# Patient Record
Sex: FEMALE | Race: BLACK OR AFRICAN AMERICAN | NOT HISPANIC OR LATINO | Employment: OTHER | URBAN - METROPOLITAN AREA
[De-identification: names, ages, dates, MRNs, and addresses within clinical notes are randomized per-mention and may not be internally consistent; named-entity substitution may affect disease eponyms.]

---

## 2017-01-04 DIAGNOSIS — M54.2 CHRONIC NECK PAIN: ICD-10-CM

## 2017-01-04 DIAGNOSIS — G89.29 CHRONIC NECK PAIN: ICD-10-CM

## 2017-01-04 DIAGNOSIS — M17.0 PRIMARY OSTEOARTHRITIS OF BOTH KNEES: ICD-10-CM

## 2017-01-04 RX ORDER — OXYCODONE AND ACETAMINOPHEN 10; 325 MG/1; MG/1
1 TABLET ORAL
Qty: 150 TABLET | Refills: 0 | Status: SHIPPED | OUTPATIENT
Start: 2017-01-04 | End: 2017-02-06 | Stop reason: SDUPTHER

## 2017-01-27 DIAGNOSIS — G89.29 CHRONIC NECK PAIN: ICD-10-CM

## 2017-01-27 DIAGNOSIS — M54.2 CHRONIC NECK PAIN: ICD-10-CM

## 2017-01-27 DIAGNOSIS — M54.50 CHRONIC LOW BACK PAIN: ICD-10-CM

## 2017-01-27 DIAGNOSIS — G89.29 CHRONIC LOW BACK PAIN: ICD-10-CM

## 2017-01-27 DIAGNOSIS — M17.0 PRIMARY OSTEOARTHRITIS OF BOTH KNEES: ICD-10-CM

## 2017-01-27 RX ORDER — MELOXICAM 15 MG/1
TABLET ORAL
Qty: 90 TABLET | Refills: 0 | Status: SHIPPED | OUTPATIENT
Start: 2017-01-27 | End: 2017-05-01 | Stop reason: SDUPTHER

## 2017-01-28 DIAGNOSIS — J30.9 ALLERGIC RHINITIS: ICD-10-CM

## 2017-01-30 RX ORDER — CETIRIZINE HYDROCHLORIDE 10 MG/1
TABLET ORAL
Qty: 30 TABLET | Refills: 0 | Status: SHIPPED | OUTPATIENT
Start: 2017-01-30 | End: 2017-02-27 | Stop reason: SDUPTHER

## 2017-02-03 ENCOUNTER — TELEPHONE (OUTPATIENT)
Dept: PHYSICAL MEDICINE AND REHAB | Facility: CLINIC | Age: 67
End: 2017-02-03

## 2017-02-03 NOTE — TELEPHONE ENCOUNTER
----- Message from Raven Schmitt sent at 2/3/2017  2:25 PM CST -----  Contact: Patient [phone]  Patient is calling to see if he can get his appt for 10:30 on Monday 2/6/17, he is currently scheduled for 11:20 am that day. Patient says his girlfriend has an appt for 10:30 am that day and she is his ride. Please call.

## 2017-02-03 NOTE — TELEPHONE ENCOUNTER
----- Message from Raven Schmitt sent at 2/3/2017  2:25 PM CST -----  Contact: Patient [phone]  Patient is calling to see if he can get his appt for 10:30 on Monday 2/6/17, he is currently scheduled for 11:20 am that day. Patient says his girlfriend has an appt for 10:30 am that day and she is his ride. Please call.

## 2017-02-06 ENCOUNTER — OFFICE VISIT (OUTPATIENT)
Dept: PHYSICAL MEDICINE AND REHAB | Facility: CLINIC | Age: 67
End: 2017-02-06
Payer: COMMERCIAL

## 2017-02-06 VITALS
HEIGHT: 62 IN | SYSTOLIC BLOOD PRESSURE: 136 MMHG | DIASTOLIC BLOOD PRESSURE: 81 MMHG | WEIGHT: 271.19 LBS | HEART RATE: 88 BPM | BODY MASS INDEX: 49.9 KG/M2

## 2017-02-06 DIAGNOSIS — M47.26 OSTEOARTHRITIS OF SPINE WITH RADICULOPATHY, LUMBAR REGION: ICD-10-CM

## 2017-02-06 DIAGNOSIS — M17.0 PRIMARY OSTEOARTHRITIS OF BOTH KNEES: ICD-10-CM

## 2017-02-06 DIAGNOSIS — M48.061 LUMBAR SPINAL STENOSIS: ICD-10-CM

## 2017-02-06 DIAGNOSIS — M47.22 OSTEOARTHRITIS OF SPINE WITH RADICULOPATHY, CERVICAL REGION: ICD-10-CM

## 2017-02-06 DIAGNOSIS — G89.29 CHRONIC MIDLINE LOW BACK PAIN WITH BILATERAL SCIATICA: Primary | ICD-10-CM

## 2017-02-06 DIAGNOSIS — G89.29 CHRONIC NECK PAIN: ICD-10-CM

## 2017-02-06 DIAGNOSIS — M54.12 CERVICAL RADICULOPATHY: ICD-10-CM

## 2017-02-06 DIAGNOSIS — M54.16 BILATERAL LUMBAR RADICULOPATHY: ICD-10-CM

## 2017-02-06 DIAGNOSIS — E66.01 MORBID OBESITY, UNSPECIFIED OBESITY TYPE: ICD-10-CM

## 2017-02-06 DIAGNOSIS — M54.41 CHRONIC MIDLINE LOW BACK PAIN WITH BILATERAL SCIATICA: Primary | ICD-10-CM

## 2017-02-06 DIAGNOSIS — M54.42 CHRONIC MIDLINE LOW BACK PAIN WITH BILATERAL SCIATICA: Primary | ICD-10-CM

## 2017-02-06 DIAGNOSIS — M54.2 CHRONIC NECK PAIN: ICD-10-CM

## 2017-02-06 PROCEDURE — 3075F SYST BP GE 130 - 139MM HG: CPT | Mod: S$GLB,,, | Performed by: PHYSICAL MEDICINE & REHABILITATION

## 2017-02-06 PROCEDURE — 99214 OFFICE O/P EST MOD 30 MIN: CPT | Mod: S$GLB,,, | Performed by: PHYSICAL MEDICINE & REHABILITATION

## 2017-02-06 PROCEDURE — 3079F DIAST BP 80-89 MM HG: CPT | Mod: S$GLB,,, | Performed by: PHYSICAL MEDICINE & REHABILITATION

## 2017-02-06 PROCEDURE — 99999 PR PBB SHADOW E&M-EST. PATIENT-LVL III: CPT | Mod: PBBFAC,,, | Performed by: PHYSICAL MEDICINE & REHABILITATION

## 2017-02-06 RX ORDER — OXYCODONE AND ACETAMINOPHEN 10; 325 MG/1; MG/1
1 TABLET ORAL
Qty: 150 TABLET | Refills: 0 | Status: SHIPPED | OUTPATIENT
Start: 2017-02-13 | End: 2017-02-10 | Stop reason: SDUPTHER

## 2017-02-06 RX ORDER — CYCLOBENZAPRINE HCL 10 MG
10 TABLET ORAL NIGHTLY PRN
Qty: 90 TABLET | Refills: 1 | Status: SHIPPED | OUTPATIENT
Start: 2017-02-06 | End: 2017-08-04 | Stop reason: SDUPTHER

## 2017-02-06 RX ORDER — GABAPENTIN 300 MG/1
300 CAPSULE ORAL 3 TIMES DAILY
Qty: 90 CAPSULE | Refills: 2 | Status: SHIPPED | OUTPATIENT
Start: 2017-02-06 | End: 2017-06-05 | Stop reason: SINTOL

## 2017-02-06 NOTE — PROGRESS NOTES
Subjective:       Patient ID: Kuldeep Villela is a 66 y.o. female.    Chief Complaint: No chief complaint on file.    HPI    HISTORY OF PRESENT ILLNESS:  Ms. Villela is a 66-year-old black female who is   followed up in the [facility] for chronic low back pain with   bilateral lumbar radiculopathy, chronic neck pain with bilateral cervical   radiculopathy and OA of the knees.  Her last visit to the clinic was on   11/07/2016.  She was maintained on gabapentin, p.r.n. cyclobenzaprine and p.r.n.   oxycodone.    The patient is coming today to the clinic for followup and refill of her pain   medications.  Her back pain is still a constant severe aching pain in the lumbar   spine and across her back.  The pain radiates to both lower extremities down to   the feet with numbness and tingling.  Her pain is worse with activity and   better with rest.  Her maximum pain is 9-10/10 and minimum 8/10.  Today, it is   9/10.  The patient denies any change in her lower extremity weakness.  She   denies any bowel or bladder incontinence.    She continues to complain of severe neck pain.  It is a constant aching pain in   the cervical spine.  The pain radiates to both upper extremities down to the   hands with numbness.  Her pain is worse with activity and better with rest.  Her   maximum pain is 9-10/10 and minimum 8/10.  Today, it is 9/10.  The patient   complains of chronic upper extremity weakness.    She complains of severe bilateral knee pain.  It is worse with weightbearing and   better with sitting down or lying down.  Her maximum pain is 9-10/10 and   minimum 6/10.  Today, it is 7-8/10.    She is currently taking gabapentin 300 mg p.o. twice per day, meloxicam 15 mg   p.o. once per day, oxycodone/APAP 10/325 p.r.n., usually 4-5 times per day and   cyclobenzaprine 10 mg p.r.n., usually at bedtime for help with muscle spasms and   sleep.  The patient is intending to apply for disability.      MS/HN  dd: 02/06/2017  12:29:22 (CST)  td: 02/06/2017 22:59:08 (CST)  Doc ID   #[id number]  Job ID #[id number]    CC:         Review of Systems   Constitutional: Positive for fatigue.   Eyes: Negative for visual disturbance.   Respiratory: Positive for shortness of breath.    Cardiovascular: Negative for chest pain.   Gastrointestinal: Positive for constipation. Negative for nausea and vomiting.   Genitourinary: Negative for difficulty urinating.   Musculoskeletal: Positive for back pain, gait problem, neck pain and neck stiffness.   Skin: Negative for rash.   Neurological: Positive for dizziness and headaches.   Psychiatric/Behavioral: Positive for sleep disturbance. Negative for behavioral problems.       Objective:      Physical Exam   Constitutional: She appears well-developed and well-nourished.   Ambulating with a rollator walker.   Neck:   Decreased ROM.  +ve tenderness over cervical spine.   Musculoskeletal:   BUE:  ROM: decreased at shoulders.  Strength:    RUE: 4/5 at shoulder abduction, 4 elbow flexion, 4 elbow extension, 4 hand .   LUE: 3+/5 at shoulder abduction, 4 elbow flexion, 4 elbow extension, 4 hand .  Sensation to pinprick:   RUE: intact.   LUE: intact.       BLE:  ROM:full.  Bilateral knee crepitus.   Strength:    RLE: 4-/5 at hip flexion, 4 knee extension, 4 ankle DF/PF.   LLE: 4-/5 at hip flexion, 4 knee extension, 4 ankle DF/PF.  Sensation to pinprick:     RLE: intact.     LLE: intact.  SLR (sitting):    RLE: +ve.     LLE: +ve.   +ve diffuse tenderness over lumbar spine.       Neurological: She is alert.   Psychiatric: Her behavior is normal.   Anxious.   Vitals reviewed.        Assessment:       1. Chronic midline low back pain with bilateral sciatica    2. Osteoarthritis of spine with radiculopathy, lumbar region    3. Bilateral lumbar radiculopathy    4. Lumbar spinal stenosis at L4-L5.    5. Chronic neck pain    6. Cervical radiculopathy    7. Osteoarthritis of spine with radiculopathy, cervical region    8.  Primary osteoarthritis of both knees    9. Morbid obesity, unspecified obesity type        Plan:     - Increase gabapentin (NEURONTIN) 300 MG capsule; Take 1 capsule (300 mg total) by mouth 3 times.  - Continue meloxicam 15 mg po qd.  - Continue cyclobenzaprine (FLEXERIL) 10 MG tablet; Take 1 tablet (10 mg total) by mouth 2 (two) times daily as needed.  - Continue oxycodone-acetaminophen (PERCOCET)  mg per tablet; Take 1 tablet by mouth every 4 to 6 hours as needed for Pain.  - The patienet is not interested in referral for Epidural Steroid Injections.  - Return in about 4 months (around 6/6/2017).

## 2017-02-10 DIAGNOSIS — G89.29 CHRONIC NECK PAIN: ICD-10-CM

## 2017-02-10 DIAGNOSIS — M17.0 PRIMARY OSTEOARTHRITIS OF BOTH KNEES: ICD-10-CM

## 2017-02-10 DIAGNOSIS — M54.2 CHRONIC NECK PAIN: ICD-10-CM

## 2017-02-10 NOTE — TELEPHONE ENCOUNTER
---02/06/17 last office visit  02/06/17 last Rx refill  06/07/17 RTC      -- Message from Raven Schmitt sent at 2/10/2017 11:19 AM [facility] -----  Contact: Patient [phone]  Patient is calling to request a refill on her oxycodone-acetaminophen (PERCOCET)  mg per tablet      [facility] [address] [place] [address] [address] [place] AT [address] [place] [address]  Phone: [phone] Fax: [phone]

## 2017-02-11 RX ORDER — OXYCODONE AND ACETAMINOPHEN 10; 325 MG/1; MG/1
1 TABLET ORAL
Qty: 150 TABLET | Refills: 0 | Status: SHIPPED | OUTPATIENT
Start: 2017-02-13 | End: 2017-03-10 | Stop reason: SDUPTHER

## 2017-02-27 DIAGNOSIS — F39 MOOD DISORDER: ICD-10-CM

## 2017-02-27 DIAGNOSIS — J30.9 ALLERGIC RHINITIS: ICD-10-CM

## 2017-02-27 RX ORDER — CETIRIZINE HYDROCHLORIDE 10 MG/1
TABLET ORAL
Qty: 30 TABLET | Refills: 0 | Status: SHIPPED | OUTPATIENT
Start: 2017-02-27 | End: 2017-03-31 | Stop reason: SDUPTHER

## 2017-02-27 RX ORDER — CITALOPRAM 20 MG/1
TABLET, FILM COATED ORAL
Qty: 30 TABLET | Refills: 0 | Status: SHIPPED | OUTPATIENT
Start: 2017-02-27 | End: 2017-03-31 | Stop reason: SDUPTHER

## 2017-03-10 DIAGNOSIS — G89.29 CHRONIC NECK PAIN: ICD-10-CM

## 2017-03-10 DIAGNOSIS — M17.0 PRIMARY OSTEOARTHRITIS OF BOTH KNEES: ICD-10-CM

## 2017-03-10 DIAGNOSIS — M54.2 CHRONIC NECK PAIN: ICD-10-CM

## 2017-03-10 RX ORDER — OXYCODONE AND ACETAMINOPHEN 10; 325 MG/1; MG/1
1 TABLET ORAL
Qty: 150 TABLET | Refills: 0 | Status: SHIPPED | OUTPATIENT
Start: 2017-03-13 | End: 2017-04-11 | Stop reason: SDUPTHER

## 2017-03-10 NOTE — TELEPHONE ENCOUNTER
02/06/17 last Office visit  02/11/17 last Rx refill  06/05/17 RTC    Patient is requesting a refill on her oxycodone.       [facility] [address] [place] [address] [address] [place] AT [address] [place] [address]   Phone: [phone] Fax: [phone]

## 2017-03-31 DIAGNOSIS — I11.9 BENIGN HYPERTENSIVE HEART DISEASE WITHOUT HEART FAILURE: ICD-10-CM

## 2017-03-31 DIAGNOSIS — J30.9 ALLERGIC RHINITIS: ICD-10-CM

## 2017-03-31 DIAGNOSIS — F39 MOOD DISORDER: ICD-10-CM

## 2017-03-31 RX ORDER — CITALOPRAM 20 MG/1
TABLET, FILM COATED ORAL
Qty: 30 TABLET | Refills: 0 | Status: SHIPPED | OUTPATIENT
Start: 2017-03-31 | End: 2017-05-01 | Stop reason: SDUPTHER

## 2017-03-31 RX ORDER — AMLODIPINE BESYLATE 10 MG/1
TABLET ORAL
Qty: 30 TABLET | Refills: 0 | Status: SHIPPED | OUTPATIENT
Start: 2017-03-31 | End: 2017-05-01 | Stop reason: SDUPTHER

## 2017-03-31 RX ORDER — CETIRIZINE HYDROCHLORIDE 10 MG/1
TABLET ORAL
Qty: 30 TABLET | Refills: 0 | Status: SHIPPED | OUTPATIENT
Start: 2017-03-31 | End: 2017-05-01 | Stop reason: SDUPTHER

## 2017-04-11 DIAGNOSIS — M54.2 CHRONIC NECK PAIN: ICD-10-CM

## 2017-04-11 DIAGNOSIS — G89.29 CHRONIC NECK PAIN: ICD-10-CM

## 2017-04-11 DIAGNOSIS — M17.0 PRIMARY OSTEOARTHRITIS OF BOTH KNEES: ICD-10-CM

## 2017-04-11 RX ORDER — OXYCODONE AND ACETAMINOPHEN 10; 325 MG/1; MG/1
1 TABLET ORAL
Qty: 150 TABLET | Refills: 0 | Status: SHIPPED | OUTPATIENT
Start: 2017-04-11 | End: 2017-05-17 | Stop reason: SDUPTHER

## 2017-05-01 DIAGNOSIS — I11.9 BENIGN HYPERTENSIVE HEART DISEASE WITHOUT HEART FAILURE: ICD-10-CM

## 2017-05-01 DIAGNOSIS — G89.29 CHRONIC LOW BACK PAIN: ICD-10-CM

## 2017-05-01 DIAGNOSIS — M54.50 CHRONIC LOW BACK PAIN: ICD-10-CM

## 2017-05-01 DIAGNOSIS — G89.29 CHRONIC NECK PAIN: ICD-10-CM

## 2017-05-01 DIAGNOSIS — M54.2 CHRONIC NECK PAIN: ICD-10-CM

## 2017-05-01 DIAGNOSIS — M17.0 PRIMARY OSTEOARTHRITIS OF BOTH KNEES: ICD-10-CM

## 2017-05-01 DIAGNOSIS — E78.5 HYPERLIPIDEMIA: ICD-10-CM

## 2017-05-01 DIAGNOSIS — F39 MOOD DISORDER: ICD-10-CM

## 2017-05-01 DIAGNOSIS — J30.9 ALLERGIC RHINITIS: ICD-10-CM

## 2017-05-01 RX ORDER — MELOXICAM 15 MG/1
TABLET ORAL
Qty: 90 TABLET | Refills: 0 | Status: SHIPPED | OUTPATIENT
Start: 2017-05-01 | End: 2017-07-25 | Stop reason: SDUPTHER

## 2017-05-01 RX ORDER — PRAVASTATIN SODIUM 20 MG/1
TABLET ORAL
Qty: 30 TABLET | Refills: 0 | Status: SHIPPED | OUTPATIENT
Start: 2017-05-01 | End: 2017-05-31 | Stop reason: SDUPTHER

## 2017-05-01 RX ORDER — CITALOPRAM 20 MG/1
TABLET, FILM COATED ORAL
Qty: 30 TABLET | Refills: 0 | Status: SHIPPED | OUTPATIENT
Start: 2017-05-01 | End: 2017-05-31 | Stop reason: SDUPTHER

## 2017-05-01 RX ORDER — CETIRIZINE HYDROCHLORIDE 10 MG/1
TABLET ORAL
Qty: 30 TABLET | Refills: 0 | Status: SHIPPED | OUTPATIENT
Start: 2017-05-01 | End: 2017-06-06 | Stop reason: SDUPTHER

## 2017-05-01 RX ORDER — AMLODIPINE BESYLATE 10 MG/1
TABLET ORAL
Qty: 30 TABLET | Refills: 0 | Status: SHIPPED | OUTPATIENT
Start: 2017-05-01 | End: 2017-05-31 | Stop reason: SDUPTHER

## 2017-05-17 DIAGNOSIS — M17.0 PRIMARY OSTEOARTHRITIS OF BOTH KNEES: ICD-10-CM

## 2017-05-17 DIAGNOSIS — G89.29 CHRONIC NECK PAIN: ICD-10-CM

## 2017-05-17 DIAGNOSIS — M54.2 CHRONIC NECK PAIN: ICD-10-CM

## 2017-05-17 RX ORDER — OXYCODONE AND ACETAMINOPHEN 10; 325 MG/1; MG/1
1 TABLET ORAL
Qty: 150 TABLET | Refills: 0 | Status: SHIPPED | OUTPATIENT
Start: 2017-05-17 | End: 2017-06-16 | Stop reason: SDUPTHER

## 2017-05-28 DIAGNOSIS — E78.5 HYPERLIPIDEMIA: ICD-10-CM

## 2017-05-28 DIAGNOSIS — F39 MOOD DISORDER: ICD-10-CM

## 2017-05-28 DIAGNOSIS — I11.9 BENIGN HYPERTENSIVE HEART DISEASE WITHOUT HEART FAILURE: ICD-10-CM

## 2017-05-29 RX ORDER — PRAVASTATIN SODIUM 20 MG/1
TABLET ORAL
Qty: 30 TABLET | Refills: 0 | OUTPATIENT
Start: 2017-05-29

## 2017-05-29 RX ORDER — CITALOPRAM 20 MG/1
TABLET, FILM COATED ORAL
Qty: 30 TABLET | Refills: 0 | OUTPATIENT
Start: 2017-05-29

## 2017-05-29 RX ORDER — AMLODIPINE BESYLATE 10 MG/1
TABLET ORAL
Qty: 30 TABLET | Refills: 0 | OUTPATIENT
Start: 2017-05-29

## 2017-05-31 ENCOUNTER — TELEPHONE (OUTPATIENT)
Dept: FAMILY MEDICINE | Facility: CLINIC | Age: 67
End: 2017-05-31

## 2017-05-31 DIAGNOSIS — I11.9 BENIGN HYPERTENSIVE HEART DISEASE WITHOUT HEART FAILURE: ICD-10-CM

## 2017-05-31 DIAGNOSIS — F39 MOOD DISORDER: ICD-10-CM

## 2017-05-31 DIAGNOSIS — E78.5 HYPERLIPIDEMIA, UNSPECIFIED HYPERLIPIDEMIA TYPE: ICD-10-CM

## 2017-05-31 RX ORDER — PRAVASTATIN SODIUM 20 MG/1
20 TABLET ORAL DAILY
Qty: 30 TABLET | Refills: 0 | Status: SHIPPED | OUTPATIENT
Start: 2017-05-31 | End: 2017-06-06 | Stop reason: SDUPTHER

## 2017-05-31 RX ORDER — AMLODIPINE BESYLATE 10 MG/1
TABLET ORAL
Qty: 30 TABLET | Refills: 0 | Status: SHIPPED | OUTPATIENT
Start: 2017-05-31 | End: 2017-06-06 | Stop reason: SDUPTHER

## 2017-05-31 RX ORDER — CITALOPRAM 20 MG/1
TABLET, FILM COATED ORAL
Qty: 30 TABLET | Refills: 0 | Status: SHIPPED | OUTPATIENT
Start: 2017-05-31 | End: 2017-06-06

## 2017-05-31 NOTE — TELEPHONE ENCOUNTER
----- Message from Dawna Gage sent at 5/31/2017  8:27 AM CDT -----  Contact: self   Pt request to speak the nurse regarding about her medication. Please contact the pt at [phone]. Thanks!

## 2017-05-31 NOTE — TELEPHONE ENCOUNTER
"Patient contacted regarding her message; informed her that request was refused; patient needed an appointment. Patient stated that she has an appointment, but, "she needs her medication today".  "

## 2017-06-05 ENCOUNTER — OFFICE VISIT (OUTPATIENT)
Dept: PHYSICAL MEDICINE AND REHAB | Facility: CLINIC | Age: 67
End: 2017-06-05
Payer: COMMERCIAL

## 2017-06-05 VITALS
WEIGHT: 277.75 LBS | SYSTOLIC BLOOD PRESSURE: 115 MMHG | HEIGHT: 62 IN | HEART RATE: 87 BPM | DIASTOLIC BLOOD PRESSURE: 75 MMHG | BODY MASS INDEX: 51.11 KG/M2

## 2017-06-05 DIAGNOSIS — G89.29 CHRONIC NECK PAIN: ICD-10-CM

## 2017-06-05 DIAGNOSIS — M54.16 BILATERAL LUMBAR RADICULOPATHY: ICD-10-CM

## 2017-06-05 DIAGNOSIS — M17.0 PRIMARY OSTEOARTHRITIS OF BOTH KNEES: ICD-10-CM

## 2017-06-05 DIAGNOSIS — G89.29 CHRONIC MIDLINE LOW BACK PAIN WITH BILATERAL SCIATICA: Primary | ICD-10-CM

## 2017-06-05 DIAGNOSIS — M54.42 CHRONIC MIDLINE LOW BACK PAIN WITH BILATERAL SCIATICA: Primary | ICD-10-CM

## 2017-06-05 DIAGNOSIS — M47.22 OSTEOARTHRITIS OF SPINE WITH RADICULOPATHY, CERVICAL REGION: ICD-10-CM

## 2017-06-05 DIAGNOSIS — M54.2 CHRONIC NECK PAIN: ICD-10-CM

## 2017-06-05 DIAGNOSIS — M54.41 CHRONIC MIDLINE LOW BACK PAIN WITH BILATERAL SCIATICA: Primary | ICD-10-CM

## 2017-06-05 DIAGNOSIS — E66.01 MORBID OBESITY, UNSPECIFIED OBESITY TYPE: ICD-10-CM

## 2017-06-05 DIAGNOSIS — M48.061 LUMBAR SPINAL STENOSIS: ICD-10-CM

## 2017-06-05 DIAGNOSIS — M47.26 OSTEOARTHRITIS OF SPINE WITH RADICULOPATHY, LUMBAR REGION: ICD-10-CM

## 2017-06-05 DIAGNOSIS — M54.12 CERVICAL RADICULOPATHY: ICD-10-CM

## 2017-06-05 PROCEDURE — 99214 OFFICE O/P EST MOD 30 MIN: CPT | Mod: S$GLB,,, | Performed by: PHYSICAL MEDICINE & REHABILITATION

## 2017-06-05 PROCEDURE — 99999 PR PBB SHADOW E&M-EST. PATIENT-LVL II: CPT | Mod: PBBFAC,,, | Performed by: PHYSICAL MEDICINE & REHABILITATION

## 2017-06-05 RX ORDER — PREGABALIN 50 MG/1
50 CAPSULE ORAL 3 TIMES DAILY
Qty: 90 CAPSULE | Refills: 1 | Status: SHIPPED | OUTPATIENT
Start: 2017-06-05 | End: 2017-12-04 | Stop reason: SDUPTHER

## 2017-06-05 NOTE — PROGRESS NOTES
Subjective:       Patient ID: Kuldeep Villela is a 66 y.o. female.    Chief Complaint: No chief complaint on file.    HPI    HISTORY OF PRESENT ILLNESS:  Mrs. Villela is a 66-year-old black female with   osteoarthritis and morbid obesity who is followed up in the Physical Medicine   Clinic for chronic low back pain with bilateral lumbar radiculopathy, chronic   neck pain with bilateral cervical radiculopathy and OA of the knees.  Her last   visit to the clinic was on 02/06/2017.  She was maintained on meloxicam,   gabapentin, p.r.n. oxycodone and p.r.n. cyclobenzaprine.    The patient is coming today to the clinic for followup.  Her back pain has been   stable.  It is a constant aching pain in the lumbar spine.  The pain radiates to   both feet with numbness.  Her pain is worse with activity and better with rest.    Her maximum pain is 9-10/10 and minimum 8/10.  Today, it is 9-10/10.  The   patient denies any changes in lower extremity strength.  She denies any bowel or   bladder incontinence.    Her neck pain has also been about the same.  It is a constant aching pain in the   cervical spine.  It radiates to both hands with numbness.  It is worse with   activity and better with rest.  Her maximum pain is 9-10/10 and minimum 8/10.    Today, it is 9-10/10.  The patient denies any change in her upper extremity   strength.    She continues to complain of bilateral knee pain.  It is a constant aching pain   aggravated by weightbearing and better with sitting down or lying down.  Her   maximum pain is 9-10/10 and minimum 5-6/10.  Today, it is 5-6/10.    The patient is taking meloxicam 15 mg p.o. daily.  She takes gabapentin 300 mg   p.o. t.i.d.  However, she reports increase in her appetite and weight gain when   she takes it.  She takes oxycodone/APAP 10/325 p.r.n., usually 4-5 times per   day.  She takes cyclobenzaprine 10 mg p.o. three times a day to help with muscle   spasms.      MS/HN  dd: 06/05/2017 11:40:02 (CDT)   td: 06/06/2017 08:55:22 (CDT)  Doc ID   #[id number]  Job ID #[id number]    CC:         Review of Systems   Constitutional: Positive for fatigue.   Eyes: Negative for visual disturbance.   Respiratory: Positive for shortness of breath.    Cardiovascular: Negative for chest pain.   Gastrointestinal: Positive for constipation. Negative for nausea and vomiting.   Genitourinary: Negative for difficulty urinating.   Musculoskeletal: Positive for back pain, gait problem, neck pain and neck stiffness.   Skin: Negative for rash.   Neurological: Positive for dizziness and headaches.   Psychiatric/Behavioral: Positive for sleep disturbance. Negative for behavioral problems.       Objective:      Physical Exam   Constitutional: She appears well-developed and well-nourished.   Ambulating with a rollator walker.   Neck:   Decreased ROM.  +ve tenderness over cervical spine.   Musculoskeletal:   BUE:  ROM: decreased at shoulders.  Strength:    RUE: 4/5 at shoulder abduction, 4 elbow flexion, 4 elbow extension, 4 hand .   LUE: 3+/5 at shoulder abduction, 4 elbow flexion, 4 elbow extension, 4 hand .  Sensation to pinprick:   RUE: intact.   LUE: intact.       BLE:  ROM:full.  Bilateral knee crepitus.   Strength:    RLE: 4-/5 at hip flexion, 4 knee extension, 4 ankle DF/PF.   LLE: 4-/5 at hip flexion, 4 knee extension, 4 ankle DF/PF.  Sensation to pinprick:     RLE: intact.     LLE: intact.  SLR (sitting):    RLE: +ve.     LLE: +ve.   +ve diffuse tenderness over lumbar spine.       Neurological: She is alert.   Psychiatric: Her behavior is normal.   Anxious.   Vitals reviewed.        Assessment:       1. Chronic midline low back pain with bilateral sciatica    2. Osteoarthritis of spine with radiculopathy, lumbar region    3. Bilateral lumbar radiculopathy    4. Lumbar spinal stenosis at L4-L5.    5. Chronic neck pain    6. Cervical radiculopathy, BUE    7. Osteoarthritis of spine with radiculopathy, cervical region    8. Primary  osteoarthritis of both knees    9. Morbid obesity, unspecified obesity type        Plan:     - Discontinue gabapentin (due to increased appetite and weight gain).  - Start pregabalin (LYRICA) 50 MG capsule; Take 1 capsule (50 mg total) by mouth 3 (three) times daily.  - Continue meloxicam 15 mg po qd.  - Continue cyclobenzaprine (FLEXERIL) 10 MG tablet; Take 1 tablet (10 mg total) by mouth 2 (two) times daily as needed.  - Continue oxycodone-acetaminophen (PERCOCET)  mg per tablet; Take 1 tablet by mouth every 4 to 6 hours as needed for Pain.  - The patienet is not interested in referral for Epidural Steroid Injections.  - She was encouraged to pursue weight loss surgery.  - The patient is not able to commit to a course of Physical Therapy at this point due to transportation problems. She was encouraged to check with a  for possible resources..  - Return in about 3 months (around 9/5/2017).

## 2017-06-06 ENCOUNTER — LAB VISIT (OUTPATIENT)
Dept: LAB | Facility: HOSPITAL | Age: 67
End: 2017-06-06
Attending: FAMILY MEDICINE
Payer: COMMERCIAL

## 2017-06-06 ENCOUNTER — OFFICE VISIT (OUTPATIENT)
Dept: FAMILY MEDICINE | Facility: CLINIC | Age: 67
End: 2017-06-06
Payer: COMMERCIAL

## 2017-06-06 VITALS
OXYGEN SATURATION: 96 % | BODY MASS INDEX: 50.81 KG/M2 | DIASTOLIC BLOOD PRESSURE: 78 MMHG | SYSTOLIC BLOOD PRESSURE: 138 MMHG | HEART RATE: 88 BPM | HEIGHT: 62 IN | TEMPERATURE: 98 F

## 2017-06-06 DIAGNOSIS — I11.9 BENIGN HYPERTENSIVE HEART DISEASE WITHOUT HEART FAILURE: ICD-10-CM

## 2017-06-06 DIAGNOSIS — Z72.0 TOBACCO ABUSE: ICD-10-CM

## 2017-06-06 DIAGNOSIS — I10 ESSENTIAL HYPERTENSION: Primary | ICD-10-CM

## 2017-06-06 DIAGNOSIS — E78.5 HYPERLIPIDEMIA, UNSPECIFIED HYPERLIPIDEMIA TYPE: ICD-10-CM

## 2017-06-06 DIAGNOSIS — J30.89 ALLERGIC RHINITIS DUE TO OTHER ALLERGIC TRIGGER, UNSPECIFIED RHINITIS SEASONALITY: ICD-10-CM

## 2017-06-06 DIAGNOSIS — F41.9 ANXIETY: ICD-10-CM

## 2017-06-06 DIAGNOSIS — I10 ESSENTIAL HYPERTENSION: ICD-10-CM

## 2017-06-06 LAB
ALBUMIN SERPL BCP-MCNC: 4 G/DL
ALP SERPL-CCNC: 74 U/L
ALT SERPL W/O P-5'-P-CCNC: 9 U/L
ANION GAP SERPL CALC-SCNC: 9 MMOL/L
AST SERPL-CCNC: 14 U/L
BASOPHILS # BLD AUTO: 0.02 K/UL
BASOPHILS NFR BLD: 0.3 %
BILIRUB SERPL-MCNC: 0.6 MG/DL
BUN SERPL-MCNC: 11 MG/DL
CALCIUM SERPL-MCNC: 9.9 MG/DL
CHLORIDE SERPL-SCNC: 103 MMOL/L
CHOLEST/HDLC SERPL: 6.1 {RATIO}
CO2 SERPL-SCNC: 30 MMOL/L
CREAT SERPL-MCNC: 1 MG/DL
DIFFERENTIAL METHOD: NORMAL
EOSINOPHIL # BLD AUTO: 0.4 K/UL
EOSINOPHIL NFR BLD: 5.3 %
ERYTHROCYTE [DISTWIDTH] IN BLOOD BY AUTOMATED COUNT: 13.5 %
EST. GFR  (AFRICAN AMERICAN): >60 ML/MIN/1.73 M^2
EST. GFR  (NON AFRICAN AMERICAN): 60 ML/MIN/1.73 M^2
GLUCOSE SERPL-MCNC: 87 MG/DL
HCT VFR BLD AUTO: 41.8 %
HDL/CHOLESTEROL RATIO: 16.5 %
HDLC SERPL-MCNC: 164 MG/DL
HDLC SERPL-MCNC: 27 MG/DL
HGB BLD-MCNC: 14 G/DL
LDLC SERPL CALC-MCNC: 116.8 MG/DL
LYMPHOCYTES # BLD AUTO: 1.9 K/UL
LYMPHOCYTES NFR BLD: 26 %
MCH RBC QN AUTO: 31 PG
MCHC RBC AUTO-ENTMCNC: 33.5 %
MCV RBC AUTO: 93 FL
MONOCYTES # BLD AUTO: 0.5 K/UL
MONOCYTES NFR BLD: 6.3 %
NEUTROPHILS # BLD AUTO: 4.6 K/UL
NEUTROPHILS NFR BLD: 62.1 %
NONHDLC SERPL-MCNC: 137 MG/DL
PLATELET # BLD AUTO: 268 K/UL
PMV BLD AUTO: 10.6 FL
POTASSIUM SERPL-SCNC: 3.8 MMOL/L
PROT SERPL-MCNC: 7.4 G/DL
RBC # BLD AUTO: 4.52 M/UL
SODIUM SERPL-SCNC: 142 MMOL/L
TRIGL SERPL-MCNC: 101 MG/DL
TSH SERPL DL<=0.005 MIU/L-ACNC: 0.69 UIU/ML
WBC # BLD AUTO: 7.32 K/UL

## 2017-06-06 PROCEDURE — 36415 COLL VENOUS BLD VENIPUNCTURE: CPT

## 2017-06-06 PROCEDURE — 99214 OFFICE O/P EST MOD 30 MIN: CPT | Mod: S$GLB,,, | Performed by: FAMILY MEDICINE

## 2017-06-06 PROCEDURE — 80053 COMPREHEN METABOLIC PANEL: CPT

## 2017-06-06 PROCEDURE — 84443 ASSAY THYROID STIM HORMONE: CPT

## 2017-06-06 PROCEDURE — 80061 LIPID PANEL: CPT

## 2017-06-06 PROCEDURE — 85025 COMPLETE CBC W/AUTO DIFF WBC: CPT

## 2017-06-06 PROCEDURE — 99999 PR PBB SHADOW E&M-EST. PATIENT-LVL III: CPT | Mod: PBBFAC,,, | Performed by: FAMILY MEDICINE

## 2017-06-06 RX ORDER — VARENICLINE TARTRATE 0.5 (11)-1
KIT ORAL
Qty: 1 PACKAGE | Refills: 0 | Status: SHIPPED | OUTPATIENT
Start: 2017-06-06 | End: 2017-06-06

## 2017-06-06 RX ORDER — VARENICLINE TARTRATE 0.5 (11)-1
KIT ORAL
Qty: 1 PACKAGE | Refills: 0 | Status: SHIPPED | OUTPATIENT
Start: 2017-06-06 | End: 2018-11-29 | Stop reason: SDUPTHER

## 2017-06-06 RX ORDER — CETIRIZINE HYDROCHLORIDE 10 MG/1
TABLET ORAL
Qty: 30 TABLET | Refills: 5 | Status: SHIPPED | OUTPATIENT
Start: 2017-06-06 | End: 2017-12-29 | Stop reason: SDUPTHER

## 2017-06-06 RX ORDER — CITALOPRAM 40 MG/1
40 TABLET, FILM COATED ORAL DAILY
Qty: 30 TABLET | Refills: 5 | Status: SHIPPED | OUTPATIENT
Start: 2017-06-06 | End: 2017-11-29 | Stop reason: SDUPTHER

## 2017-06-06 RX ORDER — AMLODIPINE BESYLATE 10 MG/1
TABLET ORAL
Qty: 30 TABLET | Refills: 5 | Status: SHIPPED | OUTPATIENT
Start: 2017-06-06 | End: 2017-12-28 | Stop reason: SDUPTHER

## 2017-06-06 RX ORDER — PRAVASTATIN SODIUM 20 MG/1
20 TABLET ORAL DAILY
Qty: 30 TABLET | Refills: 5 | Status: SHIPPED | OUTPATIENT
Start: 2017-06-06 | End: 2017-12-28 | Stop reason: SDUPTHER

## 2017-06-06 NOTE — PROGRESS NOTES
"Subjective:       Patient ID: Kuldeep Villela is a 66 y.o. female.    Chief Complaint: Discuss Meds    66 year old female with hypertension, DJD of C spine and L spine, tobacco abuse, anxiety and depression, morbid obesity presents for several issues. She would like to quit smoking due to the cost. She also should not be smoking as she is on hormone therapy, which is a risk  factor for blood clots anyway along with her obesity and immobility.   She also feels her anxiety and depression is not controlled and would like an increase in her Celexa 20 mg.    She has hypertension and hyperlipidemia and denies problems with her medication. she does need refills and labs though.         Review of Systems   Constitutional: Negative for fatigue.   Respiratory: Negative for cough, chest tightness and shortness of breath.    Cardiovascular: Negative for chest pain, palpitations and leg swelling.   Gastrointestinal: Negative for abdominal pain.   Neurological: Negative for dizziness, syncope, light-headedness and headaches.       Objective:       Vitals:    06/06/17 1107   BP: 138/78   Pulse: 88   Temp: 97.7 °F (36.5 °C)   TempSrc: Oral   SpO2: 96%   Height: 5' 2" (1.575 m)       Physical Exam   Constitutional: She is oriented to person, place, and time. She appears well-developed and well-nourished. No distress.   HENT:   Head: Normocephalic and atraumatic.   Eyes: Conjunctivae are normal.   Neck: Normal range of motion. Neck supple. Carotid bruit is not present.   Cardiovascular: Normal rate, regular rhythm and normal heart sounds.  Exam reveals no gallop and no friction rub.    No murmur heard.  Pulmonary/Chest: Effort normal and breath sounds normal. No respiratory distress. She has no wheezes. She has no rales.   Musculoskeletal: She exhibits no edema.   Neurological: She is alert and oriented to person, place, and time.   Skin: She is not diaphoretic.       Assessment:       1. Essential hypertension    2. Anxiety    3. " Allergic rhinitis due to other allergic trigger, unspecified rhinitis seasonality    4. Hyperlipidemia, unspecified hyperlipidemia type    5. Benign hypertensive heart disease without heart failure    6. Tobacco abuse        Plan:       Kuldeep was seen today for discuss meds.    Diagnoses and all orders for this visit:    Essential hypertension  -     Comprehensive metabolic panel; Future  -     Lipid panel; Future  -     CBC auto differential; Future  -     TSH; Future    Anxiety  -     citalopram (CELEXA) 40 MG tablet; Take 1 tablet (40 mg total) by mouth once daily.  Increased her celexa today.   Return in about 1 month (around 7/6/2017) for depression recheck.      Allergic rhinitis due to other allergic trigger, unspecified rhinitis seasonality  -     cetirizine (ZYRTEC) 10 MG tablet; TAKE 1 TABLET(10 MG) BY MOUTH EVERY DAY    Hyperlipidemia, unspecified hyperlipidemia type  -     pravastatin (PRAVACHOL) 20 MG tablet; Take 1 tablet (20 mg total) by mouth once daily.  Refilled her pravastatin.   Benign hypertensive heart disease without heart failure  -     amlodipine (NORVASC) 10 MG tablet; TAKE 1 TABLET(10 MG) BY MOUTH EVERY DAY  Stable. Refilled meds and due for labs    Tobacco abuse  -     varenicline (CHANTIX STARTING MONTH BOX) 0.5 mg (11)- 1 mg (42) tablet; Take one 0.5mg tab by mouth once daily X3 days,then increase to one 0.5mg tab twice daily X4 days,then increase to one 1mg tab twice daily  Starting chantix to help her quit smoking.

## 2017-06-16 DIAGNOSIS — M54.2 CHRONIC NECK PAIN: ICD-10-CM

## 2017-06-16 DIAGNOSIS — M17.0 PRIMARY OSTEOARTHRITIS OF BOTH KNEES: ICD-10-CM

## 2017-06-16 DIAGNOSIS — G89.29 CHRONIC NECK PAIN: ICD-10-CM

## 2017-06-16 RX ORDER — OXYCODONE AND ACETAMINOPHEN 10; 325 MG/1; MG/1
1 TABLET ORAL
Qty: 150 TABLET | Refills: 0 | Status: SHIPPED | OUTPATIENT
Start: 2017-06-16 | End: 2017-07-25 | Stop reason: SDUPTHER

## 2017-06-27 DIAGNOSIS — F39 MOOD DISORDER: ICD-10-CM

## 2017-06-27 RX ORDER — CITALOPRAM 20 MG/1
TABLET, FILM COATED ORAL
Qty: 30 TABLET | Refills: 0 | OUTPATIENT
Start: 2017-06-27

## 2017-06-28 DIAGNOSIS — I11.9 BENIGN HYPERTENSIVE HEART DISEASE WITHOUT HEART FAILURE: ICD-10-CM

## 2017-06-28 DIAGNOSIS — E78.5 HYPERLIPIDEMIA, UNSPECIFIED HYPERLIPIDEMIA TYPE: ICD-10-CM

## 2017-06-28 RX ORDER — AMLODIPINE BESYLATE 10 MG/1
TABLET ORAL
Qty: 30 TABLET | Refills: 5 | Status: SHIPPED | OUTPATIENT
Start: 2017-06-28 | End: 2018-06-28 | Stop reason: SDUPTHER

## 2017-06-28 RX ORDER — PRAVASTATIN SODIUM 20 MG/1
TABLET ORAL
Qty: 30 TABLET | Refills: 5 | Status: SHIPPED | OUTPATIENT
Start: 2017-06-28 | End: 2018-06-28 | Stop reason: SDUPTHER

## 2017-07-12 NOTE — TELEPHONE ENCOUNTER
----- Message from Lily Ferreira sent at 7/12/2017 12:58 PM CDT -----  Contact: self  REFILL: varenicline (CHANTIX STARTING MONTH BOX) 0.5 mg (11)- 1 mg (42) tablet    Patient also needs something to help her with SOB per discuss with Dr hearn. Please contact her at [phone].    Thanks!

## 2017-07-17 NOTE — TELEPHONE ENCOUNTER
Patient states that she has already discussed this during her last visit with Dr. Musa/ Will patient need another appointment?/ please advise

## 2017-07-25 DIAGNOSIS — M17.0 PRIMARY OSTEOARTHRITIS OF BOTH KNEES: ICD-10-CM

## 2017-07-25 DIAGNOSIS — G89.29 CHRONIC NECK PAIN: ICD-10-CM

## 2017-07-25 DIAGNOSIS — M54.2 CHRONIC NECK PAIN: ICD-10-CM

## 2017-07-25 RX ORDER — MELOXICAM 15 MG/1
15 TABLET ORAL DAILY
Qty: 90 TABLET | Refills: 0 | Status: SHIPPED | OUTPATIENT
Start: 2017-07-25 | End: 2017-09-15 | Stop reason: SDUPTHER

## 2017-07-25 RX ORDER — OXYCODONE AND ACETAMINOPHEN 10; 325 MG/1; MG/1
1 TABLET ORAL
Qty: 150 TABLET | Refills: 0 | Status: SHIPPED | OUTPATIENT
Start: 2017-07-25 | End: 2017-08-31 | Stop reason: SDUPTHER

## 2017-07-27 DIAGNOSIS — G89.29 CHRONIC NECK PAIN: ICD-10-CM

## 2017-07-27 DIAGNOSIS — M54.2 CHRONIC NECK PAIN: ICD-10-CM

## 2017-07-27 DIAGNOSIS — M54.50 CHRONIC LOW BACK PAIN: ICD-10-CM

## 2017-07-27 DIAGNOSIS — M17.0 PRIMARY OSTEOARTHRITIS OF BOTH KNEES: ICD-10-CM

## 2017-07-27 DIAGNOSIS — G89.29 CHRONIC LOW BACK PAIN: ICD-10-CM

## 2017-07-27 RX ORDER — MELOXICAM 15 MG/1
TABLET ORAL
Qty: 90 TABLET | Refills: 0 | Status: SHIPPED | OUTPATIENT
Start: 2017-07-27 | End: 2017-12-04

## 2017-08-04 DIAGNOSIS — M54.41 CHRONIC MIDLINE LOW BACK PAIN WITH BILATERAL SCIATICA: ICD-10-CM

## 2017-08-04 DIAGNOSIS — G89.29 CHRONIC MIDLINE LOW BACK PAIN WITH BILATERAL SCIATICA: ICD-10-CM

## 2017-08-04 DIAGNOSIS — M54.42 CHRONIC MIDLINE LOW BACK PAIN WITH BILATERAL SCIATICA: ICD-10-CM

## 2017-08-04 RX ORDER — CYCLOBENZAPRINE HCL 10 MG
10 TABLET ORAL NIGHTLY PRN
Qty: 90 TABLET | Refills: 1 | Status: SHIPPED | OUTPATIENT
Start: 2017-08-04 | End: 2017-11-29 | Stop reason: SDUPTHER

## 2017-08-24 DIAGNOSIS — Z12.11 COLON CANCER SCREENING: ICD-10-CM

## 2017-08-31 DIAGNOSIS — G89.29 CHRONIC NECK PAIN: ICD-10-CM

## 2017-08-31 DIAGNOSIS — M54.2 CHRONIC NECK PAIN: ICD-10-CM

## 2017-08-31 DIAGNOSIS — M17.0 PRIMARY OSTEOARTHRITIS OF BOTH KNEES: ICD-10-CM

## 2017-08-31 RX ORDER — OXYCODONE AND ACETAMINOPHEN 10; 325 MG/1; MG/1
1 TABLET ORAL
Qty: 150 TABLET | Refills: 0 | Status: SHIPPED | OUTPATIENT
Start: 2017-08-31 | End: 2017-09-28 | Stop reason: SDUPTHER

## 2017-09-15 DIAGNOSIS — G89.29 CHRONIC NECK PAIN: ICD-10-CM

## 2017-09-15 DIAGNOSIS — M17.0 PRIMARY OSTEOARTHRITIS OF BOTH KNEES: ICD-10-CM

## 2017-09-15 DIAGNOSIS — M54.2 CHRONIC NECK PAIN: ICD-10-CM

## 2017-09-15 RX ORDER — MELOXICAM 15 MG/1
15 TABLET ORAL DAILY
Qty: 90 TABLET | Refills: 0 | Status: SHIPPED | OUTPATIENT
Start: 2017-09-15 | End: 2017-11-29 | Stop reason: SDUPTHER

## 2017-09-15 NOTE — TELEPHONE ENCOUNTER
First time receiving patient request.  Rx refill placed.  Please call office back if you have any questions or concerns.    ----- Message from Raven Schmitt sent at 9/15/2017 12:14 PM CDT -----  Contact: Patient [phone]  Patient states she is calling back to request a refill on her meloxicam (MOBIC) 15 MG tablet. Patient states she called 2 days ago for refill and was not called back. Patient states she is in a lot of pain and it feels like knee is about to fall off. Please call      [facility] [address] [place] [address] [address] [address] AT [address]  [place] [address]  Phone: [phone] Fax: [phone]

## 2017-09-28 DIAGNOSIS — G89.29 CHRONIC NECK PAIN: ICD-10-CM

## 2017-09-28 DIAGNOSIS — M54.2 CHRONIC NECK PAIN: ICD-10-CM

## 2017-09-28 DIAGNOSIS — M17.0 PRIMARY OSTEOARTHRITIS OF BOTH KNEES: ICD-10-CM

## 2017-09-28 RX ORDER — OXYCODONE AND ACETAMINOPHEN 10; 325 MG/1; MG/1
1 TABLET ORAL
Qty: 150 TABLET | Refills: 0 | Status: SHIPPED | OUTPATIENT
Start: 2017-09-30 | End: 2017-10-30 | Stop reason: SDUPTHER

## 2017-10-30 DIAGNOSIS — M54.2 CHRONIC NECK PAIN: ICD-10-CM

## 2017-10-30 DIAGNOSIS — M17.0 PRIMARY OSTEOARTHRITIS OF BOTH KNEES: ICD-10-CM

## 2017-10-30 DIAGNOSIS — G89.29 CHRONIC NECK PAIN: ICD-10-CM

## 2017-10-30 RX ORDER — OXYCODONE AND ACETAMINOPHEN 10; 325 MG/1; MG/1
1 TABLET ORAL
Qty: 150 TABLET | Refills: 0 | Status: SHIPPED | OUTPATIENT
Start: 2017-10-30 | End: 2017-12-01 | Stop reason: SDUPTHER

## 2017-10-30 NOTE — TELEPHONE ENCOUNTER
09/28/17 last Rx refill  06/05/17 office visit  01/19/17 RTC    Pt is req a refill for oxycodone 10/325.  [facility] on [facility] in [place].  Pt would like to speak with someone about helping her get an apartment.  pls call.

## 2017-11-29 DIAGNOSIS — G89.29 CHRONIC NECK PAIN: ICD-10-CM

## 2017-11-29 DIAGNOSIS — M54.42 CHRONIC MIDLINE LOW BACK PAIN WITH BILATERAL SCIATICA: ICD-10-CM

## 2017-11-29 DIAGNOSIS — F41.9 ANXIETY: ICD-10-CM

## 2017-11-29 DIAGNOSIS — M54.41 CHRONIC MIDLINE LOW BACK PAIN WITH BILATERAL SCIATICA: ICD-10-CM

## 2017-11-29 DIAGNOSIS — M17.0 PRIMARY OSTEOARTHRITIS OF BOTH KNEES: ICD-10-CM

## 2017-11-29 DIAGNOSIS — G89.29 CHRONIC MIDLINE LOW BACK PAIN WITH BILATERAL SCIATICA: ICD-10-CM

## 2017-11-29 DIAGNOSIS — M54.2 CHRONIC NECK PAIN: ICD-10-CM

## 2017-11-29 RX ORDER — CYCLOBENZAPRINE HCL 10 MG
10 TABLET ORAL NIGHTLY PRN
Qty: 90 TABLET | Refills: 1 | Status: SHIPPED | OUTPATIENT
Start: 2017-11-29 | End: 2018-03-02 | Stop reason: SDUPTHER

## 2017-11-29 RX ORDER — CITALOPRAM 40 MG/1
TABLET, FILM COATED ORAL
Qty: 30 TABLET | Refills: 5 | Status: SHIPPED | OUTPATIENT
Start: 2017-11-29 | End: 2018-05-30 | Stop reason: SDUPTHER

## 2017-11-29 RX ORDER — MELOXICAM 15 MG/1
15 TABLET ORAL DAILY
Qty: 90 TABLET | Refills: 0 | Status: SHIPPED | OUTPATIENT
Start: 2017-11-29 | End: 2017-12-04

## 2017-11-29 NOTE — TELEPHONE ENCOUNTER
06/05/17 last office visit  08/04/17 last Rx refill- flexeril  09/15/17 last Rx refill-Mobic  01/19/18 RTC          Patient called in requesting to get a refill on medications cyclobenzaprine, meloxicem, oxycodin. Please contact patient for further information if needed.  Patient's contact info is [phone]. Thank You.

## 2017-11-29 NOTE — TELEPHONE ENCOUNTER
----- Message from Triston Navarrete sent at 11/29/2017 12:03 PM CST -----  Contact: Patient  Patient called in requesting to schedule a sooner appointment with Dr. Esquivel. Please have nurse to contact patient to do so. Contact number is [phone]. Thank You.

## 2017-12-01 ENCOUNTER — NURSE TRIAGE (OUTPATIENT)
Dept: ADMINISTRATIVE | Facility: CLINIC | Age: 67
End: 2017-12-01

## 2017-12-01 DIAGNOSIS — M54.2 CHRONIC NECK PAIN: ICD-10-CM

## 2017-12-01 DIAGNOSIS — G89.29 CHRONIC NECK PAIN: ICD-10-CM

## 2017-12-01 DIAGNOSIS — M17.0 PRIMARY OSTEOARTHRITIS OF BOTH KNEES: ICD-10-CM

## 2017-12-01 RX ORDER — OXYCODONE AND ACETAMINOPHEN 10; 325 MG/1; MG/1
1 TABLET ORAL
Qty: 150 TABLET | Refills: 0 | Status: SHIPPED | OUTPATIENT
Start: 2017-12-02 | End: 2018-01-02 | Stop reason: SDUPTHER

## 2017-12-02 ENCOUNTER — HOSPITAL ENCOUNTER (EMERGENCY)
Facility: HOSPITAL | Age: 67
Discharge: HOME | End: 2017-12-02
Attending: EMERGENCY MEDICINE
Payer: COMMERCIAL

## 2017-12-02 ENCOUNTER — NURSE TRIAGE (OUTPATIENT)
Dept: ADMINISTRATIVE | Facility: CLINIC | Age: 67
End: 2017-12-02

## 2017-12-02 VITALS
TEMPERATURE: 98 F | WEIGHT: 272.06 LBS | OXYGEN SATURATION: 100 % | SYSTOLIC BLOOD PRESSURE: 144 MMHG | RESPIRATION RATE: 20 BRPM | HEART RATE: 88 BPM | HEIGHT: 62 IN | DIASTOLIC BLOOD PRESSURE: 86 MMHG | BODY MASS INDEX: 50.06 KG/M2

## 2017-12-02 DIAGNOSIS — G25.81 RESTLESS LEGS: ICD-10-CM

## 2017-12-02 DIAGNOSIS — G89.29 CHRONIC LOW BACK PAIN, UNSPECIFIED BACK PAIN LATERALITY, WITH SCIATICA PRESENCE UNSPECIFIED: Primary | ICD-10-CM

## 2017-12-02 DIAGNOSIS — M54.5 CHRONIC LOW BACK PAIN, UNSPECIFIED BACK PAIN LATERALITY, WITH SCIATICA PRESENCE UNSPECIFIED: Primary | ICD-10-CM

## 2017-12-02 PROCEDURE — 99283 EMERGENCY DEPT VISIT LOW MDM: CPT

## 2017-12-02 PROCEDURE — 99283 EMERGENCY DEPT VISIT LOW MDM: CPT | Mod: ,,, | Performed by: PHYSICIAN ASSISTANT

## 2017-12-02 RX ORDER — MELOXICAM 7.5 MG/1
15 TABLET ORAL DAILY
Qty: 20 TABLET | Refills: 0 | Status: SHIPPED | OUTPATIENT
Start: 2017-12-02 | End: 2017-12-04 | Stop reason: SDUPTHER

## 2017-12-02 NOTE — TELEPHONE ENCOUNTER
"  Reason for Disposition   Unable to walk    Answer Assessment - Initial Assessment Questions  1. ONSET: "When did the pain start?"       tonight  2. LOCATION: "Where is the pain located?"       Both legs lower back  3. PAIN: "How bad is the pain?"    (Scale 1-10; or mild, moderate, severe)    -  MILD (1-3): doesn't interfere with normal activities     -  MODERATE (4-7): interferes with normal activities (e.g., work or school) or awakens from sleep, limping     -  SEVERE (8-10): excruciating pain, unable to do any normal activities, unable to walk      severe  4. WORK OR EXERCISE: "Has there been any recent work or exercise that involved this part of the body?"       unanswered  5. CAUSE: "What do you think is causing the leg pain?"      arthritis  6. OTHER SYMPTOMS: "Do you have any other symptoms?" (e.g., chest pain, back pain, breathing difficulty, swelling, rash, fever, numbness, weakness)      Low back pain  7. PREGNANCY: "Is there any chance you are pregnant?" "When was your last menstrual period?"      unanswered    Protocols used: ST LEG PAIN-A-    "

## 2017-12-02 NOTE — TELEPHONE ENCOUNTER
"    Reason for Disposition   [1] Request for URGENT new prescription or refill of "essential" medication (i.e., likelihood of harm to patient if not taken) AND [2] triager unable to fill per unit policy    Protocols used: ST MEDICATION QUESTION CALL-A-    Patient called to state that she is in severe pain and asked why Dr. Esquivel has not refilled her oxycodone. Explained to patient that we can send Dr. Esquivel a request to refill, but she should go to ED if she is in severe pain. Patient asked that we send the refill request but refuses to go to ED at this time.   "

## 2017-12-02 NOTE — ED TRIAGE NOTES
"Kuldeep Villela, a 67 y.o. female presents to the ED c/o chronic lower back pain. Pt states "I went to [facility] earlier but they didn't do anything for me." Pt denies any new injuries.       Chief Complaint   Patient presents with    Back Pain     pt arrived by  EMS from home. pt complaining of chronic lower back pain and restless leg syndrome. pt was seen at [facility] and [facility] in the last 24 hours without relief.      Review of patient's allergies indicates:   Allergen Reactions    Pcn [penicillins] Anxiety    Anesthetic [benzocaine-aloe vera]      Jittery/hyper    Robitussin [guaifenesin] Anxiety     Past Medical History:   Diagnosis Date    Arthritis     Asthma     Cervical spondylosis 7/13/2012    Chronic LBP 7/13/2012    Chronic neck pain 7/13/2012    Hyperlipidemia     Hypertension     Lumbar radiculopathy, BLE 7/13/2012    Lumbar spinal stenosis at L4-L5. 7/13/2012    Lumbar spondylosis 7/13/2012    Morbid obesity 7/13/2012    Primary osteoarthritis of both knees 7/13/2012    Spondylolisthesis, grade 1 at L4-L5. 7/13/2012    Tenosynovitis of ankle     Rt peroneus longus     Adult Physical Assessment  LOC: Kuldeep Villela, 67 y.o. female verified via two identifiers.  The patient is awake, alert, oriented and speaking appropriately at this time.  APPEARANCE: Patient resting comfortably and appears to be in no acute distress at this time. Patient is clean and well groomed, patient's clothing is properly fastened.  SKIN:The skin is warm and dry, color consistent with ethnicity, patient has normal skin turgor and moist mucus membranes, skin intact, no breakdown or brusing noted.  MUSCULOSKELETAL: Patient moving all extremities well, no obvious swelling or deformities noted. Pt is c/o of lower back pain.   RESPIRATORY: Airway is open and patent, respirations are spontaneous, patient has a normal effort and rate, no accessory muscle use noted.  CARDIAC: Patient has a " normal rate and rhythm, no periphreal edema noted in any extremity, capillary refill < 3 seconds in all extremities  ABDOMEN: Soft and non tender to palpation, no abdominal distention noted. Bowel sounds present in all four quadrants.  NEUROLOGIC: Eyes open spontaneously, behavior appropriate to situation, follows commands, facial expression symmetrical, bilateral hand grasp equal and even, purposeful motor response noted, normal sensation in all extremities when touched with a finger.  ]

## 2017-12-02 NOTE — ED PROVIDER NOTES
Encounter Date: 12/2/2017    SCRIBE #1 NOTE: I, Taylor Winn, am scribing for, and in the presence of,  Dr. Hyman. I have scribed the following portions of the note - the APC attestation.       History     Chief Complaint   Patient presents with    Back Pain     pt arrived by  EMS from home. pt complaining of chronic lower back pain and restless leg syndrome. pt was seen at [facility] and [facility] in the last 24 hours without relief.      62-year-old female with a PMH significant for morbid obesity, lumbar spondylosis with spinal stenosis and radiculopathy of BLE, HTN presents to the ED with complaints of back pain.  Patient complains of diffuse low back pain as well as pain to bilateral extremities secondary to her restless leg syndrome.  Patient is out of her oxycodone and meloxicam.  She denies recent falls or injury.  Denies bowel or bladder incontinence, perineal anesthesia, fever, chills, weakness.  She was seen at [facility] this morning and was discharged.  Patient arrived to this ED via EMS.           Review of patient's allergies indicates:   Allergen Reactions    Pcn [penicillins] Anxiety    Anesthetic [benzocaine-aloe vera]      Jittery/hyper    Robitussin [guaifenesin] Anxiety     Past Medical History:   Diagnosis Date    Arthritis     Asthma     Cervical spondylosis 7/13/2012    Chronic LBP 7/13/2012    Chronic neck pain 7/13/2012    Hyperlipidemia     Hypertension     Lumbar radiculopathy, BLE 7/13/2012    Lumbar spinal stenosis at L4-L5. 7/13/2012    Lumbar spondylosis 7/13/2012    Morbid obesity 7/13/2012    Primary osteoarthritis of both knees 7/13/2012    Spondylolisthesis, grade 1 at L4-L5. 7/13/2012    Tenosynovitis of ankle     Rt peroneus longus     Past Surgical History:   Procedure Laterality Date    CHOLECYSTECTOMY      HYSTERECTOMY      AR REMOVAL OF OVARY/TUBE(S)       Family History   Problem Relation Age of Onset    Heart disease Mother      Hypertension Mother     Heart disease Father     Hypertension Father     Breast cancer Neg Hx     Colon cancer Neg Hx     Ovarian cancer Neg Hx      Social History   Substance Use Topics    Smoking status: Current Every Day Smoker     Packs/day: 1.00     Years: 20.00     Types: Cigarettes    Smokeless tobacco: Never Used    Alcohol use No     Review of Systems   Constitutional: Negative for chills and fever.   Genitourinary:        Negative for bowel/bladder incontinence, perineal anesthesia   Musculoskeletal: Positive for back pain.   Neurological: Negative for syncope and weakness.       Physical Exam     Initial Vitals [12/02/17 0640]   BP Pulse Resp Temp SpO2   (!) 144/86 88 20 98.3 °F (36.8 °C) 100 %      MAP       105.33         Physical Exam    Nursing note and vitals reviewed.  Constitutional: She appears well-developed and well-nourished. She is not diaphoretic. She is Obese .  Non-toxic appearance. She does not appear ill. No distress.   HENT:   Head: Normocephalic and atraumatic.   Neck: Neck supple.   Cardiovascular: Normal rate and regular rhythm. Exam reveals no gallop and no friction rub.    No murmur heard.  Pulses:       Dorsalis pedis pulses are 2+ on the right side, and 2+ on the left side.   Pulmonary/Chest: Effort normal and breath sounds normal. No accessory muscle usage. No tachypnea. No respiratory distress. She has no decreased breath sounds. She has no wheezes. She has no rhonchi. She has no rales.   Abdominal: Normal appearance. She exhibits no distension.   Musculoskeletal: Normal range of motion.   Diffuse lumbar tenderness.   Neurological: She is alert and oriented to person, place, and time.   4/5 symmetrical strength to BLE.  Normal sensation to light touch to BLE.    Skin: Skin is warm and dry. No rash noted. No pallor.   Psychiatric: She has a normal mood and affect. Her behavior is normal. Judgment and thought content normal.         ED Course   Procedures  Labs  Reviewed - No data to display          Medical Decision Making:   History:   Old Medical Records: I decided to obtain old medical records.  Differential Diagnosis:   My differential diagnosis includes but is not limited to: Exacerbation of chronic low back pain, opiate dependence, spinal cord compression, herniated disc, radiculopathy, epidural abscess, fracture       APC / Resident Notes:   68 yo obese F with chronic low back pain, restless leg syndrome presents to the ED with chronic low back pain requesting refills of her pain medications.  Vitals within normal limits.  Patient is tearful secondary to pain.  She is neurovascularly intact.  Diffuse lumbar tenderness.    I discussed at length with the patient that we will not be be refilling her narcotic pain medication today and that she should return to her pain management clinic for refills. I will discharge with refills for her meloxicam.  Return precautions given. I have reviewed the patient's records and discussed this case with my supervising physician.             Scribe Attestation:   Scribe #1: I performed the above scribed service and the documentation accurately describes the services I performed. I attest to the accuracy of the note.    Attending Attestation:     Physician Attestation Statement for NP/PA:   I discussed this assessment and plan of this patient with the NP/PA, but I did not personally examine the patient. The face to face encounter was performed by the NP/PA.    Other NP/PA Attestation Additions:      Medical Decision Making: Pt with low back pain.  Hx of the same.  Ran out of home opiates.  No change to quality of pain.  This is consistent with her chronic pain.  Will not prescribe opiates from ED.  Pt is neurovascularly intact.  This is not epidural abscess, fracture, nerve root compression.       Physician Attestation for Scribe:      Comments: I, Dr. Douglas Hyman, personally performed the services described in this documentation. All  medical record entries made by the scribe were at my direction and in my presence.  I have reviewed the chart and agree that the record reflects my personal performance and is accurate and complete. Douglas Hyman MD.  10:58 AM 12/02/2017              ED Course      Clinical Impression:   The primary encounter diagnosis was Chronic low back pain, unspecified back pain laterality, with sciatica presence unspecified. A diagnosis of Restless legs was also pertinent to this visit.    Disposition:   Disposition: Discharged  Condition: Stable                        Marianne Olivo PA-C  12/02/17 1888

## 2017-12-04 ENCOUNTER — TELEPHONE (OUTPATIENT)
Dept: PHYSICAL MEDICINE AND REHAB | Facility: CLINIC | Age: 67
End: 2017-12-04

## 2017-12-04 ENCOUNTER — OFFICE VISIT (OUTPATIENT)
Dept: PHYSICAL MEDICINE AND REHAB | Facility: CLINIC | Age: 67
End: 2017-12-04
Payer: COMMERCIAL

## 2017-12-04 VITALS — DIASTOLIC BLOOD PRESSURE: 100 MMHG | HEIGHT: 62 IN | SYSTOLIC BLOOD PRESSURE: 152 MMHG | HEART RATE: 90 BPM

## 2017-12-04 DIAGNOSIS — G89.29 CHRONIC MIDLINE LOW BACK PAIN WITH BILATERAL SCIATICA: Primary | ICD-10-CM

## 2017-12-04 DIAGNOSIS — M47.26 OSTEOARTHRITIS OF SPINE WITH RADICULOPATHY, LUMBAR REGION: ICD-10-CM

## 2017-12-04 DIAGNOSIS — M47.22 OSTEOARTHRITIS OF SPINE WITH RADICULOPATHY, CERVICAL REGION: ICD-10-CM

## 2017-12-04 DIAGNOSIS — M17.0 PRIMARY OSTEOARTHRITIS OF BOTH KNEES: ICD-10-CM

## 2017-12-04 DIAGNOSIS — M54.2 CHRONIC NECK PAIN: ICD-10-CM

## 2017-12-04 DIAGNOSIS — G89.29 CHRONIC NECK PAIN: ICD-10-CM

## 2017-12-04 DIAGNOSIS — M48.061 SPINAL STENOSIS OF LUMBAR REGION, UNSPECIFIED WHETHER NEUROGENIC CLAUDICATION PRESENT: ICD-10-CM

## 2017-12-04 DIAGNOSIS — M54.16 BILATERAL LUMBAR RADICULOPATHY: ICD-10-CM

## 2017-12-04 DIAGNOSIS — M54.41 CHRONIC MIDLINE LOW BACK PAIN WITH BILATERAL SCIATICA: Primary | ICD-10-CM

## 2017-12-04 DIAGNOSIS — E66.01 MORBID OBESITY, UNSPECIFIED OBESITY TYPE: ICD-10-CM

## 2017-12-04 DIAGNOSIS — M54.12 CERVICAL RADICULOPATHY: ICD-10-CM

## 2017-12-04 DIAGNOSIS — M54.42 CHRONIC MIDLINE LOW BACK PAIN WITH BILATERAL SCIATICA: Primary | ICD-10-CM

## 2017-12-04 PROCEDURE — 99999 PR PBB SHADOW E&M-EST. PATIENT-LVL III: CPT | Mod: PBBFAC,,, | Performed by: PHYSICAL MEDICINE & REHABILITATION

## 2017-12-04 PROCEDURE — 99214 OFFICE O/P EST MOD 30 MIN: CPT | Mod: S$GLB,,, | Performed by: PHYSICAL MEDICINE & REHABILITATION

## 2017-12-04 RX ORDER — PREGABALIN 50 MG/1
50 CAPSULE ORAL 3 TIMES DAILY
Qty: 90 CAPSULE | Refills: 2 | Status: SHIPPED | OUTPATIENT
Start: 2017-12-04 | End: 2018-05-17 | Stop reason: SDUPTHER

## 2017-12-04 RX ORDER — MELOXICAM 7.5 MG/1
15 TABLET ORAL 2 TIMES DAILY
Qty: 180 TABLET | Refills: 1 | Status: SHIPPED | OUTPATIENT
Start: 2017-12-04 | End: 2018-03-04

## 2017-12-04 RX ORDER — MELOXICAM 7.5 MG/1
15 TABLET ORAL 2 TIMES DAILY
Qty: 180 TABLET | Refills: 1 | Status: SHIPPED | OUTPATIENT
Start: 2017-12-04 | End: 2017-12-04 | Stop reason: SDUPTHER

## 2017-12-04 NOTE — TELEPHONE ENCOUNTER
----- Message from Alin JOSE L Garcia sent at 12/4/2017  8:28 AM CST -----  Contact: Self @  Pt says she's outside the clinic, asking to see the doctor today due to getting medication. I gave pt first available for 10:20 AM this morning. Pt declined and is asking to speak with someone to check if she can be seen sooner. I was not able to reach the medical staff regarding this. Pt does not have cell phone and is currently in the waiting room and asking to speak with the Ma.

## 2017-12-04 NOTE — PROGRESS NOTES
Subjective:       Patient ID: Kuldeep Villela is a 67 y.o. female.    Chief Complaint: No chief complaint on file.    HPI    Mrs. Villela is a 67-year-old black female with past medical history of OA and   morbid obesity who is followed up in the Physical Medicine Clinic for chronic   low back pain with lumbar radiculopathy, chronic neck pain with cervical   radiculopathy and OA of the knees.  Her last visit to the clinic was on   06/05/2017.  She was maintained on Lyrica, meloxicam, p.r.n. oxycodone/APAP and   p.r.n. cyclobenzaprine.    The patient is coming today to the clinic due to worsening of her back pain.    This started a few days ago.  She denies any preceding trauma.  However, she   admits to being out of her medications.  She presented first to the Emergency   Department at [facility] late in the evening on 12/01/2017.  No   interventions were done.  She presented to the Emergency Department at [facility] on 12/02/2017.  She was asked to follow up with the Pain Clinic.    Her back pain is a severe constant aching pain in the lumbar spine and across   her back.  She continues to have occasional shooting pain to both feet with   numbness.  Her pain is worse with activity and better with rest.  Her maximum   pain is 10/10 and minimum 8/10.  Today, it is 8-9/10.  The patient complains of   mild bilateral lower extremity weakness.  She denies any bowel or bladder   incontinence.    Her neck pain has been stable.  It is a constant aching pain in the cervical   spine.  She has occasional radiation to both hands with numbness.  Her pain is   worse with neck movement and better with rest.  Her maximum pain is 8/10 and   minimum 7/10.  Today, it is 7-8/10.  She complains of mild bilateral upper   extremity weakness.  Her bilateral knee pain has also been recently worse.  This   is usually related to weightbearing.    The patient was on Lyrica 50 mg p.o. 3 times per day.  However, she  probably ran   out and has not asked for a refill.  She continues to take meloxicam 15 mg p.o.   once per day.  She takes oxycodone/APAP 10/325 p.r.n., usually up to 5 times   per day (she has been out for a couple of weeks.  A prescription was sent for   refill on 12/02/2017, but the patient did not realize that and did not pick it   up).  She takes cyclobenzaprine 10 mg p.o. t.i.d. p.r.n. to help with muscle   spasms.  She is still hesitant to consider epidural steroid injections.  Her   last ones were about 2 years ago.      MS/IN  dd: 12/04/2017 11:17:18 (CST)  td: 12/05/2017 09:06:36 (CST)  Doc ID   #[id number]  Job ID #[id number]    CC:         Review of Systems   Constitutional: Positive for fatigue.   Eyes: Negative for visual disturbance.   Respiratory: Positive for shortness of breath.    Cardiovascular: Negative for chest pain.   Gastrointestinal: Positive for constipation and nausea. Negative for vomiting.   Genitourinary: Negative for difficulty urinating.   Musculoskeletal: Positive for back pain, gait problem, neck pain and neck stiffness.   Skin: Negative for rash.   Neurological: Positive for dizziness and headaches.   Psychiatric/Behavioral: Positive for sleep disturbance. Negative for behavioral problems.       Objective:      Physical Exam   Constitutional: She appears well-developed and well-nourished.   Ambulating with a rollator walker.   Neck:   Decreased ROM.  +ve tenderness over cervical spine.   Musculoskeletal:   BUE:  ROM: decreased at shoulders.  Strength:    RUE: 4/5 at shoulder abduction, 4 elbow flexion, 4 elbow extension, 4 hand .   LUE: 3+/5 at shoulder abduction, 4 elbow flexion, 4 elbow extension, 4 hand .  Sensation to pinprick:   RUE: intact.   LUE: intact.       BLE:  ROM:full.  Bilateral knee crepitus.   Strength:    RLE: 4-/5 at hip flexion, 4 knee extension, 4 ankle DF/PF.   LLE: 4-/5 at hip flexion, 4 knee extension, 4 ankle DF/PF.  Sensation to pinprick:     RLE: intact.      LLE: intact.  SLR (sitting):    RLE: +ve.     LLE: +ve.   +ve diffuse tenderness over lumbar spine.       Neurological: She is alert.   Psychiatric: Her behavior is normal.   Anxious.   Vitals reviewed.        Assessment:       1. Chronic midline low back pain with bilateral sciatica    2. Osteoarthritis of spine with radiculopathy, lumbar region    3. Spinal stenosis of lumbar region, unspecified whether neurogenic claudication present    4. Chronic neck pain    5. Cervical radiculopathy, BUE    6. Osteoarthritis of spine with radiculopathy, cervical region    7. Primary osteoarthritis of both knees    8. Morbid obesity, unspecified obesity type        Plan:     - Restart pregabalin (LYRICA) 50 MG capsule; Take 1 capsule (50 mg total) by mouth 3 (three) times daily.  - Continue meloxicam (MOBIC) 7.5 MG tablet; Take 2 tablets (15 mg total) by mouth 2 (two) times daily. May decrease to once daily if pain improves.  - Continue cyclobenzaprine (FLEXERIL) 10 MG tablet; Take 1 tablet (10 mg total) by mouth 2 (two) times daily as needed.  - Continue oxycodone-acetaminophen (PERCOCET)  mg per tablet; Take 1 tablet by mouth every 4 to 6 hours as needed for Pain.  -  Ambulatory Referral to Physical Therapy  - The patient is still not interested in referral for Epidural Steroid Injections.  - Return in about 3 months (around 3/4/2018).     This was a 25 minute visit, more than 50% of which was spent counseling the patient about the diagnosis and the treatment plan.

## 2017-12-28 DIAGNOSIS — I11.9 BENIGN HYPERTENSIVE HEART DISEASE WITHOUT HEART FAILURE: ICD-10-CM

## 2017-12-28 DIAGNOSIS — E78.5 HYPERLIPIDEMIA, UNSPECIFIED HYPERLIPIDEMIA TYPE: ICD-10-CM

## 2017-12-28 RX ORDER — PRAVASTATIN SODIUM 20 MG/1
TABLET ORAL
Qty: 30 TABLET | Refills: 0 | Status: SHIPPED | OUTPATIENT
Start: 2017-12-28 | End: 2018-01-27 | Stop reason: SDUPTHER

## 2017-12-28 RX ORDER — AMLODIPINE BESYLATE 10 MG/1
TABLET ORAL
Qty: 30 TABLET | Refills: 0 | Status: SHIPPED | OUTPATIENT
Start: 2017-12-28 | End: 2018-01-27 | Stop reason: SDUPTHER

## 2017-12-29 RX ORDER — CETIRIZINE HYDROCHLORIDE 10 MG/1
TABLET ORAL
Qty: 30 TABLET | Refills: 0 | Status: SHIPPED | OUTPATIENT
Start: 2017-12-29 | End: 2018-02-02 | Stop reason: SDUPTHER

## 2018-01-02 DIAGNOSIS — M54.2 CHRONIC NECK PAIN: ICD-10-CM

## 2018-01-02 DIAGNOSIS — G89.29 CHRONIC NECK PAIN: ICD-10-CM

## 2018-01-02 DIAGNOSIS — M17.0 PRIMARY OSTEOARTHRITIS OF BOTH KNEES: ICD-10-CM

## 2018-01-02 RX ORDER — OXYCODONE AND ACETAMINOPHEN 10; 325 MG/1; MG/1
1 TABLET ORAL
Qty: 150 TABLET | Refills: 0 | Status: SHIPPED | OUTPATIENT
Start: 2018-01-03 | End: 2018-01-31 | Stop reason: SDUPTHER

## 2018-01-02 NOTE — TELEPHONE ENCOUNTER
----- Message from Bib Rueda sent at 1/2/2018 11:07 AM CST -----  Contact: Patient @ [phone]  Patient needs a refill on (oxyCODONE-acetaminophen (PERCOCET)  mg per tablet )     [facility] [address] [place] [address] [address]  [place] [address]  Phone: [phone] Fax: [phone]

## 2018-01-09 DIAGNOSIS — M25.561 PAIN IN BOTH KNEES, UNSPECIFIED CHRONICITY: Primary | ICD-10-CM

## 2018-01-09 DIAGNOSIS — M25.562 PAIN IN BOTH KNEES, UNSPECIFIED CHRONICITY: Primary | ICD-10-CM

## 2018-01-27 DIAGNOSIS — E78.5 HYPERLIPIDEMIA, UNSPECIFIED HYPERLIPIDEMIA TYPE: ICD-10-CM

## 2018-01-27 DIAGNOSIS — I11.9 BENIGN HYPERTENSIVE HEART DISEASE WITHOUT HEART FAILURE: ICD-10-CM

## 2018-01-29 RX ORDER — PRAVASTATIN SODIUM 20 MG/1
TABLET ORAL
Qty: 30 TABLET | Refills: 2 | Status: SHIPPED | OUTPATIENT
Start: 2018-01-29 | End: 2018-07-12 | Stop reason: SDUPTHER

## 2018-01-29 RX ORDER — AMLODIPINE BESYLATE 10 MG/1
TABLET ORAL
Qty: 30 TABLET | Refills: 2 | Status: SHIPPED | OUTPATIENT
Start: 2018-01-29 | End: 2018-07-12 | Stop reason: SDUPTHER

## 2018-01-31 DIAGNOSIS — G89.29 CHRONIC MIDLINE LOW BACK PAIN WITH BILATERAL SCIATICA: ICD-10-CM

## 2018-01-31 DIAGNOSIS — G89.29 CHRONIC NECK PAIN: ICD-10-CM

## 2018-01-31 DIAGNOSIS — M54.41 CHRONIC MIDLINE LOW BACK PAIN WITH BILATERAL SCIATICA: ICD-10-CM

## 2018-01-31 DIAGNOSIS — M54.42 CHRONIC MIDLINE LOW BACK PAIN WITH BILATERAL SCIATICA: ICD-10-CM

## 2018-01-31 DIAGNOSIS — M17.0 PRIMARY OSTEOARTHRITIS OF BOTH KNEES: ICD-10-CM

## 2018-01-31 DIAGNOSIS — M54.2 CHRONIC NECK PAIN: ICD-10-CM

## 2018-01-31 RX ORDER — MELOXICAM 7.5 MG/1
15 TABLET ORAL 2 TIMES DAILY
Qty: 180 TABLET | Refills: 1 | Status: CANCELLED | OUTPATIENT
Start: 2018-01-31 | End: 2018-05-01

## 2018-01-31 RX ORDER — OXYCODONE AND ACETAMINOPHEN 10; 325 MG/1; MG/1
1 TABLET ORAL
Qty: 150 TABLET | Refills: 0 | Status: SHIPPED | OUTPATIENT
Start: 2018-02-02 | End: 2018-03-02 | Stop reason: SDUPTHER

## 2018-01-31 NOTE — TELEPHONE ENCOUNTER
----- Message from Yolanda Carr MA sent at 1/31/2018  1:42 PM CST -----  Contact: Self [phone]      ----- Message -----  From: Inez Avalos  Sent: 1/31/2018  12:40 PM  To: [facility] Staff    Pt is requesting a refill of the following:    oxyCODONE-acetaminophen (PERCOCET)  mg per tablet 150 tablet 0 1/3/2018 2/2/2018     meloxicam (MOBIC) 7.5 MG tablet 180 tablet 1 12/4/2017 3/4/2018     Pharmacy Contact     Telephone Fax  [phone]    Pt stated that the 7.5 of meloxicam was too strong and wanted 1mg but I did not see that listed.    Pt may be reached at [phone].    Thank you.  JESSICA

## 2018-02-02 DIAGNOSIS — G25.81 RESTLESS LEG: ICD-10-CM

## 2018-02-02 RX ORDER — CETIRIZINE HYDROCHLORIDE 10 MG/1
TABLET ORAL
Qty: 30 TABLET | Refills: 0 | Status: SHIPPED | OUTPATIENT
Start: 2018-02-02 | End: 2018-03-01 | Stop reason: SDUPTHER

## 2018-02-02 RX ORDER — ROPINIROLE 1 MG/1
TABLET, FILM COATED ORAL
Qty: 60 TABLET | Refills: 0 | Status: SHIPPED | OUTPATIENT
Start: 2018-02-02 | End: 2018-03-31 | Stop reason: SDUPTHER

## 2018-03-02 DIAGNOSIS — M54.2 CHRONIC NECK PAIN: ICD-10-CM

## 2018-03-02 DIAGNOSIS — G89.29 CHRONIC MIDLINE LOW BACK PAIN WITH BILATERAL SCIATICA: ICD-10-CM

## 2018-03-02 DIAGNOSIS — M54.42 CHRONIC MIDLINE LOW BACK PAIN WITH BILATERAL SCIATICA: ICD-10-CM

## 2018-03-02 DIAGNOSIS — M17.0 PRIMARY OSTEOARTHRITIS OF BOTH KNEES: ICD-10-CM

## 2018-03-02 DIAGNOSIS — G89.29 CHRONIC NECK PAIN: ICD-10-CM

## 2018-03-02 DIAGNOSIS — M54.41 CHRONIC MIDLINE LOW BACK PAIN WITH BILATERAL SCIATICA: ICD-10-CM

## 2018-03-02 RX ORDER — CETIRIZINE HYDROCHLORIDE 10 MG/1
TABLET ORAL
Qty: 30 TABLET | Refills: 0 | Status: SHIPPED | OUTPATIENT
Start: 2018-03-02 | End: 2018-04-03 | Stop reason: SDUPTHER

## 2018-03-02 NOTE — TELEPHONE ENCOUNTER
11/29/17 last Rx refill-Flexeril  01/31/18 last Rx refill-Oyxcodone  12/04/17 last office visit  05/17/18 RTC

## 2018-03-03 RX ORDER — OXYCODONE AND ACETAMINOPHEN 10; 325 MG/1; MG/1
1 TABLET ORAL
Qty: 150 TABLET | Refills: 0 | Status: SHIPPED | OUTPATIENT
Start: 2018-03-03 | End: 2018-04-03 | Stop reason: SDUPTHER

## 2018-03-03 RX ORDER — CYCLOBENZAPRINE HCL 10 MG
10 TABLET ORAL NIGHTLY PRN
Qty: 90 TABLET | Refills: 1 | Status: SHIPPED | OUTPATIENT
Start: 2018-03-03 | End: 2018-05-17 | Stop reason: SDUPTHER

## 2018-03-31 DIAGNOSIS — G25.81 RESTLESS LEG: ICD-10-CM

## 2018-04-02 RX ORDER — ROPINIROLE 1 MG/1
TABLET, FILM COATED ORAL
Qty: 60 TABLET | Refills: 1 | Status: SHIPPED | OUTPATIENT
Start: 2018-04-02 | End: 2018-05-30 | Stop reason: SDUPTHER

## 2018-04-03 DIAGNOSIS — M17.0 PRIMARY OSTEOARTHRITIS OF BOTH KNEES: ICD-10-CM

## 2018-04-03 DIAGNOSIS — G89.29 CHRONIC NECK PAIN: ICD-10-CM

## 2018-04-03 DIAGNOSIS — M54.2 CHRONIC NECK PAIN: ICD-10-CM

## 2018-04-03 RX ORDER — CETIRIZINE HYDROCHLORIDE 10 MG/1
TABLET ORAL
Qty: 30 TABLET | Refills: 0 | Status: SHIPPED | OUTPATIENT
Start: 2018-04-03 | End: 2018-05-02 | Stop reason: SDUPTHER

## 2018-04-03 RX ORDER — OXYCODONE AND ACETAMINOPHEN 10; 325 MG/1; MG/1
1 TABLET ORAL
Qty: 150 TABLET | Refills: 0 | Status: SHIPPED | OUTPATIENT
Start: 2018-04-04 | End: 2018-05-02 | Stop reason: SDUPTHER

## 2018-04-03 NOTE — TELEPHONE ENCOUNTER
12/04/17 last office visit  05/17/18 RTC  03/03/18 last rx refill      ----- Message from Alin Garcia sent at 4/3/2018 10:23 AM CDT -----  Contact: Self @ [phone]  Pt is asking for refill on oxyCODONE-acetaminophen (PERCOCET)  mg per tablet    [facility] [address] [place] [address] [address] [place] AT [address] [place] [address]  Phone: [phone] Fax: [phone]

## 2018-05-02 DIAGNOSIS — M17.0 PRIMARY OSTEOARTHRITIS OF BOTH KNEES: ICD-10-CM

## 2018-05-02 DIAGNOSIS — G89.29 CHRONIC NECK PAIN: ICD-10-CM

## 2018-05-02 DIAGNOSIS — M54.2 CHRONIC NECK PAIN: ICD-10-CM

## 2018-05-02 RX ORDER — OXYCODONE AND ACETAMINOPHEN 10; 325 MG/1; MG/1
1 TABLET ORAL
Qty: 150 TABLET | Refills: 0 | Status: SHIPPED | OUTPATIENT
Start: 2018-05-03 | End: 2018-05-31 | Stop reason: SDUPTHER

## 2018-05-02 RX ORDER — CETIRIZINE HYDROCHLORIDE 10 MG/1
TABLET ORAL
Qty: 30 TABLET | Refills: 0 | Status: SHIPPED | OUTPATIENT
Start: 2018-05-02 | End: 2018-06-01 | Stop reason: SDUPTHER

## 2018-05-17 ENCOUNTER — OFFICE VISIT (OUTPATIENT)
Dept: PHYSICAL MEDICINE AND REHAB | Facility: CLINIC | Age: 68
End: 2018-05-17
Payer: COMMERCIAL

## 2018-05-17 ENCOUNTER — TELEPHONE (OUTPATIENT)
Dept: PHYSICAL MEDICINE AND REHAB | Facility: CLINIC | Age: 68
End: 2018-05-17

## 2018-05-17 VITALS
SYSTOLIC BLOOD PRESSURE: 139 MMHG | HEIGHT: 62 IN | HEART RATE: 91 BPM | WEIGHT: 271.69 LBS | BODY MASS INDEX: 50 KG/M2 | DIASTOLIC BLOOD PRESSURE: 84 MMHG

## 2018-05-17 DIAGNOSIS — M54.41 CHRONIC MIDLINE LOW BACK PAIN WITH BILATERAL SCIATICA: Primary | ICD-10-CM

## 2018-05-17 DIAGNOSIS — M47.26 OSTEOARTHRITIS OF SPINE WITH RADICULOPATHY, LUMBAR REGION: ICD-10-CM

## 2018-05-17 DIAGNOSIS — M17.0 PRIMARY OSTEOARTHRITIS OF BOTH KNEES: ICD-10-CM

## 2018-05-17 DIAGNOSIS — M54.2 CHRONIC NECK PAIN: ICD-10-CM

## 2018-05-17 DIAGNOSIS — G89.29 CHRONIC MIDLINE LOW BACK PAIN WITH BILATERAL SCIATICA: Primary | ICD-10-CM

## 2018-05-17 DIAGNOSIS — M48.061 SPINAL STENOSIS OF LUMBAR REGION, UNSPECIFIED WHETHER NEUROGENIC CLAUDICATION PRESENT: ICD-10-CM

## 2018-05-17 DIAGNOSIS — G89.29 CHRONIC NECK PAIN: ICD-10-CM

## 2018-05-17 DIAGNOSIS — E66.01 MORBID OBESITY, UNSPECIFIED OBESITY TYPE: ICD-10-CM

## 2018-05-17 DIAGNOSIS — M54.12 CERVICAL RADICULOPATHY: ICD-10-CM

## 2018-05-17 DIAGNOSIS — M54.42 CHRONIC MIDLINE LOW BACK PAIN WITH BILATERAL SCIATICA: Primary | ICD-10-CM

## 2018-05-17 DIAGNOSIS — M47.22 OSTEOARTHRITIS OF SPINE WITH RADICULOPATHY, CERVICAL REGION: ICD-10-CM

## 2018-05-17 PROCEDURE — 3008F BODY MASS INDEX DOCD: CPT | Mod: CPTII,S$GLB,, | Performed by: PHYSICAL MEDICINE & REHABILITATION

## 2018-05-17 PROCEDURE — 3075F SYST BP GE 130 - 139MM HG: CPT | Mod: CPTII,S$GLB,, | Performed by: PHYSICAL MEDICINE & REHABILITATION

## 2018-05-17 PROCEDURE — 3079F DIAST BP 80-89 MM HG: CPT | Mod: CPTII,S$GLB,, | Performed by: PHYSICAL MEDICINE & REHABILITATION

## 2018-05-17 PROCEDURE — 99214 OFFICE O/P EST MOD 30 MIN: CPT | Mod: S$GLB,,, | Performed by: PHYSICAL MEDICINE & REHABILITATION

## 2018-05-17 PROCEDURE — 99999 PR PBB SHADOW E&M-EST. PATIENT-LVL III: CPT | Mod: PBBFAC,,, | Performed by: PHYSICAL MEDICINE & REHABILITATION

## 2018-05-17 RX ORDER — MELOXICAM 15 MG/1
15 TABLET ORAL DAILY
Start: 2018-05-17 | End: 2018-06-16

## 2018-05-17 RX ORDER — CYCLOBENZAPRINE HCL 10 MG
10 TABLET ORAL NIGHTLY PRN
Qty: 90 TABLET | Refills: 1 | Status: SHIPPED | OUTPATIENT
Start: 2018-05-17 | End: 2018-06-16

## 2018-05-17 RX ORDER — PREGABALIN 50 MG/1
50 CAPSULE ORAL 3 TIMES DAILY
Qty: 90 CAPSULE | Refills: 2 | Status: SHIPPED | OUTPATIENT
Start: 2018-05-17 | End: 2019-04-23 | Stop reason: SDUPTHER

## 2018-05-17 NOTE — PROGRESS NOTES
Subjective:       Patient ID: Kuldeep Villela is a 67 y.o. female.    Chief Complaint: No chief complaint on file.    HPI    Mrs. Villela is a 67-year-old black female with past medical history of OA and   morbid obesity who is followed up in the Physical Medicine Clinic for chronic   low back pain, lumbar radiculopathy, chronic neck pain with cervical   radiculopathy and OA of the knees.  Her last visit to the clinic was on   12/04/2017.  She was maintained on Meloxicam, Lyrica, p.r.n. oxycodone/APAP and   p.r.n. cyclobenzaprine.  She was referred to Physical Therapy, but was not able   to make it.    The patient comes today to the clinic for followup.  Her back pain has been   worse.  It is a constant aching pain in the lumbar spine and across her back.    Her maximum pain is 9-10/10 and minimum 7-8/10.  Today, it is 9-10/10.  The   patient complains of mild bilateral lower extremity weakness.  She denies any   bowel or bladder incontinence.    Her neck pain has also been worse.  It is a constant aching pain in the cervical   spine.  It is worse with excess neck movement.  Maximum pain is 8-9/10 and   minimum 6-7/10.  Today, it is 7-8/10.  The patient complains of mild bilateral   upper extremity weakness, but without change from baseline.  She continues to   also complain of bilateral knee pain with weightbearing.  She was due for   followup with Dr. Helms from Orthopedic Surgery in January 1018.  However,   she missed the appointment and did not reschedule yet.    She is currently taking Meloxicam 15 mg p.o. once or twice per day.  She takes   cyclobenzaprine 10 mg p.r.n., usually at bedtime.  She takes oxycodone/APAP   10/325 p.r.n., usually 4 to 5 times per day.  She was on Lyrica 50 mg p.o. 3   times per day, but ran out a few weeks ago and did not call for a refill.      MS/IN  dd: 05/17/2018 15:24:42 (CDT)  td: 05/17/2018 23:37:40 (CDT)  Doc ID   #[id number]  Job ID #[id number]    CC:         Review of Systems    Constitutional: Positive for fatigue.   Eyes: Negative for visual disturbance.   Respiratory: Positive for shortness of breath.    Cardiovascular: Negative for chest pain.   Gastrointestinal: Positive for constipation and nausea. Negative for vomiting.   Genitourinary: Negative for difficulty urinating.   Musculoskeletal: Positive for back pain, gait problem, neck pain and neck stiffness.   Skin: Negative for rash.   Neurological: Negative for dizziness and headaches.   Psychiatric/Behavioral: Positive for sleep disturbance. Negative for behavioral problems.       Objective:      Physical Exam   Constitutional: She appears well-developed and well-nourished.   Ambulating with a rollator walker.   Neck:   Decreased ROM.  +ve tenderness over cervical spine.   Musculoskeletal:   BUE:  ROM: decreased at shoulders.  Strength:    RUE: 4/5 at shoulder abduction, 4 elbow flexion, 4 elbow extension, 4 hand .   LUE: 3+/5 at shoulder abduction, 4 elbow flexion, 4 elbow extension, 4 hand .  Sensation to pinprick:   RUE: intact.   LUE: intact.       BLE:  ROM:full.  Bilateral knee crepitus.   Strength:    RLE: 4-/5 at hip flexion, 3 knee extension, 4 ankle DF/PF.   LLE: 4-/5 at hip flexion, 4 knee extension, 4 ankle DF/PF.  Sensation to pinprick:     RLE: intact.     LLE: intact.  SLR (sitting):    RLE: +ve.     LLE: +ve.   +ve diffuse tenderness over lumbar spine.       Neurological: She is alert.   Psychiatric: Her behavior is normal.   Anxious.   Vitals reviewed.        Assessment:       1. Chronic midline low back pain with bilateral sciatica    2. Osteoarthritis of spine with radiculopathy, lumbar region    3. Spinal stenosis of lumbar region, unspecified whether neurogenic claudication present    4. Chronic neck pain    5. Cervical radiculopathy, BUE    6. Osteoarthritis of spine with radiculopathy, cervical region    7. Primary osteoarthritis of both knees    8. Morbid obesity, unspecified obesity type         Plan:     - Restart pregabalin (LYRICA) 50 MG capsule; Take 1 capsule (50 mg total) by mouth 3 (three) times daily.  - Continue meloxicam (MOBIC) 15 mg po qd..  - Continue cyclobenzaprine (FLEXERIL) 10 MG tablet; Take 1 tablet (10 mg total) by mouth 2 (two) times daily as needed.  - Continue oxycodone-acetaminophen (PERCOCET)  mg per tablet; Take 1 tablet by mouth every 4 to 6 hours as needed for Pain.  - The patient is still not interested in referral for Epidural Steroid Injections.  - Follow up with Orthopedics for ADVANCED knee OA.  - Follow-up in about 4 months (around 9/17/2018).     This was a 25 minute visit, more than 50% of which was spent counseling the patient about the diagnosis and the treatment plan.

## 2018-05-17 NOTE — TELEPHONE ENCOUNTER
No available appointments at this janeen, patient placed on waiting list.  Patient has appointment today.      ----- Message from Monika Valladares sent at 5/17/2018  9:22 AM CDT -----  Contact: self  Pt would like to know if you can fit her in next week on Tuesday.  She can be reached at [phone]

## 2018-05-30 DIAGNOSIS — G25.81 RESTLESS LEG: ICD-10-CM

## 2018-05-30 DIAGNOSIS — F41.9 ANXIETY: ICD-10-CM

## 2018-05-30 RX ORDER — CITALOPRAM 40 MG/1
TABLET, FILM COATED ORAL
Qty: 30 TABLET | Refills: 0 | Status: SHIPPED | OUTPATIENT
Start: 2018-05-30 | End: 2018-06-28 | Stop reason: SDUPTHER

## 2018-05-30 RX ORDER — ROPINIROLE 1 MG/1
TABLET, FILM COATED ORAL
Qty: 60 TABLET | Refills: 0 | Status: SHIPPED | OUTPATIENT
Start: 2018-05-30 | End: 2018-07-12 | Stop reason: SDUPTHER

## 2018-05-31 DIAGNOSIS — M54.2 CHRONIC NECK PAIN: ICD-10-CM

## 2018-05-31 DIAGNOSIS — G89.29 CHRONIC NECK PAIN: ICD-10-CM

## 2018-05-31 DIAGNOSIS — M17.0 PRIMARY OSTEOARTHRITIS OF BOTH KNEES: ICD-10-CM

## 2018-05-31 RX ORDER — OXYCODONE AND ACETAMINOPHEN 10; 325 MG/1; MG/1
1 TABLET ORAL
Qty: 150 TABLET | Refills: 0 | Status: SHIPPED | OUTPATIENT
Start: 2018-06-01 | End: 2018-07-02 | Stop reason: SDUPTHER

## 2018-05-31 NOTE — TELEPHONE ENCOUNTER
05/02/18 last Rx refill  05/17/18 last office visit  09/18/18 [facility]    [phone]  Patient was informed that her Rx refill request was forward to the Doctor.             ----- Message from Brandi Martinez sent at 5/31/2018  7:59 AM CDT -----  Contact: Pt  Pt called to speak to the nurse to request an Rx refill for oxyCODONE-acetaminophen (PERCOCET)  mg per tablet and would like the Rx sent to [facility] located at [address] [place] [address] in [place], [place]. Pt would like a call back to confirm that the request has been sent.    Pt can be reached at [phone].    Thanks

## 2018-06-01 RX ORDER — CETIRIZINE HYDROCHLORIDE 10 MG/1
TABLET ORAL
Qty: 30 TABLET | Refills: 11 | Status: SHIPPED | OUTPATIENT
Start: 2018-06-01 | End: 2019-06-01 | Stop reason: SDUPTHER

## 2018-06-26 DIAGNOSIS — M17.0 PRIMARY OSTEOARTHRITIS OF BOTH KNEES: ICD-10-CM

## 2018-06-26 DIAGNOSIS — G89.29 CHRONIC NECK PAIN: ICD-10-CM

## 2018-06-26 DIAGNOSIS — M54.41 CHRONIC MIDLINE LOW BACK PAIN WITH BILATERAL SCIATICA: ICD-10-CM

## 2018-06-26 DIAGNOSIS — M54.42 CHRONIC MIDLINE LOW BACK PAIN WITH BILATERAL SCIATICA: ICD-10-CM

## 2018-06-26 DIAGNOSIS — M54.2 CHRONIC NECK PAIN: ICD-10-CM

## 2018-06-26 DIAGNOSIS — G89.29 CHRONIC MIDLINE LOW BACK PAIN WITH BILATERAL SCIATICA: ICD-10-CM

## 2018-06-26 RX ORDER — MELOXICAM 7.5 MG/1
TABLET ORAL
Qty: 180 TABLET | Refills: 0 | Status: SHIPPED | OUTPATIENT
Start: 2018-06-26 | End: 2018-08-14 | Stop reason: SDUPTHER

## 2018-06-28 DIAGNOSIS — I11.9 BENIGN HYPERTENSIVE HEART DISEASE WITHOUT HEART FAILURE: ICD-10-CM

## 2018-06-28 DIAGNOSIS — F41.9 ANXIETY: ICD-10-CM

## 2018-06-28 DIAGNOSIS — E78.5 HYPERLIPIDEMIA, UNSPECIFIED HYPERLIPIDEMIA TYPE: ICD-10-CM

## 2018-06-28 DIAGNOSIS — Z00.00 ANNUAL PHYSICAL EXAM: Primary | ICD-10-CM

## 2018-06-28 RX ORDER — PRAVASTATIN SODIUM 20 MG/1
TABLET ORAL
Qty: 30 TABLET | Refills: 0 | Status: SHIPPED | OUTPATIENT
Start: 2018-06-28 | End: 2018-07-12 | Stop reason: SDUPTHER

## 2018-06-28 RX ORDER — AMLODIPINE BESYLATE 10 MG/1
TABLET ORAL
Qty: 30 TABLET | Refills: 0 | Status: SHIPPED | OUTPATIENT
Start: 2018-06-28 | End: 2018-07-12 | Stop reason: SDUPTHER

## 2018-06-28 RX ORDER — CITALOPRAM 40 MG/1
TABLET, FILM COATED ORAL
Qty: 30 TABLET | Refills: 0 | Status: SHIPPED | OUTPATIENT
Start: 2018-06-28 | End: 2018-07-12 | Stop reason: SDUPTHER

## 2018-06-28 NOTE — TELEPHONE ENCOUNTER
Left message informing patient of one month refill approvals and need for office visit and lab visit.

## 2018-07-02 DIAGNOSIS — M17.0 PRIMARY OSTEOARTHRITIS OF BOTH KNEES: ICD-10-CM

## 2018-07-02 DIAGNOSIS — M54.2 CHRONIC NECK PAIN: ICD-10-CM

## 2018-07-02 DIAGNOSIS — G89.29 CHRONIC NECK PAIN: ICD-10-CM

## 2018-07-02 RX ORDER — OXYCODONE AND ACETAMINOPHEN 10; 325 MG/1; MG/1
1 TABLET ORAL
Qty: 150 TABLET | Refills: 0 | Status: SHIPPED | OUTPATIENT
Start: 2018-07-02 | End: 2018-08-02 | Stop reason: SDUPTHER

## 2018-07-02 NOTE — TELEPHONE ENCOUNTER
-05/17/18 last Office visit  05/31/18 last Rx refill  09/18/18 RTC    ---- Message from Ana Maria Elias sent at 7/2/2018  8:09 AM CDT -----  Contact: self @ [phone]  Pt is req a refill for oxycodone 10/325.        [facility] [address] [place] [address] [address] AT [facility] OF [address]  [place] [address]  Phone: [phone] Fax: [phone]

## 2018-07-12 ENCOUNTER — OFFICE VISIT (OUTPATIENT)
Dept: FAMILY MEDICINE | Facility: CLINIC | Age: 68
End: 2018-07-12
Payer: COMMERCIAL

## 2018-07-12 VITALS
BODY MASS INDEX: 46.88 KG/M2 | DIASTOLIC BLOOD PRESSURE: 76 MMHG | HEIGHT: 63 IN | SYSTOLIC BLOOD PRESSURE: 138 MMHG | OXYGEN SATURATION: 95 % | WEIGHT: 264.56 LBS | HEART RATE: 93 BPM | TEMPERATURE: 98 F

## 2018-07-12 DIAGNOSIS — G25.81 RESTLESS LEG: ICD-10-CM

## 2018-07-12 DIAGNOSIS — F41.1 GENERALIZED ANXIETY DISORDER WITH PANIC ATTACKS: ICD-10-CM

## 2018-07-12 DIAGNOSIS — F41.0 GENERALIZED ANXIETY DISORDER WITH PANIC ATTACKS: ICD-10-CM

## 2018-07-12 DIAGNOSIS — E78.5 HYPERLIPIDEMIA, UNSPECIFIED HYPERLIPIDEMIA TYPE: ICD-10-CM

## 2018-07-12 DIAGNOSIS — I11.9 BENIGN HYPERTENSIVE HEART DISEASE WITHOUT HEART FAILURE: ICD-10-CM

## 2018-07-12 PROCEDURE — 3078F DIAST BP <80 MM HG: CPT | Mod: CPTII,S$GLB,, | Performed by: INTERNAL MEDICINE

## 2018-07-12 PROCEDURE — 3075F SYST BP GE 130 - 139MM HG: CPT | Mod: CPTII,S$GLB,, | Performed by: INTERNAL MEDICINE

## 2018-07-12 PROCEDURE — 99214 OFFICE O/P EST MOD 30 MIN: CPT | Mod: S$GLB,,, | Performed by: INTERNAL MEDICINE

## 2018-07-12 PROCEDURE — 99999 PR PBB SHADOW E&M-EST. PATIENT-LVL III: CPT | Mod: PBBFAC,,, | Performed by: INTERNAL MEDICINE

## 2018-07-12 PROCEDURE — 3008F BODY MASS INDEX DOCD: CPT | Mod: CPTII,S$GLB,, | Performed by: INTERNAL MEDICINE

## 2018-07-12 RX ORDER — BUSPIRONE HYDROCHLORIDE 7.5 MG/1
7.5 TABLET ORAL 3 TIMES DAILY PRN
Qty: 60 TABLET | Refills: 2 | Status: SHIPPED | OUTPATIENT
Start: 2018-07-12 | End: 2018-07-26 | Stop reason: DRUGHIGH

## 2018-07-12 RX ORDER — PRAVASTATIN SODIUM 20 MG/1
TABLET ORAL
Qty: 30 TABLET | Refills: 2 | Status: SHIPPED | OUTPATIENT
Start: 2018-07-12 | End: 2018-10-26 | Stop reason: SDUPTHER

## 2018-07-12 RX ORDER — CITALOPRAM 40 MG/1
TABLET, FILM COATED ORAL
Qty: 30 TABLET | Refills: 2 | Status: SHIPPED | OUTPATIENT
Start: 2018-07-12 | End: 2018-10-26 | Stop reason: SDUPTHER

## 2018-07-12 RX ORDER — ROPINIROLE 1 MG/1
TABLET, FILM COATED ORAL
Qty: 60 TABLET | Refills: 0 | Status: SHIPPED | OUTPATIENT
Start: 2018-07-12 | End: 2018-12-31

## 2018-07-12 RX ORDER — AMLODIPINE BESYLATE 10 MG/1
10 TABLET ORAL DAILY
Qty: 30 TABLET | Refills: 2 | Status: SHIPPED | OUTPATIENT
Start: 2018-07-12 | End: 2018-10-26 | Stop reason: SDUPTHER

## 2018-07-12 NOTE — PROGRESS NOTES
SUBJECTIVE     Chief Complaint   Patient presents with    Anxiety    Panic Attack       HPI  Kuldeep Villela is a 67 y.o. female with multiple medical diagnoses as listed in the medical history and problem list that presents for evaluation of anxiety x 1 week. Pt reports anxiety worsened last night. It starts as SOB and gets paranoid and start shaking. Pt reports full compliance with Celexa since she started it last month. Her anxiety had been doing well, but she has a current stressor of her last living aunt being ill. She is having a hard time accepting her aunt's condition with resultant anxiety and panic attacks.     PAST MEDICAL HISTORY:  Past Medical History:   Diagnosis Date    Arthritis     Asthma     Cervical spondylosis 7/13/2012    Chronic LBP 7/13/2012    Chronic neck pain 7/13/2012    Hyperlipidemia     Hypertension     Lumbar radiculopathy, BLE 7/13/2012    Lumbar spinal stenosis at L4-L5. 7/13/2012    Lumbar spondylosis 7/13/2012    Morbid obesity 7/13/2012    Primary osteoarthritis of both knees 7/13/2012    Spondylolisthesis, grade 1 at L4-L5. 7/13/2012    Tenosynovitis of ankle     Rt peroneus longus       PAST SURGICAL HISTORY:  Past Surgical History:   Procedure Laterality Date    CHOLECYSTECTOMY      HYSTERECTOMY      GA REMOVAL OF OVARY/TUBE(S)         SOCIAL HISTORY:  Social History     Social History    Marital status: Single     Spouse name: N/A    Number of children: N/A    Years of education: N/A     Occupational History    Not on file.     Social History Main Topics    Smoking status: Current Every Day Smoker     Packs/day: 1.00     Years: 20.00     Types: Cigarettes    Smokeless tobacco: Never Used    Alcohol use No    Drug use: No    Sexual activity: Not Currently     Other Topics Concern    Not on file     Social History Narrative    No narrative on file       FAMILY HISTORY:  Family History   Problem Relation Age of Onset    Heart disease Mother      Hypertension Mother     Heart disease Father     Hypertension Father     Breast cancer Neg Hx     Colon cancer Neg Hx     Ovarian cancer Neg Hx        ALLERGIES AND MEDICATIONS: updated and reviewed.  Review of patient's allergies indicates:   Allergen Reactions    Pcn [penicillins] Anxiety    Anesthetic [benzocaine-aloe vera]      Jittery/hyper    Robitussin [guaifenesin] Anxiety     Current Outpatient Prescriptions   Medication Sig Dispense Refill    amLODIPine (NORVASC) 10 MG tablet Take 1 tablet (10 mg total) by mouth once daily. 30 tablet 2    bisacodyl (DULCOLAX) 5 mg EC tablet Take 1 tablet (5 mg total) by mouth daily as needed for Constipation. 30 tablet 5    cetirizine (ZYRTEC) 10 MG tablet TAKE 1 TABLET BY MOUTH EVERY DAY 30 tablet 11    estrogens,conjugated,-methyltestosterone 0.625-1.25mg (EEMT HS) 0.625-1.25 mg per tablet Take 1 tablet by mouth once daily. 30 tablet 5    meloxicam (MOBIC) 7.5 MG tablet TAKE 2 TABLETS BY MOUTH TWICE DAILY, MAY DECREASE TO ONCE DAILY IF PAIN IMPROVES 180 tablet 0    oxyCODONE-acetaminophen (PERCOCET)  mg per tablet Take 1 tablet by mouth every 4 to 6 hours as needed for Pain. 150 tablet 0    pravastatin (PRAVACHOL) 20 MG tablet TAKE 1 TABLET(20 MG) BY MOUTH EVERY DAY 30 tablet 2    rOPINIRole (REQUIP) 1 MG tablet TAKE 1 TABLET(1 MG) BY MOUTH TWICE DAILY 60 tablet 0    busPIRone (BUSPAR) 7.5 MG tablet Take 1 tablet (7.5 mg total) by mouth 3 (three) times daily as needed. 60 tablet 2    citalopram (CELEXA) 40 MG tablet TAKE 1 TABLET(40 MG) BY MOUTH EVERY DAY 30 tablet 2    cyclobenzaprine (FLEXERIL) 10 MG tablet Take 1 tablet (10 mg total) by mouth nightly as needed for Muscle spasms. 90 tablet 1    lubiprostone (AMITIZA) 24 MCG Cap Take 1 capsule (24 mcg total) by mouth 2 (two) times daily. 60 capsule 5    nicotine (NICODERM CQ) 14 mg/24 hr Place 1 patch onto the skin once daily. 30 patch 12    pregabalin (LYRICA) 50 MG capsule Take 1 capsule  "(50 mg total) by mouth 3 (three) times daily. 90 capsule 2    varenicline (CHANTIX STARTING MONTH BOX) 0.5 mg (11)- 1 mg (42) tablet Take one 0.5mg tab by mouth once daily X3 days,then increase to one 0.5mg tab twice daily X4 days,then increase to one 1mg tab twice daily 1 Package 0     No current facility-administered medications for this visit.        ROS  Review of Systems   Constitutional: Negative for chills and fever.   HENT: Negative for hearing loss and sore throat.    Eyes: Negative for visual disturbance.   Respiratory: Negative for cough and shortness of breath.    Cardiovascular: Negative for chest pain, palpitations and leg swelling.   Gastrointestinal: Negative for abdominal pain, constipation, diarrhea, nausea and vomiting.   Genitourinary: Negative for dysuria, frequency and urgency.   Musculoskeletal: Negative for arthralgias, joint swelling and myalgias.   Skin: Negative for rash and wound.   Neurological: Negative for headaches.   Psychiatric/Behavioral: Negative for agitation and confusion. The patient is nervous/anxious.          OBJECTIVE     Physical Exam  Vitals:    07/12/18 1114   BP: 138/76   Pulse: 93   Temp: 97.9 °F (36.6 °C)    Body mass index is 46.86 kg/m².  Weight: 120 kg (264 lb 8.8 oz)   Height: 5' 3" (160 cm)     Physical Exam   Constitutional: She is oriented to person, place, and time. She appears well-developed and well-nourished. No distress.   HENT:   Head: Normocephalic and atraumatic.   Right Ear: External ear normal.   Left Ear: External ear normal.   Nose: Nose normal.   Mouth/Throat: Oropharynx is clear and moist.   Eyes: Conjunctivae and EOM are normal. Right eye exhibits no discharge. Left eye exhibits no discharge. No scleral icterus.   Neck: Normal range of motion. Neck supple. No JVD present. No tracheal deviation present.   Cardiovascular: Normal rate, regular rhythm and intact distal pulses.  Exam reveals no gallop and no friction rub.    No murmur " heard.  Pulmonary/Chest: Effort normal and breath sounds normal. No respiratory distress. She has no wheezes.   Abdominal: Soft. Bowel sounds are normal. She exhibits no distension and no mass. There is no tenderness. There is no rebound and no guarding.   Musculoskeletal: Normal range of motion. She exhibits no edema, tenderness or deformity.   Neurological: She is alert and oriented to person, place, and time. She exhibits normal muscle tone. Coordination normal.   Skin: Skin is warm and dry. No rash noted. No erythema.   Psychiatric: She has a normal mood and affect. Her behavior is normal. Judgment and thought content normal.         Health Maintenance       Date Due Completion Date    Hepatitis C Screening 1950 ---    TETANUS VACCINE 11/18/1970 ---    Sign Pain Contract 11/18/1970 ---    Complete Opioid Risk Tool 11/18/1970 ---    DEXA SCAN 11/18/1992 ---    Colonoscopy 11/18/2002 ---    Zoster Vaccine 11/18/2012 ---    Mammogram 05/20/2014 5/20/2013    Override on 5/13/2013: Done    Pneumococcal (65+) (1 of 2 - PCV13) 11/18/2017 ---    Pap Smear 09/06/2018 9/6/2017    Influenza Vaccine 08/01/2018 12/2/2014    Lipid Panel 06/06/2022 6/6/2017            ASSESSMENT     67 y.o. female with     1. Generalized anxiety disorder with panic attacks    2. Benign hypertensive heart disease without heart failure    3. Hyperlipidemia, unspecified hyperlipidemia type    4. Restless leg        PLAN:     1. Generalized anxiety disorder with panic attacks  - Will add Buspar prn panic attacks and pt to continue Celexa as previously ordered  - citalopram (CELEXA) 40 MG tablet; TAKE 1 TABLET(40 MG) BY MOUTH EVERY DAY  Dispense: 30 tablet; Refill: 2  - busPIRone (BUSPAR) 7.5 MG tablet; Take 1 tablet (7.5 mg total) by mouth 3 (three) times daily as needed.  Dispense: 60 tablet; Refill: 2    2. Benign hypertensive heart disease without heart failure  - BP well controlled; at goal of <140/90  - The current medical regimen is  effective;  continue present plan and medications.  - amLODIPine (NORVASC) 10 MG tablet; Take 1 tablet (10 mg total) by mouth once daily.  Dispense: 30 tablet; Refill: 2    3. Hyperlipidemia, unspecified hyperlipidemia type  - Stable; no acute issues  - The current medical regimen is effective;  continue present plan and medications.  - pravastatin (PRAVACHOL) 20 MG tablet; TAKE 1 TABLET(20 MG) BY MOUTH EVERY DAY  Dispense: 30 tablet; Refill: 2    4. Restless leg  - Stable; no acute issues  - The current medical regimen is effective;  continue present plan and medications.  - rOPINIRole (REQUIP) 1 MG tablet; TAKE 1 TABLET(1 MG) BY MOUTH TWICE DAILY  Dispense: 60 tablet; Refill: 0        RTC in 3 months     Rosanne Sawyer MD  07/12/2018 11:53 AM        No Follow-up on file.

## 2018-07-26 ENCOUNTER — TELEPHONE (OUTPATIENT)
Dept: FAMILY MEDICINE | Facility: CLINIC | Age: 68
End: 2018-07-26

## 2018-07-26 DIAGNOSIS — F41.0 ANXIETY ATTACK: Primary | ICD-10-CM

## 2018-07-26 RX ORDER — BUSPIRONE HYDROCHLORIDE 15 MG/1
15 TABLET ORAL 3 TIMES DAILY
Qty: 90 TABLET | Refills: 1 | Status: SHIPPED | OUTPATIENT
Start: 2018-07-26 | End: 2018-11-01 | Stop reason: SDUPTHER

## 2018-07-26 NOTE — TELEPHONE ENCOUNTER
----- Message from Vianey kelbysilvina sent at 7/26/2018  3:40 PM CDT -----  Contact: self  Pt would like to speak to Dr. Sawyer regarding her anxiety attacks. She states she has been fighting the attack all day.  [phone].

## 2018-07-26 NOTE — TELEPHONE ENCOUNTER
Called pt and she reports continued anxiety attacks without improvement. She has 26 tabs of Buspar remaining. Pt advised to start taking two 7.5 mg tabs 3 times daily for a total of 15 mg TID for better control. I placed a referral for Psych and gave pt the number for her to call and make an official appt. Sent a new Rx for 15 mg TID and stressed to pt that she will go back to 1 tab TID once she picks up the new Rx because it is already reflective of the dose adjustment. All questions/concerns addressed and she voiced understanding.

## 2018-07-30 DIAGNOSIS — F41.9 ANXIETY: ICD-10-CM

## 2018-07-30 DIAGNOSIS — E78.5 HYPERLIPIDEMIA, UNSPECIFIED HYPERLIPIDEMIA TYPE: ICD-10-CM

## 2018-07-30 RX ORDER — CITALOPRAM 40 MG/1
TABLET, FILM COATED ORAL
Qty: 30 TABLET | Refills: 0 | OUTPATIENT
Start: 2018-07-30

## 2018-07-30 RX ORDER — PRAVASTATIN SODIUM 20 MG/1
TABLET ORAL
Qty: 30 TABLET | Refills: 2 | Status: SHIPPED | OUTPATIENT
Start: 2018-07-30 | End: 2018-11-29 | Stop reason: SDUPTHER

## 2018-07-31 ENCOUNTER — TELEPHONE (OUTPATIENT)
Dept: FAMILY MEDICINE | Facility: CLINIC | Age: 68
End: 2018-07-31

## 2018-07-31 DIAGNOSIS — I11.9 BENIGN HYPERTENSIVE HEART DISEASE WITHOUT HEART FAILURE: ICD-10-CM

## 2018-07-31 RX ORDER — AMLODIPINE BESYLATE 10 MG/1
TABLET ORAL
Qty: 30 TABLET | Refills: 2 | Status: SHIPPED | OUTPATIENT
Start: 2018-07-31 | End: 2018-11-29 | Stop reason: SDUPTHER

## 2018-07-31 NOTE — TELEPHONE ENCOUNTER
Spoke with pt and the medication she is requesting a refill on Zyrtec has refills, suggested she call the pharmacy for refill.

## 2018-07-31 NOTE — TELEPHONE ENCOUNTER
----- Message from Cherelle Burgess sent at 7/31/2018  3:59 PM CDT -----  Contact: Self/ [phone]  Pt requesting refill on sinus medications. Says she is having a lot of trouble. Please call with status. Thank you.    [facility] [address] [place] [address] [place] [address] [address]  Phone: [phone] Fax: [phone]

## 2018-08-02 DIAGNOSIS — G89.29 CHRONIC NECK PAIN: ICD-10-CM

## 2018-08-02 DIAGNOSIS — M17.0 PRIMARY OSTEOARTHRITIS OF BOTH KNEES: ICD-10-CM

## 2018-08-02 DIAGNOSIS — M54.2 CHRONIC NECK PAIN: ICD-10-CM

## 2018-08-02 RX ORDER — OXYCODONE AND ACETAMINOPHEN 10; 325 MG/1; MG/1
1 TABLET ORAL
Qty: 150 TABLET | Refills: 0 | Status: SHIPPED | OUTPATIENT
Start: 2018-08-03 | End: 2018-09-04 | Stop reason: SDUPTHER

## 2018-08-02 NOTE — TELEPHONE ENCOUNTER
07/02/18 last Rx refill  05/17/18 last Office visit  09/18/18 RTC         ----- Message from Paige Marte sent at 8/2/2018  2:01 PM CDT -----  Contact: Pt  Pt is requesting a refill on Percocet 10-325mg to be sent to [facility] # [phone]    Pt contact number  [phone]  Thanks

## 2018-08-14 DIAGNOSIS — M17.0 PRIMARY OSTEOARTHRITIS OF BOTH KNEES: ICD-10-CM

## 2018-08-14 DIAGNOSIS — G89.29 CHRONIC MIDLINE LOW BACK PAIN WITH BILATERAL SCIATICA: ICD-10-CM

## 2018-08-14 DIAGNOSIS — G89.29 CHRONIC NECK PAIN: ICD-10-CM

## 2018-08-14 DIAGNOSIS — M54.42 CHRONIC MIDLINE LOW BACK PAIN WITH BILATERAL SCIATICA: ICD-10-CM

## 2018-08-14 DIAGNOSIS — M54.2 CHRONIC NECK PAIN: ICD-10-CM

## 2018-08-14 DIAGNOSIS — M54.41 CHRONIC MIDLINE LOW BACK PAIN WITH BILATERAL SCIATICA: ICD-10-CM

## 2018-08-14 RX ORDER — MELOXICAM 7.5 MG/1
7.5 TABLET ORAL 2 TIMES DAILY
Qty: 180 TABLET | Refills: 0 | Status: SHIPPED | OUTPATIENT
Start: 2018-08-14 | End: 2018-11-11 | Stop reason: SDUPTHER

## 2018-08-17 ENCOUNTER — TELEPHONE (OUTPATIENT)
Dept: FAMILY MEDICINE | Facility: CLINIC | Age: 68
End: 2018-08-17

## 2018-08-17 DIAGNOSIS — R29.6 FREQUENT FALLS: Primary | ICD-10-CM

## 2018-08-17 DIAGNOSIS — M47.26 OSTEOARTHRITIS OF SPINE WITH RADICULOPATHY, LUMBAR REGION: ICD-10-CM
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
## 2018-08-17 NOTE — TELEPHONE ENCOUNTER
----- Message from Vianey Haro sent at 8/17/2018 12:07 PM CDT -----  Contact: daughter-  Henri  Pt's daughter is asking for HH. She states this is urgent and they are really stressed out. [phone].

## 2018-08-17 NOTE — TELEPHONE ENCOUNTER
----- Message from Vianey Haro sent at 8/17/2018 12:07 PM CDT -----  Contact: daughter-  Henri  Pt's daughter is asking for HH. She states this is urgent and they are really stressed out. [phone].

## 2018-08-23 ENCOUNTER — TELEPHONE (OUTPATIENT)
Dept: FAMILY MEDICINE | Facility: CLINIC | Age: 68
End: 2018-08-23

## 2018-08-23 NOTE — TELEPHONE ENCOUNTER
----- Message from Clint Flanagan sent at 8/23/2018  1:17 PM CDT -----  Contact: Willie with [facility] [phone]  Willie with [facility] is calling to notify Dr. Sawyer that when they tried to go to the patient's home, she refused home health services. Please call at your earliest convenience.  
----- Message from Lisa Lopez sent at 8/23/2018 12:40 PM CDT -----  Contact: Cheryl larios/ roseanne  [phone]  Pt refused  today because he was a male. Will try again tomorrow. Thanks......Aliza  
BERT Musa  
Noted  
unknown

## 2018-08-24 DIAGNOSIS — Z12.11 COLON CANCER SCREENING: ICD-10-CM

## 2018-08-28 DIAGNOSIS — F41.0 GENERALIZED ANXIETY DISORDER WITH PANIC ATTACKS: ICD-10-CM

## 2018-08-28 DIAGNOSIS — F41.1 GENERALIZED ANXIETY DISORDER WITH PANIC ATTACKS: ICD-10-CM

## 2018-08-28 RX ORDER — BUSPIRONE HYDROCHLORIDE 7.5 MG/1
TABLET ORAL
Qty: 60 TABLET | Refills: 0 | OUTPATIENT
Start: 2018-08-28

## 2018-08-28 NOTE — TELEPHONE ENCOUNTER
----- Message from Re Sena sent at 8/27/2018  2:27 PM CDT -----  Contact: self  PT Needs refill of anxiety medicine but not sure of which one she has several on file and doesn't seem to know what kind. She says the 7.5 mg help her (unidentified name).please call at [phone]

## 2018-09-03 ENCOUNTER — NURSE TRIAGE (OUTPATIENT)
Dept: ADMINISTRATIVE | Facility: CLINIC | Age: 68
End: 2018-09-03

## 2018-09-03 NOTE — TELEPHONE ENCOUNTER
Reason for Disposition   Caller requesting a NON-URGENT new prescription or refill and triager unable to refill per unit policy    Protocols used: ST MEDICATION QUESTION CALL-A-AH

## 2018-09-04 DIAGNOSIS — M17.0 PRIMARY OSTEOARTHRITIS OF BOTH KNEES: ICD-10-CM

## 2018-09-04 DIAGNOSIS — M54.2 CHRONIC NECK PAIN: ICD-10-CM

## 2018-09-04 DIAGNOSIS — G89.29 CHRONIC NECK PAIN: ICD-10-CM

## 2018-09-04 RX ORDER — OXYCODONE AND ACETAMINOPHEN 10; 325 MG/1; MG/1
1 TABLET ORAL
Qty: 150 TABLET | Refills: 0 | Status: SHIPPED | OUTPATIENT
Start: 2018-09-04 | End: 2018-09-05 | Stop reason: SDUPTHER

## 2018-09-04 NOTE — TELEPHONE ENCOUNTER
----- Message from Burke Smith sent at 9/4/2018  8:40 AM CDT -----  Contact: self  Pt called in about wanting to get refill on medication. Pt medication: oxyCODONE-acetaminophen (PERCOCET)  mg per tablet    Pt would like the office to give her a call back      Pt can be reached at [phone]      TY

## 2018-09-05 ENCOUNTER — TELEPHONE (OUTPATIENT)
Dept: PHYSICAL MEDICINE AND REHAB | Facility: CLINIC | Age: 68
End: 2018-09-05

## 2018-09-05 DIAGNOSIS — M54.2 CHRONIC NECK PAIN: ICD-10-CM

## 2018-09-05 DIAGNOSIS — G89.29 CHRONIC NECK PAIN: ICD-10-CM

## 2018-09-05 DIAGNOSIS — M17.0 PRIMARY OSTEOARTHRITIS OF BOTH KNEES: ICD-10-CM

## 2018-09-05 RX ORDER — OXYCODONE AND ACETAMINOPHEN 10; 325 MG/1; MG/1
1 TABLET ORAL
Qty: 150 TABLET | Refills: 0 | Status: SHIPPED | OUTPATIENT
Start: 2018-09-05 | End: 2018-10-01 | Stop reason: SDUPTHER

## 2018-09-05 NOTE — TELEPHONE ENCOUNTER
----- Message from Ana Maria Elias sent at 9/5/2018  3:45 PM CDT -----  Contact: self @ [phone]  Pt says [facility] on [facility] [address] [facility] does not have her medication and will not have it until tomorrow.  She was told the [facility] on [facility] [address] has the medication.  She would like to have the prescription moved.    [id number] - [place] [address] [address] AT [facility]          [phone] (Phone)               [phone] (Fax)

## 2018-09-18 ENCOUNTER — TELEPHONE (OUTPATIENT)
Dept: PHYSICAL MEDICINE AND REHAB | Facility: CLINIC | Age: 68
End: 2018-09-18

## 2018-09-18 ENCOUNTER — OFFICE VISIT (OUTPATIENT)
Dept: PHYSICAL MEDICINE AND REHAB | Facility: CLINIC | Age: 68
End: 2018-09-18
Payer: COMMERCIAL

## 2018-09-18 VITALS
WEIGHT: 267 LBS | SYSTOLIC BLOOD PRESSURE: 132 MMHG | DIASTOLIC BLOOD PRESSURE: 78 MMHG | BODY MASS INDEX: 47.31 KG/M2 | HEIGHT: 63 IN | HEART RATE: 93 BPM

## 2018-09-18 DIAGNOSIS — M47.22 OSTEOARTHRITIS OF SPINE WITH RADICULOPATHY, CERVICAL REGION: ICD-10-CM

## 2018-09-18 DIAGNOSIS — M54.41 CHRONIC MIDLINE LOW BACK PAIN WITH BILATERAL SCIATICA: Primary | ICD-10-CM

## 2018-09-18 DIAGNOSIS — M54.42 CHRONIC MIDLINE LOW BACK PAIN WITH BILATERAL SCIATICA: Primary | ICD-10-CM

## 2018-09-18 DIAGNOSIS — E66.01 MORBID OBESITY, UNSPECIFIED OBESITY TYPE: ICD-10-CM

## 2018-09-18 DIAGNOSIS — M48.061 SPINAL STENOSIS OF LUMBAR REGION, UNSPECIFIED WHETHER NEUROGENIC CLAUDICATION PRESENT: ICD-10-CM

## 2018-09-18 DIAGNOSIS — L84 FOOT CALLUS: ICD-10-CM

## 2018-09-18 DIAGNOSIS — M47.26 OSTEOARTHRITIS OF SPINE WITH RADICULOPATHY, LUMBAR REGION: ICD-10-CM

## 2018-09-18 DIAGNOSIS — M54.12 CERVICAL RADICULOPATHY: ICD-10-CM

## 2018-09-18 DIAGNOSIS — M54.2 CHRONIC NECK PAIN: ICD-10-CM

## 2018-09-18 DIAGNOSIS — G89.29 CHRONIC NECK PAIN: ICD-10-CM

## 2018-09-18 DIAGNOSIS — G89.29 CHRONIC MIDLINE LOW BACK PAIN WITH BILATERAL SCIATICA: Primary | ICD-10-CM

## 2018-09-18 DIAGNOSIS — M17.0 PRIMARY OSTEOARTHRITIS OF BOTH KNEES: ICD-10-CM

## 2018-09-18 PROCEDURE — 3078F DIAST BP <80 MM HG: CPT | Mod: CPTII,S$GLB,, | Performed by: PHYSICAL MEDICINE & REHABILITATION

## 2018-09-18 PROCEDURE — 3008F BODY MASS INDEX DOCD: CPT | Mod: CPTII,S$GLB,, | Performed by: PHYSICAL MEDICINE & REHABILITATION

## 2018-09-18 PROCEDURE — 99214 OFFICE O/P EST MOD 30 MIN: CPT | Mod: S$GLB,,, | Performed by: PHYSICAL MEDICINE & REHABILITATION

## 2018-09-18 PROCEDURE — 99999 PR PBB SHADOW E&M-EST. PATIENT-LVL III: CPT | Mod: PBBFAC,,, | Performed by: PHYSICAL MEDICINE & REHABILITATION

## 2018-09-18 PROCEDURE — 1101F PT FALLS ASSESS-DOCD LE1/YR: CPT | Mod: CPTII,S$GLB,, | Performed by: PHYSICAL MEDICINE & REHABILITATION

## 2018-09-18 PROCEDURE — 3075F SYST BP GE 130 - 139MM HG: CPT | Mod: CPTII,S$GLB,, | Performed by: PHYSICAL MEDICINE & REHABILITATION

## 2018-09-18 NOTE — PROGRESS NOTES
Subjective:       Patient ID: Kuldeep Villela is a 67 y.o. female.    Chief Complaint: No chief complaint on file.    HPI    HISTORY OF PRESENT ILLNESS:  Mrs. Villela is a 67-year-old black female with past   medical history of osteoarthritis and morbid obesity who is followed up in the   Physical Medicine Clinic for chronic low back pain with lumbar radiculopathy,   chronic neck pain with cervical radiculopathy and OA of the knees.  Her last   visit to the clinic was on May 17th.  She was maintained on meloxicam, Lyrica,   p.r.n. oxycodone and p.r.n. cyclobenzaprine.    The patient comes today to the clinic for followup.  Her symptoms have been   stable.  Her back pain is a constant aching pain at the lumbar spine and across   her back.  The pain radiates to both feet with numbness.  Her pain is worse with   activity and better with rest.  Her maximum pain is 9 to 10/10 and minimum 6 to   7/10.  Today, it is 8/10.  The patient complains of bilateral lower extremity   weakness.  She denies any bowel or bladder incontinence.    Her neck pain is a constant aching pain in the cervical spine.  It radiates to   both hands with numbness and burning.  It is worse with movement.  Her maximum   pain is 9 to 10/10 and minimum 6 to 7/10.  Today, it is 7 to 8/10.  The patient   admits bilateral upper extremity weakness, but without significant change from   baseline.    She continues to complain of bilateral knee pain.  She was asked to follow up   with Orthopedic Surgery last visit, but did not have the chance to do it.  Her   pain is a constant aching sensation in both knees.  It is aggravated by   weightbearing.  Her maximum pain is 9 to 10/10 and minimum 6 to 7/10.  Today, it   is 7 to 8/10.    She is currently taking meloxicam 7.5 mg p.o. twice per day, oxycodone/APAP   10/325 p.r.n., usually five times per day.  She is not taking the previously   prescribed Lyrica.  She takes cyclobenzaprine 10 mg p.r.n., usually once or    twice per day.      MS/HN  dd: 09/18/2018 15:14:21 (CDT)  td: 09/19/2018 11:57:54 (CDT)  Doc ID   #[id number]  Job ID #[id number]    CC:         Review of Systems   Constitutional: Positive for fatigue.   Eyes: Negative for visual disturbance.   Respiratory: Positive for shortness of breath.    Cardiovascular: Negative for chest pain.   Gastrointestinal: Positive for constipation and nausea. Negative for vomiting.   Genitourinary: Negative for difficulty urinating.   Musculoskeletal: Positive for back pain, gait problem, neck pain and neck stiffness.   Skin: Negative for rash.   Neurological: Positive for dizziness and headaches.   Psychiatric/Behavioral: Positive for sleep disturbance. Negative for behavioral problems.       Objective:      Physical Exam   Constitutional: She appears well-developed and well-nourished.   Coming to the clinic in a manual wheelchair propelled by family member.     Neck:   Decreased ROM.  +ve tenderness over cervical spine.   Musculoskeletal:   BUE:  ROM: decreased at shoulders.  Strength:    RUE: 4/5 at shoulder abduction, 4 elbow flexion, 4 elbow extension, 4 hand .   LUE: 3+/5 at shoulder abduction, 4 elbow flexion, 4 elbow extension, 4 hand .  Sensation to pinprick:   RUE: intact.   LUE: intact.       BLE:  ROM:full.  Bilateral knee crepitus.   Strength:    RLE: 4-/5 at hip flexion, 3 knee extension, 4 ankle DF/PF.   LLE: 4-/5 at hip flexion, 4 knee extension, 4 ankle DF/PF.  Sensation to pinprick:     RLE: intact.     LLE: intact.  SLR (sitting):    RLE: +ve.     LLE: +ve.   +ve diffuse tenderness over lumbar spine.       Neurological: She is alert.   Psychiatric: Her behavior is normal.   Anxious.   Vitals reviewed.        Assessment:       1. Chronic midline low back pain with bilateral sciatica    2. Osteoarthritis of spine with radiculopathy, lumbar region    3. Spinal stenosis of lumbar region, unspecified whether neurogenic claudication present    4. Chronic neck pain     5. Cervical radiculopathy, BUE    6. Osteoarthritis of spine with radiculopathy, cervical region    7. Primary osteoarthritis of both knees    8. Morbid obesity, unspecified obesity type        Plan:     - Start pregabalin (LYRICA) 50 MG capsule; Take 1 capsule (50 mg total) by mouth 3 (three) times daily.  - Continue meloxicam (MOBIC) 7.5 mg po bid.  - Continue cyclobenzaprine (FLEXERIL) 10 MG tablet; Take 1 tablet (10 mg total) by mouth 2 (two) times daily as needed.  - Continue oxycodone-acetaminophen (PERCOCET)  mg per tablet; Take 1 tablet by mouth every 4 to 6 hours as needed for Pain.  - Follow up with Orthopedics for adavnced knee OA.  - Ambulatory consult to Podiatry for foot calluses.  - Follow-up in about 4 months (around 1/18/2019).      This was a 25 minute visit, more than 50% of which was spent counseling the patient about the diagnosis and the treatment plan.

## 2018-09-18 NOTE — TELEPHONE ENCOUNTER
----- Message from Karon Hatfield sent at 9/18/2018 10:03 AM CDT -----  Contact: pt @ [phone]  Calling to inform the office that she is running late, but will be coming to her appt scheduled for 10:40am today.

## 2018-09-18 NOTE — TELEPHONE ENCOUNTER
Patient was seen today at schedule appointment         ----- Message from Karon Hatfield sent at 9/18/2018 10:38 AM CDT -----  Contact: pt @ [phone]  [phone]  Calling to confirm that the patient will be seen today if she is late. Please call to inform if the patient will not be seen.

## 2018-10-01 DIAGNOSIS — M17.0 PRIMARY OSTEOARTHRITIS OF BOTH KNEES: ICD-10-CM

## 2018-10-01 DIAGNOSIS — M54.2 CHRONIC NECK PAIN: ICD-10-CM

## 2018-10-01 DIAGNOSIS — G89.29 CHRONIC NECK PAIN: ICD-10-CM

## 2018-10-01 RX ORDER — OXYCODONE AND ACETAMINOPHEN 10; 325 MG/1; MG/1
1 TABLET ORAL
Qty: 150 TABLET | Refills: 0 | Status: SHIPPED | OUTPATIENT
Start: 2018-10-05 | End: 2018-11-02 | Stop reason: SDUPTHER

## 2018-10-01 NOTE — TELEPHONE ENCOUNTER
09/05/18 last Rx refill  09/18/18 last Office visit  01/18/19 RTC    ----- Message from Alin Garcia sent at 10/1/2018 11:19 AM CDT -----  Rx Refill/Request     Is this a Refill or New Rx:  Refill  Rx Name and Strength:  oxyCODONE-acetaminophen (PERCOCET)  mg per tablet  Preferred Pharmacy with phone number: see below  Communication Preference: [phone]  Additional Information:     [email or web address] [address] [place] [address] [place] [address] AT [address]  [place] [address]  Phone: [phone] Fax: [phone]

## 2018-10-09 ENCOUNTER — HOSPITAL ENCOUNTER (OUTPATIENT)
Dept: RADIOLOGY | Facility: HOSPITAL | Age: 68
Discharge: HOME | End: 2018-10-09
Attending: PODIATRIST
Payer: COMMERCIAL

## 2018-10-09 ENCOUNTER — OFFICE VISIT (OUTPATIENT)
Dept: PODIATRY | Facility: CLINIC | Age: 68
End: 2018-10-09
Payer: COMMERCIAL

## 2018-10-09 VITALS
BODY MASS INDEX: 45.58 KG/M2 | WEIGHT: 267 LBS | SYSTOLIC BLOOD PRESSURE: 142 MMHG | HEIGHT: 64 IN | DIASTOLIC BLOOD PRESSURE: 82 MMHG | HEART RATE: 80 BPM

## 2018-10-09 DIAGNOSIS — M79.672 LEFT FOOT PAIN: Primary | ICD-10-CM

## 2018-10-09 DIAGNOSIS — R60.9 SWELLING: ICD-10-CM

## 2018-10-09 DIAGNOSIS — M79.672 LEFT FOOT PAIN: ICD-10-CM

## 2018-10-09 PROCEDURE — 99999 PR PBB SHADOW E&M-EST. PATIENT-LVL III: CPT | Mod: PBBFAC,,, | Performed by: PODIATRIST

## 2018-10-09 PROCEDURE — 3079F DIAST BP 80-89 MM HG: CPT | Mod: CPTII,S$GLB,, | Performed by: PODIATRIST

## 2018-10-09 PROCEDURE — 1101F PT FALLS ASSESS-DOCD LE1/YR: CPT | Mod: CPTII,S$GLB,, | Performed by: PODIATRIST

## 2018-10-09 PROCEDURE — 73630 X-RAY EXAM OF FOOT: CPT | Mod: TC,LT

## 2018-10-09 PROCEDURE — 73630 X-RAY EXAM OF FOOT: CPT | Mod: 26,LT,, | Performed by: RADIOLOGY

## 2018-10-09 PROCEDURE — 99203 OFFICE O/P NEW LOW 30 MIN: CPT | Mod: S$GLB,,, | Performed by: PODIATRIST

## 2018-10-09 PROCEDURE — 3077F SYST BP >= 140 MM HG: CPT | Mod: CPTII,S$GLB,, | Performed by: PODIATRIST

## 2018-10-09 PROCEDURE — 3008F BODY MASS INDEX DOCD: CPT | Mod: CPTII,S$GLB,, | Performed by: PODIATRIST

## 2018-10-09 NOTE — LETTER
October 16, 2018      Donny Esquivel MD  [address]  [place] [address]           [facility]  [address]  [place] [address]  Phone: [phone]          Patient: Kuldeep Villela   MR Number: [id number]   YOB: 1950   Date of Visit: 10/9/2018       Dear Dr. Donny Esquivel:    Thank you for referring Kuldeep Villela to me for evaluation. Attached you will find relevant portions of my assessment and plan of care.    If you have questions, please do not hesitate to call me. I look forward to following Kuldeep Villela along with you.    Sincerely,    Yanet Templeton, SAMI    Enclosure  CC:  No Recipients    If you would like to receive this communication electronically, please contact [email or web address] or [phone] to request more information on [facility] access.    For providers and/or their staff who would like to refer a patient to [facility], please contact us through our one-stop-shop provider referral line, [facility], at [phone].    If you feel you have received this communication in error or would no longer like to receive these types of communications, please e-mail [email or web address]

## 2018-10-16 RX ORDER — DOXYCYCLINE HYCLATE 100 MG
100 TABLET ORAL 2 TIMES DAILY
Qty: 14 TABLET | Refills: 0 | Status: SHIPPED | OUTPATIENT
Start: 2018-10-16 | End: 2018-11-29

## 2018-10-16 RX ORDER — METHYLPREDNISOLONE 4 MG/1
TABLET ORAL
Qty: 1 PACKAGE | Refills: 0 | Status: SHIPPED | OUTPATIENT
Start: 2018-10-16 | End: 2018-11-06

## 2018-10-16 NOTE — PROGRESS NOTES
Subjective:      Patient ID: Kuldeep Villela is a 67 y.o. female.    Chief Complaint: Callouses and Foot Problem    Kuldeep is a 67 y.o. female who presents to the podiatry clinic  with complaint of  left foot pain. Onset of the symptoms was several days ago. Precipitating event: none known. Current symptoms include: ability to bear weight, but with some pain, redness and swelling. Aggravating factors: any weight bearing. Symptoms have progressed to a point and plateaued. Patient has had no prior foot problems. Evaluation to date: plain films: normal. Treatment to date: none. Patients rates pain 6/10 on pain scale.        Review of Systems   Constitution: Negative for chills, fever and malaise/fatigue.   HENT: Negative for hearing loss.    Cardiovascular: Positive for leg swelling. Negative for claudication.   Respiratory: Negative for shortness of breath.    Skin: Negative for flushing and rash.   Musculoskeletal: Negative for joint pain and myalgias.   Neurological: Negative for loss of balance, numbness, paresthesias and sensory change.   Psychiatric/Behavioral: Negative for altered mental status.           Objective:      Physical Exam   Constitutional: She is oriented to person, place, and time. She appears well-developed and well-nourished.   Cardiovascular:   Pulses:       Dorsalis pedis pulses are 2+ on the right side, and 2+ on the left side.        Posterior tibial pulses are 2+ on the right side, and 2+ on the left side.   Left plantar lateral foot swollen, some erythema, increased temp.    Musculoskeletal:        Right knee: She exhibits no swelling and no ecchymosis.        Left knee: She exhibits no swelling and no ecchymosis.        Right ankle: She exhibits normal range of motion, no swelling, no ecchymosis and normal pulse. No lateral malleolus, no medial malleolus and no head of 5th metatarsal tenderness found. Achilles tendon exhibits no pain, no defect and normal Campos's test results.         Left ankle: She exhibits normal range of motion, no swelling, no ecchymosis and normal pulse. No lateral malleolus, no medial malleolus and no head of 5th metatarsal tenderness found. Achilles tendon exhibits no pain and normal Campos's test results.        Right lower leg: She exhibits no tenderness, no bony tenderness, no swelling, no edema and no deformity.        Left lower leg: She exhibits no tenderness, no swelling and no edema.        Right foot: There is normal range of motion and no deformity.        Left foot: There is normal range of motion and no deformity.   Adequate joint ROM noted to all lower extremity muscle groups with no pain or crepitation noted. Muscle strength is 5/5 in all groups bilaterally.     Feet:   Right Foot:   Protective Sensation: 5 sites tested. 5 sites sensed.   Left Foot:   Protective Sensation: 5 sites tested. 5 sites sensed.   Neurological: She is alert and oriented to person, place, and time.   Gross sensation intact to b/L lower extremities   Skin: Skin is warm. Capillary refill takes more than 3 seconds. No abrasion, no bruising, no burn and no ecchymosis noted.   No open lesions noted to b/L lower extremities.        Psychiatric: She has a normal mood and affect. Her speech is normal and behavior is normal. She is attentive.             Assessment:       Encounter Diagnoses   Name Primary?    Left foot pain Yes    Swelling          Plan:       Kuldeep was seen today for callouses and foot problem.    Diagnoses and all orders for this visit:    Left foot pain  -     Uric acid; Future  -     X-Ray Foot Complete Left; Future  -     methylPREDNISolone (MEDROL DOSEPACK) 4 mg tablet; use as directed    Swelling  -     methylPREDNISolone (MEDROL DOSEPACK) 4 mg tablet; use as directed    Other orders  -     doxycycline (VIBRA-TABS) 100 MG tablet; Take 1 tablet (100 mg total) by mouth 2 (two) times daily.      I counseled the patient on her conditions, their implications and  medical management.      Uric acid ordered to r/o gout vs cellulitis.   Results back with UA 0.2 points elevated  X-ray negative  Doxy and dose pac proscribed and pt dispensed CAM boot.   Pt advised on RICE and OTC NSAIDs for associated pain.     RTC in 2 weeks or sooner if any new pedal problems should arise or if condition worsens.     .

## 2018-10-26 DIAGNOSIS — F41.0 GENERALIZED ANXIETY DISORDER WITH PANIC ATTACKS: ICD-10-CM

## 2018-10-26 DIAGNOSIS — E78.5 HYPERLIPIDEMIA, UNSPECIFIED HYPERLIPIDEMIA TYPE: ICD-10-CM

## 2018-10-26 DIAGNOSIS — F41.1 GENERALIZED ANXIETY DISORDER WITH PANIC ATTACKS: ICD-10-CM

## 2018-10-26 DIAGNOSIS — I11.9 BENIGN HYPERTENSIVE HEART DISEASE WITHOUT HEART FAILURE: ICD-10-CM

## 2018-10-26 RX ORDER — AMLODIPINE BESYLATE 10 MG/1
TABLET ORAL
Qty: 90 TABLET | Refills: 0 | Status: SHIPPED | OUTPATIENT
Start: 2018-10-26 | End: 2018-11-29 | Stop reason: SDUPTHER

## 2018-10-26 RX ORDER — PRAVASTATIN SODIUM 20 MG/1
TABLET ORAL
Qty: 90 TABLET | Refills: 0 | Status: SHIPPED | OUTPATIENT
Start: 2018-10-26 | End: 2019-01-27 | Stop reason: SDUPTHER

## 2018-10-26 RX ORDER — CITALOPRAM 40 MG/1
TABLET, FILM COATED ORAL
Qty: 90 TABLET | Refills: 0 | Status: SHIPPED | OUTPATIENT
Start: 2018-10-26 | End: 2018-11-29 | Stop reason: SDUPTHER

## 2018-11-01 DIAGNOSIS — F41.0 ANXIETY ATTACK: ICD-10-CM

## 2018-11-01 NOTE — TELEPHONE ENCOUNTER
----- Message from Adele Sierra sent at 11/1/2018  3:07 PM CDT -----  Contact: Self   Refill ; busPIRone (BUSPAR) 15 MG tablet      Pharmacy     [facility] [address] [place] [address] [place] - [address] [facility] [address] AT [facility]

## 2018-11-02 DIAGNOSIS — G89.29 CHRONIC NECK PAIN: ICD-10-CM

## 2018-11-02 DIAGNOSIS — M54.2 CHRONIC NECK PAIN: ICD-10-CM

## 2018-11-02 DIAGNOSIS — M17.0 PRIMARY OSTEOARTHRITIS OF BOTH KNEES: ICD-10-CM

## 2018-11-02 RX ORDER — OXYCODONE AND ACETAMINOPHEN 10; 325 MG/1; MG/1
1 TABLET ORAL
Qty: 150 TABLET | Refills: 0 | Status: SHIPPED | OUTPATIENT
Start: 2018-11-03 | End: 2018-12-03 | Stop reason: SDUPTHER

## 2018-11-02 RX ORDER — BUSPIRONE HYDROCHLORIDE 15 MG/1
15 TABLET ORAL 3 TIMES DAILY
Qty: 90 TABLET | Refills: 1 | Status: SHIPPED | OUTPATIENT
Start: 2018-11-02 | End: 2018-12-31 | Stop reason: SDUPTHER

## 2018-11-02 NOTE — TELEPHONE ENCOUNTER
-10/0/18 last Rx refill  09/18/18 last office visit  01/18/19 RTC          ---- Message from Alin Garcia sent at 11/2/2018  8:31 AM CDT -----  Rx Refill/Request     Is this a Refill or New Rx: Refill    Rx Name and Strength:  oxyCODONE-acetaminophen (PERCOCET)  mg per tablet  Preferred Pharmacy with phone number: see below  Communication Preference: [phone]  Additional Information:     [facility] [address] [place] [address] [place] [address] AT [address]  [place] [address]  Phone: [phone] Fax: [phone]

## 2018-11-06 ENCOUNTER — TELEPHONE (OUTPATIENT)
Dept: PODIATRY | Facility: CLINIC | Age: 68
End: 2018-11-06

## 2018-11-06 NOTE — TELEPHONE ENCOUNTER
----- Message from Kathleen Flores sent at 11/6/2018 10:52 AM CST -----  Contact: Self  Pt is calling to here result from xray and blood work   Can be reached @home#

## 2018-11-07 ENCOUNTER — TELEPHONE (OUTPATIENT)
Dept: FAMILY MEDICINE | Facility: CLINIC | Age: 68
End: 2018-11-07

## 2018-11-11 DIAGNOSIS — M54.41 CHRONIC MIDLINE LOW BACK PAIN WITH BILATERAL SCIATICA: ICD-10-CM

## 2018-11-11 DIAGNOSIS — G89.29 CHRONIC NECK PAIN: ICD-10-CM

## 2018-11-11 DIAGNOSIS — G89.29 CHRONIC MIDLINE LOW BACK PAIN WITH BILATERAL SCIATICA: ICD-10-CM

## 2018-11-11 DIAGNOSIS — M17.0 PRIMARY OSTEOARTHRITIS OF BOTH KNEES: ICD-10-CM

## 2018-11-11 DIAGNOSIS — M54.42 CHRONIC MIDLINE LOW BACK PAIN WITH BILATERAL SCIATICA: ICD-10-CM

## 2018-11-11 DIAGNOSIS — M54.2 CHRONIC NECK PAIN: ICD-10-CM

## 2018-11-11 RX ORDER — MELOXICAM 7.5 MG/1
TABLET ORAL
Qty: 180 TABLET | Refills: 0 | Status: SHIPPED | OUTPATIENT
Start: 2018-11-11 | End: 2019-04-23 | Stop reason: SDUPTHER

## 2018-11-28 DIAGNOSIS — G25.81 RESTLESS LEG: ICD-10-CM

## 2018-11-28 RX ORDER — ROPINIROLE 1 MG/1
TABLET, FILM COATED ORAL
Qty: 60 TABLET | Refills: 0 | Status: SHIPPED | OUTPATIENT
Start: 2018-11-28 | End: 2018-12-31 | Stop reason: SDUPTHER

## 2018-11-29 ENCOUNTER — OFFICE VISIT (OUTPATIENT)
Dept: FAMILY MEDICINE | Facility: CLINIC | Age: 68
End: 2018-11-29
Payer: COMMERCIAL

## 2018-11-29 VITALS
WEIGHT: 262.38 LBS | OXYGEN SATURATION: 97 % | HEIGHT: 63 IN | SYSTOLIC BLOOD PRESSURE: 130 MMHG | DIASTOLIC BLOOD PRESSURE: 80 MMHG | HEART RATE: 80 BPM | TEMPERATURE: 99 F | BODY MASS INDEX: 46.49 KG/M2

## 2018-11-29 DIAGNOSIS — Z11.59 ENCOUNTER FOR HEPATITIS C SCREENING TEST FOR LOW RISK PATIENT: Primary | ICD-10-CM

## 2018-11-29 DIAGNOSIS — F41.1 GENERALIZED ANXIETY DISORDER WITH PANIC ATTACKS: ICD-10-CM

## 2018-11-29 DIAGNOSIS — K59.04 CHRONIC IDIOPATHIC CONSTIPATION: ICD-10-CM

## 2018-11-29 DIAGNOSIS — I11.9 BENIGN HYPERTENSIVE HEART DISEASE WITHOUT HEART FAILURE: ICD-10-CM

## 2018-11-29 DIAGNOSIS — F41.0 GENERALIZED ANXIETY DISORDER WITH PANIC ATTACKS: ICD-10-CM

## 2018-11-29 DIAGNOSIS — E66.01 MORBID OBESITY: ICD-10-CM

## 2018-11-29 DIAGNOSIS — Z12.39 BREAST CANCER SCREENING: ICD-10-CM

## 2018-11-29 DIAGNOSIS — Z72.0 TOBACCO ABUSE: ICD-10-CM

## 2018-11-29 PROCEDURE — 3075F SYST BP GE 130 - 139MM HG: CPT | Mod: CPTII,S$GLB,, | Performed by: FAMILY MEDICINE

## 2018-11-29 PROCEDURE — 99214 OFFICE O/P EST MOD 30 MIN: CPT | Mod: S$GLB,,, | Performed by: FAMILY MEDICINE

## 2018-11-29 PROCEDURE — 3079F DIAST BP 80-89 MM HG: CPT | Mod: CPTII,S$GLB,, | Performed by: FAMILY MEDICINE

## 2018-11-29 PROCEDURE — 99999 PR PBB SHADOW E&M-EST. PATIENT-LVL III: CPT | Mod: PBBFAC,,, | Performed by: FAMILY MEDICINE

## 2018-11-29 PROCEDURE — 1101F PT FALLS ASSESS-DOCD LE1/YR: CPT | Mod: CPTII,S$GLB,, | Performed by: FAMILY MEDICINE

## 2018-11-29 RX ORDER — AMLODIPINE BESYLATE 10 MG/1
TABLET ORAL
Qty: 90 TABLET | Refills: 1 | Status: SHIPPED | OUTPATIENT
Start: 2018-11-29 | End: 2019-07-27 | Stop reason: SDUPTHER

## 2018-11-29 RX ORDER — VARENICLINE TARTRATE 1 MG/1
1 TABLET, FILM COATED ORAL DAILY
Qty: 30 TABLET | Refills: 1 | Status: SHIPPED | OUTPATIENT
Start: 2018-11-29 | End: 2019-12-04 | Stop reason: SDUPTHER

## 2018-11-29 RX ORDER — CITALOPRAM 40 MG/1
40 TABLET, FILM COATED ORAL DAILY
Qty: 90 TABLET | Refills: 1 | Status: SHIPPED | OUTPATIENT
Start: 2018-11-29 | End: 2019-07-28 | Stop reason: SDUPTHER

## 2018-11-29 RX ORDER — VARENICLINE TARTRATE 0.5 (11)-1
KIT ORAL
Qty: 1 PACKAGE | Refills: 0 | Status: SHIPPED | OUTPATIENT
Start: 2018-11-29 | End: 2021-01-28

## 2018-11-29 NOTE — PROGRESS NOTES
Subjective:       Patient ID: Kuldeep Villela is a 68 y.o. female.    Chief Complaint: Panic Attack and Discuss Medication Changes    68 year old female is here for refills of her medication. She is on amlodipine for blood pressure and it is controlled.    She is on celexa for her mood and panic attacks and this is effective.     She would like a refill of her chantix to quit smoking as she found this effective.    She would also like a prescription for linzess as this helped her use the restroom effectively.      Past Medical History:  No date: Arthritis  No date: Asthma  7/13/2012: Cervical spondylosis  7/13/2012: Chronic LBP  7/13/2012: Chronic neck pain  No date: Hyperlipidemia  No date: Hypertension  7/13/2012: Lumbar radiculopathy, BLE  7/13/2012: Lumbar spinal stenosis at L4-L5.  7/13/2012: Lumbar spondylosis  7/13/2012: Morbid obesity  7/13/2012: Primary osteoarthritis of both knees  7/13/2012: Spondylolisthesis, grade 1 at L4-L5.  No date: Tenosynovitis of ankle      Comment:  Rt peroneus longus   Past Surgical History:  5/18/2015: BLOCK-NERVE-MEDIAL BRANCH-LUMBAR; Bilateral      Comment:  Performed by Aarti Londono MD at [facility]  No date: CHOLECYSTECTOMY  No date: HYSTERECTOMY  No date: NC REMOVAL OF OVARY/TUBE(S)  Review of patient's family history indicates:  Problem: Heart disease      Relation: Mother          Age of Onset: (Not Specified)  Problem: Hypertension      Relation: Mother          Age of Onset: (Not Specified)  Problem: Heart disease      Relation: Father          Age of Onset: (Not Specified)  Problem: Hypertension      Relation: Father          Age of Onset: (Not Specified)  Problem: Breast cancer      Relation: Neg Hx          Age of Onset: (Not Specified)  Problem: Colon cancer      Relation: Neg Hx          Age of Onset: (Not Specified)  Problem: Ovarian cancer      Relation: Neg Hx          Age of Onset: (Not Specified)    Social History    Socioeconomic History      Marital  "status: Single      Spouse name: Not on file      Number of children: Not on file      Years of education: Not on file      Highest education level: Not on file    Social Needs      Financial resource strain: Not on file      Food insecurity - worry: Not on file      Food insecurity - inability: Not on file      Transportation needs - medical: Not on file      Transportation needs - non-medical: Not on file    Occupational History      Not on file    Tobacco Use      Smoking status: Current Every Day Smoker        Packs/day: 1.00        Years: 20.00        Pack years: 20        Types: Cigarettes      Smokeless tobacco: Never Used    Substance and Sexual Activity      Alcohol use: No        Alcohol/week: 0.0 oz      Drug use: No      Sexual activity: Not Currently    Other Topics      Concerns:        Not on file    Social History Narrative      Not on file          Review of Systems    Objective:       Vitals:    11/29/18 1346   BP: 130/80   Pulse: 80   Temp: 98.8 °F (37.1 °C)   TempSrc: Oral   SpO2: 97%   Weight: 119 kg (262 lb 5.6 oz)   Height: 5' 3" (1.6 m)       Physical Exam   Constitutional: She is oriented to person, place, and time. She appears well-developed and well-nourished. No distress.   HENT:   Head: Normocephalic and atraumatic.   Eyes: Conjunctivae are normal.   Neck: Normal range of motion. Neck supple. Carotid bruit is not present.   Cardiovascular: Normal rate, regular rhythm and normal heart sounds. Exam reveals no gallop and no friction rub.   No murmur heard.  Pulmonary/Chest: Effort normal and breath sounds normal. No stridor. No respiratory distress. She has no wheezes. She has no rales.   Musculoskeletal: She exhibits edema.   Neurological: She is alert and oriented to person, place, and time.   Skin: She is not diaphoretic.       Assessment:       1. Encounter for hepatitis C screening test for low risk patient    2. Generalized anxiety disorder with panic attacks    3. Benign hypertensive " heart disease without heart failure    4. Morbid obesity    5. Tobacco abuse    6. Chronic idiopathic constipation    7. Breast cancer screening        Plan:       Kuldeep was seen today for panic attack and discuss medication changes.    Diagnoses and all orders for this visit:    Encounter for hepatitis C screening test for low risk patient  -     Hepatitis C antibody; Future    Generalized anxiety disorder with panic attacks  -     citalopram (CELEXA) 40 MG tablet; Take 1 tablet (40 mg total) by mouth once daily.    Benign hypertensive heart disease without heart failure  -     CBC auto differential; Future  -     Comprehensive metabolic panel; Future  -     Lipid panel; Future  -     TSH; Future  -     amLODIPine (NORVASC) 10 MG tablet; TAKE 1 TABLET(10 MG) BY MOUTH EVERY DAY  Stable. Refilled meds and due for labs    Morbid obesity  -     Hemoglobin A1c; Future  -     Lipid panel; Future    Tobacco abuse  -     varenicline (CHANTIX STARTING MONTH BOX) 0.5 mg (11)- 1 mg (42) tablet; Take one 0.5mg tab by mouth once daily X3 days,then increase to one 0.5mg tab twice daily X4 days,then increase to one 1mg tab twice daily  -     varenicline (CHANTIX) 1 mg Tab; Take 1 tablet (1 mg total) by mouth once daily.  Restarting chantix and given a starter pack and refill    Chronic idiopathic constipation  -     linaclotide (LINZESS) 145 mcg Cap capsule; Take 1 capsule (145 mcg total) by mouth once daily.  Trial of linzess.   Breast cancer screening  -     Mammo Digital Screening Bilat; Future

## 2018-11-30 ENCOUNTER — TELEPHONE (OUTPATIENT)
Dept: FAMILY MEDICINE | Facility: CLINIC | Age: 68
End: 2018-11-30

## 2018-11-30 NOTE — TELEPHONE ENCOUNTER
----- Message from Bonnie Valladares sent at 11/30/2018  2:29 PM CST -----  Contact: Self   Patient says she was told to call back with a number to have her medication ordered [phone]. Please call patient at [phone]      [place]

## 2018-11-30 NOTE — TELEPHONE ENCOUNTER
Patient states she want medication sent to professional [facility] pharmacy in [place]. Medication is Litomax. Form on Dr Musa desmindy for signature

## 2018-11-30 NOTE — TELEPHONE ENCOUNTER
Patient states Dr Musa told her to call with the number of the place to order her medication. I called number given and got a answering machine stating call cannot be completed as dialed. Check the number and try again. Informed patient she will call her friend to confirm number

## 2018-12-03 DIAGNOSIS — M17.0 PRIMARY OSTEOARTHRITIS OF BOTH KNEES: ICD-10-CM

## 2018-12-03 DIAGNOSIS — M54.2 CHRONIC NECK PAIN: ICD-10-CM

## 2018-12-03 DIAGNOSIS — G89.29 CHRONIC NECK PAIN: ICD-10-CM

## 2018-12-03 RX ORDER — OXYCODONE AND ACETAMINOPHEN 10; 325 MG/1; MG/1
1 TABLET ORAL
Qty: 150 TABLET | Refills: 0 | Status: SHIPPED | OUTPATIENT
Start: 2018-12-03 | End: 2018-12-04 | Stop reason: SDUPTHER

## 2018-12-03 NOTE — TELEPHONE ENCOUNTER
----- Message from Alin Garcia sent at 12/3/2018  9:59 AM CST -----  Rx Refill/Request     Is this a Refill or New Rx: Refill    Rx Name and Strength:  oxyCODONE-acetaminophen (PERCOCET)  mg per tablet  Preferred Pharmacy with phone number: see below  Communication Preference: [phone]  Additional Information:     [facility] [address] [place] [address] [place] [address] AT [address]  [place] [address]  Phone: [phone] Fax: [phone]

## 2018-12-04 DIAGNOSIS — M17.0 PRIMARY OSTEOARTHRITIS OF BOTH KNEES: ICD-10-CM

## 2018-12-04 DIAGNOSIS — M54.2 CHRONIC NECK PAIN: ICD-10-CM

## 2018-12-04 DIAGNOSIS — G89.29 CHRONIC NECK PAIN: ICD-10-CM

## 2018-12-04 RX ORDER — OXYCODONE AND ACETAMINOPHEN 10; 325 MG/1; MG/1
1 TABLET ORAL
Qty: 150 TABLET | Refills: 0 | Status: SHIPPED | OUTPATIENT
Start: 2018-12-04 | End: 2019-01-02 | Stop reason: SDUPTHER

## 2018-12-04 NOTE — TELEPHONE ENCOUNTER
----- Message from Alin Garcia sent at 12/4/2018  8:21 AM CST -----  Needs Advice    Reason for call: Pt states [facility] does not have any oxyCODONE-acetaminophen (PERCOCET)  mg per tablet in stock, and pt is asking the doctor send the script to pharmacy below        Communication Preference: [phone]    Additional Information:    [facility] [address] [address] [place] - [address] AT [facility] & [place] [address] [employer]  [place] [address]  Phone: [phone] Fax: [phone]

## 2018-12-06 ENCOUNTER — TELEPHONE (OUTPATIENT)
Dept: FAMILY MEDICINE | Facility: CLINIC | Age: 68
End: 2018-12-06

## 2018-12-06 NOTE — TELEPHONE ENCOUNTER
Patient would like to know how she is to go about starting the Chantix.  Stated she has two medications and is not sure if she is to start both at the same time or start one then stagger in the use of the other.  Please advise.

## 2018-12-06 NOTE — TELEPHONE ENCOUNTER
----- Message from Clint Flanagan sent at 12/5/2018  4:27 PM CST -----  Contact: Kuldeep [phone]  The patient is requesting a call back from the staff. She has some questions to ask, in regards to medication. Please call at your earliest convenience.

## 2018-12-31 DIAGNOSIS — F41.0 ANXIETY ATTACK: ICD-10-CM

## 2018-12-31 DIAGNOSIS — G25.81 RESTLESS LEG: ICD-10-CM

## 2018-12-31 RX ORDER — ROPINIROLE 1 MG/1
TABLET, FILM COATED ORAL
Qty: 60 TABLET | Refills: 0 | Status: SHIPPED | OUTPATIENT
Start: 2018-12-31 | End: 2019-03-30 | Stop reason: SDUPTHER

## 2018-12-31 RX ORDER — BUSPIRONE HYDROCHLORIDE 15 MG/1
TABLET ORAL
Qty: 90 TABLET | Refills: 0 | Status: SHIPPED | OUTPATIENT
Start: 2018-12-31 | End: 2019-01-31 | Stop reason: SDUPTHER

## 2019-01-02 DIAGNOSIS — M17.0 PRIMARY OSTEOARTHRITIS OF BOTH KNEES: ICD-10-CM

## 2019-01-02 DIAGNOSIS — M54.2 CHRONIC NECK PAIN: ICD-10-CM

## 2019-01-02 DIAGNOSIS — G89.29 CHRONIC NECK PAIN: ICD-10-CM

## 2019-01-02 RX ORDER — OXYCODONE AND ACETAMINOPHEN 10; 325 MG/1; MG/1
1 TABLET ORAL
Qty: 150 TABLET | Refills: 0 | Status: SHIPPED | OUTPATIENT
Start: 2019-01-03 | End: 2019-01-03 | Stop reason: SDUPTHER

## 2019-01-02 NOTE — TELEPHONE ENCOUNTER
12/04/18 last Rx refill  09/18/18 last office visit  01/18/19 RTC      ---- Message from Karon Hatifeld sent at 1/2/2019  9:22 AM CST -----  Rx Refill/Request     Is this a Refill or New Rx:  refill  Rx Name and Strength:  oxyCODONE-acetaminophen (PERCOCET)  mg per tablet  Preferred Pharmacy with phone number:     [facility] [address]  [place] [facility] [address] [facility] & [place] [address] [facility]  [place] [address]  Phone: [phone] Fax: [phone]    Communication Preference:pt @ [phone]  Additional Information:

## 2019-01-03 RX ORDER — OXYCODONE AND ACETAMINOPHEN 10; 325 MG/1; MG/1
1 TABLET ORAL
Qty: 150 TABLET | Refills: 0 | Status: SHIPPED | OUTPATIENT
Start: 2019-01-03 | End: 2019-02-01 | Stop reason: SDUPTHER

## 2019-01-03 NOTE — TELEPHONE ENCOUNTER
Last visit: 09/18/2018  Next visit: 01/18/2019  Last refill: 01/03/2018  Refill was done but went to wrong pharmacy    ----- Message from Karon Hatfield sent at 1/3/2019  8:54 AM CST -----  Contact: pt @ [phone]   Calling regarding her medication being sent to the wrong pharmacy, per the message sent yesterday:  Karon BARNETT Staff  Caller: Unspecified (Yesterday,  9:22 AM)  Rx Refill/Request       Is this a Refill or New Rx:  refill   Rx Name and Strength:  oxyCODONE-acetaminophen (PERCOCET)  mg per tablet   Preferred Pharmacy with phone number:     [facility] [address] [place] [address] [place] - [address] AT [facility] & [place] [address] [address]   [facility] [address]   Phone: [phone] Fax: [phone]     the  refill was called in to [facility] which is out of the medication.     asking to have sent to: [facility] [address] [facility] [place] [facility] [address] [place] [address] [address]   [facility] [address]   Phone: [phone] Fax: [phone]

## 2019-01-10 ENCOUNTER — TELEPHONE (OUTPATIENT)
Dept: FAMILY MEDICINE | Facility: CLINIC | Age: 69
End: 2019-01-10

## 2019-01-27 DIAGNOSIS — E78.5 HYPERLIPIDEMIA, UNSPECIFIED HYPERLIPIDEMIA TYPE: ICD-10-CM

## 2019-01-28 DIAGNOSIS — I11.9 BENIGN HYPERTENSIVE HEART DISEASE WITHOUT HEART FAILURE: ICD-10-CM

## 2019-01-28 DIAGNOSIS — F41.1 GENERALIZED ANXIETY DISORDER WITH PANIC ATTACKS: ICD-10-CM

## 2019-01-28 DIAGNOSIS — F41.0 GENERALIZED ANXIETY DISORDER WITH PANIC ATTACKS: ICD-10-CM

## 2019-01-28 RX ORDER — PRAVASTATIN SODIUM 20 MG/1
TABLET ORAL
Qty: 90 TABLET | Refills: 0 | Status: SHIPPED | OUTPATIENT
Start: 2019-01-28 | End: 2019-04-29 | Stop reason: SDUPTHER

## 2019-01-28 RX ORDER — AMLODIPINE BESYLATE 10 MG/1
TABLET ORAL
Qty: 90 TABLET | Refills: 0 | Status: SHIPPED | OUTPATIENT
Start: 2019-01-28 | End: 2020-05-01 | Stop reason: SDUPTHER

## 2019-01-28 RX ORDER — CITALOPRAM 40 MG/1
TABLET, FILM COATED ORAL
Qty: 90 TABLET | Refills: 0 | Status: SHIPPED | OUTPATIENT
Start: 2019-01-28 | End: 2019-10-27 | Stop reason: SDUPTHER

## 2019-01-31 DIAGNOSIS — F41.0 ANXIETY ATTACK: ICD-10-CM

## 2019-01-31 RX ORDER — BUSPIRONE HYDROCHLORIDE 15 MG/1
TABLET ORAL
Qty: 90 TABLET | Refills: 0 | Status: SHIPPED | OUTPATIENT
Start: 2019-01-31 | End: 2019-05-22 | Stop reason: SDUPTHER

## 2019-02-01 DIAGNOSIS — M54.2 CHRONIC NECK PAIN: ICD-10-CM

## 2019-02-01 DIAGNOSIS — G89.29 CHRONIC NECK PAIN: ICD-10-CM

## 2019-02-01 DIAGNOSIS — M17.0 PRIMARY OSTEOARTHRITIS OF BOTH KNEES: ICD-10-CM

## 2019-02-01 RX ORDER — OXYCODONE AND ACETAMINOPHEN 10; 325 MG/1; MG/1
1 TABLET ORAL
Qty: 150 TABLET | Refills: 0 | Status: SHIPPED | OUTPATIENT
Start: 2019-02-02 | End: 2019-03-01 | Stop reason: SDUPTHER

## 2019-02-01 NOTE — TELEPHONE ENCOUNTER
----- Message from Brandi Martinez sent at 2/1/2019  9:23 AM CST -----  Contact: Pt  Pt called to speak to the nurse to request a refill for oxyCODONE-acetaminophen (PERCOCET)  mg per tablet and would like the Rx sent to [facility] and would like a call back to confirm that the request has been sent.    Pt can be reached at [phone].    Thanks

## 2019-02-27 DIAGNOSIS — G89.29 CHRONIC MIDLINE LOW BACK PAIN WITH BILATERAL SCIATICA: ICD-10-CM

## 2019-02-27 DIAGNOSIS — M54.42 CHRONIC MIDLINE LOW BACK PAIN WITH BILATERAL SCIATICA: ICD-10-CM

## 2019-02-27 DIAGNOSIS — M54.41 CHRONIC MIDLINE LOW BACK PAIN WITH BILATERAL SCIATICA: ICD-10-CM

## 2019-02-27 RX ORDER — CYCLOBENZAPRINE HCL 10 MG
TABLET ORAL
Qty: 90 TABLET | Refills: 0 | Status: SHIPPED | OUTPATIENT
Start: 2019-02-27 | End: 2019-04-23 | Stop reason: SDUPTHER

## 2019-03-01 DIAGNOSIS — G89.29 CHRONIC NECK PAIN: ICD-10-CM

## 2019-03-01 DIAGNOSIS — M54.2 CHRONIC NECK PAIN: ICD-10-CM

## 2019-03-01 DIAGNOSIS — M17.0 PRIMARY OSTEOARTHRITIS OF BOTH KNEES: ICD-10-CM

## 2019-03-01 RX ORDER — OXYCODONE AND ACETAMINOPHEN 10; 325 MG/1; MG/1
1 TABLET ORAL
Qty: 150 TABLET | Refills: 0 | Status: SHIPPED | OUTPATIENT
Start: 2019-03-03 | End: 2019-03-02 | Stop reason: SDUPTHER

## 2019-03-01 NOTE — TELEPHONE ENCOUNTER
02/01/19 last Rx refill  09/18/18 last office visit  04/23/19 RTC          ----- Message from Bib Rueda sent at 3/1/2019  9:08 AM CST -----  Contact: Patient   Rx Refill/Request     Is this a Refill or New Rx:  Yes   Rx Name and Strength:  oxyCODONE-acetaminophen (PERCOCET)  mg per tablet    Preferred Pharmacy with phone number:     [facility] [id number]  [place]  [address] [place] [address] [place] [address]  Phone: [phone] Fax: [phone]

## 2019-03-02 DIAGNOSIS — G89.29 CHRONIC NECK PAIN: ICD-10-CM

## 2019-03-02 DIAGNOSIS — M54.2 CHRONIC NECK PAIN: ICD-10-CM

## 2019-03-02 DIAGNOSIS — M17.0 PRIMARY OSTEOARTHRITIS OF BOTH KNEES: ICD-10-CM

## 2019-03-02 RX ORDER — OXYCODONE AND ACETAMINOPHEN 10; 325 MG/1; MG/1
1 TABLET ORAL
Qty: 150 TABLET | Refills: 0 | Status: SHIPPED | OUTPATIENT
Start: 2019-03-03 | End: 2019-04-01 | Stop reason: SDUPTHER

## 2019-03-30 DIAGNOSIS — G25.81 RESTLESS LEG: ICD-10-CM

## 2019-04-01 DIAGNOSIS — G89.29 CHRONIC NECK PAIN: ICD-10-CM

## 2019-04-01 DIAGNOSIS — M54.2 CHRONIC NECK PAIN: ICD-10-CM

## 2019-04-01 DIAGNOSIS — M17.0 PRIMARY OSTEOARTHRITIS OF BOTH KNEES: ICD-10-CM

## 2019-04-01 RX ORDER — OXYCODONE AND ACETAMINOPHEN 10; 325 MG/1; MG/1
1 TABLET ORAL
Qty: 150 TABLET | Refills: 0 | Status: SHIPPED | OUTPATIENT
Start: 2019-04-02 | End: 2019-04-23 | Stop reason: SDUPTHER

## 2019-04-01 NOTE — TELEPHONE ENCOUNTER
03/02/19 last Rx refill  09/18/18 last office visit  04/23/19 RTC      ----- Message from Bib Rueda sent at 4/1/2019  9:20 AM CDT -----  Contact: Patient   Rx Refill/Request     Is this a Refill or New Rx:  Yes   Rx Name and Strength:  oxyCODONE-acetaminophen (PERCOCET)  mg per tablet    Preferred Pharmacy with phone number:     [facility] [address]  [place]  [address] [place] [address] [facility] [place] [address]  Phone: [phone] Fax: [phone]

## 2019-04-03 RX ORDER — ROPINIROLE 1 MG/1
TABLET, FILM COATED ORAL
Qty: 60 TABLET | Refills: 5 | Status: SHIPPED | OUTPATIENT
Start: 2019-04-03 | End: 2020-03-27 | Stop reason: SDUPTHER

## 2019-04-23 ENCOUNTER — OFFICE VISIT (OUTPATIENT)
Dept: PHYSICAL MEDICINE AND REHAB | Facility: CLINIC | Age: 69
End: 2019-04-23
Payer: COMMERCIAL

## 2019-04-23 ENCOUNTER — LAB VISIT (OUTPATIENT)
Dept: LAB | Facility: HOSPITAL | Age: 69
End: 2019-04-23
Attending: PHYSICAL MEDICINE & REHABILITATION
Payer: COMMERCIAL

## 2019-04-23 VITALS
SYSTOLIC BLOOD PRESSURE: 165 MMHG | BODY MASS INDEX: 48.06 KG/M2 | HEART RATE: 83 BPM | DIASTOLIC BLOOD PRESSURE: 87 MMHG | HEIGHT: 63 IN | WEIGHT: 271.25 LBS

## 2019-04-23 DIAGNOSIS — Z79.891 CHRONICALLY ON OPIATE THERAPY: ICD-10-CM

## 2019-04-23 DIAGNOSIS — M47.26 OSTEOARTHRITIS OF SPINE WITH RADICULOPATHY, LUMBAR REGION: ICD-10-CM

## 2019-04-23 DIAGNOSIS — G89.29 CHRONIC NECK PAIN: ICD-10-CM

## 2019-04-23 DIAGNOSIS — M54.42 CHRONIC MIDLINE LOW BACK PAIN WITH BILATERAL SCIATICA: Primary | ICD-10-CM

## 2019-04-23 DIAGNOSIS — M54.2 CHRONIC NECK PAIN: ICD-10-CM

## 2019-04-23 DIAGNOSIS — G89.29 CHRONIC MIDLINE LOW BACK PAIN WITH BILATERAL SCIATICA: Primary | ICD-10-CM

## 2019-04-23 DIAGNOSIS — M17.0 PRIMARY OSTEOARTHRITIS OF BOTH KNEES: ICD-10-CM

## 2019-04-23 DIAGNOSIS — E66.01 MORBID OBESITY, UNSPECIFIED OBESITY TYPE: ICD-10-CM

## 2019-04-23 DIAGNOSIS — M54.41 CHRONIC MIDLINE LOW BACK PAIN WITH BILATERAL SCIATICA: Primary | ICD-10-CM

## 2019-04-23 DIAGNOSIS — M54.12 CERVICAL RADICULOPATHY: ICD-10-CM

## 2019-04-23 DIAGNOSIS — M48.061 SPINAL STENOSIS OF LUMBAR REGION, UNSPECIFIED WHETHER NEUROGENIC CLAUDICATION PRESENT: ICD-10-CM

## 2019-04-23 DIAGNOSIS — M47.22 OSTEOARTHRITIS OF SPINE WITH RADICULOPATHY, CERVICAL REGION: ICD-10-CM

## 2019-04-23 PROCEDURE — 1101F PR PT FALLS ASSESS DOC 0-1 FALLS W/OUT INJ PAST YR: ICD-10-PCS | Mod: CPTII,S$GLB,, | Performed by: PHYSICAL MEDICINE & REHABILITATION

## 2019-04-23 PROCEDURE — 3079F PR MOST RECENT DIASTOLIC BLOOD PRESSURE 80-89 MM HG: ICD-10-PCS | Mod: CPTII,S$GLB,, | Performed by: PHYSICAL MEDICINE & REHABILITATION

## 2019-04-23 PROCEDURE — 1101F PT FALLS ASSESS-DOCD LE1/YR: CPT | Mod: CPTII,S$GLB,, | Performed by: PHYSICAL MEDICINE & REHABILITATION

## 2019-04-23 PROCEDURE — 99214 OFFICE O/P EST MOD 30 MIN: CPT | Mod: S$GLB,,, | Performed by: PHYSICAL MEDICINE & REHABILITATION

## 2019-04-23 PROCEDURE — 3077F SYST BP >= 140 MM HG: CPT | Mod: CPTII,S$GLB,, | Performed by: PHYSICAL MEDICINE & REHABILITATION

## 2019-04-23 PROCEDURE — 3079F DIAST BP 80-89 MM HG: CPT | Mod: CPTII,S$GLB,, | Performed by: PHYSICAL MEDICINE & REHABILITATION

## 2019-04-23 PROCEDURE — 80307 DRUG TEST PRSMV CHEM ANLYZR: CPT

## 2019-04-23 PROCEDURE — 99214 PR OFFICE/OUTPT VISIT, EST, LEVL IV, 30-39 MIN: ICD-10-PCS | Mod: S$GLB,,, | Performed by: PHYSICAL MEDICINE & REHABILITATION

## 2019-04-23 PROCEDURE — 3077F PR MOST RECENT SYSTOLIC BLOOD PRESSURE >= 140 MM HG: ICD-10-PCS | Mod: CPTII,S$GLB,, | Performed by: PHYSICAL MEDICINE & REHABILITATION

## 2019-04-23 PROCEDURE — 99999 PR PBB SHADOW E&M-EST. PATIENT-LVL III: CPT | Mod: PBBFAC,,, | Performed by: PHYSICAL MEDICINE & REHABILITATION

## 2019-04-23 PROCEDURE — 99999 PR PBB SHADOW E&M-EST. PATIENT-LVL III: ICD-10-PCS | Mod: PBBFAC,,, | Performed by: PHYSICAL MEDICINE & REHABILITATION

## 2019-04-23 RX ORDER — MELOXICAM 7.5 MG/1
TABLET ORAL
Qty: 180 TABLET | Refills: 2 | Status: SHIPPED | OUTPATIENT
Start: 2019-04-23 | End: 2020-01-27

## 2019-04-23 RX ORDER — PREGABALIN 50 MG/1
50 CAPSULE ORAL 3 TIMES DAILY
Qty: 90 CAPSULE | Refills: 3 | Status: SHIPPED | OUTPATIENT
Start: 2019-04-23 | End: 2020-01-07 | Stop reason: SDUPTHER

## 2019-04-23 RX ORDER — CYCLOBENZAPRINE HCL 10 MG
10 TABLET ORAL 2 TIMES DAILY PRN
Qty: 180 TABLET | Refills: 1 | Status: SHIPPED | OUTPATIENT
Start: 2019-04-23 | End: 2019-08-02 | Stop reason: SDUPTHER

## 2019-04-23 RX ORDER — OXYCODONE AND ACETAMINOPHEN 10; 325 MG/1; MG/1
1 TABLET ORAL
Qty: 150 TABLET | Refills: 0 | Status: SHIPPED | OUTPATIENT
Start: 2019-05-02 | End: 2019-05-31 | Stop reason: SDUPTHER

## 2019-04-23 NOTE — PROGRESS NOTES
Subjective:       Patient ID: Kuldeep Villela is a 68 y.o. female.    Chief Complaint: No chief complaint on file.    HPI    HISTORY OF PRESENT ILLNESS:  Mrs. Villela is a 68-year-old black female with past   medical history of osteoarthritis and morbid obesity who is followed up in the   Physical Medicine Clinic for chronic low back pain with lumbar radiculopathy,   chronic neck pain with cervical radiculopathy and OA of the knees.  Her last   visit to the clinic was on 9/18/18.  She was maintained on meloxicam, Lyrica,   p.r.n. oxycodone and p.r.n. Cyclobenzaprine. Podiatry was consulted for foot calluses.    The patient comes to the clinic for followup.  She missed her last appointment.    Her back pain has been stable.  It is a constant aching pain in the lumbar spine and across her back.  The pain radiates to both   feet with numbness.  Her maximum pain is 9 to 10/10 and minimum 8/10.  Today, it   is 8/10.  The patient complains of bilateral lower extremity weakness.  She has   chronic bladder urgency.  She denies any bowel incontinence.    Her neck pain has been stable.  It is a constant aching pain in the cervical   spine.  She has occasional radiation to both hands with numbness.  Her maximum   pain is 9 to 10/10 and minimum 7 to 8/10.  Today, it is 7 to 8/10.  The patient   complains of bilateral upper extremity weakness.    She continues to complain of bilateral knee pain, worse on the right.  It is a   constant aching pain aggravated by weightbearing.  Her maximum pain is 9 to   10/10 and minimum 6 to 7/10.  Today, it is 7 to 8/10.  The patient complains of   occasional swelling and warmth of her knees.  She was seen in the past by   Orthopedic Surgery (Dr. Helms), but has not seen her a long time.  She   continues to also complain of bilateral shoulder pain aggravated by movement.    She is currently taking meloxicam 7.5 mg p.o. twice per day.  However, she has   been out for few weeks.  She was  prescribed Lyrica in the past, but apparently   is not taking it.  She takes oxycodone/APAP 10/325 p.r.n. four to five times per   day.  She takes cyclobenzaprine 10 mg p.r.n. usually couple of times per day.    She has been out for couple of weeks.      MS/HN  dd: 04/23/2019 14:30:12 (CDT)  td: 04/24/2019 02:46:01 (CDT)  Doc ID   #[id number]  Job ID #[id number]    CC:         Review of Systems   Constitutional: Positive for fatigue.   Eyes: Negative for visual disturbance.   Respiratory: Positive for shortness of breath.    Cardiovascular: Negative for chest pain.   Gastrointestinal: Positive for constipation and nausea. Negative for vomiting.   Genitourinary: Negative for difficulty urinating.   Musculoskeletal: Positive for back pain, gait problem, neck pain and neck stiffness.   Neurological: Positive for dizziness and headaches.   Psychiatric/Behavioral: Positive for sleep disturbance. Negative for behavioral problems.       Objective:      Physical Exam   Constitutional: She appears well-developed and well-nourished.   Coming to the clinic in a manual wheelchair propelled by family member.     Neck:   Decreased ROM.  +ve tenderness over cervical spine.   Musculoskeletal:   BUE:  ROM: decreased at shoulders.  Strength:    RUE: 4/5 at shoulder abduction, 4 elbow flexion, 4 elbow extension, 4 hand .   LUE: 3+/5 at shoulder abduction, 4 elbow flexion, 4 elbow extension, 4 hand .  Sensation to pinprick:   RUE: intact.   LUE: intact.       BLE:  ROM:full.  Bilateral knee crepitus.   Strength:    RLE: 4-/5 at hip flexion, 4 knee extension, 4 ankle DF/PF.   LLE: 4-/5 at hip flexion, 4 knee extension, 4 ankle DF/PF.  Sensation to pinprick:     RLE: intact.     LLE: intact.  SLR (sitting):    RLE: +ve.     LLE: +ve.   +ve diffuse severe tenderness over lumbar spine.       Neurological: She is alert.   Psychiatric: Her behavior is normal.   Anxious.   Vitals reviewed.        Assessment:       1. Chronic midline low back  pain with bilateral sciatica    2. Osteoarthritis of spine with radiculopathy, lumbar region    3. Spinal stenosis of lumbar region, unspecified whether neurogenic claudication present    4. Chronic neck pain    5. Cervical radiculopathy, BUE    6. Osteoarthritis of spine with radiculopathy, cervical region    7. Primary osteoarthritis of both knees    8. Morbid obesity, unspecified obesity type        Plan:       - Restart meloxicam (MOBIC) 7.5 MG tablet; TAKE 1 TABLET(7.5 MG) BY MOUTH TWICE DAILY  - Start pregabalin (LYRICA) 50 MG capsule; Take 1 capsule (50 mg total) by mouth 3 (three) times daily.  - Continue cyclobenzaprine (FLEXERIL) 10 MG tablet; Take 1 tablet (10 mg total) by mouth 2 (two) times daily as needed for Muscle spasms.  - Continue oxyCODONE-acetaminophen (PERCOCET)  mg per tablet; Take 1 tablet by mouth every 4 to 6 hours as needed for Pain.  - Ambulatory consult to Orthopedics  - A Pain contract was signed and will be scanned into the chart.  - Pain Clinic Drug Screen; Future  - Weight loss was encouraged.  - Follow up in about 4 months (around 8/23/2019).      This was a 25 minute visit, more than 50% of which was spent counseling the patient about the diagnosis and the treatment plan.

## 2019-04-28 LAB

## 2019-04-29 DIAGNOSIS — E78.5 HYPERLIPIDEMIA, UNSPECIFIED HYPERLIPIDEMIA TYPE: ICD-10-CM

## 2019-04-29 RX ORDER — PRAVASTATIN SODIUM 20 MG/1
TABLET ORAL
Qty: 90 TABLET | Refills: 0 | Status: SHIPPED | OUTPATIENT
Start: 2019-04-29 | End: 2019-07-27 | Stop reason: SDUPTHER

## 2019-05-03 ENCOUNTER — TELEPHONE (OUTPATIENT)
Dept: ORTHOPEDICS | Facility: CLINIC | Age: 69
End: 2019-05-03

## 2019-05-03 NOTE — TELEPHONE ENCOUNTER
Left message with Elana Sravani, at pt's contact number, to have pt return call for Ortho appt r/t knees per Dr. Esquivel's referral. Mrs. Lassiter states will have pt return call.

## 2019-05-07 ENCOUNTER — TELEPHONE (OUTPATIENT)
Dept: ORTHOPEDICS | Facility: CLINIC | Age: 69
End: 2019-05-07

## 2019-05-07 NOTE — TELEPHONE ENCOUNTER
Ortho appt scheduled with SILVESTRE Davila NP, for ophelia knee pain per Dr. Esquivel referral, on 5/21/19 at 11:15am with arrival at 11:00am. Pt confirms time and location of appt. Appt slip mailed.

## 2019-05-22 DIAGNOSIS — F41.0 ANXIETY ATTACK: ICD-10-CM

## 2019-05-22 RX ORDER — BUSPIRONE HYDROCHLORIDE 15 MG/1
TABLET ORAL
Qty: 90 TABLET | Refills: 0 | Status: SHIPPED | OUTPATIENT
Start: 2019-05-22 | End: 2019-07-08 | Stop reason: SDUPTHER

## 2019-05-31 ENCOUNTER — TELEPHONE (OUTPATIENT)
Dept: FAMILY MEDICINE | Facility: CLINIC | Age: 69
End: 2019-05-31

## 2019-05-31 DIAGNOSIS — M17.0 PRIMARY OSTEOARTHRITIS OF BOTH KNEES: ICD-10-CM

## 2019-05-31 DIAGNOSIS — G89.29 CHRONIC NECK PAIN: ICD-10-CM

## 2019-05-31 DIAGNOSIS — M54.2 CHRONIC NECK PAIN: ICD-10-CM

## 2019-05-31 NOTE — TELEPHONE ENCOUNTER
----- Message from Adele Sierra sent at 5/31/2019 10:12 AM CDT -----  Contact: Self   Pt calling to speak to a nurse regarding her sinus medication. [phone]

## 2019-05-31 NOTE — TELEPHONE ENCOUNTER
----- Message from Alin Garcia sent at 5/31/2019  9:11 AM CDT -----  Needs Advice    Reason for call: Pt is asking to speak w/ the doctor about the new medication prescribed, pt said it's too expensive        Communication Preference: [phone]    Additional Information:

## 2019-05-31 NOTE — TELEPHONE ENCOUNTER
Return call to Pt, and she states that her Zyrtec isn't working for her. She states that she still takes it, because if she stops. She would be worse off if she doesn't take it. She would like something else for her sinus. Pt last seen on 11-28-19. Pt refused appointment with Provider. She states that she doesn't need appointment. She wants Provider to call her. Please advise.

## 2019-05-31 NOTE — TELEPHONE ENCOUNTER
Last Rx refill---04/23/19  Last office visit--04/23/19  Next office visit--09/03/19    ----- Message from Alin Garcia sent at 5/31/2019  9:13 AM CDT -----  Rx Refill/Request     Is this a Refill or New Rx: Refill    Rx Name and Strength: oxyCODONE-acetaminophen (PERCOCET)  mg per tablet   Preferred Pharmacy with phone number: see below  Communication Preference: [phone]  Additional Information:     [id number] - [place] - [address] AT [facility]  [address]  [place] [address]  Phone: [phone] Fax: [phone]

## 2019-06-01 RX ORDER — OXYCODONE AND ACETAMINOPHEN 10; 325 MG/1; MG/1
1 TABLET ORAL
Qty: 150 TABLET | Refills: 0 | Status: SHIPPED | OUTPATIENT
Start: 2019-06-01 | End: 2019-07-02 | Stop reason: SDUPTHER

## 2019-06-03 ENCOUNTER — TELEPHONE (OUTPATIENT)
Dept: FAMILY MEDICINE | Facility: CLINIC | Age: 69
End: 2019-06-03

## 2019-06-03 RX ORDER — CETIRIZINE HYDROCHLORIDE 10 MG/1
TABLET ORAL
Qty: 30 TABLET | Refills: 0 | Status: SHIPPED | OUTPATIENT
Start: 2019-06-03 | End: 2019-07-02 | Stop reason: SDUPTHER

## 2019-06-03 NOTE — TELEPHONE ENCOUNTER
Patient requesting medication for rash on right inner groin region, with a foul odor.  Stated it began Friday.  Please advise.

## 2019-06-03 NOTE — TELEPHONE ENCOUNTER
----- Message from Vianey Haro sent at 6/3/2019  9:17 AM CDT -----  ..Type: Patient Call Back    Who called: pt     What is the request in detail: pt calling to check on the status of her medication request placed on Friday.     Can the clinic reply by [facility]? No     Would the patient rather a call back or a response via [facility]? Call back     Best call back number: [phone]
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
## 2019-06-03 NOTE — TELEPHONE ENCOUNTER
Call placed to Pt, and informed of Provider order. Pt acknowledged understanding. Pt states that she has a rash to her right inner groin region, with a foul odor. Pt states that it has been there since Friday. She is requesting something called into her pharmacy. Please advise.

## 2019-06-12 ENCOUNTER — PATIENT OUTREACH (OUTPATIENT)
Dept: ADMINISTRATIVE | Facility: HOSPITAL | Age: 69
End: 2019-06-12

## 2019-06-13 ENCOUNTER — TELEPHONE (OUTPATIENT)
Dept: PHYSICAL MEDICINE AND REHAB | Facility: CLINIC | Age: 69
End: 2019-06-13

## 2019-06-13 NOTE — TELEPHONE ENCOUNTER
----- Message from Ana Maria Elias sent at 6/13/2019  4:10 PM CDT -----  Contact: Eileen (daughter) @ [phone]  Calling to inform Dr Esquivel that pt is being rushed to the hospital with concerns of dementia.  This just started this past Monday.  Pts daughter is requesting to speak with Dr Esquivel

## 2019-07-02 DIAGNOSIS — M17.0 PRIMARY OSTEOARTHRITIS OF BOTH KNEES: ICD-10-CM

## 2019-07-02 DIAGNOSIS — M54.2 CHRONIC NECK PAIN: ICD-10-CM

## 2019-07-02 DIAGNOSIS — G89.29 CHRONIC NECK PAIN: ICD-10-CM

## 2019-07-02 NOTE — TELEPHONE ENCOUNTER
----- Message from Karon Hatfield sent at 7/2/2019  8:58 AM CDT -----  Rx Refill/Request     Is this a Refill or New Rx:  Refill    Rx Name and Strength:  oxyCODONE-acetaminophen (PERCOCET)  mg per tablet    Preferred Pharmacy with phone number:   [id number]  [place] - [address] AT [facility] & [facility]  [address] [facility] [address]  [place] [address]  Phone: [phone] Fax: [phone]    Communication Preference:pt @ [phone]  Additional Information:

## 2019-07-03 ENCOUNTER — TELEPHONE (OUTPATIENT)
Dept: PHYSICAL MEDICINE AND REHAB | Facility: CLINIC | Age: 69
End: 2019-07-03

## 2019-07-03 RX ORDER — CETIRIZINE HYDROCHLORIDE 10 MG/1
10 TABLET ORAL DAILY
Qty: 30 TABLET | Refills: 0 | Status: SHIPPED | OUTPATIENT
Start: 2019-07-03 | End: 2019-08-06 | Stop reason: SDUPTHER

## 2019-07-03 NOTE — TELEPHONE ENCOUNTER
Duplicate request, MD already has request    ----- Message from Bib Rueda sent at 7/3/2019 11:44 AM CDT -----  Contact: Patient   Rx Refill/Request     Is this a Refill or New Rx:  Yes   Rx Name and Strength:  oxyCODONE-acetaminophen (PERCOCET)  mg per tablet  Preferred Pharmacy with phone number:     [id number] - [place]  [address] [facility]  [address] [place] [address]  Phone: [phone] Fax: [phone]

## 2019-07-04 RX ORDER — OXYCODONE AND ACETAMINOPHEN 10; 325 MG/1; MG/1
1 TABLET ORAL
Qty: 150 TABLET | Refills: 0 | Status: SHIPPED | OUTPATIENT
Start: 2019-07-04 | End: 2019-08-02 | Stop reason: SDUPTHER

## 2019-07-08 DIAGNOSIS — F41.0 ANXIETY ATTACK: ICD-10-CM

## 2019-07-08 RX ORDER — BUSPIRONE HYDROCHLORIDE 15 MG/1
TABLET ORAL
Qty: 90 TABLET | Refills: 0 | Status: SHIPPED | OUTPATIENT
Start: 2019-07-08 | End: 2019-09-29 | Stop reason: SDUPTHER

## 2019-07-27 DIAGNOSIS — E78.5 HYPERLIPIDEMIA, UNSPECIFIED HYPERLIPIDEMIA TYPE: ICD-10-CM

## 2019-07-27 DIAGNOSIS — I11.9 BENIGN HYPERTENSIVE HEART DISEASE WITHOUT HEART FAILURE: ICD-10-CM

## 2019-07-28 DIAGNOSIS — F41.0 GENERALIZED ANXIETY DISORDER WITH PANIC ATTACKS: ICD-10-CM

## 2019-07-28 DIAGNOSIS — F41.1 GENERALIZED ANXIETY DISORDER WITH PANIC ATTACKS: ICD-10-CM

## 2019-07-30 DIAGNOSIS — I11.9 BENIGN HYPERTENSIVE HEART DISEASE WITHOUT HEART FAILURE: ICD-10-CM

## 2019-07-30 DIAGNOSIS — E78.5 HYPERLIPIDEMIA, UNSPECIFIED HYPERLIPIDEMIA TYPE: ICD-10-CM

## 2019-07-30 RX ORDER — AMLODIPINE BESYLATE 10 MG/1
TABLET ORAL
Qty: 90 TABLET | Refills: 0 | Status: SHIPPED | OUTPATIENT
Start: 2019-07-30 | End: 2021-01-28 | Stop reason: SDUPTHER

## 2019-07-30 RX ORDER — PRAVASTATIN SODIUM 20 MG/1
20 TABLET ORAL DAILY
Qty: 90 TABLET | Refills: 0 | Status: SHIPPED | OUTPATIENT
Start: 2019-07-30 | End: 2020-01-27

## 2019-07-30 RX ORDER — PRAVASTATIN SODIUM 20 MG/1
TABLET ORAL
Qty: 90 TABLET | Refills: 0 | Status: SHIPPED | OUTPATIENT
Start: 2019-07-30 | End: 2021-01-06 | Stop reason: SDUPTHER

## 2019-07-30 RX ORDER — AMLODIPINE BESYLATE 10 MG/1
TABLET ORAL
Qty: 90 TABLET | Refills: 0 | Status: SHIPPED | OUTPATIENT
Start: 2019-07-30 | End: 2020-01-27

## 2019-07-30 RX ORDER — CITALOPRAM 40 MG/1
TABLET, FILM COATED ORAL
Qty: 90 TABLET | Refills: 0 | Status: SHIPPED | OUTPATIENT
Start: 2019-07-30 | End: 2020-05-01 | Stop reason: SDUPTHER

## 2019-07-30 NOTE — TELEPHONE ENCOUNTER
Called to inform patient of refill approval and need for office and lab visits.  No answer, unable to leave a message.

## 2019-08-02 DIAGNOSIS — M54.2 CHRONIC NECK PAIN: ICD-10-CM

## 2019-08-02 DIAGNOSIS — G89.29 CHRONIC NECK PAIN: ICD-10-CM

## 2019-08-02 DIAGNOSIS — M54.41 CHRONIC MIDLINE LOW BACK PAIN WITH BILATERAL SCIATICA: ICD-10-CM

## 2019-08-02 DIAGNOSIS — M17.0 PRIMARY OSTEOARTHRITIS OF BOTH KNEES: ICD-10-CM

## 2019-08-02 DIAGNOSIS — G89.29 CHRONIC MIDLINE LOW BACK PAIN WITH BILATERAL SCIATICA: ICD-10-CM

## 2019-08-02 DIAGNOSIS — M54.42 CHRONIC MIDLINE LOW BACK PAIN WITH BILATERAL SCIATICA: ICD-10-CM

## 2019-08-02 RX ORDER — CYCLOBENZAPRINE HCL 10 MG
10 TABLET ORAL 2 TIMES DAILY PRN
Qty: 180 TABLET | Refills: 1 | Status: SHIPPED | OUTPATIENT
Start: 2019-08-03 | End: 2021-01-28

## 2019-08-02 RX ORDER — OXYCODONE AND ACETAMINOPHEN 10; 325 MG/1; MG/1
1 TABLET ORAL
Qty: 150 TABLET | Refills: 0 | Status: SHIPPED | OUTPATIENT
Start: 2019-08-03 | End: 2019-09-03 | Stop reason: SDUPTHER

## 2019-08-02 NOTE — TELEPHONE ENCOUNTER
Last Rx refill-----07/04/19  Last office visit--04/23/19  Next office visit--09/03/19        ----- Message from Karon Hatfield sent at 8/2/2019  2:05 PM CDT -----  Rx Refill/Request     Is this a Refill or New Rx:  Refills    Rx Name and Strength:  oxyCODONE-acetaminophen (PERCOCET)  mg per tablet    Preferred Pharmacy with phone number:     [facility] #[id number] - [place]  [address] [place] [address]  [place] [address]  Phone: [phone] Fax: [phone]    Communication Preference:pt@   Additional Information:

## 2019-08-02 NOTE — TELEPHONE ENCOUNTER
----- Message from Karon Hatfield sent at 8/2/2019  2:08 PM CDT -----  Rx Refill/Request     Is this a Refill or New Rx:  Refill    Rx Name and Strength:cyclobenzaprine (FLEXERIL) 10 MG tablet      Preferred Pharmacy with phone number:       [id number]  [place] [address] AT [address]  [place] [address]  Phone: [phone] Fax: [phone]      Communication Preference:pt @ [phone]  Additional Information:

## 2019-08-08 RX ORDER — CETIRIZINE HYDROCHLORIDE 10 MG/1
TABLET ORAL
Qty: 30 TABLET | Refills: 5 | Status: SHIPPED | OUTPATIENT
Start: 2019-08-08 | End: 2020-02-04

## 2019-08-28 DIAGNOSIS — Z12.11 COLON CANCER SCREENING: ICD-10-CM

## 2019-09-03 ENCOUNTER — TELEPHONE (OUTPATIENT)
Dept: PHYSICAL MEDICINE AND REHAB | Facility: CLINIC | Age: 69
End: 2019-09-03

## 2019-09-03 DIAGNOSIS — M54.2 CHRONIC NECK PAIN: ICD-10-CM

## 2019-09-03 DIAGNOSIS — M17.0 PRIMARY OSTEOARTHRITIS OF BOTH KNEES: ICD-10-CM

## 2019-09-03 DIAGNOSIS — G89.29 CHRONIC NECK PAIN: ICD-10-CM

## 2019-09-03 RX ORDER — OXYCODONE AND ACETAMINOPHEN 10; 325 MG/1; MG/1
1 TABLET ORAL
Qty: 150 TABLET | Refills: 0 | Status: SHIPPED | OUTPATIENT
Start: 2019-09-03 | End: 2019-10-02 | Stop reason: SDUPTHER

## 2019-09-03 NOTE — TELEPHONE ENCOUNTER
Patient no show  appointment today.        ----- Message from Michelle Hercules sent at 9/3/2019 12:51 PM CDT -----  Contact: pt at [phone]  Rx Refill/Request     Is this a Refill or New Rx:  refill  Rx Name and Strength:  Oycodone 10/325mg  Qty   150  Preferred Pharmacy with phone number: [facility] at [phone]  Communication Preference:call pt when taken  care of  Additional Information: Med is due today and wants today.

## 2019-09-29 DIAGNOSIS — F41.0 ANXIETY ATTACK: ICD-10-CM

## 2019-09-30 RX ORDER — BUSPIRONE HYDROCHLORIDE 15 MG/1
TABLET ORAL
Qty: 90 TABLET | Refills: 0 | Status: SHIPPED | OUTPATIENT
Start: 2019-09-30 | End: 2019-10-04 | Stop reason: SDUPTHER

## 2019-10-02 DIAGNOSIS — M17.0 PRIMARY OSTEOARTHRITIS OF BOTH KNEES: ICD-10-CM

## 2019-10-02 DIAGNOSIS — G89.29 CHRONIC NECK PAIN: ICD-10-CM

## 2019-10-02 DIAGNOSIS — M54.2 CHRONIC NECK PAIN: ICD-10-CM

## 2019-10-02 RX ORDER — OXYCODONE AND ACETAMINOPHEN 10; 325 MG/1; MG/1
1 TABLET ORAL
Qty: 150 TABLET | Refills: 0 | Status: SHIPPED | OUTPATIENT
Start: 2019-10-03 | End: 2019-11-04 | Stop reason: SDUPTHER

## 2019-10-03 PROBLEM — A41.9 SEPSIS: Status: ACTIVE | Noted: 2019-06-13

## 2019-10-03 PROBLEM — L03.311 CELLULITIS OF ABDOMINAL WALL: Status: ACTIVE | Noted: 2019-06-15

## 2019-10-04 DIAGNOSIS — F41.0 ANXIETY ATTACK: ICD-10-CM

## 2019-10-09 RX ORDER — BUSPIRONE HYDROCHLORIDE 15 MG/1
TABLET ORAL
Qty: 90 TABLET | Refills: 0 | Status: SHIPPED | OUTPATIENT
Start: 2019-10-09 | End: 2020-09-08

## 2019-10-27 DIAGNOSIS — F41.1 GENERALIZED ANXIETY DISORDER WITH PANIC ATTACKS: ICD-10-CM

## 2019-10-27 DIAGNOSIS — F41.0 GENERALIZED ANXIETY DISORDER WITH PANIC ATTACKS: ICD-10-CM

## 2019-10-28 RX ORDER — CITALOPRAM 40 MG/1
TABLET, FILM COATED ORAL
Qty: 90 TABLET | Refills: 0 | Status: SHIPPED | OUTPATIENT
Start: 2019-10-28 | End: 2020-01-27

## 2019-10-31 ENCOUNTER — TELEPHONE (OUTPATIENT)
Dept: PHYSICAL MEDICINE AND REHAB | Facility: CLINIC | Age: 69
End: 2019-10-31

## 2019-10-31 NOTE — TELEPHONE ENCOUNTER
----- Message from Gypsy Carvalho sent at 10/31/2019  9:51 AM CDT -----  Contact: pt [phone]  Pt is requesting a refill:  oxyCODONE-acetaminophen (PERCOCET)  mg per tablet      [facility] #[id number] - [place] - [address] AT [facility]  [address] [place] [address]  Phone: [phone] Fax: [phone]

## 2019-11-02 ENCOUNTER — NURSE TRIAGE (OUTPATIENT)
Dept: ADMINISTRATIVE | Facility: CLINIC | Age: 69
End: 2019-11-02

## 2019-11-02 NOTE — TELEPHONE ENCOUNTER
Reason for Disposition   [1] Caller requesting NON-URGENT health information AND [2] PCP's office is the best resource    Protocols used: ST INFORMATION ONLY CALL-A-    Pt calling for prescription pain medication refill. Advised to discuss with Provider on Monday.

## 2019-11-04 DIAGNOSIS — M17.0 PRIMARY OSTEOARTHRITIS OF BOTH KNEES: ICD-10-CM

## 2019-11-04 DIAGNOSIS — G89.29 CHRONIC NECK PAIN: ICD-10-CM

## 2019-11-04 DIAGNOSIS — M54.2 CHRONIC NECK PAIN: ICD-10-CM

## 2019-11-04 RX ORDER — OXYCODONE AND ACETAMINOPHEN 10; 325 MG/1; MG/1
1 TABLET ORAL
Qty: 150 TABLET | Refills: 0 | Status: SHIPPED | OUTPATIENT
Start: 2019-11-04 | End: 2019-12-04 | Stop reason: SDUPTHER

## 2019-11-04 NOTE — TELEPHONE ENCOUNTER
Last Rx refill-----10/02/19  Last office visit--04/23/19  Next office visit--01/07/20        ----- Message from Karon Hatfield sent at 11/4/2019  8:34 AM CST -----  Rx Refill/Request     Is this a Refill or New Rx:  Refill    Rx Name and Strength: oxyCODONE-acetaminophen (PERCOCET)  mg per tablet     Preferred Pharmacy with phone number:     [facility] #[id number] - [place] - [facility] [facility] [place] [address]  Phone: [phone] Fax: [phone]    Communication Preference:pt @ [phone]  Additional Information: please call patient she needs to speak with the doctor.

## 2019-12-04 DIAGNOSIS — M54.2 CHRONIC NECK PAIN: ICD-10-CM

## 2019-12-04 DIAGNOSIS — Z72.0 TOBACCO ABUSE: ICD-10-CM

## 2019-12-04 DIAGNOSIS — M17.0 PRIMARY OSTEOARTHRITIS OF BOTH KNEES: ICD-10-CM

## 2019-12-04 DIAGNOSIS — G89.29 CHRONIC NECK PAIN: ICD-10-CM

## 2019-12-04 RX ORDER — VARENICLINE TARTRATE 1 MG/1
TABLET, FILM COATED ORAL
Qty: 30 TABLET | Refills: 0 | Status: SHIPPED | OUTPATIENT
Start: 2019-12-04 | End: 2020-03-20 | Stop reason: SDUPTHER

## 2019-12-04 RX ORDER — OXYCODONE AND ACETAMINOPHEN 10; 325 MG/1; MG/1
1 TABLET ORAL
Qty: 150 TABLET | Refills: 0 | Status: SHIPPED | OUTPATIENT
Start: 2019-12-05 | End: 2020-01-07 | Stop reason: SDUPTHER

## 2019-12-04 NOTE — TELEPHONE ENCOUNTER
Last Rx refill-----11/04/19  Last office visit--04/23/19  Next office visit--01/07/20        ----- Message from Radha Middleton sent at 12/4/2019 11:16 AM CST -----  Contact: self  Rx Refill/Request     Is this a Refill or New Rx:  Refill    Rx Name and Strength:  oxyCODONE-acetaminophen (PERCOCET)  mg per tablet    Preferred Pharmacy with phone number: [facility] #[id number] [phone] (Phone) [phone] (Fax)    Communication Preference: [phone]    Additional Information: Pt ask why is her appointment schedule so late in the day on 1/7/2020 ask for a call    This department did not scheduled patient appointment.  Patient can be rescheduled.  If patient decides first available is  April

## 2020-01-02 DIAGNOSIS — Z72.0 TOBACCO ABUSE: ICD-10-CM

## 2020-01-02 DIAGNOSIS — F41.0 ANXIETY ATTACK: ICD-10-CM

## 2020-01-02 RX ORDER — VARENICLINE TARTRATE 1 MG/1
TABLET, FILM COATED ORAL
Qty: 30 TABLET | Refills: 0 | OUTPATIENT
Start: 2020-01-02

## 2020-01-02 RX ORDER — BUSPIRONE HYDROCHLORIDE 15 MG/1
TABLET ORAL
Qty: 90 TABLET | Refills: 0 | OUTPATIENT
Start: 2020-01-02

## 2020-01-04 ENCOUNTER — PATIENT OUTREACH (OUTPATIENT)
Dept: ADMINISTRATIVE | Facility: OTHER | Age: 70
End: 2020-01-04

## 2020-01-07 ENCOUNTER — OFFICE VISIT (OUTPATIENT)
Dept: PHYSICAL MEDICINE AND REHAB | Facility: CLINIC | Age: 70
End: 2020-01-07
Payer: COMMERCIAL

## 2020-01-07 VITALS
DIASTOLIC BLOOD PRESSURE: 89 MMHG | SYSTOLIC BLOOD PRESSURE: 145 MMHG | HEIGHT: 63 IN | HEART RATE: 99 BPM | BODY MASS INDEX: 44.46 KG/M2

## 2020-01-07 DIAGNOSIS — M48.061 SPINAL STENOSIS OF LUMBAR REGION, UNSPECIFIED WHETHER NEUROGENIC CLAUDICATION PRESENT: ICD-10-CM

## 2020-01-07 DIAGNOSIS — M54.12 CERVICAL RADICULOPATHY: ICD-10-CM

## 2020-01-07 DIAGNOSIS — M17.0 PRIMARY OSTEOARTHRITIS OF BOTH KNEES: ICD-10-CM

## 2020-01-07 DIAGNOSIS — M54.41 CHRONIC MIDLINE LOW BACK PAIN WITH BILATERAL SCIATICA: Primary | ICD-10-CM

## 2020-01-07 DIAGNOSIS — G89.29 CHRONIC NECK PAIN: ICD-10-CM

## 2020-01-07 DIAGNOSIS — G89.29 CHRONIC MIDLINE LOW BACK PAIN WITH BILATERAL SCIATICA: Primary | ICD-10-CM

## 2020-01-07 DIAGNOSIS — Z79.891 CHRONICALLY ON OPIATE THERAPY: ICD-10-CM

## 2020-01-07 DIAGNOSIS — M54.42 CHRONIC MIDLINE LOW BACK PAIN WITH BILATERAL SCIATICA: Primary | ICD-10-CM

## 2020-01-07 DIAGNOSIS — M47.26 OSTEOARTHRITIS OF SPINE WITH RADICULOPATHY, LUMBAR REGION: ICD-10-CM

## 2020-01-07 DIAGNOSIS — M54.2 CHRONIC NECK PAIN: ICD-10-CM

## 2020-01-07 DIAGNOSIS — E66.01 MORBID OBESITY, UNSPECIFIED OBESITY TYPE: ICD-10-CM

## 2020-01-07 DIAGNOSIS — M47.22 OSTEOARTHRITIS OF SPINE WITH RADICULOPATHY, CERVICAL REGION: ICD-10-CM

## 2020-01-07 PROCEDURE — 99999 PR PBB SHADOW E&M-EST. PATIENT-LVL III: CPT | Mod: PBBFAC,,, | Performed by: PHYSICAL MEDICINE & REHABILITATION

## 2020-01-07 PROCEDURE — 1125F PR PAIN SEVERITY QUANTIFIED, PAIN PRESENT: ICD-10-PCS | Mod: S$GLB,,, | Performed by: PHYSICAL MEDICINE & REHABILITATION

## 2020-01-07 PROCEDURE — 1159F MED LIST DOCD IN RCRD: CPT | Mod: S$GLB,,, | Performed by: PHYSICAL MEDICINE & REHABILITATION

## 2020-01-07 PROCEDURE — 3079F PR MOST RECENT DIASTOLIC BLOOD PRESSURE 80-89 MM HG: ICD-10-PCS | Mod: CPTII,S$GLB,, | Performed by: PHYSICAL MEDICINE & REHABILITATION

## 2020-01-07 PROCEDURE — 99214 OFFICE O/P EST MOD 30 MIN: CPT | Mod: S$GLB,,, | Performed by: PHYSICAL MEDICINE & REHABILITATION

## 2020-01-07 PROCEDURE — 1125F AMNT PAIN NOTED PAIN PRSNT: CPT | Mod: S$GLB,,, | Performed by: PHYSICAL MEDICINE & REHABILITATION

## 2020-01-07 PROCEDURE — 3079F DIAST BP 80-89 MM HG: CPT | Mod: CPTII,S$GLB,, | Performed by: PHYSICAL MEDICINE & REHABILITATION

## 2020-01-07 PROCEDURE — 3077F SYST BP >= 140 MM HG: CPT | Mod: CPTII,S$GLB,, | Performed by: PHYSICAL MEDICINE & REHABILITATION

## 2020-01-07 PROCEDURE — 3077F PR MOST RECENT SYSTOLIC BLOOD PRESSURE >= 140 MM HG: ICD-10-PCS | Mod: CPTII,S$GLB,, | Performed by: PHYSICAL MEDICINE & REHABILITATION

## 2020-01-07 PROCEDURE — 99999 PR PBB SHADOW E&M-EST. PATIENT-LVL III: ICD-10-PCS | Mod: PBBFAC,,, | Performed by: PHYSICAL MEDICINE & REHABILITATION

## 2020-01-07 PROCEDURE — 99214 PR OFFICE/OUTPT VISIT, EST, LEVL IV, 30-39 MIN: ICD-10-PCS | Mod: S$GLB,,, | Performed by: PHYSICAL MEDICINE & REHABILITATION

## 2020-01-07 PROCEDURE — 1159F PR MEDICATION LIST DOCUMENTED IN MEDICAL RECORD: ICD-10-PCS | Mod: S$GLB,,, | Performed by: PHYSICAL MEDICINE & REHABILITATION

## 2020-01-07 RX ORDER — PREGABALIN 50 MG/1
50 CAPSULE ORAL 3 TIMES DAILY
Qty: 90 CAPSULE | Refills: 3 | Status: SHIPPED | OUTPATIENT
Start: 2020-01-07 | End: 2020-09-18 | Stop reason: SDUPTHER

## 2020-01-07 RX ORDER — OXYCODONE AND ACETAMINOPHEN 10; 325 MG/1; MG/1
1 TABLET ORAL
Qty: 150 TABLET | Refills: 0 | Status: SHIPPED | OUTPATIENT
Start: 2020-01-07 | End: 2020-02-04 | Stop reason: SDUPTHER

## 2020-01-07 NOTE — PROGRESS NOTES
Subjective:       Patient ID: Kuldeep Villela is a 69 y.o. female.    Chief Complaint: No chief complaint on file.    HPI    HISTORY OF PRESENT ILLNESS:  Mrs. Villela is a 69-year-old black female with past medical history of osteoarthritis and morbid obesity who is followed up in the   Physical Medicine Clinic for chronic low back pain with lumbar radiculopathy, chronic neck pain with cervical radiculopathy and OA of the knees.  Her last visit to the clinic was on 4/23/19.  She was maintained on meloxicam, Lyrica, p.r.n. oxycodone and p.r.n. Cyclobenzaprine. A Pain Clinic Drug Screen was obtained and was positive as expected for oxycodone.    The patient comes to the clinic for followup.  Her back pain has been stable with occasional flare ups.  It is a constant aching pain in the lumbar spine and across her back.  The pain radiates to both feet with numbness.  Her maximum pain is 10/10 and minimum 7-8/10.  Today, it is 7-8/10.  The patient complains of bilateral lower extremity weakness.  She denies any bowel or bladder incontinence.    Her neck pain has been stable with occasional flare ups.  It is a constant aching pain in the cervical spine.  She has occasional radiation to both hands with cold sensations.  Her maximum   pain is 10/10 and minimum 5-6/10.  Today, it is 5-6/10.  The patient complains of bilateral upper extremity weakness.    She continues to complain of bilateral knee pain, worse on the right.  It is a constant aching pain aggravated by weightbearing.  Her maximum pain is 10/10 and minimum 5/10.  Today, it is 5/10.  The patient complains of occasional swelling and warmth of her knees.  She was seen in the past by Orthopedic Surgery (Dr. Helms) when she is interested in see him again to discuss her options.     She is currently taking meloxicam 7.5 mg p.o. twice per day. She was prescribed Lyrica in the past, but did not get it filled.  She takes oxycodone/APAP 10/325 p.r.n. five times per   day.   She takes cyclobenzaprine 10 mg p.r.n. usually in the evening.         Review of Systems   Constitutional: Positive for fatigue.   Eyes: Negative for visual disturbance.   Respiratory: Positive for shortness of breath.    Cardiovascular: Positive for chest pain.   Gastrointestinal: Positive for constipation. Negative for nausea and vomiting.   Genitourinary: Negative for difficulty urinating.   Musculoskeletal: Positive for back pain, gait problem, myalgias, neck pain and neck stiffness.   Neurological: Positive for dizziness and headaches.   Psychiatric/Behavioral: Positive for sleep disturbance. Negative for behavioral problems.       Objective:      Physical Exam   Constitutional: She appears well-developed and well-nourished.   Coming to the clinic in a manual wheelchair propelled by family member.     Neck:   Decreased ROM.  +ve tenderness over cervical spine.   Musculoskeletal:   BUE:  ROM: decreased at shoulders.  Strength:    RUE: 4/5 at shoulder abduction, 4 elbow flexion, 4 elbow extension, 4 hand .   LUE: 3+/5 at shoulder abduction, 4 elbow flexion, 4 elbow extension, 4 hand .  Sensation to pinprick:   RUE: intact.   LUE: intact.  DTR:    RUE: +1 biceps, +1 triceps.   LUE:  +1 biceps, +1 triceps.      BLE:  ROM:full.  Bilateral knee crepitus.   Strength:    RLE: 4-/5 at hip flexion, 4 knee extension, 4 ankle DF/PF.   LLE: 4/5 at hip flexion, 4 knee extension, 4 ankle DF/PF.  Sensation to pinprick:     RLE: intact.     LLE: intact.  DTR:     RLE: +1 knee, +1 ankle.    LLE: +1 knee, +1 ankle.  Clonus:    Rt ankle: -ve.    Lt ankle: -ve.  SLR (sitting):    RLE: +ve.     LLE: +ve.   +ve diffuse severe tenderness over lumbar spine.       Neurological: She is alert.   Psychiatric: Her behavior is normal.   Anxious.   Vitals reviewed.        Assessment:       1. Chronic midline low back pain with bilateral sciatica    2. Osteoarthritis of spine with radiculopathy, lumbar region    3. Spinal stenosis of  lumbar region, unspecified whether neurogenic claudication present    4. Chronic neck pain    5. Osteoarthritis of spine with radiculopathy, cervical region    6. Cervical radiculopathy, BUE    7. Primary osteoarthritis of both knees    8. Morbid obesity, unspecified obesity type    9. Chronically on opiate therapy        Plan:       - Continuet meloxicam (MOBIC) 7.5 MG tablet; TAKE 1 TABLET(7.5 MG) BY MOUTH TWICE DAILY  - Start pregabalin (LYRICA) 50 MG capsule; Take 1 capsule (50 mg total) by mouth 3 (three) times daily (she was given a printed prescription and asked to check if it is covered).  - Continue cyclobenzaprine (FLEXERIL) 10 MG tablet; Take 1 tablet (10 mg total) by mouth 2 (two) times daily as needed for Muscle spasms.  - Continue oxyCODONE-acetaminophen (PERCOCET)  mg per tablet; Take 1 tablet by mouth every 4 to 6 hours as needed for Pain.  - Ambulatory consult to Orthopedics (Dr. Helms at her request).  - Weight loss was encouraged.  - Smoking cessation was advised.  She said she is on Chantix.  - Follow up in about 4 months (around 5/7/2020).      This was a 25 minute visit, more than 50% of which was spent counseling the patient about the diagnosis and the treatment plan.    This note was generated with Blink voice recognition software. I apologize for any possible typographical errors.

## 2020-01-08 ENCOUNTER — TELEPHONE (OUTPATIENT)
Dept: ADMINISTRATIVE | Facility: OTHER | Age: 70
End: 2020-01-08

## 2020-01-08 NOTE — TELEPHONE ENCOUNTER
Left voice message for patient to return call to schedule appointment from referral to Orthopedic department.  Rosemarie EVANS [phone]

## 2020-01-27 DIAGNOSIS — I11.9 BENIGN HYPERTENSIVE HEART DISEASE WITHOUT HEART FAILURE: ICD-10-CM

## 2020-01-27 DIAGNOSIS — E78.5 HYPERLIPIDEMIA, UNSPECIFIED HYPERLIPIDEMIA TYPE: ICD-10-CM

## 2020-01-27 DIAGNOSIS — G89.29 CHRONIC NECK PAIN: ICD-10-CM

## 2020-01-27 DIAGNOSIS — M17.0 PRIMARY OSTEOARTHRITIS OF BOTH KNEES: ICD-10-CM

## 2020-01-27 DIAGNOSIS — M54.41 CHRONIC MIDLINE LOW BACK PAIN WITH BILATERAL SCIATICA: ICD-10-CM

## 2020-01-27 DIAGNOSIS — G89.29 CHRONIC MIDLINE LOW BACK PAIN WITH BILATERAL SCIATICA: ICD-10-CM

## 2020-01-27 DIAGNOSIS — M54.42 CHRONIC MIDLINE LOW BACK PAIN WITH BILATERAL SCIATICA: ICD-10-CM

## 2020-01-27 DIAGNOSIS — F41.1 GENERALIZED ANXIETY DISORDER WITH PANIC ATTACKS: ICD-10-CM

## 2020-01-27 DIAGNOSIS — F41.0 GENERALIZED ANXIETY DISORDER WITH PANIC ATTACKS: ICD-10-CM

## 2020-01-27 DIAGNOSIS — M54.2 CHRONIC NECK PAIN: ICD-10-CM

## 2020-01-27 RX ORDER — CITALOPRAM 40 MG/1
TABLET, FILM COATED ORAL
Qty: 30 TABLET | Refills: 0 | Status: SHIPPED | OUTPATIENT
Start: 2020-01-27 | End: 2020-02-26

## 2020-01-27 RX ORDER — PRAVASTATIN SODIUM 20 MG/1
TABLET ORAL
Qty: 90 TABLET | Refills: 0 | Status: SHIPPED | OUTPATIENT
Start: 2020-01-27 | End: 2020-04-25

## 2020-01-27 RX ORDER — MELOXICAM 7.5 MG/1
TABLET ORAL
Qty: 180 TABLET | Refills: 2 | Status: SHIPPED | OUTPATIENT
Start: 2020-01-27 | End: 2021-02-09 | Stop reason: SDUPTHER

## 2020-01-27 RX ORDER — AMLODIPINE BESYLATE 10 MG/1
TABLET ORAL
Qty: 90 TABLET | Refills: 0 | Status: SHIPPED | OUTPATIENT
Start: 2020-01-27 | End: 2020-04-27
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
## 2020-01-29 DIAGNOSIS — F41.0 ANXIETY ATTACK: ICD-10-CM

## 2020-01-29 RX ORDER — BUSPIRONE HYDROCHLORIDE 15 MG/1
TABLET ORAL
Qty: 90 TABLET | Refills: 0 | Status: SHIPPED | OUTPATIENT
Start: 2020-01-29 | End: 2020-02-26

## 2020-02-04 DIAGNOSIS — M54.2 CHRONIC NECK PAIN: ICD-10-CM

## 2020-02-04 DIAGNOSIS — M17.0 PRIMARY OSTEOARTHRITIS OF BOTH KNEES: ICD-10-CM

## 2020-02-04 DIAGNOSIS — G89.29 CHRONIC NECK PAIN: ICD-10-CM

## 2020-02-04 RX ORDER — OXYCODONE AND ACETAMINOPHEN 10; 325 MG/1; MG/1
1 TABLET ORAL
Qty: 150 TABLET | Refills: 0 | Status: SHIPPED | OUTPATIENT
Start: 2020-02-06 | End: 2020-02-05 | Stop reason: SDUPTHER

## 2020-02-04 RX ORDER — CETIRIZINE HYDROCHLORIDE 10 MG/1
TABLET ORAL
Qty: 30 TABLET | Refills: 5 | Status: SHIPPED | OUTPATIENT
Start: 2020-02-04 | End: 2020-06-01

## 2020-02-04 NOTE — TELEPHONE ENCOUNTER
Last Rx refill-----01/07/20  Last office visit--01/07/20  Next office visit--05/11/20        ----- Message from Bib Rueda sent at 2/4/2020 10:35 AM CST -----  Contact: Patient   Rx Refill/Request     Is this a Refill or New Rx:  Yes   Rx Name and Strength:  oxyCODONE-acetaminophen (PERCOCET)  mg per tablet  Preferred Pharmacy with phone number:     [facility] #[id number] - [place] - [address] [facility] [address]  [address] [place] [address]  Phone: [phone] Fax: [phone]

## 2020-02-05 DIAGNOSIS — G89.29 CHRONIC NECK PAIN: ICD-10-CM

## 2020-02-05 DIAGNOSIS — M17.0 PRIMARY OSTEOARTHRITIS OF BOTH KNEES: ICD-10-CM

## 2020-02-05 DIAGNOSIS — M54.2 CHRONIC NECK PAIN: ICD-10-CM

## 2020-02-05 RX ORDER — OXYCODONE AND ACETAMINOPHEN 10; 325 MG/1; MG/1
1 TABLET ORAL
Qty: 150 TABLET | Refills: 0 | Status: SHIPPED | OUTPATIENT
Start: 2020-02-06 | End: 2020-03-05 | Stop reason: SDUPTHER

## 2020-02-05 RX ORDER — CYCLOBENZAPRINE HCL 10 MG
10 TABLET ORAL 2 TIMES DAILY PRN
Qty: 60 TABLET | Refills: 1 | Status: SHIPPED | OUTPATIENT
Start: 2020-02-05 | End: 2020-04-27 | Stop reason: SDUPTHER

## 2020-02-05 NOTE — TELEPHONE ENCOUNTER
Last Rx refill-----02/04/20-Percocet                             10/03/19-Flexeril  Last office visit--01/07/20  Next office visit--05/11/20          ----- Message from Radha Minor MA sent at 2/5/2020 12:00 PM CST -----  Contact: self/[phone]  Refill request       cyclobenzaprine (FLEXERIL) 10 MG tablet    oxyCODONE-acetaminophen (PERCOCET)  mg per tablet     .  [facility] #[id number] - [place] - [address] [facility]  [address]  [place] [address]  Phone: [phone] Fax: [phone]

## 2020-02-07 DIAGNOSIS — Z11.59 NEED FOR HEPATITIS C SCREENING TEST: ICD-10-CM

## 2020-02-14 DIAGNOSIS — I10 HYPERTENSION: ICD-10-CM

## 2020-02-25 DIAGNOSIS — F41.0 GENERALIZED ANXIETY DISORDER WITH PANIC ATTACKS: ICD-10-CM

## 2020-02-25 DIAGNOSIS — F41.0 ANXIETY ATTACK: ICD-10-CM

## 2020-02-25 DIAGNOSIS — F41.1 GENERALIZED ANXIETY DISORDER WITH PANIC ATTACKS: ICD-10-CM

## 2020-02-26 RX ORDER — BUSPIRONE HYDROCHLORIDE 15 MG/1
TABLET ORAL
Qty: 90 TABLET | Refills: 0 | Status: SHIPPED | OUTPATIENT
Start: 2020-02-26 | End: 2020-03-26

## 2020-02-26 RX ORDER — CITALOPRAM 40 MG/1
TABLET, FILM COATED ORAL
Qty: 30 TABLET | Refills: 0 | Status: SHIPPED | OUTPATIENT
Start: 2020-02-26 | End: 2020-03-26

## 2020-03-05 DIAGNOSIS — G89.29 CHRONIC NECK PAIN: ICD-10-CM

## 2020-03-05 DIAGNOSIS — M17.0 PRIMARY OSTEOARTHRITIS OF BOTH KNEES: ICD-10-CM

## 2020-03-05 DIAGNOSIS — M54.2 CHRONIC NECK PAIN: ICD-10-CM

## 2020-03-05 RX ORDER — OXYCODONE AND ACETAMINOPHEN 10; 325 MG/1; MG/1
1 TABLET ORAL
Qty: 150 TABLET | Refills: 0 | Status: SHIPPED | OUTPATIENT
Start: 2020-03-07 | End: 2020-04-06 | Stop reason: SDUPTHER

## 2020-03-05 NOTE — TELEPHONE ENCOUNTER
Last Rx refill-----02/05/20  Last office visit--01/07/20  Next office visit--05/11/20      ----- Message from Radha Minor MA sent at 3/5/2020 11:32 AM CST -----  Contact: self/[phone]  Refill request     oxyCODONE-acetaminophen (PERCOCET)  mg per tablet    ..  [facility] #[id number] - [place] - [address] [facility] [address]  [address]  [place] [address]  Phone: [phone] Fax: [phone]

## 2020-03-06 DIAGNOSIS — G25.81 RESTLESS LEG: ICD-10-CM

## 2020-03-10 RX ORDER — ROPINIROLE 1 MG/1
TABLET, FILM COATED ORAL
Qty: 60 TABLET | Refills: 5 | OUTPATIENT
Start: 2020-03-10

## 2020-03-20 DIAGNOSIS — F41.1 GENERALIZED ANXIETY DISORDER WITH PANIC ATTACKS: ICD-10-CM

## 2020-03-20 DIAGNOSIS — F41.0 GENERALIZED ANXIETY DISORDER WITH PANIC ATTACKS: ICD-10-CM

## 2020-03-20 DIAGNOSIS — Z72.0 TOBACCO ABUSE: ICD-10-CM

## 2020-03-20 RX ORDER — ROPINIROLE 1 MG/1
1 TABLET, FILM COATED ORAL
OUTPATIENT
Start: 2020-03-20

## 2020-03-20 RX ORDER — VARENICLINE TARTRATE 1 MG/1
TABLET, FILM COATED ORAL
Qty: 30 TABLET | Refills: 2 | Status: SHIPPED | OUTPATIENT
Start: 2020-03-20 | End: 2021-01-28

## 2020-03-20 RX ORDER — CITALOPRAM 40 MG/1
40 TABLET, FILM COATED ORAL DAILY
Qty: 90 TABLET | Refills: 1 | OUTPATIENT
Start: 2020-03-20

## 2020-03-20 NOTE — TELEPHONE ENCOUNTER
----- Message from Tara Lopez sent at 3/20/2020 12:37 PM CDT -----  Contact: Self/  [phone]  Type: RX Refill Request    Who Called:   Patient    Refill or New Rx:  Refill    RX Name and Strength:  rOPINIRole (REQUIP) 1 MG tablet                                          citalopram (CELEXA) 40 MG tablet                                          CHANTIX 1 mg Tab      Preferred Pharmacy with phone number:  [id number] [place], [place] - [address] [facility] [address] AT [facility]    Local or Mail Order:  Local    Ordering Provider:  JAG Musa    Would the patient rather a call back or a response via [facility]?   Call back    Best Call Back Number: [phone]

## 2020-03-25 DIAGNOSIS — Z72.0 TOBACCO ABUSE: ICD-10-CM

## 2020-03-25 DIAGNOSIS — F41.0 GENERALIZED ANXIETY DISORDER WITH PANIC ATTACKS: ICD-10-CM

## 2020-03-25 DIAGNOSIS — G25.81 RESTLESS LEG: ICD-10-CM

## 2020-03-25 DIAGNOSIS — F41.1 GENERALIZED ANXIETY DISORDER WITH PANIC ATTACKS: ICD-10-CM

## 2020-03-25 RX ORDER — ROPINIROLE 1 MG/1
1 TABLET, FILM COATED ORAL 2 TIMES DAILY
Qty: 180 TABLET | Refills: 0 | Status: CANCELLED | OUTPATIENT
Start: 2020-03-25

## 2020-03-25 RX ORDER — CITALOPRAM 40 MG/1
40 TABLET, FILM COATED ORAL DAILY
Qty: 90 TABLET | Refills: 0 | Status: CANCELLED | OUTPATIENT
Start: 2020-03-25

## 2020-03-25 RX ORDER — VARENICLINE TARTRATE 1 MG/1
TABLET, FILM COATED ORAL
Qty: 30 TABLET | Refills: 2 | Status: CANCELLED | OUTPATIENT
Start: 2020-03-25

## 2020-03-25 NOTE — TELEPHONE ENCOUNTER
Pt states that this is the second time her appointment was rescheduled, and she needs her medication. She doesn't do online appointments. Please advise.

## 2020-03-26 DIAGNOSIS — F41.0 GENERALIZED ANXIETY DISORDER WITH PANIC ATTACKS: ICD-10-CM

## 2020-03-26 DIAGNOSIS — F41.1 GENERALIZED ANXIETY DISORDER WITH PANIC ATTACKS: ICD-10-CM

## 2020-03-26 DIAGNOSIS — F41.0 ANXIETY ATTACK: ICD-10-CM

## 2020-03-26 RX ORDER — CITALOPRAM 40 MG/1
TABLET, FILM COATED ORAL
Qty: 30 TABLET | Refills: 0 | Status: SHIPPED | OUTPATIENT
Start: 2020-03-26 | End: 2021-04-05

## 2020-03-26 RX ORDER — BUSPIRONE HYDROCHLORIDE 15 MG/1
TABLET ORAL
Qty: 90 TABLET | Refills: 0 | Status: SHIPPED | OUTPATIENT
Start: 2020-03-26 | End: 2020-09-08

## 2020-03-27 DIAGNOSIS — G25.81 RESTLESS LEG: ICD-10-CM

## 2020-03-27 RX ORDER — ROPINIROLE 1 MG/1
1 TABLET, FILM COATED ORAL 2 TIMES DAILY
Qty: 60 TABLET | Refills: 5 | Status: SHIPPED | OUTPATIENT
Start: 2020-03-27 | End: 2020-11-11

## 2020-03-27 NOTE — TELEPHONE ENCOUNTER
----- Message from Cherelle Quintero sent at 3/27/2020 12:51 PM CDT -----  Contact: pt  Type: RX Refill Request    Who Called: pt call pt. She said this is the third request    Have you contacted your pharmacy    Refill or New Rx:citalopram (CELEXA) 40 MG tablet     cetirizine (ZYRTEC) 10 MG tablet     rOPINIRole (REQUIP) 1 MG tablet     RX Name and Strength:    How is the patient currently taking it? (ex. 1XDay):    Is this a 30 day or 90 day RX:    Preferred Pharmacy with phone number:      [facility] #[id number]  [place] [address] [place] [address]  Phone: [phone] Fax: [phone]        Local or Mail Order:    Ordering Provider:    Would the patient rather a call back or a response via [facility]? call    Best Call Back Number:[phone]    Additional Information:

## 2020-03-27 NOTE — TELEPHONE ENCOUNTER
Trying to notify patient medication was approved , no one answer , left message to check with pharmacy .

## 2020-04-06 DIAGNOSIS — M17.0 PRIMARY OSTEOARTHRITIS OF BOTH KNEES: ICD-10-CM

## 2020-04-06 DIAGNOSIS — M54.2 CHRONIC NECK PAIN: ICD-10-CM

## 2020-04-06 DIAGNOSIS — G89.29 CHRONIC NECK PAIN: ICD-10-CM

## 2020-04-06 RX ORDER — OXYCODONE AND ACETAMINOPHEN 10; 325 MG/1; MG/1
1 TABLET ORAL
Qty: 150 TABLET | Refills: 0 | Status: SHIPPED | OUTPATIENT
Start: 2020-04-06 | End: 2020-05-06 | Stop reason: SDUPTHER

## 2020-04-06 NOTE — TELEPHONE ENCOUNTER
Last Rx refill-----03/05/20  Last office visit--01/07/20  Next office visit--05/11/20        ----- Message from Shilpi Shipman sent at 4/6/2020 10:25 AM CDT -----  Contact: Kuldeep    tel:   [phone]    Ref:  Wants the OXYCODONE.   Pharmacy:  [facility] :  [phone]    /  Caller says it was due on Sunday, and she has to call every month for this .

## 2020-04-24 DIAGNOSIS — F41.0 GENERALIZED ANXIETY DISORDER WITH PANIC ATTACKS: ICD-10-CM

## 2020-04-24 DIAGNOSIS — F41.0 ANXIETY ATTACK: ICD-10-CM

## 2020-04-24 DIAGNOSIS — F41.1 GENERALIZED ANXIETY DISORDER WITH PANIC ATTACKS: ICD-10-CM

## 2020-04-24 RX ORDER — BUSPIRONE HYDROCHLORIDE 15 MG/1
TABLET ORAL
Qty: 90 TABLET | Refills: 0 | OUTPATIENT
Start: 2020-04-24

## 2020-04-24 RX ORDER — CITALOPRAM 40 MG/1
TABLET, FILM COATED ORAL
Qty: 30 TABLET | Refills: 0 | OUTPATIENT
Start: 2020-04-24

## 2020-04-25 DIAGNOSIS — E78.5 HYPERLIPIDEMIA, UNSPECIFIED HYPERLIPIDEMIA TYPE: ICD-10-CM

## 2020-04-25 RX ORDER — PRAVASTATIN SODIUM 20 MG/1
TABLET ORAL
Qty: 90 TABLET | Refills: 0 | Status: SHIPPED | OUTPATIENT
Start: 2020-04-25 | End: 2020-07-06

## 2020-04-25 NOTE — TELEPHONE ENCOUNTER
Last refill  She is overdue for appointment. She can be scheduled in like 2 months   Fast for labs

## 2020-04-26 DIAGNOSIS — F41.1 GENERALIZED ANXIETY DISORDER WITH PANIC ATTACKS: ICD-10-CM

## 2020-04-26 DIAGNOSIS — I11.9 BENIGN HYPERTENSIVE HEART DISEASE WITHOUT HEART FAILURE: ICD-10-CM

## 2020-04-26 DIAGNOSIS — F41.0 GENERALIZED ANXIETY DISORDER WITH PANIC ATTACKS: ICD-10-CM

## 2020-04-27 RX ORDER — CITALOPRAM 40 MG/1
TABLET, FILM COATED ORAL
Qty: 30 TABLET | Refills: 0 | OUTPATIENT
Start: 2020-04-27

## 2020-04-27 RX ORDER — AMLODIPINE BESYLATE 10 MG/1
TABLET ORAL
Qty: 90 TABLET | Refills: 0 | Status: SHIPPED | OUTPATIENT
Start: 2020-04-27 | End: 2020-07-06

## 2020-04-27 RX ORDER — CYCLOBENZAPRINE HCL 10 MG
10 TABLET ORAL 2 TIMES DAILY PRN
Qty: 60 TABLET | Refills: 1 | Status: SHIPPED | OUTPATIENT
Start: 2020-04-27 | End: 2020-08-31

## 2020-04-27 NOTE — TELEPHONE ENCOUNTER
----- Message from Frances Faust sent at 4/27/2020  1:03 PM CDT -----  Contact: PT   PT is calling to get a refill on one of her prescriptions     cyclobenzaprine (FLEXERIL) 10 MG tablet  Take 1 tablet (10 mg total) by mouth 2 (two) times daily as needed for Muscle spasms. - Oral  60 tablet    [id number] - [place] [address] [place] [address]  Phone: [phone] Fax: [phone]    Callback: [phone]

## 2020-04-27 NOTE — TELEPHONE ENCOUNTER
----- Message from Paige Knox sent at 4/27/2020  8:50 AM CDT -----  Contact: Self  Type: RX Refill Request    Who Called: Kuldeep    Have you contacted your pharmacy:No    Refill or New Rx:refill    RX Name and Strength:citalopram (CELEXA) 40 MG tablet 30 tablet     Preferred Pharmacy with phone number: [facility] #[id number] [place], [place] - [address] [place] [address] [address] AT [facility] [phone] (Phone)  [phone] (Fax)    Local or Mail Order:Local    Ordering Provider:Lencho    Would the patient rather a call back or a response via [facility]? Call    Best Call Back Number:[phone]    Additional Information: Medication refill.  Patient requesting call with meds are sent over

## 2020-04-28 DIAGNOSIS — F41.1 GENERALIZED ANXIETY DISORDER WITH PANIC ATTACKS: ICD-10-CM

## 2020-04-28 DIAGNOSIS — F41.0 GENERALIZED ANXIETY DISORDER WITH PANIC ATTACKS: ICD-10-CM

## 2020-04-28 DIAGNOSIS — I11.9 BENIGN HYPERTENSIVE HEART DISEASE WITHOUT HEART FAILURE: ICD-10-CM

## 2020-04-28 NOTE — TELEPHONE ENCOUNTER
----- Message from Bianca Bernstein sent at 4/28/2020  4:54 PM CDT -----  Contact: ELENA CHANDLER   Can the clinic reply in [facility]: no      Please refill the medication(s) listed below. Please call the patient when the prescription(s) is ready for  at this phone number   [phone]      Medication #1 citalopram (CELEXA) 40 MG tablet    Medication #2       Preferred Pharmacy: [facility] #[id number] [facility] [address] [place] [address] [address] AT [place]

## 2020-04-29 RX ORDER — CITALOPRAM 40 MG/1
TABLET, FILM COATED ORAL
Qty: 30 TABLET | Refills: 0 | OUTPATIENT
Start: 2020-04-29

## 2020-04-29 NOTE — TELEPHONE ENCOUNTER
----- Message from Destiny Valladares sent at 4/29/2020  9:16 AM CDT -----  Contact: Patient  Type: Patient Call Back    Who called: Patient    What is the request in detail: Patient is requesting a 90 day supply of citalopram (CELEXA) 40 MG tablet. Please advise    Can the clinic reply by [facility]? No    Would the patient rather a call back or a response via [facility]? Call    Best call back number: [phone] or [phone]    Additional Information:   [facility] #[id number]  [place] [address] AT [address]  [place] [address]  Phone: [phone] Fax: [phone]

## 2020-05-01 RX ORDER — AMLODIPINE BESYLATE 10 MG/1
TABLET ORAL
Qty: 90 TABLET | Refills: 0 | Status: SHIPPED | OUTPATIENT
Start: 2020-05-01 | End: 2021-04-05

## 2020-05-01 RX ORDER — CITALOPRAM 40 MG/1
TABLET, FILM COATED ORAL
Qty: 90 TABLET | Refills: 0 | Status: SHIPPED | OUTPATIENT
Start: 2020-05-01 | End: 2020-07-06

## 2020-05-01 NOTE — TELEPHONE ENCOUNTER
Return call to Pt, and she states that she takes the Celexa every day and the Effexor only as needed. But, she needs it because she has been out since Monday, and it shows.

## 2020-05-01 NOTE — TELEPHONE ENCOUNTER
I will refill the celexa, but she shouldn't be taking the effexor as needed. She needs to talk to her psychiatrist to adjust her medicaitons. Also, is she taking zoloft?

## 2020-05-04 ENCOUNTER — TELEPHONE (OUTPATIENT)
Dept: FAMILY MEDICINE | Facility: CLINIC | Age: 70
End: 2020-05-04

## 2020-05-04 NOTE — TELEPHONE ENCOUNTER
PLEASE RESCHEDULE APPOINTMENT FOR TODAY AS WE ARE NOT DOING WELLNESS VISITS OR PHYSICAL RIGHT NOW.

## 2020-05-06 ENCOUNTER — PATIENT OUTREACH (OUTPATIENT)
Dept: ADMINISTRATIVE | Facility: HOSPITAL | Age: 70
End: 2020-05-06

## 2020-05-06 DIAGNOSIS — G89.29 CHRONIC NECK PAIN: ICD-10-CM

## 2020-05-06 DIAGNOSIS — M54.2 CHRONIC NECK PAIN: ICD-10-CM

## 2020-05-06 DIAGNOSIS — M17.0 PRIMARY OSTEOARTHRITIS OF BOTH KNEES: ICD-10-CM

## 2020-05-06 DIAGNOSIS — Z12.11 COLON CANCER SCREENING: Primary | ICD-10-CM

## 2020-05-06 RX ORDER — OXYCODONE AND ACETAMINOPHEN 10; 325 MG/1; MG/1
1 TABLET ORAL
Qty: 150 TABLET | Refills: 0 | Status: SHIPPED | OUTPATIENT
Start: 2020-05-06 | End: 2020-06-05 | Stop reason: SDUPTHER

## 2020-05-06 NOTE — TELEPHONE ENCOUNTER
Last Rx refill-----04/06/20  Last office visit--01/07/20  Next office visit--05/11/20          ----- Message from Alin Garcia sent at 5/6/2020  9:17 AM CDT -----  Rx Refill/Request     Is this a Refill or New Rx: Refill    Rx Name and Strength: oxyCODONE-acetaminophen (PERCOCET)  mg per tablet    Preferred Pharmacy with phone number: see below  Communication Preference: [phone]  Additional Information:     [id number] - [place] - [address] AT [facility] [place] [facility]  [address]  [place] [address]  Phone: [phone] Fax: [phone]

## 2020-05-07 ENCOUNTER — PATIENT OUTREACH (OUTPATIENT)
Dept: ADMINISTRATIVE | Facility: OTHER | Age: 70
End: 2020-05-07

## 2020-05-08 NOTE — PROGRESS NOTES
Chart reviewed.   Immunizations: UPDATED  Orders placed: N/A  Upcoming appts to satisfy SHIRA topics: N/A  OPEN CASE REQUEST FOR COLONOSCOPY

## 2020-05-11 ENCOUNTER — TELEPHONE (OUTPATIENT)
Dept: PHYSICAL MEDICINE AND REHAB | Facility: CLINIC | Age: 70
End: 2020-05-11

## 2020-05-11 ENCOUNTER — OFFICE VISIT (OUTPATIENT)
Dept: PHYSICAL MEDICINE AND REHAB | Facility: CLINIC | Age: 70
End: 2020-05-11
Payer: COMMERCIAL

## 2020-05-11 DIAGNOSIS — G89.29 CHRONIC NECK PAIN: ICD-10-CM

## 2020-05-11 DIAGNOSIS — G89.29 CHRONIC MIDLINE LOW BACK PAIN WITH BILATERAL SCIATICA: Primary | ICD-10-CM

## 2020-05-11 DIAGNOSIS — E66.01 MORBID OBESITY, UNSPECIFIED OBESITY TYPE: ICD-10-CM

## 2020-05-11 DIAGNOSIS — M54.12 CERVICAL RADICULOPATHY: ICD-10-CM

## 2020-05-11 DIAGNOSIS — M54.42 CHRONIC MIDLINE LOW BACK PAIN WITH BILATERAL SCIATICA: Primary | ICD-10-CM

## 2020-05-11 DIAGNOSIS — M54.41 CHRONIC MIDLINE LOW BACK PAIN WITH BILATERAL SCIATICA: ICD-10-CM

## 2020-05-11 DIAGNOSIS — M17.0 PRIMARY OSTEOARTHRITIS OF BOTH KNEES: ICD-10-CM

## 2020-05-11 DIAGNOSIS — M47.26 OSTEOARTHRITIS OF SPINE WITH RADICULOPATHY, LUMBAR REGION: ICD-10-CM

## 2020-05-11 DIAGNOSIS — R26.9 GAIT DISORDER: Primary | ICD-10-CM

## 2020-05-11 DIAGNOSIS — M48.061 SPINAL STENOSIS OF LUMBAR REGION, UNSPECIFIED WHETHER NEUROGENIC CLAUDICATION PRESENT: ICD-10-CM

## 2020-05-11 DIAGNOSIS — M54.2 CHRONIC NECK PAIN: ICD-10-CM

## 2020-05-11 DIAGNOSIS — Z79.891 CHRONICALLY ON OPIATE THERAPY: ICD-10-CM

## 2020-05-11 DIAGNOSIS — M54.41 CHRONIC MIDLINE LOW BACK PAIN WITH BILATERAL SCIATICA: Primary | ICD-10-CM

## 2020-05-11 DIAGNOSIS — M47.22 OSTEOARTHRITIS OF SPINE WITH RADICULOPATHY, CERVICAL REGION: ICD-10-CM

## 2020-05-11 DIAGNOSIS — G89.29 CHRONIC MIDLINE LOW BACK PAIN WITH BILATERAL SCIATICA: ICD-10-CM

## 2020-05-11 DIAGNOSIS — M54.42 CHRONIC MIDLINE LOW BACK PAIN WITH BILATERAL SCIATICA: ICD-10-CM

## 2020-05-11 PROCEDURE — 1159F MED LIST DOCD IN RCRD: CPT | Mod: 95,,, | Performed by: PHYSICAL MEDICINE & REHABILITATION

## 2020-05-11 PROCEDURE — 99214 OFFICE O/P EST MOD 30 MIN: CPT | Mod: 95,,, | Performed by: PHYSICAL MEDICINE & REHABILITATION

## 2020-05-11 PROCEDURE — 99214 PR OFFICE/OUTPT VISIT, EST, LEVL IV, 30-39 MIN: ICD-10-PCS | Mod: 95,,, | Performed by: PHYSICAL MEDICINE & REHABILITATION

## 2020-05-11 PROCEDURE — 1159F PR MEDICATION LIST DOCUMENTED IN MEDICAL RECORD: ICD-10-PCS | Mod: 95,,, | Performed by: PHYSICAL MEDICINE & REHABILITATION

## 2020-05-11 NOTE — PROGRESS NOTES
Subjective:       Patient ID: Kuldeep Villela is a 69 y.o. female.    Chief Complaint: No chief complaint on file.      A telemedicine Audio Visit is being done today (due to the COVID-19 restrictions).     The patient location is: home  The chief complaint leading to consultation is: back pain, neck pain.  Visit type: Virtual visit with synchronous audio  Total time spent with patient: 25 min  Each patient to whom he or she provides medical services by telemedicine is:  (1) informed of the relationship between the physician and patient and the respective role of any other health care provider with respect to management of the patient; and (2) notified that he or she may decline to receive medical services by telemedicine and may withdraw from such care at any time.  This service was not originating from a related E/M service provided within the previous 7 days nor will  to an E/M service or procedure within the next 24 hours or my soonest available appointment.  Prevailing standard of care was able to be met in this audio-only visit.      HPI    HISTORY OF PRESENT ILLNESS:  Mrs. Villela is a 69-year-old black female with past medical history of osteoarthritis and morbid obesity who is followed up in the Physical Medicine Clinic for chronic low back pain with lumbar radiculopathy, chronic neck pain with cervical radiculopathy and OA of the knees.  Her last visit to the clinic was on 1/7/2020.  She was maintained on meloxicam, Lyrica, p.r.n. oxycodone and p.r.n. Cyclobenzaprine.     The patient's back pain has been stable with occasional flare ups.  It is a constant aching pain in the lumbar spine and across her back.  It is worse with activity and better with rest.  The pain radiates to both feet with numbness.  Her maximum pain is 10/10 and minimum 5-6/10.  Today, it is 5-6/10.  The patient complains of bilateral lower extremity weakness.  She denies any bowel or bladder incontinence.    Her neck pain has been  stable with occasional flare ups.  It is a constant aching pain in the cervical spine.  She has occasional radiation to both hands.  Her maximum pain is 10/10 and minimum 5-6/10.  Today, it is 5-6/10.  The patient complains of bilateral upper extremity weakness. She complains of hand numbness.    She continues to complain of bilateral knee pain, worse on the right.  It is a constant aching pain aggravated by weightbearing.  Her maximum pain is 8/10 and minimum 0/10.  Today, it is 8/10.  The patient denies swelling or warmth of her knees.  She could not get an appointment with Orthopedic surgery due to Coronavirus restrictions.    She is currently taking meloxicam 7.5 mg p.o. twice put occasional 3 times per day.  She takes oxycodone/APAP 10/325 p.r.n., usually 4 times per day.  She takes cyclobenzaprine 10 mg at bedtime for help with muscle spasms and sleep.      Past Medical History:   Diagnosis Date    Arthritis     Asthma     Cervical spondylosis 7/13/2012    Chronic LBP 7/13/2012    Chronic neck pain 7/13/2012    Hyperlipidemia     Hypertension     Lumbar radiculopathy, BLE 7/13/2012    Lumbar spinal stenosis at L4-L5. 7/13/2012    Lumbar spondylosis 7/13/2012    Morbid obesity 7/13/2012    Primary osteoarthritis of both knees 7/13/2012    Spondylolisthesis, grade 1 at L4-L5. 7/13/2012    Tenosynovitis of ankle     Rt peroneus longus         Review of Systems   Constitutional: Positive for chills and fatigue. Negative for fever.   Eyes: Negative for visual disturbance.   Respiratory: Negative for shortness of breath.    Cardiovascular: Negative for chest pain.   Gastrointestinal: Negative for constipation, nausea and vomiting.   Genitourinary: Negative for difficulty urinating.   Musculoskeletal: Positive for back pain, gait problem, myalgias, neck pain and neck stiffness.   Neurological: Positive for dizziness and headaches.   Psychiatric/Behavioral: Positive for sleep disturbance. Negative for  behavioral problems.       Objective:      Physical Exam         N/A    BUE:  The patient reports mild weakness on the right and mild-to-moderate on the left. .    BLE:  The patient reports mild bilateral weakness.       Assessment:       1. Chronic midline low back pain with bilateral sciatica    2. Osteoarthritis of spine with radiculopathy, lumbar region    3. Spinal stenosis of lumbar region, unspecified whether neurogenic claudication present    4. Chronic neck pain    5. Osteoarthritis of spine with radiculopathy, cervical region    6. Cervical radiculopathy, BUE    7. Primary osteoarthritis of both knees    8. Morbid obesity, unspecified obesity type    9. Chronically on opiate therapy        Plan:       - Continue meloxicam (MOBIC) 7.5 MG tablet; TAKE 1 TABLET(7.5 MG) BY MOUTH TWICE DAILY.  (She was asked to take no more than twice per day).  - Continue cyclobenzaprine (FLEXERIL) 10 MG tablet; Take 1 tablet (10 mg total) by mouth 2 (two) times daily as needed for Muscle spasms.  - Continue oxyCODONE-acetaminophen (PERCOCET)  mg per tablet; Take 1 tablet by mouth every 4 to 6 hours as needed for Pain.  - She was asked to call and get an appointment with orthopedic surgery for her knees.  - Regular home exercise program was encouraged.  - Follow up in about 4 months (around 9/11/2020).      This note was generated with [employer] voice recognition software. I apologize for any possible typographical errors.

## 2020-05-11 NOTE — TELEPHONE ENCOUNTER
The patient called her insurance ([employer]).  She was reportedly told that they cover is stand up walker.  A generated a prescription and will mail it to the patient.

## 2020-05-11 NOTE — TELEPHONE ENCOUNTER
Patient calling to remind the Doctor of her walker.  Patient states this was discussed during her Audio visit today @ 11:20.      ----- Message from Gala Monson sent at 5/11/2020 12:33 PM CDT -----  Contact: pt   Please call pt at [phone]    Patient would like to discuss getting the stand up chair/walker orders    Thank you

## 2020-05-29 ENCOUNTER — OFFICE VISIT (OUTPATIENT)
Dept: FAMILY MEDICINE | Facility: CLINIC | Age: 70
End: 2020-05-29
Payer: COMMERCIAL

## 2020-05-29 VITALS
RESPIRATION RATE: 16 BRPM | OXYGEN SATURATION: 97 % | TEMPERATURE: 98 F | HEART RATE: 92 BPM | HEIGHT: 62 IN | WEIGHT: 257.94 LBS | SYSTOLIC BLOOD PRESSURE: 128 MMHG | DIASTOLIC BLOOD PRESSURE: 84 MMHG | BODY MASS INDEX: 47.47 KG/M2

## 2020-05-29 DIAGNOSIS — E66.01 MORBID OBESITY: ICD-10-CM

## 2020-05-29 DIAGNOSIS — E78.5 HYPERLIPIDEMIA, UNSPECIFIED HYPERLIPIDEMIA TYPE: ICD-10-CM

## 2020-05-29 DIAGNOSIS — I10 ESSENTIAL HYPERTENSION: Primary | ICD-10-CM

## 2020-05-29 DIAGNOSIS — Z12.11 COLON CANCER SCREENING: ICD-10-CM

## 2020-05-29 PROCEDURE — 99397 PR PREVENTIVE VISIT,EST,65 & OVER: ICD-10-PCS | Mod: S$GLB,,, | Performed by: FAMILY MEDICINE

## 2020-05-29 PROCEDURE — 3074F PR MOST RECENT SYSTOLIC BLOOD PRESSURE < 130 MM HG: ICD-10-PCS | Mod: CPTII,S$GLB,, | Performed by: FAMILY MEDICINE

## 2020-05-29 PROCEDURE — 3074F SYST BP LT 130 MM HG: CPT | Mod: CPTII,S$GLB,, | Performed by: FAMILY MEDICINE

## 2020-05-29 PROCEDURE — 99397 PER PM REEVAL EST PAT 65+ YR: CPT | Mod: S$GLB,,, | Performed by: FAMILY MEDICINE

## 2020-05-29 PROCEDURE — 99999 PR PBB SHADOW E&M-EST. PATIENT-LVL III: ICD-10-PCS | Mod: PBBFAC,,, | Performed by: FAMILY MEDICINE

## 2020-05-29 PROCEDURE — 3079F DIAST BP 80-89 MM HG: CPT | Mod: CPTII,S$GLB,, | Performed by: FAMILY MEDICINE

## 2020-05-29 PROCEDURE — 3079F PR MOST RECENT DIASTOLIC BLOOD PRESSURE 80-89 MM HG: ICD-10-PCS | Mod: CPTII,S$GLB,, | Performed by: FAMILY MEDICINE

## 2020-05-29 PROCEDURE — 99999 PR PBB SHADOW E&M-EST. PATIENT-LVL III: CPT | Mod: PBBFAC,,, | Performed by: FAMILY MEDICINE

## 2020-05-29 RX ORDER — ESTERIFIED ESTROGEN AND METHYLTESTOSTERONE .625; 1.25 MG/1; MG/1
1 TABLET ORAL
COMMUNITY
Start: 2020-04-30

## 2020-05-29 RX ORDER — ALBUTEROL SULFATE 90 UG/1
2 AEROSOL, METERED RESPIRATORY (INHALATION) EVERY 6 HOURS PRN
Qty: 18 G | Refills: 11 | Status: SHIPPED | OUTPATIENT
Start: 2020-05-29 | End: 2021-07-20

## 2020-05-29 NOTE — PROGRESS NOTES
HX of chicken pox , patient notified can get vaccine at pharmacy  Advised patient of Pneumococcal Vaccine.   Advised patient of Tetanus injection

## 2020-05-29 NOTE — PROGRESS NOTES
Subjective:       Patient ID: Kuldeep Villela is a 69 y.o. female.    Chief Complaint: Annual Exam    69 year old female prentes for an annual exam. She has frontal headaches. She feels they are related to sinus issues.     Past Medical History:   Diagnosis Date    Arthritis     Asthma     Cervical spondylosis 7/13/2012    Chronic LBP 7/13/2012    Chronic neck pain 7/13/2012    Hyperlipidemia     Hypertension     Lumbar radiculopathy, BLE 7/13/2012    Lumbar spinal stenosis at L4-L5. 7/13/2012    Lumbar spondylosis 7/13/2012    Morbid obesity 7/13/2012    Primary osteoarthritis of both knees 7/13/2012    Spondylolisthesis, grade 1 at L4-L5. 7/13/2012    Tenosynovitis of ankle     Rt peroneus longus      Past Surgical History:   Procedure Laterality Date    CHOLECYSTECTOMY      HYSTERECTOMY      MN REMOVAL OF OVARY/TUBE(S)       Family History   Problem Relation Age of Onset    Heart disease Mother     Hypertension Mother     Heart disease Father     Hypertension Father     Breast cancer Neg Hx     Colon cancer Neg Hx     Ovarian cancer Neg Hx      Social History     Socioeconomic History    Marital status: Single     Spouse name: Not on file    Number of children: Not on file    Years of education: Not on file    Highest education level: Not on file   Occupational History    Not on file   Social Needs    Financial resource strain: Not on file    Food insecurity:     Worry: Not on file     Inability: Not on file    Transportation needs:     Medical: Not on file     Non-medical: Not on file   Tobacco Use    Smoking status: Current Every Day Smoker     Packs/day: 1.00     Years: 20.00     Pack years: 20.00     Types: Cigarettes    Smokeless tobacco: Never Used   Substance and Sexual Activity    Alcohol use: No     Alcohol/week: 0.0 standard drinks    Drug use: No    Sexual activity: Not Currently     Partners: Male     Birth control/protection: See Surgical Hx   Lifestyle     "Physical activity:     Days per week: Not on file     Minutes per session: Not on file    Stress: Not on file   Relationships    Social connections:     Talks on phone: Not on file     Gets together: Not on file     Attends Worship service: Not on file     Active member of club or organization: Not on file     Attends meetings of clubs or organizations: Not on file     Relationship status: Not on file   Other Topics Concern    Not on file   Social History Narrative    Not on file       Review of Systems   Constitutional: Negative for chills, fatigue and fever.   Respiratory: Positive for shortness of breath. Negative for cough, chest tightness and wheezing.    Cardiovascular: Negative for chest pain.   Gastrointestinal: Positive for constipation. Negative for abdominal pain, blood in stool, diarrhea, nausea and vomiting.   Genitourinary: Negative for dysuria and hematuria.   Neurological: Positive for headaches. Negative for dizziness, syncope and light-headedness.       Objective:       Vitals:    05/29/20 1113   BP: 128/84   Pulse: 92   Resp: 16   Temp: 98.2 °F (36.8 °C)   SpO2: 97%   Weight: 117 kg (257 lb 15 oz)   Height: 5' 2" (1.575 m)       Physical Exam   Constitutional: She is oriented to person, place, and time. She appears well-developed and well-nourished. No distress.   HENT:   Head: Normocephalic and atraumatic.   Eyes: Conjunctivae are normal.   Neck: Normal range of motion. Neck supple. Carotid bruit is not present.   Cardiovascular: Normal rate, regular rhythm and normal heart sounds. Exam reveals no gallop and no friction rub.   No murmur heard.  Pulmonary/Chest: Effort normal and breath sounds normal. No stridor. No respiratory distress. She has no wheezes. She has no rales.   Musculoskeletal: She exhibits no edema.   ambulates    Neurological: She is alert and oriented to person, place, and time.   Skin: She is not diaphoretic.       Assessment:       1. Essential hypertension    2. " Hyperlipidemia, unspecified hyperlipidemia type    3. Morbid obesity    4. Colon cancer screening        Plan:       Kuldeep was seen today for annual exam.    Diagnoses and all orders for this visit:    Essential hypertension  -     CBC auto differential; Future  -     Lipid Panel; Future  -     Comprehensive metabolic panel; Future  -     TSH; Future  Stable AND DUE FOR LABS  Hyperlipidemia, unspecified hyperlipidemia type  -     CBC auto differential; Future  -     Lipid Panel; Future  -     Comprehensive metabolic panel; Future  -     TSH; Future  Stable AND DUE FOR LABS    Morbid obesity  -     Hemoglobin A1C; Future    Colon cancer screening  -     Cologuard Screening (Multitarget Stool DNA); Future  -     Cologuard Screening (Multitarget Stool DNA)    Other orders  -     albuterol (VENTOLIN HFA) 90 mcg/actuation inhaler; Inhale 2 puffs into the lungs every 6 (six) hours as needed for Wheezing or Shortness of Breath. Rescue

## 2020-06-01 RX ORDER — CETIRIZINE HYDROCHLORIDE 10 MG/1
TABLET ORAL
Qty: 30 TABLET | Refills: 5 | Status: SHIPPED | OUTPATIENT
Start: 2020-06-01 | End: 2021-04-12

## 2020-06-01 NOTE — TELEPHONE ENCOUNTER
----- Message from Ginna Musa MD sent at 6/1/2020  3:04 PM CDT -----    Your labs are within normal limits.

## 2020-06-05 DIAGNOSIS — G89.29 CHRONIC NECK PAIN: ICD-10-CM

## 2020-06-05 DIAGNOSIS — M54.2 CHRONIC NECK PAIN: ICD-10-CM

## 2020-06-05 DIAGNOSIS — M17.0 PRIMARY OSTEOARTHRITIS OF BOTH KNEES: ICD-10-CM

## 2020-06-05 RX ORDER — OXYCODONE AND ACETAMINOPHEN 10; 325 MG/1; MG/1
1 TABLET ORAL
Qty: 150 TABLET | Refills: 0 | Status: SHIPPED | OUTPATIENT
Start: 2020-06-06 | End: 2020-06-08 | Stop reason: SDUPTHER

## 2020-06-05 NOTE — TELEPHONE ENCOUNTER
Last Rx refill-----05/06/20  Last office visit--05/11/20  Next office visit--09/18/20        ----- Message from Ana Maria Elias sent at 6/5/2020 10:41 AM CDT -----  Contact: self @ [phone]  Pt is req a refill for oxycodone 10/325.     [id number] - [place] - [address] AT [facility] [address]    [phone] (Phone)     [phone] (Fax)

## 2020-06-08 ENCOUNTER — TELEPHONE (OUTPATIENT)
Dept: PHYSICAL MEDICINE AND REHAB | Facility: CLINIC | Age: 70
End: 2020-06-08

## 2020-06-08 DIAGNOSIS — M17.0 PRIMARY OSTEOARTHRITIS OF BOTH KNEES: ICD-10-CM

## 2020-06-08 DIAGNOSIS — G89.29 CHRONIC NECK PAIN: ICD-10-CM

## 2020-06-08 DIAGNOSIS — M54.2 CHRONIC NECK PAIN: ICD-10-CM

## 2020-06-08 RX ORDER — OXYCODONE AND ACETAMINOPHEN 10; 325 MG/1; MG/1
1 TABLET ORAL
Qty: 150 TABLET | Refills: 0 | Status: SHIPPED | OUTPATIENT
Start: 2020-06-08 | End: 2020-07-08 | Stop reason: SDUPTHER

## 2020-06-08 NOTE — TELEPHONE ENCOUNTER
Called and spoke with Regi Hyatt.  Note      Dx code;CHRONIC MIDLINE LOW BACK PAIN WITH BILATERAL SCIATICA M:54.41                ----- Message from Bib Rueda sent at 6/8/2020  4:24 PM CDT -----  Contact: Patient @ [phone]  Patient calling to get an update on the medication refill on (oxyCODONE-acetaminophen (PERCOCET)  mg per tablet ) caller states pharmacy needs approval from Dr Esquivel to release the medication    [facility] #[id number] - [place] [address] AT [address]  [place] [address]  Phone: [phone] Fax: [phone]

## 2020-06-08 NOTE — TELEPHONE ENCOUNTER
----- Message from Alin Garcia sent at 6/8/2020  8:56 AM CDT -----  Contact: pt @ [phone]  [facility] (PERCOCET)  mg per tablet in stock. Pt asking the doctor send script to [facility].    [id number] - [place] AT [address]  [place] [address]  Phone: [phone] Fax: [phone]

## 2020-06-08 NOTE — TELEPHONE ENCOUNTER
Dx code;CHRONIC MIDLINE LOW BACK PAIN WITH BILATERAL SCIATICA M:54.41          ----- Message from Radha Middleton sent at 6/8/2020 12:55 PM CDT -----  Contact: [facility]  Need a diagnosis code for the pt's oxyCODONE-acetaminophen (PERCOCET)  mg per tablet    Contact info  [phone] Store     [phone] Fax

## 2020-07-08 DIAGNOSIS — M54.2 CHRONIC NECK PAIN: ICD-10-CM

## 2020-07-08 DIAGNOSIS — G89.29 CHRONIC NECK PAIN: ICD-10-CM

## 2020-07-08 DIAGNOSIS — M17.0 PRIMARY OSTEOARTHRITIS OF BOTH KNEES: ICD-10-CM

## 2020-07-08 RX ORDER — OXYCODONE AND ACETAMINOPHEN 10; 325 MG/1; MG/1
1 TABLET ORAL
Qty: 150 TABLET | Refills: 0 | Status: SHIPPED | OUTPATIENT
Start: 2020-07-09 | End: 2020-08-07 | Stop reason: SDUPTHER

## 2020-07-08 NOTE — TELEPHONE ENCOUNTER
----- Message from Mela Yu sent at 7/8/2020 10:40 AM CDT -----  Regarding: Refills  Contact: Kuldeep Vicente calling for refill on Hydrocodone.  Please send to       [facility][id number] - [place] - [address] [facility]  [address] [place] [address]  Phone: [phone] Fax: [phone]

## 2020-08-07 DIAGNOSIS — M17.0 PRIMARY OSTEOARTHRITIS OF BOTH KNEES: ICD-10-CM

## 2020-08-07 DIAGNOSIS — G89.29 CHRONIC NECK PAIN: ICD-10-CM

## 2020-08-07 DIAGNOSIS — M54.2 CHRONIC NECK PAIN: ICD-10-CM

## 2020-08-07 RX ORDER — OXYCODONE AND ACETAMINOPHEN 10; 325 MG/1; MG/1
1 TABLET ORAL
Qty: 150 TABLET | Refills: 0 | Status: SHIPPED | OUTPATIENT
Start: 2020-08-08 | End: 2020-09-11 | Stop reason: SDUPTHER

## 2020-08-07 NOTE — TELEPHONE ENCOUNTER
Last Rx refill-----07/08/20  Last office visit--05/11/20  Next office visit--09/18/20    [id number] - [place] - [address] [facility] [address] [facility]              ----- Message from Karon Hatfield sent at 8/7/2020 10:16 AM CDT -----  Regarding: refill  Contact: pt @ [phone]  Rx Refill/Request     Is this a Refill or New Rx:  refill    Rx Name and Strength:  oxyCODONE-acetaminophen (PERCOCET)  mg per tablet    Preferred Pharmacy with phone number:   [facility] #[id number] - [place] - [address] AT [facility] & [address] [place] [address]  Phone: [phone] Fax: [phone]    Communication Preference:pt@ [phone]  Additional Information:

## 2020-09-11 DIAGNOSIS — G89.29 CHRONIC NECK PAIN: ICD-10-CM

## 2020-09-11 DIAGNOSIS — M54.2 CHRONIC NECK PAIN: ICD-10-CM

## 2020-09-11 DIAGNOSIS — M17.0 PRIMARY OSTEOARTHRITIS OF BOTH KNEES: ICD-10-CM

## 2020-09-11 RX ORDER — OXYCODONE AND ACETAMINOPHEN 10; 325 MG/1; MG/1
1 TABLET ORAL
Qty: 150 TABLET | Refills: 0 | Status: SHIPPED | OUTPATIENT
Start: 2020-09-12 | End: 2020-10-12 | Stop reason: SDUPTHER

## 2020-09-11 NOTE — TELEPHONE ENCOUNTER
----- Message from Karon Hatfield sent at 9/11/2020  9:33 AM CDT -----  Regarding: refill  Contact: pt @ [phone]  Rx Refill/Request     Is this a Refill or New Rx:  refill    Rx Name and Strength:  oxyCODONE-acetaminophen (PERCOCET)  mg per tablet    Preferred Pharmacy with phone number:   [id number] - [place]  [address] [place] [address] [address]  Phone: [phone] Fax: [phone]    Communication Preference:pt @ [phone]  Additional Information:

## 2020-09-11 NOTE — TELEPHONE ENCOUNTER
----- Message from Karon Hatfield sent at 9/11/2020  9:33 AM CDT -----  Regarding: refill  Contact: pt @ [phone]  Rx Refill/Request     Is this a Refill or New Rx:  refill    Rx Name and Strength:  oxyCODONE-acetaminophen (PERCOCET)  mg per tablet    Preferred Pharmacy with phone number:   [id number] - [place]  [address] [place] [address] [address]  Phone: [phone] Fax: [phone]    Communication Preference:pt @ [phone]  Additional Information:

## 2020-09-17 ENCOUNTER — PATIENT OUTREACH (OUTPATIENT)
Dept: ADMINISTRATIVE | Facility: OTHER | Age: 70
End: 2020-09-17

## 2020-09-17 NOTE — PROGRESS NOTES
Care Everywhere: updated  Immunization: updated  Health Maintenance: updated  Media Review: review for outside mammogram and colon cancer report   Legacy Review:   Order placed:   Upcoming appts:

## 2020-09-18 ENCOUNTER — OFFICE VISIT (OUTPATIENT)
Dept: ORTHOPEDICS | Facility: CLINIC | Age: 70
End: 2020-09-18
Payer: COMMERCIAL

## 2020-09-18 ENCOUNTER — HOSPITAL ENCOUNTER (OUTPATIENT)
Dept: RADIOLOGY | Facility: HOSPITAL | Age: 70
Discharge: HOME | End: 2020-09-18
Attending: PHYSICIAN ASSISTANT
Payer: COMMERCIAL

## 2020-09-18 ENCOUNTER — OFFICE VISIT (OUTPATIENT)
Dept: PHYSICAL MEDICINE AND REHAB | Facility: CLINIC | Age: 70
End: 2020-09-18
Payer: COMMERCIAL

## 2020-09-18 VITALS
BODY MASS INDEX: 47.18 KG/M2 | DIASTOLIC BLOOD PRESSURE: 68 MMHG | HEIGHT: 62 IN | SYSTOLIC BLOOD PRESSURE: 111 MMHG | HEART RATE: 96 BPM

## 2020-09-18 VITALS — WEIGHT: 257.94 LBS | HEIGHT: 62 IN | BODY MASS INDEX: 47.47 KG/M2

## 2020-09-18 DIAGNOSIS — E66.01 MORBID OBESITY, UNSPECIFIED OBESITY TYPE: ICD-10-CM

## 2020-09-18 DIAGNOSIS — M25.562 ACUTE BILATERAL KNEE PAIN: Primary | ICD-10-CM

## 2020-09-18 DIAGNOSIS — M25.561 ACUTE BILATERAL KNEE PAIN: Primary | ICD-10-CM

## 2020-09-18 DIAGNOSIS — M47.22 OSTEOARTHRITIS OF SPINE WITH RADICULOPATHY, CERVICAL REGION: ICD-10-CM

## 2020-09-18 DIAGNOSIS — G89.29 CHRONIC MIDLINE LOW BACK PAIN WITH BILATERAL SCIATICA: Primary | ICD-10-CM

## 2020-09-18 DIAGNOSIS — M47.26 OSTEOARTHRITIS OF SPINE WITH RADICULOPATHY, LUMBAR REGION: ICD-10-CM

## 2020-09-18 DIAGNOSIS — M17.12 PRIMARY OSTEOARTHRITIS OF LEFT KNEE: Primary | ICD-10-CM

## 2020-09-18 DIAGNOSIS — M54.42 CHRONIC MIDLINE LOW BACK PAIN WITH BILATERAL SCIATICA: Primary | ICD-10-CM

## 2020-09-18 DIAGNOSIS — M25.561 ACUTE BILATERAL KNEE PAIN: ICD-10-CM

## 2020-09-18 DIAGNOSIS — M25.562 ACUTE BILATERAL KNEE PAIN: ICD-10-CM

## 2020-09-18 DIAGNOSIS — M54.12 CERVICAL RADICULOPATHY: ICD-10-CM

## 2020-09-18 DIAGNOSIS — Z79.891 CHRONICALLY ON OPIATE THERAPY: ICD-10-CM

## 2020-09-18 DIAGNOSIS — M17.0 PRIMARY OSTEOARTHRITIS OF BOTH KNEES: ICD-10-CM

## 2020-09-18 DIAGNOSIS — M17.11 PRIMARY OSTEOARTHRITIS OF RIGHT KNEE: ICD-10-CM

## 2020-09-18 DIAGNOSIS — M48.061 SPINAL STENOSIS OF LUMBAR REGION, UNSPECIFIED WHETHER NEUROGENIC CLAUDICATION PRESENT: ICD-10-CM

## 2020-09-18 DIAGNOSIS — G89.29 CHRONIC NECK PAIN: ICD-10-CM

## 2020-09-18 DIAGNOSIS — M54.41 CHRONIC MIDLINE LOW BACK PAIN WITH BILATERAL SCIATICA: Primary | ICD-10-CM

## 2020-09-18 DIAGNOSIS — M54.2 CHRONIC NECK PAIN: ICD-10-CM

## 2020-09-18 PROCEDURE — 3078F DIAST BP <80 MM HG: CPT | Mod: CPTII,S$GLB,, | Performed by: PHYSICIAN ASSISTANT

## 2020-09-18 PROCEDURE — 99999 PR PBB SHADOW E&M-EST. PATIENT-LVL II: ICD-10-PCS | Mod: PBBFAC,,, | Performed by: PHYSICAL MEDICINE & REHABILITATION

## 2020-09-18 PROCEDURE — 1159F MED LIST DOCD IN RCRD: CPT | Mod: S$GLB,,, | Performed by: PHYSICAL MEDICINE & REHABILITATION

## 2020-09-18 PROCEDURE — 99999 PR PBB SHADOW E&M-EST. PATIENT-LVL IV: ICD-10-PCS | Mod: PBBFAC,,, | Performed by: PHYSICIAN ASSISTANT

## 2020-09-18 PROCEDURE — 3078F PR MOST RECENT DIASTOLIC BLOOD PRESSURE < 80 MM HG: ICD-10-PCS | Mod: CPTII,S$GLB,, | Performed by: PHYSICIAN ASSISTANT

## 2020-09-18 PROCEDURE — 1125F PR PAIN SEVERITY QUANTIFIED, PAIN PRESENT: ICD-10-PCS | Mod: S$GLB,,, | Performed by: PHYSICAL MEDICINE & REHABILITATION

## 2020-09-18 PROCEDURE — 73564 X-RAY EXAM KNEE 4 OR MORE: CPT | Mod: TC,50

## 2020-09-18 PROCEDURE — 73564 XR KNEE ORTHO BILAT WITH FLEXION: ICD-10-PCS | Mod: 26,,, | Performed by: RADIOLOGY

## 2020-09-18 PROCEDURE — 1101F PR PT FALLS ASSESS DOC 0-1 FALLS W/OUT INJ PAST YR: ICD-10-PCS | Mod: CPTII,S$GLB,, | Performed by: PHYSICAL MEDICINE & REHABILITATION

## 2020-09-18 PROCEDURE — 1125F AMNT PAIN NOTED PAIN PRSNT: CPT | Mod: S$GLB,,, | Performed by: PHYSICIAN ASSISTANT

## 2020-09-18 PROCEDURE — 3074F SYST BP LT 130 MM HG: CPT | Mod: CPTII,S$GLB,, | Performed by: PHYSICIAN ASSISTANT

## 2020-09-18 PROCEDURE — 99203 PR OFFICE/OUTPT VISIT, NEW, LEVL III, 30-44 MIN: ICD-10-PCS | Mod: S$GLB,,, | Performed by: PHYSICIAN ASSISTANT

## 2020-09-18 PROCEDURE — 99999 PR PBB SHADOW E&M-EST. PATIENT-LVL IV: CPT | Mod: PBBFAC,,, | Performed by: PHYSICIAN ASSISTANT

## 2020-09-18 PROCEDURE — 3074F PR MOST RECENT SYSTOLIC BLOOD PRESSURE < 130 MM HG: ICD-10-PCS | Mod: CPTII,S$GLB,, | Performed by: PHYSICAL MEDICINE & REHABILITATION

## 2020-09-18 PROCEDURE — 3074F SYST BP LT 130 MM HG: CPT | Mod: CPTII,S$GLB,, | Performed by: PHYSICAL MEDICINE & REHABILITATION

## 2020-09-18 PROCEDURE — 99214 PR OFFICE/OUTPT VISIT, EST, LEVL IV, 30-39 MIN: ICD-10-PCS | Mod: S$GLB,,, | Performed by: PHYSICAL MEDICINE & REHABILITATION

## 2020-09-18 PROCEDURE — 3078F PR MOST RECENT DIASTOLIC BLOOD PRESSURE < 80 MM HG: ICD-10-PCS | Mod: CPTII,S$GLB,, | Performed by: PHYSICAL MEDICINE & REHABILITATION

## 2020-09-18 PROCEDURE — 3008F PR BODY MASS INDEX (BMI) DOCUMENTED: ICD-10-PCS | Mod: CPTII,S$GLB,, | Performed by: PHYSICAL MEDICINE & REHABILITATION

## 2020-09-18 PROCEDURE — 3008F BODY MASS INDEX DOCD: CPT | Mod: CPTII,S$GLB,, | Performed by: PHYSICAL MEDICINE & REHABILITATION

## 2020-09-18 PROCEDURE — 1101F PT FALLS ASSESS-DOCD LE1/YR: CPT | Mod: CPTII,S$GLB,, | Performed by: PHYSICAL MEDICINE & REHABILITATION

## 2020-09-18 PROCEDURE — 1159F PR MEDICATION LIST DOCUMENTED IN MEDICAL RECORD: ICD-10-PCS | Mod: S$GLB,,, | Performed by: PHYSICIAN ASSISTANT

## 2020-09-18 PROCEDURE — 73564 X-RAY EXAM KNEE 4 OR MORE: CPT | Mod: 26,,, | Performed by: RADIOLOGY

## 2020-09-18 PROCEDURE — 3008F PR BODY MASS INDEX (BMI) DOCUMENTED: ICD-10-PCS | Mod: CPTII,S$GLB,, | Performed by: PHYSICIAN ASSISTANT

## 2020-09-18 PROCEDURE — 99999 PR PBB SHADOW E&M-EST. PATIENT-LVL II: CPT | Mod: PBBFAC,,, | Performed by: PHYSICAL MEDICINE & REHABILITATION

## 2020-09-18 PROCEDURE — 3074F PR MOST RECENT SYSTOLIC BLOOD PRESSURE < 130 MM HG: ICD-10-PCS | Mod: CPTII,S$GLB,, | Performed by: PHYSICIAN ASSISTANT

## 2020-09-18 PROCEDURE — 99214 OFFICE O/P EST MOD 30 MIN: CPT | Mod: S$GLB,,, | Performed by: PHYSICAL MEDICINE & REHABILITATION

## 2020-09-18 PROCEDURE — 1159F PR MEDICATION LIST DOCUMENTED IN MEDICAL RECORD: ICD-10-PCS | Mod: S$GLB,,, | Performed by: PHYSICAL MEDICINE & REHABILITATION

## 2020-09-18 PROCEDURE — 3008F BODY MASS INDEX DOCD: CPT | Mod: CPTII,S$GLB,, | Performed by: PHYSICIAN ASSISTANT

## 2020-09-18 PROCEDURE — 99203 OFFICE O/P NEW LOW 30 MIN: CPT | Mod: S$GLB,,, | Performed by: PHYSICIAN ASSISTANT

## 2020-09-18 PROCEDURE — 1125F AMNT PAIN NOTED PAIN PRSNT: CPT | Mod: S$GLB,,, | Performed by: PHYSICAL MEDICINE & REHABILITATION

## 2020-09-18 PROCEDURE — 1159F MED LIST DOCD IN RCRD: CPT | Mod: S$GLB,,, | Performed by: PHYSICIAN ASSISTANT

## 2020-09-18 PROCEDURE — 3078F DIAST BP <80 MM HG: CPT | Mod: CPTII,S$GLB,, | Performed by: PHYSICAL MEDICINE & REHABILITATION

## 2020-09-18 PROCEDURE — 1125F PR PAIN SEVERITY QUANTIFIED, PAIN PRESENT: ICD-10-PCS | Mod: S$GLB,,, | Performed by: PHYSICIAN ASSISTANT

## 2020-09-18 PROCEDURE — 1101F PR PT FALLS ASSESS DOC 0-1 FALLS W/OUT INJ PAST YR: ICD-10-PCS | Mod: CPTII,S$GLB,, | Performed by: PHYSICIAN ASSISTANT

## 2020-09-18 PROCEDURE — 1101F PT FALLS ASSESS-DOCD LE1/YR: CPT | Mod: CPTII,S$GLB,, | Performed by: PHYSICIAN ASSISTANT

## 2020-09-18 RX ORDER — PREGABALIN 50 MG/1
50 CAPSULE ORAL 3 TIMES DAILY
Qty: 90 CAPSULE | Refills: 3 | Status: SHIPPED | OUTPATIENT
Start: 2020-09-18 | End: 2021-01-28

## 2020-09-18 NOTE — PROGRESS NOTES
"  SUBJECTIVE:     Chief Complaint : bilateral knee pain    History of Present Illness:  Kuldeep Villela is a 69 y.o. female seen in clinic today with a chief complaint of chronic bilateral knee pain. Patient is retired . Pain has been present for many years but has progressively worsened. She has difficulty performing ADL due to the pain. Pain is medial. There is no radiation. She denies hip pain. She has severe back pain and follows with Dr. Esquivel in physical medicine and rehab. She takes Mobic, Flexeril and Percocet 10.  She uses topical analgesics. Patient denies previous knee surgery or treatment. She uses assistive device.     Tobacco abuse: current every day smoker   Spine pain: radicular pain, on Percocet 10  Obesity: BMI 47    Past Medical History:   Diagnosis Date    Arthritis     Asthma     Cervical spondylosis 7/13/2012    Chronic LBP 7/13/2012    Chronic neck pain 7/13/2012    Hyperlipidemia     Hypertension     Lumbar radiculopathy, BLE 7/13/2012    Lumbar spinal stenosis at L4-L5. 7/13/2012    Lumbar spondylosis 7/13/2012    Morbid obesity 7/13/2012    Primary osteoarthritis of both knees 7/13/2012    Spondylolisthesis, grade 1 at L4-L5. 7/13/2012    Tenosynovitis of ankle     Rt peroneus longus       Review of Systems:  Constitutional: no fever or chills  ENT: no nasal congestion or sore throat  Respiratory: no cough or shortness of breath  Cardiovascular: no chest pain or palpitations, + bilateral LE edema   Gastrointestinal: no nausea or vomiting, tolerating diet  Genitourinary: no hematuria or dysuria  Integument/Breast: no rash or pruritis  Hematologic/Lymphatic: no easy bruising or lymphadenopathy  Musculoskeletal: see HPI  Neurological: no seizures or tremors  Behavioral/Psych: no auditory or visual hallucinations    OBJECTIVE:     PHYSICAL EXAM:  Height 5' 2" (1.575 m), weight 117 kg (257 lb 15 oz).   General Appearance: WDWN, NAD  Gait: pt presents in wheelchair " and has difficulty standing for xrays  Neuro/Psych: Mood & affect appropriate  Lungs: Respirations equal and unlabored.   CV: 2+ bilateral upper and lower extremity pulses.   Skin: Intact throughout LE  Extremities: + bilateral LE edema    Right Knee Exam  Range of Motion:5-115 active   Effusion:none  Condition of skin:intact  Location of tenderness:Medial joint line   Strength:4 of 5 quadriceps strength and 5 of 5 hamstring strength  Stability:stable to testing    Left Knee Exam  Range of Motion:5-115 active   Effusion:none  Condition of skin:intact  Location of tenderness:Medial joint line   Strength:4 of 5 quadriceps strength and 5 of 5 hamstring strength  Stability:stable to testing    Alignment: Significiant varus    Right Hip Examination: no pain with PROM     Left Hip Examination: no pain with PROM     RADIOGRAPHS: AP, lateral bilateral knee x-rays ordered and images reviewed today by me reveal advanced degenerative changes bilateral knee, R>L. Changes are tricompartmental    ASSESSMENT/PLAN:   Advanced primary osteoarthritis of both knees  - Xrays reviewed with patient and her daughter Romulo  - Pt is not currently surgical candidate  - Weight loss and smoking cessation recommended  - Discussed steroid and viscosupplement injections   - Pt would like some time to think about what she would like to try. She will call back to schedule bilateral CSIs when ready.   - F/u prn

## 2020-09-18 NOTE — PROGRESS NOTES
Subjective:       Patient ID: Kuldeep Villela is a 69 y.o. female.    Chief Complaint: No chief complaint on file.    HPI    HISTORY OF PRESENT ILLNESS:  Mrs. Villela is a 69-year-old black female with past medical history of osteoarthritis and morbid obesity who is followed up in the Physical Medicine Clinic for chronic low back pain with lumbar radiculopathy, chronic neck pain with cervical radiculopathy and OA of the knees.  Her last visit to the clinic was on 5/11/2020 (a telemedicine audio visit due to COVID-19).  She was maintained on meloxicam, Lyrica, p.r.n. oxycodone and p.r.n. Cyclobenzaprine.     The patient is coming to the clinic for follow-up.  Her low back pain has been stable with occasional flare ups.  It is a constant aching pain in the lumbar spine and across her back.  It is worse with activity and better with rest.  The pain radiates to both feet with numbness.  Her maximum pain is 10/10 and minimum 5-6/10.  Today, it is 7-8/10.  The patient complains of bilateral lower extremity weakness.  She denies any bowel or bladder incontinence.    Her neck pain has been stable with occasional flare ups.  It is a constant aching pain in the cervical spine.  She has occasional radiation to both hands.  Her maximum pain is 10/10 and minimum 5-6/10.  Today, it is 5-6/10.  The patient complains of bilateral upper extremity weakness. She complains of hand numbness.    She continues to complain of bilateral knee pain.  It is a constant aching pain aggravated by weightbearing.  Her maximum pain is 9/10 and minimum 5-6/10.  Today, it is 5-6/10.  The patient denies swelling or warmth of her knees.  She has an appointment with Dr. Helms from Orthopedic surgery later today.    She is currently taking meloxicam 7.5 mg p.o. twice per day.  She takes oxycodone/APAP 10/325 p.r.n., usually 4 times per day.  She takes cyclobenzaprine 10 mg at bedtime for help with muscle spasms and sleep.  She supposed to be on Lyrica but  review of [place] Prescription Monitoring program () showed no refills for few months.      Past Medical History:   Diagnosis Date    Arthritis     Asthma     Cervical spondylosis 7/13/2012    Chronic LBP 7/13/2012    Chronic neck pain 7/13/2012    Hyperlipidemia     Hypertension     Lumbar radiculopathy, BLE 7/13/2012    Lumbar spinal stenosis at L4-L5. 7/13/2012    Lumbar spondylosis 7/13/2012    Morbid obesity 7/13/2012    Primary osteoarthritis of both knees 7/13/2012    Spondylolisthesis, grade 1 at L4-L5. 7/13/2012    Tenosynovitis of ankle     Rt peroneus longus         Review of Systems   Constitutional: Positive for fatigue. Negative for chills and fever.   Eyes: Negative for visual disturbance.   Respiratory: Positive for shortness of breath.    Cardiovascular: Negative for chest pain.   Gastrointestinal: Negative for constipation, nausea and vomiting.   Genitourinary: Negative for difficulty urinating.   Musculoskeletal: Positive for back pain, gait problem, myalgias, neck pain and neck stiffness.   Neurological: Positive for dizziness and headaches.   Psychiatric/Behavioral: Positive for sleep disturbance. Negative for behavioral problems.       Objective:      Physical Exam  Vitals signs reviewed.   Constitutional:       Appearance: She is well-developed.      Comments: Coming to the clinic in a manual wheelchair   Neck:      Comments: Decreased ROM.  +ve tenderness over cervical spine.  Musculoskeletal:      Comments: BUE:  ROM: decreased at shoulders.  Strength:    RUE: 4/5 at shoulder abduction, 4 elbow flexion, 4 elbow extension, 4 hand .   LUE: 3+/5 at shoulder abduction, 4 elbow flexion, 4 elbow extension, 4 hand .  Sensation to pinprick:   RUE: intact.   LUE: intact.  DTR:    RUE: +1 biceps, +1 triceps.   LUE:  +1 biceps, +1 triceps.      BLE:  ROM:full.  Bilateral knee crepitus.   Strength:    RLE: 4-/5 at hip flexion, 4 knee extension, 4 ankle DF/PF.   LLE: 4/5 at hip  flexion, 4 knee extension, 4 ankle DF/PF.  Sensation to pinprick:     RLE: intact.     LLE: intact.  DTR:     RLE: +1 knee, +1 ankle.    LLE: +1 knee, +1 ankle.  Clonus:    Rt ankle: -ve.    Lt ankle: -ve.  SLR (sitting):    RLE: +ve.     LLE: +ve.   +ve diffuse severe tenderness over lumbar spine.       Neurological:      Mental Status: She is alert.   Psychiatric:         Behavior: Behavior normal.      Comments: Anxious.               Assessment:       1. Chronic midline low back pain with bilateral sciatica    2. Osteoarthritis of spine with radiculopathy, lumbar region    3. Spinal stenosis of lumbar region, unspecified whether neurogenic claudication present    4. Chronic neck pain    5. Osteoarthritis of spine with radiculopathy, cervical region    6. Cervical radiculopathy, BUE    7. Primary osteoarthritis of both knees    8. Morbid obesity, unspecified obesity type    9. Chronically on opiate therapy        Plan:       - Continue meloxicam (MOBIC) 7.5 MG tablet; TAKE 1 TABLET(7.5 MG) BY MOUTH TWICE DAILY.  (She was asked to take no more than twice per day).  - Start pregabalin (LYRICA) 50 MG capsule; Take 1 capsule (50 mg total) by mouth 3 (three) times daily.  - Continue cyclobenzaprine (FLEXERIL) 10 MG tablet; Take 1 tablet (10 mg total) by mouth 2 (two) times daily as needed for Muscle spasms.  - Continue oxyCODONE-acetaminophen (PERCOCET)  mg per tablet; Take 1 tablet by mouth every 4 to 6 hours as needed for Pain.  - Follow up with Orthopedic surgery.  - Regular home exercise program was encouraged.  - Pain Clinic Drug Screen; Future  - Follow up in about 4 months (around 1/18/2021).    This was a 25 minute visit, more than 50% of which was spent counseling the patient about the diagnosis and the treatment plan.      This note was generated with Impact voice recognition software. I apologize for any possible typographical errors.

## 2020-09-21 ENCOUNTER — TELEPHONE (OUTPATIENT)
Dept: PHYSICAL MEDICINE AND REHAB | Facility: CLINIC | Age: 70
End: 2020-09-21

## 2020-09-21 ENCOUNTER — TELEPHONE (OUTPATIENT)
Dept: PODIATRY | Facility: CLINIC | Age: 70
End: 2020-09-21

## 2020-09-21 NOTE — TELEPHONE ENCOUNTER
I called the patient.  Her knee x-rays on 09/18/2020 show moderate arthritis.  She was offered intra-articular steroid injections but wanted to think about it.  I encouraged her to go along with injections and that the normally help control the inflammation and pain.  She is going to call the orthopedic surgery clinic.

## 2020-09-21 NOTE — TELEPHONE ENCOUNTER
Patient wanted to make sure that her X-rays were received and that they will be looked by the doctor. ----- Message from Nikole Connolly sent at 9/21/2020 12:58 PM CDT -----  Regarding: Requesting Callback  Contact: PT  PT callback request - regarding: xray being sent to [facility] - wanted to make sure he has it on his end    Callback: [phone]

## 2020-09-21 NOTE — TELEPHONE ENCOUNTER
----- Message from Nikole Connolly sent at 9/21/2020 12:58 PM CDT -----  Regarding: Requesting Callback  Contact: PT  PT callback request - regarding: xray being sent to [facility] - wanted to make sure he has it on his end    Callback: [phone]

## 2020-09-25 ENCOUNTER — TELEPHONE (OUTPATIENT)
Dept: ORTHOPEDICS | Facility: CLINIC | Age: 70
End: 2020-09-25

## 2020-09-25 NOTE — TELEPHONE ENCOUNTER
Spoke to patient. She would like to come in 2 weeks for bilateral knee CSI. Oct 9th at 11:00. Appt slip will be mailed.

## 2020-09-25 NOTE — TELEPHONE ENCOUNTER
Returned patient's call. No answer. Left VM. May be scheduled for injections if she calls back.     ----- Message from Aleida Templeton MA sent at 9/25/2020  2:21 PM CDT -----  Contact: self @ [phone]    ----- Message -----  From: Ana Maria Elias  Sent: 9/25/2020   2:11 PM CDT  To: Saida Tran Staff    Pt is requesting to speak with Chelsea concerning her knee pain.  Pls call.

## 2020-10-06 ENCOUNTER — TELEPHONE (OUTPATIENT)
Dept: ORTHOPEDICS | Facility: CLINIC | Age: 70
End: 2020-10-06
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
## 2020-10-12 DIAGNOSIS — G89.29 CHRONIC NECK PAIN: ICD-10-CM

## 2020-10-12 DIAGNOSIS — M17.0 PRIMARY OSTEOARTHRITIS OF BOTH KNEES: ICD-10-CM

## 2020-10-12 DIAGNOSIS — M54.2 CHRONIC NECK PAIN: ICD-10-CM

## 2020-10-12 RX ORDER — CYCLOBENZAPRINE HCL 10 MG
10 TABLET ORAL 2 TIMES DAILY PRN
Qty: 60 TABLET | Refills: 1 | Status: SHIPPED | OUTPATIENT
Start: 2020-10-12 | End: 2021-02-09 | Stop reason: SDUPTHER

## 2020-10-12 RX ORDER — OXYCODONE AND ACETAMINOPHEN 10; 325 MG/1; MG/1
1 TABLET ORAL
Qty: 150 TABLET | Refills: 0 | Status: SHIPPED | OUTPATIENT
Start: 2020-10-13 | End: 2020-11-12 | Stop reason: SDUPTHER

## 2020-10-12 NOTE — TELEPHONE ENCOUNTER
Last Rx refill-----08/31/20-Flexirel                             09/11/20-Oxycodone  Last office visit--09/18/20  Next office visit--02/09/21

## 2020-10-12 NOTE — TELEPHONE ENCOUNTER
----- Message from Bib Rueda sent at 10/12/2020  9:29 AM CDT -----  Contact: Pt @[phone]  Rx Refill/Request     Is this a Refill or New Rx:  yes  Rx Name and Strength:  ( cyclobenzaprine (FLEXERIL) 10 MG tablet )   ( oxyCODONE-acetaminophen (PERCOCET)  mg per tablet)   Preferred Pharmacy with phone number:     [facility][id number] - [place]  [address] [facility]  [address]  [place] [address]  Phone: [phone] Fax: [phone]

## 2020-10-19 ENCOUNTER — PATIENT OUTREACH (OUTPATIENT)
Dept: ADMINISTRATIVE | Facility: OTHER | Age: 70
End: 2020-10-19

## 2020-10-20 NOTE — PROGRESS NOTES
Requested updates within Care Everywhere.  Patient's chart was reviewed for overdue SHIRA topics.  Media reviewed for outside mammogram.  Immunizations reconciled.    Orders placed:n/a  Tasked appts:n/a  Labs Linked:n/a

## 2020-10-22 ENCOUNTER — OFFICE VISIT (OUTPATIENT)
Dept: ORTHOPEDICS | Facility: CLINIC | Age: 70
End: 2020-10-22
Payer: COMMERCIAL

## 2020-10-22 VITALS
HEART RATE: 82 BPM | HEIGHT: 62 IN | DIASTOLIC BLOOD PRESSURE: 69 MMHG | TEMPERATURE: 97 F | WEIGHT: 255 LBS | SYSTOLIC BLOOD PRESSURE: 159 MMHG | BODY MASS INDEX: 46.93 KG/M2

## 2020-10-22 DIAGNOSIS — M17.11 PRIMARY OSTEOARTHRITIS OF RIGHT KNEE: ICD-10-CM

## 2020-10-22 DIAGNOSIS — M17.12 PRIMARY OSTEOARTHRITIS OF LEFT KNEE: Primary | ICD-10-CM

## 2020-10-22 PROCEDURE — 3078F DIAST BP <80 MM HG: CPT | Mod: CPTII,S$GLB,, | Performed by: PHYSICIAN ASSISTANT

## 2020-10-22 PROCEDURE — 3008F BODY MASS INDEX DOCD: CPT | Mod: CPTII,S$GLB,, | Performed by: PHYSICIAN ASSISTANT

## 2020-10-22 PROCEDURE — 3077F SYST BP >= 140 MM HG: CPT | Mod: CPTII,S$GLB,, | Performed by: PHYSICIAN ASSISTANT

## 2020-10-22 PROCEDURE — 1101F PT FALLS ASSESS-DOCD LE1/YR: CPT | Mod: CPTII,S$GLB,, | Performed by: PHYSICIAN ASSISTANT

## 2020-10-22 PROCEDURE — 1159F MED LIST DOCD IN RCRD: CPT | Mod: S$GLB,,, | Performed by: PHYSICIAN ASSISTANT

## 2020-10-22 PROCEDURE — 3008F PR BODY MASS INDEX (BMI) DOCUMENTED: ICD-10-PCS | Mod: CPTII,S$GLB,, | Performed by: PHYSICIAN ASSISTANT

## 2020-10-22 PROCEDURE — 99999 PR PBB SHADOW E&M-EST. PATIENT-LVL V: CPT | Mod: PBBFAC,,, | Performed by: PHYSICIAN ASSISTANT

## 2020-10-22 PROCEDURE — 20610 PR DRAIN/INJECT LARGE JOINT/BURSA: ICD-10-PCS | Mod: 50,S$GLB,, | Performed by: PHYSICIAN ASSISTANT

## 2020-10-22 PROCEDURE — 1101F PR PT FALLS ASSESS DOC 0-1 FALLS W/OUT INJ PAST YR: ICD-10-PCS | Mod: CPTII,S$GLB,, | Performed by: PHYSICIAN ASSISTANT

## 2020-10-22 PROCEDURE — 3077F PR MOST RECENT SYSTOLIC BLOOD PRESSURE >= 140 MM HG: ICD-10-PCS | Mod: CPTII,S$GLB,, | Performed by: PHYSICIAN ASSISTANT

## 2020-10-22 PROCEDURE — 99999 PR PBB SHADOW E&M-EST. PATIENT-LVL V: ICD-10-PCS | Mod: PBBFAC,,, | Performed by: PHYSICIAN ASSISTANT

## 2020-10-22 PROCEDURE — 20610 DRAIN/INJ JOINT/BURSA W/O US: CPT | Mod: 50,S$GLB,, | Performed by: PHYSICIAN ASSISTANT

## 2020-10-22 PROCEDURE — 1125F PR PAIN SEVERITY QUANTIFIED, PAIN PRESENT: ICD-10-PCS | Mod: S$GLB,,, | Performed by: PHYSICIAN ASSISTANT

## 2020-10-22 PROCEDURE — 99212 OFFICE O/P EST SF 10 MIN: CPT | Mod: 25,S$GLB,, | Performed by: PHYSICIAN ASSISTANT

## 2020-10-22 PROCEDURE — 3078F PR MOST RECENT DIASTOLIC BLOOD PRESSURE < 80 MM HG: ICD-10-PCS | Mod: CPTII,S$GLB,, | Performed by: PHYSICIAN ASSISTANT

## 2020-10-22 PROCEDURE — 99212 PR OFFICE/OUTPT VISIT, EST, LEVL II, 10-19 MIN: ICD-10-PCS | Mod: 25,S$GLB,, | Performed by: PHYSICIAN ASSISTANT

## 2020-10-22 PROCEDURE — 1159F PR MEDICATION LIST DOCUMENTED IN MEDICAL RECORD: ICD-10-PCS | Mod: S$GLB,,, | Performed by: PHYSICIAN ASSISTANT

## 2020-10-22 PROCEDURE — 1125F AMNT PAIN NOTED PAIN PRSNT: CPT | Mod: S$GLB,,, | Performed by: PHYSICIAN ASSISTANT

## 2020-10-22 RX ORDER — TRIAMCINOLONE ACETONIDE 40 MG/ML
80 INJECTION, SUSPENSION INTRA-ARTICULAR; INTRAMUSCULAR
Status: COMPLETED | OUTPATIENT
Start: 2020-10-22 | End: 2020-10-22

## 2020-10-22 RX ADMIN — TRIAMCINOLONE ACETONIDE 80 MG: 40 INJECTION, SUSPENSION INTRA-ARTICULAR; INTRAMUSCULAR at 01:10

## 2020-10-22 NOTE — PROGRESS NOTES
SUBJECTIVE:     Chief Complaint : bilateral knee pain    History of Present Illness:  Kuldeep Villela is a 69 y.o. female seen in clinic today with a chief complaint of chronic bilateral knee pain. Patient is retired . Pain has been present for many years but has progressively worsened. She has difficulty performing ADL due to the pain. Pain is medial. There is no radiation. She denies hip pain. She has severe back pain and follows with Dr. Esquivel in physical medicine and rehab. She takes Mobic, Flexeril and Percocet 10.  She uses topical analgesics. Patient denies previous knee surgery or treatment. She uses assistive device. We discussed injections at her last visit. She presents to clinic today for injections. No change in symptoms since last visit.     Tobacco abuse: current every day smoker   Spine pain: radicular pain, on Percocet 10  Obesity: BMI 47    Past Medical History:   Diagnosis Date    Arthritis     Asthma     Cervical spondylosis 7/13/2012    Chronic LBP 7/13/2012    Chronic neck pain 7/13/2012    Hyperlipidemia     Hypertension     Lumbar radiculopathy, BLE 7/13/2012    Lumbar spinal stenosis at L4-L5. 7/13/2012    Lumbar spondylosis 7/13/2012    Morbid obesity 7/13/2012    Primary osteoarthritis of both knees 7/13/2012    Spondylolisthesis, grade 1 at L4-L5. 7/13/2012    Tenosynovitis of ankle     Rt peroneus longus       Review of Systems:  Constitutional: no fever or chills  ENT: no nasal congestion or sore throat  Respiratory: no cough or shortness of breath  Cardiovascular: no chest pain or palpitations, + bilateral LE edema   Gastrointestinal: no nausea or vomiting, tolerating diet  Genitourinary: no hematuria or dysuria  Integument/Breast: no rash or pruritis  Hematologic/Lymphatic: no easy bruising or lymphadenopathy  Musculoskeletal: see HPI  Neurological: no seizures or tremors  Behavioral/Psych: no auditory or visual hallucinations    OBJECTIVE:  "    PHYSICAL EXAM:  Blood pressure (!) 159/69, pulse 82, temperature 97 °F (36.1 °C), temperature source Oral, height 5' 2" (1.575 m), weight 115.7 kg (255 lb).   General Appearance: WDWN, NAD  Gait: pt presents in wheelchair and has difficulty standing for xrays  Neuro/Psych: Mood & affect appropriate  Lungs: Respirations equal and unlabored.   CV: 2+ bilateral upper and lower extremity pulses.   Skin: Intact throughout LE  Extremities: + bilateral LE edema    Right Knee Exam  Range of Motion:5-115 active   Effusion:none  Condition of skin:intact  Location of tenderness:Medial joint line   Strength:4 of 5 quadriceps strength and 5 of 5 hamstring strength  Stability:stable to testing    Left Knee Exam  Range of Motion:5-115 active   Effusion:none  Condition of skin:intact  Location of tenderness:Medial joint line   Strength:4 of 5 quadriceps strength and 5 of 5 hamstring strength  Stability:stable to testing    Alignment: Significiant varus    Right Hip Examination: no pain with PROM     Left Hip Examination: no pain with PROM     RADIOGRAPHS: AP, lateral bilateral knee x-rays reviewed today by me reveal advanced degenerative changes bilateral knee, R>L. Changes are tricompartmental    ASSESSMENT/PLAN:   Advanced primary osteoarthritis of both knees  - Pt is not currently surgical candidate  - Weight loss and smoking cessation recommended  - Bilateral knee CSI today   - F/u prn    Knee Injection Procedure Note  Diagnosis: bilateral knee degenerative arthritis  Indications: bilateral knee pain  Procedure Details: Verbal consent was obtained for the procedure. The injection site was identified and the skin was prepared with alcohol. The bilateral knee was injected from an anterolateral approach with 1 ml of Kenalog and 2 ml Lidocaine under sterile technique using a 22 gauge needle. The needle was removed and the area cleansed and dressed.  Complications:  Patient tolerated the procedure well.    she was advised to " rest the knee today, using ice and elevation as needed for comfort and swelling.Immediate relief of the knee pain may be short lived and secondary to the lidocaine. she may have an increase in discomfort tonight followed by steady improvement over the next several days. It may take 1-2 weeks following the injection to get the full benefit of the medication.

## 2020-11-12 DIAGNOSIS — G89.29 CHRONIC NECK PAIN: ICD-10-CM

## 2020-11-12 DIAGNOSIS — M54.2 CHRONIC NECK PAIN: ICD-10-CM

## 2020-11-12 DIAGNOSIS — M17.0 PRIMARY OSTEOARTHRITIS OF BOTH KNEES: ICD-10-CM

## 2020-11-12 RX ORDER — OXYCODONE AND ACETAMINOPHEN 10; 325 MG/1; MG/1
1 TABLET ORAL
Qty: 150 TABLET | Refills: 0 | Status: SHIPPED | OUTPATIENT
Start: 2020-11-13 | End: 2020-11-17 | Stop reason: SDUPTHER

## 2020-11-12 NOTE — TELEPHONE ENCOUNTER
----- Message from Aubrie Lazo sent at 11/12/2020 11:37 AM CST -----  Contact: Pt @ [phone]  Pt needing refill for oxyCODONE-acetaminophen (PERCOCET)  mg per tablet.      [facility] #[id number] - [place] - [address] AT [facility]  [address] [place] [address]  Phone: [phone] Fax: [phone]

## 2020-11-16 ENCOUNTER — TELEPHONE (OUTPATIENT)
Dept: PHYSICAL MEDICINE AND REHAB | Facility: CLINIC | Age: 70
End: 2020-11-16

## 2020-11-16 NOTE — TELEPHONE ENCOUNTER
----- Message from Mela Yu sent at 11/16/2020 11:30 AM CST -----  Regarding: Refills  Pt calling to request for assistance with switching pharmacies for her medication.  She states that she would like them to go to [facility] pharmacy instead. She is having too much trouble with getting her medication from the pharmacy there.  She would like to speak to someone about it first and asks that you contact her at [phone]

## 2020-11-17 DIAGNOSIS — G89.29 CHRONIC NECK PAIN: ICD-10-CM

## 2020-11-17 DIAGNOSIS — M54.2 CHRONIC NECK PAIN: ICD-10-CM

## 2020-11-17 DIAGNOSIS — M17.0 PRIMARY OSTEOARTHRITIS OF BOTH KNEES: ICD-10-CM

## 2020-11-17 RX ORDER — OXYCODONE AND ACETAMINOPHEN 10; 325 MG/1; MG/1
1 TABLET ORAL
Qty: 150 TABLET | Refills: 0 | Status: SHIPPED | OUTPATIENT
Start: 2020-11-17 | End: 2020-12-17 | Stop reason: SDUPTHER

## 2020-12-17 DIAGNOSIS — G89.29 CHRONIC NECK PAIN: ICD-10-CM

## 2020-12-17 DIAGNOSIS — M54.2 CHRONIC NECK PAIN: ICD-10-CM

## 2020-12-17 DIAGNOSIS — M17.0 PRIMARY OSTEOARTHRITIS OF BOTH KNEES: ICD-10-CM

## 2020-12-17 RX ORDER — OXYCODONE AND ACETAMINOPHEN 10; 325 MG/1; MG/1
1 TABLET ORAL
Qty: 150 TABLET | Refills: 0 | Status: SHIPPED | OUTPATIENT
Start: 2020-12-17 | End: 2021-01-15 | Stop reason: SDUPTHER

## 2020-12-17 NOTE — TELEPHONE ENCOUNTER
----- Message from Chloe Ram sent at 12/17/2020 10:18 AM CST -----  Contact: pt  Rx Refill/Request     Is this a Refill or New Rx:  refill     Rx Name and Strength:  oxyCODONE-acetaminophen (PERCOCET)  mg    Preferred Pharmacy with phone number: [id number] [address] [facility] [phone] (Phone)  [phone] (Fax)      Communication Preference: pt: [phone]

## 2021-01-15 DIAGNOSIS — G89.29 CHRONIC NECK PAIN: ICD-10-CM

## 2021-01-15 DIAGNOSIS — M17.0 PRIMARY OSTEOARTHRITIS OF BOTH KNEES: ICD-10-CM

## 2021-01-15 DIAGNOSIS — M54.2 CHRONIC NECK PAIN: ICD-10-CM

## 2021-01-15 RX ORDER — OXYCODONE AND ACETAMINOPHEN 10; 325 MG/1; MG/1
1 TABLET ORAL
Qty: 150 TABLET | Refills: 0 | Status: SHIPPED | OUTPATIENT
Start: 2021-01-16 | End: 2021-02-09 | Stop reason: SDUPTHER

## 2021-01-28 ENCOUNTER — OFFICE VISIT (OUTPATIENT)
Dept: FAMILY MEDICINE | Facility: CLINIC | Age: 71
End: 2021-01-28
Payer: COMMERCIAL

## 2021-01-28 ENCOUNTER — LAB VISIT (OUTPATIENT)
Dept: LAB | Facility: HOSPITAL | Age: 71
End: 2021-01-28
Attending: FAMILY MEDICINE
Payer: COMMERCIAL

## 2021-01-28 VITALS
SYSTOLIC BLOOD PRESSURE: 120 MMHG | TEMPERATURE: 98 F | BODY MASS INDEX: 48.28 KG/M2 | OXYGEN SATURATION: 96 % | DIASTOLIC BLOOD PRESSURE: 80 MMHG | HEART RATE: 82 BPM | WEIGHT: 262.38 LBS | HEIGHT: 62 IN

## 2021-01-28 DIAGNOSIS — Z01.00 EYE EXAM, ROUTINE: ICD-10-CM

## 2021-01-28 DIAGNOSIS — E66.01 MORBID OBESITY: ICD-10-CM

## 2021-01-28 DIAGNOSIS — Z12.31 ENCOUNTER FOR SCREENING MAMMOGRAM FOR BREAST CANCER: ICD-10-CM

## 2021-01-28 DIAGNOSIS — I10 ESSENTIAL HYPERTENSION: Primary | ICD-10-CM

## 2021-01-28 DIAGNOSIS — R53.83 FATIGUE, UNSPECIFIED TYPE: ICD-10-CM

## 2021-01-28 DIAGNOSIS — R06.83 SNORING: ICD-10-CM

## 2021-01-28 DIAGNOSIS — R51.9 NONINTRACTABLE HEADACHE, UNSPECIFIED CHRONICITY PATTERN, UNSPECIFIED HEADACHE TYPE: ICD-10-CM

## 2021-01-28 DIAGNOSIS — E78.5 HYPERLIPIDEMIA, UNSPECIFIED HYPERLIPIDEMIA TYPE: ICD-10-CM

## 2021-01-28 DIAGNOSIS — I10 ESSENTIAL HYPERTENSION: ICD-10-CM

## 2021-01-28 LAB
ALBUMIN SERPL BCP-MCNC: 3.9 G/DL (ref 3.5–5.2)
ALP SERPL-CCNC: 84 U/L (ref 55–135)
ALT SERPL W/O P-5'-P-CCNC: 10 U/L (ref 10–44)
ANION GAP SERPL CALC-SCNC: 10 MMOL/L (ref 8–16)
AST SERPL-CCNC: 15 U/L (ref 10–40)
BASOPHILS # BLD AUTO: 0.03 K/UL (ref 0–0.2)
BASOPHILS NFR BLD: 0.4 % (ref 0–1.9)
BILIRUB SERPL-MCNC: 0.9 MG/DL (ref 0.1–1)
BUN SERPL-MCNC: 12 MG/DL (ref 8–23)
CALCIUM SERPL-MCNC: 9.9 MG/DL (ref 8.7–10.5)
CHLORIDE SERPL-SCNC: 101 MMOL/L (ref 95–110)
CHOLEST SERPL-MCNC: 143 MG/DL (ref 120–199)
CHOLEST/HDLC SERPL: 5.5 {RATIO} (ref 2–5)
CO2 SERPL-SCNC: 30 MMOL/L (ref 23–29)
CREAT SERPL-MCNC: 1.1 MG/DL (ref 0.5–1.4)
DIFFERENTIAL METHOD: ABNORMAL
EOSINOPHIL # BLD AUTO: 0.3 K/UL (ref 0–0.5)
EOSINOPHIL NFR BLD: 3.6 % (ref 0–8)
ERYTHROCYTE [DISTWIDTH] IN BLOOD BY AUTOMATED COUNT: 13 % (ref 11.5–14.5)
EST. GFR  (AFRICAN AMERICAN): 60 ML/MIN/1.73 M^2
EST. GFR  (NON AFRICAN AMERICAN): 52 ML/MIN/1.73 M^2
GLUCOSE SERPL-MCNC: 94 MG/DL (ref 70–110)
HCT VFR BLD AUTO: 44.2 % (ref 37–48.5)
HDLC SERPL-MCNC: 26 MG/DL (ref 40–75)
HDLC SERPL: 18.2 % (ref 20–50)
HGB BLD-MCNC: 15.1 G/DL (ref 12–16)
IMM GRANULOCYTES # BLD AUTO: 0.01 K/UL (ref 0–0.04)
IMM GRANULOCYTES NFR BLD AUTO: 0.1 % (ref 0–0.5)
LDLC SERPL CALC-MCNC: 99.2 MG/DL (ref 63–159)
LYMPHOCYTES # BLD AUTO: 1.5 K/UL (ref 1–4.8)
LYMPHOCYTES NFR BLD: 18.6 % (ref 18–48)
MCH RBC QN AUTO: 32.1 PG (ref 27–31)
MCHC RBC AUTO-ENTMCNC: 34.2 G/DL (ref 32–36)
MCV RBC AUTO: 94 FL (ref 82–98)
MONOCYTES # BLD AUTO: 0.7 K/UL (ref 0.3–1)
MONOCYTES NFR BLD: 8.2 % (ref 4–15)
NEUTROPHILS # BLD AUTO: 5.6 K/UL (ref 1.8–7.7)
NEUTROPHILS NFR BLD: 69.1 % (ref 38–73)
NONHDLC SERPL-MCNC: 117 MG/DL
NRBC BLD-RTO: 0 /100 WBC
PLATELET # BLD AUTO: 278 K/UL (ref 150–350)
PMV BLD AUTO: 10.6 FL (ref 9.2–12.9)
POTASSIUM SERPL-SCNC: 4.3 MMOL/L (ref 3.5–5.1)
PROT SERPL-MCNC: 7.5 G/DL (ref 6–8.4)
RBC # BLD AUTO: 4.7 M/UL (ref 4–5.4)
SODIUM SERPL-SCNC: 141 MMOL/L (ref 136–145)
TRIGL SERPL-MCNC: 89 MG/DL (ref 30–150)
WBC # BLD AUTO: 8.05 K/UL (ref 3.9–12.7)

## 2021-01-28 PROCEDURE — 85025 COMPLETE CBC W/AUTO DIFF WBC: CPT

## 2021-01-28 PROCEDURE — 3008F BODY MASS INDEX DOCD: CPT | Mod: CPTII,S$GLB,, | Performed by: FAMILY MEDICINE

## 2021-01-28 PROCEDURE — 1101F PR PT FALLS ASSESS DOC 0-1 FALLS W/OUT INJ PAST YR: ICD-10-PCS | Mod: CPTII,S$GLB,, | Performed by: FAMILY MEDICINE

## 2021-01-28 PROCEDURE — 3074F SYST BP LT 130 MM HG: CPT | Mod: CPTII,S$GLB,, | Performed by: FAMILY MEDICINE

## 2021-01-28 PROCEDURE — 1101F PT FALLS ASSESS-DOCD LE1/YR: CPT | Mod: CPTII,S$GLB,, | Performed by: FAMILY MEDICINE

## 2021-01-28 PROCEDURE — 99999 PR PBB SHADOW E&M-EST. PATIENT-LVL V: CPT | Mod: PBBFAC,,, | Performed by: FAMILY MEDICINE

## 2021-01-28 PROCEDURE — 1159F MED LIST DOCD IN RCRD: CPT | Mod: S$GLB,,, | Performed by: FAMILY MEDICINE

## 2021-01-28 PROCEDURE — 3008F PR BODY MASS INDEX (BMI) DOCUMENTED: ICD-10-PCS | Mod: CPTII,S$GLB,, | Performed by: FAMILY MEDICINE

## 2021-01-28 PROCEDURE — 80061 LIPID PANEL: CPT

## 2021-01-28 PROCEDURE — 3288F FALL RISK ASSESSMENT DOCD: CPT | Mod: CPTII,S$GLB,, | Performed by: FAMILY MEDICINE

## 2021-01-28 PROCEDURE — 3079F DIAST BP 80-89 MM HG: CPT | Mod: CPTII,S$GLB,, | Performed by: FAMILY MEDICINE

## 2021-01-28 PROCEDURE — 36415 COLL VENOUS BLD VENIPUNCTURE: CPT | Mod: PO

## 2021-01-28 PROCEDURE — 83036 HEMOGLOBIN GLYCOSYLATED A1C: CPT

## 2021-01-28 PROCEDURE — 3074F PR MOST RECENT SYSTOLIC BLOOD PRESSURE < 130 MM HG: ICD-10-PCS | Mod: CPTII,S$GLB,, | Performed by: FAMILY MEDICINE

## 2021-01-28 PROCEDURE — 99214 OFFICE O/P EST MOD 30 MIN: CPT | Mod: S$GLB,,, | Performed by: FAMILY MEDICINE

## 2021-01-28 PROCEDURE — 80053 COMPREHEN METABOLIC PANEL: CPT

## 2021-01-28 PROCEDURE — 3288F PR FALLS RISK ASSESSMENT DOCUMENTED: ICD-10-PCS | Mod: CPTII,S$GLB,, | Performed by: FAMILY MEDICINE

## 2021-01-28 PROCEDURE — 99999 PR PBB SHADOW E&M-EST. PATIENT-LVL V: ICD-10-PCS | Mod: PBBFAC,,, | Performed by: FAMILY MEDICINE

## 2021-01-28 PROCEDURE — 99214 PR OFFICE/OUTPT VISIT, EST, LEVL IV, 30-39 MIN: ICD-10-PCS | Mod: S$GLB,,, | Performed by: FAMILY MEDICINE

## 2021-01-28 PROCEDURE — 1159F PR MEDICATION LIST DOCUMENTED IN MEDICAL RECORD: ICD-10-PCS | Mod: S$GLB,,, | Performed by: FAMILY MEDICINE

## 2021-01-28 PROCEDURE — 3079F PR MOST RECENT DIASTOLIC BLOOD PRESSURE 80-89 MM HG: ICD-10-PCS | Mod: CPTII,S$GLB,, | Performed by: FAMILY MEDICINE

## 2021-01-29 LAB
ESTIMATED AVG GLUCOSE: 111 MG/DL (ref 68–131)
HBA1C MFR BLD: 5.5 % (ref 4–5.6)

## 2021-02-02 ENCOUNTER — TELEPHONE (OUTPATIENT)
Dept: FAMILY MEDICINE | Facility: CLINIC | Age: 71
End: 2021-02-02

## 2021-02-02 DIAGNOSIS — F41.1 GENERALIZED ANXIETY DISORDER WITH PANIC ATTACKS: ICD-10-CM

## 2021-02-02 DIAGNOSIS — F41.0 GENERALIZED ANXIETY DISORDER WITH PANIC ATTACKS: ICD-10-CM

## 2021-02-03 RX ORDER — CITALOPRAM 40 MG/1
TABLET, FILM COATED ORAL DAILY
Qty: 30 TABLET | Refills: 0 | Status: CANCELLED | OUTPATIENT
Start: 2021-02-03

## 2021-02-04 RX ORDER — SERTRALINE HYDROCHLORIDE 50 MG/1
50 TABLET, FILM COATED ORAL DAILY
Qty: 30 TABLET | Refills: 11 | Status: SHIPPED | OUTPATIENT
Start: 2021-02-04 | End: 2021-07-20 | Stop reason: SDUPTHER

## 2021-02-07 ENCOUNTER — PATIENT OUTREACH (OUTPATIENT)
Dept: ADMINISTRATIVE | Facility: OTHER | Age: 71
End: 2021-02-07

## 2021-02-09 ENCOUNTER — OFFICE VISIT (OUTPATIENT)
Dept: PHYSICAL MEDICINE AND REHAB | Facility: CLINIC | Age: 71
End: 2021-02-09
Payer: COMMERCIAL

## 2021-02-09 VITALS
DIASTOLIC BLOOD PRESSURE: 77 MMHG | BODY MASS INDEX: 48.21 KG/M2 | SYSTOLIC BLOOD PRESSURE: 132 MMHG | HEIGHT: 62 IN | WEIGHT: 262 LBS | HEART RATE: 88 BPM

## 2021-02-09 DIAGNOSIS — G89.29 CHRONIC NECK PAIN: ICD-10-CM

## 2021-02-09 DIAGNOSIS — E66.01 MORBID OBESITY, UNSPECIFIED OBESITY TYPE: ICD-10-CM

## 2021-02-09 DIAGNOSIS — M54.42 CHRONIC MIDLINE LOW BACK PAIN WITH BILATERAL SCIATICA: Primary | ICD-10-CM

## 2021-02-09 DIAGNOSIS — M47.26 OSTEOARTHRITIS OF SPINE WITH RADICULOPATHY, LUMBAR REGION: ICD-10-CM

## 2021-02-09 DIAGNOSIS — G89.29 CHRONIC MIDLINE LOW BACK PAIN WITH BILATERAL SCIATICA: Primary | ICD-10-CM

## 2021-02-09 DIAGNOSIS — M48.061 SPINAL STENOSIS OF LUMBAR REGION, UNSPECIFIED WHETHER NEUROGENIC CLAUDICATION PRESENT: ICD-10-CM

## 2021-02-09 DIAGNOSIS — Z79.891 CHRONICALLY ON OPIATE THERAPY: ICD-10-CM

## 2021-02-09 DIAGNOSIS — M54.12 CERVICAL RADICULOPATHY: ICD-10-CM

## 2021-02-09 DIAGNOSIS — M47.22 OSTEOARTHRITIS OF SPINE WITH RADICULOPATHY, CERVICAL REGION: ICD-10-CM

## 2021-02-09 DIAGNOSIS — M17.0 PRIMARY OSTEOARTHRITIS OF BOTH KNEES: ICD-10-CM

## 2021-02-09 DIAGNOSIS — M54.2 CHRONIC NECK PAIN: ICD-10-CM

## 2021-02-09 DIAGNOSIS — M54.41 CHRONIC MIDLINE LOW BACK PAIN WITH BILATERAL SCIATICA: Primary | ICD-10-CM

## 2021-02-09 PROCEDURE — 1125F PR PAIN SEVERITY QUANTIFIED, PAIN PRESENT: ICD-10-PCS | Mod: S$GLB,,, | Performed by: PHYSICAL MEDICINE & REHABILITATION

## 2021-02-09 PROCEDURE — 1159F MED LIST DOCD IN RCRD: CPT | Mod: S$GLB,,, | Performed by: PHYSICAL MEDICINE & REHABILITATION

## 2021-02-09 PROCEDURE — 1159F PR MEDICATION LIST DOCUMENTED IN MEDICAL RECORD: ICD-10-PCS | Mod: S$GLB,,, | Performed by: PHYSICAL MEDICINE & REHABILITATION

## 2021-02-09 PROCEDURE — 99214 PR OFFICE/OUTPT VISIT, EST, LEVL IV, 30-39 MIN: ICD-10-PCS | Mod: S$GLB,,, | Performed by: PHYSICAL MEDICINE & REHABILITATION

## 2021-02-09 PROCEDURE — 3075F PR MOST RECENT SYSTOLIC BLOOD PRESS GE 130-139MM HG: ICD-10-PCS | Mod: CPTII,S$GLB,, | Performed by: PHYSICAL MEDICINE & REHABILITATION

## 2021-02-09 PROCEDURE — 3078F DIAST BP <80 MM HG: CPT | Mod: CPTII,S$GLB,, | Performed by: PHYSICAL MEDICINE & REHABILITATION

## 2021-02-09 PROCEDURE — 3075F SYST BP GE 130 - 139MM HG: CPT | Mod: CPTII,S$GLB,, | Performed by: PHYSICAL MEDICINE & REHABILITATION

## 2021-02-09 PROCEDURE — 3008F BODY MASS INDEX DOCD: CPT | Mod: CPTII,S$GLB,, | Performed by: PHYSICAL MEDICINE & REHABILITATION

## 2021-02-09 PROCEDURE — 99999 PR PBB SHADOW E&M-EST. PATIENT-LVL II: CPT | Mod: PBBFAC,,, | Performed by: PHYSICAL MEDICINE & REHABILITATION

## 2021-02-09 PROCEDURE — 3078F PR MOST RECENT DIASTOLIC BLOOD PRESSURE < 80 MM HG: ICD-10-PCS | Mod: CPTII,S$GLB,, | Performed by: PHYSICAL MEDICINE & REHABILITATION

## 2021-02-09 PROCEDURE — 1125F AMNT PAIN NOTED PAIN PRSNT: CPT | Mod: S$GLB,,, | Performed by: PHYSICAL MEDICINE & REHABILITATION

## 2021-02-09 PROCEDURE — 3008F PR BODY MASS INDEX (BMI) DOCUMENTED: ICD-10-PCS | Mod: CPTII,S$GLB,, | Performed by: PHYSICAL MEDICINE & REHABILITATION

## 2021-02-09 PROCEDURE — 99999 PR PBB SHADOW E&M-EST. PATIENT-LVL II: ICD-10-PCS | Mod: PBBFAC,,, | Performed by: PHYSICAL MEDICINE & REHABILITATION

## 2021-02-09 PROCEDURE — 99214 OFFICE O/P EST MOD 30 MIN: CPT | Mod: S$GLB,,, | Performed by: PHYSICAL MEDICINE & REHABILITATION

## 2021-02-09 RX ORDER — OXYCODONE AND ACETAMINOPHEN 10; 325 MG/1; MG/1
1 TABLET ORAL
Qty: 150 TABLET | Refills: 0 | Status: SHIPPED | OUTPATIENT
Start: 2021-02-16 | End: 2021-03-18 | Stop reason: SDUPTHER

## 2021-02-09 RX ORDER — MELOXICAM 7.5 MG/1
7.5 TABLET ORAL 2 TIMES DAILY
Qty: 180 TABLET | Refills: 2 | Status: SHIPPED | OUTPATIENT
Start: 2021-02-09 | End: 2022-02-07 | Stop reason: SDUPTHER

## 2021-02-09 RX ORDER — CYCLOBENZAPRINE HCL 10 MG
10 TABLET ORAL 2 TIMES DAILY PRN
Qty: 60 TABLET | Refills: 1 | Status: SHIPPED | OUTPATIENT
Start: 2021-02-09 | End: 2021-06-20

## 2021-02-10 DIAGNOSIS — M17.0 PRIMARY OSTEOARTHRITIS OF BOTH KNEES: Primary | ICD-10-CM

## 2021-03-12 ENCOUNTER — TELEPHONE (OUTPATIENT)
Dept: ORTHOPEDICS | Facility: CLINIC | Age: 71
End: 2021-03-12

## 2021-03-12 DIAGNOSIS — M17.0 PRIMARY OSTEOARTHRITIS OF BOTH KNEES: Primary | ICD-10-CM

## 2021-03-15 ENCOUNTER — TELEPHONE (OUTPATIENT)
Dept: PHYSICAL MEDICINE AND REHAB | Facility: CLINIC | Age: 71
End: 2021-03-15

## 2021-03-18 ENCOUNTER — NURSE TRIAGE (OUTPATIENT)
Dept: ADMINISTRATIVE | Facility: CLINIC | Age: 71
End: 2021-03-18

## 2021-03-18 DIAGNOSIS — G89.29 CHRONIC NECK PAIN: ICD-10-CM

## 2021-03-18 DIAGNOSIS — M17.0 PRIMARY OSTEOARTHRITIS OF BOTH KNEES: ICD-10-CM

## 2021-03-18 DIAGNOSIS — M54.2 CHRONIC NECK PAIN: ICD-10-CM

## 2021-03-18 RX ORDER — OXYCODONE AND ACETAMINOPHEN 10; 325 MG/1; MG/1
1 TABLET ORAL
Qty: 150 TABLET | Refills: 0 | Status: SHIPPED | OUTPATIENT
Start: 2021-03-19 | End: 2021-04-19 | Stop reason: SDUPTHER

## 2021-03-19 ENCOUNTER — PATIENT OUTREACH (OUTPATIENT)
Dept: ADMINISTRATIVE | Facility: OTHER | Age: 71
End: 2021-03-19

## 2021-03-19 ENCOUNTER — TELEPHONE (OUTPATIENT)
Dept: PHYSICAL MEDICINE AND REHAB | Facility: CLINIC | Age: 71
End: 2021-03-19

## 2021-03-19 ENCOUNTER — TELEPHONE (OUTPATIENT)
Dept: FAMILY MEDICINE | Facility: CLINIC | Age: 71
End: 2021-03-19

## 2021-03-22 ENCOUNTER — OFFICE VISIT (OUTPATIENT)
Dept: ORTHOPEDICS | Facility: CLINIC | Age: 71
End: 2021-03-22
Payer: COMMERCIAL

## 2021-03-22 VITALS — WEIGHT: 262 LBS | BODY MASS INDEX: 48.21 KG/M2 | HEIGHT: 62 IN

## 2021-03-22 DIAGNOSIS — M17.0 PRIMARY OSTEOARTHRITIS OF BOTH KNEES: Primary | ICD-10-CM

## 2021-03-22 PROCEDURE — 99999 PR PBB SHADOW E&M-EST. PATIENT-LVL III: CPT | Mod: PBBFAC,,, | Performed by: PHYSICIAN ASSISTANT

## 2021-03-22 PROCEDURE — 3008F BODY MASS INDEX DOCD: CPT | Mod: CPTII,S$GLB,, | Performed by: PHYSICIAN ASSISTANT

## 2021-03-22 PROCEDURE — 3008F PR BODY MASS INDEX (BMI) DOCUMENTED: ICD-10-PCS | Mod: CPTII,S$GLB,, | Performed by: PHYSICIAN ASSISTANT

## 2021-03-22 PROCEDURE — 20610 PR DRAIN/INJECT LARGE JOINT/BURSA: ICD-10-PCS | Mod: 50,S$GLB,, | Performed by: PHYSICIAN ASSISTANT

## 2021-03-22 PROCEDURE — 99499 NO LOS: ICD-10-PCS | Mod: S$GLB,,, | Performed by: PHYSICIAN ASSISTANT

## 2021-03-22 PROCEDURE — 1125F AMNT PAIN NOTED PAIN PRSNT: CPT | Mod: S$GLB,,, | Performed by: PHYSICIAN ASSISTANT

## 2021-03-22 PROCEDURE — 3288F FALL RISK ASSESSMENT DOCD: CPT | Mod: CPTII,S$GLB,, | Performed by: PHYSICIAN ASSISTANT

## 2021-03-22 PROCEDURE — 1101F PR PT FALLS ASSESS DOC 0-1 FALLS W/OUT INJ PAST YR: ICD-10-PCS | Mod: CPTII,S$GLB,, | Performed by: PHYSICIAN ASSISTANT

## 2021-03-22 PROCEDURE — 1101F PT FALLS ASSESS-DOCD LE1/YR: CPT | Mod: CPTII,S$GLB,, | Performed by: PHYSICIAN ASSISTANT

## 2021-03-22 PROCEDURE — 99499 UNLISTED E&M SERVICE: CPT | Mod: S$GLB,,, | Performed by: PHYSICIAN ASSISTANT

## 2021-03-22 PROCEDURE — 99999 PR PBB SHADOW E&M-EST. PATIENT-LVL III: ICD-10-PCS | Mod: PBBFAC,,, | Performed by: PHYSICIAN ASSISTANT

## 2021-03-22 PROCEDURE — 1125F PR PAIN SEVERITY QUANTIFIED, PAIN PRESENT: ICD-10-PCS | Mod: S$GLB,,, | Performed by: PHYSICIAN ASSISTANT

## 2021-03-22 PROCEDURE — 3288F PR FALLS RISK ASSESSMENT DOCUMENTED: ICD-10-PCS | Mod: CPTII,S$GLB,, | Performed by: PHYSICIAN ASSISTANT

## 2021-03-22 PROCEDURE — 20610 DRAIN/INJ JOINT/BURSA W/O US: CPT | Mod: 50,S$GLB,, | Performed by: PHYSICIAN ASSISTANT

## 2021-03-29 ENCOUNTER — OFFICE VISIT (OUTPATIENT)
Dept: ORTHOPEDICS | Facility: CLINIC | Age: 71
End: 2021-03-29
Payer: COMMERCIAL

## 2021-03-29 VITALS — HEIGHT: 62 IN | BODY MASS INDEX: 48.21 KG/M2 | WEIGHT: 262 LBS

## 2021-03-29 DIAGNOSIS — M17.0 PRIMARY OSTEOARTHRITIS OF BOTH KNEES: Primary | ICD-10-CM

## 2021-03-29 PROCEDURE — 3288F PR FALLS RISK ASSESSMENT DOCUMENTED: ICD-10-PCS | Mod: CPTII,S$GLB,, | Performed by: PHYSICIAN ASSISTANT

## 2021-03-29 PROCEDURE — 1101F PR PT FALLS ASSESS DOC 0-1 FALLS W/OUT INJ PAST YR: ICD-10-PCS | Mod: CPTII,S$GLB,, | Performed by: PHYSICIAN ASSISTANT

## 2021-03-29 PROCEDURE — 99999 PR PBB SHADOW E&M-EST. PATIENT-LVL III: ICD-10-PCS | Mod: PBBFAC,,, | Performed by: PHYSICIAN ASSISTANT

## 2021-03-29 PROCEDURE — 99999 PR PBB SHADOW E&M-EST. PATIENT-LVL III: CPT | Mod: PBBFAC,,, | Performed by: PHYSICIAN ASSISTANT

## 2021-03-29 PROCEDURE — 3008F PR BODY MASS INDEX (BMI) DOCUMENTED: ICD-10-PCS | Mod: CPTII,S$GLB,, | Performed by: PHYSICIAN ASSISTANT

## 2021-03-29 PROCEDURE — 99499 UNLISTED E&M SERVICE: CPT | Mod: S$GLB,,, | Performed by: PHYSICIAN ASSISTANT

## 2021-03-29 PROCEDURE — 3288F FALL RISK ASSESSMENT DOCD: CPT | Mod: CPTII,S$GLB,, | Performed by: PHYSICIAN ASSISTANT

## 2021-03-29 PROCEDURE — 1125F PR PAIN SEVERITY QUANTIFIED, PAIN PRESENT: ICD-10-PCS | Mod: S$GLB,,, | Performed by: PHYSICIAN ASSISTANT

## 2021-03-29 PROCEDURE — 20610 PR DRAIN/INJECT LARGE JOINT/BURSA: ICD-10-PCS | Mod: 50,S$GLB,, | Performed by: PHYSICIAN ASSISTANT

## 2021-03-29 PROCEDURE — 1125F AMNT PAIN NOTED PAIN PRSNT: CPT | Mod: S$GLB,,, | Performed by: PHYSICIAN ASSISTANT

## 2021-03-29 PROCEDURE — 1101F PT FALLS ASSESS-DOCD LE1/YR: CPT | Mod: CPTII,S$GLB,, | Performed by: PHYSICIAN ASSISTANT

## 2021-03-29 PROCEDURE — 3008F BODY MASS INDEX DOCD: CPT | Mod: CPTII,S$GLB,, | Performed by: PHYSICIAN ASSISTANT

## 2021-03-29 PROCEDURE — 99499 NO LOS: ICD-10-PCS | Mod: S$GLB,,, | Performed by: PHYSICIAN ASSISTANT

## 2021-03-29 PROCEDURE — 20610 DRAIN/INJ JOINT/BURSA W/O US: CPT | Mod: 50,S$GLB,, | Performed by: PHYSICIAN ASSISTANT

## 2021-04-08 ENCOUNTER — OFFICE VISIT (OUTPATIENT)
Dept: ORTHOPEDICS | Facility: CLINIC | Age: 71
End: 2021-04-08
Payer: COMMERCIAL

## 2021-04-08 VITALS — BODY MASS INDEX: 48.21 KG/M2 | HEIGHT: 62 IN | WEIGHT: 262 LBS

## 2021-04-08 DIAGNOSIS — E78.5 HYPERLIPIDEMIA, UNSPECIFIED HYPERLIPIDEMIA TYPE: ICD-10-CM

## 2021-04-08 DIAGNOSIS — M17.0 PRIMARY OSTEOARTHRITIS OF BOTH KNEES: Primary | ICD-10-CM

## 2021-04-08 PROCEDURE — 3288F PR FALLS RISK ASSESSMENT DOCUMENTED: ICD-10-PCS | Mod: CPTII,S$GLB,, | Performed by: PHYSICIAN ASSISTANT

## 2021-04-08 PROCEDURE — 1101F PR PT FALLS ASSESS DOC 0-1 FALLS W/OUT INJ PAST YR: ICD-10-PCS | Mod: CPTII,S$GLB,, | Performed by: PHYSICIAN ASSISTANT

## 2021-04-08 PROCEDURE — 1125F AMNT PAIN NOTED PAIN PRSNT: CPT | Mod: S$GLB,,, | Performed by: PHYSICIAN ASSISTANT

## 2021-04-08 PROCEDURE — 99499 UNLISTED E&M SERVICE: CPT | Mod: S$GLB,,, | Performed by: PHYSICIAN ASSISTANT

## 2021-04-08 PROCEDURE — 99499 NO LOS: ICD-10-PCS | Mod: S$GLB,,, | Performed by: PHYSICIAN ASSISTANT

## 2021-04-08 PROCEDURE — 20610 PR DRAIN/INJECT LARGE JOINT/BURSA: ICD-10-PCS | Mod: 50,S$GLB,, | Performed by: PHYSICIAN ASSISTANT

## 2021-04-08 PROCEDURE — 99999 PR PBB SHADOW E&M-EST. PATIENT-LVL III: ICD-10-PCS | Mod: PBBFAC,,, | Performed by: PHYSICIAN ASSISTANT

## 2021-04-08 PROCEDURE — 3008F PR BODY MASS INDEX (BMI) DOCUMENTED: ICD-10-PCS | Mod: CPTII,S$GLB,, | Performed by: PHYSICIAN ASSISTANT

## 2021-04-08 PROCEDURE — 20610 DRAIN/INJ JOINT/BURSA W/O US: CPT | Mod: 50,S$GLB,, | Performed by: PHYSICIAN ASSISTANT

## 2021-04-08 PROCEDURE — 3008F BODY MASS INDEX DOCD: CPT | Mod: CPTII,S$GLB,, | Performed by: PHYSICIAN ASSISTANT

## 2021-04-08 PROCEDURE — 3288F FALL RISK ASSESSMENT DOCD: CPT | Mod: CPTII,S$GLB,, | Performed by: PHYSICIAN ASSISTANT

## 2021-04-08 PROCEDURE — 1125F PR PAIN SEVERITY QUANTIFIED, PAIN PRESENT: ICD-10-PCS | Mod: S$GLB,,, | Performed by: PHYSICIAN ASSISTANT

## 2021-04-08 PROCEDURE — 1101F PT FALLS ASSESS-DOCD LE1/YR: CPT | Mod: CPTII,S$GLB,, | Performed by: PHYSICIAN ASSISTANT

## 2021-04-08 PROCEDURE — 99999 PR PBB SHADOW E&M-EST. PATIENT-LVL III: CPT | Mod: PBBFAC,,, | Performed by: PHYSICIAN ASSISTANT

## 2021-04-09 RX ORDER — PRAVASTATIN SODIUM 20 MG/1
20 TABLET ORAL DAILY
Qty: 90 TABLET | Refills: 0 | Status: SHIPPED | OUTPATIENT
Start: 2021-04-09 | End: 2021-07-20

## 2021-04-19 DIAGNOSIS — G89.29 CHRONIC NECK PAIN: ICD-10-CM

## 2021-04-19 DIAGNOSIS — M54.2 CHRONIC NECK PAIN: ICD-10-CM

## 2021-04-19 DIAGNOSIS — M17.0 PRIMARY OSTEOARTHRITIS OF BOTH KNEES: ICD-10-CM

## 2021-04-20 RX ORDER — OXYCODONE AND ACETAMINOPHEN 10; 325 MG/1; MG/1
1 TABLET ORAL
Qty: 150 TABLET | Refills: 0 | Status: SHIPPED | OUTPATIENT
Start: 2021-04-20 | End: 2021-05-24 | Stop reason: SDUPTHER

## 2021-04-23 LAB — BCS RECOMMENDATION EXT: NORMAL

## 2021-05-04 ENCOUNTER — TELEPHONE (OUTPATIENT)
Dept: FAMILY MEDICINE | Facility: CLINIC | Age: 71
End: 2021-05-04

## 2021-05-24 DIAGNOSIS — M54.2 CHRONIC NECK PAIN: ICD-10-CM

## 2021-05-24 DIAGNOSIS — M17.0 PRIMARY OSTEOARTHRITIS OF BOTH KNEES: ICD-10-CM

## 2021-05-24 DIAGNOSIS — G89.29 CHRONIC NECK PAIN: ICD-10-CM

## 2021-05-24 RX ORDER — OXYCODONE AND ACETAMINOPHEN 10; 325 MG/1; MG/1
1 TABLET ORAL
Qty: 150 TABLET | Refills: 0 | Status: SHIPPED | OUTPATIENT
Start: 2021-05-24 | End: 2021-06-24 | Stop reason: SDUPTHER

## 2021-06-23 ENCOUNTER — TELEPHONE (OUTPATIENT)
Dept: PHYSICAL MEDICINE AND REHAB | Facility: CLINIC | Age: 71
End: 2021-06-23

## 2021-06-24 DIAGNOSIS — M17.0 PRIMARY OSTEOARTHRITIS OF BOTH KNEES: ICD-10-CM

## 2021-06-24 DIAGNOSIS — G89.29 CHRONIC NECK PAIN: ICD-10-CM

## 2021-06-24 DIAGNOSIS — M54.2 CHRONIC NECK PAIN: ICD-10-CM

## 2021-06-24 RX ORDER — OXYCODONE AND ACETAMINOPHEN 10; 325 MG/1; MG/1
1 TABLET ORAL
Qty: 150 TABLET | Refills: 0 | Status: SHIPPED | OUTPATIENT
Start: 2021-06-24 | End: 2021-07-15 | Stop reason: SDUPTHER

## 2021-07-12 ENCOUNTER — PATIENT OUTREACH (OUTPATIENT)
Dept: ADMINISTRATIVE | Facility: OTHER | Age: 71
End: 2021-07-12

## 2021-07-12 DIAGNOSIS — Z12.11 ENCOUNTER FOR FIT (FECAL IMMUNOCHEMICAL TEST) SCREENING: Primary | ICD-10-CM

## 2021-07-15 ENCOUNTER — OFFICE VISIT (OUTPATIENT)
Dept: PHYSICAL MEDICINE AND REHAB | Facility: CLINIC | Age: 71
End: 2021-07-15
Payer: COMMERCIAL

## 2021-07-15 VITALS
HEIGHT: 62 IN | SYSTOLIC BLOOD PRESSURE: 136 MMHG | DIASTOLIC BLOOD PRESSURE: 67 MMHG | WEIGHT: 262 LBS | BODY MASS INDEX: 48.21 KG/M2 | HEART RATE: 87 BPM

## 2021-07-15 DIAGNOSIS — M54.12 CERVICAL RADICULOPATHY: ICD-10-CM

## 2021-07-15 DIAGNOSIS — M54.41 CHRONIC MIDLINE LOW BACK PAIN WITH BILATERAL SCIATICA: Primary | ICD-10-CM

## 2021-07-15 DIAGNOSIS — M54.42 CHRONIC MIDLINE LOW BACK PAIN WITH BILATERAL SCIATICA: Primary | ICD-10-CM

## 2021-07-15 DIAGNOSIS — G89.29 CHRONIC MIDLINE LOW BACK PAIN WITH BILATERAL SCIATICA: Primary | ICD-10-CM

## 2021-07-15 DIAGNOSIS — G89.29 CHRONIC NECK PAIN: ICD-10-CM

## 2021-07-15 DIAGNOSIS — M48.061 SPINAL STENOSIS OF LUMBAR REGION, UNSPECIFIED WHETHER NEUROGENIC CLAUDICATION PRESENT: ICD-10-CM

## 2021-07-15 DIAGNOSIS — M17.0 PRIMARY OSTEOARTHRITIS OF BOTH KNEES: ICD-10-CM

## 2021-07-15 DIAGNOSIS — M47.22 OSTEOARTHRITIS OF SPINE WITH RADICULOPATHY, CERVICAL REGION: ICD-10-CM

## 2021-07-15 DIAGNOSIS — M47.26 OSTEOARTHRITIS OF SPINE WITH RADICULOPATHY, LUMBAR REGION: ICD-10-CM

## 2021-07-15 DIAGNOSIS — Z79.891 CHRONICALLY ON OPIATE THERAPY: ICD-10-CM

## 2021-07-15 DIAGNOSIS — E66.01 MORBID OBESITY, UNSPECIFIED OBESITY TYPE: ICD-10-CM

## 2021-07-15 DIAGNOSIS — M54.2 CHRONIC NECK PAIN: ICD-10-CM

## 2021-07-15 PROCEDURE — 99214 PR OFFICE/OUTPT VISIT, EST, LEVL IV, 30-39 MIN: ICD-10-PCS | Mod: S$GLB,,, | Performed by: PHYSICAL MEDICINE & REHABILITATION

## 2021-07-15 PROCEDURE — 3008F PR BODY MASS INDEX (BMI) DOCUMENTED: ICD-10-PCS | Mod: CPTII,S$GLB,, | Performed by: PHYSICAL MEDICINE & REHABILITATION

## 2021-07-15 PROCEDURE — 1159F MED LIST DOCD IN RCRD: CPT | Mod: S$GLB,,, | Performed by: PHYSICAL MEDICINE & REHABILITATION

## 2021-07-15 PROCEDURE — 99214 OFFICE O/P EST MOD 30 MIN: CPT | Mod: S$GLB,,, | Performed by: PHYSICAL MEDICINE & REHABILITATION

## 2021-07-15 PROCEDURE — 99999 PR PBB SHADOW E&M-EST. PATIENT-LVL II: CPT | Mod: PBBFAC,,, | Performed by: PHYSICAL MEDICINE & REHABILITATION

## 2021-07-15 PROCEDURE — 1125F AMNT PAIN NOTED PAIN PRSNT: CPT | Mod: S$GLB,,, | Performed by: PHYSICAL MEDICINE & REHABILITATION

## 2021-07-15 PROCEDURE — 99999 PR PBB SHADOW E&M-EST. PATIENT-LVL II: ICD-10-PCS | Mod: PBBFAC,,, | Performed by: PHYSICAL MEDICINE & REHABILITATION

## 2021-07-15 PROCEDURE — 3008F BODY MASS INDEX DOCD: CPT | Mod: CPTII,S$GLB,, | Performed by: PHYSICAL MEDICINE & REHABILITATION

## 2021-07-15 PROCEDURE — 1159F PR MEDICATION LIST DOCUMENTED IN MEDICAL RECORD: ICD-10-PCS | Mod: S$GLB,,, | Performed by: PHYSICAL MEDICINE & REHABILITATION

## 2021-07-15 PROCEDURE — 1125F PR PAIN SEVERITY QUANTIFIED, PAIN PRESENT: ICD-10-PCS | Mod: S$GLB,,, | Performed by: PHYSICAL MEDICINE & REHABILITATION

## 2021-07-15 RX ORDER — OXYCODONE AND ACETAMINOPHEN 10; 325 MG/1; MG/1
1 TABLET ORAL
Qty: 150 TABLET | Refills: 0 | Status: SHIPPED | OUTPATIENT
Start: 2021-07-26 | End: 2021-08-26 | Stop reason: SDUPTHER

## 2021-07-20 DIAGNOSIS — K59.04 CHRONIC IDIOPATHIC CONSTIPATION: ICD-10-CM

## 2021-07-20 DIAGNOSIS — G25.81 RESTLESS LEG: ICD-10-CM

## 2021-07-20 DIAGNOSIS — F41.0 ANXIETY ATTACK: ICD-10-CM

## 2021-07-21 RX ORDER — CETIRIZINE HYDROCHLORIDE 10 MG/1
10 TABLET ORAL DAILY
Qty: 30 TABLET | Refills: 5 | Status: SHIPPED | OUTPATIENT
Start: 2021-07-21 | End: 2021-10-05 | Stop reason: SDUPTHER

## 2021-07-21 RX ORDER — CYCLOBENZAPRINE HCL 10 MG
TABLET ORAL
Qty: 60 TABLET | Refills: 1 | Status: SHIPPED | OUTPATIENT
Start: 2021-07-21 | End: 2022-02-07 | Stop reason: SDUPTHER

## 2021-07-21 RX ORDER — BUSPIRONE HYDROCHLORIDE 15 MG/1
TABLET ORAL
Qty: 90 TABLET | Refills: 0 | Status: SHIPPED | OUTPATIENT
Start: 2021-07-21 | End: 2021-08-17

## 2021-07-21 RX ORDER — ROPINIROLE 1 MG/1
1 TABLET, FILM COATED ORAL 2 TIMES DAILY
Qty: 60 TABLET | Refills: 5 | Status: SHIPPED | OUTPATIENT
Start: 2021-07-21 | End: 2021-10-05 | Stop reason: SDUPTHER

## 2021-07-21 RX ORDER — SERTRALINE HYDROCHLORIDE 50 MG/1
50 TABLET, FILM COATED ORAL DAILY
Qty: 30 TABLET | Refills: 11 | Status: SHIPPED | OUTPATIENT
Start: 2021-07-21 | End: 2022-07-21

## 2021-08-04 ENCOUNTER — TELEPHONE (OUTPATIENT)
Dept: FAMILY MEDICINE | Facility: CLINIC | Age: 71
End: 2021-08-04

## 2021-08-06 DIAGNOSIS — K59.04 CHRONIC IDIOPATHIC CONSTIPATION: ICD-10-CM

## 2021-08-26 DIAGNOSIS — M54.2 CHRONIC NECK PAIN: ICD-10-CM

## 2021-08-26 DIAGNOSIS — G89.29 CHRONIC NECK PAIN: ICD-10-CM

## 2021-08-26 DIAGNOSIS — M17.0 PRIMARY OSTEOARTHRITIS OF BOTH KNEES: ICD-10-CM

## 2021-08-26 RX ORDER — OXYCODONE AND ACETAMINOPHEN 10; 325 MG/1; MG/1
1 TABLET ORAL
Qty: 150 TABLET | Refills: 0 | Status: SHIPPED | OUTPATIENT
Start: 2021-08-27 | End: 2021-09-26

## 2021-09-04 ENCOUNTER — HOSPITAL ENCOUNTER (EMERGENCY)
Facility: HOSPITAL | Age: 71
Discharge: HOME | End: 2021-09-04
Attending: EMERGENCY MEDICINE
Payer: COMMERCIAL

## 2021-09-04 VITALS
BODY MASS INDEX: 47.66 KG/M2 | OXYGEN SATURATION: 97 % | WEIGHT: 259 LBS | HEART RATE: 81 BPM | DIASTOLIC BLOOD PRESSURE: 85 MMHG | TEMPERATURE: 97 F | RESPIRATION RATE: 20 BRPM | SYSTOLIC BLOOD PRESSURE: 148 MMHG | HEIGHT: 62 IN

## 2021-09-04 DIAGNOSIS — G25.81 RESTLESS LEG SYNDROME: Primary | ICD-10-CM

## 2021-09-04 LAB
ALBUMIN SERPL BCP-MCNC: 4.5 G/DL (ref 3.5–5.2)
ALP SERPL-CCNC: 84 U/L (ref 55–135)
ALT SERPL W/O P-5'-P-CCNC: 16 U/L (ref 10–44)
ANION GAP SERPL CALC-SCNC: 12 MMOL/L (ref 8–16)
AST SERPL-CCNC: 21 U/L (ref 10–40)
BASOPHILS # BLD AUTO: 0.04 K/UL (ref 0–0.2)
BASOPHILS NFR BLD: 0.4 % (ref 0–1.9)
BILIRUB SERPL-MCNC: 1.6 MG/DL (ref 0.1–1)
BUN SERPL-MCNC: 20 MG/DL (ref 8–23)
CALCIUM SERPL-MCNC: 10 MG/DL (ref 8.7–10.5)
CHLORIDE SERPL-SCNC: 101 MMOL/L (ref 95–110)
CO2 SERPL-SCNC: 26 MMOL/L (ref 23–29)
CREAT SERPL-MCNC: 1.2 MG/DL (ref 0.5–1.4)
DIFFERENTIAL METHOD: ABNORMAL
EOSINOPHIL # BLD AUTO: 0.3 K/UL (ref 0–0.5)
EOSINOPHIL NFR BLD: 3.4 % (ref 0–8)
ERYTHROCYTE [DISTWIDTH] IN BLOOD BY AUTOMATED COUNT: 13.2 % (ref 11.5–14.5)
EST. GFR  (AFRICAN AMERICAN): 54 ML/MIN/1.73 M^2
EST. GFR  (NON AFRICAN AMERICAN): 47 ML/MIN/1.73 M^2
GLUCOSE SERPL-MCNC: 105 MG/DL (ref 70–110)
HCT VFR BLD AUTO: 43.6 % (ref 37–48.5)
HGB BLD-MCNC: 14.3 G/DL (ref 12–16)
IMM GRANULOCYTES # BLD AUTO: 0.04 K/UL (ref 0–0.04)
IMM GRANULOCYTES NFR BLD AUTO: 0.4 % (ref 0–0.5)
LIPASE SERPL-CCNC: 79 U/L (ref 4–60)
LYMPHOCYTES # BLD AUTO: 1.4 K/UL (ref 1–4.8)
LYMPHOCYTES NFR BLD: 14.3 % (ref 18–48)
MCH RBC QN AUTO: 33.1 PG (ref 27–31)
MCHC RBC AUTO-ENTMCNC: 32.8 G/DL (ref 32–36)
MCV RBC AUTO: 101 FL (ref 82–98)
MONOCYTES # BLD AUTO: 0.5 K/UL (ref 0.3–1)
MONOCYTES NFR BLD: 4.9 % (ref 4–15)
NEUTROPHILS # BLD AUTO: 7.4 K/UL (ref 1.8–7.7)
NEUTROPHILS NFR BLD: 76.6 % (ref 38–73)
NRBC BLD-RTO: 0 /100 WBC
PLATELET # BLD AUTO: 282 K/UL (ref 150–450)
PMV BLD AUTO: 10.2 FL (ref 9.2–12.9)
POTASSIUM SERPL-SCNC: 3.9 MMOL/L (ref 3.5–5.1)
PROT SERPL-MCNC: 7.9 G/DL (ref 6–8.4)
RBC # BLD AUTO: 4.32 M/UL (ref 4–5.4)
SODIUM SERPL-SCNC: 139 MMOL/L (ref 136–145)
WBC # BLD AUTO: 9.62 K/UL (ref 3.9–12.7)

## 2021-09-04 PROCEDURE — 85025 COMPLETE CBC W/AUTO DIFF WBC: CPT | Performed by: EMERGENCY MEDICINE

## 2021-09-04 PROCEDURE — 80053 COMPREHEN METABOLIC PANEL: CPT | Performed by: EMERGENCY MEDICINE

## 2021-09-04 PROCEDURE — 25000003 PHARM REV CODE 250: Performed by: EMERGENCY MEDICINE

## 2021-09-04 PROCEDURE — 99283 EMERGENCY DEPT VISIT LOW MDM: CPT

## 2021-09-04 PROCEDURE — 83690 ASSAY OF LIPASE: CPT | Performed by: EMERGENCY MEDICINE

## 2021-09-04 PROCEDURE — 36415 COLL VENOUS BLD VENIPUNCTURE: CPT | Performed by: EMERGENCY MEDICINE

## 2021-09-04 RX ORDER — OXYCODONE AND ACETAMINOPHEN 5; 325 MG/1; MG/1
1 TABLET ORAL EVERY 4 HOURS PRN
Qty: 6 TABLET | Refills: 0 | Status: SHIPPED | OUTPATIENT
Start: 2021-09-04 | End: 2021-10-01 | Stop reason: SDUPTHER

## 2021-09-04 RX ORDER — HYDROCODONE BITARTRATE AND ACETAMINOPHEN 5; 325 MG/1; MG/1
1 TABLET ORAL
Status: COMPLETED | OUTPATIENT
Start: 2021-09-04 | End: 2021-09-04

## 2021-09-04 RX ADMIN — HYDROCODONE BITARTRATE AND ACETAMINOPHEN 1 TABLET: 5; 325 TABLET ORAL at 06:09

## 2021-10-01 RX ORDER — OXYCODONE AND ACETAMINOPHEN 5; 325 MG/1; MG/1
1 TABLET ORAL
Qty: 150 TABLET | Refills: 0 | Status: SHIPPED | OUTPATIENT
Start: 2021-10-02 | End: 2021-10-26 | Stop reason: DRUGHIGH

## 2021-10-05 DIAGNOSIS — E78.5 HYPERLIPIDEMIA, UNSPECIFIED HYPERLIPIDEMIA TYPE: ICD-10-CM

## 2021-10-05 DIAGNOSIS — G25.81 RESTLESS LEG: ICD-10-CM

## 2021-10-06 RX ORDER — ROPINIROLE 1 MG/1
1 TABLET, FILM COATED ORAL 2 TIMES DAILY
Qty: 60 TABLET | Refills: 5 | Status: SHIPPED | OUTPATIENT
Start: 2021-10-06 | End: 2022-05-03

## 2021-10-06 RX ORDER — CETIRIZINE HYDROCHLORIDE 10 MG/1
10 TABLET ORAL DAILY
Qty: 30 TABLET | Refills: 5 | Status: SHIPPED | OUTPATIENT
Start: 2021-10-06 | End: 2021-11-01

## 2021-10-07 RX ORDER — PRAVASTATIN SODIUM 20 MG/1
20 TABLET ORAL DAILY
Qty: 90 TABLET | Refills: 0 | Status: SHIPPED | OUTPATIENT
Start: 2021-10-07 | End: 2022-01-15

## 2021-10-11 ENCOUNTER — TELEPHONE (OUTPATIENT)
Dept: ORTHOPEDICS | Facility: CLINIC | Age: 71
End: 2021-10-11

## 2021-10-13 ENCOUNTER — PATIENT OUTREACH (OUTPATIENT)
Dept: ADMINISTRATIVE | Facility: OTHER | Age: 71
End: 2021-10-13

## 2021-10-25 ENCOUNTER — TELEPHONE (OUTPATIENT)
Dept: PHYSICAL MEDICINE AND REHAB | Facility: CLINIC | Age: 71
End: 2021-10-25
Payer: COMMERCIAL

## 2021-10-26 ENCOUNTER — TELEPHONE (OUTPATIENT)
Dept: PHYSICAL MEDICINE AND REHAB | Facility: CLINIC | Age: 71
End: 2021-10-26
Payer: COMMERCIAL

## 2021-10-26 RX ORDER — OXYCODONE AND ACETAMINOPHEN 7.5; 325 MG/1; MG/1
1 TABLET ORAL
Qty: 150 TABLET | Refills: 0 | Status: SHIPPED | OUTPATIENT
Start: 2021-11-01 | End: 2021-10-27 | Stop reason: SDUPTHER

## 2021-10-27 ENCOUNTER — TELEPHONE (OUTPATIENT)
Dept: PHYSICAL MEDICINE AND REHAB | Facility: CLINIC | Age: 71
End: 2021-10-27
Payer: COMMERCIAL

## 2021-10-27 RX ORDER — OXYCODONE AND ACETAMINOPHEN 7.5; 325 MG/1; MG/1
1 TABLET ORAL
Qty: 150 TABLET | Refills: 0 | Status: SHIPPED | OUTPATIENT
Start: 2021-10-29 | End: 2021-11-24 | Stop reason: SDUPTHER

## 2021-11-23 ENCOUNTER — TELEPHONE (OUTPATIENT)
Dept: FAMILY MEDICINE | Facility: CLINIC | Age: 71
End: 2021-11-23
Payer: COMMERCIAL

## 2021-11-24 ENCOUNTER — TELEPHONE (OUTPATIENT)
Dept: FAMILY MEDICINE | Facility: CLINIC | Age: 71
End: 2021-11-24

## 2021-11-24 ENCOUNTER — OFFICE VISIT (OUTPATIENT)
Dept: FAMILY MEDICINE | Facility: CLINIC | Age: 71
End: 2021-11-24
Payer: COMMERCIAL

## 2021-11-24 DIAGNOSIS — R51.9 INTRACTABLE EPISODIC HEADACHE, UNSPECIFIED HEADACHE TYPE: Primary | ICD-10-CM

## 2021-11-24 DIAGNOSIS — F11.20 CONTINUOUS OPIOID DEPENDENCE: ICD-10-CM

## 2021-11-24 PROCEDURE — 99213 OFFICE O/P EST LOW 20 MIN: CPT | Mod: 95,,, | Performed by: NURSE PRACTITIONER

## 2021-11-24 PROCEDURE — 99213 PR OFFICE/OUTPT VISIT, EST, LEVL III, 20-29 MIN: ICD-10-PCS | Mod: 95,,, | Performed by: NURSE PRACTITIONER

## 2021-11-24 RX ORDER — TOPIRAMATE 50 MG/1
50 TABLET, FILM COATED ORAL EVERY 12 HOURS
Qty: 60 TABLET | Refills: 2 | Status: SHIPPED | OUTPATIENT
Start: 2021-11-24 | End: 2022-02-28

## 2021-11-25 RX ORDER — OXYCODONE AND ACETAMINOPHEN 7.5; 325 MG/1; MG/1
1 TABLET ORAL
Qty: 150 TABLET | Refills: 0 | Status: SHIPPED | OUTPATIENT
Start: 2021-11-28 | End: 2021-12-21 | Stop reason: SDUPTHER

## 2021-11-26 ENCOUNTER — TELEPHONE (OUTPATIENT)
Dept: PHYSICAL MEDICINE AND REHAB | Facility: CLINIC | Age: 71
End: 2021-11-26
Payer: COMMERCIAL

## 2021-12-21 ENCOUNTER — TELEPHONE (OUTPATIENT)
Dept: NEUROLOGY | Facility: CLINIC | Age: 71
End: 2021-12-21

## 2021-12-21 RX ORDER — OXYCODONE AND ACETAMINOPHEN 7.5; 325 MG/1; MG/1
1 TABLET ORAL
Qty: 150 TABLET | Refills: 0 | Status: SHIPPED | OUTPATIENT
Start: 2021-12-28 | End: 2022-01-27 | Stop reason: SDUPTHER

## 2021-12-21 NOTE — TELEPHONE ENCOUNTER
----- Message from Tre Vela sent at 12/21/2021  1:47 PM CST -----  Regarding: pain Rx needed      The Pt states that since her pain pills are due for 12/25/2021 and she states that since we are closed and with the holiday she would like for you to call them in before that date.    The Pt states that she can't wait until she can see Dr. Helms to be able to get more shots in her knees.    [id number]  [address] AT [facility] ... Pt would like this Pharmacy only please.    Phone: [phone]  Fax:  [phone]

## 2022-01-12 DIAGNOSIS — E78.5 HYPERLIPIDEMIA, UNSPECIFIED HYPERLIPIDEMIA TYPE: ICD-10-CM

## 2022-01-12 DIAGNOSIS — I11.9 BENIGN HYPERTENSIVE HEART DISEASE WITHOUT HEART FAILURE: ICD-10-CM

## 2022-01-12 NOTE — TELEPHONE ENCOUNTER
Care Due:                  Date            Visit Type   Department     Provider  --------------------------------------------------------------------------------                                             [facility]                                           MEDICINE/INTERN  Last Visit: 01-      None         AL MED         Ginna Nguyen  Next Visit: None Scheduled  None         None Found                                                            Last  Test          Frequency    Reason                     Performed    Due Date  --------------------------------------------------------------------------------    Office Visit  12 months..  citalopram, pravastatin,   01- 01-                             sertraline...............    Lipid Panel.  12 months..  pravastatin..............  Not Found    Overdue    Powered by [employer]. Reference number: [id number].   1/12/2022 1:00:22 PM CST

## 2022-01-15 ENCOUNTER — NURSE TRIAGE (OUTPATIENT)
Dept: ADMINISTRATIVE | Facility: CLINIC | Age: 72
End: 2022-01-15
Payer: COMMERCIAL

## 2022-01-15 DIAGNOSIS — E78.5 HYPERLIPIDEMIA, UNSPECIFIED HYPERLIPIDEMIA TYPE: ICD-10-CM

## 2022-01-15 DIAGNOSIS — I11.9 BENIGN HYPERTENSIVE HEART DISEASE WITHOUT HEART FAILURE: ICD-10-CM

## 2022-01-15 RX ORDER — AMLODIPINE BESYLATE 10 MG/1
10 TABLET ORAL DAILY
Qty: 90 TABLET | Refills: 0 | Status: SHIPPED | OUTPATIENT
Start: 2022-01-15 | End: 2023-01-11 | Stop reason: SDUPTHER

## 2022-01-15 RX ORDER — PRAVASTATIN SODIUM 20 MG/1
20 TABLET ORAL DAILY
Qty: 90 TABLET | Refills: 0 | Status: SHIPPED | OUTPATIENT
Start: 2022-01-15 | End: 2022-04-17

## 2022-01-15 NOTE — TELEPHONE ENCOUNTER
No new care gaps identified.  Powered by [employer]. Reference number: [id number].   1/15/2022 4:23:51 PM CST

## 2022-01-16 ENCOUNTER — NURSE TRIAGE (OUTPATIENT)
Dept: ADMINISTRATIVE | Facility: CLINIC | Age: 72
End: 2022-01-16
Payer: COMMERCIAL

## 2022-01-16 NOTE — TELEPHONE ENCOUNTER
Pt calling to confirm prescription was called in yesterday. Confirmed for pt. Verbalized understanding.     Reason for Disposition   [1] Prescription prescribed recently is not at pharmacy AND [2] triager has access to patient's EMR AND [3] prescription is recorded in the EMR    Protocols used: MEDICATION REFILL AND RENEWAL CALL-A-AH

## 2022-01-16 NOTE — TELEPHONE ENCOUNTER
Reason for Disposition   [1] Prescription prescribed recently is not at pharmacy AND [2] triager has access to patient's EMR AND [3] prescription is recorded in the EMR    Protocols used: MEDICATION REFILL AND RENEWAL CALL-A-AH

## 2022-01-18 RX ORDER — AMLODIPINE BESYLATE 10 MG/1
TABLET ORAL
Qty: 90 TABLET | Refills: 0 | OUTPATIENT
Start: 2022-01-18

## 2022-01-18 RX ORDER — PRAVASTATIN SODIUM 20 MG/1
TABLET ORAL
Qty: 90 TABLET | Refills: 0 | OUTPATIENT
Start: 2022-01-18

## 2022-01-18 NOTE — TELEPHONE ENCOUNTER
Quick DC. Request already responded to by other means (e.g. phone or fax)   Refill Authorization Note   Kuldeep Villela  is requesting a refill authorization.  Brief Assessment and Rationale for Refill:  Quick Discontinue  Medication Therapy Plan:  quick dc: duplicate     Medication Reconciliation Completed:  No      Comments:   Pended Medication(s)       Requested Prescriptions     Pending Prescriptions Disp Refills    pravastatin (PRAVACHOL) 20 MG tablet [Pharmacy Med Name: PRAVASTATIN 20MG TABLETS] 90 tablet 0     Sig: TAKE 1 TABLET(20 MG) BY MOUTH EVERY DAY    amLODIPine (NORVASC) 10 MG tablet [Pharmacy Med Name: AMLODIPINE BESYLATE 10MG TABLETS] 90 tablet 0     Sig: TAKE 1 TABLET(10 MG) BY MOUTH EVERY DAY        Duplicate Pended Encounter(s)/ Last Prescribed Details: (includes pharmacy & prescriber details)   amLODIPine (NORVASC) 10 MG tablet 90 tablet 0 1/15/2022  No   Sig - Route: Take 1 tablet (10 mg total) by mouth once daily. - Oral   Sent to pharmacy as: amLODIPine (NORVASC) 10 MG tablet   Class: Normal   Notes to Pharmacy: .   Order: [id number]   Date/Time Signed: 1/15/2022 21:34       E-Prescribing Status: Receipt confirmed by pharmacy (1/15/2022  9:34 PM CST)       Ordering Encounter Report    Associated Reports   View Encounter                Note composed:11:33 AM 01/18/2022

## 2022-01-21 RX ORDER — PRAVASTATIN SODIUM 20 MG/1
TABLET ORAL
Qty: 90 TABLET | Refills: 0 | Status: SHIPPED | OUTPATIENT
Start: 2022-01-21

## 2022-01-21 RX ORDER — AMLODIPINE BESYLATE 10 MG/1
TABLET ORAL
Qty: 90 TABLET | Refills: 0 | Status: SHIPPED | OUTPATIENT
Start: 2022-01-21 | End: 2022-07-08

## 2022-01-27 RX ORDER — OXYCODONE AND ACETAMINOPHEN 7.5; 325 MG/1; MG/1
1 TABLET ORAL
Qty: 150 TABLET | Refills: 0 | Status: SHIPPED | OUTPATIENT
Start: 2022-01-28 | End: 2022-02-07 | Stop reason: DRUGHIGH

## 2022-02-07 ENCOUNTER — LAB VISIT (OUTPATIENT)
Dept: LAB | Facility: HOSPITAL | Age: 72
End: 2022-02-07
Attending: PHYSICAL MEDICINE & REHABILITATION
Payer: COMMERCIAL

## 2022-02-07 ENCOUNTER — OFFICE VISIT (OUTPATIENT)
Dept: PHYSICAL MEDICINE AND REHAB | Facility: CLINIC | Age: 72
End: 2022-02-07
Payer: COMMERCIAL

## 2022-02-07 VITALS
SYSTOLIC BLOOD PRESSURE: 132 MMHG | BODY MASS INDEX: 47.37 KG/M2 | HEIGHT: 62 IN | HEART RATE: 90 BPM | DIASTOLIC BLOOD PRESSURE: 76 MMHG

## 2022-02-07 DIAGNOSIS — M17.0 PRIMARY OSTEOARTHRITIS OF BOTH KNEES: ICD-10-CM

## 2022-02-07 DIAGNOSIS — G89.29 CHRONIC MIDLINE LOW BACK PAIN WITH BILATERAL SCIATICA: Primary | ICD-10-CM

## 2022-02-07 DIAGNOSIS — M47.26 OSTEOARTHRITIS OF SPINE WITH RADICULOPATHY, LUMBAR REGION: ICD-10-CM

## 2022-02-07 DIAGNOSIS — G89.29 CHRONIC NECK PAIN: ICD-10-CM

## 2022-02-07 DIAGNOSIS — E66.01 MORBID OBESITY, UNSPECIFIED OBESITY TYPE: ICD-10-CM

## 2022-02-07 DIAGNOSIS — M54.42 CHRONIC MIDLINE LOW BACK PAIN WITH BILATERAL SCIATICA: Primary | ICD-10-CM

## 2022-02-07 DIAGNOSIS — M54.41 CHRONIC MIDLINE LOW BACK PAIN WITH BILATERAL SCIATICA: Primary | ICD-10-CM

## 2022-02-07 DIAGNOSIS — M47.22 OSTEOARTHRITIS OF SPINE WITH RADICULOPATHY, CERVICAL REGION: ICD-10-CM

## 2022-02-07 DIAGNOSIS — M48.061 SPINAL STENOSIS OF LUMBAR REGION, UNSPECIFIED WHETHER NEUROGENIC CLAUDICATION PRESENT: ICD-10-CM

## 2022-02-07 DIAGNOSIS — M54.12 CERVICAL RADICULOPATHY: ICD-10-CM

## 2022-02-07 DIAGNOSIS — Z79.891 CHRONICALLY ON OPIATE THERAPY: ICD-10-CM

## 2022-02-07 DIAGNOSIS — M54.2 CHRONIC NECK PAIN: ICD-10-CM

## 2022-02-07 PROCEDURE — 3078F DIAST BP <80 MM HG: CPT | Mod: CPTII,S$GLB,, | Performed by: PHYSICAL MEDICINE & REHABILITATION

## 2022-02-07 PROCEDURE — 3075F SYST BP GE 130 - 139MM HG: CPT | Mod: CPTII,S$GLB,, | Performed by: PHYSICAL MEDICINE & REHABILITATION

## 2022-02-07 PROCEDURE — 99215 OFFICE O/P EST HI 40 MIN: CPT | Mod: S$GLB,,, | Performed by: PHYSICAL MEDICINE & REHABILITATION

## 2022-02-07 PROCEDURE — 3075F PR MOST RECENT SYSTOLIC BLOOD PRESS GE 130-139MM HG: ICD-10-PCS | Mod: CPTII,S$GLB,, | Performed by: PHYSICAL MEDICINE & REHABILITATION

## 2022-02-07 PROCEDURE — 3078F PR MOST RECENT DIASTOLIC BLOOD PRESSURE < 80 MM HG: ICD-10-PCS | Mod: CPTII,S$GLB,, | Performed by: PHYSICAL MEDICINE & REHABILITATION

## 2022-02-07 PROCEDURE — 99215 PR OFFICE/OUTPT VISIT, EST, LEVL V, 40-54 MIN: ICD-10-PCS | Mod: S$GLB,,, | Performed by: PHYSICAL MEDICINE & REHABILITATION

## 2022-02-07 PROCEDURE — 80307 DRUG TEST PRSMV CHEM ANLYZR: CPT | Performed by: PHYSICAL MEDICINE & REHABILITATION

## 2022-02-07 PROCEDURE — 3008F BODY MASS INDEX DOCD: CPT | Mod: CPTII,S$GLB,, | Performed by: PHYSICAL MEDICINE & REHABILITATION

## 2022-02-07 PROCEDURE — 3008F PR BODY MASS INDEX (BMI) DOCUMENTED: ICD-10-PCS | Mod: CPTII,S$GLB,, | Performed by: PHYSICAL MEDICINE & REHABILITATION

## 2022-02-07 PROCEDURE — 99999 PR PBB SHADOW E&M-EST. PATIENT-LVL II: CPT | Mod: PBBFAC,,, | Performed by: PHYSICAL MEDICINE & REHABILITATION

## 2022-02-07 PROCEDURE — 99999 PR PBB SHADOW E&M-EST. PATIENT-LVL II: ICD-10-PCS | Mod: PBBFAC,,, | Performed by: PHYSICAL MEDICINE & REHABILITATION

## 2022-02-07 RX ORDER — CYCLOBENZAPRINE HCL 10 MG
TABLET ORAL
Qty: 60 TABLET | Refills: 1 | Status: SHIPPED | OUTPATIENT
Start: 2022-02-07 | End: 2022-06-21 | Stop reason: SDUPTHER

## 2022-02-07 RX ORDER — MELOXICAM 7.5 MG/1
7.5 TABLET ORAL 2 TIMES DAILY
Qty: 180 TABLET | Refills: 2 | Status: SHIPPED | OUTPATIENT
Start: 2022-02-07 | End: 2022-02-16 | Stop reason: SDUPTHER

## 2022-02-07 RX ORDER — OXYCODONE AND ACETAMINOPHEN 10; 325 MG/1; MG/1
1 TABLET ORAL EVERY 6 HOURS PRN
Qty: 120 TABLET | Refills: 0
Start: 2022-02-28 | End: 2022-02-24 | Stop reason: SDUPTHER
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
## 2022-02-10 LAB
6MAM UR QL: NOT DETECTED
7AMINOCLONAZEPAM UR QL: NOT DETECTED
A-OH ALPRAZ UR QL: NOT DETECTED
ALPHA-OH-MIDAZOLAM: NOT DETECTED
ALPRAZ UR QL: NOT DETECTED
AMPHET UR QL SCN: NOT DETECTED
ANNOTATION COMMENT IMP: NORMAL
ANNOTATION COMMENT IMP: NORMAL
BARBITURATES UR QL: NOT DETECTED
BUPRENORPHINE UR QL: NOT DETECTED
BZE UR QL: NOT DETECTED
CARBOXYTHC UR QL: NOT DETECTED
CARISOPRODOL UR QL: NOT DETECTED
CLONAZEPAM UR QL: NOT DETECTED
CODEINE UR QL: NOT DETECTED
CREAT UR-MCNC: 84.7 MG/DL (ref 20–400)
DIAZEPAM UR QL: NOT DETECTED
ETHYL GLUCURONIDE UR QL: NOT DETECTED
FENTANYL UR QL: NOT DETECTED
GABAPENTIN: NOT DETECTED
HYDROCODONE UR QL: NOT DETECTED
HYDROMORPHONE UR QL: NOT DETECTED
LORAZEPAM UR QL: NOT DETECTED
MDA UR QL: NOT DETECTED
MDEA UR QL: NOT DETECTED
MDMA UR QL: NOT DETECTED
ME-PHENIDATE UR QL: NOT DETECTED
METHADONE UR QL: NOT DETECTED
METHAMPHET UR QL: NOT DETECTED
MIDAZOLAM UR QL SCN: NOT DETECTED
MORPHINE UR QL: NOT DETECTED
NALOXONE: NOT DETECTED
NORBUPRENORPHINE UR QL CFM: NOT DETECTED
NORDIAZEPAM UR QL: NOT DETECTED
NORFENTANYL UR QL: NOT DETECTED
NORHYDROCODONE UR QL CFM: NOT DETECTED
NORMEPERIDINE UR QL CFM: NOT DETECTED
NOROXYCODONE UR QL CFM: PRESENT
NOROXYMORPHONE UR QL SCN: PRESENT
OXAZEPAM UR QL: NOT DETECTED
OXYCODONE UR QL: PRESENT
OXYMORPHONE UR QL: PRESENT
PATHOLOGY STUDY: NORMAL
PCP UR QL: NOT DETECTED
PHENTERMINE UR QL: NOT DETECTED
PREGABALIN: NOT DETECTED
SERVICE CMNT-IMP: NORMAL
TAPENTADOL UR QL SCN: NOT DETECTED
TAPENTADOL UR QL SCN: NOT DETECTED
TEMAZEPAM UR QL: NOT DETECTED
TRAMADOL UR QL: NOT DETECTED
ZOLPIDEM METABOLITE: NOT DETECTED
ZOLPIDEM UR QL: NOT DETECTED

## 2022-02-16 ENCOUNTER — TELEPHONE (OUTPATIENT)
Dept: ORTHOPEDICS | Facility: CLINIC | Age: 72
End: 2022-02-16
Payer: COMMERCIAL

## 2022-02-16 ENCOUNTER — TELEPHONE (OUTPATIENT)
Dept: PHYSICAL MEDICINE AND REHAB | Facility: CLINIC | Age: 72
End: 2022-02-16
Payer: COMMERCIAL

## 2022-02-16 DIAGNOSIS — G89.29 CHRONIC MIDLINE LOW BACK PAIN WITH BILATERAL SCIATICA: ICD-10-CM

## 2022-02-16 DIAGNOSIS — M17.0 PRIMARY OSTEOARTHRITIS OF BOTH KNEES: Primary | ICD-10-CM

## 2022-02-16 DIAGNOSIS — M54.42 CHRONIC MIDLINE LOW BACK PAIN WITH BILATERAL SCIATICA: ICD-10-CM

## 2022-02-16 DIAGNOSIS — M17.0 PRIMARY OSTEOARTHRITIS OF BOTH KNEES: ICD-10-CM

## 2022-02-16 DIAGNOSIS — G89.29 CHRONIC NECK PAIN: ICD-10-CM

## 2022-02-16 DIAGNOSIS — M54.2 CHRONIC NECK PAIN: ICD-10-CM

## 2022-02-16 DIAGNOSIS — M54.41 CHRONIC MIDLINE LOW BACK PAIN WITH BILATERAL SCIATICA: ICD-10-CM

## 2022-02-16 RX ORDER — MELOXICAM 7.5 MG/1
7.5 TABLET ORAL 2 TIMES DAILY
Qty: 180 TABLET | Refills: 2 | Status: SHIPPED | OUTPATIENT
Start: 2022-02-16 | End: 2022-04-26 | Stop reason: SDUPTHER

## 2022-02-16 NOTE — TELEPHONE ENCOUNTER
----- Message from Ishmael Garcia sent at 2/16/2022  4:40 PM CST -----  Regarding: RX Request  Contact: patient  Pt. Requesting a call back in regards to RX, meloxicam (MOBIC) 7.5 MG tablet. Pt. Stated RX was sent to the wrong pharmacy requesting it be sent to [facility] [address] [facility].            Pt. @[phone]

## 2022-02-16 NOTE — PROGRESS NOTES
Kuldeep Villela has primary osteoarthritis of bilateral knee. Radiographs reveal Kellgren- Leroy grade 2. Patient has had euflexxa injections in the past with excellent relief and would like to repeat. Will order and schedule 40 mg weekly for three weeks.

## 2022-02-16 NOTE — TELEPHONE ENCOUNTER
Spoke to patient, scheduled appointments as requested. Printed and mailed slips to patient.       ----- Message from Brinda Davila NP sent at 2/16/2022  3:34 PM CST -----  Contact: self @ [phone]  Orders are in. I'm happy to see her if we have space :-)  ----- Message -----  From: Opal Da Silva MA  Sent: 2/16/2022   3:26 PM CST  To: Brinda Davila NP    Patient previously had bilat Euflexxa. Funmi patient.   Please add orders... and should we schedule with you or another provider? Let me know and I can schedule   ----- Message -----  From: Ana Maria Elias  Sent: 2/16/2022   3:03 PM CST  To: Saida Tran Staff    Pt is calling to schedule another series of Euflexxa.  Her last injections started on 3-22-21.  Pls call with an appt.

## 2022-02-19 DIAGNOSIS — F41.0 GENERALIZED ANXIETY DISORDER WITH PANIC ATTACKS: ICD-10-CM

## 2022-02-19 DIAGNOSIS — F41.1 GENERALIZED ANXIETY DISORDER WITH PANIC ATTACKS: ICD-10-CM

## 2022-02-19 NOTE — TELEPHONE ENCOUNTER
Care Due:                  Date            Visit Type   Department     Provider  --------------------------------------------------------------------------------                                [facility] FAMILY                              PRIMARY      MEDICINE/INTERN  Last Visit: 01-      CARE ([facility])   AL MED         Ginna Nguyen  Next Visit: None Scheduled  None         None Found                                                            Last  Test          Frequency    Reason                     Performed    Due Date  --------------------------------------------------------------------------------    Lipid Panel.  12 months..  pravastatin..............  01- 01-    Powered by [employer]. Reference number: [id number].   2/19/2022 5:09:09 AM CST

## 2022-02-23 RX ORDER — CITALOPRAM 40 MG/1
TABLET, FILM COATED ORAL
Qty: 90 TABLET | Refills: 3 | Status: SHIPPED | OUTPATIENT
Start: 2022-02-23 | End: 2022-10-27 | Stop reason: SDUPTHER

## 2022-02-24 RX ORDER — OXYCODONE AND ACETAMINOPHEN 10; 325 MG/1; MG/1
1 TABLET ORAL EVERY 6 HOURS PRN
Qty: 120 TABLET | Refills: 0 | Status: SHIPPED | OUTPATIENT
Start: 2022-03-01 | End: 2022-03-28 | Stop reason: SDUPTHER

## 2022-02-24 NOTE — TELEPHONE ENCOUNTER
----- Message from Aroldo Puente sent at 2/24/2022 11:35 AM CST -----  Regarding: Prescription refill  oxyCODONE-acetaminophen (PERCOCET)  mg per tablet          [facility] #[id number] [address] [place] [address] EXPY AT [facility] (Ph: [phone])      Pt called for a refill on the above medication, sent to the above location.

## 2022-03-11 ENCOUNTER — TELEPHONE (OUTPATIENT)
Dept: ORTHOPEDICS | Facility: CLINIC | Age: 72
End: 2022-03-11
Payer: COMMERCIAL

## 2022-03-11 NOTE — TELEPHONE ENCOUNTER
Spoke to patient, she advised she needs to reschedule her appointments but will need to call back to reschedule. Cancelled as requested.        ----- Message from Karla Hardy MA sent at 3/11/2022 12:01 PM CST -----  Contact: pt    ----- Message -----  From: Dyan Mcpherson  Sent: 3/11/2022  11:44 AM CST  To: Scooter MOODY Staff    Pt requesting call back in regards rescheduling injection bassem today . Please call     Confirmed patient's contact info below:  Contact Name: Kuldeep Villela  Phone Number: [phone]

## 2022-03-28 ENCOUNTER — TELEPHONE (OUTPATIENT)
Dept: FAMILY MEDICINE | Facility: CLINIC | Age: 72
End: 2022-03-28
Payer: COMMERCIAL

## 2022-03-28 RX ORDER — OXYCODONE AND ACETAMINOPHEN 10; 325 MG/1; MG/1
1 TABLET ORAL EVERY 6 HOURS PRN
Qty: 120 TABLET | Refills: 0 | Status: SHIPPED | OUTPATIENT
Start: 2022-03-31 | End: 2022-04-26 | Stop reason: SDUPTHER

## 2022-03-28 NOTE — TELEPHONE ENCOUNTER
Last Rx refill-----03/01/22-03/31/22  Last office visit--02/07/22  Next office visit--06/09/22

## 2022-03-28 NOTE — TELEPHONE ENCOUNTER
----- Message from Amita Rodriguez sent at 3/28/2022 12:47 PM CDT -----  Who Called: ELENA CHANDLER    Symptoms (please be specific): Body soreness and eye leakage    How long has patient had these symptoms: 3 days    Pharmacy name and phone #: [id number] - [place] - [address] [facility] [address] AT [facility]    Best Call Back Number: [phone]

## 2022-03-28 NOTE — TELEPHONE ENCOUNTER
Last office visit 1/28/2021. Patient is scheduled with a audio visit due to body aches and eye drainage. Patient stated she really need help today. Seen NP Sarah 11/2021 please advise

## 2022-03-28 NOTE — TELEPHONE ENCOUNTER
----- Message from Ana Maria Michaelalley sent at 3/28/2022  8:19 AM CDT -----  Contact: self @ [phone]  Pt is req a refill for oxycodone 10/325.  Pt says she needs to  her refill today.  Pt says she has some sort of virus, not Covid and she is having muscle pain.        [id number] [address] [place] [facility]   Phone:  [phone]  Fax:  [phone]

## 2022-03-29 ENCOUNTER — TELEPHONE (OUTPATIENT)
Dept: PHYSICAL MEDICINE AND REHAB | Facility: CLINIC | Age: 72
End: 2022-03-29
Payer: COMMERCIAL

## 2022-03-29 DIAGNOSIS — F41.0 ANXIETY ATTACK: ICD-10-CM

## 2022-03-29 NOTE — TELEPHONE ENCOUNTER
Last Office Visit Info:   The patient's last visit with Ginna Musa MD was on 1/28/2021.    The patient's last visit in current department was on 11/24/2021.        Last CBC Results:   Lab Results   Component Value Date    WBC 9.62 09/04/2021    HGB 14.3 09/04/2021    HCT 43.6 09/04/2021     09/04/2021       Last CMP Results  Lab Results   Component Value Date     09/04/2021    K 3.9 09/04/2021     09/04/2021    CO2 26 09/04/2021    BUN 20 09/04/2021    CREATININE 1.2 09/04/2021    CALCIUM 10.0 09/04/2021    ALBUMIN 4.5 09/04/2021    AST 21 09/04/2021    ALT 16 09/04/2021       Last Lipids  Lab Results   Component Value Date    CHOL 143 01/28/2021    TRIG 89 01/28/2021    HDL 26 (L) 01/28/2021    LDLCALC 99.2 01/28/2021       Last A1C  Lab Results   Component Value Date    HGBA1C 5.5 01/28/2021       Last TSH  Lab Results   Component Value Date    TSH 0.733 05/29/2020             Current Med Refills  Medication List with Changes/Refills   Current Medications    ALBUTEROL (PROVENTIL/VENTOLIN HFA) 90 MCG/ACTUATION INHALER    INHALE 2 PUFFS BY MOUTH INTO THE LUNGS EVERY 6 HOURS AS NEEDED FOR WHEEZING OR SHORTNESS OF BREATH, RESCUE       Start Date: 7/20/2021 End Date: --    AMLODIPINE (NORVASC) 10 MG TABLET    TAKE 1 TABLET(10 MG) BY MOUTH EVERY DAY       Start Date: 1/21/2022 End Date: --    AMLODIPINE (NORVASC) 10 MG TABLET    Take 1 tablet (10 mg total) by mouth once daily.       Start Date: 1/15/2022 End Date: --    BISACODYL (DULCOLAX) 5 MG EC TABLET    Take 1 tablet (5 mg total) by mouth daily as needed for Constipation.       Start Date: 6/3/2016  End Date: --    BUSPIRONE (BUSPAR) 15 MG TABLET    TAKE 1 TABLET(15 MG) BY MOUTH THREE TIMES DAILY       Start Date: 8/17/2021 End Date: --    CETIRIZINE (ZYRTEC) 10 MG TABLET    TAKE 1 TABLET BY MOUTH EVERY DAY       Start Date: 11/1/2021 End Date: --    CITALOPRAM (CELEXA) 40 MG TABLET    TAKE 1 TABLET(40 MG) BY MOUTH EVERY DAY       Start  Date: 2/23/2022 End Date: --    CYCLOBENZAPRINE (FLEXERIL) 10 MG TABLET    TAKE 1 TABLET(10 MG) BY MOUTH TWICE DAILY AS NEEDED       Start Date: 2/7/2022  End Date: --    ESTROGENS,CONJUGATED,-METHYLTESTOSTERONE 0.625-1.25MG (ESTRATEST HS) 0.625-1.25 MG PER TABLET    Take 1 tablet by mouth.       Start Date: 4/30/2020 End Date: --    LINACLOTIDE (LINZESS) 290 MCG CAP CAPSULE    Take 1 capsule (290 mcg total) by mouth before breakfast.       Start Date: 8/12/2021 End Date: --    MELOXICAM (MOBIC) 7.5 MG TABLET    Take 1 tablet (7.5 mg total) by mouth 2 (two) times a day.       Start Date: 2/16/2022 End Date: 3/18/2022    OXYCODONE-ACETAMINOPHEN (PERCOCET)  MG PER TABLET    Take 1 tablet by mouth every 6 (six) hours as needed for Pain.       Start Date: 3/31/2022 End Date: 4/30/2022    PRAVASTATIN (PRAVACHOL) 20 MG TABLET    TAKE 1 TABLET(20 MG) BY MOUTH EVERY DAY       Start Date: 1/21/2022 End Date: --    PRAVASTATIN (PRAVACHOL) 20 MG TABLET    Take 1 tablet (20 mg total) by mouth once daily.       Start Date: 1/15/2022 End Date: --    ROPINIROLE (REQUIP) 1 MG TABLET    Take 1 tablet (1 mg total) by mouth 2 (two) times a day.       Start Date: 10/6/2021 End Date: --    SERTRALINE (ZOLOFT) 50 MG TABLET    Take 1 tablet (50 mg total) by mouth once daily.       Start Date: 7/21/2021 End Date: 7/21/2022    TOPIRAMATE (TOPAMAX) 50 MG TABLET    TAKE 1 TABLET(50 MG) BY MOUTH EVERY 12 HOURS       Start Date: 2/28/2022 End Date: --     .

## 2022-03-29 NOTE — TELEPHONE ENCOUNTER
----- Message from Анна Langston sent at 3/29/2022 12:42 PM CDT -----  Patient Call Back    Who Called: PT     What is the request in detail: pt calling to speak with someone regarding her medication. Pls call the pt regarding her concerns.    Can the clinic reply by [facility]?    Best Call Back Number: [phone]

## 2022-03-30 RX ORDER — BUSPIRONE HYDROCHLORIDE 15 MG/1
TABLET ORAL
Qty: 90 TABLET | Refills: 5 | Status: SHIPPED | OUTPATIENT
Start: 2022-03-30 | End: 2022-11-09

## 2022-04-04 ENCOUNTER — TELEPHONE (OUTPATIENT)
Dept: ORTHOPEDICS | Facility: CLINIC | Age: 72
End: 2022-04-04
Payer: COMMERCIAL

## 2022-04-04 ENCOUNTER — TELEPHONE (OUTPATIENT)
Dept: PHYSICAL MEDICINE AND REHAB | Facility: CLINIC | Age: 72
End: 2022-04-04
Payer: COMMERCIAL

## 2022-04-04 NOTE — TELEPHONE ENCOUNTER
----- Message from Tremontana Chevalier sent at 4/4/2022  9:18 AM CDT -----  Regarding: Rx  Contact: pt @ [phone]  Pt calling regarding refills on pain meds. Please call.

## 2022-04-04 NOTE — TELEPHONE ENCOUNTER
Returned call to patient, call right to VM. LM advising of return call.       ----- Message from Tre Vela sent at 4/4/2022 11:28 AM CDT -----  Regarding: missed call from  Opal      The Pt states that he missed your call and would appreciate it if you would let the phone ring about 7 times.  This is for the injections.     # [phone]

## 2022-04-04 NOTE — TELEPHONE ENCOUNTER
I was returning the patient call.   Patient did not leave any details on the reason for the call.  Mrs Villela can contact the office back.

## 2022-04-04 NOTE — TELEPHONE ENCOUNTER
Returned call to patient, LM advising of returned call.       ----- Message from Tremontana Chevalier sent at 4/4/2022  9:14 AM CDT -----  Regarding: Appt  Contact: pt @ [phone]  Pt need appointment for knee injections. Please call.

## 2022-04-04 NOTE — TELEPHONE ENCOUNTER
----- Message from Tre Vela sent at 4/4/2022 11:26 AM CDT -----  Regarding: todd jaramillo from [facility]      The Pt states that he missed a call from Yolanda      Ph # [phone]

## 2022-04-13 DIAGNOSIS — I11.9 BENIGN HYPERTENSIVE HEART DISEASE WITHOUT HEART FAILURE: ICD-10-CM

## 2022-04-13 NOTE — TELEPHONE ENCOUNTER
Refill Routing Note   Medication(s) are not appropriate for processing by [facility] for the following reason(s):      - Patient has been seen in the ED/Hospital since the last PCP visit    [facility] action(s):  Route          Medication reconciliation completed: No     Appointments  past 12m or future 3m with PCP    Date Provider   Last Visit   1/28/2021 Ginna Musa MD   Next Visit   Visit date not found Ginna Musa MD   ED visits in past 90 days: 0        Note composed:11:09 AM 04/13/2022

## 2022-04-13 NOTE — TELEPHONE ENCOUNTER
Care Due:                  Date            Visit Type   Department     Provider  --------------------------------------------------------------------------------                                [facility] FAMILY                              PRIMARY      MEDICINE/INTERN  Last Visit: 01-      CARE ([facility])   AL MED         Ginna Nguyen  Next Visit: None Scheduled  None         None Found                                                            Last  Test          Frequency    Reason                     Performed    Due Date  --------------------------------------------------------------------------------    Office Visit  12 months..  citalopram, pravastatin,   01- 01-                             sertraline...............    Powered by [facility] by [employer]. Reference number: [id number].   4/13/2022 11:09:13 AM CDT

## 2022-04-14 RX ORDER — AMLODIPINE BESYLATE 10 MG/1
TABLET ORAL
Qty: 90 TABLET | Refills: 0 | OUTPATIENT
Start: 2022-04-14

## 2022-04-15 DIAGNOSIS — E78.5 HYPERLIPIDEMIA, UNSPECIFIED HYPERLIPIDEMIA TYPE: ICD-10-CM

## 2022-04-15 NOTE — TELEPHONE ENCOUNTER
No new care gaps identified.  Powered by [employer]. Reference number: [id number].   4/15/2022 5:05:11 PM CDT

## 2022-04-15 NOTE — TELEPHONE ENCOUNTER
Refill Routing Note   Medication(s) are not appropriate for processing by [facility] for the following reason(s):      - Patient has not been seen in over 15 months by PCP  - Required laboratory values are outdated  - Required vitals are outdated    [facility] action(s):  Defer Medication-related problems identified:   Requires labs  Requires appointment        Medication reconciliation completed: No     Appointments  past 12m or future 3m with PCP    Date Provider   Last Visit   1/28/2021 Ginna Musa MD   Next Visit   Visit date not found Ginna Musa MD   ED visits in past 90 days: 0        Note composed:5:09 PM 04/15/2022

## 2022-04-17 RX ORDER — PRAVASTATIN SODIUM 20 MG/1
TABLET ORAL
Qty: 90 TABLET | Refills: 0 | Status: SHIPPED | OUTPATIENT
Start: 2022-04-17 | End: 2022-08-16

## 2022-04-19 ENCOUNTER — PATIENT OUTREACH (OUTPATIENT)
Dept: ADMINISTRATIVE | Facility: OTHER | Age: 72
End: 2022-04-19
Payer: COMMERCIAL

## 2022-04-20 NOTE — PROGRESS NOTES
Requested updates within Care Everywhere.  Patient's chart was reviewed for overdue SHIRA topics.  Health maintenance:updated  Immunizations:reconciled   Legacy:   Media:  Orders placed:  Tasked appts:  Labs Linked:  Upcoming appt:

## 2022-04-21 ENCOUNTER — OFFICE VISIT (OUTPATIENT)
Dept: ORTHOPEDICS | Facility: CLINIC | Age: 72
End: 2022-04-21
Payer: COMMERCIAL

## 2022-04-21 DIAGNOSIS — M17.0 PRIMARY OSTEOARTHRITIS OF BOTH KNEES: Primary | ICD-10-CM

## 2022-04-21 PROCEDURE — 99999 PR PBB SHADOW E&M-EST. PATIENT-LVL III: ICD-10-PCS | Mod: PBBFAC,,, | Performed by: PHYSICIAN ASSISTANT

## 2022-04-21 PROCEDURE — 20610 DRAIN/INJ JOINT/BURSA W/O US: CPT | Mod: 50,S$GLB,, | Performed by: PHYSICIAN ASSISTANT

## 2022-04-21 PROCEDURE — 99999 PR PBB SHADOW E&M-EST. PATIENT-LVL III: CPT | Mod: PBBFAC,,, | Performed by: PHYSICIAN ASSISTANT

## 2022-04-21 PROCEDURE — 1159F PR MEDICATION LIST DOCUMENTED IN MEDICAL RECORD: ICD-10-PCS | Mod: CPTII,S$GLB,, | Performed by: PHYSICIAN ASSISTANT

## 2022-04-21 PROCEDURE — 99499 UNLISTED E&M SERVICE: CPT | Mod: S$GLB,,, | Performed by: PHYSICIAN ASSISTANT

## 2022-04-21 PROCEDURE — 20610 PR DRAIN/INJECT LARGE JOINT/BURSA: ICD-10-PCS | Mod: 50,S$GLB,, | Performed by: PHYSICIAN ASSISTANT

## 2022-04-21 PROCEDURE — 1160F PR REVIEW ALL MEDS BY PRESCRIBER/CLIN PHARMACIST DOCUMENTED: ICD-10-PCS | Mod: CPTII,S$GLB,, | Performed by: PHYSICIAN ASSISTANT

## 2022-04-21 PROCEDURE — 1160F RVW MEDS BY RX/DR IN RCRD: CPT | Mod: CPTII,S$GLB,, | Performed by: PHYSICIAN ASSISTANT

## 2022-04-21 PROCEDURE — 1159F MED LIST DOCD IN RCRD: CPT | Mod: CPTII,S$GLB,, | Performed by: PHYSICIAN ASSISTANT

## 2022-04-21 PROCEDURE — 99499 NO LOS: ICD-10-PCS | Mod: S$GLB,,, | Performed by: PHYSICIAN ASSISTANT

## 2022-04-21 NOTE — PROGRESS NOTES
Subjective:      Patient ID: Kuldeep Villela is a 71 y.o. female.    Chief Complaint: No chief complaint on file.    HPI  Patient returns for the first of three Euflexxa injections both knees.    Review of Systems   Constitutional: Negative for chills, fever and night sweats.   Cardiovascular: Negative for chest pain.   Respiratory: Negative for cough and shortness of breath.    Hematologic/Lymphatic: Does not bruise/bleed easily.   Skin: Negative for color change.   Gastrointestinal: Negative for heartburn.   Genitourinary: Negative for dysuria.   Neurological: Negative for numbness and paresthesias.   Psychiatric/Behavioral: Negative for altered mental status.   Allergic/Immunologic: Negative for persistent infections.         Objective:            Ortho/SPM Exam  The bilateral knee is examined, there is no evidence of swelling or effusion.  There is no evidence of erythema. Skin, pulses, sensation are intact.            Assessment:       Encounter Diagnosis   Name Primary?    Primary osteoarthritis of both knees Yes          Plan:       PROCEDURE:  I have explained the risks, benefits, and alternatives of the procedure in detail.  The patient voices understanding and all questions have been answered.  The patient agrees to proceed as planned. So after I performed a sterile prep of the skin in the normal fashion the bilateral knee is injected using a 22 gauge needle from the anterolateral approach with 2cc of euflexxa solution. The patient is reminded that it can take 6 - 8 weeks to see all the affects of this treatment, they must complete all three injections to see all the affects and the treatment can not be repeated any earlier than six months.

## 2022-04-26 DIAGNOSIS — M54.42 CHRONIC MIDLINE LOW BACK PAIN WITH BILATERAL SCIATICA: ICD-10-CM

## 2022-04-26 DIAGNOSIS — M17.0 PRIMARY OSTEOARTHRITIS OF BOTH KNEES: ICD-10-CM

## 2022-04-26 DIAGNOSIS — M54.41 CHRONIC MIDLINE LOW BACK PAIN WITH BILATERAL SCIATICA: ICD-10-CM

## 2022-04-26 DIAGNOSIS — G89.29 CHRONIC NECK PAIN: ICD-10-CM

## 2022-04-26 DIAGNOSIS — G89.29 CHRONIC MIDLINE LOW BACK PAIN WITH BILATERAL SCIATICA: ICD-10-CM

## 2022-04-26 DIAGNOSIS — M54.2 CHRONIC NECK PAIN: ICD-10-CM

## 2022-04-26 NOTE — TELEPHONE ENCOUNTER
----- Message from Bib Rueda sent at 4/26/2022  3:51 PM CDT -----  Contact: @ [phone]  Patient requesting a return call about the dispense amount and release date on   ( oxyCODONE-acetaminophen (PERCOCET)  mg per tablet) & (meloxicam (MOBIC) 7.5 MG tablet ) pls return call ( patient would like to discuss the knee injection )

## 2022-04-26 NOTE — TELEPHONE ENCOUNTER
Last Rx refill-----03/28/22-Oxycodone                             02/16/22-Mobic  Last office visit--02/07/22  Next office visit--06/09/22

## 2022-04-27 RX ORDER — OXYCODONE AND ACETAMINOPHEN 10; 325 MG/1; MG/1
1 TABLET ORAL EVERY 6 HOURS PRN
Qty: 120 TABLET | Refills: 0 | Status: SHIPPED | OUTPATIENT
Start: 2022-04-27 | End: 2022-04-28 | Stop reason: SDUPTHER

## 2022-04-27 RX ORDER — MELOXICAM 7.5 MG/1
7.5 TABLET ORAL 2 TIMES DAILY
Qty: 180 TABLET | Refills: 2 | Status: SHIPPED | OUTPATIENT
Start: 2022-04-27 | End: 2022-04-29 | Stop reason: SDUPTHER

## 2022-04-28 ENCOUNTER — OFFICE VISIT (OUTPATIENT)
Dept: ORTHOPEDICS | Facility: CLINIC | Age: 72
End: 2022-04-28
Payer: COMMERCIAL

## 2022-04-28 ENCOUNTER — TELEPHONE (OUTPATIENT)
Dept: PHYSICAL MEDICINE AND REHAB | Facility: CLINIC | Age: 72
End: 2022-04-28
Payer: COMMERCIAL

## 2022-04-28 VITALS — HEIGHT: 62 IN | WEIGHT: 259.06 LBS | BODY MASS INDEX: 47.67 KG/M2

## 2022-04-28 DIAGNOSIS — M17.0 PRIMARY OSTEOARTHRITIS OF BOTH KNEES: Primary | ICD-10-CM

## 2022-04-28 PROCEDURE — 1125F PR PAIN SEVERITY QUANTIFIED, PAIN PRESENT: ICD-10-PCS | Mod: CPTII,S$GLB,, | Performed by: PHYSICIAN ASSISTANT

## 2022-04-28 PROCEDURE — 99499 UNLISTED E&M SERVICE: CPT | Mod: S$GLB,,, | Performed by: PHYSICIAN ASSISTANT

## 2022-04-28 PROCEDURE — 99999 PR PBB SHADOW E&M-EST. PATIENT-LVL III: CPT | Mod: PBBFAC,,, | Performed by: PHYSICIAN ASSISTANT

## 2022-04-28 PROCEDURE — 3008F PR BODY MASS INDEX (BMI) DOCUMENTED: ICD-10-PCS | Mod: CPTII,S$GLB,, | Performed by: PHYSICIAN ASSISTANT

## 2022-04-28 PROCEDURE — 1160F RVW MEDS BY RX/DR IN RCRD: CPT | Mod: CPTII,S$GLB,, | Performed by: PHYSICIAN ASSISTANT

## 2022-04-28 PROCEDURE — 99999 PR PBB SHADOW E&M-EST. PATIENT-LVL III: ICD-10-PCS | Mod: PBBFAC,,, | Performed by: PHYSICIAN ASSISTANT

## 2022-04-28 PROCEDURE — 20610 PR DRAIN/INJECT LARGE JOINT/BURSA: ICD-10-PCS | Mod: 50,S$GLB,, | Performed by: PHYSICIAN ASSISTANT

## 2022-04-28 PROCEDURE — 20610 DRAIN/INJ JOINT/BURSA W/O US: CPT | Mod: 50,S$GLB,, | Performed by: PHYSICIAN ASSISTANT

## 2022-04-28 PROCEDURE — 3008F BODY MASS INDEX DOCD: CPT | Mod: CPTII,S$GLB,, | Performed by: PHYSICIAN ASSISTANT

## 2022-04-28 PROCEDURE — 1125F AMNT PAIN NOTED PAIN PRSNT: CPT | Mod: CPTII,S$GLB,, | Performed by: PHYSICIAN ASSISTANT

## 2022-04-28 PROCEDURE — 1159F MED LIST DOCD IN RCRD: CPT | Mod: CPTII,S$GLB,, | Performed by: PHYSICIAN ASSISTANT

## 2022-04-28 PROCEDURE — 1159F PR MEDICATION LIST DOCUMENTED IN MEDICAL RECORD: ICD-10-PCS | Mod: CPTII,S$GLB,, | Performed by: PHYSICIAN ASSISTANT

## 2022-04-28 PROCEDURE — 1160F PR REVIEW ALL MEDS BY PRESCRIBER/CLIN PHARMACIST DOCUMENTED: ICD-10-PCS | Mod: CPTII,S$GLB,, | Performed by: PHYSICIAN ASSISTANT

## 2022-04-28 PROCEDURE — 99499 NO LOS: ICD-10-PCS | Mod: S$GLB,,, | Performed by: PHYSICIAN ASSISTANT

## 2022-04-28 RX ORDER — OXYCODONE AND ACETAMINOPHEN 10; 325 MG/1; MG/1
1 TABLET ORAL
Qty: 150 TABLET | Refills: 0 | Status: SHIPPED | OUTPATIENT
Start: 2022-04-28 | End: 2022-05-03 | Stop reason: SDUPTHER

## 2022-04-28 NOTE — TELEPHONE ENCOUNTER
Patient came to the lobby today upset because her Rx refill for Oxycodone was dispensed wrong.  Patient did not received her full amount of  120 tablets.    Mrs Villela also would like to discuss her refill date.  Patient feels that her  medication each month is short a day or two and would like to know why.  I was able to discuss with her that every month does not have 31 days.  Patient said she knows but the doctor keeps sending in her Rx wrong each month.    Please resend Rx refill next time to the [facility] on  [address]

## 2022-04-28 NOTE — TELEPHONE ENCOUNTER
----- Message from Paige Casas sent at 4/28/2022  2:40 PM CDT -----  Type:  Patient Call Back    Who Called: gurinder     What is the reqeust in detail: Pt is requesting a call back from Zeb she didn't say what it was regarding. Please Advise     Can the clinic reply by [facility]?    Best Call Back Number: [phone]

## 2022-04-28 NOTE — TELEPHONE ENCOUNTER
I called the patient.  She was complaining about getting 120 tablets of oxycodone/APAP per month and about the refill date changing.  I told her her prescription was changed from 5 tablets daily to 4 tablets daily based on her last visit when she said she takes it 4 times.  She said she is taking it 5 times per day.  I will changed back to 5 times per day and I will send a new prescription to her [facility]'s pharmacy (on [address]).

## 2022-04-28 NOTE — PROGRESS NOTES
Subjective:      Patient ID: Kuldeep Villela is a 71 y.o. female.    Chief Complaint: Pain and Injections of the Left Knee and Pain and Injections of the Right Knee    HPI  Patient returns for the second of three. The patient tolerated the last injection well. The patient reports the Euflexxa injection in the bilateral knee(s) has brought about no improvement..    Review of Systems   Constitutional: Negative for chills, fever and night sweats.   Cardiovascular: Negative for chest pain.   Respiratory: Negative for cough and shortness of breath.    Hematologic/Lymphatic: Does not bruise/bleed easily.   Skin: Negative for color change.   Gastrointestinal: Negative for heartburn.   Genitourinary: Negative for dysuria.   Neurological: Negative for numbness and paresthesias.   Psychiatric/Behavioral: Negative for altered mental status.   Allergic/Immunologic: Negative for persistent infections.         Objective:            Ortho/SPM Exam  The bilateral knee is examined, there is no evidence of swelling or effusion.  There is no evidence of erythema. Skin, pulses, sensation are intact.            Assessment:       Encounter Diagnosis   Name Primary?    Primary osteoarthritis of both knees Yes          Plan:       PROCEDURE:  I have explained the risks, benefits, and alternatives of the procedure in detail.  The patient voices understanding and all questions have been answered.  The patient agrees to proceed as planned. So after I performed a sterile prep of the skin in the normal fashion the bilateral knee is injected using a 22 gauge needle from the anteromedial approach with 2cc of euflexxa solution. The patient is reminded that it can take 6 - 8 weeks to see all the affects of this treatment, they must complete all three injections to see all the affects and the treatment can not be repeated any earlier than six months.

## 2022-04-29 DIAGNOSIS — M54.2 CHRONIC NECK PAIN: ICD-10-CM

## 2022-04-29 DIAGNOSIS — M54.42 CHRONIC MIDLINE LOW BACK PAIN WITH BILATERAL SCIATICA: ICD-10-CM

## 2022-04-29 DIAGNOSIS — M54.41 CHRONIC MIDLINE LOW BACK PAIN WITH BILATERAL SCIATICA: ICD-10-CM

## 2022-04-29 DIAGNOSIS — G89.29 CHRONIC NECK PAIN: ICD-10-CM

## 2022-04-29 DIAGNOSIS — M17.0 PRIMARY OSTEOARTHRITIS OF BOTH KNEES: ICD-10-CM

## 2022-04-29 DIAGNOSIS — G89.29 CHRONIC MIDLINE LOW BACK PAIN WITH BILATERAL SCIATICA: ICD-10-CM

## 2022-04-29 RX ORDER — MELOXICAM 7.5 MG/1
7.5 TABLET ORAL 2 TIMES DAILY
Qty: 180 TABLET | Refills: 1 | Status: SHIPPED | OUTPATIENT
Start: 2022-04-29 | End: 2023-03-10 | Stop reason: SDUPTHER

## 2022-05-01 DIAGNOSIS — G25.81 RESTLESS LEG: ICD-10-CM

## 2022-05-02 ENCOUNTER — TELEPHONE (OUTPATIENT)
Dept: PHYSICAL MEDICINE AND REHAB | Facility: CLINIC | Age: 72
End: 2022-05-02
Payer: COMMERCIAL

## 2022-05-02 NOTE — TELEPHONE ENCOUNTER
----- Message from Tremontana Chevalier sent at 5/2/2022  9:25 AM CDT -----  Regarding: Refill  Contact: pt @ [phone]  Pt calling to say that the Rx for oxyCODONE-acetaminophen (PERCOCET)  mg per tablet should be resent to the pharm below. The [facility] [address] would not fill the Rx. Pt is out of medication and having headaches.       Rx Refill/Request    Is this a Refill or New Rx:  Refill/transfer  Rx Name and Strength:  oxyCODONE-acetaminophen (PERCOCET)  mg per tablet    Preferred Pharmacy with phone number:      [facility] #[id number] - [place] - [address] [facility] Azra HUGHES  [facility] [address] [place] [address]  Phone: [phone] Fax: [phone]      Communication Preference: pt @ [phone]  Additional Information:

## 2022-05-02 NOTE — TELEPHONE ENCOUNTER
----- Message from Pamela Bernstein sent at 5/2/2022  1:53 PM CDT -----  Regarding: speak with nurse/ refill  Contact: patient  [phone]   please call patient need to speak with the nurse ASAP have not been filled yet waiting on a call back thanks.

## 2022-05-02 NOTE — TELEPHONE ENCOUNTER
----- Message from Donald Solano sent at 5/2/2022  7:49 AM CDT -----  Contact: Patient  The pt called and would to have a nurse call her back    She is having trouble getting her medication from a pharmacy    The pt can be reached at [phone]

## 2022-05-03 RX ORDER — ROPINIROLE 1 MG/1
TABLET, FILM COATED ORAL
Qty: 60 TABLET | Refills: 5 | Status: SHIPPED | OUTPATIENT
Start: 2022-05-03 | End: 2022-10-06

## 2022-05-03 RX ORDER — OXYCODONE AND ACETAMINOPHEN 10; 325 MG/1; MG/1
1 TABLET ORAL
Qty: 150 TABLET | Refills: 0 | Status: SHIPPED | OUTPATIENT
Start: 2022-05-03 | End: 2022-06-02 | Stop reason: SDUPTHER

## 2022-05-05 ENCOUNTER — OFFICE VISIT (OUTPATIENT)
Dept: ORTHOPEDICS | Facility: CLINIC | Age: 72
End: 2022-05-05
Payer: COMMERCIAL

## 2022-05-05 VITALS — BODY MASS INDEX: 47.67 KG/M2 | HEIGHT: 62 IN | WEIGHT: 259.06 LBS

## 2022-05-05 DIAGNOSIS — M17.0 PRIMARY OSTEOARTHRITIS OF BOTH KNEES: Primary | ICD-10-CM

## 2022-05-05 PROCEDURE — 20610 PR DRAIN/INJECT LARGE JOINT/BURSA: ICD-10-PCS | Mod: 50,S$GLB,, | Performed by: PHYSICIAN ASSISTANT

## 2022-05-05 PROCEDURE — 99999 PR PBB SHADOW E&M-EST. PATIENT-LVL III: CPT | Mod: PBBFAC,,, | Performed by: PHYSICIAN ASSISTANT

## 2022-05-05 PROCEDURE — 99999 PR PBB SHADOW E&M-EST. PATIENT-LVL III: ICD-10-PCS | Mod: PBBFAC,,, | Performed by: PHYSICIAN ASSISTANT

## 2022-05-05 PROCEDURE — 1160F RVW MEDS BY RX/DR IN RCRD: CPT | Mod: CPTII,S$GLB,, | Performed by: PHYSICIAN ASSISTANT

## 2022-05-05 PROCEDURE — 1159F PR MEDICATION LIST DOCUMENTED IN MEDICAL RECORD: ICD-10-PCS | Mod: CPTII,S$GLB,, | Performed by: PHYSICIAN ASSISTANT

## 2022-05-05 PROCEDURE — 20610 DRAIN/INJ JOINT/BURSA W/O US: CPT | Mod: 50,S$GLB,, | Performed by: PHYSICIAN ASSISTANT

## 2022-05-05 PROCEDURE — 1160F PR REVIEW ALL MEDS BY PRESCRIBER/CLIN PHARMACIST DOCUMENTED: ICD-10-PCS | Mod: CPTII,S$GLB,, | Performed by: PHYSICIAN ASSISTANT

## 2022-05-05 PROCEDURE — 3008F PR BODY MASS INDEX (BMI) DOCUMENTED: ICD-10-PCS | Mod: CPTII,S$GLB,, | Performed by: PHYSICIAN ASSISTANT

## 2022-05-05 PROCEDURE — 3008F BODY MASS INDEX DOCD: CPT | Mod: CPTII,S$GLB,, | Performed by: PHYSICIAN ASSISTANT

## 2022-05-05 PROCEDURE — 1159F MED LIST DOCD IN RCRD: CPT | Mod: CPTII,S$GLB,, | Performed by: PHYSICIAN ASSISTANT

## 2022-05-05 PROCEDURE — 99499 UNLISTED E&M SERVICE: CPT | Mod: S$GLB,,, | Performed by: PHYSICIAN ASSISTANT

## 2022-05-05 PROCEDURE — 99499 NO LOS: ICD-10-PCS | Mod: S$GLB,,, | Performed by: PHYSICIAN ASSISTANT

## 2022-05-16 NOTE — PROGRESS NOTES
Subjective:      Patient ID: Kuldeep Villela is a 71 y.o. female.    Chief Complaint: Pain and Injections of the Left Knee and Pain and Injections of the Right Knee    HPI  Patient returns for the third of three. The patient tolerated the last injection well. The patient reports the Euflexxa injection in the bilateral knee(s) has brought about no improvement..    Review of Systems   Constitutional: Negative for chills, fever and night sweats.   Cardiovascular: Negative for chest pain.   Respiratory: Negative for cough and shortness of breath.    Hematologic/Lymphatic: Does not bruise/bleed easily.   Skin: Negative for color change.   Gastrointestinal: Negative for heartburn.   Genitourinary: Negative for dysuria.   Neurological: Negative for numbness and paresthesias.   Psychiatric/Behavioral: Negative for altered mental status.   Allergic/Immunologic: Negative for persistent infections.         Objective:            Ortho/SPM Exam  The bilateral knee is examined, there is no evidence of swelling or effusion.  There is no evidence of erythema. Skin, pulses, sensation are intact.            Assessment:       Encounter Diagnosis   Name Primary?    Primary osteoarthritis of both knees Yes          Plan:       PROCEDURE:  I have explained the risks, benefits, and alternatives of the procedure in detail.  The patient voices understanding and all questions have been answered.  The patient agrees to proceed as planned. So after I performed a sterile prep of the skin in the normal fashion the bilateral knee is injected using a 22 gauge needle from the anterolateral approach with 2cc of euflexxa solution. The patient is reminded that it can take 6 - 8 weeks to see all the affects of this treatment, they must complete all three injections to see all the affects and the treatment can not be repeated any earlier than six months.

## 2022-05-28 DIAGNOSIS — R51.9 INTRACTABLE EPISODIC HEADACHE, UNSPECIFIED HEADACHE TYPE: ICD-10-CM

## 2022-05-30 RX ORDER — TOPIRAMATE 50 MG/1
TABLET, FILM COATED ORAL
Qty: 60 TABLET | Refills: 0 | Status: SHIPPED | OUTPATIENT
Start: 2022-05-30 | End: 2022-06-30

## 2022-06-02 ENCOUNTER — TELEPHONE (OUTPATIENT)
Dept: PHYSICAL MEDICINE AND REHAB | Facility: CLINIC | Age: 72
End: 2022-06-02
Payer: COMMERCIAL

## 2022-06-02 RX ORDER — OXYCODONE AND ACETAMINOPHEN 10; 325 MG/1; MG/1
1 TABLET ORAL
Qty: 150 TABLET | Refills: 0 | Status: SHIPPED | OUTPATIENT
Start: 2022-06-02 | End: 2022-07-01 | Stop reason: SDUPTHER

## 2022-06-02 NOTE — TELEPHONE ENCOUNTER
----- Message from Nova Starr sent at 6/2/2022  4:00 PM CDT -----  Contact: Patient [phone]  Type: RX Refill Request    Who Called: Patient     Have you contacted your pharmacy: No. Medication is due tomorrow, 06-03-22. Please send in.     Refill or New Rx: Refill     RX Name and Strength: oxyCODONE-acetaminophen (PERCOCET)  mg per tablet    Is this a 30 day or 90 day: 90 day    Preferred Pharmacy with phone number: .    [facility] #[id number] [address] AT [address] [address]  Phone: [phone] Fax: [phone]    Local or Mail Order: Local    Would the patient rather a call back or a response via [facility]? Call back    Best Call Back Number: [phone]    Additional Information: Please call pt once this is sent in.

## 2022-06-09 ENCOUNTER — TELEPHONE (OUTPATIENT)
Dept: PHYSICAL MEDICINE AND REHAB | Facility: CLINIC | Age: 72
End: 2022-06-09
Payer: COMMERCIAL

## 2022-06-09 NOTE — TELEPHONE ENCOUNTER
----- Message from Donny Esquivel MD sent at 6/9/2022 10:21 AM CDT -----  I just saw this message.  You can offer her an appointment tomorrow at 3:40 p.m..  If not able to make it, she can stay on the waiting list.  ----- Message -----  From: Janette Galeana  Sent: 6/9/2022   9:36 AM CDT  To: Donny Esquivel MD    Type: Patient Call Back    Who called: self     What is the request in detail: Pt has a family emergency today so she need to reschedule. She cannot wait until next available in October please advise     Can the clinic reply by [facility]?    Would the patient rather a call back or a response via [facility]?     Best call back number: [phone] (home)

## 2022-06-09 NOTE — TELEPHONE ENCOUNTER
----- Message from Janette Galeana sent at 6/9/2022  1:49 PM CDT -----  Pt can make the appt offered for tomorrow at 3:40 please put her on the [facility]   [phone] (home)

## 2022-06-10 ENCOUNTER — TELEPHONE (OUTPATIENT)
Dept: PHYSICAL MEDICINE AND REHAB | Facility: CLINIC | Age: 72
End: 2022-06-10
Payer: COMMERCIAL

## 2022-06-10 NOTE — TELEPHONE ENCOUNTER
----- Message from Mary Carmen Fitch sent at 6/10/2022  2:41 PM CDT -----  Regarding: Appt  Contact: pt @[phone]  Pt need another appt, weather is bad and would like another appt. Asking for a call back

## 2022-06-10 NOTE — TELEPHONE ENCOUNTER
Patient appointment was rescheduled to our first available.  Mrs Villela will receive a reminder letter in the mail.

## 2022-06-21 NOTE — TELEPHONE ENCOUNTER
----- Message from Nova Starr sent at 6/21/2022 12:51 PM CDT -----  Contact: Patient [phone]  Type: RX Refill Request    Who Called: Patient     Have you contacted your pharmacy: No    Refill or New Rx: Refill    RX Name and Strength: cyclobenzaprine (FLEXERIL) 10 MG tablet    Is this a 30 day or 90 day RX: 90 day    Preferred Pharmacy with phone number: .    [facility] #[id number] [address] AT [address]  [place] [address]  Phone: [phone] Fax: [phone]    Local or Mail Order: Local    Would the patient rather a call back or a response via [facility]? Call back    Best Call Back Number: [phone]    Additional Information: Patient is out of medication. Please call in and call pt once this is done. Also need to speak to nurse in regards to getting a back brace. Stating her back is really hurting and she's leaning over now. Please call.

## 2022-06-22 RX ORDER — CYCLOBENZAPRINE HCL 10 MG
TABLET ORAL
Qty: 60 TABLET | Refills: 1 | Status: SHIPPED | OUTPATIENT
Start: 2022-06-22 | End: 2023-03-10 | Stop reason: SDUPTHER

## 2022-06-24 ENCOUNTER — PATIENT OUTREACH (OUTPATIENT)
Dept: ADMINISTRATIVE | Facility: HOSPITAL | Age: 72
End: 2022-06-24
Payer: COMMERCIAL

## 2022-06-24 DIAGNOSIS — Z12.31 BREAST CANCER SCREENING BY MAMMOGRAM: Primary | ICD-10-CM

## 2022-06-24 NOTE — PROGRESS NOTES
Health Maintenance Due   Topic Date Due    Hepatitis C Screening  Never done    Pneumococcal Vaccines (Age 65+) (1 - PCV) Never done    TETANUS VACCINE  Never done    Sign Pain Contract  Never done    DEXA Scan  Never done    Shingles Vaccine (1 of 2) Never done    Colorectal Cancer Screening  05/15/2013    LDCT Lung Screen  06/14/2020    COVID-19 Vaccine (3 - Booster for Moderna series) 11/30/2021    Mammogram  04/23/2022     Updates were requested from care everywhere.  Chart was reviewed for overdue [facility]active [facility] Encounters (SHIRA) topics (CRS, Breast Cancer Screening, Eye exam)  Health Maintenance has been updated.  LINKS immunization registry triggered.  Immunizations were reconciled.

## 2022-06-30 DIAGNOSIS — R51.9 INTRACTABLE EPISODIC HEADACHE, UNSPECIFIED HEADACHE TYPE: ICD-10-CM

## 2022-06-30 RX ORDER — TOPIRAMATE 50 MG/1
TABLET, FILM COATED ORAL
Qty: 60 TABLET | Refills: 0 | Status: SHIPPED | OUTPATIENT
Start: 2022-06-30 | End: 2022-06-30

## 2022-07-01 RX ORDER — OXYCODONE AND ACETAMINOPHEN 10; 325 MG/1; MG/1
1 TABLET ORAL
Qty: 150 TABLET | Refills: 0 | Status: SHIPPED | OUTPATIENT
Start: 2022-07-02 | End: 2022-08-11 | Stop reason: SDUPTHER

## 2022-07-01 NOTE — TELEPHONE ENCOUNTER
Last Rx Refill  6/2/22    Last Office Visit  2/7/22    Next Office Visit  10/28/22    [facility]

## 2022-07-01 NOTE — TELEPHONE ENCOUNTER
----- Message from Mary Carmen Fitch sent at 7/1/2022 10:32 AM CDT -----  Regarding: Refill  Contact: pt @ [phone]  Rx Refill/Request    Is this a Refill or New Rx: Refill    Rx Name and Strength:oxyCODONE-acetaminophen (PERCOCET)  mg per tablet    Preferred Pharmacy with phone number:  [id number] - [place]  [address] [facility] [place] [address] [address] [place] [address]  Phone: [phone] Fax: [phone]    Communication Preference:pt @ [phone]    Additional Information:

## 2022-08-11 RX ORDER — OXYCODONE AND ACETAMINOPHEN 10; 325 MG/1; MG/1
1 TABLET ORAL
Qty: 150 TABLET | Refills: 0 | Status: SHIPPED | OUTPATIENT
Start: 2022-08-11 | End: 2022-09-12 | Stop reason: SDUPTHER

## 2022-08-11 NOTE — TELEPHONE ENCOUNTER
----- Message from Mary Carmen Fitch sent at 8/11/2022  1:49 PM CDT -----  Regarding: refill  Contact: pt @ [phone]  Rx Refill/Request    Is this a Refill or New Rx:refill    Rx Name and Strength:oxyCODONE-acetaminophen (PERCOCET)  mg per tablet    Preferred Pharmacy with phone number:  [facility][id number] - [place]  [address] [facility] [place] [address] [address] [place] [address]  Phone: [phone] Fax: [phone]    Communication Preference:pt @ [phone]    Additional Information:

## 2022-09-12 RX ORDER — OXYCODONE AND ACETAMINOPHEN 10; 325 MG/1; MG/1
1 TABLET ORAL
Qty: 150 TABLET | Refills: 0 | Status: SHIPPED | OUTPATIENT
Start: 2022-09-12 | End: 2022-10-11 | Stop reason: SDUPTHER

## 2022-09-12 NOTE — TELEPHONE ENCOUNTER
----- Message from Natasha Navarrete, Patient Care Assistant sent at 9/12/2022 10:31 AM CDT -----  Regarding: medication refill  Contact: Pt  Pt is calling in regards to medication refill. Pt states she would like her medication to go to the pharmacy close to her daughter. Pt states if she needs to speak with her concerning this matter you may contact her. Pt is referring a different pharmacy to have her medication to go to.      Medication: oxyCODONE-acetaminophen (PERCOCET)  mg per tablet    Pharmacy recommended: [facility] [address] [place] [address]    Pt @ [phone]

## 2022-09-20 DIAGNOSIS — I10 HYPERTENSION: ICD-10-CM

## 2022-09-30 PROBLEM — Z99.89 WALKER AS AMBULATION AID: Status: ACTIVE | Noted: 2022-09-30

## 2022-10-11 RX ORDER — OXYCODONE AND ACETAMINOPHEN 10; 325 MG/1; MG/1
1 TABLET ORAL
Qty: 150 TABLET | Refills: 0 | Status: SHIPPED | OUTPATIENT
Start: 2022-10-12 | End: 2022-11-10 | Stop reason: SDUPTHER

## 2022-10-11 NOTE — TELEPHONE ENCOUNTER
----- Message from Darius Tremayne Brown sent at 10/11/2022 12:20 PM CDT -----  Regarding: refill  Contact: pt @ [phone]  Rx Refill/Request    Is this a Refill or New Rx: refill      Rx Name and Strength:  oxyCODONE-acetaminophen (PERCOCET)  mg per tablet 150 tablet     Preferred Pharmacy with phone number:     [facility] #[id number] [address] & [address] [address]  Phone: [phone] Fax: [phone]    Communication Preference: pt @ [phone]     Additional Information:

## 2022-10-25 ENCOUNTER — TELEPHONE (OUTPATIENT)
Dept: NEUROLOGY | Facility: CLINIC | Age: 72
End: 2022-10-25
Payer: COMMERCIAL

## 2022-10-25 NOTE — TELEPHONE ENCOUNTER
----- Message from Taryn Nava sent at 10/25/2022 11:55 AM CDT -----  Contact: pt  Pt requesting a callback to speak to a nurse           Confirmed contact below:  Contact Name:Kuldeep Manohar  Phone Number: [phone]

## 2022-10-27 DIAGNOSIS — F41.1 GENERALIZED ANXIETY DISORDER WITH PANIC ATTACKS: ICD-10-CM

## 2022-10-27 DIAGNOSIS — F41.0 GENERALIZED ANXIETY DISORDER WITH PANIC ATTACKS: ICD-10-CM

## 2022-10-27 NOTE — TELEPHONE ENCOUNTER
No new care gaps identified.  Health [facility] Embedded Care Gaps. Reference number: [id number]. 10/27/2022   4:58:40 PM CDT

## 2022-10-27 NOTE — TELEPHONE ENCOUNTER
----- Message from Nohemi Velazquez sent at 10/27/2022  1:08 PM CDT -----  Regarding: Refill Request  Who Called:ELENA CHANDLER [[id number]]      New Prescription or Refill : Refill        RX Name and Strength:  citalopram (CELEXA) 40 MG tablet       30 day or 90 day RX: 30        Preferred Pharmacy:[facility] [address] [place] [address] & [facility]        Would the patient rather a call back or a response via [facility]? Call Back           Best Call Back Number:  [phone]           Additional Information: Pt is requesting an early refill due to death in family.  Please assist.

## 2022-11-01 RX ORDER — CITALOPRAM 40 MG/1
40 TABLET, FILM COATED ORAL DAILY
Qty: 90 TABLET | Refills: 3 | Status: SHIPPED | OUTPATIENT
Start: 2022-11-01 | End: 2023-07-11

## 2022-11-10 RX ORDER — OXYCODONE AND ACETAMINOPHEN 10; 325 MG/1; MG/1
1 TABLET ORAL
Qty: 150 TABLET | Refills: 0 | Status: SHIPPED | OUTPATIENT
Start: 2022-11-11 | End: 2022-12-13 | Stop reason: SDUPTHER

## 2022-11-10 RX ORDER — OXYCODONE AND ACETAMINOPHEN 10; 325 MG/1; MG/1
1 TABLET ORAL
Qty: 150 TABLET | Refills: 0 | OUTPATIENT
Start: 2022-11-11 | End: 2022-12-11

## 2022-11-10 NOTE — TELEPHONE ENCOUNTER
----- Message from Cinthya Hodgson sent at 11/10/2022  9:39 AM CST -----  Regarding: Appt/Refill  Contact: Pt [phone]  Patient called to get an appt soon as possible patient states her last appt was changed so she came to office on the 5th instead of the 7th and prior appts was not kept due to death in the family patient states she is in a lot of pain and is walking bending over patient is willing to take virtual appt also requesting an refill on oxyCODONE-acetaminophen (PERCOCET)  mg per tablet, please call to discuss further

## 2022-11-14 ENCOUNTER — TELEPHONE (OUTPATIENT)
Dept: PHYSICAL MEDICINE AND REHAB | Facility: CLINIC | Age: 72
End: 2022-11-14

## 2022-11-14 NOTE — TELEPHONE ENCOUNTER
"----- Message from Cinthya Hodgson sent at 11/14/2022  8:45 AM CST -----  Regarding: Refill  Contact: Pt [phone]  Patient called stating [facility] medication [facility]. but she is staying with daughter in [place] and would like refill to be sent to [facility] in [facility] [address] [place] [address]   Phone: [phone]  Fax:  [phone]          "

## 2022-12-13 RX ORDER — OXYCODONE AND ACETAMINOPHEN 10; 325 MG/1; MG/1
1 TABLET ORAL
Qty: 150 TABLET | Refills: 0 | Status: SHIPPED | OUTPATIENT
Start: 2022-12-13 | End: 2023-01-11 | Stop reason: SDUPTHER

## 2022-12-13 NOTE — TELEPHONE ENCOUNTER
----- Message from Taryn Nava sent at 12/13/2022  9:00 AM CST -----  Contact: pt  Rx refill         oxyCODONE-acetaminophen (PERCOCET)  mg per tablet              [facility] #[id number] - [address] & [address] [address]  Phone: [phone] Fax: [phone]

## 2023-01-09 DIAGNOSIS — E78.5 HYPERLIPIDEMIA, UNSPECIFIED HYPERLIPIDEMIA TYPE: ICD-10-CM

## 2023-01-09 DIAGNOSIS — I11.9 BENIGN HYPERTENSIVE HEART DISEASE WITHOUT HEART FAILURE: ICD-10-CM

## 2023-01-09 NOTE — TELEPHONE ENCOUNTER
No new care gaps identified.  Health [facility] Embedded Care Gaps. Reference number: [id number]. 1/09/2023   5:33:44 AM CST

## 2023-01-10 DIAGNOSIS — I11.9 BENIGN HYPERTENSIVE HEART DISEASE WITHOUT HEART FAILURE: ICD-10-CM

## 2023-01-10 DIAGNOSIS — E78.5 HYPERLIPIDEMIA, UNSPECIFIED HYPERLIPIDEMIA TYPE: ICD-10-CM

## 2023-01-11 RX ORDER — OXYCODONE AND ACETAMINOPHEN 10; 325 MG/1; MG/1
1 TABLET ORAL
Qty: 150 TABLET | Refills: 0 | Status: SHIPPED | OUTPATIENT
Start: 2023-01-12 | End: 2023-02-10 | Stop reason: SDUPTHER

## 2023-01-11 RX ORDER — AMLODIPINE BESYLATE 10 MG/1
TABLET ORAL
Qty: 90 TABLET | Refills: 0 | Status: SHIPPED | OUTPATIENT
Start: 2023-01-11 | End: 2023-07-11

## 2023-01-11 RX ORDER — AMLODIPINE BESYLATE 10 MG/1
10 TABLET ORAL DAILY
Qty: 90 TABLET | Refills: 0 | Status: SHIPPED | OUTPATIENT
Start: 2023-01-11 | End: 2023-07-11

## 2023-01-11 RX ORDER — PRAVASTATIN SODIUM 20 MG/1
TABLET ORAL
Qty: 90 TABLET | Refills: 0 | OUTPATIENT
Start: 2023-01-11

## 2023-01-11 RX ORDER — PRAVASTATIN SODIUM 20 MG/1
TABLET ORAL
Qty: 90 TABLET | Refills: 0 | Status: SHIPPED | OUTPATIENT
Start: 2023-01-11 | End: 2023-05-09 | Stop reason: SDUPTHER

## 2023-01-11 NOTE — TELEPHONE ENCOUNTER
Called pt to schedule an appt. I was unable to speak with the pt but left a vm. I will forward the prescription refill to Dr. Esquivel.       ----- Message from Yenny Bernstein sent at 1/11/2023  9:13 AM CST -----  Contact: Communication Preference:  [phone]  Rx Refill/Request    Is this a Refill or New Rx: Refill    Rx Name and Strength: oxyCODONE-acetaminophen (PERCOCET)  mg per tablet    Preferred Pharmacy with phone number:   [facility] #[id number] [address] & [address] [address]  Phone: [phone] Fax: [phone]    Communication Preference:  [phone]     Additional Information: continuous med    PT, would like to Scheduled a  Appointment, please contact @ number provided : Please Call    Because she is staying by daughter in [place].

## 2023-01-11 NOTE — TELEPHONE ENCOUNTER
No new care gaps identified.  [facility] Embedded Care Gaps. Reference number: [id number]. 1/10/2023   10:21:33 PM CST

## 2023-02-09 DIAGNOSIS — M17.0 PRIMARY OSTEOARTHRITIS OF BOTH KNEES: Primary | ICD-10-CM

## 2023-02-09 NOTE — PROGRESS NOTES
Kuldeep Villela has primary osteoarthritis of bilateral knee. 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 Patient has had euflexxa injections in the past with excellent relief and would like to repeat. Will order and schedule 40 mg weekly for three weeks.

## 2023-02-10 ENCOUNTER — TELEPHONE (OUTPATIENT)
Dept: ORTHOPEDICS | Facility: CLINIC | Age: 73
End: 2023-02-10
Payer: COMMERCIAL

## 2023-02-10 RX ORDER — OXYCODONE AND ACETAMINOPHEN 10; 325 MG/1; MG/1
1 TABLET ORAL
Qty: 150 TABLET | Refills: 0 | Status: SHIPPED | OUTPATIENT
Start: 2023-02-11 | End: 2023-03-10 | Stop reason: SDUPTHER

## 2023-02-10 NOTE — TELEPHONE ENCOUNTER
Returned call to patient, LM asking for a return call to schedule joint injections with any provider     ----- Message from Brinda Davila NP sent at 2/9/2023  1:53 PM CST -----  Contact: pt  Orders in :-)  ----- Message -----  From: Opal Da Silva MA  Sent: 2/9/2023   1:37 PM CST  To: Brinda Davila NP    Patient previously had bilateral Euflexxa. Please add orders :)  ----- Message -----  From: Dyan Mcpherson  Sent: 2/9/2023   1:33 PM CST  To: Giovanni Parry Staff    Pt calling for her injection appt with her knees     Confirmed patient's contact info below:  Contact Name: Kuldeep Villela  Phone Number: [phone]

## 2023-02-10 NOTE — TELEPHONE ENCOUNTER
----- Message from Tremontana Chevalier sent at 2/10/2023 12:19 PM CST -----  Regarding: RX renewal  ELENA CHANDLER calling regarding Refills  (message) for #oxyCODONE-acetaminophen (PERCOCET)  mg per tablet    [facility] #[id number] [address]

## 2023-02-10 NOTE — TELEPHONE ENCOUNTER
Returned call to patient, LARY advising of return call to schedule injections     ----- Message from Tremontana Chevalier sent at 2/10/2023 12:17 PM CST -----  Regarding: appt  #Pt returning call to Opal Maloney to schedule joint knee injections.    Confirmed patient's contact info below:  Contact Name: Kuldeep Villela  Phone Number: [phone]    Confirmed patient's contact info below:  Contact Name: Kuldeep Villela  Phone Number: [phone]

## 2023-02-20 ENCOUNTER — TELEPHONE (OUTPATIENT)
Dept: FAMILY MEDICINE | Facility: CLINIC | Age: 73
End: 2023-02-20
Payer: COMMERCIAL

## 2023-02-20 ENCOUNTER — TELEPHONE (OUTPATIENT)
Dept: PHYSICAL MEDICINE AND REHAB | Facility: CLINIC | Age: 73
End: 2023-02-20
Payer: COMMERCIAL

## 2023-02-20 NOTE — TELEPHONE ENCOUNTER
----- Message from Otilio Ocampo sent at 2/20/2023 11:06 AM CST -----  Type: Patient Call Back    Who called: Self     What is the request in detail: Asking for a call     Can the clinic reply by [facility]? No    Would the patient rather a call back or a response via [facility]? CALL     Best call back number: [phone] (home)

## 2023-02-20 NOTE — TELEPHONE ENCOUNTER
RE:  Received: Today  MOE [place] MA  Cc: Joseph Grant MA  Caller: Unspecified (Today, 11:27 AM)  Wrong provider. Needs to go to their ortho provider who has been doing their injections.

## 2023-02-20 NOTE — TELEPHONE ENCOUNTER
----- Message from Tremontana Chevalier sent at 2/20/2023 11:15 AM CST -----  Regarding: appt   pt says returning cll to R Eliane's office for knee injections. [facility] cll pt @ [phone].

## 2023-02-22 ENCOUNTER — TELEPHONE (OUTPATIENT)
Dept: ORTHOPEDICS | Facility: CLINIC | Age: 73
End: 2023-02-22
Payer: COMMERCIAL

## 2023-02-22 NOTE — TELEPHONE ENCOUNTER
Called patient regarding scheduled appts. No answer, left message on voicemail to return call to clinic.        ----- Message from Opal Da Silva MA sent at 2/20/2023  1:00 PM CST -----  Regarding: FW: appt; joint injections  Contact: PT @ [phone]  She just needs to schedule joint injections   Thanks!  ----- Message -----  From: Valeria Aburto LPN  Sent: 2/20/2023  12:05 PM CST  To: Opal Da Silva MA  Subject: FW: appt; joint injections                       I seen you left message for her on 2/10/23.     ----- Message -----  From: Elyssa Boone  Sent: 2/20/2023  12:01 PM CST  To: Scooter MOODY Staff  Subject: appt; joint injections                           Pt is returning a missed call from someone in the office to schedule for joint injections, she believes. Pt states that she was provided some dates and times of an appt. Pt is asking for a return call back. Thanks.

## 2023-02-24 ENCOUNTER — PATIENT OUTREACH (OUTPATIENT)
Dept: ADMINISTRATIVE | Facility: HOSPITAL | Age: 73
End: 2023-02-24
Payer: COMMERCIAL

## 2023-02-24 NOTE — PROGRESS NOTES
Health Maintenance Due   Topic Date Due    Hepatitis C Screening  Never done    Pneumococcal Vaccines (Age 65+) (1 - PCV) Never done    TETANUS VACCINE  Never done    Sign Pain Contract  Never done    DEXA Scan  Never done    Shingles Vaccine (1 of 2) Never done    Colorectal Cancer Screening  05/14/2014    LDCT Lung Screen  06/14/2020    COVID-19 Vaccine (3 - Booster for Moderna series) 08/25/2021    Mammogram  04/23/2022    Influenza Vaccine (1) 09/01/2022

## 2023-03-07 ENCOUNTER — TELEPHONE (OUTPATIENT)
Dept: ORTHOPEDICS | Facility: CLINIC | Age: 73
End: 2023-03-07
Payer: COMMERCIAL

## 2023-03-07 NOTE — TELEPHONE ENCOUNTER
I called patient at 3:55 PM ton Tuesday 3-7-2023 to let her know her Euflexxa injection is not approved as of now. I will cancel the appointment and I will reschedule the 1st injection for 3-. I left her a voicemail her phone goes straight to voicemail.

## 2023-03-10 ENCOUNTER — OFFICE VISIT (OUTPATIENT)
Dept: PHYSICAL MEDICINE AND REHAB | Facility: CLINIC | Age: 73
End: 2023-03-10
Payer: COMMERCIAL

## 2023-03-10 ENCOUNTER — LAB VISIT (OUTPATIENT)
Dept: LAB | Facility: HOSPITAL | Age: 73
End: 2023-03-10
Attending: PHYSICAL MEDICINE & REHABILITATION
Payer: COMMERCIAL

## 2023-03-10 VITALS
HEIGHT: 60 IN | BODY MASS INDEX: 50.59 KG/M2 | DIASTOLIC BLOOD PRESSURE: 69 MMHG | HEART RATE: 80 BPM | SYSTOLIC BLOOD PRESSURE: 133 MMHG

## 2023-03-10 DIAGNOSIS — G89.29 CHRONIC NECK PAIN: ICD-10-CM

## 2023-03-10 DIAGNOSIS — M54.42 CHRONIC MIDLINE LOW BACK PAIN WITH BILATERAL SCIATICA: Primary | ICD-10-CM

## 2023-03-10 DIAGNOSIS — M47.26 OSTEOARTHRITIS OF SPINE WITH RADICULOPATHY, LUMBAR REGION: ICD-10-CM

## 2023-03-10 DIAGNOSIS — M54.41 CHRONIC MIDLINE LOW BACK PAIN WITH BILATERAL SCIATICA: Primary | ICD-10-CM

## 2023-03-10 DIAGNOSIS — M54.2 CHRONIC NECK PAIN: ICD-10-CM

## 2023-03-10 DIAGNOSIS — G89.29 CHRONIC MIDLINE LOW BACK PAIN WITH BILATERAL SCIATICA: Primary | ICD-10-CM

## 2023-03-10 DIAGNOSIS — Z79.891 CHRONICALLY ON OPIATE THERAPY: ICD-10-CM

## 2023-03-10 DIAGNOSIS — M54.12 CERVICAL RADICULOPATHY: ICD-10-CM

## 2023-03-10 DIAGNOSIS — M47.22 OSTEOARTHRITIS OF SPINE WITH RADICULOPATHY, CERVICAL REGION: ICD-10-CM

## 2023-03-10 DIAGNOSIS — M17.0 PRIMARY OSTEOARTHRITIS OF BOTH KNEES: ICD-10-CM

## 2023-03-10 DIAGNOSIS — M48.061 SPINAL STENOSIS OF LUMBAR REGION, UNSPECIFIED WHETHER NEUROGENIC CLAUDICATION PRESENT: ICD-10-CM

## 2023-03-10 DIAGNOSIS — E66.01 MORBID OBESITY, UNSPECIFIED OBESITY TYPE: ICD-10-CM

## 2023-03-10 PROCEDURE — 3078F PR MOST RECENT DIASTOLIC BLOOD PRESSURE < 80 MM HG: ICD-10-PCS | Mod: CPTII,S$GLB,, | Performed by: PHYSICAL MEDICINE & REHABILITATION

## 2023-03-10 PROCEDURE — 99999 PR PBB SHADOW E&M-EST. PATIENT-LVL III: ICD-10-PCS | Mod: PBBFAC,,, | Performed by: PHYSICAL MEDICINE & REHABILITATION

## 2023-03-10 PROCEDURE — 1159F MED LIST DOCD IN RCRD: CPT | Mod: CPTII,S$GLB,, | Performed by: PHYSICAL MEDICINE & REHABILITATION

## 2023-03-10 PROCEDURE — 1101F PR PT FALLS ASSESS DOC 0-1 FALLS W/OUT INJ PAST YR: ICD-10-PCS | Mod: CPTII,S$GLB,, | Performed by: PHYSICAL MEDICINE & REHABILITATION

## 2023-03-10 PROCEDURE — 1125F AMNT PAIN NOTED PAIN PRSNT: CPT | Mod: CPTII,S$GLB,, | Performed by: PHYSICAL MEDICINE & REHABILITATION

## 2023-03-10 PROCEDURE — 3008F BODY MASS INDEX DOCD: CPT | Mod: CPTII,S$GLB,, | Performed by: PHYSICAL MEDICINE & REHABILITATION

## 2023-03-10 PROCEDURE — 3288F PR FALLS RISK ASSESSMENT DOCUMENTED: ICD-10-PCS | Mod: CPTII,S$GLB,, | Performed by: PHYSICAL MEDICINE & REHABILITATION

## 2023-03-10 PROCEDURE — 3075F SYST BP GE 130 - 139MM HG: CPT | Mod: CPTII,S$GLB,, | Performed by: PHYSICAL MEDICINE & REHABILITATION

## 2023-03-10 PROCEDURE — 1159F PR MEDICATION LIST DOCUMENTED IN MEDICAL RECORD: ICD-10-PCS | Mod: CPTII,S$GLB,, | Performed by: PHYSICAL MEDICINE & REHABILITATION

## 2023-03-10 PROCEDURE — 99214 OFFICE O/P EST MOD 30 MIN: CPT | Mod: S$GLB,,, | Performed by: PHYSICAL MEDICINE & REHABILITATION

## 2023-03-10 PROCEDURE — 1125F PR PAIN SEVERITY QUANTIFIED, PAIN PRESENT: ICD-10-PCS | Mod: CPTII,S$GLB,, | Performed by: PHYSICAL MEDICINE & REHABILITATION

## 2023-03-10 PROCEDURE — 80326 AMPHETAMINES 5 OR MORE: CPT | Performed by: PHYSICAL MEDICINE & REHABILITATION

## 2023-03-10 PROCEDURE — 1101F PT FALLS ASSESS-DOCD LE1/YR: CPT | Mod: CPTII,S$GLB,, | Performed by: PHYSICAL MEDICINE & REHABILITATION

## 2023-03-10 PROCEDURE — 3075F PR MOST RECENT SYSTOLIC BLOOD PRESS GE 130-139MM HG: ICD-10-PCS | Mod: CPTII,S$GLB,, | Performed by: PHYSICAL MEDICINE & REHABILITATION

## 2023-03-10 PROCEDURE — 3288F FALL RISK ASSESSMENT DOCD: CPT | Mod: CPTII,S$GLB,, | Performed by: PHYSICAL MEDICINE & REHABILITATION

## 2023-03-10 PROCEDURE — 3008F PR BODY MASS INDEX (BMI) DOCUMENTED: ICD-10-PCS | Mod: CPTII,S$GLB,, | Performed by: PHYSICAL MEDICINE & REHABILITATION

## 2023-03-10 PROCEDURE — 99999 PR PBB SHADOW E&M-EST. PATIENT-LVL III: CPT | Mod: PBBFAC,,, | Performed by: PHYSICAL MEDICINE & REHABILITATION

## 2023-03-10 PROCEDURE — 99214 PR OFFICE/OUTPT VISIT, EST, LEVL IV, 30-39 MIN: ICD-10-PCS | Mod: S$GLB,,, | Performed by: PHYSICAL MEDICINE & REHABILITATION

## 2023-03-10 PROCEDURE — 3078F DIAST BP <80 MM HG: CPT | Mod: CPTII,S$GLB,, | Performed by: PHYSICAL MEDICINE & REHABILITATION

## 2023-03-10 RX ORDER — MELOXICAM 7.5 MG/1
7.5 TABLET ORAL 2 TIMES DAILY
Qty: 180 TABLET | Refills: 1 | Status: SHIPPED | OUTPATIENT
Start: 2023-03-10 | End: 2024-03-12 | Stop reason: SDUPTHER

## 2023-03-10 RX ORDER — CYCLOBENZAPRINE HCL 10 MG
10 TABLET ORAL 3 TIMES DAILY PRN
Qty: 90 TABLET | Refills: 1 | Status: SHIPPED | OUTPATIENT
Start: 2023-03-10 | End: 2024-01-08 | Stop reason: SDUPTHER

## 2023-03-10 RX ORDER — DICLOFENAC SODIUM 10 MG/G
2 GEL TOPICAL 3 TIMES DAILY PRN
Qty: 300 G | Refills: 3 | Status: SHIPPED | OUTPATIENT
Start: 2023-03-10

## 2023-03-10 RX ORDER — OXYCODONE AND ACETAMINOPHEN 10; 325 MG/1; MG/1
1 TABLET ORAL
Qty: 150 TABLET | Refills: 0 | Status: SHIPPED | OUTPATIENT
Start: 2023-03-13 | End: 2023-04-17 | Stop reason: SDUPTHER

## 2023-03-10 NOTE — PROGRESS NOTES
Subjective:       Patient ID: Kuldeep Villela is a 72 y.o. female.    Chief Complaint: No chief complaint on file.    HPI    HISTORY OF PRESENT ILLNESS:  Mrs. Villela is a 72-year-old black female with past medical history of osteoarthritis and morbid obesity who is followed up in the Physical Medicine Clinic for chronic low back pain with lumbar radiculopathy, chronic neck pain with cervical radiculopathy and OA of the knees.  Her last visit to the clinic was on 2/7/2022.  She was maintained on meloxicam, p.r.n. oxycodone and p.r.n. Cyclobenzaprine.      The patient is coming to the clinic for follow-up.  Her low back pain has been stable with occasional flare ups.  It is a constant aching pain in the lumbar spine and across her back..  The pain radiates to both feet with numbness.  It is worse with activity and better with rest.  Her maximum pain is 10/10 and minimum 6-7/10.  Today, it is 9-10/10.  The patient complains of bilateral lower extremity weakness.  She has been using a rollator walker at home. She denies any bowel or bladder incontinence.    Her neck pain has been stable with occasional flare ups.  It is a constant aching pain in the cervical spine.  She has occasional radiation to both hands.  Her maximum pain is 10/10 and minimum 0/10.  Today, it is 0/10.  The patient complains of bilateral upper extremity weakness. She complains of hand numbness.    The patient is followed up by Orthopedic surgery for her knee OA on 02/09/2023, Brinda Joaquin NP from Orthopedics ordered Euflexxa injections.  She is scheduled to see Orthopedics later today.. Her bilateral knee pain has been worse.  Her maximum pain is 10/10 and minimum 7-8/10; today it is 10/10.    She is currently taking:  - meloxicam 7.5 mg p.o. twice per day.    - oxycodone/APAP 10/325 p.r.n., usually 5 times per day.   - cyclobenzaprine 10 mg as needed for muscle spasms, usually twice per day   She previously failed gabapentin, pregabalin,  duloxetine and venlafaxine.      Past Medical History:   Diagnosis Date    Arthritis     Asthma     Cervical spondylosis 07/13/2012    Chronic LBP 07/13/2012    Chronic neck pain 07/13/2012    Hyperlipidemia     Hypertension     Lumbar radiculopathy, BLE 07/13/2012    Lumbar spinal stenosis at L4-L5. 07/13/2012    Lumbar spondylosis 07/13/2012    Morbid obesity 07/13/2012    Primary osteoarthritis of both knees 07/13/2012    Spondylolisthesis, grade 1 at L4-L5. 07/13/2012    Tenosynovitis of ankle     Rt peroneus longus    Walker as ambulation aid        Review of patient's allergies indicates:   Allergen Reactions    Penicillins Anxiety and Other (See Comments)    Anesthetic [benzocaine-aloe vera]      Jittery/hyper    Guaifenesin Anxiety and Other (See Comments)   \    Review of Systems   Constitutional:  Positive for chills and fatigue. Negative for fever.   Eyes:  Positive for visual disturbance.   Respiratory:  Positive for shortness of breath.    Cardiovascular:  Negative for chest pain.   Gastrointestinal:  Positive for constipation and nausea. Negative for vomiting.   Genitourinary:  Negative for difficulty urinating.   Musculoskeletal:  Positive for back pain, gait problem, myalgias, neck pain and neck stiffness.   Neurological:  Positive for headaches. Negative for dizziness.   Psychiatric/Behavioral:  Positive for sleep disturbance. Negative for behavioral problems.      Objective:      Physical Exam  Vitals reviewed.   Constitutional:       Appearance: She is well-developed.      Comments: Coming to the clinic in a manual wheelchair   Neck:      Comments: Decreased ROM.  +ve tenderness over cervical spine.  Musculoskeletal:      Comments: BUE:  ROM: decreased at shoulders.  Strength:    RUE: 4-/5 at shoulder abduction, 4 elbow flexion, 4 elbow extension, 4 hand .   LUE: 4-/5 at shoulder abduction, 4 elbow flexion, 4 elbow extension, 4 hand .  Sensation to pinprick:   RUE: intact.   LUE:  intact.  DTR:    RUE: +1 biceps, +1 triceps.   LUE:  +1 biceps, +1 triceps.  Sorenson:   RUE: -ve.   LUE: -ve.        BLE:  ROM:full.  Bilateral knee crepitus.   Strength:    RLE: 4-/5 at hip flexion, 4 knee extension, 4 ankle DF/PF.   LLE: 4/5 at hip flexion, 4 knee extension, 4 ankle DF/PF.  Sensation to pinprick:     RLE: intact.     LLE: intact.  DTR:     RLE: +1 knee, +1 ankle.    LLE: +1 knee, +1 ankle.  Clonus:    Rt ankle: -ve.    Lt ankle: -ve.  SLR (sitting):    RLE: +ve.     LLE: +ve.          Neurological:      Mental Status: She is alert.   Psychiatric:         Behavior: Behavior normal.      Comments: Anxious.             Assessment:       1. Chronic midline low back pain with bilateral sciatica    2. Osteoarthritis of spine with radiculopathy, lumbar region    3. Spinal stenosis of lumbar region, unspecified whether neurogenic claudication present    4. Chronic neck pain    5. Osteoarthritis of spine with radiculopathy, cervical region    6. Cervical radiculopathy, BUE    7. Primary osteoarthritis of both knees    8. Morbid obesity, unspecified obesity type    9. Chronically on opiate therapy        Plan:       - Continue meloxicam (MOBIC) 7.5 MG tablet; TAKE 1 TABLET(7.5 MG) BY MOUTH TWICE DAILY.  (She was asked to take no more than twice per day).  - Continue cyclobenzaprine (FLEXERIL) 10 MG tablet; Take 1 tablet (10 mg total) by mouth 2 (two) times daily as needed for Muscle spasms.  - Change oxyCODONE-acetaminophen (PERCOCET)  mg per tablet; Take 1 tablet by mouth every 6 hours as needed for Pain.  - Follow up with Orthopedics for intra-articular viscosupplement injections.  - Follow up in about 4 months (around 7/10/2023).    This was a 30 minute visit, more than 50% of which was spent counseling the patient about the diagnosis and the treatment plan.      This note was generated with [employer] voice recognition software. I apologize for any possible typographical errors.

## 2023-03-14 ENCOUNTER — TELEPHONE (OUTPATIENT)
Dept: ORTHOPEDICS | Facility: CLINIC | Age: 73
End: 2023-03-14
Payer: COMMERCIAL

## 2023-03-14 NOTE — TELEPHONE ENCOUNTER
I called patient on 3- left a message her injection is not approved. I will call her when her injections is approved.

## 2023-03-15 LAB
6MAM UR QL: NOT DETECTED
7AMINOCLONAZEPAM UR QL: NOT DETECTED
A-OH ALPRAZ UR QL: NOT DETECTED
ALPHA-OH-MIDAZOLAM: NOT DETECTED
ALPRAZ UR QL: NOT DETECTED
AMPHET UR QL SCN: NOT DETECTED
ANNOTATION COMMENT IMP: NORMAL
ANNOTATION COMMENT IMP: NORMAL
BARBITURATES UR QL: NOT DETECTED
BUPRENORPHINE UR QL: NOT DETECTED
BZE UR QL: NOT DETECTED
CARBOXYTHC UR QL: NOT DETECTED
CARISOPRODOL UR QL: NOT DETECTED
CLONAZEPAM UR QL: NOT DETECTED
CODEINE UR QL: NOT DETECTED
CREAT UR-MCNC: 48.4 MG/DL (ref 20–400)
DIAZEPAM UR QL: NOT DETECTED
ETHYL GLUCURONIDE UR QL: NOT DETECTED
FENTANYL UR QL: NOT DETECTED
GABAPENTIN: NOT DETECTED
HYDROCODONE UR QL: NOT DETECTED
HYDROMORPHONE UR QL: NOT DETECTED
LORAZEPAM UR QL: NOT DETECTED
MDA UR QL: NOT DETECTED
MDEA UR QL: NOT DETECTED
MDMA UR QL: NOT DETECTED
ME-PHENIDATE UR QL: NOT DETECTED
METHADONE UR QL: NOT DETECTED
METHAMPHET UR QL: NOT DETECTED
MIDAZOLAM UR QL SCN: NOT DETECTED
MORPHINE UR QL: NOT DETECTED
NALOXONE: NOT DETECTED
NORBUPRENORPHINE UR QL CFM: NOT DETECTED
NORDIAZEPAM UR QL: NOT DETECTED
NORFENTANYL UR QL: NOT DETECTED
NORHYDROCODONE UR QL CFM: NOT DETECTED
NORMEPERIDINE UR QL CFM: NOT DETECTED
NOROXYCODONE UR QL CFM: PRESENT
NOROXYMORPHONE UR QL SCN: NOT DETECTED
OXAZEPAM UR QL: NOT DETECTED
OXYCODONE UR QL: PRESENT
OXYMORPHONE UR QL: PRESENT
PATHOLOGY STUDY: NORMAL
PCP UR QL: NOT DETECTED
PHENTERMINE UR QL: NOT DETECTED
PREGABALIN: NOT DETECTED
SERVICE CMNT-IMP: NORMAL
TAPENTADOL UR QL SCN: NOT DETECTED
TAPENTADOL UR QL SCN: NOT DETECTED
TEMAZEPAM UR QL: NOT DETECTED
TRAMADOL UR QL: NOT DETECTED
ZOLPIDEM METABOLITE: NOT DETECTED
ZOLPIDEM UR QL: NOT DETECTED

## 2023-03-20 ENCOUNTER — TELEPHONE (OUTPATIENT)
Dept: PHYSICAL MEDICINE AND REHAB | Facility: CLINIC | Age: 73
End: 2023-03-20
Payer: COMMERCIAL

## 2023-03-20 ENCOUNTER — TELEPHONE (OUTPATIENT)
Dept: ORTHOPEDICS | Facility: CLINIC | Age: 73
End: 2023-03-20
Payer: COMMERCIAL

## 2023-03-20 DIAGNOSIS — M54.41 CHRONIC MIDLINE LOW BACK PAIN WITH BILATERAL SCIATICA: Primary | ICD-10-CM

## 2023-03-20 DIAGNOSIS — M54.42 CHRONIC MIDLINE LOW BACK PAIN WITH BILATERAL SCIATICA: Primary | ICD-10-CM

## 2023-03-20 DIAGNOSIS — M47.26 OSTEOARTHRITIS OF SPINE WITH RADICULOPATHY, LUMBAR REGION: ICD-10-CM

## 2023-03-20 DIAGNOSIS — G89.29 CHRONIC MIDLINE LOW BACK PAIN WITH BILATERAL SCIATICA: Primary | ICD-10-CM

## 2023-03-20 DIAGNOSIS — M48.061 SPINAL STENOSIS OF LUMBAR REGION, UNSPECIFIED WHETHER NEUROGENIC CLAUDICATION PRESENT: ICD-10-CM

## 2023-03-20 NOTE — TELEPHONE ENCOUNTER
I returned patient call she did not answer Khai worthy her a message. I let her know her appointment is for Wednesday 3- at 8:20 AM with Brinda if she has any question to please call.

## 2023-03-20 NOTE — TELEPHONE ENCOUNTER
----- Message from Jacquie Wright MA sent at 3/20/2023  4:03 PM CDT -----  Regarding: Advice MRN:  [id number]  Contact: Pt [phone]  I spoke with the patient and she is requesting a back brace , patient express to me that she has been in pain to the point she can barley move   ----- Message -----  From: Janie Giron  Sent: 3/20/2023   3:41 PM CDT  To: [facility]NET[facility] Staff  Subject: Pt Advice                                        Pt is calling and would like to speak with someone in providers office regarding a back brace.Pt was last seen 3/10. Please call

## 2023-03-21 ENCOUNTER — TELEPHONE (OUTPATIENT)
Dept: ORTHOPEDICS | Facility: CLINIC | Age: 73
End: 2023-03-21
Payer: COMMERCIAL

## 2023-03-21 NOTE — TELEPHONE ENCOUNTER
I called patient back on 3- at 8:55 AM to reschedule her appointment on 3-. I left her a message to call back.

## 2023-03-31 ENCOUNTER — TELEPHONE (OUTPATIENT)
Dept: ORTHOPEDICS | Facility: CLINIC | Age: 73
End: 2023-03-31
Payer: COMMERCIAL

## 2023-03-31 NOTE — TELEPHONE ENCOUNTER
Spoke to patient, she does not have transportation for appointment and would like to cancel for today. She will call back to schedule when she is able to secure transportation.     ----- Message from Lily Knox MA sent at 3/31/2023 11:33 AM CDT -----  Regarding: Cancel appt  Contact: [phone]  Patient is requesting to speak to staff. Please call and advise.

## 2023-04-12 ENCOUNTER — TELEPHONE (OUTPATIENT)
Dept: FAMILY MEDICINE | Facility: CLINIC | Age: 73
End: 2023-04-12
Payer: COMMERCIAL

## 2023-04-12 NOTE — TELEPHONE ENCOUNTER
Call returned. Patient advised appointment is needed to refill medications. Appointment scheduled with PCP on 4/27/23 at 3:00 pm.

## 2023-04-12 NOTE — TELEPHONE ENCOUNTER
----- Message from Luis Thompson sent at 4/12/2023 12:25 PM CDT -----  Regarding: Patient Call Back  .Type: Patient Call Back    Who called: Self     What is the request in detail: Pt is requesting a call back from provider or nurse regarding one of her medications she requested a refill for. Also states its hard for her to get transportation to get to her appts to get her refills for medications. Please advise.    Can the clinic reply by [facility]? No     Would the patient rather a call back or a response via [facility]? Call back    Best call back number: [phone]     Additional Information:

## 2023-04-17 DIAGNOSIS — M54.41 CHRONIC MIDLINE LOW BACK PAIN WITH BILATERAL SCIATICA: Primary | ICD-10-CM

## 2023-04-17 DIAGNOSIS — G89.29 CHRONIC NECK PAIN: ICD-10-CM

## 2023-04-17 DIAGNOSIS — M54.42 CHRONIC MIDLINE LOW BACK PAIN WITH BILATERAL SCIATICA: Primary | ICD-10-CM

## 2023-04-17 DIAGNOSIS — G89.29 CHRONIC MIDLINE LOW BACK PAIN WITH BILATERAL SCIATICA: Primary | ICD-10-CM

## 2023-04-17 DIAGNOSIS — G25.81 RESTLESS LEG: ICD-10-CM

## 2023-04-17 DIAGNOSIS — E78.5 HYPERLIPIDEMIA, UNSPECIFIED HYPERLIPIDEMIA TYPE: ICD-10-CM

## 2023-04-17 DIAGNOSIS — M54.2 CHRONIC NECK PAIN: ICD-10-CM

## 2023-04-17 NOTE — TELEPHONE ENCOUNTER
----- Message from Aileen Ye sent at 4/17/2023 12:25 PM CDT -----  Regarding: self [phone] Pt is out of meds  Type: RX Refill Request    Who Called:  self     Have you contacted your pharmacy: yes    Refill or New Rx: refill     RX Name and Strength:pravastatin (PRAVACHOL) 20 MG tablet, cetirizine (ZYRTEC) 10 MG tablet and rOPINIRole (REQUIP) 1 MG tablet    Preferred Pharmacy with phone number:   [id number] [address] [facility] [facility] [address]  Phone: [phone] Fax: [phone]      Local or Mail Order: local   Would the patient rather a call back or a response via [facility]? Call back    Best Call Back Number: [phone]       Pt is out of medication

## 2023-04-17 NOTE — TELEPHONE ENCOUNTER
No new care gaps identified.  [facility] Embedded Care Gaps. Reference number: [id number]. 4/17/2023   12:32:04 PM CDT

## 2023-04-18 RX ORDER — ROPINIROLE 1 MG/1
1 TABLET, FILM COATED ORAL 2 TIMES DAILY
Qty: 180 TABLET | Refills: 0 | OUTPATIENT
Start: 2023-04-18

## 2023-04-18 RX ORDER — PRAVASTATIN SODIUM 20 MG/1
20 TABLET ORAL DAILY
Qty: 90 TABLET | Refills: 0 | OUTPATIENT
Start: 2023-04-18

## 2023-04-18 RX ORDER — CETIRIZINE HYDROCHLORIDE 10 MG/1
10 TABLET ORAL DAILY
Qty: 30 TABLET | Refills: 11 | OUTPATIENT
Start: 2023-04-18

## 2023-04-18 RX ORDER — OXYCODONE AND ACETAMINOPHEN 10; 325 MG/1; MG/1
1 TABLET ORAL
Qty: 150 TABLET | Refills: 0 | Status: SHIPPED | OUTPATIENT
Start: 2023-04-18 | End: 2023-05-18 | Stop reason: SDUPTHER

## 2023-05-09 ENCOUNTER — LAB VISIT (OUTPATIENT)
Dept: LAB | Facility: HOSPITAL | Age: 73
End: 2023-05-09
Attending: FAMILY MEDICINE
Payer: COMMERCIAL

## 2023-05-09 ENCOUNTER — OFFICE VISIT (OUTPATIENT)
Dept: FAMILY MEDICINE | Facility: CLINIC | Age: 73
End: 2023-05-09
Payer: COMMERCIAL

## 2023-05-09 VITALS
HEIGHT: 60 IN | TEMPERATURE: 98 F | DIASTOLIC BLOOD PRESSURE: 70 MMHG | OXYGEN SATURATION: 97 % | WEIGHT: 224.63 LBS | SYSTOLIC BLOOD PRESSURE: 134 MMHG | BODY MASS INDEX: 44.1 KG/M2 | HEART RATE: 80 BPM

## 2023-05-09 DIAGNOSIS — I10 HYPERTENSION, UNSPECIFIED TYPE: Primary | ICD-10-CM

## 2023-05-09 DIAGNOSIS — E78.5 HYPERLIPIDEMIA, UNSPECIFIED HYPERLIPIDEMIA TYPE: ICD-10-CM

## 2023-05-09 DIAGNOSIS — Z99.89 WALKER AS AMBULATION AID: ICD-10-CM

## 2023-05-09 DIAGNOSIS — I10 HYPERTENSION, UNSPECIFIED TYPE: ICD-10-CM

## 2023-05-09 LAB
ALBUMIN SERPL BCP-MCNC: 3.9 G/DL (ref 3.5–5.2)
ALP SERPL-CCNC: 71 U/L (ref 55–135)
ALT SERPL W/O P-5'-P-CCNC: 6 U/L (ref 10–44)
ANION GAP SERPL CALC-SCNC: 9 MMOL/L (ref 8–16)
AST SERPL-CCNC: 13 U/L (ref 10–40)
BASOPHILS # BLD AUTO: 0.03 K/UL (ref 0–0.2)
BASOPHILS NFR BLD: 0.4 % (ref 0–1.9)
BILIRUB SERPL-MCNC: 0.9 MG/DL (ref 0.1–1)
BUN SERPL-MCNC: 13 MG/DL (ref 8–23)
CALCIUM SERPL-MCNC: 9.3 MG/DL (ref 8.7–10.5)
CHLORIDE SERPL-SCNC: 106 MMOL/L (ref 95–110)
CHOLEST SERPL-MCNC: 153 MG/DL (ref 120–199)
CHOLEST/HDLC SERPL: 4.6 {RATIO} (ref 2–5)
CO2 SERPL-SCNC: 26 MMOL/L (ref 23–29)
CREAT SERPL-MCNC: 1.2 MG/DL (ref 0.5–1.4)
DIFFERENTIAL METHOD: ABNORMAL
EOSINOPHIL # BLD AUTO: 0.2 K/UL (ref 0–0.5)
EOSINOPHIL NFR BLD: 2.7 % (ref 0–8)
ERYTHROCYTE [DISTWIDTH] IN BLOOD BY AUTOMATED COUNT: 12.3 % (ref 11.5–14.5)
EST. GFR  (NO RACE VARIABLE): 49 ML/MIN/1.73 M^2
GLUCOSE SERPL-MCNC: 72 MG/DL (ref 70–110)
HCT VFR BLD AUTO: 36.5 % (ref 37–48.5)
HDLC SERPL-MCNC: 33 MG/DL (ref 40–75)
HDLC SERPL: 21.6 % (ref 20–50)
HGB BLD-MCNC: 12.2 G/DL (ref 12–16)
IMM GRANULOCYTES # BLD AUTO: 0.03 K/UL (ref 0–0.04)
IMM GRANULOCYTES NFR BLD AUTO: 0.4 % (ref 0–0.5)
LDLC SERPL CALC-MCNC: 104.4 MG/DL (ref 63–159)
LYMPHOCYTES # BLD AUTO: 1.5 K/UL (ref 1–4.8)
LYMPHOCYTES NFR BLD: 19.6 % (ref 18–48)
MCH RBC QN AUTO: 33.4 PG (ref 27–31)
MCHC RBC AUTO-ENTMCNC: 33.4 G/DL (ref 32–36)
MCV RBC AUTO: 100 FL (ref 82–98)
MONOCYTES # BLD AUTO: 0.5 K/UL (ref 0.3–1)
MONOCYTES NFR BLD: 6.7 % (ref 4–15)
NEUTROPHILS # BLD AUTO: 5.5 K/UL (ref 1.8–7.7)
NEUTROPHILS NFR BLD: 70.2 % (ref 38–73)
NONHDLC SERPL-MCNC: 120 MG/DL
NRBC BLD-RTO: 0 /100 WBC
PLATELET # BLD AUTO: 302 K/UL (ref 150–450)
PMV BLD AUTO: 10 FL (ref 9.2–12.9)
POTASSIUM SERPL-SCNC: 4.1 MMOL/L (ref 3.5–5.1)
PROT SERPL-MCNC: 7.3 G/DL (ref 6–8.4)
RBC # BLD AUTO: 3.65 M/UL (ref 4–5.4)
SODIUM SERPL-SCNC: 141 MMOL/L (ref 136–145)
TRIGL SERPL-MCNC: 78 MG/DL (ref 30–150)
TSH SERPL DL<=0.005 MIU/L-ACNC: 0.72 UIU/ML (ref 0.4–4)
WBC # BLD AUTO: 7.87 K/UL (ref 3.9–12.7)

## 2023-05-09 PROCEDURE — 84443 ASSAY THYROID STIM HORMONE: CPT | Performed by: FAMILY MEDICINE

## 2023-05-09 PROCEDURE — 1125F PR PAIN SEVERITY QUANTIFIED, PAIN PRESENT: ICD-10-PCS | Mod: CPTII,S$GLB,, | Performed by: FAMILY MEDICINE

## 2023-05-09 PROCEDURE — 1101F PR PT FALLS ASSESS DOC 0-1 FALLS W/OUT INJ PAST YR: ICD-10-PCS | Mod: CPTII,S$GLB,, | Performed by: FAMILY MEDICINE

## 2023-05-09 PROCEDURE — 1159F MED LIST DOCD IN RCRD: CPT | Mod: CPTII,S$GLB,, | Performed by: FAMILY MEDICINE

## 2023-05-09 PROCEDURE — 99214 PR OFFICE/OUTPT VISIT, EST, LEVL IV, 30-39 MIN: ICD-10-PCS | Mod: S$GLB,,, | Performed by: FAMILY MEDICINE

## 2023-05-09 PROCEDURE — 99999 PR PBB SHADOW E&M-EST. PATIENT-LVL IV: CPT | Mod: PBBFAC,,, | Performed by: FAMILY MEDICINE

## 2023-05-09 PROCEDURE — 3288F PR FALLS RISK ASSESSMENT DOCUMENTED: ICD-10-PCS | Mod: CPTII,S$GLB,, | Performed by: FAMILY MEDICINE

## 2023-05-09 PROCEDURE — 99999 PR PBB SHADOW E&M-EST. PATIENT-LVL IV: ICD-10-PCS | Mod: PBBFAC,,, | Performed by: FAMILY MEDICINE

## 2023-05-09 PROCEDURE — 85025 COMPLETE CBC W/AUTO DIFF WBC: CPT | Performed by: FAMILY MEDICINE

## 2023-05-09 PROCEDURE — 80061 LIPID PANEL: CPT | Performed by: FAMILY MEDICINE

## 2023-05-09 PROCEDURE — 36415 COLL VENOUS BLD VENIPUNCTURE: CPT | Mod: PO | Performed by: FAMILY MEDICINE

## 2023-05-09 PROCEDURE — 1101F PT FALLS ASSESS-DOCD LE1/YR: CPT | Mod: CPTII,S$GLB,, | Performed by: FAMILY MEDICINE

## 2023-05-09 PROCEDURE — 99214 OFFICE O/P EST MOD 30 MIN: CPT | Mod: S$GLB,,, | Performed by: FAMILY MEDICINE

## 2023-05-09 PROCEDURE — 3075F SYST BP GE 130 - 139MM HG: CPT | Mod: CPTII,S$GLB,, | Performed by: FAMILY MEDICINE

## 2023-05-09 PROCEDURE — 3288F FALL RISK ASSESSMENT DOCD: CPT | Mod: CPTII,S$GLB,, | Performed by: FAMILY MEDICINE

## 2023-05-09 PROCEDURE — 83036 HEMOGLOBIN GLYCOSYLATED A1C: CPT | Performed by: FAMILY MEDICINE

## 2023-05-09 PROCEDURE — 80053 COMPREHEN METABOLIC PANEL: CPT | Performed by: FAMILY MEDICINE

## 2023-05-09 PROCEDURE — 3078F PR MOST RECENT DIASTOLIC BLOOD PRESSURE < 80 MM HG: ICD-10-PCS | Mod: CPTII,S$GLB,, | Performed by: FAMILY MEDICINE

## 2023-05-09 PROCEDURE — 3008F BODY MASS INDEX DOCD: CPT | Mod: CPTII,S$GLB,, | Performed by: FAMILY MEDICINE

## 2023-05-09 PROCEDURE — 1125F AMNT PAIN NOTED PAIN PRSNT: CPT | Mod: CPTII,S$GLB,, | Performed by: FAMILY MEDICINE

## 2023-05-09 PROCEDURE — 3075F PR MOST RECENT SYSTOLIC BLOOD PRESS GE 130-139MM HG: ICD-10-PCS | Mod: CPTII,S$GLB,, | Performed by: FAMILY MEDICINE

## 2023-05-09 PROCEDURE — 3078F DIAST BP <80 MM HG: CPT | Mod: CPTII,S$GLB,, | Performed by: FAMILY MEDICINE

## 2023-05-09 PROCEDURE — 3008F PR BODY MASS INDEX (BMI) DOCUMENTED: ICD-10-PCS | Mod: CPTII,S$GLB,, | Performed by: FAMILY MEDICINE

## 2023-05-09 PROCEDURE — 1159F PR MEDICATION LIST DOCUMENTED IN MEDICAL RECORD: ICD-10-PCS | Mod: CPTII,S$GLB,, | Performed by: FAMILY MEDICINE

## 2023-05-09 RX ORDER — PRAVASTATIN SODIUM 20 MG/1
20 TABLET ORAL DAILY
Qty: 90 TABLET | Refills: 1 | Status: SHIPPED | OUTPATIENT
Start: 2023-05-09 | End: 2023-10-06

## 2023-05-09 NOTE — PROGRESS NOTES
Subjective     Patient ID: Kuldeep Villela is a 72 y.o. female.    Chief Complaint: Medication Refill    Hyperlipidemia  This is a chronic problem. The current episode started more than 1 year ago. The problem is controlled. Recent lipid tests were reviewed and are normal. Pertinent negatives include no chest pain or shortness of breath. Current antihyperlipidemic treatment includes statins (off the pravastatin for 3 weeks). The current treatment provides significant improvement of lipids. There are no compliance problems.  Risk factors for coronary artery disease include dyslipidemia, hypertension and obesity.   Hypertension  This is a chronic problem. The current episode started more than 1 year ago. The problem is unchanged. The problem is controlled. Associated symptoms include peripheral edema. Pertinent negatives include no chest pain, headaches, palpitations or shortness of breath. Risk factors for coronary artery disease include obesity and dyslipidemia. The current treatment provides significant improvement. There are no compliance problems.    Review of Systems   Respiratory:  Negative for chest tightness and shortness of breath.    Cardiovascular:  Positive for leg swelling. Negative for chest pain and palpitations.        Swelling sometimes   Gastrointestinal:  Negative for abdominal pain, blood in stool, diarrhea, nausea and vomiting.   Genitourinary:  Negative for dysuria and frequency.   Neurological:  Positive for dizziness. Negative for syncope, light-headedness and headaches.        Objective     Vitals:    05/09/23 1141   BP: 134/70   Pulse: 80   Temp: 98.3 °F (36.8 °C)   TempSrc: Oral   SpO2: 97%   Weight: 101.9 kg (224 lb 10.4 oz)   Height: 5' (1.524 m)       Physical Exam  Constitutional:       Appearance: Normal appearance. She is obese. She is not ill-appearing, toxic-appearing or diaphoretic.   HENT:      Head: Normocephalic and atraumatic.   Cardiovascular:      Rate and Rhythm: Normal rate  and regular rhythm.      Heart sounds: Normal heart sounds. No murmur heard.    No friction rub. No gallop.   Pulmonary:      Effort: Pulmonary effort is normal. No respiratory distress.      Breath sounds: No stridor. No wheezing or rhonchi.   Musculoskeletal:      Right lower leg: No edema.      Left lower leg: No edema.      Comments: Ambulates with a  walker   Neurological:      General: No focal deficit present.      Mental Status: She is alert and oriented to person, place, and time.   Psychiatric:         Mood and Affect: Mood normal.         Behavior: Behavior normal.          Assessment and Plan     Problem List Items Addressed This Visit       Walker as ambulation aid    Hypertension - Primary    Relevant Orders    CBC Auto Differential    Comprehensive Metabolic Panel    Lipid Panel    TSH    Hemoglobin A1C    Hyperlipemia    Relevant Medications    pravastatin (PRAVACHOL) 20 MG tablet    Other Relevant Orders    CBC Auto Differential    Comprehensive Metabolic Panel    Lipid Panel    TSH    Hemoglobin A1C       Kuldeep was seen today for medication refill.    Diagnoses and all orders for this visit:    Hypertension, unspecified type  -     CBC Auto Differential; Future  -     Comprehensive Metabolic Panel; Future  -     Lipid Panel; Future  -     TSH; Future  -     Hemoglobin A1C; Future    Hyperlipidemia, unspecified hyperlipidemia type  -     CBC Auto Differential; Future  -     Comprehensive Metabolic Panel; Future  -     Lipid Panel; Future  -     TSH; Future  -     Hemoglobin A1C; Future  -     pravastatin (PRAVACHOL) 20 MG tablet; Take 1 tablet (20 mg total) by mouth once daily.    Walker as ambulation aid       [facility] with Dr. Jennyfer Carey

## 2023-05-10 LAB
ESTIMATED AVG GLUCOSE: 94 MG/DL (ref 68–131)
HBA1C MFR BLD: 4.9 % (ref 4–5.6)

## 2023-05-16 ENCOUNTER — TELEPHONE (OUTPATIENT)
Dept: FAMILY MEDICINE | Facility: CLINIC | Age: 73
End: 2023-05-16
Payer: COMMERCIAL

## 2023-05-16 NOTE — TELEPHONE ENCOUNTER
Call placed to patient,no answer. Message left on voicemail to call office regarding provider message below.

## 2023-05-16 NOTE — TELEPHONE ENCOUNTER
----- Message from Ginna Musa MD sent at 5/16/2023  2:59 PM CDT -----    Call patient and let the patient know the labs are normal

## 2023-05-17 DIAGNOSIS — G25.81 RESTLESS LEG: ICD-10-CM

## 2023-05-17 RX ORDER — ROPINIROLE 1 MG/1
TABLET, FILM COATED ORAL
Qty: 60 TABLET | Refills: 5 | Status: SHIPPED | OUTPATIENT
Start: 2023-05-17 | End: 2024-02-15

## 2023-05-18 ENCOUNTER — TELEPHONE (OUTPATIENT)
Dept: NEUROLOGY | Facility: CLINIC | Age: 73
End: 2023-05-18
Payer: COMMERCIAL

## 2023-05-18 DIAGNOSIS — G89.29 CHRONIC MIDLINE LOW BACK PAIN WITH BILATERAL SCIATICA: ICD-10-CM

## 2023-05-18 DIAGNOSIS — M54.41 CHRONIC MIDLINE LOW BACK PAIN WITH BILATERAL SCIATICA: ICD-10-CM

## 2023-05-18 DIAGNOSIS — M54.2 CHRONIC NECK PAIN: ICD-10-CM

## 2023-05-18 DIAGNOSIS — M54.42 CHRONIC MIDLINE LOW BACK PAIN WITH BILATERAL SCIATICA: ICD-10-CM

## 2023-05-18 DIAGNOSIS — G89.29 CHRONIC NECK PAIN: ICD-10-CM

## 2023-05-18 RX ORDER — OXYCODONE AND ACETAMINOPHEN 10; 325 MG/1; MG/1
1 TABLET ORAL
Qty: 150 TABLET | Refills: 0 | Status: SHIPPED | OUTPATIENT
Start: 2023-05-18 | End: 2023-05-19 | Stop reason: SDUPTHER

## 2023-05-18 NOTE — TELEPHONE ENCOUNTER
----- Message from Barb Saravia sent at 5/18/2023  9:28 AM CDT -----  Who Called: Patient    What is the request in detail: New Rx    oxyCODONE-acetaminophen (PERCOCET)  mg per tablet 150 tablet 0 4/18/2023 5/18/2023 No  Sig - Route: Take 1 tablet by mouth every 4 to 6 hours as needed for Pain. - Oral  Sent to pharmacy as: oxyCODONE-acetaminophen (PERCOCET)  mg per tablet  Class: Normal  Earliest Fill Date: 4/18/2023  Notes to Pharmacy: Medically necessary for > 7 days due to chronic pain.      [facility] #[id number]  [place] [address] [address]  Phone: [phone] Fax: [phone]    Can the clinic reply by [facility]? No    Best Call Back Number: [phone]      Additional Information:     Symptom Screen outcome:

## 2023-05-18 NOTE — TELEPHONE ENCOUNTER
----- Message from Urszula Riley sent at 5/18/2023  5:16 PM CDT -----  Contact: pt  Type:  RX Refill Request    Who Called: pt   Refill or New Rx:refill  RX Name and Strength:oxyCODONE-acetaminophen (PERCOCET)  mg per tablet  Preferred Pharmacy with phone number:[facility] #[id number] - [place] - [address] [facility]  Local or Mail Order:local  Ordering Provider:Sierra  Would the patient rather a call back or a response via [facility]? call  Best Call Back Number:[phone]  Additional Information:

## 2023-05-19 DIAGNOSIS — M54.42 CHRONIC MIDLINE LOW BACK PAIN WITH BILATERAL SCIATICA: ICD-10-CM

## 2023-05-19 DIAGNOSIS — G89.29 CHRONIC NECK PAIN: ICD-10-CM

## 2023-05-19 DIAGNOSIS — G89.29 CHRONIC MIDLINE LOW BACK PAIN WITH BILATERAL SCIATICA: ICD-10-CM

## 2023-05-19 DIAGNOSIS — M54.41 CHRONIC MIDLINE LOW BACK PAIN WITH BILATERAL SCIATICA: ICD-10-CM

## 2023-05-19 DIAGNOSIS — M54.2 CHRONIC NECK PAIN: ICD-10-CM

## 2023-05-19 RX ORDER — OXYCODONE AND ACETAMINOPHEN 10; 325 MG/1; MG/1
1 TABLET ORAL
Qty: 150 TABLET | Refills: 0 | Status: SHIPPED | OUTPATIENT
Start: 2023-05-19 | End: 2023-06-16 | Stop reason: SDUPTHER

## 2023-06-05 ENCOUNTER — TELEPHONE (OUTPATIENT)
Dept: FAMILY MEDICINE | Facility: CLINIC | Age: 73
End: 2023-06-05
Payer: COMMERCIAL

## 2023-06-05 NOTE — TELEPHONE ENCOUNTER
----- Message from Raven Keane sent at 6/5/2023  9:30 AM CDT -----  Regarding: self [phone]  Type: RX Refill Request       Who Called: self         Refill or New Rx: refill        RX Name and Strength: busPIRone (BUSPAR) 15 MG tablet         Preferred Pharmacy with phone number:   [facility] #[id number] - [place] AT [address]  [place] [address]  Phone: [phone] Fax: [phone]       Local or Mail Order: local           Would the patient rather a call back or a response via [facility]? Call back         Best Call Back Number: [phone]           Additional Information: refill isn't due until 06/12 pt stated she's out, pt stated her hormones pills had effect to her anxiety meds

## 2023-06-05 NOTE — TELEPHONE ENCOUNTER
Return call to patient, and she states that the pharmacy informed her that she couldn't get her med's until 6-. That she was using more of her Buspar because she was out of her Thyroid med's and her anxiety was up. Patient informed that is a pharmacy issue that they will not give her med's early until it's time for her next refill date.

## 2023-06-16 DIAGNOSIS — M54.2 CHRONIC NECK PAIN: ICD-10-CM

## 2023-06-16 DIAGNOSIS — M54.42 CHRONIC MIDLINE LOW BACK PAIN WITH BILATERAL SCIATICA: ICD-10-CM

## 2023-06-16 DIAGNOSIS — G89.29 CHRONIC MIDLINE LOW BACK PAIN WITH BILATERAL SCIATICA: ICD-10-CM

## 2023-06-16 DIAGNOSIS — G89.29 CHRONIC NECK PAIN: ICD-10-CM

## 2023-06-16 DIAGNOSIS — M54.41 CHRONIC MIDLINE LOW BACK PAIN WITH BILATERAL SCIATICA: ICD-10-CM

## 2023-06-16 RX ORDER — OXYCODONE AND ACETAMINOPHEN 10; 325 MG/1; MG/1
1 TABLET ORAL
Qty: 150 TABLET | Refills: 0 | Status: SHIPPED | OUTPATIENT
Start: 2023-06-17 | End: 2023-07-19 | Stop reason: SDUPTHER

## 2023-06-16 NOTE — TELEPHONE ENCOUNTER
----- Message from Raven Barth sent at 6/16/2023  9:37 AM CDT -----  Regarding: Rx Refill  Contact: @[phone]  Rx Refill/Request      Rx Name and Strength:oxyCODONE-acetaminophen (PERCOCET)  mg per tablet      Preferred Pharmacy with phone number:   [facility] #[id number] - [place] AT [address]  [place] [address]  Phone: [phone] Fax: [phone]        Communication Preference:[phone]      Additional Information: Pt stated she has 4 pills left.

## 2023-07-16 DIAGNOSIS — R51.9 INTRACTABLE EPISODIC HEADACHE, UNSPECIFIED HEADACHE TYPE: ICD-10-CM

## 2023-07-17 RX ORDER — TOPIRAMATE 50 MG/1
TABLET, FILM COATED ORAL
Qty: 180 TABLET | Refills: 1 | Status: SHIPPED | OUTPATIENT
Start: 2023-07-17

## 2023-07-19 DIAGNOSIS — M54.2 CHRONIC NECK PAIN: ICD-10-CM

## 2023-07-19 DIAGNOSIS — G89.29 CHRONIC NECK PAIN: ICD-10-CM

## 2023-07-19 DIAGNOSIS — M54.42 CHRONIC MIDLINE LOW BACK PAIN WITH BILATERAL SCIATICA: ICD-10-CM

## 2023-07-19 DIAGNOSIS — G89.29 CHRONIC MIDLINE LOW BACK PAIN WITH BILATERAL SCIATICA: ICD-10-CM

## 2023-07-19 DIAGNOSIS — M54.41 CHRONIC MIDLINE LOW BACK PAIN WITH BILATERAL SCIATICA: ICD-10-CM

## 2023-07-19 NOTE — TELEPHONE ENCOUNTER
----- Message from Dyan Mcpherson sent at 7/19/2023 10:22 AM CDT -----  Regarding: refill  Contact: pt  Refill request.    oxyCODONE-acetaminophen (PERCOCET)  mg per tablet      [id number] [address] [place] [address]  Phone: [phone] Fax: [phone]    Confirmed patient's contact info below:  Contact Name: Kuldeep Villela  Phone Number: [phone]

## 2023-07-21 DIAGNOSIS — M54.2 CHRONIC NECK PAIN: ICD-10-CM

## 2023-07-21 DIAGNOSIS — G89.29 CHRONIC MIDLINE LOW BACK PAIN WITH BILATERAL SCIATICA: ICD-10-CM

## 2023-07-21 DIAGNOSIS — M54.42 CHRONIC MIDLINE LOW BACK PAIN WITH BILATERAL SCIATICA: ICD-10-CM

## 2023-07-21 DIAGNOSIS — M54.41 CHRONIC MIDLINE LOW BACK PAIN WITH BILATERAL SCIATICA: ICD-10-CM

## 2023-07-21 DIAGNOSIS — G89.29 CHRONIC NECK PAIN: ICD-10-CM

## 2023-07-21 NOTE — TELEPHONE ENCOUNTER
----- Message from Juice Lopes sent at 7/21/2023  9:07 AM CDT -----  Regarding: Refill  Contact: @[phone]  Patient is calling to get a refill on the following oxyCODONE-acetaminophen (PERCOCET)  mg per tablet, patient would like get prescription somewhere else because [facility] doesn't have the medication ... Please and advise @[phone]

## 2023-07-21 NOTE — TELEPHONE ENCOUNTER
----- Message from Jasmin Copeland sent at 7/21/2023  4:17 PM CDT -----  Regarding: Refill  Contact: Pt [phone]  Pt is calling requesting refills on Rx: oxyCODONE-acetaminophen (PERCOCET)  mg per tablet 150 tablet please call       [facility] #[id number] [address] AT [address] [address]  Phone: [phone] Fax: [phone]

## 2023-07-24 RX ORDER — OXYCODONE AND ACETAMINOPHEN 10; 325 MG/1; MG/1
1 TABLET ORAL
Qty: 150 TABLET | Refills: 0 | OUTPATIENT
Start: 2023-07-24 | End: 2023-08-23

## 2023-07-24 RX ORDER — OXYCODONE AND ACETAMINOPHEN 10; 325 MG/1; MG/1
1 TABLET ORAL
Qty: 150 TABLET | Refills: 0 | Status: SHIPPED | OUTPATIENT
Start: 2023-07-24 | End: 2023-08-23 | Stop reason: SDUPTHER

## 2023-07-25 ENCOUNTER — TELEPHONE (OUTPATIENT)
Dept: FAMILY MEDICINE | Facility: CLINIC | Age: 73
End: 2023-07-25
Payer: COMMERCIAL

## 2023-07-25 NOTE — TELEPHONE ENCOUNTER
----- Message from Lilly Fitch sent at 7/25/2023  2:45 PM CDT -----  Type: Patient Call Back    Who called:Daughter    What is the request in detail:In regards to pt being in pain, and wants to know can she get tylenol 3 prescribed     Can the clinic reply by [facility]?No    Would the patient rather a call back or a response via [facility]? call    Best call back number:[phone]    Additional Information:

## 2023-07-25 NOTE — TELEPHONE ENCOUNTER
Call returned. Patient informed Dr. Christianson is currently out of the office on medical leave until late August. Chart review showed Rx for Percocet sent in by Dr. Esquivel yesterday for pain. She reports the pharmacy is unable to dispense due to being out of stock. Patient advised to reach out to Dr. Esquivel's office regarding Rx to see if an alternative can be prescribed.

## 2023-08-23 DIAGNOSIS — M54.41 CHRONIC MIDLINE LOW BACK PAIN WITH BILATERAL SCIATICA: ICD-10-CM

## 2023-08-23 DIAGNOSIS — G89.29 CHRONIC NECK PAIN: ICD-10-CM

## 2023-08-23 DIAGNOSIS — G89.29 CHRONIC MIDLINE LOW BACK PAIN WITH BILATERAL SCIATICA: ICD-10-CM

## 2023-08-23 DIAGNOSIS — M54.42 CHRONIC MIDLINE LOW BACK PAIN WITH BILATERAL SCIATICA: ICD-10-CM

## 2023-08-23 DIAGNOSIS — M54.2 CHRONIC NECK PAIN: ICD-10-CM

## 2023-08-23 RX ORDER — OXYCODONE AND ACETAMINOPHEN 10; 325 MG/1; MG/1
1 TABLET ORAL
Qty: 150 TABLET | Refills: 0 | Status: SHIPPED | OUTPATIENT
Start: 2023-08-25 | End: 2023-10-19 | Stop reason: SDUPTHER

## 2023-08-25 DIAGNOSIS — M54.42 CHRONIC MIDLINE LOW BACK PAIN WITH BILATERAL SCIATICA: ICD-10-CM

## 2023-08-25 DIAGNOSIS — M54.41 CHRONIC MIDLINE LOW BACK PAIN WITH BILATERAL SCIATICA: ICD-10-CM

## 2023-08-25 DIAGNOSIS — M54.2 CHRONIC NECK PAIN: ICD-10-CM

## 2023-08-25 DIAGNOSIS — G89.29 CHRONIC NECK PAIN: ICD-10-CM

## 2023-08-25 DIAGNOSIS — G89.29 CHRONIC MIDLINE LOW BACK PAIN WITH BILATERAL SCIATICA: ICD-10-CM

## 2023-08-26 RX ORDER — OXYCODONE AND ACETAMINOPHEN 10; 325 MG/1; MG/1
1 TABLET ORAL
Qty: 150 TABLET | Refills: 0 | OUTPATIENT
Start: 2023-08-26 | End: 2023-09-25

## 2023-09-29 ENCOUNTER — TELEPHONE (OUTPATIENT)
Dept: NEUROLOGY | Facility: CLINIC | Age: 73
End: 2023-09-29
Payer: COMMERCIAL

## 2023-09-29 ENCOUNTER — NURSE TRIAGE (OUTPATIENT)
Dept: ADMINISTRATIVE | Facility: CLINIC | Age: 73
End: 2023-09-29
Payer: COMMERCIAL

## 2023-09-29 DIAGNOSIS — M17.0 PRIMARY OSTEOARTHRITIS OF BOTH KNEES: Primary | ICD-10-CM

## 2023-09-29 RX ORDER — OXYCODONE HYDROCHLORIDE 10 MG/1
10 TABLET ORAL
Qty: 150 TABLET | Refills: 0 | Status: SHIPPED | OUTPATIENT
Start: 2023-09-29 | End: 2024-01-09 | Stop reason: SDUPTHER

## 2023-09-29 NOTE — TELEPHONE ENCOUNTER
----- Message from Venkata Rodriguez sent at 9/29/2023  6:08 PM CDT -----  Type:  Patient Returning Call    Who Called:pt  Who Left Message for Patient:  Does the patient know what this is regarding?:Rx  Would the patient rather a call back or a response via [employer] [facility]? Call  Best Call Back Number:[phone]  Additional Information: pt states the Rx is at the pharmacy

## 2023-09-29 NOTE — TELEPHONE ENCOUNTER
Patient states she requested a refill of her prescription for Percocet  mg to be submitted by Dr. Donny Esquivel. Patient states long term use of Percocet for c/o back and knee pain. Patient states prescription is not available at this time and is requesting refill of Percocet  mg. Patient's prescription is  per documentation in patient's medication profile. Patient states c/o severe back and knee pain rated 10/10 at this time.     Patient advised to Go to ED Now for evaluation/treatment and to contact Dr. Esquivel on next business day for refill of Percocet  mg.    Reason for Disposition   Caller requesting a CONTROLLED substance prescription refill (e.g., narcotics, ADHD medicines)   [1] SEVERE back pain (e.g., excruciating) AND [2] sudden onset AND [3] age > 60 years    Additional Information   Negative: New-onset or worsening symptoms, see that guideline (e.g., diarrhea, runny nose, sore throat)   Negative: Medicine question not related to refill or renewal   Negative: Caller (e.g., patient or pharmacist) requesting information about a new medicine   Negative: Caller requesting information unrelated to medicine   Negative: [1] Prescription refill request for ESSENTIAL medicine (i.e., likelihood of harm to patient if not taken) AND [2] triager unable to refill per department policy   Negative: [1] Prescription not at pharmacy AND [2] was prescribed by PCP recently  (Exception: Triager has access to EMR and prescription is recorded there. Go to Home Care and confirm for pharmacy.)   Negative: [1] Pharmacy calling with prescription questions AND [2] triager unable to answer question   Negative: Prescription request for new medicine (not a refill)   Negative: Passed out (i.e., lost consciousness, collapsed and was not responding)   Negative: Shock suspected (e.g., cold/pale/clammy skin, too weak to stand, low BP, rapid pulse)   Negative: Sounds like a life-threatening emergency to the  triager   Negative: Major injury to the back (e.g., MVA, fall > 10 feet or 3 meters, penetrating injury, etc.)   Negative: Followed a tailbone injury   Negative: [1] Pain in the upper back over the ribs (rib cage) AND [2] radiates (travels, goes) into chest   Negative: [1] Pain in the upper back over the ribs (rib cage) AND [2] worsened by coughing (or clearly increases with breathing)   Negative: Back pain during pregnancy   Negative: Pain mainly in flank (i.e., in the side, over the lower ribs or just below the ribs)    Protocols used: Medication Refill and Renewal Call-A-AH, Back Pain-A-AH

## 2023-10-06 DIAGNOSIS — E78.5 HYPERLIPIDEMIA, UNSPECIFIED HYPERLIPIDEMIA TYPE: ICD-10-CM

## 2023-10-06 RX ORDER — PRAVASTATIN SODIUM 20 MG/1
20 TABLET ORAL
Qty: 90 TABLET | Refills: 2 | Status: SHIPPED | OUTPATIENT
Start: 2023-10-06

## 2023-10-07 NOTE — TELEPHONE ENCOUNTER
No care due was identified.  [facility] Embedded Care Due Messages. Reference number: [id number].   10/06/2023 9:38:31 PM CDT

## 2023-10-07 NOTE — TELEPHONE ENCOUNTER
Refill Decision Note   Kuldeep Manohar  is requesting a refill authorization.  Brief Assessment and Rationale for Refill:  Approve     Medication Therapy Plan:         Alert overridden per protocol: Yes   Comments:     Note composed:11:03 PM 10/06/2023

## 2023-10-18 DIAGNOSIS — F41.0 ANXIETY ATTACK: ICD-10-CM

## 2023-10-19 ENCOUNTER — TELEPHONE (OUTPATIENT)
Dept: NEUROLOGY | Facility: CLINIC | Age: 73
End: 2023-10-19
Payer: COMMERCIAL

## 2023-10-19 ENCOUNTER — OFFICE VISIT (OUTPATIENT)
Dept: PHYSICAL MEDICINE AND REHAB | Facility: CLINIC | Age: 73
End: 2023-10-19
Payer: COMMERCIAL

## 2023-10-19 VITALS — WEIGHT: 236.69 LBS | HEIGHT: 60 IN | OXYGEN SATURATION: 100 % | BODY MASS INDEX: 46.47 KG/M2

## 2023-10-19 DIAGNOSIS — M47.26 OSTEOARTHRITIS OF SPINE WITH RADICULOPATHY, LUMBAR REGION: ICD-10-CM

## 2023-10-19 DIAGNOSIS — G89.29 CHRONIC MIDLINE LOW BACK PAIN WITH BILATERAL SCIATICA: Primary | ICD-10-CM

## 2023-10-19 DIAGNOSIS — M54.42 CHRONIC MIDLINE LOW BACK PAIN WITH BILATERAL SCIATICA: Primary | ICD-10-CM

## 2023-10-19 DIAGNOSIS — M47.22 OSTEOARTHRITIS OF SPINE WITH RADICULOPATHY, CERVICAL REGION: ICD-10-CM

## 2023-10-19 DIAGNOSIS — M54.2 CHRONIC NECK PAIN: ICD-10-CM

## 2023-10-19 DIAGNOSIS — E66.01 MORBID OBESITY, UNSPECIFIED OBESITY TYPE: ICD-10-CM

## 2023-10-19 DIAGNOSIS — M17.0 PRIMARY OSTEOARTHRITIS OF BOTH KNEES: ICD-10-CM

## 2023-10-19 DIAGNOSIS — G89.29 CHRONIC NECK PAIN: ICD-10-CM

## 2023-10-19 DIAGNOSIS — M48.061 SPINAL STENOSIS OF LUMBAR REGION, UNSPECIFIED WHETHER NEUROGENIC CLAUDICATION PRESENT: ICD-10-CM

## 2023-10-19 DIAGNOSIS — M54.12 CERVICAL RADICULOPATHY: ICD-10-CM

## 2023-10-19 DIAGNOSIS — Z79.891 CHRONICALLY ON OPIATE THERAPY: ICD-10-CM

## 2023-10-19 DIAGNOSIS — M54.41 CHRONIC MIDLINE LOW BACK PAIN WITH BILATERAL SCIATICA: Primary | ICD-10-CM

## 2023-10-19 DIAGNOSIS — R26.9 GAIT DISORDER: ICD-10-CM

## 2023-10-19 PROCEDURE — 3008F BODY MASS INDEX DOCD: CPT | Mod: CPTII,S$GLB,, | Performed by: PHYSICAL MEDICINE & REHABILITATION

## 2023-10-19 PROCEDURE — 99999 PR PBB SHADOW E&M-EST. PATIENT-LVL III: CPT | Mod: PBBFAC,,, | Performed by: PHYSICAL MEDICINE & REHABILITATION

## 2023-10-19 PROCEDURE — 99214 PR OFFICE/OUTPT VISIT, EST, LEVL IV, 30-39 MIN: ICD-10-PCS | Mod: S$GLB,,, | Performed by: PHYSICAL MEDICINE & REHABILITATION

## 2023-10-19 PROCEDURE — 1125F AMNT PAIN NOTED PAIN PRSNT: CPT | Mod: CPTII,S$GLB,, | Performed by: PHYSICAL MEDICINE & REHABILITATION

## 2023-10-19 PROCEDURE — 3008F PR BODY MASS INDEX (BMI) DOCUMENTED: ICD-10-PCS | Mod: CPTII,S$GLB,, | Performed by: PHYSICAL MEDICINE & REHABILITATION

## 2023-10-19 PROCEDURE — 1125F PR PAIN SEVERITY QUANTIFIED, PAIN PRESENT: ICD-10-PCS | Mod: CPTII,S$GLB,, | Performed by: PHYSICAL MEDICINE & REHABILITATION

## 2023-10-19 PROCEDURE — 1159F PR MEDICATION LIST DOCUMENTED IN MEDICAL RECORD: ICD-10-PCS | Mod: CPTII,S$GLB,, | Performed by: PHYSICAL MEDICINE & REHABILITATION

## 2023-10-19 PROCEDURE — 3044F HG A1C LEVEL LT 7.0%: CPT | Mod: CPTII,S$GLB,, | Performed by: PHYSICAL MEDICINE & REHABILITATION

## 2023-10-19 PROCEDURE — 1159F MED LIST DOCD IN RCRD: CPT | Mod: CPTII,S$GLB,, | Performed by: PHYSICAL MEDICINE & REHABILITATION

## 2023-10-19 PROCEDURE — 3044F PR MOST RECENT HEMOGLOBIN A1C LEVEL <7.0%: ICD-10-PCS | Mod: CPTII,S$GLB,, | Performed by: PHYSICAL MEDICINE & REHABILITATION

## 2023-10-19 PROCEDURE — 99999 PR PBB SHADOW E&M-EST. PATIENT-LVL III: ICD-10-PCS | Mod: PBBFAC,,, | Performed by: PHYSICAL MEDICINE & REHABILITATION

## 2023-10-19 PROCEDURE — 99214 OFFICE O/P EST MOD 30 MIN: CPT | Mod: S$GLB,,, | Performed by: PHYSICAL MEDICINE & REHABILITATION

## 2023-10-19 RX ORDER — OXYCODONE AND ACETAMINOPHEN 10; 325 MG/1; MG/1
1 TABLET ORAL
Qty: 150 TABLET | Refills: 0 | Status: SHIPPED | OUTPATIENT
Start: 2023-10-29 | End: 2023-12-05 | Stop reason: SDUPTHER

## 2023-10-19 RX ORDER — BUSPIRONE HYDROCHLORIDE 15 MG/1
TABLET ORAL
Qty: 90 TABLET | Refills: 5 | Status: SHIPPED | OUTPATIENT
Start: 2023-10-19 | End: 2023-11-17

## 2023-10-19 NOTE — TELEPHONE ENCOUNTER
----- Message from Elida James sent at 10/19/2023  3:47 PM CDT -----  Regarding: Chair Information  Contact: pt @[phone]  Good Afternoon,    Pt states she would like the information about the walker that Dr. Esquivel advised her about. Pt says she lost the card information. Please c/b to advise..Thanks

## 2023-10-19 NOTE — TELEPHONE ENCOUNTER
No care due was identified.  [facility] Embedded Care Due Messages. Reference number: [id number].   10/18/2023 7:37:33 PM CDT

## 2023-10-19 NOTE — PROGRESS NOTES
Subjective:       Patient ID: Kuldeep Villela is a 72 y.o. female.    Chief Complaint: No chief complaint on file.    HPI    HISTORY OF PRESENT ILLNESS:  Mrs. Villela is a 72-year-old black female with past medical history of osteoarthritis and morbid obesity who is followed up in the Physical Medicine Clinic for chronic low back pain with lumbar radiculopathy, chronic neck pain with cervical radiculopathy and OA of the knees.  Her last visit to the clinic was on 3/10/2023.  She was maintained on meloxicam, p.r.n. oxycodone and p.r.n. Cyclobenzaprine.  A Pain Clinic Drug Screen was obtained and was positive as expected for oxycodone.    The patient is coming to the clinic for follow-up.  Her low back pain has been worse.  It is a constant aching pain in the lumbar spine and across her back..  The pain radiates to both feet with numbness.  It is worse with activity and better with rest.  Her maximum pain is 10/10 and minimum 7-8/10.  Today, it is 9/10.  The patient complains of bilateral lower extremity weakness.  She denies any bowel or bladder incontinence.She has been using a rollator walker at home.  Her walker is few years old.  She said makes her walk with her back bent over.  She prefers a walker that keeps her upright.    Her neck pain has been stable with occasional flare ups.  It is a constant aching pain in the cervical spine.  She has occasional radiation to both shoulders (L>R) and both hands.  Her maximum pain is 10/10 and minimum 8/10.  Today, it is 9/10.  The patient complains of bilateral upper extremity weakness. She complains of hand numbness.    The patient is followed up by Orthopedic surgery for her knee OA.  However, she has not been seen since 02/20.  At her last visit, they were working on approved for Euflexxa injections.  Her bilateral knee pain has been worse.  Her maximum pain is 10/10 and minimum 7-8/10; today it is 10/10.    She is currently taking:  - meloxicam 7.5 mg p.o. twice per day.     - oxycodone/APAP 10/325 p.r.n., usually 5 times per day.   - cyclobenzaprine 10 mg as needed for muscle spasms, usually twice per day   She previously failed gabapentin, pregabalin, duloxetine and venlafaxine.      Past Medical History:   Diagnosis Date    Arthritis     Asthma     Cervical spondylosis 07/13/2012    Chronic LBP 07/13/2012    Chronic neck pain 07/13/2012    Hyperlipidemia     Hypertension     Lumbar radiculopathy, BLE 07/13/2012    Lumbar spinal stenosis at L4-L5. 07/13/2012    Lumbar spondylosis 07/13/2012    Morbid obesity 07/13/2012    Primary osteoarthritis of both knees 07/13/2012    Spondylolisthesis, grade 1 at L4-L5. 07/13/2012    Tenosynovitis of ankle     Rt peroneus longus    Walker as ambulation aid        Review of patient's allergies indicates:   Allergen Reactions    Penicillins Anxiety and Other (See Comments)    Anesthetic [benzocaine-aloe vera]      Jittery/hyper    Guaifenesin Anxiety and Other (See Comments)   \    Review of Systems   Constitutional:  Positive for chills and fatigue. Negative for fever.   Eyes:  Positive for visual disturbance.   Respiratory:  Positive for shortness of breath.    Cardiovascular:  Negative for chest pain.   Gastrointestinal:  Positive for constipation. Negative for nausea and vomiting.   Genitourinary:  Negative for difficulty urinating.   Musculoskeletal:  Positive for back pain, gait problem, myalgias, neck pain and neck stiffness.   Neurological:  Positive for headaches. Negative for dizziness.   Psychiatric/Behavioral:  Positive for sleep disturbance. Negative for behavioral problems.        Objective:      Physical Exam  Vitals reviewed.   Constitutional:       Appearance: She is well-developed.      Comments: Coming to the clinic in a manual wheelchair   Neck:      Comments: Decreased ROM.  +ve tenderness over cervical spine.  Musculoskeletal:      Comments: BUE:  ROM: decreased at shoulders.  Strength:    RUE: 4-/5 at shoulder abduction, 4  elbow flexion, 4 elbow extension, 4 hand .   LUE: 4-/5 at shoulder abduction, 4 elbow flexion, 4 elbow extension, 4 hand .  Sensation to pinprick:   RUE: intact.   LUE: intact.        BLE:  ROM:full.  Bilateral knee crepitus.   Strength:    RLE: 4-/5 at hip flexion, 4 knee extension, 4 ankle DF/PF.   LLE: 4-/5 at hip flexion, 4 knee extension, 4 ankle DF/PF.  Sensation to pinprick:     RLE: intact.     LLE: intact.  SLR (sitting):    RLE: +ve.     LLE: +ve.          Neurological:      Mental Status: She is alert.   Psychiatric:         Behavior: Behavior normal.      Comments: Anxious.             Assessment:       1. Chronic midline low back pain with bilateral sciatica    2. Osteoarthritis of spine with radiculopathy, lumbar region    3. Spinal stenosis of lumbar region, unspecified whether neurogenic claudication present    4. Chronic neck pain    5. Osteoarthritis of spine with radiculopathy, cervical region    6. Cervical radiculopathy, BUE    7. Primary osteoarthritis of both knees    8. Morbid obesity, unspecified obesity type    9. Chronically on opiate therapy        Plan:       - Continue meloxicam (MOBIC) 7.5 MG tablet; TAKE 1 TABLET(7.5 MG) BY MOUTH TWICE DAILY.  (She was asked to take no more than twice per day).  - Continue cyclobenzaprine (FLEXERIL) 10 MG tablet; Take 1 tablet (10 mg total) by mouth 2 (two) times daily as needed for Muscle spasms.  - Change oxyCODONE-acetaminophen (PERCOCET)  mg per tablet; Take 1 tablet by mouth every 6 hours as needed for Pain.  - Follow up with Orthopedics for intra-articular viscosupplement injections.  - Prescription for an upright Rollator walker was generated and will be faxed to the [employer] vendor.  - Follow up in about 4 months (around 2/19/2024).    This was a 30 minute visit, more than 50% of which was spent counseling the patient about the diagnosis and the treatment plan.      This note was generated with [employer] voice recognition software. I  apologize for any possible typographical errors.

## 2023-11-13 ENCOUNTER — TELEPHONE (OUTPATIENT)
Dept: FAMILY MEDICINE | Facility: CLINIC | Age: 73
End: 2023-11-13
Payer: COMMERCIAL

## 2023-11-13 NOTE — TELEPHONE ENCOUNTER
Patient reports having to take the buspar 15 mg 4 times a day and would like to know if the dosage can be increased. Please advise.

## 2023-11-13 NOTE — TELEPHONE ENCOUNTER
I recommend he follow up with his PCP to adjust this medication, has not been seen since May and he may need other medication besides the santiago Pollock MD

## 2023-11-13 NOTE — TELEPHONE ENCOUNTER
----- Message from Mani Wilson sent at 11/13/2023  2:11 PM CST -----  Regarding: Kuldeep  Type: RX Refill Request     Who Called:Kuldeep      Have you contacted your pharmacy: Yes     Refill or New Rx: Refill      RX Name and Strength: busPIRone (BUSPAR) 15 MG tablet    Preferred Pharmacy with phone number:.  [facility] #[id number] [address] [address]  Phone: [phone] Fax: [phone]     Local or Mail Order: Local     Ordering Provider: Dr. Ginna Musa     Would the patient rather a call back or a response via [facility] [employer]? Callback      Best Call Back Number: [phone]      Additional Information: Pt stated that she would like for the doctor to up the dosage on the medication because she is taking 4 a day because the one is not helping.She would like for the nurse to give her a call

## 2023-11-14 NOTE — TELEPHONE ENCOUNTER
Patient informed of provider response. Patient says she is unable to come in for a visit due to transportation. Audio visit scheduled with PCP to discuss on 11/15/23 at 2:20 pm.

## 2023-11-16 ENCOUNTER — TELEPHONE (OUTPATIENT)
Dept: FAMILY MEDICINE | Facility: CLINIC | Age: 73
End: 2023-11-16
Payer: COMMERCIAL

## 2023-11-16 NOTE — TELEPHONE ENCOUNTER
----- Message from Alycia Raymundo sent at 11/15/2023  5:03 PM CST -----  Type:  Patient Returning Call    Who Called: pt   Who Left Message for Patient: pt   Does the patient know what this is regarding?: pt never got a call for appt  virtual audio she want  to see if the DR can up her busPIRone (BUSPAR) 15 MG tablet  Would the patient rather a call back or a response via [facility]?  Call   Best Call Back [phone]  Additional Information: call ba

## 2023-11-16 NOTE — TELEPHONE ENCOUNTER
Patient says she did not receive a call for audio visit yesterday. She is asking if her buspar prescription can be increased. She reports currently having to take 4 tablets daily.   Please advise.

## 2023-11-17 ENCOUNTER — OFFICE VISIT (OUTPATIENT)
Dept: FAMILY MEDICINE | Facility: CLINIC | Age: 73
End: 2023-11-17
Payer: COMMERCIAL

## 2023-11-17 DIAGNOSIS — F41.1 GAD (GENERALIZED ANXIETY DISORDER): ICD-10-CM

## 2023-11-17 DIAGNOSIS — Z99.89 WALKER AS AMBULATION AID: Primary | ICD-10-CM

## 2023-11-17 PROCEDURE — 3044F HG A1C LEVEL LT 7.0%: CPT | Mod: CPTII,95,, | Performed by: FAMILY MEDICINE

## 2023-11-17 PROCEDURE — 99213 PR OFFICE/OUTPT VISIT, EST, LEVL III, 20-29 MIN: ICD-10-PCS | Mod: 95,,, | Performed by: FAMILY MEDICINE

## 2023-11-17 PROCEDURE — 99213 OFFICE O/P EST LOW 20 MIN: CPT | Mod: 95,,, | Performed by: FAMILY MEDICINE

## 2023-11-17 PROCEDURE — 3044F PR MOST RECENT HEMOGLOBIN A1C LEVEL <7.0%: ICD-10-PCS | Mod: CPTII,95,, | Performed by: FAMILY MEDICINE

## 2023-11-17 RX ORDER — BUSPIRONE HYDROCHLORIDE 15 MG/1
15 TABLET ORAL 4 TIMES DAILY
Qty: 120 TABLET | Refills: 11 | Status: SHIPPED | OUTPATIENT
Start: 2023-11-17 | End: 2024-11-16

## 2023-11-17 NOTE — TELEPHONE ENCOUNTER
----- Message from Evette Yu sent at 11/17/2023  1:52 PM CST -----  Regarding: Self [phone]  Type: Patient Call Back     What is the request in detail: Pt is requesting a call back in regards to her audio visit that was scheduled for today, pt stated she did not receive a call during the visit time, only call she received was this morning from the nurse.     Can the clinic reply by [facility]? No     Would the patient rather a call back or a response via [facility]? Call back    Best call back number: .[phone]      Additional Information: this is in regards to her buspar medication     Thank you.

## 2023-11-17 NOTE — PROGRESS NOTES
Established Patient - Audio Only Telehealth Visit     The patient location is: [place]  The chief complaint leading to consultation is: buspar  Visit type: Virtual visit with audio only (telephone)  Total time spent with patient: 4 minutes       The reason for the audio only service rather than synchronous audio and video virtual visit was related to technical difficulties or patient preference/necessity.     Each patient to whom I provide medical services by telemedicine is:  (1) informed of the relationship between the physician and patient and the respective role of any other health care provider with respect to management of the patient; and (2) notified that they may decline to receive medical services by telemedicine and may withdraw from such care at any time. Patient verbally consented to receive this service via voice-only telephone call.       HPI: 72 year old female presents for concerns about buspar. She states she is taking more than 3 buspar a day and she is taking 4 per day. She is running out of them. She states this amount helps keep her calm. She would like to incrase her odse. She is on celexa 40mg daily as well.      Assessment and plan:    Diagnoses and all orders for this visit:    Walker as ambulation aid    HUSSEIN (generalized anxiety disorder)  -     busPIRone (BUSPAR) 15 MG tablet; Take 1 tablet (15 mg total) by mouth 4 (four) times daily.    Increased dose to 4 times a day.                           This service was not originating from a related E/M service provided within the previous 7 days nor will  to an E/M service or procedure within the next 24 hours or my soonest available appointment.  Prevailing standard of care was able to be met in this audio-only visit.

## 2023-12-04 ENCOUNTER — TELEPHONE (OUTPATIENT)
Dept: NEUROLOGY | Facility: CLINIC | Age: 73
End: 2023-12-04
Payer: COMMERCIAL

## 2023-12-04 NOTE — TELEPHONE ENCOUNTER
----- Message from Jasmin Copeland sent at 12/4/2023 10:16 AM CST -----  Regarding: Refill  Contact: Pt  [phone]  Pt is calling to refill rx: oxyCODONE-acetaminophen (PERCOCET)  mg per tablet 150 tablet please call     [id number] - [place] [address] [place] AT [address] [place] [address]  Phone: [phone] Fax: [phone]        
27-Oct-2022

## 2023-12-05 ENCOUNTER — NURSE TRIAGE (OUTPATIENT)
Dept: ADMINISTRATIVE | Facility: CLINIC | Age: 73
End: 2023-12-05
Payer: COMMERCIAL

## 2023-12-05 ENCOUNTER — TELEPHONE (OUTPATIENT)
Dept: NEUROLOGY | Facility: CLINIC | Age: 73
End: 2023-12-05
Payer: COMMERCIAL

## 2023-12-05 DIAGNOSIS — G89.29 CHRONIC NECK PAIN: ICD-10-CM

## 2023-12-05 DIAGNOSIS — M54.2 CHRONIC NECK PAIN: ICD-10-CM

## 2023-12-05 DIAGNOSIS — M54.41 CHRONIC MIDLINE LOW BACK PAIN WITH BILATERAL SCIATICA: ICD-10-CM

## 2023-12-05 DIAGNOSIS — G89.29 CHRONIC MIDLINE LOW BACK PAIN WITH BILATERAL SCIATICA: ICD-10-CM

## 2023-12-05 DIAGNOSIS — M54.42 CHRONIC MIDLINE LOW BACK PAIN WITH BILATERAL SCIATICA: ICD-10-CM

## 2023-12-05 DIAGNOSIS — M17.0 PRIMARY OSTEOARTHRITIS OF BOTH KNEES: ICD-10-CM

## 2023-12-05 RX ORDER — OXYCODONE AND ACETAMINOPHEN 10; 325 MG/1; MG/1
1 TABLET ORAL
Qty: 150 TABLET | Refills: 0 | Status: SHIPPED | OUTPATIENT
Start: 2023-12-05 | End: 2024-01-08 | Stop reason: SDUPTHER

## 2023-12-05 RX ORDER — OXYCODONE HYDROCHLORIDE 10 MG/1
10 TABLET ORAL
Qty: 150 TABLET | Refills: 0 | OUTPATIENT
Start: 2023-12-05 | End: 2024-01-04

## 2023-12-05 NOTE — TELEPHONE ENCOUNTER
----- Message from Samantha Isaacs sent at 12/5/2023 12:43 PM CST -----  Regarding: rx refill  Contact: pt @ [phone]  Rx Refill/Request Is this a Refill or New Rx: refill     Rx Name and Strength: oxyCODONE-acetaminophen (PERCOCET)  mg per tablet    Preferred Pharmacy with phone number:   [facility][id number] [address] AT [address]  [place] [address]  Phone: [phone] Fax: [phone]      Communication Preference: call back     Additional Information:  Pt is advising she is out of RX and is needing a refill to be sent to the pharmacy. Please call to advise further if necessary. Thank you for all you are doing.

## 2023-12-05 NOTE — TELEPHONE ENCOUNTER
Pt reports she has been trying to contact Dr. Esquivel or his office for 2 days now to get a refill of her oxycodone medication, pt states she is out of the med and really needs the refill as soon as possible. Pt advised to call office when it is open for controlled substance refills per protocol, but a message will be routed with her request as well. Pt encouraged to call back with any worsening symptoms or questions. Pt verbalized understanding.    Reason for Disposition   Caller requesting a CONTROLLED substance prescription refill (e.g., narcotics, ADHD medicines)    Protocols used: Medication Refill and Renewal Call-A-

## 2023-12-05 NOTE — TELEPHONE ENCOUNTER
----- Message from Francia Grayson sent at 12/5/2023  9:17 AM CST -----  Regarding: Refill  Contact: [phone]  Type:  RX Refill Request        Who Called:  ELENA CHANDLER [[id number]]        Refill or New Rx:  Refill        RX Name and Strength:  oxyCODONE-acetaminophen (PERCOCET)  mg per tablet        Preferred Pharmacy with phone number:      [facility][id number]  [address]  [place] [address]  Phone: [phone] Fax: [phone]            Local or Mail Order: Local        Would the patient rather a call back or a response via [facility]?        Best Call Back Number:   [phone]        Additional Information:  Happy Holidays!!

## 2024-01-08 DIAGNOSIS — G89.29 CHRONIC NECK PAIN: ICD-10-CM

## 2024-01-08 DIAGNOSIS — M54.42 CHRONIC MIDLINE LOW BACK PAIN WITH BILATERAL SCIATICA: ICD-10-CM

## 2024-01-08 DIAGNOSIS — M17.0 PRIMARY OSTEOARTHRITIS OF BOTH KNEES: ICD-10-CM

## 2024-01-08 DIAGNOSIS — G89.29 CHRONIC MIDLINE LOW BACK PAIN WITH BILATERAL SCIATICA: ICD-10-CM

## 2024-01-08 DIAGNOSIS — M54.2 CHRONIC NECK PAIN: ICD-10-CM

## 2024-01-08 DIAGNOSIS — M54.41 CHRONIC MIDLINE LOW BACK PAIN WITH BILATERAL SCIATICA: ICD-10-CM

## 2024-01-08 RX ORDER — OXYCODONE AND ACETAMINOPHEN 10; 325 MG/1; MG/1
1 TABLET ORAL
Qty: 150 TABLET | Refills: 0 | Status: SHIPPED | OUTPATIENT
Start: 2024-01-09 | End: 2024-02-10 | Stop reason: SDUPTHER

## 2024-01-08 RX ORDER — CYCLOBENZAPRINE HCL 10 MG
10 TABLET ORAL 3 TIMES DAILY PRN
Qty: 90 TABLET | Refills: 1 | Status: SHIPPED | OUTPATIENT
Start: 2024-01-08 | End: 2024-01-10 | Stop reason: SDUPTHER

## 2024-01-08 RX ORDER — OXYCODONE HYDROCHLORIDE 10 MG/1
10 TABLET ORAL
Qty: 150 TABLET | Refills: 0 | OUTPATIENT
Start: 2024-01-08 | End: 2024-02-07

## 2024-01-09 DIAGNOSIS — M17.0 PRIMARY OSTEOARTHRITIS OF BOTH KNEES: ICD-10-CM

## 2024-01-09 RX ORDER — OXYCODONE HYDROCHLORIDE 10 MG/1
10 TABLET ORAL
Qty: 150 TABLET | Refills: 0 | Status: SHIPPED | OUTPATIENT
Start: 2024-01-09 | End: 2024-02-06 | Stop reason: SDUPTHER

## 2024-01-10 RX ORDER — CYCLOBENZAPRINE HCL 10 MG
10 TABLET ORAL 3 TIMES DAILY PRN
Qty: 90 TABLET | Refills: 1 | Status: SHIPPED | OUTPATIENT
Start: 2024-01-10

## 2024-01-12 RX ORDER — CETIRIZINE HYDROCHLORIDE 10 MG/1
TABLET ORAL
Qty: 90 TABLET | Refills: 3 | Status: SHIPPED | OUTPATIENT
Start: 2024-01-12

## 2024-01-12 NOTE — TELEPHONE ENCOUNTER
Refill Decision Note   Kuldeep Villela  is requesting a refill authorization.  Brief Assessment and Rationale for Refill:  Approve     Medication Therapy Plan:         Comments:     Note composed:7:41 AM 01/12/2024             Appointments     Last Visit   11/17/2023 Ginna Musa MD   Next Visit   Visit date not found Ginna Musa MD

## 2024-01-12 NOTE — TELEPHONE ENCOUNTER
No care due was identified.  [facility] Embedded Care Due Messages. Reference number: [id number].   1/12/2024 3:33:14 AM CST

## 2024-01-24 ENCOUNTER — TELEPHONE (OUTPATIENT)
Dept: NEUROLOGY | Facility: CLINIC | Age: 74
End: 2024-01-24
Payer: COMMERCIAL

## 2024-01-24 DIAGNOSIS — M17.0 PRIMARY OSTEOARTHRITIS OF BOTH KNEES: Primary | ICD-10-CM

## 2024-01-25 NOTE — TELEPHONE ENCOUNTER
I called the patient.    She said her knee pain has been getting worse.  She is to take more pain medication up to 4 tablets of oxycodone daily.  I told her she has a prescription for up to 5 tablets per day so she should not run out.  I can also put a referral to Orthopedics for Visco supplement knee injections.  She having had dose for over a year.  She voiced agreement.

## 2024-01-25 NOTE — TELEPHONE ENCOUNTER
----- Message from Barbra Latif MA sent at 1/24/2024  2:16 PM CST -----  Regarding: FW: MEDICATION: PAIN  Contact: self    ----- Message -----  From: Radha Middleton  Sent: 1/24/2024   1:31 PM CST  To: [facility] Staff  Subject: MEDICATION: PAIN                                 Pt stated she is having bad back and knee pain. Pt stated she have not been sleeping at night. Pt stated the medication is not helping. Pt stated she have to 2 of her pain medications to help w/ the pain. Pt ask for a call to discuss    PAIN LEVEL: 9-10    Contact info  [phone] (home)

## 2024-01-25 NOTE — TELEPHONE ENCOUNTER
----- Message from Barbra Latif MA sent at 1/24/2024  2:16 PM CST -----  Regarding: FW: MEDICATION: PAIN  Contact: self    ----- Message -----  From: Radha Middleton  Sent: 1/24/2024   1:31 PM CST  To: [facility] Staff  Subject: MEDICATION: PAIN                                 Pt stated she is having bad back and knee pain. Pt stated she have not been sleeping at night. Pt stated the medication is not helping. Pt stated she have to 2 of her pain medications to help w/ the pain. Pt ask for a call to discuss    PAIN LEVEL: 9-10    Contact info  [phone] (home)

## 2024-01-31 DIAGNOSIS — M17.0 PRIMARY OSTEOARTHRITIS OF BOTH KNEES: Primary | ICD-10-CM

## 2024-02-05 ENCOUNTER — TELEPHONE (OUTPATIENT)
Dept: NEUROLOGY | Facility: CLINIC | Age: 74
End: 2024-02-05
Payer: COMMERCIAL

## 2024-02-05 NOTE — TELEPHONE ENCOUNTER
----- Message from Dyan Mcpherson sent at 2/5/2024  1:15 PM CST -----  Regarding: refill  Refill request. ( Pt is experiencing  bad constipation, wants to be off of these  pills )    oxyCODONE (ROXICODONE) 10 mg Tab immediate release tablet      [id number]  [place] [place] [address]  Phone: [phone] Fax: [phone]        Confirmed patient's contact info below:  Contact Name: Kuldeep Villela  Phone Number: [phone]

## 2024-02-06 DIAGNOSIS — M17.0 PRIMARY OSTEOARTHRITIS OF BOTH KNEES: ICD-10-CM

## 2024-02-06 DIAGNOSIS — M54.2 CHRONIC NECK PAIN: ICD-10-CM

## 2024-02-06 DIAGNOSIS — G89.29 CHRONIC MIDLINE LOW BACK PAIN WITH BILATERAL SCIATICA: ICD-10-CM

## 2024-02-06 DIAGNOSIS — M54.42 CHRONIC MIDLINE LOW BACK PAIN WITH BILATERAL SCIATICA: ICD-10-CM

## 2024-02-06 DIAGNOSIS — G89.29 CHRONIC NECK PAIN: ICD-10-CM

## 2024-02-06 DIAGNOSIS — Z12.11 COLON CANCER SCREENING: ICD-10-CM

## 2024-02-06 DIAGNOSIS — M54.41 CHRONIC MIDLINE LOW BACK PAIN WITH BILATERAL SCIATICA: ICD-10-CM

## 2024-02-06 RX ORDER — OXYCODONE HYDROCHLORIDE 10 MG/1
10 TABLET ORAL
Qty: 150 TABLET | Refills: 0 | Status: SHIPPED | OUTPATIENT
Start: 2024-02-09 | End: 2024-03-10

## 2024-02-06 RX ORDER — OXYCODONE AND ACETAMINOPHEN 10; 325 MG/1; MG/1
1 TABLET ORAL
Qty: 150 TABLET | Refills: 0 | OUTPATIENT
Start: 2024-02-06 | End: 2024-03-07

## 2024-02-09 ENCOUNTER — TELEPHONE (OUTPATIENT)
Dept: PHYSICAL MEDICINE AND REHAB | Facility: CLINIC | Age: 74
End: 2024-02-09

## 2024-02-09 DIAGNOSIS — G25.81 RESTLESS LEG: ICD-10-CM

## 2024-02-09 NOTE — TELEPHONE ENCOUNTER
----- Message from Jenicatiesilvia Tran sent at 2/9/2024 12:10 PM CST -----  Regarding: Refill request  Contact: [phone]  Pt called to request a call to discuss a different medication then the oxyCODONE (ROXICODONE) 10 mg Tab immediate release tablet. Pt says the medication has her constipated. Pls call the pt at  [phone] to discuss sending another medication to the Pharmacy.      [id number]  [place] [address] AT [address]  [place] [address]  Phone: [phone] Fax: [phone]    Thank you.

## 2024-02-10 DIAGNOSIS — M54.2 CHRONIC NECK PAIN: ICD-10-CM

## 2024-02-10 DIAGNOSIS — M54.41 CHRONIC MIDLINE LOW BACK PAIN WITH BILATERAL SCIATICA: ICD-10-CM

## 2024-02-10 DIAGNOSIS — M54.42 CHRONIC MIDLINE LOW BACK PAIN WITH BILATERAL SCIATICA: ICD-10-CM

## 2024-02-10 DIAGNOSIS — G89.29 CHRONIC MIDLINE LOW BACK PAIN WITH BILATERAL SCIATICA: ICD-10-CM

## 2024-02-10 DIAGNOSIS — G89.29 CHRONIC NECK PAIN: ICD-10-CM

## 2024-02-10 RX ORDER — OXYCODONE AND ACETAMINOPHEN 10; 325 MG/1; MG/1
1 TABLET ORAL
Qty: 150 TABLET | Refills: 0 | Status: SHIPPED | OUTPATIENT
Start: 2024-02-10 | End: 2024-03-11 | Stop reason: SDUPTHER

## 2024-02-10 NOTE — TELEPHONE ENCOUNTER
Care Due:                  Date            Visit Type   Department     Provider  --------------------------------------------------------------------------------                                             [facility]      MEDICINE/INTERN  Last Visit: 11-      AUDIO ONLY   [facility]         Ginna Nguyen  Next Visit: None Scheduled  None         None Found                                                            Last  Test          Frequency    Reason                     Performed    Due Date  --------------------------------------------------------------------------------    CMP.........  12 months..  pravastatin..............  05- 05-    Lipid Panel.  12 months..  pravastatin..............  05- 05-    [facility] Embedded Care Due Messages. Reference number: [id number].   2/09/2024 6:39:59 PM CST

## 2024-02-15 RX ORDER — ROPINIROLE 1 MG/1
TABLET, FILM COATED ORAL
Qty: 180 TABLET | Refills: 1 | Status: SHIPPED | OUTPATIENT
Start: 2024-02-15

## 2024-02-16 DIAGNOSIS — M17.0 PRIMARY OSTEOARTHRITIS OF BOTH KNEES: Primary | ICD-10-CM

## 2024-02-23 ENCOUNTER — TELEPHONE (OUTPATIENT)
Dept: ORTHOPEDICS | Facility: CLINIC | Age: 74
End: 2024-02-23
Payer: COMMERCIAL

## 2024-02-23 NOTE — TELEPHONE ENCOUNTER
Returned call to patient, she will speak to her daughter and call back to schedule.  ----- Message from Jarad Bower sent at 2/23/2024 12:25 PM CST -----  Regarding: Appt  Contact: pt [phone]  Pt is requesting a callback to discuss scheduling xray and injection the same day, please call pt  [phone]

## 2024-02-25 ENCOUNTER — HOSPITAL ENCOUNTER (EMERGENCY)
Facility: HOSPITAL | Age: 74
Discharge: HOME | End: 2024-02-25
Attending: STUDENT IN AN ORGANIZED HEALTH CARE EDUCATION/TRAINING PROGRAM
Payer: COMMERCIAL

## 2024-02-25 VITALS
RESPIRATION RATE: 18 BRPM | SYSTOLIC BLOOD PRESSURE: 129 MMHG | DIASTOLIC BLOOD PRESSURE: 68 MMHG | BODY MASS INDEX: 46.22 KG/M2 | HEART RATE: 88 BPM | OXYGEN SATURATION: 98 % | TEMPERATURE: 98 F | HEIGHT: 60 IN

## 2024-02-25 DIAGNOSIS — R60.0 PERIPHERAL EDEMA: ICD-10-CM

## 2024-02-25 DIAGNOSIS — M79.89 LEG SWELLING: Primary | ICD-10-CM

## 2024-02-25 LAB
ALBUMIN SERPL BCP-MCNC: 3.3 G/DL (ref 3.5–5.2)
ALP SERPL-CCNC: 68 U/L (ref 55–135)
ALT SERPL W/O P-5'-P-CCNC: 6 U/L (ref 10–44)
ANION GAP SERPL CALC-SCNC: 10 MMOL/L (ref 8–16)
AST SERPL-CCNC: 10 U/L (ref 10–40)
BASOPHILS # BLD AUTO: 0.04 K/UL (ref 0–0.2)
BASOPHILS NFR BLD: 0.5 % (ref 0–1.9)
BILIRUB SERPL-MCNC: 0.6 MG/DL (ref 0.1–1)
BNP SERPL-MCNC: 14 PG/ML (ref 0–99)
BUN SERPL-MCNC: 10 MG/DL (ref 8–23)
CALCIUM SERPL-MCNC: 9.3 MG/DL (ref 8.7–10.5)
CHLORIDE SERPL-SCNC: 106 MMOL/L (ref 95–110)
CO2 SERPL-SCNC: 25 MMOL/L (ref 23–29)
CREAT SERPL-MCNC: 1 MG/DL (ref 0.5–1.4)
DIFFERENTIAL METHOD BLD: ABNORMAL
EOSINOPHIL # BLD AUTO: 0.4 K/UL (ref 0–0.5)
EOSINOPHIL NFR BLD: 4.6 % (ref 0–8)
ERYTHROCYTE [DISTWIDTH] IN BLOOD BY AUTOMATED COUNT: 14.3 % (ref 11.5–14.5)
EST. GFR  (NO RACE VARIABLE): >60 ML/MIN/1.73 M^2
GLUCOSE SERPL-MCNC: 78 MG/DL (ref 70–110)
HCT VFR BLD AUTO: 33.2 % (ref 37–48.5)
HCV AB SERPL QL IA: NORMAL
HGB BLD-MCNC: 10.7 G/DL (ref 12–16)
HIV 1+2 AB+HIV1 P24 AG SERPL QL IA: NORMAL
IMM GRANULOCYTES # BLD AUTO: 0.03 K/UL (ref 0–0.04)
IMM GRANULOCYTES NFR BLD AUTO: 0.4 % (ref 0–0.5)
LYMPHOCYTES # BLD AUTO: 1.4 K/UL (ref 1–4.8)
LYMPHOCYTES NFR BLD: 18.5 % (ref 18–48)
MCH RBC QN AUTO: 33.9 PG (ref 27–31)
MCHC RBC AUTO-ENTMCNC: 32.2 G/DL (ref 32–36)
MCV RBC AUTO: 105 FL (ref 82–98)
MONOCYTES # BLD AUTO: 0.5 K/UL (ref 0.3–1)
MONOCYTES NFR BLD: 6 % (ref 4–15)
NEUTROPHILS # BLD AUTO: 5.4 K/UL (ref 1.8–7.7)
NEUTROPHILS NFR BLD: 70 % (ref 38–73)
NRBC BLD-RTO: 0 /100 WBC
PLATELET # BLD AUTO: 269 K/UL (ref 150–450)
PMV BLD AUTO: 10.9 FL (ref 9.2–12.9)
POTASSIUM SERPL-SCNC: 3.9 MMOL/L (ref 3.5–5.1)
PROT SERPL-MCNC: 6.7 G/DL (ref 6–8.4)
RBC # BLD AUTO: 3.16 M/UL (ref 4–5.4)
SODIUM SERPL-SCNC: 141 MMOL/L (ref 136–145)
WBC # BLD AUTO: 7.77 K/UL (ref 3.9–12.7)

## 2024-02-25 PROCEDURE — 80053 COMPREHEN METABOLIC PANEL: CPT | Performed by: STUDENT IN AN ORGANIZED HEALTH CARE EDUCATION/TRAINING PROGRAM

## 2024-02-25 PROCEDURE — 86803 HEPATITIS C AB TEST: CPT | Performed by: PHYSICIAN ASSISTANT

## 2024-02-25 PROCEDURE — 93005 ELECTROCARDIOGRAM TRACING: CPT

## 2024-02-25 PROCEDURE — 85025 COMPLETE CBC W/AUTO DIFF WBC: CPT | Performed by: NURSE PRACTITIONER

## 2024-02-25 PROCEDURE — 93010 ELECTROCARDIOGRAM REPORT: CPT | Mod: ,,, | Performed by: INTERNAL MEDICINE

## 2024-02-25 PROCEDURE — 99285 EMERGENCY DEPT VISIT HI MDM: CPT | Mod: 25

## 2024-02-25 PROCEDURE — 87389 HIV-1 AG W/HIV-1&-2 AB AG IA: CPT | Performed by: PHYSICIAN ASSISTANT

## 2024-02-25 PROCEDURE — 83880 ASSAY OF NATRIURETIC PEPTIDE: CPT | Performed by: NURSE PRACTITIONER

## 2024-02-25 RX ORDER — FUROSEMIDE 20 MG/1
20 TABLET ORAL 2 TIMES DAILY
Qty: 10 TABLET | Refills: 0 | Status: SHIPPED | OUTPATIENT
Start: 2024-02-25 | End: 2024-03-01

## 2024-02-25 NOTE — FIRST PROVIDER EVALUATION
" Emergency Department TeleTriage Encounter Note      CHIEF COMPLAINT    Chief Complaint   Patient presents with    Leg Swelling     Leg swelling, weeping, possible blisters, leg pain        VITAL SIGNS   Initial Vitals [02/25/24 1625]   BP Pulse Resp Temp SpO2   132/61 81 20 97.5 °F (36.4 °C) 100 %      MAP       --            ALLERGIES    Review of patient's allergies indicates:   Allergen Reactions    Penicillins Anxiety and Other (See Comments)    Anesthetic [benzocaine-aloe vera]      Jittery/hyper    Guaifenesin Anxiety and Other (See Comments)       PROVIDER TRIAGE NOTE  This is a teletriage evaluation of a 73 y.o. female presenting to the ED with c/o bilateral lower extremity swelling, redness and pain.  Pt states swelling has been ongoing for "awhile" but wounds worsened this week. Family denies any history of CHF.       PE: VSS.  NAD noted.  Sitting in wheelchair    Plan: labs    Limited physical exam via telehealth: The patient is awake, alert, answering questions appropriately and is not in respiratory distress. All ED beds are full at present; patient notified of this status.  Patient seen and medically screened by Nurse Practitioner via teletriage.      Initial orders will be placed and care will be transferred to an alternate provider when patient is roomed for a full evaluation. Any additional orders and the final disposition will be determined by that provider.         ORDERS  Labs Reviewed   HIV 1 / 2 ANTIBODY   HEPATITIS C ANTIBODY   CBC W/ AUTO DIFFERENTIAL   COMPREHENSIVE METABOLIC PANEL   B-TYPE NATRIURETIC PEPTIDE       ED Orders (720h ago, onward)      Start Ordered     Status Ordering Provider    02/25/24 1720 02/25/24 1719  CBC auto differential  STAT         Ordered ONIEL SARABIA    02/25/24 1720 02/25/24 1719  Comprehensive metabolic panel  STAT         Ordered ONIEL SARABIA    02/25/24 1720 02/25/24 1719  Brain natriuretic peptide  STAT         Ordered ONIEL SARABIA    02/25/24 " 1627 02/25/24 1626  HIV 1/2 Ag/Ab (4th Gen)  STAT         Ordered CARISSAZORABING TAVERAS HIEN    02/25/24 1627 02/25/24 1626  Hepatitis C Antibody  STAT         Ordered BING THAPA HIEN    02/25/24 1627 02/25/24 1627  EKG 12-lead  Once         Completed by NAN CUBA on 2/25/2024 at  4:35 PM ALYSSIA GRACIA              Virtual Visit Note: The provider triage portion of this emergency department evaluation and documentation was performed via Mine, a HIPAA-compliant telemedicine application, in concert with a tele-presenter in the room. A face to face patient evaluation with one of my colleagues will occur once the patient is placed in an emergency department room.      DISCLAIMER: This note was prepared with [employer] voice recognition transcription software. Garbled syntax, mangled pronouns, and other bizarre constructions may be attributed to that software system.

## 2024-02-26 LAB
OHS QRS DURATION: 82 MS
OHS QTC CALCULATION: 450 MS

## 2024-02-26 NOTE — ED PROVIDER NOTES
Encounter Date: 2/25/2024       History     Chief Complaint   Patient presents with    Leg Swelling     Leg swelling, weeping, possible blisters, leg pain      73 y.o. female with arthritis, asthma, HLD, HTN presents for bilateral lower extremity swelling.  Patient reports she has large legs at baseline but she has had worsening swelling to the legs and is now having some dark skin changes and a wound to the left leg.  She reports shortness of breath that is chronic and not changed and attributed to her asthma.  She denies history of similar swelling in the past.  She denies any chest pain, fever/chills, or known heart failure history    The history is provided by the patient and medical records.     Review of patient's allergies indicates:   Allergen Reactions    Penicillins Anxiety and Other (See Comments)    Anesthetic [benzocaine-aloe vera]      Jittery/hyper    Guaifenesin Anxiety and Other (See Comments)     Past Medical History:   Diagnosis Date    Arthritis     Asthma     Cervical spondylosis 07/13/2012    Chronic LBP 07/13/2012    Chronic neck pain 07/13/2012    Hyperlipidemia     Hypertension     Lumbar radiculopathy, BLE 07/13/2012    Lumbar spinal stenosis at L4-L5. 07/13/2012    Lumbar spondylosis 07/13/2012    Morbid obesity 07/13/2012    Primary osteoarthritis of both knees 07/13/2012    Spondylolisthesis, grade 1 at L4-L5. 07/13/2012    Tenosynovitis of ankle     Rt peroneus longus    Walker as ambulation aid      Past Surgical History:   Procedure Laterality Date    CHOLECYSTECTOMY      HYSTERECTOMY      MT REMOVAL OF OVARY/TUBE(S)       Family History   Problem Relation Age of Onset    Heart disease Mother     Hypertension Mother     Heart disease Father     Hypertension Father     Breast cancer Neg Hx     Colon cancer Neg Hx     Ovarian cancer Neg Hx      Social History     Tobacco Use    Smoking status: Every Day     Current packs/day: 1.00     Average packs/day: 1 pack/day for 20.0 years (20.0  ttl pk-yrs)     Types: Cigarettes    Smokeless tobacco: Never   Substance Use Topics    Alcohol use: No     Alcohol/week: 0.0 standard drinks of alcohol    Drug use: No     Review of Systems   Reason unable to perform ROS: See HPI for relevant ROS.       Physical Exam     Initial Vitals [02/25/24 1625]   BP Pulse Resp Temp SpO2   132/61 81 20 97.5 °F (36.4 °C) 100 %      MAP       --         Physical Exam    Nursing note and vitals reviewed.  Constitutional:   Alert, normal work of breathing, no acute distress   Eyes: Conjunctivae are normal. No scleral icterus.   Cardiovascular:  Normal rate, regular rhythm and intact distal pulses.           Pulmonary/Chest: Breath sounds normal. No stridor. No respiratory distress.   Abdominal: Abdomen is soft. She exhibits no distension. There is no abdominal tenderness.   Musculoskeletal:      Comments: Bilateral lower extremity edema with dark, dry, scaly skin.  Small 1 cm x 1 cm area of superficial ulceration of the left anterior shin.  Approximately 2 x 2 cm raised lesion to the posterior left ankle with no palpable fluctuance    No erythema or warmth to bilateral lower extremities     Neurological: She is alert.   Skin: Skin is warm and dry.         ED Course   Procedures  Labs Reviewed   CBC W/ AUTO DIFFERENTIAL - Abnormal; Notable for the following components:       Result Value    RBC 3.16 (*)     Hemoglobin 10.7 (*)     Hematocrit 33.2 (*)      (*)     MCH 33.9 (*)     All other components within normal limits   COMPREHENSIVE METABOLIC PANEL - Abnormal; Notable for the following components:    Albumin 3.3 (*)     ALT 6 (*)     All other components within normal limits   HIV 1 / 2 ANTIBODY    Narrative:     Release to patient->Immediate   HEPATITIS C ANTIBODY    Narrative:     Release to patient->Immediate   B-TYPE NATRIURETIC PEPTIDE          Imaging Results              X-Ray Chest AP Portable (Final result)  Result time 02/25/24 20:20:30      Final result by  Jose Luis Russell, DO (02/25/24 20:20:30)                   Impression:      Coarse interstitial opacities bilaterally, may be chronic.  Mild edema not excluded.      Electronically signed by: Jose Luis Russell  Date:    02/25/2024  Time:    20:20               Narrative:    EXAMINATION:  XR CHEST AP PORTABLE    CLINICAL HISTORY:  Localized edema    TECHNIQUE:  Single frontal view of the chest was performed.    COMPARISON:  None    FINDINGS:  The lungs are well expanded.  There are coarse chronic opacities.  There is no focal consolidation.  The pleural spaces are clear.  The cardiac silhouette is enlarged.  There are calcifications of the aortic arch.  Osseous structures are intact.                                       Medications - No data to display  Medical Decision Making  73 y.o. female with arthritis, asthma, HLD, HTN presents for bilateral lower extremity swelling  Differentials include venous insufficiency, venous stasis dermatitis, lymphedema, less likely CHF/pulmonary edema  Patient with no respiratory symptoms, no known heart failure history, lungs clear, no hypoxia, speaking full sentences  Patient presenting with bilateral lower extremity edema with associated venous stasis dermatitis.  She does have 1 small open ulcer to left anterior shin and 1 raised lesion to the left posterior leg, no palpable fluctuance, not hot to touch, no evidence to suggest cellulitis or deep space infection/abscess  Workup initiated including evidence of pulmonary edema/heart failure  Recommended compression stockings, basic wound care, will start short course of diuretics, recommended PCP follow-up, return precautions given    Amount and/or Complexity of Data Reviewed  Labs:  Decision-making details documented in ED Course.  Radiology: ordered. Decision-making details documented in ED Course.    Risk  Prescription drug management.               ED Course as of 02/25/24 2040   Sun Feb 25, 2024   2012 X-Ray Chest AP  Portable  Findings, per independent interpretation:  Symmetrically expanded lungs, mild prominence of bilateral interstitial markings, no pulmonary edema, no focal consolidation, no prior chest x-ray for comparison [OK]   2039 BNP: 14 [OK]   2039 Comprehensive metabolic panel(!)  No electrolyte abnormality [OK]   2039 Workup overall unremarkable, chest x-ray with some mild interstitial markings, no prior to compare, this may be her baseline, in context of normal BNP, no known heart history, and no significant pulmonary symptoms apart from her baseline, will discharge with short course of Lasix, wound care instructions, instructions to place compression stockings, close follow-up with her PCP was recommended, return precautions given [OK]      ED Course User Index  [OK] Cassius Boss MD                           Clinical Impression:  Final diagnoses:  [M79.89] Leg swelling (Primary)  [R60.0] Peripheral edema          ED Disposition Condition    Discharge Stable          ED Prescriptions       Medication Sig Dispense Start Date End Date Auth. Provider    furosemide (LASIX) 20 MG tablet Take 1 tablet (20 mg total) by mouth 2 (two) times daily. for 5 days 10 tablet 2/25/2024 3/1/2024 Cassius Boss MD          Follow-up Information       Follow up With Specialties Details Why Contact Info    Ginna Musa MD Family Medicine Schedule an appointment as soon as possible for a visit   [address] [facility] [place] [id number] [phone]      [facility] - Emergency Dept Emergency Medicine  As needed, shortness of breath, fever/chills, or for any other concerning symptoms [address] [facility] [address] [phone]             Cassius Boss MD  02/25/24 2040

## 2024-02-26 NOTE — DISCHARGE INSTRUCTIONS
Gently cleanse your wound with soap and water every day  Place a nonadherent dressing over the open wound and wrap it with a gauze dressing.  After placing this dressing use compression stockings whenever able to  Elevate your legs above the level of your chest/heart whenever your able to.  Take the prescribed course of 5 days of furosemide

## 2024-03-11 DIAGNOSIS — M54.41 CHRONIC MIDLINE LOW BACK PAIN WITH BILATERAL SCIATICA: ICD-10-CM

## 2024-03-11 DIAGNOSIS — G89.29 CHRONIC MIDLINE LOW BACK PAIN WITH BILATERAL SCIATICA: ICD-10-CM

## 2024-03-11 DIAGNOSIS — G89.29 CHRONIC NECK PAIN: ICD-10-CM

## 2024-03-11 DIAGNOSIS — M54.42 CHRONIC MIDLINE LOW BACK PAIN WITH BILATERAL SCIATICA: ICD-10-CM

## 2024-03-11 DIAGNOSIS — M54.2 CHRONIC NECK PAIN: ICD-10-CM

## 2024-03-11 RX ORDER — OXYCODONE AND ACETAMINOPHEN 10; 325 MG/1; MG/1
1 TABLET ORAL
Qty: 150 TABLET | Refills: 0 | Status: SHIPPED | OUTPATIENT
Start: 2024-03-12 | End: 2024-04-16 | Stop reason: SDUPTHER

## 2024-03-12 ENCOUNTER — TELEPHONE (OUTPATIENT)
Dept: ORTHOPEDICS | Facility: CLINIC | Age: 74
End: 2024-03-12
Payer: COMMERCIAL

## 2024-03-12 DIAGNOSIS — M54.42 CHRONIC MIDLINE LOW BACK PAIN WITH BILATERAL SCIATICA: ICD-10-CM

## 2024-03-12 DIAGNOSIS — G89.29 CHRONIC MIDLINE LOW BACK PAIN WITH BILATERAL SCIATICA: ICD-10-CM

## 2024-03-12 DIAGNOSIS — M54.2 CHRONIC NECK PAIN: ICD-10-CM

## 2024-03-12 DIAGNOSIS — G89.29 CHRONIC NECK PAIN: ICD-10-CM

## 2024-03-12 DIAGNOSIS — M54.41 CHRONIC MIDLINE LOW BACK PAIN WITH BILATERAL SCIATICA: ICD-10-CM

## 2024-03-12 DIAGNOSIS — M17.0 PRIMARY OSTEOARTHRITIS OF BOTH KNEES: ICD-10-CM

## 2024-03-12 RX ORDER — MELOXICAM 7.5 MG/1
7.5 TABLET ORAL 2 TIMES DAILY
Qty: 180 TABLET | Refills: 1 | Status: SHIPPED | OUTPATIENT
Start: 2024-03-12 | End: 2024-04-17 | Stop reason: SDUPTHER

## 2024-03-12 NOTE — TELEPHONE ENCOUNTER
----- Message from Aby Campos sent at 3/12/2024  2:47 PM CDT -----  Regarding: [id number]  Type: RX Refill Request    Who Called: self     Have you contacted your pharmacy:yes     Refill or New Rx:refill     RX Name and Strength:meloxicam (MOBIC) 7.5 MG tablet        Preferred Pharmacy with phone number:  [facility] #[id number] - [place]  [place] [address]  Phone: [phone] Fax: [phone]           Local or Mail Order:local     Ordering Provider:Lencho     Would the patient rather a call back or a response via [facility]? Call back    Best Call Back Number:[phone]       Additional Information: she states if there is any issue refilling to please reach out to her. Pt states she is really in needof the medication as soon as possible

## 2024-03-12 NOTE — TELEPHONE ENCOUNTER
Spoke with with regard to next available appointment. Wanted to be seen for her bilateral knee pain and wanted next available that worked with her schedule with regard to her ride situation. Scheduled with next available provider.

## 2024-03-12 NOTE — TELEPHONE ENCOUNTER
No care due was identified.  [facility] Embedded Care Due Messages. Reference number: [id number].   3/12/2024 2:52:46 PM CDT

## 2024-03-15 ENCOUNTER — PATIENT OUTREACH (OUTPATIENT)
Dept: ADMINISTRATIVE | Facility: HOSPITAL | Age: 74
End: 2024-03-15
Payer: COMMERCIAL

## 2024-03-15 NOTE — PROGRESS NOTES
Health Maintenance Due   Topic Date Due    Pneumococcal Vaccines (Age 65+) (1 of 2 - PCV) Never done    TETANUS VACCINE  Never done    Sign Pain Contract  Never done    Naloxone Prescription  Never done    DEXA Scan  Never done    Shingles Vaccine (1 of 2) Never done    RSV Vaccine (Age 60+ and Pregnant patients) (1 - 1-dose 60+ series) Never done    Colorectal Cancer Screening  05/14/2014    LDCT Lung Screen  06/14/2020    Mammogram  04/23/2022    Influenza Vaccine (1) 09/01/2023    COVID-19 Vaccine (3 - 2023-24 season) 09/01/2023    Urine Drug Screen  09/10/2023     Chart review done. HM updated. Immunizations reviewed & updated. Care Everywhere updated.

## 2024-04-16 ENCOUNTER — NURSE TRIAGE (OUTPATIENT)
Dept: ADMINISTRATIVE | Facility: CLINIC | Age: 74
End: 2024-04-16
Payer: COMMERCIAL

## 2024-04-16 DIAGNOSIS — G89.29 CHRONIC MIDLINE LOW BACK PAIN WITH BILATERAL SCIATICA: ICD-10-CM

## 2024-04-16 DIAGNOSIS — M54.41 CHRONIC MIDLINE LOW BACK PAIN WITH BILATERAL SCIATICA: ICD-10-CM

## 2024-04-16 DIAGNOSIS — M54.42 CHRONIC MIDLINE LOW BACK PAIN WITH BILATERAL SCIATICA: ICD-10-CM

## 2024-04-16 DIAGNOSIS — G89.29 CHRONIC NECK PAIN: ICD-10-CM

## 2024-04-16 DIAGNOSIS — M54.2 CHRONIC NECK PAIN: ICD-10-CM

## 2024-04-16 RX ORDER — OXYCODONE AND ACETAMINOPHEN 10; 325 MG/1; MG/1
1 TABLET ORAL
Qty: 150 TABLET | Refills: 0 | Status: SHIPPED | OUTPATIENT
Start: 2024-04-16 | End: 2024-04-17 | Stop reason: SDUPTHER

## 2024-04-16 NOTE — TELEPHONE ENCOUNTER
Pt reports about to run out of her pain medication (Mobic) for her knee/back issues, wanting to request a refill thru Dr. Esquivel's office, Pt advised to call office when open for controlled substance refills per protocol, but message will be sent with the request as well. Pt encouraged to call back with any worsening symptoms or questions. She verbalized understanding.    Reason for Disposition   Caller requesting a CONTROLLED substance prescription refill (e.g., narcotics, ADHD medicines)    Protocols used: Medication Refill and Renewal Call-A-AH

## 2024-04-17 DIAGNOSIS — M17.0 PRIMARY OSTEOARTHRITIS OF BOTH KNEES: ICD-10-CM

## 2024-04-17 DIAGNOSIS — M54.42 CHRONIC MIDLINE LOW BACK PAIN WITH BILATERAL SCIATICA: ICD-10-CM

## 2024-04-17 DIAGNOSIS — G89.29 CHRONIC NECK PAIN: ICD-10-CM

## 2024-04-17 DIAGNOSIS — M54.2 CHRONIC NECK PAIN: ICD-10-CM

## 2024-04-17 DIAGNOSIS — M54.41 CHRONIC MIDLINE LOW BACK PAIN WITH BILATERAL SCIATICA: ICD-10-CM

## 2024-04-17 DIAGNOSIS — G89.29 CHRONIC MIDLINE LOW BACK PAIN WITH BILATERAL SCIATICA: ICD-10-CM

## 2024-04-17 RX ORDER — MELOXICAM 7.5 MG/1
7.5 TABLET ORAL 2 TIMES DAILY
Qty: 180 TABLET | Refills: 1 | Status: SHIPPED | OUTPATIENT
Start: 2024-04-17 | End: 2024-05-17

## 2024-04-17 RX ORDER — OXYCODONE HYDROCHLORIDE 10 MG/1
10 TABLET ORAL
Qty: 150 TABLET | Refills: 0 | OUTPATIENT
Start: 2024-04-17 | End: 2024-05-17

## 2024-04-17 RX ORDER — OXYCODONE AND ACETAMINOPHEN 10; 325 MG/1; MG/1
1 TABLET ORAL
Qty: 150 TABLET | Refills: 0 | Status: SHIPPED | OUTPATIENT
Start: 2024-04-17 | End: 2024-05-17 | Stop reason: SDUPTHER

## 2024-05-07 RX ORDER — CYCLOBENZAPRINE HCL 10 MG
10 TABLET ORAL
Qty: 90 TABLET | Refills: 1 | Status: SHIPPED | OUTPATIENT
Start: 2024-05-07

## 2024-05-17 ENCOUNTER — TELEPHONE (OUTPATIENT)
Dept: ORTHOPEDICS | Facility: CLINIC | Age: 74
End: 2024-05-17
Payer: COMMERCIAL

## 2024-05-17 DIAGNOSIS — M54.42 CHRONIC MIDLINE LOW BACK PAIN WITH BILATERAL SCIATICA: ICD-10-CM

## 2024-05-17 DIAGNOSIS — M54.2 CHRONIC NECK PAIN: ICD-10-CM

## 2024-05-17 DIAGNOSIS — G89.29 CHRONIC MIDLINE LOW BACK PAIN WITH BILATERAL SCIATICA: ICD-10-CM

## 2024-05-17 DIAGNOSIS — G89.29 CHRONIC NECK PAIN: ICD-10-CM

## 2024-05-17 DIAGNOSIS — M54.41 CHRONIC MIDLINE LOW BACK PAIN WITH BILATERAL SCIATICA: ICD-10-CM

## 2024-05-17 RX ORDER — OXYCODONE AND ACETAMINOPHEN 10; 325 MG/1; MG/1
1 TABLET ORAL
Qty: 150 TABLET | Refills: 0 | Status: SHIPPED | OUTPATIENT
Start: 2024-05-17 | End: 2024-06-16

## 2024-05-17 NOTE — TELEPHONE ENCOUNTER
----- Message from Dyan Mcpherson sent at 5/17/2024  1:13 PM CDT -----  Regarding: bassem  Pt calling I regards to   scheduling     injection  bassem for both knees, pt states see is still waiting for a call back      Confirmed patient's contact info below:  Contact Name: Kuldeep Villela  Phone Number: [phone]

## 2024-05-29 ENCOUNTER — TELEPHONE (OUTPATIENT)
Dept: ORTHOPEDICS | Facility: CLINIC | Age: 74
End: 2024-05-29
Payer: COMMERCIAL

## 2024-05-29 DIAGNOSIS — M17.0 PRIMARY OSTEOARTHRITIS OF BOTH KNEES: Primary | ICD-10-CM

## 2024-05-29 NOTE — TELEPHONE ENCOUNTER
I called the patient today regarding her voice message. Patient is aware of her apt date and times . The patient verbalized understanding and has no further questions.           ----- Message from Tamiko Bernstein sent at 5/29/2024 10:46 AM CDT -----  Regarding: appt access  Contact: pt [phone]  Pt returning call from missed call regarding scheduling injection for Friday. Pls call

## 2024-06-12 ENCOUNTER — OFFICE VISIT (OUTPATIENT)
Dept: ORTHOPEDICS | Facility: CLINIC | Age: 74
End: 2024-06-12
Payer: COMMERCIAL

## 2024-06-12 DIAGNOSIS — M17.0 PRIMARY OSTEOARTHRITIS OF BOTH KNEES: Primary | ICD-10-CM

## 2024-06-12 PROCEDURE — 99999 PR PBB SHADOW E&M-EST. PATIENT-LVL III: CPT | Mod: PBBFAC,,, | Performed by: NURSE PRACTITIONER

## 2024-06-12 PROCEDURE — 20610 DRAIN/INJ JOINT/BURSA W/O US: CPT | Mod: 50,S$GLB,, | Performed by: NURSE PRACTITIONER

## 2024-06-12 PROCEDURE — 99499 UNLISTED E&M SERVICE: CPT | Mod: S$GLB,,, | Performed by: NURSE PRACTITIONER

## 2024-06-12 NOTE — PROGRESS NOTES
Kuldeep Villela presents to clinic today for the first bilateral knee Euflexxa injection.      Exam demonstrates the no effusion in the  bilateral knee, and the skin is intact.    Radiographs show degenerative changes of knee    Diagnosis: primary osteoarthritis bilateral knee    After time out was performed and patient ID, side, and site were verified, the  bilateral  knee was sterilly prepped in the standard fashion. Verbal consent obtained.  A 25-gauge needle was introduced into bilateral knee joint from an koffi-lateral site without complication and knee was then injected with 2 ml of Euflexxa.  Sterile dressing was applied.  The patient was instructed to resume activities as tolerated and to call with any problems.     Patient will return next week for the second injection.

## 2024-06-18 ENCOUNTER — TELEPHONE (OUTPATIENT)
Dept: PHYSICAL MEDICINE AND REHAB | Facility: CLINIC | Age: 74
End: 2024-06-18

## 2024-06-18 DIAGNOSIS — M54.2 CHRONIC NECK PAIN: ICD-10-CM

## 2024-06-18 DIAGNOSIS — G89.29 CHRONIC MIDLINE LOW BACK PAIN WITH BILATERAL SCIATICA: ICD-10-CM

## 2024-06-18 DIAGNOSIS — M17.0 PRIMARY OSTEOARTHRITIS OF BOTH KNEES: ICD-10-CM

## 2024-06-18 DIAGNOSIS — M54.42 CHRONIC MIDLINE LOW BACK PAIN WITH BILATERAL SCIATICA: ICD-10-CM

## 2024-06-18 DIAGNOSIS — M54.41 CHRONIC MIDLINE LOW BACK PAIN WITH BILATERAL SCIATICA: ICD-10-CM

## 2024-06-18 DIAGNOSIS — G89.29 CHRONIC NECK PAIN: ICD-10-CM

## 2024-06-18 RX ORDER — MELOXICAM 7.5 MG/1
7.5 TABLET ORAL 2 TIMES DAILY
Qty: 180 TABLET | Refills: 1 | Status: CANCELLED | OUTPATIENT
Start: 2024-06-18 | End: 2024-07-18

## 2024-06-20 ENCOUNTER — TELEPHONE (OUTPATIENT)
Dept: ORTHOPEDICS | Facility: CLINIC | Age: 74
End: 2024-06-20
Payer: COMMERCIAL

## 2024-06-20 DIAGNOSIS — G89.29 CHRONIC NECK PAIN: ICD-10-CM

## 2024-06-20 DIAGNOSIS — M54.42 CHRONIC MIDLINE LOW BACK PAIN WITH BILATERAL SCIATICA: ICD-10-CM

## 2024-06-20 DIAGNOSIS — G89.29 CHRONIC MIDLINE LOW BACK PAIN WITH BILATERAL SCIATICA: ICD-10-CM

## 2024-06-20 DIAGNOSIS — M54.2 CHRONIC NECK PAIN: ICD-10-CM

## 2024-06-20 DIAGNOSIS — M54.41 CHRONIC MIDLINE LOW BACK PAIN WITH BILATERAL SCIATICA: ICD-10-CM

## 2024-06-20 RX ORDER — OXYCODONE AND ACETAMINOPHEN 10; 325 MG/1; MG/1
1 TABLET ORAL
Qty: 150 TABLET | Refills: 0 | Status: SHIPPED | OUTPATIENT
Start: 2024-06-20 | End: 2024-07-20

## 2024-06-20 NOTE — TELEPHONE ENCOUNTER
Called top and she stated she cannot make her injection appt today. Stated I will reschedule her for Friday and she will call if she needs a new date and time. Pt verbalized understanding and has no further questions.     ----- Message from Vika Carmichael sent at 6/19/2024  1:48 PM CDT -----  Kuldeep Villela calling regarding Patient Advice for wanting to confirm that she will be at the new appt on 06/20/24 at 1:30pm

## 2024-07-11 DIAGNOSIS — E78.5 HYPERLIPIDEMIA, UNSPECIFIED HYPERLIPIDEMIA TYPE: ICD-10-CM

## 2024-07-11 DIAGNOSIS — I11.9 BENIGN HYPERTENSIVE HEART DISEASE WITHOUT HEART FAILURE: ICD-10-CM

## 2024-07-11 NOTE — TELEPHONE ENCOUNTER
Care Due:                  Date            Visit Type   Department     Provider  --------------------------------------------------------------------------------                                             [facility]      MEDICINE/INTERN  Last Visit: 11-      AUDIO ONLY   [facility]         Ginna Nguyen  Next Visit: None Scheduled  None         None Found                                                            Last  Test          Frequency    Reason                     Performed    Due Date  --------------------------------------------------------------------------------    Lipid Panel.  12 months..  pravastatin..............  05- 05-    [facility] Embedded Care Due Messages. Reference number: [id number].   7/11/2024 3:34:13 AM CDT

## 2024-07-12 RX ORDER — AMLODIPINE BESYLATE 10 MG/1
TABLET ORAL
Qty: 90 TABLET | Refills: 0 | Status: SHIPPED | OUTPATIENT
Start: 2024-07-12

## 2024-07-12 RX ORDER — PRAVASTATIN SODIUM 20 MG/1
20 TABLET ORAL DAILY
Qty: 90 TABLET | Refills: 0 | Status: SHIPPED | OUTPATIENT
Start: 2024-07-12

## 2024-07-22 DIAGNOSIS — M54.41 CHRONIC MIDLINE LOW BACK PAIN WITH BILATERAL SCIATICA: ICD-10-CM

## 2024-07-22 DIAGNOSIS — G89.29 CHRONIC NECK PAIN: ICD-10-CM

## 2024-07-22 DIAGNOSIS — M54.42 CHRONIC MIDLINE LOW BACK PAIN WITH BILATERAL SCIATICA: ICD-10-CM

## 2024-07-22 DIAGNOSIS — M17.0 PRIMARY OSTEOARTHRITIS OF BOTH KNEES: ICD-10-CM

## 2024-07-22 DIAGNOSIS — G89.29 CHRONIC MIDLINE LOW BACK PAIN WITH BILATERAL SCIATICA: ICD-10-CM

## 2024-07-22 DIAGNOSIS — M54.2 CHRONIC NECK PAIN: ICD-10-CM

## 2024-07-22 RX ORDER — OXYCODONE AND ACETAMINOPHEN 10; 325 MG/1; MG/1
1 TABLET ORAL
Qty: 150 TABLET | Refills: 0 | OUTPATIENT
Start: 2024-07-22 | End: 2024-08-21

## 2024-07-22 RX ORDER — OXYCODONE HYDROCHLORIDE 10 MG/1
10 TABLET ORAL
Qty: 150 TABLET | Refills: 0 | Status: SHIPPED | OUTPATIENT
Start: 2024-07-22 | End: 2024-08-21

## 2024-08-12 DIAGNOSIS — F41.0 GENERALIZED ANXIETY DISORDER WITH PANIC ATTACKS: ICD-10-CM

## 2024-08-12 DIAGNOSIS — F41.1 GENERALIZED ANXIETY DISORDER WITH PANIC ATTACKS: ICD-10-CM

## 2024-08-12 RX ORDER — CITALOPRAM 40 MG/1
TABLET, FILM COATED ORAL
Qty: 90 TABLET | Refills: 3 | Status: SHIPPED | OUTPATIENT
Start: 2024-08-12

## 2024-08-12 NOTE — TELEPHONE ENCOUNTER
No care due was identified.  [facility] Embedded Care Due Messages. Reference number: [id number].   8/12/2024 3:34:49 AM CDT

## 2024-08-14 ENCOUNTER — TELEPHONE (OUTPATIENT)
Dept: FAMILY MEDICINE | Facility: CLINIC | Age: 74
End: 2024-08-14
Payer: COMMERCIAL

## 2024-08-14 DIAGNOSIS — F41.1 GAD (GENERALIZED ANXIETY DISORDER): ICD-10-CM

## 2024-08-14 DIAGNOSIS — G25.81 RESTLESS LEG: ICD-10-CM

## 2024-08-14 RX ORDER — ROPINIROLE 1 MG/1
1 TABLET, FILM COATED ORAL 2 TIMES DAILY
Qty: 180 TABLET | Refills: 1 | Status: SHIPPED | OUTPATIENT
Start: 2024-08-14

## 2024-08-14 RX ORDER — BUSPIRONE HYDROCHLORIDE 15 MG/1
15 TABLET ORAL 4 TIMES DAILY
Qty: 120 TABLET | Refills: 11 | Status: SHIPPED | OUTPATIENT
Start: 2024-08-14 | End: 2025-08-14

## 2024-08-14 RX ORDER — SERTRALINE HYDROCHLORIDE 50 MG/1
50 TABLET, FILM COATED ORAL DAILY
Qty: 30 TABLET | Refills: 11 | Status: SHIPPED | OUTPATIENT
Start: 2024-08-14 | End: 2025-08-14

## 2024-08-14 NOTE — TELEPHONE ENCOUNTER
----- Message from Evette Yu sent at 8/14/2024 12:40 PM CDT -----  Regarding: Self   Type: RX Refill Request    Who Called: Self    Have you contacted your pharmacy: no    Refill    RX Name and Strength: Nerve pills and any other meds that she needs refills on     Preferred Pharmacy with phone number: .  [facility] #[id number] [address] AT [address] [address]  Phone: [phone] Fax: [phone]          Local or Mail Order: local     Would the patient rather a call back or a response via [facility]? Call back     Best Call Back Number:.[phone]      Additional Information:     Thank you.

## 2024-08-26 ENCOUNTER — TELEPHONE (OUTPATIENT)
Dept: PHYSICAL MEDICINE AND REHAB | Facility: HOSPITAL | Age: 74
End: 2024-08-26
Payer: COMMERCIAL

## 2024-08-26 ENCOUNTER — TELEPHONE (OUTPATIENT)
Dept: FAMILY MEDICINE | Facility: CLINIC | Age: 74
End: 2024-08-26
Payer: COMMERCIAL

## 2024-08-26 NOTE — TELEPHONE ENCOUNTER
Call returned. Patient wanted to know why her pharmacy dispensed so many tablets for her buspar prescription. Patient advised that it is prescribed to be taken up to 4 times daily , 30 day supply = 120 tablets. She verbalized understanding.

## 2024-08-26 NOTE — TELEPHONE ENCOUNTER
----- Message from Jennifer Rosas sent at 8/26/2024  1:31 PM CDT -----  Regarding: call back  Type: Patient Call Back    Who called: pt     What is the request in detail: requesting call to discuss medication regimen with staff, needs a medication refilled but doesn't know the name of the medication     Can the clinic reply by [facility]?no    Would the patient rather a call back or a response via [facility]? call    Best call back number: [phone]     Additional Information:

## 2024-08-26 NOTE — TELEPHONE ENCOUNTER
----- Message from Evette Yu sent at 8/26/2024 12:24 PM CDT -----  Regarding: Self   Type: RX Refill Request    Who Called: Self     Have you contacted your pharmacy: yes     Refill    RX Name and Strength: oxyCODONE (ROXICODONE) 10 mg Tab immediate release tablet     Preferred Pharmacy with phone number: .  [facility] #[id number] [address] [place] [address]  Phone: [phone] Fax: [phone]        Local or Mail Order: local     Would the patient rather a call back or a response via [facility]? Call back     Best Call Back Number:.[phone]      Additional Information:  Pt stated she has been trying to get in contact with staff all morning and couldnty get through. Pt is out of medication and has been for a few days     Thank you.

## 2024-08-28 DIAGNOSIS — M54.42 CHRONIC MIDLINE LOW BACK PAIN WITH BILATERAL SCIATICA: ICD-10-CM

## 2024-08-28 DIAGNOSIS — M54.2 CHRONIC NECK PAIN: ICD-10-CM

## 2024-08-28 DIAGNOSIS — M54.41 CHRONIC MIDLINE LOW BACK PAIN WITH BILATERAL SCIATICA: ICD-10-CM

## 2024-08-28 DIAGNOSIS — M17.0 PRIMARY OSTEOARTHRITIS OF BOTH KNEES: ICD-10-CM

## 2024-08-28 DIAGNOSIS — G89.29 CHRONIC NECK PAIN: ICD-10-CM

## 2024-08-28 DIAGNOSIS — G89.29 CHRONIC MIDLINE LOW BACK PAIN WITH BILATERAL SCIATICA: ICD-10-CM

## 2024-08-28 RX ORDER — OXYCODONE AND ACETAMINOPHEN 10; 325 MG/1; MG/1
1 TABLET ORAL
Qty: 150 TABLET | Refills: 0 | Status: SHIPPED | OUTPATIENT
Start: 2024-08-28 | End: 2024-09-27

## 2024-08-28 RX ORDER — OXYCODONE HYDROCHLORIDE 10 MG/1
10 TABLET ORAL
Qty: 150 TABLET | Refills: 0 | OUTPATIENT
Start: 2024-08-28 | End: 2024-09-27

## 2024-08-28 NOTE — TELEPHONE ENCOUNTER
----- Message from Ekaterina Riley sent at 8/28/2024  8:01 AM CDT -----  Regarding: Refill  Contact: [phone]  Is this a Refill or New Rx (Script/Out of Refills): Refill     Name of Caller: Patient     Rx Name: oxyCODONE (ROXICODONE) 10 mg Tab immediate release tablet    Pharmacy Name:   [facility] #[id number]  [address]  [place] [address]  Phone: [phone] Fax: [phone]    Additional Information: Patient is stating that she is completely out of medication and has been without it for days. She states she is in severe pain. Pt would like a call from office.

## 2024-08-29 ENCOUNTER — TELEPHONE (OUTPATIENT)
Dept: PHYSICAL MEDICINE AND REHAB | Facility: CLINIC | Age: 74
End: 2024-08-29
Payer: COMMERCIAL

## 2024-08-29 NOTE — TELEPHONE ENCOUNTER
----- Message from Preston Valerio sent at 8/28/2024  4:39 PM CDT -----  Regarding: Refill  Contact: [phone]  Patient called requesting a new prescription on  oxyCODONE-acetaminophen (PERCOCET)  mg per tablet  medication. Please contact patient as soon as possible.     [facility][id number]  [place] [place] AT [address] [place] [address]  Phone: [phone] Fax: [phone]

## 2024-09-18 ENCOUNTER — TELEPHONE (OUTPATIENT)
Dept: FAMILY MEDICINE | Facility: CLINIC | Age: 74
End: 2024-09-18
Payer: COMMERCIAL

## 2024-09-18 NOTE — TELEPHONE ENCOUNTER
Spoke w/ PT. She reports that her pain medication (oxycodone) is causing her to be constipated. She had to manually disimpact herself. Now, her bowels are moving without help, but she cannot stop the movement.     Please advise if anything she can take to stop her bowels or regulate her bowels while on this pain medication?

## 2024-09-18 NOTE — TELEPHONE ENCOUNTER
----- Message from Mani Wilson sent at 9/18/2024  1:08 PM CDT -----  Regarding: Kuldeep  Type:Patient Callback     Who called: Kuldeep     What is the request in detail: Pt stated that she needs to speak to the doctor as soon as possible. Pt is having trouble with her bowel movement and she needs for it to be checked and it may be due to the medication. Please reach out to the patient as soon as possible because she has to wear protection because she is using the bathroom constently.     Can the clinic reply by [facility]? No     Would the patient rather a call back or a response via [facility]? Callback     Best call back number: [phone]      Additional Information:

## 2024-09-19 ENCOUNTER — TELEPHONE (OUTPATIENT)
Dept: FAMILY MEDICINE | Facility: CLINIC | Age: 74
End: 2024-09-19
Payer: COMMERCIAL

## 2024-09-19 ENCOUNTER — HOSPITAL ENCOUNTER (EMERGENCY)
Facility: HOSPITAL | Age: 74
Discharge: HOME | End: 2024-09-20
Attending: EMERGENCY MEDICINE
Payer: COMMERCIAL

## 2024-09-19 DIAGNOSIS — K59.00 CONSTIPATION: ICD-10-CM

## 2024-09-19 LAB
ALBUMIN SERPL BCP-MCNC: 3.4 G/DL (ref 3.5–5.2)
ALP SERPL-CCNC: 67 U/L (ref 55–135)
ALT SERPL W/O P-5'-P-CCNC: <5 U/L (ref 10–44)
ANION GAP SERPL CALC-SCNC: 12 MMOL/L (ref 8–16)
AST SERPL-CCNC: 19 U/L (ref 10–40)
BASOPHILS # BLD AUTO: 0.04 K/UL (ref 0–0.2)
BASOPHILS NFR BLD: 0.4 % (ref 0–1.9)
BILIRUB SERPL-MCNC: 0.7 MG/DL (ref 0.1–1)
BUN SERPL-MCNC: 15 MG/DL (ref 8–23)
BUN SERPL-MCNC: 17 MG/DL (ref 6–30)
CALCIUM SERPL-MCNC: 9.6 MG/DL (ref 8.7–10.5)
CHLORIDE SERPL-SCNC: 102 MMOL/L (ref 95–110)
CHLORIDE SERPL-SCNC: 104 MMOL/L (ref 95–110)
CO2 SERPL-SCNC: 21 MMOL/L (ref 23–29)
CREAT SERPL-MCNC: 1.3 MG/DL (ref 0.5–1.4)
CREAT SERPL-MCNC: 1.3 MG/DL (ref 0.5–1.4)
DIFFERENTIAL METHOD BLD: ABNORMAL
EOSINOPHIL # BLD AUTO: 0.1 K/UL (ref 0–0.5)
EOSINOPHIL NFR BLD: 0.7 % (ref 0–8)
ERYTHROCYTE [DISTWIDTH] IN BLOOD BY AUTOMATED COUNT: 13.2 % (ref 11.5–14.5)
EST. GFR  (NO RACE VARIABLE): 44 ML/MIN/1.73 M^2
GLUCOSE SERPL-MCNC: 86 MG/DL (ref 70–110)
GLUCOSE SERPL-MCNC: 86 MG/DL (ref 70–110)
HCT VFR BLD AUTO: 34.1 % (ref 37–48.5)
HCT VFR BLD CALC: 34 %PCV (ref 36–54)
HGB BLD-MCNC: 10.4 G/DL (ref 12–16)
IMM GRANULOCYTES # BLD AUTO: 0.04 K/UL (ref 0–0.04)
IMM GRANULOCYTES NFR BLD AUTO: 0.4 % (ref 0–0.5)
LIPASE SERPL-CCNC: 36 U/L (ref 4–60)
LYMPHOCYTES # BLD AUTO: 0.9 K/UL (ref 1–4.8)
LYMPHOCYTES NFR BLD: 8.2 % (ref 18–48)
MCH RBC QN AUTO: 32.6 PG (ref 27–31)
MCHC RBC AUTO-ENTMCNC: 30.5 G/DL (ref 32–36)
MCV RBC AUTO: 107 FL (ref 82–98)
MONOCYTES # BLD AUTO: 0.7 K/UL (ref 0.3–1)
MONOCYTES NFR BLD: 5.9 % (ref 4–15)
NEUTROPHILS # BLD AUTO: 9.5 K/UL (ref 1.8–7.7)
NEUTROPHILS NFR BLD: 84.4 % (ref 38–73)
NRBC BLD-RTO: 0 /100 WBC
PLATELET # BLD AUTO: 360 K/UL (ref 150–450)
PMV BLD AUTO: 9.7 FL (ref 9.2–12.9)
POC IONIZED CALCIUM: 1.13 MMOL/L (ref 1.06–1.42)
POC TCO2 (MEASURED): 26 MMOL/L (ref 23–29)
POTASSIUM BLD-SCNC: 4.7 MMOL/L (ref 3.5–5.1)
POTASSIUM SERPL-SCNC: 4.8 MMOL/L (ref 3.5–5.1)
PROT SERPL-MCNC: 7.3 G/DL (ref 6–8.4)
RBC # BLD AUTO: 3.19 M/UL (ref 4–5.4)
SAMPLE: ABNORMAL
SODIUM BLD-SCNC: 138 MMOL/L (ref 136–145)
SODIUM SERPL-SCNC: 137 MMOL/L (ref 136–145)
WBC # BLD AUTO: 11.24 K/UL (ref 3.9–12.7)

## 2024-09-19 PROCEDURE — 80053 COMPREHEN METABOLIC PANEL: CPT

## 2024-09-19 PROCEDURE — 63600175 PHARM REV CODE 636 W HCPCS

## 2024-09-19 PROCEDURE — 96361 HYDRATE IV INFUSION ADD-ON: CPT

## 2024-09-19 PROCEDURE — 96374 THER/PROPH/DIAG INJ IV PUSH: CPT

## 2024-09-19 PROCEDURE — 85025 COMPLETE CBC W/AUTO DIFF WBC: CPT

## 2024-09-19 PROCEDURE — 25000003 PHARM REV CODE 250

## 2024-09-19 PROCEDURE — 99285 EMERGENCY DEPT VISIT HI MDM: CPT | Mod: 25

## 2024-09-19 PROCEDURE — 83690 ASSAY OF LIPASE: CPT

## 2024-09-19 RX ORDER — POLYETHYLENE GLYCOL 3350, SODIUM SULFATE ANHYDROUS, SODIUM BICARBONATE, SODIUM CHLORIDE, POTASSIUM CHLORIDE 236; 22.74; 6.74; 5.86; 2.97 G/4L; G/4L; G/4L; G/4L; G/4L
240 POWDER, FOR SOLUTION ORAL ONCE
Qty: 240 ML | Refills: 0 | Status: SHIPPED | OUTPATIENT
Start: 2024-09-19 | End: 2024-09-19

## 2024-09-19 RX ORDER — KETOROLAC TROMETHAMINE 30 MG/ML
10 INJECTION, SOLUTION INTRAMUSCULAR; INTRAVENOUS
Status: COMPLETED | OUTPATIENT
Start: 2024-09-19 | End: 2024-09-19

## 2024-09-19 RX ADMIN — KETOROLAC TROMETHAMINE 10 MG: 30 INJECTION, SOLUTION INTRAMUSCULAR; INTRAVENOUS at 08:09

## 2024-09-19 RX ADMIN — SODIUM CHLORIDE 1000 ML: 9 INJECTION, SOLUTION INTRAVENOUS at 08:09

## 2024-09-19 NOTE — TELEPHONE ENCOUNTER
She just needs to empty out. No laxatives. When the frequent stools stop she will only take a stool softener, colace

## 2024-09-19 NOTE — TELEPHONE ENCOUNTER
----- Message from Jennifer Peaceody sent at 9/19/2024 12:17 PM CDT -----  Regarding: call back  Type: Patient Call Back    Who called: pt    What is the request in detail: calling to check on status of a refill request she called about yesterday     Can the clinic reply by [facility]?no    Would the patient rather a call back or a response via [facility]? call    Best call back number: [phone]     Additional Information:

## 2024-09-19 NOTE — TELEPHONE ENCOUNTER
Patient is following up on a medication request for loose stools.     Spoke w/ PT. She reports that her pain medication (oxycodone) is causing her to be constipated. She had to manually disimpact herself. Now, her bowels are moving without help, but she cannot stop the movement.      Please advise if anything she can take to stop her bowels or regulate her bowels while on this pain medication?             LR    9/18/24  1:46 PM  Birgit Stover LPN attempted to contact Kuldeep Villela (Success)   Birgit Stover LPN     LR    9/18/24  1:22 PM  Note  ----- Message from Mani Wilson sent at 9/18/2024  1:08 PM CDT -----  Regarding: Kuldeep  Type:Patient Callback      Who called: Kuldeep      What is the request in detail: Pt stated that she needs to speak to the doctor as soon as possible. Pt is having trouble with her bowel movement and she needs for it to be checked and it may be due to the medication. Please reach out to the patient as soon as possible because she has to wear protection because she is using the bathroom constently.      Can the clinic reply by [facility]? No      Would the patient rather a call back or a response via [facility]? Callback      Best call back number: .[phone]        Additional Information:

## 2024-09-20 VITALS
TEMPERATURE: 98 F | BODY MASS INDEX: 46.33 KG/M2 | DIASTOLIC BLOOD PRESSURE: 89 MMHG | HEART RATE: 89 BPM | HEIGHT: 60 IN | RESPIRATION RATE: 18 BRPM | SYSTOLIC BLOOD PRESSURE: 160 MMHG | WEIGHT: 236 LBS | OXYGEN SATURATION: 100 %

## 2024-09-20 NOTE — ED NOTES
The patient was incontinent of stool, linens changed and perineal care given. Patient repositioned for comfort.

## 2024-09-20 NOTE — ED NOTES
I-STAT Chem-8+ Results:   Value Reference Range   Sodium 138 136-145 mmol/L   Potassium  4.7 3.5-5.1 mmol/L   Chloride 102  mmol/L   Ionized Calcium 1.13 1.06-1.42 mmol/L   CO2 (measured) 26 23-29 mmol/L   Glucose 86  mg/dL   BUN 17 6-30 mg/dL   Creatinine 1.3 0.5-1.4 mg/dL   Hematocrit 34 36-54%

## 2024-09-20 NOTE — PROVIDER PROGRESS NOTES - EMERGENCY DEPT.
Encounter Date: 9/19/2024    ED Physician Progress Notes        Patient signed out to me.  Patient was manually disimpacted and referred to improvement of abdominal pain.  CT abdomenpelvis shows findings suspicious for rectal fecal impaction.  Patient discharged.

## 2024-09-20 NOTE — ED PROVIDER NOTES
Encounter Date: 9/19/2024       History     Chief Complaint   Patient presents with    Abdominal Pain     From home via EMS, with lower abdominal pain and constipation. Pt states she is constipated. Denies CP or SOB.     HPI  73-year-old female with history of hypertension, chronic back pain presents with chief complaint of lower abdominal pain and constipation. The pt takes opiate pain medication for her back pain and thinks this is causing her constipation.  Tried to manually disimpact herself today and did pass some stool.  She says that this was dark colored stool.  She has also tried stool softeners at home.  Review of patient's allergies indicates:   Allergen Reactions    Penicillins Anxiety and Other (See Comments)    Anesthetic [benzocaine-aloe vera]      Jittery/hyper    Guaifenesin Anxiety and Other (See Comments)     Past Medical History:   Diagnosis Date    Arthritis     Asthma     Cervical spondylosis 07/13/2012    Chronic LBP 07/13/2012    Chronic neck pain 07/13/2012    Hyperlipidemia     Hypertension     Lumbar radiculopathy, BLE 07/13/2012    Lumbar spinal stenosis at L4-L5. 07/13/2012    Lumbar spondylosis 07/13/2012    Morbid obesity 07/13/2012    Primary osteoarthritis of both knees 07/13/2012    Spondylolisthesis, grade 1 at L4-L5. 07/13/2012    Tenosynovitis of ankle     Rt peroneus longus    Walker as ambulation aid      Past Surgical History:   Procedure Laterality Date    CHOLECYSTECTOMY      HYSTERECTOMY      NE REMOVAL OF OVARY/TUBE(S)       Family History   Problem Relation Name Age of Onset    Heart disease Mother      Hypertension Mother      Heart disease Father      Hypertension Father      Breast cancer Neg Hx      Colon cancer Neg Hx      Ovarian cancer Neg Hx       Social History     Tobacco Use    Smoking status: Every Day     Current packs/day: 1.00     Average packs/day: 1 pack/day for 20.0 years (20.0 ttl pk-yrs)     Types: Cigarettes    Smokeless tobacco: Never   Substance Use  Topics    Alcohol use: No     Alcohol/week: 0.0 standard drinks of alcohol    Drug use: No     Review of Systems    Physical Exam     Initial Vitals   BP Pulse Resp Temp SpO2   09/19/24 1659 09/19/24 1659 09/19/24 1659 09/19/24 1700 09/19/24 1659   122/76 104 20 97.4 °F (36.3 °C) 99 %      MAP       --                Physical Exam  Physical Exam:  CONSTITUTIONAL: Well developed, well nourished, in no acute distress.  HENT: Normocephalic, atraumatic    EYES: Sclerae anicteric   NECK: Supple, no thyroid enlargement  CARDIOVASCULAR: Regular rate and rhythm without any murmurs, gallops, rubs.  RESPIRATORY: Speaking in full sentences. Breathing comfortably. Auscultation of the lungs revealed normal breath sounds b/l, no wheezing, no rales, no rhonchi.  ABDOMEN: Soft and nontender, no masses, no rebound or guarding   NEUROLOGIC: Alert, interacting normally. No facial droop. Voice is clear. Speech is fluent.  MSK: Moving all four extremities.  SKIN: Warm and dry. No visible rash on exposed areas of skin.    Psych: Mood and affect normal.     ED Course   Procedures  Labs Reviewed   CBC W/ AUTO DIFFERENTIAL - Abnormal       Result Value    WBC 11.24      RBC 3.19 (*)     Hemoglobin 10.4 (*)     Hematocrit 34.1 (*)      (*)     MCH 32.6 (*)     MCHC 30.5 (*)     RDW 13.2      Platelets 360      MPV 9.7      Immature Granulocytes 0.4      Gran # (ANC) 9.5 (*)     Immature Grans (Abs) 0.04      Lymph # 0.9 (*)     Mono # 0.7      Eos # 0.1      Baso # 0.04      nRBC 0      Gran % 84.4 (*)     Lymph % 8.2 (*)     Mono % 5.9      Eosinophil % 0.7      Basophil % 0.4      Differential Method Automated      Narrative:     Add on Lipase per Dr. Magaña @ 20:41 pm to order # [id number]   COMPREHENSIVE METABOLIC PANEL - Abnormal    Sodium 137      Potassium 4.8      Chloride 104      CO2 21 (*)     Glucose 86      BUN 15      Creatinine 1.3      Calcium 9.6      Total Protein 7.3      Albumin 3.4 (*)     Total Bilirubin 0.7       Alkaline Phosphatase 67      AST 19      ALT <5 (*)     eGFR 44.0 (*)     Anion Gap 12      Narrative:     Add on Lipase per Dr. Magaña @ 20:41 pm to order # [id number]   ISTAT PROCEDURE - Abnormal    POC Glucose 86      POC BUN 17      POC Creatinine 1.3      POC Sodium 138      POC Potassium 4.7      POC Chloride 102      POC TCO2 (MEASURED) 26      POC Ionized Calcium 1.13      POC Hematocrit 34 (*)     Sample DIONICIO     LIPASE   LIPASE    Lipase 36      Narrative:     Add on Lipase per Dr. Magaña @ 20:41 pm to order # [id number]   URINALYSIS, REFLEX TO URINE CULTURE   ISTAT CHEM8          Imaging Results              CT Abdomen Pelvis  Without Contrast (In process)                      Medications   ketorolac injection 9.999 mg (9.999 mg Intravenous Given 9/19/24 2050)   sodium chloride 0.9% bolus 1,000 mL 1,000 mL (0 mLs Intravenous Stopped 9/19/24 2151)     Medical Decision Making  73-year-old female presenting with chief complaint of lower abdominal pain and constipation.  She has tried to manually disimpact herself at home with minimal relief.  She is hemodynamically stable.  FOBT guaiac negative. Her abdomen is nontender throughout.  The patient was actively passing brown stool when I went to examine her.  Doubt GI bleed.  I considered small bowel obstruction, but the patient is not vomiting. Her labs including CBC, CMP, lipase, are largely unremarkable. CT read preliminarily shows moderate stool burden.  I manually disimpacted the patient and this provided her additional relief.  She is still waiting for urinalysis and official CT radiology read. I have provided her a prescription for GoLYTELY that she can take as needed.  Final disposition per oncoming team.     Amount and/or Complexity of Data Reviewed  Labs: ordered.  Radiology: ordered.  ECG/medicine tests:  Decision-making details documented in ED Course.    Risk  Prescription drug management.               ED Course as of 09/19/24 2233   Thu Sep 19,  2024 2046 EKG 12-lead  Multifocal atrial tachycardia. Rate 104 bpm.  [MK]      ED Course User Index  [MK] Pratima Sheppard MD                           Clinical Impression:  Final diagnoses:  [K59.00] Constipation          ED Disposition Condition    Discharge Stable          ED Prescriptions       Medication Sig Dispense Start Date End Date Auth. Provider    polyethylene glycol (GOLYTELY) 236-22.74-6.74 -5.86 gram suspension (Expires today) Take 240 mLs by mouth once. for 1 dose 240 mL 9/19/2024 9/19/2024 Pratima Sheppard MD          Follow-up Information       Follow up With Specialties Details Why Contact Info    Ginna Musa MD Family Medicine Call  As needed [address] [facility]  [place] [address]  [phone]               Pratima Sheppard MD  Resident  09/19/24 2230

## 2024-09-20 NOTE — ED NOTES
Patient identifiers for Kuldeep Villela checked and correct.  LOC: Patient is awake, alert, and aware of environment with an appropriate affect. Patient is oriented x 3 and speaking appropriately.  APPEARANCE: Patient resting comfortably and in no acute distress. Patient is clean and well groomed, patient's clothing is properly fastened.  SKIN: The skin is warm and dry. Patient has normal skin turgor and moist mucus membrances. Skin is intact; no bruising or breakdown noted.  MUSKULOSKELETAL: Patient is moving all extremities well, no obvious deformities noted. Pulses intact.   RESPIRATORY: Airway is open and patent. Respirations are spontaneous and non-labored with normal effort and rate.  CARDIAC: Patient has a normal rate and rhythm. No peripheral edema noted. Capillary refill < 3 seconds.  ABDOMEN: No distention noted. Bowel sounds active in all 4 quadrants. Soft and non-tender upon palpation. Pt was constipated, disimpacted by physician. Now incontinent of stool.   NEUROLOGICAL: PERRL. Facial expression is symmetrical. Hand grasps are equal bilaterally. Normal sensation in all extremities when touched with finger.  Allergies reported:   Review of patient's allergies indicates:   Allergen Reactions    Penicillins Anxiety and Other (See Comments)    Anesthetic [benzocaine-aloe vera]      Jittery/hyper    Guaifenesin Anxiety and Other (See Comments)

## 2024-09-25 DIAGNOSIS — Z79.891 CHRONICALLY ON OPIATE THERAPY: ICD-10-CM

## 2024-09-25 DIAGNOSIS — T40.2X5A THERAPEUTIC OPIOID INDUCED CONSTIPATION: Primary | ICD-10-CM

## 2024-09-25 DIAGNOSIS — M54.2 CHRONIC NECK PAIN: ICD-10-CM

## 2024-09-25 DIAGNOSIS — G89.29 CHRONIC NECK PAIN: ICD-10-CM

## 2024-09-25 DIAGNOSIS — K59.03 THERAPEUTIC OPIOID INDUCED CONSTIPATION: Primary | ICD-10-CM

## 2024-09-25 DIAGNOSIS — M54.41 CHRONIC MIDLINE LOW BACK PAIN WITH BILATERAL SCIATICA: ICD-10-CM

## 2024-09-25 DIAGNOSIS — G89.29 CHRONIC MIDLINE LOW BACK PAIN WITH BILATERAL SCIATICA: ICD-10-CM

## 2024-09-25 DIAGNOSIS — M54.42 CHRONIC MIDLINE LOW BACK PAIN WITH BILATERAL SCIATICA: ICD-10-CM

## 2024-09-25 RX ORDER — OXYCODONE AND ACETAMINOPHEN 10; 325 MG/1; MG/1
1 TABLET ORAL
Qty: 150 TABLET | Refills: 0 | Status: SHIPPED | OUTPATIENT
Start: 2024-09-27 | End: 2024-10-27

## 2024-09-25 RX ORDER — NALOXEGOL OXALATE 25 MG/1
25 TABLET, FILM COATED ORAL DAILY
Qty: 30 TABLET | Refills: 1 | Status: SHIPPED | OUTPATIENT
Start: 2024-09-25

## 2024-09-25 NOTE — TELEPHONE ENCOUNTER
I called the patient.    She said she was in the hospital recently for severe constipation.  She is drinking plenty of fluids and taking Dulcolax.  I asked her about Linzess.  She said she was on it before and did not help.  I told her I will send a prescription for Movantik.

## 2024-10-07 DIAGNOSIS — E78.5 HYPERLIPIDEMIA, UNSPECIFIED HYPERLIPIDEMIA TYPE: ICD-10-CM

## 2024-10-07 DIAGNOSIS — I11.9 BENIGN HYPERTENSIVE HEART DISEASE WITHOUT HEART FAILURE: ICD-10-CM

## 2024-10-07 RX ORDER — PRAVASTATIN SODIUM 20 MG/1
TABLET ORAL
Qty: 90 TABLET | Refills: 0 | Status: SHIPPED | OUTPATIENT
Start: 2024-10-07

## 2024-10-07 RX ORDER — AMLODIPINE BESYLATE 10 MG/1
TABLET ORAL
Qty: 90 TABLET | Refills: 0 | Status: SHIPPED | OUTPATIENT
Start: 2024-10-07

## 2024-10-07 NOTE — TELEPHONE ENCOUNTER
Care Due:                  Date            Visit Type   Department     Provider  --------------------------------------------------------------------------------                                             [facility]      MEDICINE/INTERN  Last Visit: 11-      AUDIO ONLY   [facility]         Ginna Nguyen  Next Visit: None Scheduled  None         None Found                                                            Last  Test          Frequency    Reason                     Performed    Due Date  --------------------------------------------------------------------------------    Office Visit  12 months..  busPIRone................  11- 11-    Lipid Panel.  12 months..  pravastatin..............  05- 05-    [employer] Embedded Care Due Messages. Reference number: [id number].   10/07/2024 12:07:38 PM CDT

## 2024-10-11 DIAGNOSIS — G25.81 RESTLESS LEG: ICD-10-CM

## 2024-10-11 RX ORDER — ROPINIROLE 1 MG/1
1 TABLET, FILM COATED ORAL 2 TIMES DAILY
Qty: 180 TABLET | Refills: 1 | Status: SHIPPED | OUTPATIENT
Start: 2024-10-11

## 2024-10-11 NOTE — TELEPHONE ENCOUNTER
No care due was identified.  [facility] Embedded Care Due Messages. Reference number: [id number].   10/11/2024 3:34:18 AM CDT

## 2024-10-23 ENCOUNTER — TELEPHONE (OUTPATIENT)
Dept: FAMILY MEDICINE | Facility: CLINIC | Age: 74
End: 2024-10-23
Payer: COMMERCIAL

## 2024-10-23 NOTE — TELEPHONE ENCOUNTER
Spoke w/ PT.   Dr Esquivel called in Movantik for constipation on 9-25-24.  She has only taken 2 pills at that time and has not been able to stop her bowels.    When she urinates, she also has a bowel movement. She is not taking the Dulcolax.    Please advise.

## 2024-10-23 NOTE — TELEPHONE ENCOUNTER
----- Message from Evette sent at 10/23/2024  3:53 PM CDT -----  Regarding: Self  Type: Patient Call Back     What is the request in detail: Pt is requesting a call back regarding her being constipated a month ago , she stated its too loose now.      Can the clinic reply by [facility]? No     Would the patient rather a call back or a response via [facility]? Call back    Best call back number: .[phone]      Additional Information: called staff no answer     Thank you.

## 2024-10-24 NOTE — TELEPHONE ENCOUNTER
Patient informed of provider recommendations below. She verbalized understanding.     Ginna Musa MD Raia, Lynn, LPN; Lencho QUIJANO Staff28 minutes ago (8:20 AM)       Tell her to stop the movantik and any stool softeners and take fiber to bulk up her stools

## 2024-10-25 DIAGNOSIS — G89.29 CHRONIC NECK PAIN: ICD-10-CM

## 2024-10-25 DIAGNOSIS — G89.29 CHRONIC MIDLINE LOW BACK PAIN WITH BILATERAL SCIATICA: ICD-10-CM

## 2024-10-25 DIAGNOSIS — M54.2 CHRONIC NECK PAIN: ICD-10-CM

## 2024-10-25 DIAGNOSIS — M54.42 CHRONIC MIDLINE LOW BACK PAIN WITH BILATERAL SCIATICA: ICD-10-CM

## 2024-10-25 DIAGNOSIS — M54.41 CHRONIC MIDLINE LOW BACK PAIN WITH BILATERAL SCIATICA: ICD-10-CM

## 2024-10-25 RX ORDER — OXYCODONE AND ACETAMINOPHEN 10; 325 MG/1; MG/1
1 TABLET ORAL
Qty: 150 TABLET | Refills: 0 | Status: SHIPPED | OUTPATIENT
Start: 2024-10-27 | End: 2024-11-26

## 2024-10-25 NOTE — TELEPHONE ENCOUNTER
----- Message from Joan sent at 10/25/2024  4:04 PM CDT -----  Regarding: refill  Contact: [phone]  Patient is calling in to have her ( oxyCODONE-acetaminophen (PERCOCET)  mg per tablet )  Refilled. If there are any questions please contact patient at [phone]    [id number] [place] [facility] [address] [address] AT [address] [place] [address]  Phone: [phone] Fax: [phone]

## 2024-11-11 ENCOUNTER — TELEPHONE (OUTPATIENT)
Dept: NEUROLOGY | Facility: CLINIC | Age: 74
End: 2024-11-11
Payer: COMMERCIAL

## 2024-11-11 DIAGNOSIS — M17.0 PRIMARY OSTEOARTHRITIS OF BOTH KNEES: ICD-10-CM

## 2024-11-11 DIAGNOSIS — R26.9 GAIT DISORDER: ICD-10-CM

## 2024-11-11 DIAGNOSIS — M54.41 CHRONIC MIDLINE LOW BACK PAIN WITH BILATERAL SCIATICA: Primary | ICD-10-CM

## 2024-11-11 DIAGNOSIS — E66.01 MORBID OBESITY WITH BMI OF 45.0-49.9, ADULT: ICD-10-CM

## 2024-11-11 DIAGNOSIS — M54.42 CHRONIC MIDLINE LOW BACK PAIN WITH BILATERAL SCIATICA: Primary | ICD-10-CM

## 2024-11-11 DIAGNOSIS — G89.29 CHRONIC MIDLINE LOW BACK PAIN WITH BILATERAL SCIATICA: Primary | ICD-10-CM

## 2024-11-11 NOTE — TELEPHONE ENCOUNTER
----- Message from Med Assistant Alyssa sent at 11/5/2024 11:05 AM CST -----  Pt would like to get Rolator and a back brace. Knows you are out of the office.  ----- Message -----  From: Dyan Mcpherson  Sent: 11/5/2024  10:46 AM CST  To: Sierra BARNETT Staff    Pt calling to  if she can get a back brace for her back pain , pt has a trip planned for     thanksgiving and would like it before her trip     Confirmed patient's contact info below:  Contact Name: Kuldeep Villela  Phone Number: [phone]

## 2024-11-11 NOTE — TELEPHONE ENCOUNTER
Request of the patient, I generated prescriptions for a back brace to be used as needed, and for a Rollator walker.  I will have them mailed to the patient.

## 2024-11-19 DIAGNOSIS — M17.0 PRIMARY OSTEOARTHRITIS OF BOTH KNEES: ICD-10-CM

## 2024-11-19 RX ORDER — OXYCODONE HYDROCHLORIDE 10 MG/1
10 TABLET ORAL
Qty: 150 TABLET | Refills: 0 | Status: SHIPPED | OUTPATIENT
Start: 2024-11-26 | End: 2024-12-26

## 2024-11-19 NOTE — TELEPHONE ENCOUNTER
----- Message from Modesta sent at 11/19/2024  3:09 PM CST -----  Regarding: Refill  Contact: [phone]  Type:  RX Refill Request    Who Called:  Kuldeep Villela     Refill or New Rx: Refill     RX Name and Strength:     OxyCODONE (ROXICODONE) 10 mg Tab immediate release table      Preferred Pharmacy with phone number:  [facility] #[id number] [address] AT [address] [address]  Phone: [phone] Fax: [phone]    Local or Mail Order: Local     Ordering Provider:      Would the patient rather a call back or a response via [facility]?  Call     Best Call Back Number: [phone]     Additional Information: Pt states she going out of town need medication before 11/25.

## 2024-11-30 ENCOUNTER — NURSE TRIAGE (OUTPATIENT)
Dept: ADMINISTRATIVE | Facility: CLINIC | Age: 74
End: 2024-11-30
Payer: COMMERCIAL

## 2024-11-30 NOTE — TELEPHONE ENCOUNTER
Call placed to pt twice. No answer. VM left. Encounter routed to provider.   Reason for Disposition   Second attempt to contact caller AND no contact made. Phone number verified.    Protocols used: No Contact or Duplicate Contact Call-A-AH

## 2024-12-02 DIAGNOSIS — M54.42 CHRONIC MIDLINE LOW BACK PAIN WITH BILATERAL SCIATICA: ICD-10-CM

## 2024-12-02 DIAGNOSIS — M54.2 CHRONIC NECK PAIN: ICD-10-CM

## 2024-12-02 DIAGNOSIS — G89.29 CHRONIC MIDLINE LOW BACK PAIN WITH BILATERAL SCIATICA: ICD-10-CM

## 2024-12-02 DIAGNOSIS — M54.41 CHRONIC MIDLINE LOW BACK PAIN WITH BILATERAL SCIATICA: ICD-10-CM

## 2024-12-02 DIAGNOSIS — G89.29 CHRONIC NECK PAIN: ICD-10-CM

## 2024-12-02 NOTE — TELEPHONE ENCOUNTER
----- Message from Germaine sent at 12/2/2024 12:36 PM CST -----  Regarding: Refill  Contact: [phone]  Type:  RX Refill Request    Who Called: gurinder  Refill or New Rx:Refill    RX Name and Strength:  oxyCODONE-acetaminophen (PERCOCET)  mg per tablet 150 tablet 0 10/27/2024 11/26/2024 No    Preferred Pharmacy with phone number:  [id number] - [place] [address] AT [address]  [place] [address]  Phone: [phone] Fax: [phone]          Local or Mail Order:local  Ordering Provider:Sierra  Would the patient rather a call back or a response via [facility]? .Callback  Best Call Back Number:[phone]    Additional Information:

## 2024-12-03 DIAGNOSIS — M54.41 CHRONIC MIDLINE LOW BACK PAIN WITH BILATERAL SCIATICA: ICD-10-CM

## 2024-12-03 DIAGNOSIS — G89.29 CHRONIC MIDLINE LOW BACK PAIN WITH BILATERAL SCIATICA: ICD-10-CM

## 2024-12-03 DIAGNOSIS — G89.29 CHRONIC NECK PAIN: ICD-10-CM

## 2024-12-03 DIAGNOSIS — M54.42 CHRONIC MIDLINE LOW BACK PAIN WITH BILATERAL SCIATICA: ICD-10-CM

## 2024-12-03 DIAGNOSIS — M54.2 CHRONIC NECK PAIN: ICD-10-CM

## 2024-12-03 RX ORDER — OXYCODONE AND ACETAMINOPHEN 10; 325 MG/1; MG/1
1 TABLET ORAL
Qty: 150 TABLET | Refills: 0 | Status: SHIPPED | OUTPATIENT
Start: 2024-12-03 | End: 2025-01-02

## 2024-12-03 RX ORDER — OXYCODONE AND ACETAMINOPHEN 10; 325 MG/1; MG/1
1 TABLET ORAL
Qty: 150 TABLET | Refills: 0 | OUTPATIENT
Start: 2024-12-03 | End: 2025-01-02

## 2024-12-13 ENCOUNTER — TELEPHONE (OUTPATIENT)
Dept: PHYSICAL MEDICINE AND REHAB | Facility: CLINIC | Age: 74
End: 2024-12-13
Payer: COMMERCIAL

## 2024-12-13 ENCOUNTER — TELEPHONE (OUTPATIENT)
Dept: NEUROLOGY | Facility: CLINIC | Age: 74
End: 2024-12-13
Payer: COMMERCIAL

## 2024-12-13 NOTE — TELEPHONE ENCOUNTER
I called Chelsea from [facility].  I informed her that the last visit for the patient was in 11/2023.  I will have it faxed to [facility].  If there is a problem and a new visit as needed, we can let the patient know.

## 2024-12-13 NOTE — TELEPHONE ENCOUNTER
"----- Message from Med Assistant Belle sent at 12/13/2024  1:27 PM CST -----  Regarding: FW: pt advice  Contact: [phone]    ----- Message -----  From: Shamkia Barbour  Sent: 12/13/2024  10:09 AM CST  To: Sierra BARNETT Staff  Subject: pt advice                                        .Name Of Caller:Donte Tran      Contact Preference?:[phone]     What is the nature of the call?: in reference to orders rolator pt  clinical notes signed by provider. Pls call     FAX [phone]       Additional Notes:  "Thank you for all that you do for our patients"  "

## 2024-12-13 NOTE — TELEPHONE ENCOUNTER
"----- Message from Shamika sent at 12/13/2024 10:05 AM CST -----  Regarding: pt advice  Contact: [phone]  .Name Of Caller:Donte Tran      Contact Preference?:[phone]     What is the nature of the call?: in reference to orders riley pt  clinical notes signed by provider. Pls call     FAX [phone]       Additional Notes:  "Thank you for all that you do for our patients"  "

## 2024-12-20 ENCOUNTER — TELEPHONE (OUTPATIENT)
Dept: FAMILY MEDICINE | Facility: CLINIC | Age: 74
End: 2024-12-20
Payer: COMMERCIAL

## 2024-12-20 NOTE — TELEPHONE ENCOUNTER
No transportation. Has been doing virtual visits.   Staying cold all the time. If she drinks water, she has go immediately to the bathroom. These are new symptoms for her.   Not sure what to do. Please advise.

## 2024-12-20 NOTE — TELEPHONE ENCOUNTER
----- Message from Melva sent at 12/20/2024 12:45 PM CST -----  Regarding: Appt advise  Contact: [phone]  ELENA CHANDLER calling regarding Patient Advice (message) for # pt is calling to speak with nurse regarding pt care pls advise

## 2024-12-27 DIAGNOSIS — M17.0 PRIMARY OSTEOARTHRITIS OF BOTH KNEES: ICD-10-CM

## 2024-12-27 RX ORDER — OXYCODONE HYDROCHLORIDE 10 MG/1
10 TABLET ORAL
Qty: 150 TABLET | Refills: 0 | Status: SHIPPED | OUTPATIENT
Start: 2025-01-02 | End: 2025-02-01

## 2024-12-27 NOTE — TELEPHONE ENCOUNTER
----- Message from Helena sent at 12/27/2024  9:03 AM CST -----  Regarding: self  Who called: self    What is the request in detail: pt would like to speak with office about oxyCODONE (ROXICODONE) 10 mg Tab immediate release tablet. She is trying to request it early she said but now her chart is confusing if it is refilled or is going to be refilled at future date listed in chart    Can the clinic reply by [facility]? No    Would the patient rather a call back or a response via [facility]? Call back    Best call back number: [phone]      Additional Information:    Thank you.

## 2024-12-27 NOTE — TELEPHONE ENCOUNTER
----- Message from Helena sent at 12/27/2024  9:03 AM CST -----  Regarding: self  Who called: self    What is the request in detail: pt would like to speak with office about oxyCODONE (ROXICODONE) 10 mg Tab immediate release tablet. She is trying to request it early she said but now her chart is confusing if it is refilled or is going to be refilled at future date listed in chart    Can the clinic reply by [facility]? No    Would the patient rather a call back or a response via [facility]? Call back    Best call back number: [phone]      Additional Information:    Thank you.

## 2025-01-02 DIAGNOSIS — I11.9 BENIGN HYPERTENSIVE HEART DISEASE WITHOUT HEART FAILURE: ICD-10-CM

## 2025-01-02 NOTE — TELEPHONE ENCOUNTER
Care Due:                  Date            Visit Type   Department     Provider  --------------------------------------------------------------------------------                                             [facility]      MEDICINE/INTERN  Last Visit: 11-      AUDIO ONLY   [facility]         Ginna Nguyen  Next Visit: None Scheduled  None         None Found                                                            Last  Test          Frequency    Reason                     Performed    Due Date  --------------------------------------------------------------------------------    Office Visit  12 months..  busPIRone, citalopram,     11- 11-                             pravastatin, rOPINIRole,                             sertraline...............    Lipid Panel.  12 months..  pravastatin..............  05- 05-    [facility] Embedded Care Due Messages. Reference number: [id number].   1/02/2025 11:32:13 AM CST

## 2025-01-03 RX ORDER — AMLODIPINE BESYLATE 10 MG/1
TABLET ORAL
Qty: 90 TABLET | Refills: 0 | Status: SHIPPED | OUTPATIENT
Start: 2025-01-03

## 2025-01-07 NOTE — TELEPHONE ENCOUNTER
No care due was identified.  [facility] Embedded Care Due Messages. Reference number: [id number].   1/07/2025 4:44:37 AM CST

## 2025-01-08 RX ORDER — CETIRIZINE HYDROCHLORIDE 10 MG/1
TABLET ORAL
Qty: 90 TABLET | Refills: 3 | Status: SHIPPED | OUTPATIENT
Start: 2025-01-08

## 2025-01-09 ENCOUNTER — TELEPHONE (OUTPATIENT)
Dept: PHYSICAL MEDICINE AND REHAB | Facility: CLINIC | Age: 75
End: 2025-01-09
Payer: COMMERCIAL

## 2025-01-09 NOTE — TELEPHONE ENCOUNTER
----- Message from Анна sent at 1/9/2025  1:24 PM CST -----  Sooner Appointment Request     Caller is requesting a sooner appointment.  Caller declined first available appointment listed below.  Caller will not accept being placed on the waitlist and is requesting a message be sent to doctor.  Name of Caller:ELENA CHANDLER [[id number]]  When is the first available appointment?01/31/2025  Symptoms:real bad body pain/ pt stated pain is over a 10  Would the patient rather a call back or a response via [facility]? Call  Best Call Back Number:Telephone Information:  Mobile          [phone]      Additional Information: Pt need to see doctor ASAP Monday the 13th if possible. Pt stated she's having transportation problems and needs to see doctor ASAP.

## 2025-01-27 ENCOUNTER — TELEPHONE (OUTPATIENT)
Dept: FAMILY MEDICINE | Facility: CLINIC | Age: 75
End: 2025-01-27
Payer: COMMERCIAL

## 2025-01-27 DIAGNOSIS — G89.29 CHRONIC MIDLINE LOW BACK PAIN WITH BILATERAL SCIATICA: ICD-10-CM

## 2025-01-27 DIAGNOSIS — M54.42 CHRONIC MIDLINE LOW BACK PAIN WITH BILATERAL SCIATICA: ICD-10-CM

## 2025-01-27 DIAGNOSIS — G89.29 CHRONIC NECK PAIN: ICD-10-CM

## 2025-01-27 DIAGNOSIS — M54.41 CHRONIC MIDLINE LOW BACK PAIN WITH BILATERAL SCIATICA: ICD-10-CM

## 2025-01-27 DIAGNOSIS — M54.2 CHRONIC NECK PAIN: ICD-10-CM

## 2025-01-27 RX ORDER — OXYCODONE AND ACETAMINOPHEN 10; 325 MG/1; MG/1
1 TABLET ORAL
Qty: 150 TABLET | Refills: 0 | OUTPATIENT
Start: 2025-01-27 | End: 2025-02-26

## 2025-01-27 NOTE — TELEPHONE ENCOUNTER
Called patient to confirm the message will be sent to . Patient has called the ambulance to transport to ER. She is stating she is having a panic attack and drooling to left side of her mouth.

## 2025-01-27 NOTE — TELEPHONE ENCOUNTER
----- Message from Summer sent at 1/27/2025  7:08 AM CST -----  Regarding: notice  Type:  Patient Returning Call        Name of who is calling:pt        What is request in detail:pt is requesting a call back in regards to concerns, pt wants  to know she is going to [facility] due to drooling by the mouth and having panic attacks. Pt want dr to know and to give her a call back, please assist.         Can clinic reply by [facility]:no        What number to call back if not in [facility]:[phone]

## 2025-01-28 NOTE — TELEPHONE ENCOUNTER
----- Message from Melva sent at 1/24/2025  8:21 AM CST -----  Regarding: Rx Refill  Contact: [phone]  ELENA CHANDLER calling regarding Refills  (message) for #  oxyCODONE (ROXICODONE) 10 mg Tab immediate release tablet    Pt only has 2 more left and needs a refill asap pls advise and pt is asking if provider can giver her a call today [id number]  [place] [address] [place] AT [address] [place] [address]  Phone: [phone] Fax: [phone]

## 2025-01-30 ENCOUNTER — TELEPHONE (OUTPATIENT)
Dept: FAMILY MEDICINE | Facility: CLINIC | Age: 75
End: 2025-01-30
Payer: COMMERCIAL

## 2025-01-30 NOTE — TELEPHONE ENCOUNTER
Spoke w/ PT. She wanted to make sure that Dr Musa saw her notes from her ED visit. Audio visit schedule for hospital f/u on 2-5-25 with Dr Musa. PT has no transportation to come for in-person visit.

## 2025-01-30 NOTE — TELEPHONE ENCOUNTER
----- Message from Mani sent at 1/30/2025  2:39 PM CST -----  Regarding: Kuldeep  Type:Patient Callback     Who called: Kuldeep     What is the request in detail: Patient stated that she would like for the nurse to give her a call. This is in reference to her recent hospital stay. She needs to have everything that was done while she was there. Please reach out to the patient as soon as possible.     Can the clinic reply by [facility]? No     Would the patient rather a call back or a response via [facility]? Callback     Best call back number: [phone]      Additional Information:

## 2025-02-01 ENCOUNTER — NURSE TRIAGE (OUTPATIENT)
Dept: ADMINISTRATIVE | Facility: CLINIC | Age: 75
End: 2025-02-01
Payer: COMMERCIAL

## 2025-02-01 NOTE — TELEPHONE ENCOUNTER
[facility] Encounter noted 1/27 for Dyspnea. Pt states she was seen for panic attacks but never treated for them or given anything for them while there. Says panic attacks are new, has not had in past before. Worse at nighttime. Pt is requesting rx be sent in until she can be seen. Informed pt of triage process & questions to best assist.     During triage, redirected to triage, reiterated questions questions several times to confirm accuracy of answers to symptoms as pt continues to ask for rx for panic attacks.Informed advisement & further plan of care based on triage once completed.     Pt answers YES to difficulty breathing. Answers YES to difficulty breathing currently on call. Says sometimes cannot catch her breath, last night felt like she was struggling to breathe. Scares her bc shortness of breath is in her nose and throat and is scary, has never experienced before.     Advised per protocol-ER now for eval, if she does not have someone to bring her to call for emergency service assistance. Pt declines advisement, asking to contact Dr. Christianson's for rx. Reiterated importance of following advisement based on symptoms & questions answered.Pt vu, but is upset. Explained that reaching out to on call provider would be considered delay in care with advisement ER now & would message would be routed to clinic regard request.     Reason for Disposition   [1] Difficulty breathing AND [2] persists > 10 minutes AND [3] not relieved by reassurance provided by triager    Additional Information   Negative: SEVERE difficulty breathing (e.g., struggling for each breath, speaks in single words)   Negative: Bluish (or gray) lips or face now   Negative: Difficult to awaken or acting confused (e.g., disoriented, slurred speech)   Negative: Violent behavior, or threatening to physically hurt or kill someone   Negative: Sounds like a life-threatening emergency to the triager    Protocols used: Anxiety and Panic Attack-A-

## 2025-02-03 ENCOUNTER — NURSE TRIAGE (OUTPATIENT)
Dept: ADMINISTRATIVE | Facility: CLINIC | Age: 75
End: 2025-02-03
Payer: COMMERCIAL

## 2025-02-03 ENCOUNTER — TELEPHONE (OUTPATIENT)
Dept: FAMILY MEDICINE | Facility: CLINIC | Age: 75
End: 2025-02-03
Payer: COMMERCIAL

## 2025-02-03 NOTE — TELEPHONE ENCOUNTER
Spoke with pt who reports that on Friday she started with breathing issue, and was admitted to hospital. She states that she is still continuing to have same issue,and calling in to see if she could be prescribed something to help calm her down. She reports that she gets dry nose, and dry throat, and it will cause her to panic. States getting fresh air helps. She also reports when she eats anything she it will cause her to have diarrhea. Advised will send message to office, and should have callback today. She verbalized understanding.      Reason for Disposition   Condition / symptoms SAME (not improving)    Additional Information   Negative: Sounds like a life-threatening emergency to the triager   Negative: Patient sounds very sick or weak to the triager   Negative: Sounds like a serious complication to the triager   Negative: Condition / symptoms WORSE   Negative: Caller has URGENT question and triager unable to answer question   Negative: Patient wants to be seen    Protocols used: Post-Hospitalization Follow-up Call-A-[place]

## 2025-02-03 NOTE — TELEPHONE ENCOUNTER
----- Message from Paige sent at 2/1/2025  7:45 AM CST -----  Contact: ELENA CHANDLER [[id number]]  .Type:  Patient Requesting Call    Who Called:ELENA CHANDLER [[id number]]  Does the patient know what this is regarding?:Patient is requesting a call back in regards to dry nose that is causing her to have panic attacks, She is asking for medication to calm her down.  Would the patient rather a call back or a response via [facility]? Call back  Best Call Back Number:.[phone] (home)    Additional Information:

## 2025-02-05 ENCOUNTER — OFFICE VISIT (OUTPATIENT)
Dept: FAMILY MEDICINE | Facility: CLINIC | Age: 75
End: 2025-02-05
Payer: COMMERCIAL

## 2025-02-05 DIAGNOSIS — Z99.89 WALKER AS AMBULATION AID: ICD-10-CM

## 2025-02-05 DIAGNOSIS — D64.9 ANEMIA, UNSPECIFIED TYPE: ICD-10-CM

## 2025-02-05 DIAGNOSIS — R09.81 NASAL CONGESTION: Primary | ICD-10-CM

## 2025-02-05 DIAGNOSIS — I10 ESSENTIAL HYPERTENSION: ICD-10-CM

## 2025-02-05 RX ORDER — FLUTICASONE PROPIONATE 50 MCG
1 SPRAY, SUSPENSION (ML) NASAL DAILY
Qty: 16 G | Refills: 5 | Status: SHIPPED | OUTPATIENT
Start: 2025-02-05

## 2025-02-05 RX ORDER — AZELASTINE 1 MG/ML
1 SPRAY, METERED NASAL 2 TIMES DAILY
Qty: 30 ML | Refills: 5 | Status: SHIPPED | OUTPATIENT
Start: 2025-02-05 | End: 2026-02-05

## 2025-02-05 NOTE — PROGRESS NOTES
The patient location is: [place]  The chief complaint leading to consultation is: hospital follow up.     Visit type: audio only    Face to Face time with patient:   17 minutes of total time spent on the encounter, which includes face to face time and non-face to face time preparing to see the patient (eg, review of tests), Obtaining and/or reviewing separately obtained history, Documenting clinical information in the electronic or other health record, Independently interpreting results (not separately reported) and communicating results to the patient/family/caregiver, or Care coordination (not separately reported).         Each patient to whom he or she provides medical services by telemedicine is:  (1) informed of the relationship between the physician and patient and the respective role of any other health care provider with respect to management of the patient; and (2) notified that he or she may decline to receive medical services by telemedicine and may withdraw from such care at any time.    Notes:     Subjective     Patient ID: Kuldeep Villela is a 74 y.o. female.    Chief Complaint: No chief complaint on file.    74 year old female presents for concerns about her nose.s he feels like her nose goes dry and she feels like she can't breathe. She states she gets panicked when this happens. She feels congested like she can't breathe in and out. She states the back of her throat feels dry and she starts panicking. She feels worried about something happening as she is in her 70's. She states she had a bad chest cold and took vicks cold medicine. She used the daytime version as her symptoms were during the day mostly. She states the night time version made her feel better.     She also wanted to take something for anxiety. She has buspar and wants to know if she can take this for her anxiety.         History of Present Illness               Review of Systems         Objective     There were no vitals filed for  this visit.     Physical Exam  Physical Exam                Assessment and Plan     Diagnoses and all orders for this visit:    Nasal congestion  -     fluticasone propionate (FLONASE) 50 mcg/actuation nasal spray; 1 spray (50 mcg total) by Each Nostril route once daily.  -     azelastine (ASTELIN) 137 mcg (0.1 %) nasal spray; 1 spray (137 mcg total) by Nasal route 2 (two) times daily.  Trial of astelin and flonase for nasal congestion    Anemia, unspecified type  -     CBC W/ AUTO DIFFERENTIAL; Future  -     Ambulatory referral/consult to Home Health; Future  Will need labs rechecked    Essential hypertension  -     Ambulatory referral/consult to Home Health; Future  Stable. Needs blood pressure checked.    Walker as ambulation aid  -     Ambulatory referral/consult to Home Health; Future        Diagnoses and all orders for this visit:    Nasal congestion  -     fluticasone propionate (FLONASE) 50 mcg/actuation nasal spray; 1 spray (50 mcg total) by Each Nostril route once daily.  -     azelastine (ASTELIN) 137 mcg (0.1 %) nasal spray; 1 spray (137 mcg total) by Nasal route 2 (two) times daily.    Anemia, unspecified type  -     CBC W/ AUTO DIFFERENTIAL; Future  -     Ambulatory referral/consult to Home Health; Future    Essential hypertension  -     Ambulatory referral/consult to Home Health; Future    Walker as ambulation aid  -     Ambulatory referral/consult to Home Health; Future        Assessment & Plan                      No follow-ups on file.        This note was generated with the assistance of ambient listening technology. Verbal consent was obtained by the patient and accompanying visitor(s) for the recording of patient appointment to facilitate this note. I attest to having reviewed and edited the generated note for accuracy, though some syntax or spelling errors may persist. Please contact the author of this note for any clarification.

## 2025-02-07 ENCOUNTER — TELEPHONE (OUTPATIENT)
Dept: FAMILY MEDICINE | Facility: CLINIC | Age: 75
End: 2025-02-07
Payer: COMMERCIAL

## 2025-02-07 RX ORDER — IBUPROFEN 200 MG
1 TABLET ORAL DAILY
Qty: 30 PATCH | Refills: 12 | Status: SHIPPED | OUTPATIENT
Start: 2025-02-07

## 2025-02-07 NOTE — TELEPHONE ENCOUNTER
----- Message from Marianela sent at 2/7/2025  3:40 PM CST -----  Regarding: patient call back  Type: Patient Call Back    Who called: Self     What is the request in detail: asked if she could get a call about getting some more smoking patches     Can the clinic reply by [facility]? No     Would the patient rather a call back or a response via [facility]? Call     Best call back number: .[phone]

## 2025-02-07 NOTE — TELEPHONE ENCOUNTER
Attempted to contact PT to inquire the name of the smoking cessation patches. No answer, left voicemail.

## 2025-02-07 NOTE — TELEPHONE ENCOUNTER
No care due was identified.  [facility] Embedded Care Due Messages. Reference number: [id number].   2/07/2025 4:05:05 PM CST

## 2025-02-07 NOTE — TELEPHONE ENCOUNTER
----- Message from Helena sent at 2/7/2025  4:02 PM CST -----  Regarding: self  Who Called: self    Who Left Message for Patient: Birgit Stover,    Does the patient know what this is regarding?: smoking patches    Would the patient rather a call back or a response via [facility]?  Call back    Best Call Back Number: [phone]      Additional Information:    Thank you.

## 2025-02-14 ENCOUNTER — TELEPHONE (OUTPATIENT)
Dept: NEUROLOGY | Facility: CLINIC | Age: 75
End: 2025-02-14
Payer: COMMERCIAL

## 2025-02-14 DIAGNOSIS — G89.29 CHRONIC MIDLINE LOW BACK PAIN WITH BILATERAL SCIATICA: ICD-10-CM

## 2025-02-14 DIAGNOSIS — M54.42 CHRONIC MIDLINE LOW BACK PAIN WITH BILATERAL SCIATICA: ICD-10-CM

## 2025-02-14 DIAGNOSIS — G89.29 CHRONIC NECK PAIN: ICD-10-CM

## 2025-02-14 DIAGNOSIS — M54.2 CHRONIC NECK PAIN: ICD-10-CM

## 2025-02-14 DIAGNOSIS — M54.41 CHRONIC MIDLINE LOW BACK PAIN WITH BILATERAL SCIATICA: ICD-10-CM

## 2025-02-14 RX ORDER — OXYCODONE AND ACETAMINOPHEN 10; 325 MG/1; MG/1
1 TABLET ORAL
Qty: 150 TABLET | Refills: 0 | Status: SHIPPED | OUTPATIENT
Start: 2025-02-21 | End: 2025-03-23

## 2025-02-14 NOTE — TELEPHONE ENCOUNTER
----- Message from Med Assistant Lindy sent at 2/14/2025 10:57 AM CST -----  Regarding: FW: Pt called states she need to speak with someone regarding the oxyCODONE-acetaminophen (PERCOCET)  mg per tablet states she wld rather be prescribed these instead of the other Rx  Contact: [phone]    ----- Message -----  From: Raven Campos  Sent: 2/14/2025  10:16 AM CST  To: Sierra BARNETT Staff  Subject: Pt called states she need to speak with some#    Name of Who is Calling:ELENA CHANDLER [[id number]]        What is the request in detail:Pt called states she need to speak with someone regarding the oxyCODONE-acetaminophen (PERCOCET)  mg per tablet states she wld rather be prescribed these instead of the other Rx. Please advise         Can the clinic reply by [facility]:No        What Number to Call Back if not in [facility]: Telephone Information:  Mobile          [phone]

## 2025-02-14 NOTE — TELEPHONE ENCOUNTER
I called the patient.    She is currently on oxycodone 10 mg p.o. p.r.n. up to 5 tablets per day.  She said it is not helping as much as Percocet which she was on.  I told her I will switch her back to oxycodone/APAP 10/325 p.o. p.r.n. up to 5 tablets per day.  I sent a prescription for 2/21/2025.

## 2025-02-17 ENCOUNTER — TELEPHONE (OUTPATIENT)
Dept: PHYSICAL MEDICINE AND REHAB | Facility: CLINIC | Age: 75
End: 2025-02-17
Payer: COMMERCIAL

## 2025-02-17 DIAGNOSIS — G89.29 CHRONIC NECK PAIN: ICD-10-CM

## 2025-02-17 DIAGNOSIS — G89.29 CHRONIC MIDLINE LOW BACK PAIN WITH BILATERAL SCIATICA: ICD-10-CM

## 2025-02-17 DIAGNOSIS — M54.2 CHRONIC NECK PAIN: ICD-10-CM

## 2025-02-17 DIAGNOSIS — M54.42 CHRONIC MIDLINE LOW BACK PAIN WITH BILATERAL SCIATICA: ICD-10-CM

## 2025-02-17 DIAGNOSIS — M54.41 CHRONIC MIDLINE LOW BACK PAIN WITH BILATERAL SCIATICA: ICD-10-CM

## 2025-02-17 RX ORDER — OXYCODONE AND ACETAMINOPHEN 10; 325 MG/1; MG/1
1 TABLET ORAL
Qty: 150 TABLET | Refills: 0 | Status: SHIPPED | OUTPATIENT
Start: 2025-02-19 | End: 2025-02-18 | Stop reason: SDUPTHER

## 2025-02-17 NOTE — TELEPHONE ENCOUNTER
I called the patient.    She indicated she has been intolerant to Roxicodone.  Her prescription for Percocet was sent for refill on 2/21/2025.  I told her I will send it couple days earlier.

## 2025-02-17 NOTE — TELEPHONE ENCOUNTER
----- Message from Nurse Alycia sent at 2/17/2025  4:01 PM CST -----  Regarding: FW: Medication Concerns/Refill  Contact: [phone]    ----- Message -----  From: Preston Valerio  Sent: 2/17/2025   1:32 PM CST  To: Sierra BARNETT Staff  Subject: Medication Concerns/Refill                       Patient calling requesting a callback from nurse or provider in regards to oxyCODONE-acetaminophen (PERCOCET)  mg per tablet medication not doing her any good.  She also said she do not have enough medication for the pain. She said she doubt if she have 8 pills left and she is not getting no rest at night due to the pain. Please call back as soon as possible.[id number] [place], [place] - [address] [facility] [address] AT [address] [address]Uxcpr: [phone] Fax: [phone]

## 2025-02-18 ENCOUNTER — TELEPHONE (OUTPATIENT)
Dept: PHYSICAL MEDICINE AND REHAB | Facility: CLINIC | Age: 75
End: 2025-02-18
Payer: COMMERCIAL

## 2025-02-18 DIAGNOSIS — G89.29 CHRONIC MIDLINE LOW BACK PAIN WITH BILATERAL SCIATICA: ICD-10-CM

## 2025-02-18 DIAGNOSIS — G89.29 CHRONIC NECK PAIN: ICD-10-CM

## 2025-02-18 DIAGNOSIS — M54.42 CHRONIC MIDLINE LOW BACK PAIN WITH BILATERAL SCIATICA: ICD-10-CM

## 2025-02-18 DIAGNOSIS — M54.41 CHRONIC MIDLINE LOW BACK PAIN WITH BILATERAL SCIATICA: ICD-10-CM

## 2025-02-18 DIAGNOSIS — M54.2 CHRONIC NECK PAIN: ICD-10-CM

## 2025-02-18 RX ORDER — OXYCODONE AND ACETAMINOPHEN 10; 325 MG/1; MG/1
1 TABLET ORAL
Qty: 150 TABLET | Refills: 0 | Status: SHIPPED | OUTPATIENT
Start: 2025-02-19 | End: 2025-03-21

## 2025-02-18 NOTE — TELEPHONE ENCOUNTER
----- Message from Dyan sent at 2/18/2025  9:27 AM CST -----  Refill request.oxyCODONE-acetaminophen (PERCOCET)  [facility] [address]  [place]   and [place] in [place] [phone] patient's contact info below:Contact Name: Kuldeep VillelaPhone Number: [phone]

## 2025-02-18 NOTE — TELEPHONE ENCOUNTER
----- Message from Dyan sent at 2/18/2025  9:27 AM CST -----  Refill request.oxyCODONE-acetaminophen (PERCOCET)  [facility] [address]  [place]   and [place] in [place] [phone] patient's contact info below:Contact Name: Kuldeep VillelaPhone Number: [phone]

## 2025-02-21 ENCOUNTER — TELEPHONE (OUTPATIENT)
Dept: FAMILY MEDICINE | Facility: CLINIC | Age: 75
End: 2025-02-21
Payer: COMMERCIAL

## 2025-02-21 NOTE — TELEPHONE ENCOUNTER
Evelin Hopson, MARIANA QUIJANO Staff  Caller: Unspecified (Today, 11:15 AM)  Type: Patient Call Back    Who called: Self    What is the request in detail: pt. Is asking for a call regarding a inhaler being called in for her .. She states the nasal sprays aren't helping her .. She states she's very congested..    Can the clinic reply by [facility]?NO    Would the patient rather a call back or a response via [facility]? Yes, call    Best call back number: [phone]

## 2025-02-23 ENCOUNTER — HOSPITAL ENCOUNTER (EMERGENCY)
Facility: HOSPITAL | Age: 75
Discharge: HOME | End: 2025-02-23
Attending: STUDENT IN AN ORGANIZED HEALTH CARE EDUCATION/TRAINING PROGRAM
Payer: COMMERCIAL

## 2025-02-23 ENCOUNTER — NURSE TRIAGE (OUTPATIENT)
Dept: ADMINISTRATIVE | Facility: CLINIC | Age: 75
End: 2025-02-23
Payer: COMMERCIAL

## 2025-02-23 VITALS
DIASTOLIC BLOOD PRESSURE: 78 MMHG | RESPIRATION RATE: 18 BRPM | HEART RATE: 68 BPM | OXYGEN SATURATION: 97 % | BODY MASS INDEX: 46.33 KG/M2 | TEMPERATURE: 98 F | WEIGHT: 236 LBS | HEIGHT: 60 IN | SYSTOLIC BLOOD PRESSURE: 134 MMHG

## 2025-02-23 DIAGNOSIS — R06.02 SHORTNESS OF BREATH: ICD-10-CM

## 2025-02-23 DIAGNOSIS — R60.0 PERIPHERAL EDEMA: Primary | ICD-10-CM

## 2025-02-23 LAB
ALBUMIN SERPL BCP-MCNC: 3.5 G/DL (ref 3.5–5.2)
ALP SERPL-CCNC: 62 U/L (ref 40–150)
ALT SERPL W/O P-5'-P-CCNC: 5 U/L (ref 10–44)
ANION GAP SERPL CALC-SCNC: 8 MMOL/L (ref 8–16)
AST SERPL-CCNC: 18 U/L (ref 10–40)
BASOPHILS # BLD AUTO: 0.06 K/UL (ref 0–0.2)
BASOPHILS NFR BLD: 0.6 % (ref 0–1.9)
BILIRUB SERPL-MCNC: 0.5 MG/DL (ref 0.1–1)
BNP SERPL-MCNC: 162 PG/ML (ref 0–99)
BUN SERPL-MCNC: 24 MG/DL (ref 8–23)
CALCIUM SERPL-MCNC: 8.8 MG/DL (ref 8.7–10.5)
CHLORIDE SERPL-SCNC: 107 MMOL/L (ref 95–110)
CO2 SERPL-SCNC: 25 MMOL/L (ref 23–29)
CREAT SERPL-MCNC: 1.8 MG/DL (ref 0.5–1.4)
DIFFERENTIAL METHOD BLD: ABNORMAL
EOSINOPHIL # BLD AUTO: 0.4 K/UL (ref 0–0.5)
EOSINOPHIL NFR BLD: 3.7 % (ref 0–8)
ERYTHROCYTE [DISTWIDTH] IN BLOOD BY AUTOMATED COUNT: 14.2 % (ref 11.5–14.5)
EST. GFR  (NO RACE VARIABLE): 29.6 ML/MIN/1.73 M^2
GLUCOSE SERPL-MCNC: 82 MG/DL (ref 70–110)
HCT VFR BLD AUTO: 35.1 % (ref 37–48.5)
HCV AB SERPL QL IA: NORMAL
HGB BLD-MCNC: 11 G/DL (ref 12–16)
HIV 1+2 AB+HIV1 P24 AG SERPL QL IA: NORMAL
IMM GRANULOCYTES # BLD AUTO: 0.02 K/UL (ref 0–0.04)
IMM GRANULOCYTES NFR BLD AUTO: 0.2 % (ref 0–0.5)
LYMPHOCYTES # BLD AUTO: 1.9 K/UL (ref 1–4.8)
LYMPHOCYTES NFR BLD: 19.3 % (ref 18–48)
MCH RBC QN AUTO: 31.2 PG (ref 27–31)
MCHC RBC AUTO-ENTMCNC: 31.3 G/DL (ref 32–36)
MCV RBC AUTO: 99 FL (ref 82–98)
MONOCYTES # BLD AUTO: 0.7 K/UL (ref 0.3–1)
MONOCYTES NFR BLD: 7 % (ref 4–15)
NEUTROPHILS # BLD AUTO: 6.8 K/UL (ref 1.8–7.7)
NEUTROPHILS NFR BLD: 69.2 % (ref 38–73)
NRBC BLD-RTO: 0 /100 WBC
PLATELET # BLD AUTO: 194 K/UL (ref 150–450)
PMV BLD AUTO: 11.2 FL (ref 9.2–12.9)
POTASSIUM SERPL-SCNC: 4.7 MMOL/L (ref 3.5–5.1)
PROT SERPL-MCNC: 6.4 G/DL (ref 6–8.4)
RBC # BLD AUTO: 3.53 M/UL (ref 4–5.4)
SODIUM SERPL-SCNC: 140 MMOL/L (ref 136–145)
TROPONIN I SERPL DL<=0.01 NG/ML-MCNC: <3 NG/L (ref 0–14)
WBC # BLD AUTO: 9.75 K/UL (ref 3.9–12.7)

## 2025-02-23 PROCEDURE — 86803 HEPATITIS C AB TEST: CPT | Performed by: PHYSICIAN ASSISTANT

## 2025-02-23 PROCEDURE — 80053 COMPREHEN METABOLIC PANEL: CPT | Performed by: STUDENT IN AN ORGANIZED HEALTH CARE EDUCATION/TRAINING PROGRAM

## 2025-02-23 PROCEDURE — 83880 ASSAY OF NATRIURETIC PEPTIDE: CPT | Performed by: STUDENT IN AN ORGANIZED HEALTH CARE EDUCATION/TRAINING PROGRAM

## 2025-02-23 PROCEDURE — 84484 ASSAY OF TROPONIN QUANT: CPT | Performed by: STUDENT IN AN ORGANIZED HEALTH CARE EDUCATION/TRAINING PROGRAM

## 2025-02-23 PROCEDURE — 93005 ELECTROCARDIOGRAM TRACING: CPT

## 2025-02-23 PROCEDURE — 94761 N-INVAS EAR/PLS OXIMETRY MLT: CPT

## 2025-02-23 PROCEDURE — 94640 AIRWAY INHALATION TREATMENT: CPT

## 2025-02-23 PROCEDURE — 93010 ELECTROCARDIOGRAM REPORT: CPT | Mod: ,,, | Performed by: INTERNAL MEDICINE

## 2025-02-23 PROCEDURE — 99285 EMERGENCY DEPT VISIT HI MDM: CPT | Mod: 25

## 2025-02-23 PROCEDURE — 63600175 PHARM REV CODE 636 W HCPCS: Performed by: STUDENT IN AN ORGANIZED HEALTH CARE EDUCATION/TRAINING PROGRAM

## 2025-02-23 PROCEDURE — 87389 HIV-1 AG W/HIV-1&-2 AB AG IA: CPT | Performed by: PHYSICIAN ASSISTANT

## 2025-02-23 PROCEDURE — 25000003 PHARM REV CODE 250: Performed by: STUDENT IN AN ORGANIZED HEALTH CARE EDUCATION/TRAINING PROGRAM

## 2025-02-23 PROCEDURE — 25000242 PHARM REV CODE 250 ALT 637 W/ HCPCS: Performed by: STUDENT IN AN ORGANIZED HEALTH CARE EDUCATION/TRAINING PROGRAM

## 2025-02-23 PROCEDURE — 85025 COMPLETE CBC W/AUTO DIFF WBC: CPT | Performed by: STUDENT IN AN ORGANIZED HEALTH CARE EDUCATION/TRAINING PROGRAM

## 2025-02-23 PROCEDURE — 96374 THER/PROPH/DIAG INJ IV PUSH: CPT

## 2025-02-23 RX ORDER — IPRATROPIUM BROMIDE AND ALBUTEROL SULFATE 2.5; .5 MG/3ML; MG/3ML
3 SOLUTION RESPIRATORY (INHALATION)
Status: DISCONTINUED | OUTPATIENT
Start: 2025-02-23 | End: 2025-02-23

## 2025-02-23 RX ORDER — FUROSEMIDE 20 MG/1
20 TABLET ORAL 2 TIMES DAILY
Qty: 4 TABLET | Refills: 0 | Status: SHIPPED | OUTPATIENT
Start: 2025-02-23 | End: 2025-02-25

## 2025-02-23 RX ORDER — FUROSEMIDE 10 MG/ML
60 INJECTION INTRAMUSCULAR; INTRAVENOUS
Status: COMPLETED | OUTPATIENT
Start: 2025-02-23 | End: 2025-02-23

## 2025-02-23 RX ORDER — IPRATROPIUM BROMIDE AND ALBUTEROL SULFATE 2.5; .5 MG/3ML; MG/3ML
9 SOLUTION RESPIRATORY (INHALATION) ONCE
Status: COMPLETED | OUTPATIENT
Start: 2025-02-23 | End: 2025-02-23

## 2025-02-23 RX ORDER — OXYCODONE AND ACETAMINOPHEN 10; 325 MG/1; MG/1
1 TABLET ORAL
Refills: 0 | Status: COMPLETED | OUTPATIENT
Start: 2025-02-23 | End: 2025-02-23

## 2025-02-23 RX ADMIN — IPRATROPIUM BROMIDE AND ALBUTEROL SULFATE 9 ML: 2.5; .5 SOLUTION RESPIRATORY (INHALATION) at 08:02

## 2025-02-23 RX ADMIN — FUROSEMIDE 60 MG: 10 INJECTION, SOLUTION INTRAVENOUS at 07:02

## 2025-02-23 RX ADMIN — OXYCODONE AND ACETAMINOPHEN 1 TABLET: 10; 325 TABLET ORAL at 09:02

## 2025-02-23 NOTE — ED NOTES
Patient identifiers verified and correct for Kuldeep Villela  LOC: The patient is awake, alert and aware of environment with an appropriate affect, the patient is oriented x 3 and speaking appropriately. Pt has bilateral eye swelling.   APPEARANCE: Patient appears comfortable and in no acute distress, patient is clean and well groomed.  SKIN: The skin is warm and dry, color consistent with ethnicity, patient has normal skin turgor and moist mucus membranes, skin intact, no breakdown or bruising noted.   MUSCULOSKELETAL: Patient moving all extremities spontaneously,bilateral leg swelling noted.  RESPIRATORY: Airway is open and patent, respirations are spontaneous, patient has a normal effort and rate, no accessory muscle use noted, O2 Sat 97% on room air.  CARDIAC: Patient has a normal rate and regular rhythm, no edema noted, capillary refill < 3 seconds.   GASTRO: Soft and non tender to palpation, no distention noted, Pt states bowel movements have been regular.  : Pt denies any pain or frequency with urination.  NEURO: Pt opens eyes spontaneously, behavior appropriate to situation, follows commands, facial expression symmetrical, bilateral hand grasp equal and even, purposeful motor response noted, normal sensation in all extremities when touched with a finger.

## 2025-02-23 NOTE — ED PROVIDER NOTES
Encounter Date: 2/23/2025       History     Chief Complaint   Patient presents with    Anxiety     Started carvedilol now has bilateral eye swelling.  Primary complaint panic attack     74-year-old female with PMH of hypertension and hyperlipidemia presents with reported panic attacks and shortness of breath.  Patient states she has been increasingly short of breath.  She thinks her peripheral edema is slightly worse than it usually is.  Denies any chest pain, fever, chills, cough, congestion, abdominal pain, nausea, vomiting, dysuria, hematuria, diarrhea, constipation, black/bloody stools.  She has had some swelling around her eyes since she started carvedilol.  Reports multiple recent trips to [facility] for similar symptoms.  Denies ever being prescribed Lasix or other diuretics.  Chronic tobacco use.  She does have inhalers that she uses sometimes but she ran out.    The history is provided by the patient and medical records.     Review of patient's allergies indicates:   Allergen Reactions    Penicillins Anxiety and Other (See Comments)    Anesthetic [benzocaine-aloe vera]      Jittery/hyper    Guaifenesin Anxiety and Other (See Comments)     Past Medical History:   Diagnosis Date    Arthritis     Asthma     Cervical spondylosis 07/13/2012    Chronic LBP 07/13/2012    Chronic neck pain 07/13/2012    Hyperlipidemia     Hypertension     Lumbar radiculopathy, BLE 07/13/2012    Lumbar spinal stenosis at L4-L5. 07/13/2012    Lumbar spondylosis 07/13/2012    Morbid obesity 07/13/2012    Primary osteoarthritis of both knees 07/13/2012    Spondylolisthesis, grade 1 at L4-L5. 07/13/2012    Tenosynovitis of ankle     Rt peroneus longus    Walker as ambulation aid      Past Surgical History:   Procedure Laterality Date    CHOLECYSTECTOMY      HYSTERECTOMY      MT REMOVAL OF OVARY/TUBE(S)       Family History   Problem Relation Name Age of Onset    Heart disease Mother      Hypertension Mother      Heart  disease Father      Hypertension Father      Breast cancer Neg Hx      Colon cancer Neg Hx      Ovarian cancer Neg Hx       Social History[1]  Review of Systems    Physical Exam     Initial Vitals [02/23/25 1648]   BP Pulse Resp Temp SpO2   (!) 140/86 80 18 97.9 °F (36.6 °C) 99 %      MAP       --         Physical Exam    Nursing note and vitals reviewed.  Constitutional: She appears well-developed and well-nourished. She is not diaphoretic. No distress.   HENT:   Head: Normocephalic and atraumatic.   Eyes: Conjunctivae and EOM are normal. Pupils are equal, round, and reactive to light.   Periorbital edema bilaterally. No redness, warmth, or drainage.    Neck: Neck supple.   Normal range of motion.  Cardiovascular:  Normal rate, regular rhythm, normal heart sounds and intact distal pulses.           No murmur heard.  Pulmonary/Chest: No respiratory distress.   Poor air movement bilaterally, no wheezes or crackles appreciated.  No increased work of breathing or tachypnea   Abdominal: Abdomen is soft. She exhibits no distension. There is no abdominal tenderness. There is no rebound and no guarding.   Musculoskeletal:         General: No tenderness.      Cervical back: Normal range of motion and neck supple.      Comments: Significant edema to bilateral lower extremities     Neurological: She is alert and oriented to person, place, and time.   Skin: Skin is warm and dry. Capillary refill takes less than 2 seconds.         ED Course   Procedures  Labs Reviewed   CBC W/ AUTO DIFFERENTIAL - Abnormal       Result Value    WBC 9.75      RBC 3.53 (*)     Hemoglobin 11.0 (*)     Hematocrit 35.1 (*)     MCV 99 (*)     MCH 31.2 (*)     MCHC 31.3 (*)     RDW 14.2      Platelets 194      MPV 11.2      Immature Granulocytes 0.2      Gran # (ANC) 6.8      Immature Grans (Abs) 0.02      Lymph # 1.9      Mono # 0.7      Eos # 0.4      Baso # 0.06      nRBC 0      Gran % 69.2      Lymph % 19.3      Mono % 7.0      Eosinophil % 3.7       Basophil % 0.6      Differential Method Automated     COMPREHENSIVE METABOLIC PANEL - Abnormal    Sodium 140      Potassium 4.7      Chloride 107      CO2 25      Glucose 82      BUN 24 (*)     Creatinine 1.8 (*)     Calcium 8.8      Total Protein 6.4      Albumin 3.5      Total Bilirubin 0.5      Alkaline Phosphatase 62      AST 18      ALT 5 (*)     eGFR 29.6 (*)     Anion Gap 8     B-TYPE NATRIURETIC PEPTIDE - Abnormal     (*)    HEPATITIS C ANTIBODY    Hepatitis C Ab Non-reactive      Narrative:     Release to patient->Immediate   HIV 1 / 2 ANTIBODY    HIV 1/2 Ag/Ab Non-reactive      Narrative:     Release to patient->Immediate   TROPONIN I HIGH SENSITIVITY    Troponin I High Sensitivity <3            Imaging Results              X-Ray Chest AP Portable (Final result)  Result time 02/23/25 20:36:00      Final result by Jose Luis Russell DO (02/23/25 20:36:00)                   Impression:      No acute abnormality.      Electronically signed by: Jose Luis Russell  Date:    02/23/2025  Time:    20:36               Narrative:    EXAMINATION:  XR CHEST AP PORTABLE    CLINICAL HISTORY:  CHF;    TECHNIQUE:  Single frontal view of the chest was performed.    COMPARISON:  02/25/2024.    FINDINGS:  The lungs are well expanded and clear. No focal opacities are seen. The pleural spaces are clear. The cardiac silhouette is unremarkable. The visualized osseous structures demonstrate degenerative changes.                                       Medications   albuterol-ipratropium 2.5 mg-0.5 mg/3 mL nebulizer solution 9 mL (9 mLs Nebulization Given 2/23/25 2007)   furosemide injection 60 mg (60 mg Intravenous Given 2/23/25 1939)   oxyCODONE-acetaminophen  mg per tablet 1 tablet (1 tablet Oral Given 2/23/25 2120)     Medical Decision Making  Differential diagnoses considered include CHF exacerbation, COPD exacerbation, pneumonia, ACS, viral syndrome, anxiety    Patient's echo last month did not show CHF, though  diastolic function was indeterminate.  She is presenting with a mixed picture so I am going to treat initially with DuoNebs to see if that improves her shortness of breath.  See ED course for additional attending MDM.     Amount and/or Complexity of Data Reviewed  External Data Reviewed: radiology and notes.     Details: Echo from [facility] in January 2025:     Normal left ventricular systolic function.     Left ventricular ejection fraction is estimated at 65-70%.     Mild mitral annular calcification.     Moderate aortic valve calcification.     Aortic valve mean gradient is 10.9 mmHg.     Aortic valve area is 1.9cm2.     Mild aortic stenosis.     Mild-to-moderate tricuspid valve regurgitation.     Labs: ordered. Decision-making details documented in ED Course.  Radiology: ordered and independent interpretation performed. Decision-making details documented in ED Course.  ECG/medicine tests: ordered and independent interpretation performed. Decision-making details documented in ED Course.    Risk  Prescription drug management.  Diagnosis or treatment significantly limited by social determinants of health.               ED Course as of 02/24/25 0002   Sun Feb 23, 2025 1814 EKG 12-lead  EKG independently interpreted by me shows normal sinus rhythm, rate 66, no STEMI [BD]   2114 X-Ray Chest AP Portable  Chest x-ray independently interpreted by me shows no acute process such as pneumonia, pneumothorax, or pulmonary edema.    CMP shows creatinine 1.8, which is up trending slightly.  Labs otherwise look okay.    She had a recent echo that did not demetria any CHF but I do think she is mildly volume overloaded.  She diuresed well after the Lasix in the ED.     Shared decision-making with the patient regarding admission as I considered admission for volume overload.  Patient is comfortable going home with Lasix and following up outpatient.  I prescribed a 2 day course of Lasix for home use.  Patient is aware that she needs to  have her primary care doctor recheck her kidney function in a few days.  She is given strict return precautions.     [BD]      ED Course User Index  [BD] Michael Glass MD                           Clinical Impression:  Final diagnoses:  [R06.02] Shortness of breath  [R60.0] Peripheral edema (Primary)          ED Disposition Condition    Discharge Stable          ED Prescriptions       Medication Sig Dispense Start Date End Date Auth. Provider    furosemide (LASIX) 20 MG tablet Take 1 tablet (20 mg total) by mouth 2 (two) times a day. for 2 days 4 tablet 2/23/2025 2/25/2025 Michael Glass MD          Follow-up Information       Follow up With Specialties Details Why Contact Info    Ginna Musa MD Family Medicine Schedule an appointment as soon as possible for a visit  To discuss your recent ER visit and any additional concerns that you may have [address]  [place] [address]  [phone]      [facility] - Emergency Dept Emergency Medicine Go to  As needed, If symptoms worsen [address] [facility] [address] [phone]                 [1]   Social History  Tobacco Use    Smoking status: Every Day     Current packs/day: 1.00     Average packs/day: 1 pack/day for 20.0 years (20.0 ttl pk-yrs)     Types: Cigarettes    Smokeless tobacco: Never   Substance Use Topics    Alcohol use: No     Alcohol/week: 0.0 standard drinks of alcohol    Drug use: No        Michael Glass MD  02/24/25 0003

## 2025-02-23 NOTE — TELEPHONE ENCOUNTER
Pt calling in, been suffering from anxiety attacks.  was on amlodipine for BP. Has called EMS twice in the last couple of weeks.  at [place] was started on Carvedilol and feels like anxiety attacks have gotten worse since starting this medication. She states that it is causing worsening SOB. States SOB gets worse at night when she goes to sleep. Wakes her up at night. Pt was advised to go to ED earlier but has not gone. States left eye is swollen almost shut. Right eye is swollen also but not as bad as the left. States it started after taking new medication carvedilol. Dispo is call 911. Pt verbalizes understanding. Advised to call back with further concerns.     Reason for Disposition   [1] MODERATE difficulty breathing (e.g., speaks in phrases, SOB even at rest, pulse 100-120) AND [2] NEW-onset or WORSE than normal   Difficulty breathing or wheezing    Additional Information   Negative: SEVERE difficulty breathing (e.g., struggling for each breath, speaks in single words)   Negative: [1] Breathing stopped AND [2] hasn't returned   Negative: Choking on something   Negative: Bluish (or gray) lips or face now   Negative: Difficult to awaken or acting confused (e.g., disoriented, slurred speech)   Negative: Passed out (e.g., fainted, lost consciousness, blacked out and was not responding)   Negative: Wheezing started suddenly after medicine, an allergic food or bee sting   Negative: Stridor (harsh sound while breathing in)   Negative: Slow, shallow and weak breathing   Negative: Sounds like a life-threatening emergency to the triager   Negative: Unresponsive, passed out or very weak    Protocols used: Breathing Difficulty-A-AH, Eyelid Swelling-A-AH

## 2025-02-23 NOTE — TELEPHONE ENCOUNTER
[place]    PCP:  Ginna Musa MD    Pt reports they called in med for her but she is still not able to sleep.  She is calling for something to be called in to help her relax.  C/O insomnia, nasal congestion, drooling in her sleep, and intermittent SOB when she falls asleep (she describes as symptoms are like she's having an anxiety attack).  She reports that she sleeps in the recliner but still has trouble breathing.  Denies Bi-pap/C-pap, CP, and fever.  Per protocol, care advised is go to ED now.  Pt VU and refused care advised.  Care advice reinforced but she continues to refuse care advice.  Pt wants to speak with PCP about meds.  She reports she went to the ED at [facility] and was prescribed Carvedilol.  Advised to call for worsening/questions/concerns.  VU.    Reason for Disposition   [1] MODERATE difficulty breathing (e.g., speaks in phrases, SOB even at rest, pulse 100-120) AND [2] NEW-onset or WORSE than normal    Additional Information   Negative: SEVERE difficulty breathing (e.g., struggling for each breath, speaks in single words)   Negative: [1] Breathing stopped AND [2] hasn't returned   Negative: Choking on something   Negative: Bluish (or gray) lips or face now   Negative: Difficult to awaken or acting confused (e.g., disoriented, slurred speech)   Negative: Passed out (e.g., fainted, lost consciousness, blacked out and was not responding)   Negative: Wheezing started suddenly after medicine, an allergic food or bee sting   Negative: Stridor (harsh sound while breathing in)   Negative: Slow, shallow and weak breathing   Negative: Sounds like a life-threatening emergency to the triager    Protocols used: Breathing Difficulty-A-AH

## 2025-02-24 LAB
OHS QRS DURATION: 60 MS
OHS QTC CALCULATION: 415 MS

## 2025-02-24 RX ORDER — ALBUTEROL SULFATE 90 UG/1
1-2 INHALANT RESPIRATORY (INHALATION) EVERY 4 HOURS PRN
Qty: 18 G | Refills: 11 | Status: SHIPPED | OUTPATIENT
Start: 2025-02-24 | End: 2025-02-25 | Stop reason: SDUPTHER

## 2025-02-24 NOTE — TELEPHONE ENCOUNTER
Called pt and informed her that Dr. Musa sent her medication in. Pt verbalized understanding. Pt asked me to inform Dr. Musa of her ED visit last night due to having a panic attack. Dr. Musa is aware of the visit.

## 2025-02-24 NOTE — DISCHARGE INSTRUCTIONS
Home Care:   -Continue home medications as prescribed  - You should ask your primary care doctor to recheck your labs in a few days to make sure your kidney function is okay    Follow-Up Plan:  - Follow-up with primary care doctor within 3 - 5 days to discuss your recent ER visit and any additional concerns that you may have.  - Additional testing and/or evaluation as directed by your primary doctor    Return to the Emergency Department for symptoms including but not limited to: persistence or worsening of symptoms, shortness of breath or chest pain, inability to drink without vomiting, passing out/fainting/ loss of consciousness, or if you have other concerns.

## 2025-02-27 DIAGNOSIS — J45.909 MODERATE ASTHMA, UNSPECIFIED WHETHER COMPLICATED, UNSPECIFIED WHETHER PERSISTENT: ICD-10-CM

## 2025-02-27 RX ORDER — ALBUTEROL SULFATE 90 UG/1
1-2 INHALANT RESPIRATORY (INHALATION) EVERY 4 HOURS PRN
Qty: 18 G | Refills: 11 | OUTPATIENT
Start: 2025-02-27

## 2025-02-27 RX ORDER — ALBUTEROL SULFATE 90 UG/1
1-2 INHALANT RESPIRATORY (INHALATION) EVERY 4 HOURS PRN
Qty: 18 G | Refills: 11 | Status: SHIPPED | OUTPATIENT
Start: 2025-02-27

## 2025-02-27 NOTE — TELEPHONE ENCOUNTER
----- Message from Ermelinda sent at 2/27/2025 12:28 PM CST -----  Who called: PtWhat is the request in detail: Pt would like a call back asap in regards to medications. Can the clinic reply by [facility]? No Would the patient rather a call back or a response via [facility]? Call back Best call back number: Telephone Information:Mobile          [phone] Information: Thank you.

## 2025-02-27 NOTE — TELEPHONE ENCOUNTER
Spoke with the patient and she states her albuterol is needing a PA completed. Requesting medication for anxiety. She feels that when she gets shortness of breath her anxiety increases.

## 2025-02-27 NOTE — TELEPHONE ENCOUNTER
No care due was identified.  [facility] Embedded Care Due Messages. Reference number: [id number].   2/27/2025 12:52:47 PM CST

## 2025-03-06 ENCOUNTER — TELEPHONE (OUTPATIENT)
Dept: FAMILY MEDICINE | Facility: CLINIC | Age: 75
End: 2025-03-06
Payer: COMMERCIAL

## 2025-03-06 DIAGNOSIS — G89.29 CHRONIC MIDLINE LOW BACK PAIN WITH BILATERAL SCIATICA: Primary | ICD-10-CM

## 2025-03-06 DIAGNOSIS — M47.26 OSTEOARTHRITIS OF SPINE WITH RADICULOPATHY, LUMBAR REGION: ICD-10-CM

## 2025-03-06 DIAGNOSIS — M17.0 PRIMARY OSTEOARTHRITIS OF BOTH KNEES: ICD-10-CM

## 2025-03-06 DIAGNOSIS — M54.41 CHRONIC MIDLINE LOW BACK PAIN WITH BILATERAL SCIATICA: Primary | ICD-10-CM

## 2025-03-06 DIAGNOSIS — M54.42 CHRONIC MIDLINE LOW BACK PAIN WITH BILATERAL SCIATICA: Primary | ICD-10-CM

## 2025-03-06 NOTE — TELEPHONE ENCOUNTER
----- Message from Joanie sent at 3/6/2025  2:00 PM CST -----  Type: Patient CallWho Called: [facility] Does the patient know what this is regarding? Requesting a call back to discuss having an order put in for a rollator with a C. Please advise Does the patient rather a call back or a response via [facility]? callBest Call Back Number:[phone] Information:

## 2025-03-07 NOTE — TELEPHONE ENCOUNTER
Patient is requesting valium  to assist with calming down. She feels at bedtime she feels anxious. Explained this request requires an appointment due to the controlled medication monitoring. She stated understanding.

## 2025-03-07 NOTE — TELEPHONE ENCOUNTER
----- Message from Mani sent at 3/7/2025  3:12 PM CST -----  Regarding: Kuldeep  Type:Patient Callback Who called: Kuldeep What is the request in detail: Pt stated that she would like to have the doctor give him a call. She needs to talk about the pump that was ordered for her and the fact she is experiencing shortness of breath. Please have the nurse reach out to the patient as soon as possible. Can the clinic reply by [facility]? Yes Would the patient rather a call back or a response via [facility]? Callback Best call back number: [phone] Information:

## 2025-03-10 ENCOUNTER — TELEPHONE (OUTPATIENT)
Dept: FAMILY MEDICINE | Facility: CLINIC | Age: 75
End: 2025-03-10
Payer: COMMERCIAL

## 2025-03-10 NOTE — TELEPHONE ENCOUNTER
----- Message from Juana sent at 3/10/2025  2:22 PM CDT -----  Type: Patient Call BackWho called: self What is the request in detail: pt stated that the albuterol (PROVENTIL/VENTOLIN HFA) 90 mcg/actuation inhaler is not working for her , Not stronger enoughCan the clinic reply by [facility]? No Would the patient rather a call back or a response via [facility]?  [facility]: .[phone] (home)  Additional Information:

## 2025-03-13 DIAGNOSIS — G89.29 CHRONIC NECK PAIN: ICD-10-CM

## 2025-03-13 DIAGNOSIS — G89.29 CHRONIC MIDLINE LOW BACK PAIN WITH BILATERAL SCIATICA: ICD-10-CM

## 2025-03-13 DIAGNOSIS — M54.2 CHRONIC NECK PAIN: ICD-10-CM

## 2025-03-13 DIAGNOSIS — M54.41 CHRONIC MIDLINE LOW BACK PAIN WITH BILATERAL SCIATICA: ICD-10-CM

## 2025-03-13 DIAGNOSIS — M54.42 CHRONIC MIDLINE LOW BACK PAIN WITH BILATERAL SCIATICA: ICD-10-CM

## 2025-03-13 RX ORDER — OXYCODONE AND ACETAMINOPHEN 10; 325 MG/1; MG/1
1 TABLET ORAL
Qty: 150 TABLET | Refills: 0 | Status: SHIPPED | OUTPATIENT
Start: 2025-03-22 | End: 2025-04-21

## 2025-03-13 NOTE — TELEPHONE ENCOUNTER
"----- Message from Shamika sent at 3/13/2025  1:09 PM CDT -----  Regarding: pt advice  Contact: [phone]  .Name Of Caller: self Contact Preference?: [phone] What is the nature of the call?  Needing to MA in reference In all pt medication OXYCODONE, pls callAdditional Notes:"Thank you for all that you do for our patients"  "

## 2025-03-17 DIAGNOSIS — G89.29 CHRONIC NECK PAIN: ICD-10-CM

## 2025-03-17 DIAGNOSIS — M54.41 CHRONIC MIDLINE LOW BACK PAIN WITH BILATERAL SCIATICA: ICD-10-CM

## 2025-03-17 DIAGNOSIS — G89.29 CHRONIC MIDLINE LOW BACK PAIN WITH BILATERAL SCIATICA: ICD-10-CM

## 2025-03-17 DIAGNOSIS — M54.2 CHRONIC NECK PAIN: ICD-10-CM

## 2025-03-17 DIAGNOSIS — M54.42 CHRONIC MIDLINE LOW BACK PAIN WITH BILATERAL SCIATICA: ICD-10-CM

## 2025-03-17 RX ORDER — OXYCODONE AND ACETAMINOPHEN 10; 325 MG/1; MG/1
1 TABLET ORAL
Qty: 150 TABLET | Refills: 0 | Status: SHIPPED | OUTPATIENT
Start: 2025-03-18 | End: 2025-04-17

## 2025-03-18 ENCOUNTER — TELEPHONE (OUTPATIENT)
Dept: PHYSICAL MEDICINE AND REHAB | Facility: CLINIC | Age: 75
End: 2025-03-18
Payer: COMMERCIAL

## 2025-03-18 NOTE — TELEPHONE ENCOUNTER
Spoke with patient and confirmed her appointment on 4/8/2025 @1:00pm. Pt verbalized understanding.

## 2025-03-19 ENCOUNTER — TELEPHONE (OUTPATIENT)
Dept: NEUROLOGY | Facility: CLINIC | Age: 75
End: 2025-03-19
Payer: COMMERCIAL

## 2025-03-19 ENCOUNTER — NURSE TRIAGE (OUTPATIENT)
Dept: ADMINISTRATIVE | Facility: CLINIC | Age: 75
End: 2025-03-19
Payer: COMMERCIAL

## 2025-03-19 ENCOUNTER — TELEPHONE (OUTPATIENT)
Dept: FAMILY MEDICINE | Facility: CLINIC | Age: 75
End: 2025-03-19
Payer: COMMERCIAL

## 2025-03-19 ENCOUNTER — OFFICE VISIT (OUTPATIENT)
Dept: FAMILY MEDICINE | Facility: CLINIC | Age: 75
End: 2025-03-19
Payer: COMMERCIAL

## 2025-03-19 ENCOUNTER — TELEPHONE (OUTPATIENT)
Dept: PHYSICAL MEDICINE AND REHAB | Facility: CLINIC | Age: 75
End: 2025-03-19
Payer: COMMERCIAL

## 2025-03-19 DIAGNOSIS — G25.81 RESTLESS LEG: Primary | ICD-10-CM

## 2025-03-19 DIAGNOSIS — G25.81 RESTLESS LEG: ICD-10-CM

## 2025-03-19 PROCEDURE — 99283 EMERGENCY DEPT VISIT LOW MDM: CPT

## 2025-03-19 RX ORDER — ROPINIROLE 1 MG/1
1 TABLET, FILM COATED ORAL 2 TIMES DAILY
Qty: 180 TABLET | Refills: 1 | Status: SHIPPED | OUTPATIENT
Start: 2025-03-19

## 2025-03-19 RX ORDER — ROPINIROLE 1 MG/1
1 TABLET, FILM COATED ORAL 2 TIMES DAILY
Qty: 180 TABLET | Refills: 1 | Status: SHIPPED | OUTPATIENT
Start: 2025-03-19 | End: 2025-03-19 | Stop reason: SDUPTHER

## 2025-03-19 NOTE — PROGRESS NOTES
Audio Only Telehealth Visit     The patient location is: [place]  The chief complaint leading to consultation is: refill of requip  Visit type: Virtual visit with audio only (telephone)  Total time spent in medical discussion with patient: 10 minutes  Total time spent on date of the encounter:15 minutes       The reason for the audio only service rather than synchronous audio and video virtual visit was related to technical difficulties or patient preference/necessity.       Each patient to whom I provide medical services by telemedicine is:  (1) informed of the relationship between the physician and patient and the respective role of any other health care provider with respect to management of the patient; and (2) notified that they may decline to receive medical services by telemedicine and may withdraw from such care at any time. Patient verbally consented to receive this service via voice-only telephone call.                         This service was not originating from a related E/M service provided within the previous 7 days nor will  to an E/M service or procedure within the next 24 hours or my soonest available appointment.  Prevailing standard of care was able to be met in this audio-only visit.         Subjective     Patient ID: Kuldeep Villela is a 74 y.o. female.    Chief Complaint: No chief complaint on file.    74 year old female presents for concerns about her restless leg syndrome. She states she was on requip 1 mg , but this has since been  discontinued. She is having a recurrence of her issues with her legs and would like to have  refill of her medication to help her rest at night.  She denies side effects of her medication.         History of Present Illness               Review of Systems         Objective     There were no vitals filed for this visit.     Physical Exam  Physical Exam                Assessment and Plan     1. Restless leg  -      rOPINIRole (REQUIP) 1 MG tablet; Take 1  tablet (1 mg total) by mouth 2 (two) times daily.  Dispense: 180 tablet; Refill: 1          Assessment & Plan                      No follow-ups on file.        This note was generated with the assistance of ambient listening technology. Verbal consent was obtained by the patient and accompanying visitor(s) for the recording of patient appointment to facilitate this note. I attest to having reviewed and edited the generated note for accuracy, though some syntax or spelling errors may persist. Please contact the author of this note for any clarification.

## 2025-03-19 NOTE — TELEPHONE ENCOUNTER
----- Message from Tech Анна sent at 3/19/2025 11:08 AM CDT -----  Regarding: Patient call back  .Type: Patient Call BackWho called:self What is the request in detail:asking for a call back in regards to medication Can the clinic reply by [facility]?no Would the patient rather a call back or a response via [facility]? Call Best call back number:.[phone] Information:

## 2025-03-19 NOTE — TELEPHONE ENCOUNTER
I called the patient without answer.  I left her a voicemail indicating it is better to be assessed before increasing the pain medications.  She has an appointment on 4/9/2025 at which time I can assess her and adjust her pain medications if necessary.

## 2025-03-19 NOTE — TELEPHONE ENCOUNTER
----- Message from Preston sent at 3/19/2025 11:48 AM CDT -----  Regarding: Callback  Contact: [phone]  Patient calling requesting a callback from nurse or provider in regards to finding out if he provider called in a different medication for her pain. Please call back as soon as possible.[id number] [place] [place] - [address] AT [address] [address]Ghwet: [phone] Fax: [phone]

## 2025-03-19 NOTE — TELEPHONE ENCOUNTER
No contact    Reason for Disposition   Second attempt to contact caller AND no contact made. Phone number verified.    Additional Information   Negative: Caller is angry or rude (e.g., hangs up, verbally abusive, yelling)   Negative: Caller hangs up   Negative: Caller has already spoken with the PCP and has no further questions.   Negative: Caller has already spoken with another triager and has no further questions.   Negative: Caller has already spoken with another triager or PCP AND has further questions AND triager able to answer questions.    Protocols used: No Contact or Duplicate Contact Call-A-

## 2025-03-19 NOTE — TELEPHONE ENCOUNTER
----- Message from Ingrid sent at 3/19/2025  6:41 AM CDT -----  Type:  Needs Medical AdviceWho Called:  PTSymptoms (please be specific):  pain and unable to sleepHow long has patient had these symptoms:   Pharmacy name and phone #:   Would the patient rather a call back or a response via [facility]?  Best Call Back Number: [phone] Information:  Pt would like  a call back regarding new Rx,, Rx ( oxycodone 10/325 ) need to be  higher or lower  medication is not  working and Pharm will not fill unless Dr change  dosages

## 2025-03-19 NOTE — TELEPHONE ENCOUNTER
----- Message from Preston sent at 3/19/2025  9:35 AM CDT -----  Regarding: Callback  Contact: [phone]  Patient calling requesting a callback from nurse or provider in regards to pain all night. Please call back as soon as possible.

## 2025-03-19 NOTE — TELEPHONE ENCOUNTER
Patient reports that the pharmacy Rx was originally sent to will not have the Requip in stock until Friday. She would [facility] [address]

## 2025-03-20 ENCOUNTER — HOSPITAL ENCOUNTER (EMERGENCY)
Facility: HOSPITAL | Age: 75
Discharge: HOME | End: 2025-03-20
Attending: STUDENT IN AN ORGANIZED HEALTH CARE EDUCATION/TRAINING PROGRAM
Payer: COMMERCIAL

## 2025-03-20 VITALS
SYSTOLIC BLOOD PRESSURE: 138 MMHG | HEART RATE: 78 BPM | WEIGHT: 236 LBS | DIASTOLIC BLOOD PRESSURE: 80 MMHG | TEMPERATURE: 98 F | HEIGHT: 60 IN | BODY MASS INDEX: 46.33 KG/M2 | RESPIRATION RATE: 18 BRPM | OXYGEN SATURATION: 98 %

## 2025-03-20 DIAGNOSIS — G25.81 RESTLESS LEG SYNDROME: Primary | ICD-10-CM

## 2025-03-20 PROCEDURE — 25000003 PHARM REV CODE 250

## 2025-03-20 RX ORDER — ROPINIROLE 1 MG/1
1 TABLET, FILM COATED ORAL
Status: COMPLETED | OUTPATIENT
Start: 2025-03-20 | End: 2025-03-20

## 2025-03-20 RX ORDER — GABAPENTIN 100 MG/1
100 CAPSULE ORAL 3 TIMES DAILY
Status: DISCONTINUED | OUTPATIENT
Start: 2025-03-20 | End: 2025-03-20

## 2025-03-20 RX ORDER — OXYCODONE AND ACETAMINOPHEN 10; 325 MG/1; MG/1
1 TABLET ORAL EVERY 4 HOURS PRN
Refills: 0 | Status: DISCONTINUED | OUTPATIENT
Start: 2025-03-20 | End: 2025-03-20

## 2025-03-20 RX ORDER — OXYCODONE AND ACETAMINOPHEN 10; 325 MG/1; MG/1
1 TABLET ORAL
Refills: 0 | Status: COMPLETED | OUTPATIENT
Start: 2025-03-20 | End: 2025-03-20

## 2025-03-20 RX ORDER — ROPINIROLE 1 MG/1
1 TABLET, FILM COATED ORAL 3 TIMES DAILY
Status: DISCONTINUED | OUTPATIENT
Start: 2025-03-20 | End: 2025-03-20

## 2025-03-20 RX ORDER — GABAPENTIN 100 MG/1
100 CAPSULE ORAL
Status: COMPLETED | OUTPATIENT
Start: 2025-03-20 | End: 2025-03-20

## 2025-03-20 RX ADMIN — ROPINIROLE HYDROCHLORIDE 1 MG: 1 TABLET, FILM COATED ORAL at 03:03

## 2025-03-20 RX ADMIN — GABAPENTIN 100 MG: 100 CAPSULE ORAL at 03:03

## 2025-03-20 RX ADMIN — OXYCODONE AND ACETAMINOPHEN 1 TABLET: 10; 325 TABLET ORAL at 03:03

## 2025-03-20 NOTE — ED PROVIDER NOTES
Encounter Date: 3/19/2025       History     Chief Complaint   Patient presents with    Insomnia     Reporting unable to sleep over the last two nights, reports dx with restless leg syndrome and states it is disturbing her sleep     HPI  74-year-old female past medical history of restless leg syndrome, arthritis, chronic lower back pain presents with chief complaint of restless leg syndrome symptoms.  The patient reports having ran out of her home Percocet.  She reports taking her home ropinirole as prescribed.  The patient says she is insomnia because of her restless legs syndrome.  She has no other acute complaints at this time.   Review of patient's allergies indicates:   Allergen Reactions    Penicillins Anxiety and Other (See Comments)    Anesthetic [benzocaine-aloe vera]      Jittery/hyper    Guaifenesin Anxiety and Other (See Comments)     Past Medical History:   Diagnosis Date    Arthritis     Asthma     Cervical spondylosis 07/13/2012    Chronic LBP 07/13/2012    Chronic neck pain 07/13/2012    Hyperlipidemia     Hypertension     Lumbar radiculopathy, BLE 07/13/2012    Lumbar spinal stenosis at L4-L5. 07/13/2012    Lumbar spondylosis 07/13/2012    Morbid obesity 07/13/2012    Primary osteoarthritis of both knees 07/13/2012    Spondylolisthesis, grade 1 at L4-L5. 07/13/2012    Tenosynovitis of ankle     Rt peroneus longus    Walker as ambulation aid      Past Surgical History:   Procedure Laterality Date    CHOLECYSTECTOMY      HYSTERECTOMY      DE REMOVAL OF OVARY/TUBE(S)       Family History   Problem Relation Name Age of Onset    Heart disease Mother      Hypertension Mother      Heart disease Father      Hypertension Father      Breast cancer Neg Hx      Colon cancer Neg Hx      Ovarian cancer Neg Hx       Social History[1]      Physical Exam     Initial Vitals [03/19/25 2311]   BP Pulse Resp Temp SpO2   130/70 92 18 98.4 °F (36.9 °C) 99 %      MAP       --         Physical Exam  Physical  Exam:  CONSTITUTIONAL: Well developed, well nourished, in no acute distress.  CARDIOVASCULAR: Regular rate and rhythm without any murmurs, gallops, rubs. DP 2+.   RESPIRATORY: Speaking in full sentences. Breathing comfortably. Auscultation of the lungs revealed normal breath sounds b/l.  ABDOMEN: Soft and nontender.  NEUROLOGIC: Alert, interacting normally. No facial droop. Voice is clear. Speech is fluent.  MSK: Moving all four extremities. B/l lower extremity edema.   SKIN: Warm and dry. No visible rash on exposed areas of skin, nor open wounds.  Psych: Mood and affect normal.       ED Course   Procedures  Labs Reviewed - No data to display       Imaging Results    None          Medications   oxyCODONE-acetaminophen  mg per tablet 1 tablet (1 tablet Oral Given 3/20/25 0315)   gabapentin capsule 100 mg (100 mg Oral Given 3/20/25 0315)   rOPINIRole tablet 1 mg (1 mg Oral Given 3/20/25 0315)     Medical Decision Making  74-year-old female with history and physical exam as noted above presenting with chief complaint of restless legs syndrome discomfort causing her difficulty sleeping.  She has no other acute complaints.  She is hemodynamically stable and her vital signs are within normal limits.  She is afebrile.  Physical exam remarkable for lower extremity edema which appears chronic in nature for her consistent with venous stasis dermatitis.  No significant unilateral leg swelling or calf tenderness is suggest a DVT.  The patient was provided with Percocet, ropinirole, and a dose of gabapentin.  Patient instructed to follow up with her primary doctor for her restless leg syndrome symptoms.    Risk  Prescription drug management.                                      Clinical Impression:  Final diagnoses:  [G25.81] Restless leg syndrome (Primary)          ED Disposition Condition    Discharge Stable          ED Prescriptions    None       Follow-up Information    None              [1]   Social History  Tobacco  Use    Smoking status: Every Day     Current packs/day: 1.00     Average packs/day: 1 pack/day for 20.0 years (20.0 ttl pk-yrs)     Types: Cigarettes    Smokeless tobacco: Never   Substance Use Topics    Alcohol use: No     Alcohol/week: 0.0 standard drinks of alcohol    Drug use: No        Pratima Sheppard MD  Resident  03/20/25 7448

## 2025-03-20 NOTE — ED TRIAGE NOTES
Kuldeep Villela, a 74 y.o. female presents to the ED w/ complaint of 10/10 stabbing bilateral lower extremity pain.     Triage note:  Chief Complaint   Patient presents with    Insomnia     Reporting unable to sleep over the last two nights, reports dx with restless leg syndrome and states it is disturbing her sleep     Review of patient's allergies indicates:   Allergen Reactions    Penicillins Anxiety and Other (See Comments)    Anesthetic [benzocaine-aloe vera]      Jittery/hyper    Guaifenesin Anxiety and Other (See Comments)     Past Medical History:   Diagnosis Date    Arthritis     Asthma     Cervical spondylosis 07/13/2012    Chronic LBP 07/13/2012    Chronic neck pain 07/13/2012    Hyperlipidemia     Hypertension     Lumbar radiculopathy, BLE 07/13/2012    Lumbar spinal stenosis at L4-L5. 07/13/2012    Lumbar spondylosis 07/13/2012    Morbid obesity 07/13/2012    Primary osteoarthritis of both knees 07/13/2012    Spondylolisthesis, grade 1 at L4-L5. 07/13/2012    Tenosynovitis of ankle     Rt peroneus longus    Walker as ambulation aid

## 2025-03-22 ENCOUNTER — HOSPITAL ENCOUNTER (OUTPATIENT)
Facility: HOSPITAL | Age: 75
Discharge: HOME | End: 2025-03-24
Attending: STUDENT IN AN ORGANIZED HEALTH CARE EDUCATION/TRAINING PROGRAM
Payer: COMMERCIAL

## 2025-03-22 DIAGNOSIS — R11.2 NAUSEA & VOMITING: ICD-10-CM

## 2025-03-22 DIAGNOSIS — M25.569 KNEE PAIN: ICD-10-CM

## 2025-03-22 DIAGNOSIS — M17.0 PRIMARY OSTEOARTHRITIS OF BOTH KNEES: Primary | ICD-10-CM

## 2025-03-22 DIAGNOSIS — R07.9 CHEST PAIN: ICD-10-CM

## 2025-03-22 DIAGNOSIS — R10.9 ABDOMINAL PAIN: ICD-10-CM

## 2025-03-22 PROBLEM — M54.9 CHRONIC BACK PAIN: Status: ACTIVE | Noted: 2025-03-22

## 2025-03-22 PROBLEM — G89.29 CHRONIC BACK PAIN: Status: ACTIVE | Noted: 2025-03-22

## 2025-03-22 PROBLEM — R19.7 NAUSEA VOMITING AND DIARRHEA: Status: ACTIVE | Noted: 2025-03-22

## 2025-03-22 PROBLEM — R53.81 DEBILITY: Status: ACTIVE | Noted: 2025-03-22

## 2025-03-22 PROBLEM — M25.561 PAIN IN BOTH KNEES: Status: ACTIVE | Noted: 2025-03-22

## 2025-03-22 PROBLEM — M25.562 PAIN IN BOTH KNEES: Status: ACTIVE | Noted: 2025-03-22

## 2025-03-22 LAB
ABSOLUTE EOSINOPHIL (OHS): 0.2 K/UL
ABSOLUTE MONOCYTE (OHS): 0.69 K/UL (ref 0.3–1)
ABSOLUTE NEUTROPHIL COUNT (OHS): 6.8 K/UL (ref 1.8–7.7)
ALBUMIN SERPL BCP-MCNC: 3.5 G/DL (ref 3.5–5.2)
ALP SERPL-CCNC: 72 UNIT/L (ref 40–150)
ALT SERPL W/O P-5'-P-CCNC: 8 UNIT/L (ref 10–44)
ANION GAP (OHS): 11 MMOL/L (ref 8–16)
AST SERPL-CCNC: 14 UNIT/L (ref 11–45)
BASOPHILS # BLD AUTO: 0.03 K/UL
BASOPHILS NFR BLD AUTO: 0.3 %
BILIRUB SERPL-MCNC: 0.8 MG/DL (ref 0.1–1)
BILIRUB UR QL STRIP.AUTO: NEGATIVE
BUN SERPL-MCNC: 13 MG/DL (ref 8–23)
CALCIUM SERPL-MCNC: 9 MG/DL (ref 8.7–10.5)
CHLORIDE SERPL-SCNC: 99 MMOL/L (ref 95–110)
CLARITY UR: CLEAR
CO2 SERPL-SCNC: 22 MMOL/L (ref 23–29)
COLOR UR AUTO: COLORLESS
CREAT SERPL-MCNC: 1 MG/DL (ref 0.5–1.4)
ERYTHROCYTE [DISTWIDTH] IN BLOOD BY AUTOMATED COUNT: 13 % (ref 11.5–14.5)
GFR SERPLBLD CREATININE-BSD FMLA CKD-EPI: 60 ML/MIN/1.73/M2
GLUCOSE SERPL-MCNC: 71 MG/DL (ref 70–110)
GLUCOSE UR QL STRIP: NEGATIVE
HCT VFR BLD AUTO: 36.2 % (ref 37–48.5)
HGB BLD-MCNC: 12.1 GM/DL (ref 12–16)
HGB UR QL STRIP: NEGATIVE
IMM GRANULOCYTES # BLD AUTO: 0.03 K/UL (ref 0–0.04)
IMM GRANULOCYTES NFR BLD AUTO: 0.3 % (ref 0–0.5)
INFLUENZA A BY PCR (OHS): NEGATIVE
INFLUENZA B BY PCR (OHS): NEGATIVE
KETONES UR QL STRIP: NEGATIVE
LEUKOCYTE ESTERASE UR QL STRIP: NEGATIVE
LIPASE SERPL-CCNC: 61 U/L (ref 4–60)
LYMPHOCYTES # BLD AUTO: 1.35 K/UL (ref 1–4.8)
MCH RBC QN AUTO: 30.9 PG (ref 27–50)
MCHC RBC AUTO-ENTMCNC: 33.4 G/DL (ref 32–36)
MCV RBC AUTO: 93 FL (ref 82–98)
NITRITE UR QL STRIP: NEGATIVE
NUCLEATED RBC (/100WBC) (OHS): 0 /100 WBC
PH UR STRIP: 6 [PH]
PLATELET # BLD AUTO: 292 K/UL (ref 150–450)
PMV BLD AUTO: 10.8 FL (ref 9.2–12.9)
POTASSIUM SERPL-SCNC: 3.3 MMOL/L (ref 3.5–5.1)
PROT SERPL-MCNC: 6.6 GM/DL (ref 6–8.4)
PROT UR QL STRIP: NEGATIVE
RBC # BLD AUTO: 3.91 M/UL (ref 4–5.4)
RELATIVE EOSINOPHIL (OHS): 2.2 %
RELATIVE LYMPHOCYTE (OHS): 14.8 % (ref 18–48)
RELATIVE MONOCYTE (OHS): 7.6 % (ref 4–15)
RELATIVE NEUTROPHIL (OHS): 74.8 % (ref 38–73)
RSV A 5' UTR RNA NPH QL NAA+PROBE: NEGATIVE
SARS-COV-2 RNA RESP QL NAA+PROBE: NEGATIVE
SODIUM SERPL-SCNC: 132 MMOL/L (ref 136–145)
SP GR UR STRIP: 1
TROPONIN I SERPL HS-MCNC: <3 NG/L
TSH SERPL-ACNC: 0.45 UIU/ML (ref 0.4–4)
UROBILINOGEN UR STRIP-ACNC: NEGATIVE EU/DL
WBC # BLD AUTO: 9.1 K/UL (ref 3.9–12.7)

## 2025-03-22 PROCEDURE — 80053 COMPREHEN METABOLIC PANEL: CPT

## 2025-03-22 PROCEDURE — 63600175 PHARM REV CODE 636 W HCPCS: Performed by: PHYSICIAN ASSISTANT

## 2025-03-22 PROCEDURE — 83690 ASSAY OF LIPASE: CPT

## 2025-03-22 PROCEDURE — A4216 STERILE WATER/SALINE, 10 ML: HCPCS | Performed by: PHYSICIAN ASSISTANT

## 2025-03-22 PROCEDURE — 63600175 PHARM REV CODE 636 W HCPCS

## 2025-03-22 PROCEDURE — 25000003 PHARM REV CODE 250: Performed by: STUDENT IN AN ORGANIZED HEALTH CARE EDUCATION/TRAINING PROGRAM

## 2025-03-22 PROCEDURE — 93005 ELECTROCARDIOGRAM TRACING: CPT

## 2025-03-22 PROCEDURE — 96372 THER/PROPH/DIAG INJ SC/IM: CPT | Performed by: PHYSICIAN ASSISTANT

## 2025-03-22 PROCEDURE — 0241U SARS-COV2 (COVID) WITH FLU/RSV BY PCR: CPT

## 2025-03-22 PROCEDURE — 63600175 PHARM REV CODE 636 W HCPCS: Performed by: STUDENT IN AN ORGANIZED HEALTH CARE EDUCATION/TRAINING PROGRAM

## 2025-03-22 PROCEDURE — 93010 ELECTROCARDIOGRAM REPORT: CPT | Mod: ,,, | Performed by: INTERNAL MEDICINE

## 2025-03-22 PROCEDURE — 96374 THER/PROPH/DIAG INJ IV PUSH: CPT | Mod: 59

## 2025-03-22 PROCEDURE — G0378 HOSPITAL OBSERVATION PER HR: HCPCS

## 2025-03-22 PROCEDURE — 96376 TX/PRO/DX INJ SAME DRUG ADON: CPT

## 2025-03-22 PROCEDURE — 25500020 PHARM REV CODE 255: Performed by: STUDENT IN AN ORGANIZED HEALTH CARE EDUCATION/TRAINING PROGRAM

## 2025-03-22 PROCEDURE — 81003 URINALYSIS AUTO W/O SCOPE: CPT

## 2025-03-22 PROCEDURE — 99285 EMERGENCY DEPT VISIT HI MDM: CPT | Mod: 25

## 2025-03-22 PROCEDURE — 25000003 PHARM REV CODE 250

## 2025-03-22 PROCEDURE — 96375 TX/PRO/DX INJ NEW DRUG ADDON: CPT

## 2025-03-22 PROCEDURE — 85025 COMPLETE CBC W/AUTO DIFF WBC: CPT

## 2025-03-22 PROCEDURE — 25000003 PHARM REV CODE 250: Performed by: PHYSICIAN ASSISTANT

## 2025-03-22 PROCEDURE — 84484 ASSAY OF TROPONIN QUANT: CPT

## 2025-03-22 PROCEDURE — 84443 ASSAY THYROID STIM HORMONE: CPT

## 2025-03-22 PROCEDURE — 96361 HYDRATE IV INFUSION ADD-ON: CPT

## 2025-03-22 RX ORDER — NALOXONE HCL 0.4 MG/ML
0.02 VIAL (ML) INJECTION
Status: DISCONTINUED | OUTPATIENT
Start: 2025-03-22 | End: 2025-03-24 | Stop reason: HOSPADM

## 2025-03-22 RX ORDER — IPRATROPIUM BROMIDE AND ALBUTEROL SULFATE 2.5; .5 MG/3ML; MG/3ML
3 SOLUTION RESPIRATORY (INHALATION) EVERY 6 HOURS PRN
Status: DISCONTINUED | OUTPATIENT
Start: 2025-03-22 | End: 2025-03-24 | Stop reason: HOSPADM

## 2025-03-22 RX ORDER — ONDANSETRON 4 MG/1
4 TABLET, ORALLY DISINTEGRATING ORAL
Status: COMPLETED | OUTPATIENT
Start: 2025-03-22 | End: 2025-03-22

## 2025-03-22 RX ORDER — POTASSIUM CHLORIDE 20 MEQ/1
40 TABLET, EXTENDED RELEASE ORAL ONCE
Status: COMPLETED | OUTPATIENT
Start: 2025-03-22 | End: 2025-03-22

## 2025-03-22 RX ORDER — MORPHINE SULFATE 2 MG/ML
2 INJECTION, SOLUTION INTRAMUSCULAR; INTRAVENOUS
Refills: 0 | Status: COMPLETED | OUTPATIENT
Start: 2025-03-22 | End: 2025-03-22

## 2025-03-22 RX ORDER — PROCHLORPERAZINE EDISYLATE 5 MG/ML
5 INJECTION INTRAMUSCULAR; INTRAVENOUS EVERY 6 HOURS PRN
Status: DISCONTINUED | OUTPATIENT
Start: 2025-03-22 | End: 2025-03-24 | Stop reason: HOSPADM

## 2025-03-22 RX ORDER — PRAVASTATIN SODIUM 20 MG/1
20 TABLET ORAL DAILY
Status: DISCONTINUED | OUTPATIENT
Start: 2025-03-23 | End: 2025-03-24 | Stop reason: HOSPADM

## 2025-03-22 RX ORDER — SIMETHICONE 80 MG
1 TABLET,CHEWABLE ORAL 4 TIMES DAILY PRN
Status: DISCONTINUED | OUTPATIENT
Start: 2025-03-22 | End: 2025-03-24 | Stop reason: HOSPADM

## 2025-03-22 RX ORDER — MORPHINE SULFATE 4 MG/ML
4 INJECTION, SOLUTION INTRAMUSCULAR; INTRAVENOUS
Refills: 0 | Status: COMPLETED | OUTPATIENT
Start: 2025-03-22 | End: 2025-03-22

## 2025-03-22 RX ORDER — FUROSEMIDE 20 MG/1
20 TABLET ORAL 2 TIMES DAILY
Status: DISCONTINUED | OUTPATIENT
Start: 2025-03-22 | End: 2025-03-24 | Stop reason: HOSPADM

## 2025-03-22 RX ORDER — IBUPROFEN 200 MG
24 TABLET ORAL
Status: DISCONTINUED | OUTPATIENT
Start: 2025-03-22 | End: 2025-03-24 | Stop reason: HOSPADM

## 2025-03-22 RX ORDER — ACETAMINOPHEN 325 MG/1
650 TABLET ORAL EVERY 4 HOURS PRN
Status: DISCONTINUED | OUTPATIENT
Start: 2025-03-22 | End: 2025-03-24 | Stop reason: HOSPADM

## 2025-03-22 RX ORDER — MICONAZOLE NITRATE 2 G/100G
POWDER TOPICAL
Status: COMPLETED | OUTPATIENT
Start: 2025-03-22 | End: 2025-03-22

## 2025-03-22 RX ORDER — GLUCAGON 1 MG
1 KIT INJECTION
Status: DISCONTINUED | OUTPATIENT
Start: 2025-03-22 | End: 2025-03-24 | Stop reason: HOSPADM

## 2025-03-22 RX ORDER — OXYCODONE AND ACETAMINOPHEN 10; 325 MG/1; MG/1
1 TABLET ORAL EVERY 6 HOURS PRN
Refills: 0 | Status: DISCONTINUED | OUTPATIENT
Start: 2025-03-22 | End: 2025-03-24 | Stop reason: HOSPADM

## 2025-03-22 RX ORDER — AMLODIPINE BESYLATE 10 MG/1
10 TABLET ORAL DAILY
Status: DISCONTINUED | OUTPATIENT
Start: 2025-03-23 | End: 2025-03-24 | Stop reason: HOSPADM

## 2025-03-22 RX ORDER — ONDANSETRON HYDROCHLORIDE 2 MG/ML
4 INJECTION, SOLUTION INTRAVENOUS
Status: COMPLETED | OUTPATIENT
Start: 2025-03-22 | End: 2025-03-22

## 2025-03-22 RX ORDER — TALC
6 POWDER (GRAM) TOPICAL NIGHTLY PRN
Status: DISCONTINUED | OUTPATIENT
Start: 2025-03-22 | End: 2025-03-24 | Stop reason: HOSPADM

## 2025-03-22 RX ORDER — SERTRALINE HYDROCHLORIDE 50 MG/1
50 TABLET, FILM COATED ORAL DAILY
Status: DISCONTINUED | OUTPATIENT
Start: 2025-03-23 | End: 2025-03-24 | Stop reason: HOSPADM

## 2025-03-22 RX ORDER — CYCLOBENZAPRINE HCL 5 MG
10 TABLET ORAL 3 TIMES DAILY PRN
Status: DISCONTINUED | OUTPATIENT
Start: 2025-03-22 | End: 2025-03-24 | Stop reason: HOSPADM

## 2025-03-22 RX ORDER — ONDANSETRON 8 MG/1
8 TABLET, ORALLY DISINTEGRATING ORAL EVERY 8 HOURS PRN
Status: DISCONTINUED | OUTPATIENT
Start: 2025-03-22 | End: 2025-03-24 | Stop reason: HOSPADM

## 2025-03-22 RX ORDER — IBUPROFEN 200 MG
16 TABLET ORAL
Status: DISCONTINUED | OUTPATIENT
Start: 2025-03-22 | End: 2025-03-24 | Stop reason: HOSPADM

## 2025-03-22 RX ORDER — SODIUM CHLORIDE 0.9 % (FLUSH) 0.9 %
10 SYRINGE (ML) INJECTION EVERY 8 HOURS
Status: DISCONTINUED | OUTPATIENT
Start: 2025-03-22 | End: 2025-03-24 | Stop reason: HOSPADM

## 2025-03-22 RX ORDER — ROPINIROLE 1 MG/1
1 TABLET, FILM COATED ORAL 2 TIMES DAILY
Status: DISCONTINUED | OUTPATIENT
Start: 2025-03-22 | End: 2025-03-24 | Stop reason: HOSPADM

## 2025-03-22 RX ORDER — HEPARIN SODIUM 5000 [USP'U]/ML
5000 INJECTION, SOLUTION INTRAVENOUS; SUBCUTANEOUS EVERY 8 HOURS
Status: DISCONTINUED | OUTPATIENT
Start: 2025-03-22 | End: 2025-03-24 | Stop reason: HOSPADM

## 2025-03-22 RX ORDER — TOPIRAMATE 25 MG/1
50 TABLET ORAL EVERY 12 HOURS
Status: DISCONTINUED | OUTPATIENT
Start: 2025-03-22 | End: 2025-03-24 | Stop reason: HOSPADM

## 2025-03-22 RX ORDER — ALUMINUM HYDROXIDE, MAGNESIUM HYDROXIDE, AND SIMETHICONE 1200; 120; 1200 MG/30ML; MG/30ML; MG/30ML
30 SUSPENSION ORAL 4 TIMES DAILY PRN
Status: DISCONTINUED | OUTPATIENT
Start: 2025-03-22 | End: 2025-03-24 | Stop reason: HOSPADM

## 2025-03-22 RX ORDER — POLYETHYLENE GLYCOL 3350 17 G/17G
17 POWDER, FOR SOLUTION ORAL DAILY PRN
Status: DISCONTINUED | OUTPATIENT
Start: 2025-03-22 | End: 2025-03-24 | Stop reason: HOSPADM

## 2025-03-22 RX ADMIN — IOHEXOL 100 ML: 350 INJECTION, SOLUTION INTRAVENOUS at 02:03

## 2025-03-22 RX ADMIN — FUROSEMIDE 20 MG: 20 TABLET ORAL at 06:03

## 2025-03-22 RX ADMIN — SODIUM CHLORIDE, PRESERVATIVE FREE 10 ML: 5 INJECTION INTRAVENOUS at 09:03

## 2025-03-22 RX ADMIN — ROPINIROLE HYDROCHLORIDE 1 MG: 1 TABLET, FILM COATED ORAL at 08:03

## 2025-03-22 RX ADMIN — MORPHINE SULFATE 4 MG: 4 INJECTION INTRAVENOUS at 10:03

## 2025-03-22 RX ADMIN — TOPIRAMATE 50 MG: 25 TABLET, FILM COATED ORAL at 08:03

## 2025-03-22 RX ADMIN — ONDANSETRON 4 MG: 4 TABLET, ORALLY DISINTEGRATING ORAL at 10:03

## 2025-03-22 RX ADMIN — MICONAZOLE NITRATE 2 % TOPICAL POWDER: at 02:03

## 2025-03-22 RX ADMIN — BUSPIRONE HYDROCHLORIDE 15 MG: 10 TABLET ORAL at 08:03

## 2025-03-22 RX ADMIN — POTASSIUM CHLORIDE 40 MEQ: 1500 TABLET, EXTENDED RELEASE ORAL at 08:03

## 2025-03-22 RX ADMIN — SODIUM CHLORIDE 1000 ML: 9 INJECTION, SOLUTION INTRAVENOUS at 04:03

## 2025-03-22 RX ADMIN — ONDANSETRON 4 MG: 2 INJECTION INTRAMUSCULAR; INTRAVENOUS at 04:03

## 2025-03-22 RX ADMIN — OXYCODONE AND ACETAMINOPHEN 1 TABLET: 325; 10 TABLET ORAL at 08:03

## 2025-03-22 RX ADMIN — MORPHINE SULFATE 2 MG: 2 INJECTION, SOLUTION INTRAMUSCULAR; INTRAVENOUS at 05:03

## 2025-03-22 RX ADMIN — HEPARIN SODIUM 5000 UNITS: 5000 INJECTION INTRAVENOUS; SUBCUTANEOUS at 09:03

## 2025-03-22 NOTE — PROVIDER PROGRESS NOTES - EMERGENCY DEPT.
Encounter Date: 3/22/2025    ED Physician Progress Notes        ED STAFF PHYSICIAN HAND-OFF NOTE:  2:10 PM 3/22/2025  Kuldeep Villela is a 74 y.o. female who presented to the ED on 3/22/2025 and has been managed by , who reports patient C/O knee injury. I assumed care of patient from off-going ED physician team at 2:10 PM pending CT A/P.    On my evaluation, Kuldeep Villela resting in bed in NAD. Thus far, Kuldeep Villela has received:  Medications   ondansetron injection 4 mg (has no administration in time range)   miconazole NITRATE 2 % top powder ( Topical (Top) Given 3/22/25 1403)   morphine injection 4 mg (4 mg Intravenous Given 3/22/25 1058)   ondansetron disintegrating tablet 4 mg (4 mg Oral Given 3/22/25 1059)   iohexoL (OMNIPAQUE 350) injection 100 mL (100 mLs Intravenous Given 3/22/25 1403)       On my exam, I appreciate:  /61   Pulse 88   Temp 98.2 °F (36.8 °C) (Oral)   Resp (!) 30   Ht 5' (1.524 m)   Wt 107 kg (236 lb)   SpO2 95%   Breastfeeding No   BMI 46.09 kg/m²   Constitutional: Non-Toxic-appearing and in NAD.  Head/Face: AT/NC & No Facial asymmetry.  Pulmonary/Chest:  No acute respiratory distress  Abdominal: Soft, ND, NT.         Disposition:  Patient persistently complaining of back and knee pain.  She reports having progressive weakness at home is interfering with her daily activities.  CT abdomen and pelvis was negative for acute process.  Patient was admitted to EDOU for observation warranting PT OT to assess for SNF vs HH needs.   ______________________  Анна Ward MD  Emergency Medicine Staff  2:10 PM 3/22/2025

## 2025-03-22 NOTE — H&P
ED Observation Unit  History and Physical      I assumed care of this patient from the Main ED at onset of observation time, 17:50 on 03/22/2025.       History of Present Illness:    74 y.o. female with a PMH of morbid obesity, HTN, HLD, spondylosis, radiculopathy, chronic lumbar back pain, osteoarthritis of both knees presents to [facility] Emergency Department for evaluation of periumbilical and suprapubic abdominal pain, nausea, 3 episodes of nonbloody nonbilious emesis, and 3 episodes of watery nonbloody diarrhea that began last night. She is also complaining of generalized weakness, acute on chronic back pain that she says feels like a flare, and bilateral knee pain that she reports having for several years but has been worse recently. No recent trauma. Lives at home with grandson but states she is trying to stay independent in ADLs as her grandson is busy and unavailable to help most of the time, but she is having trouble caring for herself. She in interested in rehab placement and reports she has previously discussed this with her primary care physician.      She denies fever, cough, congestion, rhinorrhea, chest pain, dyspnea, dysuria, hematuria, flank pain, vaginal bleeding or discharge, bowel or bladder incontinence, saddle anesthesia, sensory changes, or new weakness.   She has lower extremity edema bilaterally that she states is improved from prior.      The history is provided by the patient, medical records and the EMS personnel.     I reviewed the ED Provider Note dated 3/22/25 prior to my evaluation of this patient.  I reviewed all labs and imaging performed in the Main ED, prior to patient being placed in Observation. Patient was placed in the ED Observation Unit for Nausea & vomiting.    PMHx   Past Medical History:   Diagnosis Date    Arthritis     Asthma     Cervical spondylosis 07/13/2012    Chronic LBP 07/13/2012    Chronic neck pain 07/13/2012    Hyperlipidemia     Hypertension     Lumbar  radiculopathy, BLE 07/13/2012    Lumbar spinal stenosis at L4-L5. 07/13/2012    Lumbar spondylosis 07/13/2012    Morbid obesity 07/13/2012    Primary osteoarthritis of both knees 07/13/2012    Spondylolisthesis, grade 1 at L4-L5. 07/13/2012    Tenosynovitis of ankle     Rt peroneus longus    Walker as ambulation aid       Past Surgical History:   Procedure Laterality Date    CHOLECYSTECTOMY      HYSTERECTOMY      UT REMOVAL OF OVARY/TUBE(S)          Family Hx   Family History   Problem Relation Name Age of Onset    Heart disease Mother      Hypertension Mother      Heart disease Father      Hypertension Father      Breast cancer Neg Hx      Colon cancer Neg Hx      Ovarian cancer Neg Hx          Social Hx   Social History     Socioeconomic History    Marital status: Single   Tobacco Use    Smoking status: Every Day     Current packs/day: 1.00     Average packs/day: 1 pack/day for 20.0 years (20.0 ttl pk-yrs)     Types: Cigarettes    Smokeless tobacco: Never   Substance and Sexual Activity    Alcohol use: No     Alcohol/week: 0.0 standard drinks of alcohol    Drug use: No    Sexual activity: Not Currently     Partners: Male     Birth control/protection: See Surgical Hx     Social Drivers of Health     Food Insecurity: No Food Insecurity (2/10/2025)    [place] [facility]    Hunger Vital Sign     Worried About Running Out of Food in the Last Year: Never true     Ran Out of Food in the Last Year: Never true   Transportation Needs: No Transportation Needs (2/10/2025)    Received from [facility]    PRAPARE - Transportation     Lack of Transportation (Medical): No     Lack of Transportation (Non-Medical): No   Stress: Stress Concern Present (8/5/2024)    Received from [employer] [place]    [employer] [place] [employer] - Occupational Stress Questionnaire     Feeling of Stress : To some extent   Housing Stability: Low Risk  (2/10/2025)    Received from [facility]    Housing Stability  Vital Sign     Unable to Pay for Housing in the Last Year: No     Number of Times Moved in the Last Year: 1     Homeless in the Last Year: No        Vital Signs   Vitals:    03/22/25 1058 03/22/25 1200 03/22/25 1300 03/22/25 1731   BP:  122/64 116/61    BP Location:       Patient Position:       Pulse:  77 88    Resp: 18 (!) 22 (!) 30 18   Temp:       TempSrc:       SpO2:  96% 95%    Weight:       Height:            Review of Systems  Review of Systems   Gastrointestinal:  Positive for abdominal pain, diarrhea, nausea and vomiting.   Musculoskeletal:  Positive for back pain, joint pain and myalgias.       Physical Exam  Physical Exam  Vitals and nursing note reviewed.   Constitutional:       General: She is not in acute distress.     Appearance: She is not ill-appearing.   HENT:      Head: Normocephalic.   Eyes:      Extraocular Movements: Extraocular movements intact.   Cardiovascular:      Rate and Rhythm: Normal rate.   Pulmonary:      Effort: Pulmonary effort is normal.   Abdominal:      General: Abdomen is flat. Bowel sounds are normal.      Palpations: Abdomen is soft.      Tenderness: There is abdominal tenderness.   Musculoskeletal:      Comments:  Moderate paraspinal L>R lumbar tenderness to palpation, no midline tenderness.    Bilateral knee tenderness and pain with ROM, L>R.    Neurological:      Mental Status: She is alert.         Medications:   Scheduled Meds:   [START ON 3/23/2025] amLODIPine  10 mg Oral Daily    busPIRone  15 mg Oral QID    furosemide  20 mg Oral BID    heparin (porcine)  5,000 Units Subcutaneous Q8H    [START ON 3/23/2025] pravastatin  20 mg Oral Daily    rOPINIRole  1 mg Oral BID    [START ON 3/23/2025] sertraline  50 mg Oral Daily    sodium chloride 0.9%  10 mL Intravenous Q8H    sodium hyaluronate (EUFLEXXA)  40 mg Intra-articular Weekly    topiramate  50 mg Oral Q12H     Continuous Infusions:  PRN Meds:.  Current Facility-Administered Medications:     acetaminophen, 650 mg,  Oral, Q4H PRN    albuterol-ipratropium, 3 mL, Nebulization, Q6H PRN    aluminum-magnesium hydroxide-simethicone, 30 mL, Oral, QID PRN    cyclobenzaprine, 10 mg, Oral, TID PRN    dextrose 50%, 12.5 g, Intravenous, PRN    dextrose 50%, 25 g, Intravenous, PRN    glucagon (human recombinant), 1 mg, Intramuscular, PRN    glucose, 16 g, Oral, PRN    glucose, 24 g, Oral, PRN    melatonin, 6 mg, Oral, Nightly PRN    naloxone, 0.02 mg, Intravenous, PRN    ondansetron, 8 mg, Oral, Q8H PRN    oxyCODONE-acetaminophen, 1 tablet, Oral, Q6H PRN    polyethylene glycol, 17 g, Oral, Daily PRN    prochlorperazine, 5 mg, Intravenous, Q6H PRN    simethicone, 1 tablet, Oral, QID PRN      Assessment/Plan:  Nausea vomiting and diarrhea  Abdominal pain    - afebrile, HDS  - No leukocytosis  - K 3.3, replaced  - CT a/p Previous large volume stool within the rectum has been evacuated.  There is residual rectal wall thickening, but significant no adjacent inflammatory change or presacral fluid.  Correlate for proctitis. Redemonstrated right paraumbilical hernia containing short segment of small bowel, with slight decreased gaseous distension from prior.  No evidence of strangulation or complete obstruction, noting partial obstruction not excluded. Colonic diverticulosis. Cholelithiasis.  - tylenol 1 g tid  - prn antiemetics  - C diff pending  - daily CBC, BMP, magnesium  - continue to monitor    Knee pain  Back pain  Debility    XR b/l knee: Suspected bilateral nonspecific suprapatellar joint effusions without acute displaced fracture-dislocation seen at either knee. Bilateral knee advanced tricompartmental DJD, progressed since 09/18/2020 study.  - continue home percocet 10 mg q6h, flexeril  - PTOT consulted    Case was discussed with the ED provider, Dr. Glass.

## 2025-03-22 NOTE — ED PROVIDER NOTES
Encounter Date: 3/22/2025       History     Chief Complaint   Patient presents with    Knee Injury     Bilateral knee pain and back pain for 2 days.    Nausea     Nausea and vomiting onset last night     74 y.o. female with a PMH of morbid obesity, HTN, HLD, spondylosis, radiculopathy, chronic lumbar back pain, osteoarthritis of both knees presents to [facility] for evaluation of periumbilical and suprapubic abdominal pain, nausea, 3 episodes of nonbloody nonbilious emesis, and 3 episodes of watery nonbloody diarrhea that began last night. She is also complaining of generalized weakness, acute on chronic back pain that she says feels like a flare, and bilateral knee pain that she reports having for several years but has been worse recently. No recent trauma. Lives at home with grandson but states she is trying to stay independent in ADLs as her grandson is busy and unavailable to help most of the time, but she is having trouble caring for herself. She in interested in rehab placement and reports she has previously discussed this with her primary care physician.     She denies fever, cough, congestion, rhinorrhea, chest pain, dyspnea, dysuria, hematuria, flank pain, vaginal bleeding or discharge, bowel or bladder incontinence, saddle anesthesia, sensory changes, or new weakness.   She has lower extremity edema bilaterally that she states is improved from prior.     The history is provided by the patient, medical records and the EMS personnel.     Review of patient's allergies indicates:   Allergen Reactions    Penicillins Anxiety and Other (See Comments)    Anesthetic [benzocaine-aloe vera]      Jittery/hyper    Guaifenesin Anxiety and Other (See Comments)     Past Medical History:   Diagnosis Date    Arthritis     Asthma     Cervical spondylosis 07/13/2012    Chronic LBP 07/13/2012    Chronic neck pain 07/13/2012    Hyperlipidemia     Hypertension     Lumbar radiculopathy, BLE 07/13/2012    Lumbar  spinal stenosis at L4-L5. 07/13/2012    Lumbar spondylosis 07/13/2012    Morbid obesity 07/13/2012    Primary osteoarthritis of both knees 07/13/2012    Spondylolisthesis, grade 1 at L4-L5. 07/13/2012    Tenosynovitis of ankle     Rt peroneus longus    Walker as ambulation aid      Past Surgical History:   Procedure Laterality Date    CHOLECYSTECTOMY      HYSTERECTOMY      MI REMOVAL OF OVARY/TUBE(S)       Family History   Problem Relation Name Age of Onset    Heart disease Mother      Hypertension Mother      Heart disease Father      Hypertension Father      Breast cancer Neg Hx      Colon cancer Neg Hx      Ovarian cancer Neg Hx       Social History[1]  Review of Systems    Physical Exam     Initial Vitals [03/22/25 0915]   BP Pulse Resp Temp SpO2   (!) 138/90 70 18 98.2 °F (36.8 °C) 99 %      MAP       --         Physical Exam    Nursing note and vitals reviewed.  Constitutional: She is Obese . No distress.   HENT:   Head: Normocephalic. Mouth/Throat: Oropharynx is clear and moist.   Eyes: Pupils are equal, round, and reactive to light. No scleral icterus.   Neck: Neck supple.   Cardiovascular:  Normal rate and regular rhythm.           No murmur heard.  Pulmonary/Chest: Breath sounds normal. No stridor. No respiratory distress.   Abdominal: Abdomen is soft.   Erythema and whitish film over skin fold under pannus consistent with intertrigo, no significant skin break down.     Moderate periumbilical tenderness to palpation without rebound or guarding.    Musculoskeletal:         General: Edema (2+ pitting edema with chronic overlying skin changes) present.      Cervical back: Neck supple.      Comments: Moderate paraspinal L>R lumbar tenderness to palpation, no midline tenderness.     Bilateral knee tenderness and pain with ROM, L>R. No significant joint effusion, erythema, or induration.   Normal sensation over bilateral lower extremities, unable to test strength due to pain and patient effort      Neurological:  She is alert. GCS score is 15. GCS eye subscore is 4. GCS verbal subscore is 5. GCS motor subscore is 6.   Skin: Skin is warm and dry.         ED Course   Procedures  Labs Reviewed   COMPREHENSIVE METABOLIC PANEL - Abnormal       Result Value    Sodium 132 (*)     Potassium 3.3 (*)     Chloride 99      CO2 22 (*)     Glucose 71      BUN 13      Creatinine 1.0      Calcium 9.0      Protein Total 6.6      Albumin 3.5      Bilirubin Total 0.8      ALP 72      AST 14      ALT 8 (*)     Anion Gap 11      eGFR 60 (*)    URINALYSIS, REFLEX TO URINE CULTURE - Abnormal    Color, UA Colorless (*)     Appearance, UA Clear      pH, UA 6.0      Spec Grav UA 1.005      Protein, UA Negative      Glucose, UA Negative      Ketones, UA Negative      Bilirubin, UA Negative      Blood, UA Negative      Nitrites, UA Negative      Urobilinogen, UA Negative      Leukocyte Esterase, UA Negative     LIPASE - Abnormal    Lipase Level 61 (*)    CBC WITH DIFFERENTIAL - Abnormal    WBC 9.10      RBC 3.91 (*)     HGB 12.1      HCT 36.2 (*)     MCV 93      MCH 30.9      MCHC 33.4      RDW 13.0      Platelet Count 292      MPV 10.8      Nucleated RBC 0      Neut % 74.8 (*)     Lymph % 14.8 (*)     Mono % 7.6      Eos % 2.2      Basophil % 0.3      Imm Grans % 0.3      Neut # 6.80      Lymph # 1.35      Mono # 0.69      Eos # 0.20      Baso # 0.03      Imm Grans # 0.03     TSH - Normal    TSH 0.448     TROPONIN I HIGH SENSITIVITY - Normal    Troponin High Sensitive <3     SARS-COV2 (COVID) WITH FLU/RSV BY PCR - Normal    INFLUENZA A BY PCR Negative      INFLUENZA B BY PCR Negative      Respiratory Syncytial Virus PCR Negative      SARS-CoV-2 PCR Negative     CBC W/ AUTO DIFFERENTIAL    Narrative:     The following orders were created for panel order CBC auto differential.  Procedure                               Abnormality         Status                     ---------                               -----------         ------                     CBC  with Differential[[id number]]       Abnormal            Final result                 Please view results for these tests on the individual orders.          Imaging Results              CT Abdomen Pelvis With IV Contrast NO Oral Contrast (In process)                      X-Ray Knee 3 View Bilateral (Final result)  Result time 03/22/25 12:46:34      Final result by Krishna Sterling MD (03/22/25 12:46:34)                   Impression:      Suspected bilateral nonspecific suprapatellar joint effusions without acute displaced fracture-dislocation seen at either knee.    Bilateral knee advanced tricompartmental DJD, progressed since 09/18/2020 study.      Electronically signed by: Krishna Sterling MD  Date:    03/22/2025  Time:    12:46               Narrative:    EXAMINATION:  XR KNEE 3 VIEW BILATERAL    CLINICAL HISTORY:  Pain in unspecified knee    TECHNIQUE:  AP, lateral, and Merchant views of both knees were performed.    COMPARISON:  Bilateral knee series 09/18/2020    FINDINGS:  Right knee: Advanced tricompartmental degenerative change, appears progressed since 09/18/2020 study.  No displaced fracture, dislocation or destructive osseous process.  Suspected nonspecific suprapatellar joint effusion.  Mild scattered atherosclerotic vascular calcifications and few scattered nonspecific soft tissue calcifications noted.  No subcutaneous emphysema or radiopaque foreign body.    Left knee: Advanced tricompartmental degenerative change, appears progressed since 09/18/2020 study.  No displaced fracture, dislocation or destructive osseous process.  Suspected nonspecific suprapatellar joint effusion.  Mild scattered atherosclerotic vascular calcifications and few scattered nonspecific soft tissue calcifications noted.  No subcutaneous emphysema or radiopaque foreign body.                                       Medications   ondansetron injection 4 mg (has no administration in time range)   miconazole NITRATE 2 % top powder (  Topical (Top) Given 3/22/25 1403)   morphine injection 4 mg (4 mg Intravenous Given 3/22/25 1058)   ondansetron disintegrating tablet 4 mg (4 mg Oral Given 3/22/25 1059)   iohexoL (OMNIPAQUE 350) injection 100 mL (100 mLs Intravenous Given 3/22/25 1403)     Medical Decision Making  73 y/o presents with abdominal pain and n/v/d since last night. She additionally complains of acute on chronic back and knee pain.     Patient is afebrile, hemodynamically stable, and in no acute distress on arrival.   Differential diagnoses considered include, but not limited to: influenza, viral gastroenteritis, SBO, diverticulitis, gastritis, UTI, electrolyte abnormality     On exam, patient has skin findings consistent with intertrigo. Nystatin powder ordered. She additionally has moderate periumbilical tenderness to palpation; with obtain labs, UA, and CT a/p to evaluate.     Knees bilaterally without induration, erythema, or effusion. Do not suspect septic arthritis. Suspect pain is due to osteoarthritis. Do not suspect cauda equina.     Patient received morphine and zofran in the ED but continued to have nausea and vomiting. Patient signed out to oncoming team, Dr. Glass and Dr. Ward pending CT abdomen pelvis and final disposition. I anticipate patient will be admitted for inability to tolerate PO and rehab placement. Discussed placement with LEBRON Wheeler who states she can be placed in rehab but will need to wait until Monday when [employer] is open. Discussed findings and plan with pt and she expressed understanding and agreement.     Amount and/or Complexity of Data Reviewed  Labs: ordered.  Radiology: ordered.    Risk  OTC drugs.  Prescription drug management.                                      Clinical Impression:  Final diagnoses:  [R11.2] Nausea & vomiting  [M25.569] Knee pain  [R10.9] Abdominal pain                     [1]   Social History  Tobacco Use    Smoking status: Every Day     Current packs/day: 1.00      Average packs/day: 1 pack/day for 20.0 years (20.0 ttl pk-yrs)     Types: Cigarettes    Smokeless tobacco: Never   Substance Use Topics    Alcohol use: No     Alcohol/week: 0.0 standard drinks of alcohol    Drug use: No        Misty Bernstein MD  Resident  03/22/25 [id number]

## 2025-03-22 NOTE — ED NOTES
,LOC: The patient is awake and alert; oriented x 3 and speaking appropriately.  APPEARANCE: Patient resting comfortably, patient is clean and well groomed  SKIN: warm and dry, normal skin turgor & moist mucus membranes, skin intact, no breakdown noted.Skin excoriated near naval from urine per pt report.   MUSCULOSKELETAL: Patient moving all extremities well, no obvious swelling or deformities noted, c/o knee and back pain   RESPIRATORY: Airway is open and patent,  respirations are spontaneous, normal effort and rate  CARDIAC: Patient has a normal rate, no peripheral edema noted, capillary refill < 3 seconds; No complaints of chest pain   ABDOMEN: Soft and non tender to palpation, no distention noted. C/o n/v/d x4  last night.

## 2025-03-22 NOTE — ED NOTES
Pt is AA&O times four Resp full and reg Rates ophelia knee and lower back pain a 9/10  Pt has a pure wick in place

## 2025-03-22 NOTE — ED TRIAGE NOTES
C/o n/v /d all night   and back pain ( chronic)  and knee pain . Had 3 -4 stools and threw up 3-4 x'd. Hasn't eaten in 4 days - poor appetite. Threw up what she tried to eat. Pt requesting placement in a REHAB facility as she has no help at home and is unable to care for herself due to pain .

## 2025-03-22 NOTE — Clinical Note
Diagnosis: Nausea & vomiting [[id number]]   Future Attending Provider: GERMAINE ARREDONDO [[id number]]   Is the patient being sent to ED Observation?: Yes

## 2025-03-23 PROBLEM — G25.81 RESTLESS LEG SYNDROME: Status: ACTIVE | Noted: 2025-03-23

## 2025-03-23 PROBLEM — F41.8 DEPRESSION WITH ANXIETY: Status: ACTIVE | Noted: 2025-03-23

## 2025-03-23 LAB
ABSOLUTE EOSINOPHIL (OHS): 0.2 K/UL
ABSOLUTE MONOCYTE (OHS): 0.74 K/UL (ref 0.3–1)
ABSOLUTE NEUTROPHIL COUNT (OHS): 6.89 K/UL (ref 1.8–7.7)
ANION GAP (OHS): 8 MMOL/L (ref 8–16)
BASOPHILS # BLD AUTO: 0.04 K/UL
BASOPHILS NFR BLD AUTO: 0.4 %
BUN SERPL-MCNC: 12 MG/DL (ref 8–23)
CALCIUM SERPL-MCNC: 8.4 MG/DL (ref 8.7–10.5)
CHLORIDE SERPL-SCNC: 106 MMOL/L (ref 95–110)
CO2 SERPL-SCNC: 20 MMOL/L (ref 23–29)
CREAT SERPL-MCNC: 1 MG/DL (ref 0.5–1.4)
ERYTHROCYTE [DISTWIDTH] IN BLOOD BY AUTOMATED COUNT: 13.4 % (ref 11.5–14.5)
GFR SERPLBLD CREATININE-BSD FMLA CKD-EPI: 60 ML/MIN/1.73/M2
GLUCOSE SERPL-MCNC: 93 MG/DL (ref 70–110)
HCT VFR BLD AUTO: 35.9 % (ref 37–48.5)
HGB BLD-MCNC: 11.9 GM/DL (ref 12–16)
IMM GRANULOCYTES # BLD AUTO: 0.04 K/UL (ref 0–0.04)
IMM GRANULOCYTES NFR BLD AUTO: 0.4 % (ref 0–0.5)
LYMPHOCYTES # BLD AUTO: 1.59 K/UL (ref 1–4.8)
MAGNESIUM SERPL-MCNC: 1.6 MG/DL (ref 1.6–2.6)
MCH RBC QN AUTO: 31.4 PG (ref 27–50)
MCHC RBC AUTO-ENTMCNC: 33.1 G/DL (ref 32–36)
MCV RBC AUTO: 95 FL (ref 82–98)
NUCLEATED RBC (/100WBC) (OHS): 0 /100 WBC
OHS QRS DURATION: 82 MS
OHS QTC CALCULATION: 449 MS
PLATELET # BLD AUTO: 222 K/UL (ref 150–450)
PMV BLD AUTO: 12 FL (ref 9.2–12.9)
POTASSIUM SERPL-SCNC: 3.8 MMOL/L (ref 3.5–5.1)
RBC # BLD AUTO: 3.79 M/UL (ref 4–5.4)
RELATIVE EOSINOPHIL (OHS): 2.1 %
RELATIVE LYMPHOCYTE (OHS): 16.7 % (ref 18–48)
RELATIVE MONOCYTE (OHS): 7.8 % (ref 4–15)
RELATIVE NEUTROPHIL (OHS): 72.6 % (ref 38–73)
SODIUM SERPL-SCNC: 134 MMOL/L (ref 136–145)
WBC # BLD AUTO: 9.5 K/UL (ref 3.9–12.7)

## 2025-03-23 PROCEDURE — 63600175 PHARM REV CODE 636 W HCPCS: Performed by: PHYSICIAN ASSISTANT

## 2025-03-23 PROCEDURE — 25000003 PHARM REV CODE 250: Performed by: PHYSICIAN ASSISTANT

## 2025-03-23 PROCEDURE — 97116 GAIT TRAINING THERAPY: CPT

## 2025-03-23 PROCEDURE — S4991 NICOTINE PATCH NONLEGEND: HCPCS | Performed by: PHYSICIAN ASSISTANT

## 2025-03-23 PROCEDURE — 96372 THER/PROPH/DIAG INJ SC/IM: CPT | Performed by: PHYSICIAN ASSISTANT

## 2025-03-23 PROCEDURE — 80048 BASIC METABOLIC PNL TOTAL CA: CPT | Performed by: PHYSICIAN ASSISTANT

## 2025-03-23 PROCEDURE — 97165 OT EVAL LOW COMPLEX 30 MIN: CPT

## 2025-03-23 PROCEDURE — 97161 PT EVAL LOW COMPLEX 20 MIN: CPT

## 2025-03-23 PROCEDURE — 85025 COMPLETE CBC W/AUTO DIFF WBC: CPT | Performed by: PHYSICIAN ASSISTANT

## 2025-03-23 PROCEDURE — A4216 STERILE WATER/SALINE, 10 ML: HCPCS | Performed by: PHYSICIAN ASSISTANT

## 2025-03-23 PROCEDURE — G0378 HOSPITAL OBSERVATION PER HR: HCPCS

## 2025-03-23 PROCEDURE — 83735 ASSAY OF MAGNESIUM: CPT | Performed by: PHYSICIAN ASSISTANT

## 2025-03-23 PROCEDURE — 97535 SELF CARE MNGMENT TRAINING: CPT

## 2025-03-23 RX ORDER — TALC
6 POWDER (GRAM) TOPICAL NIGHTLY PRN
Status: CANCELLED | OUTPATIENT
Start: 2025-03-23

## 2025-03-23 RX ORDER — PROCHLORPERAZINE EDISYLATE 5 MG/ML
5 INJECTION INTRAMUSCULAR; INTRAVENOUS EVERY 6 HOURS PRN
Status: CANCELLED | OUTPATIENT
Start: 2025-03-23

## 2025-03-23 RX ORDER — NALOXONE HCL 0.4 MG/ML
0.02 VIAL (ML) INJECTION
Status: CANCELLED | OUTPATIENT
Start: 2025-03-23

## 2025-03-23 RX ORDER — GLUCAGON 1 MG
1 KIT INJECTION
Status: CANCELLED | OUTPATIENT
Start: 2025-03-23

## 2025-03-23 RX ORDER — ACETAMINOPHEN 325 MG/1
650 TABLET ORAL EVERY 8 HOURS PRN
Status: CANCELLED | OUTPATIENT
Start: 2025-03-23

## 2025-03-23 RX ORDER — IBUPROFEN 200 MG
16 TABLET ORAL
Status: CANCELLED | OUTPATIENT
Start: 2025-03-23

## 2025-03-23 RX ORDER — IBUPROFEN 200 MG
1 TABLET ORAL
Status: COMPLETED | OUTPATIENT
Start: 2025-03-23 | End: 2025-03-24

## 2025-03-23 RX ORDER — IBUPROFEN 200 MG
24 TABLET ORAL
Status: CANCELLED | OUTPATIENT
Start: 2025-03-23

## 2025-03-23 RX ORDER — BISACODYL 10 MG/1
10 SUPPOSITORY RECTAL DAILY PRN
Status: CANCELLED | OUTPATIENT
Start: 2025-03-23

## 2025-03-23 RX ORDER — ONDANSETRON HYDROCHLORIDE 2 MG/ML
4 INJECTION, SOLUTION INTRAVENOUS EVERY 8 HOURS PRN
Status: CANCELLED | OUTPATIENT
Start: 2025-03-23

## 2025-03-23 RX ORDER — IPRATROPIUM BROMIDE AND ALBUTEROL SULFATE 2.5; .5 MG/3ML; MG/3ML
3 SOLUTION RESPIRATORY (INHALATION) EVERY 4 HOURS PRN
Status: CANCELLED | OUTPATIENT
Start: 2025-03-23

## 2025-03-23 RX ORDER — ALUMINUM HYDROXIDE, MAGNESIUM HYDROXIDE, AND SIMETHICONE 1200; 120; 1200 MG/30ML; MG/30ML; MG/30ML
30 SUSPENSION ORAL 4 TIMES DAILY PRN
Status: CANCELLED | OUTPATIENT
Start: 2025-03-23

## 2025-03-23 RX ORDER — INSULIN ASPART 100 [IU]/ML
0-10 INJECTION, SOLUTION INTRAVENOUS; SUBCUTANEOUS
Status: CANCELLED | OUTPATIENT
Start: 2025-03-23

## 2025-03-23 RX ORDER — AMOXICILLIN 250 MG
1 CAPSULE ORAL 2 TIMES DAILY PRN
Status: CANCELLED | OUTPATIENT
Start: 2025-03-23

## 2025-03-23 RX ADMIN — HEPARIN SODIUM 5000 UNITS: 5000 INJECTION INTRAVENOUS; SUBCUTANEOUS at 05:03

## 2025-03-23 RX ADMIN — SERTRALINE HYDROCHLORIDE 50 MG: 50 TABLET ORAL at 08:03

## 2025-03-23 RX ADMIN — Medication 1 PATCH: at 04:03

## 2025-03-23 RX ADMIN — OXYCODONE AND ACETAMINOPHEN 1 TABLET: 325; 10 TABLET ORAL at 09:03

## 2025-03-23 RX ADMIN — PRAVASTATIN SODIUM 20 MG: 20 TABLET ORAL at 08:03

## 2025-03-23 RX ADMIN — BUSPIRONE HYDROCHLORIDE 15 MG: 10 TABLET ORAL at 08:03

## 2025-03-23 RX ADMIN — FUROSEMIDE 20 MG: 20 TABLET ORAL at 05:03

## 2025-03-23 RX ADMIN — FUROSEMIDE 20 MG: 20 TABLET ORAL at 08:03

## 2025-03-23 RX ADMIN — ROPINIROLE HYDROCHLORIDE 1 MG: 1 TABLET, FILM COATED ORAL at 09:03

## 2025-03-23 RX ADMIN — TOPIRAMATE 50 MG: 25 TABLET, FILM COATED ORAL at 09:03

## 2025-03-23 RX ADMIN — ROPINIROLE HYDROCHLORIDE 1 MG: 1 TABLET, FILM COATED ORAL at 08:03

## 2025-03-23 RX ADMIN — SODIUM CHLORIDE, PRESERVATIVE FREE 10 ML: 5 INJECTION INTRAVENOUS at 01:03

## 2025-03-23 RX ADMIN — HEPARIN SODIUM 5000 UNITS: 5000 INJECTION INTRAVENOUS; SUBCUTANEOUS at 10:03

## 2025-03-23 RX ADMIN — BUSPIRONE HYDROCHLORIDE 15 MG: 10 TABLET ORAL at 05:03

## 2025-03-23 RX ADMIN — SODIUM CHLORIDE, PRESERVATIVE FREE 10 ML: 5 INJECTION INTRAVENOUS at 05:03

## 2025-03-23 RX ADMIN — OXYCODONE AND ACETAMINOPHEN 1 TABLET: 325; 10 TABLET ORAL at 03:03

## 2025-03-23 RX ADMIN — TOPIRAMATE 50 MG: 25 TABLET, FILM COATED ORAL at 08:03

## 2025-03-23 RX ADMIN — AMLODIPINE BESYLATE 10 MG: 10 TABLET ORAL at 08:03

## 2025-03-23 RX ADMIN — BUSPIRONE HYDROCHLORIDE 15 MG: 10 TABLET ORAL at 09:03

## 2025-03-23 RX ADMIN — BUSPIRONE HYDROCHLORIDE 15 MG: 10 TABLET ORAL at 01:03

## 2025-03-23 NOTE — PT/OT/SLP EVAL
Physical Therapy Co-Evaluation and Treatment     Patient Name:  Kuldeep Villela   MRN:  [id number]    *co-treatment with OT  2/2 pt with potential impaired ability to tolerate 2 evaluations 2/2 medical status   Recommendations:     Discharge Recommendations: Moderate Intensity Therapy   Discharge Equipment Recommendations: bath bench, rollator, bedside commode (needs new rollator, pt wheel is broken)   Barriers to discharge: Decreased caregiver support    Assessment:     Kuldeep Villela is a 74 y.o. female admitted with a medical diagnosis of Nausea vomiting and diarrhea.  She presents with the following impairments/functional limitations: weakness, impaired endurance, impaired self care skills, impaired functional mobility, gait instability, impaired balance, pain, decreased lower extremity function. Pt admitted with reports of trouble caring for herself. She reports multiple areas of chronic pain including knees and back. She lives her her grandson but he is not often present. Patient currently demonstrates a need for moderate intensity therapy on a daily basis post acute secondary to a decline in functional status due to disease . Pt would continue to benefit from acute skilled therapy intervention to address deficits and progress toward prior level of function.       Rehab Prognosis: Good; patient would benefit from acute skilled PT services to address these deficits and reach maximum level of function.    Recent Surgery: * No surgery found *      Plan:     During this hospitalization, patient to be seen 3 x/week to address the identified rehab impairments via gait training, therapeutic activities, therapeutic exercises, neuromuscular re-education and progress toward the following goals:    Plan of Care Expires:  04/23/25    Subjective     Chief Complaint: consistent pain, c/o inability to acquire DME 2/2 insurance   Patient/Family Comments/goals: functional improvement   Pain/Comfort:  Pain Rating 1: 8/10  Location  "1:  (Back, knee, and "side" pain)  Pain Addressed 1: Reposition, Distraction    Patients cultural, spiritual, Nondenominational conflicts given the current situation: no    Living Environment:  Pt lives with her grandson in a H with no AARON. Pt reports grandson is a  and not often present.   Prior to admission, patients level of function was able to ambulate household distances and perform ADLs with modified independence with use of a rollator, reports no recent falls.  Equipment used at home: rollator.  DME owned (not currently used): none.  Upon discharge, patient will have assistance from: no consistent assistance available.    Objective:     Communicated with RN prior to session.  Patient found HOB elevated with PureWick  upon PT entry to room.    General Precautions: Standard, fall  Orthopedic Precautions:N/A   Braces: N/A  Respiratory Status: Room air    Exams:  Cognitive Exam:  Patient is AAOx4, followed all commands, communicates clearly and fluently  Gross Motor Coordination:  WFL  RLE ROM: WFL  RLE Strength: WFL  LLE ROM: WFL  LLE Strength: WFL    Functional Mobility:  Bed Mobility:     Supine to Sit: stand by assistance  Sit to Supine: stand by assistance  Transfers:     Sit to Stand:  stand by assistance with rollator  Gait: Pt ambulated 28 ft with her personal rollator and stand by assistance. Pt demo'd small step size, decreased foot clearance, flexed posture with forearms resting on rollator handles, excessive lateral excursion. Cuing provided for forward gaze and upright posture, however, pt reported she is unable to increase upright posture 2/2 back pain. Pt with no LOB, no SOB, no dizziness.       AM-PAC 6 CLICK MOBILITY  Total Score:18       Treatment & Education:  Pt educated on role of PT/POC. Pt verbalized understanding.   Pt encouraged to only perform OOB mobility with assistance from nursing/therapy. Pt agreeable.   Pt encouraged to ambulate daily with assistance/supervision from " nursing/therapy. Pt agreeable.      Patient left HOB elevated with all lines intact, call button in reach, and RN notified.    GOALS:   Multidisciplinary Problems       Physical Therapy Goals          Problem: Physical Therapy    Goal Priority Disciplines Outcome Interventions   Physical Therapy Goal     PT, PT/OT Progressing    Description: Goals to be met by: 2025     Patient will increase functional independence with mobility by performin. Supine to sit with Modified [place]  2. Sit to stand transfer with Modified [place]  3. Gait  x 50 feet with Modified [place] using rollator  4. Stand for 3 minutes with Supervision using rollator, while performing self care tasks.                          DME Justifications:   Kuldeep's mobility limitation cannot be sufficiently resolved by the use of a cane. Her functional mobility deficit can be sufficiently resolved with the use of a Rollator. Patient's mobility limitation significantly impairs their ability to participate in one of more activities of daily living.  The use of a Rollator will significantly improve the patient's ability to participate in MRADLS and the patient will use it on regular basis in the home.      History:     Past Medical History:   Diagnosis Date    Arthritis     Asthma     Cervical spondylosis 2012    Chronic LBP 2012    Chronic neck pain 2012    Hyperlipidemia     Hypertension     Lumbar radiculopathy, BLE 2012    Lumbar spinal stenosis at L4-L5. 2012    Lumbar spondylosis 2012    Morbid obesity 2012    Primary osteoarthritis of both knees 2012    Spondylolisthesis, grade 1 at L4-L5. 2012    Tenosynovitis of ankle     Rt peroneus longus    Walker as ambulation aid        Past Surgical History:   Procedure Laterality Date    CHOLECYSTECTOMY      HYSTERECTOMY      RI REMOVAL OF OVARY/TUBE(S)         Time Tracking:     PT Received On: 25  PT Start Time: 919      PT Stop Time: 0944  PT Total Time (min): 25 min     Billable Minutes: Evaluation 8 mins  and Gait Training 17 mins       03/23/2025

## 2025-03-23 NOTE — PLAN OF CARE
Problem: Occupational Therapy  Goal: Occupational Therapy Goal  Description: Goals to be met by: 4/6/2025     Patient will increase functional independence with ADLs by performing:    UE Dressing with Supervision (retrieve and don).  LE Dressing with Supervision (retrieve and don).  Grooming while standing at sink with Supervision.  Toileting from toilet with Supervision for hygiene and clothing management.   Toilet transfer to toilet with Supervision.    Outcome: Progressing

## 2025-03-23 NOTE — ED NOTES
Assumed care of pt.     Pt AAOx4, resting comfortably in bed, NAD, respirations E/UL, updated on POC, wheels locked and in low position, call bell with in reach, Comfort positioning and restroom needs were addressed. Necessary items were placed with in reach and was advised when a reassessment would take place.     Pt denies any new complaints at this time. Pt refused tele monitoring.

## 2025-03-23 NOTE — PLAN OF CARE
Problem: Physical Therapy  Goal: Physical Therapy Goal  Description: Goals to be met by: 2025     Patient will increase functional independence with mobility by performin. Supine to sit with Modified [place]  2. Sit to stand transfer with Modified [place]  3. Gait  x 50 feet with Modified [place] using rollator  4. Stand for 3 minutes with Supervision using rollator, while performing self care tasks.     Outcome: Progressing     Pt evaluated and appropriate goals established.

## 2025-03-23 NOTE — PROGRESS NOTES
ED Observation Unit  Progress Note      HPI   74 y.o. female with a PMH of morbid obesity, HTN, HLD, spondylosis, radiculopathy, chronic lumbar back pain, osteoarthritis of both knees presents to [facility] [place] [facility] Department for evaluation of periumbilical and suprapubic abdominal pain, nausea, 3 episodes of nonbloody nonbilious emesis, and 3 episodes of watery nonbloody diarrhea that began last night. She is also complaining of generalized weakness, acute on chronic back pain that she says feels like a flare, and bilateral knee pain that she reports having for several years but has been worse recently. No recent trauma. Lives at home with grandson but states she is trying to stay independent in ADLs as her grandson is busy and unavailable to help most of the time, but she is having trouble caring for herself. She in interested in rehab placement and reports she has previously discussed this with her primary care physician.      She denies fever, cough, congestion, rhinorrhea, chest pain, dyspnea, dysuria, hematuria, flank pain, vaginal bleeding or discharge, bowel or bladder incontinence, saddle anesthesia, sensory changes, or new weakness.   She has lower extremity edema bilaterally that she states is improved from prior.      The history is provided by the patient, medical records and the EMS personnel.      I reviewed the ED Provider Note dated 3/22/25 prior to my evaluation of this patient.  I reviewed all labs and imaging performed in the Main ED, prior to patient being placed in Observation. Patient was placed in the ED Observation Unit for Nausea & vomiting.    Interval History   SOHAM. Pt doing well this am, n/v/d resolved. C diff canceled. PTOT still pending.     PMHx   Past Medical History:   Diagnosis Date    Arthritis     Asthma     Cervical spondylosis 07/13/2012    Chronic LBP 07/13/2012    Chronic neck pain 07/13/2012    Hyperlipidemia     Hypertension     Lumbar radiculopathy, BLE 07/13/2012    Lumbar  spinal stenosis at L4-L5. 07/13/2012    Lumbar spondylosis 07/13/2012    Morbid obesity 07/13/2012    Primary osteoarthritis of both knees 07/13/2012    Spondylolisthesis, grade 1 at L4-L5. 07/13/2012    Tenosynovitis of ankle     Rt peroneus longus    Walker as ambulation aid       Past Surgical History:   Procedure Laterality Date    CHOLECYSTECTOMY      HYSTERECTOMY      [place] REMOVAL OF OVARY/TUBE(S)          Family Hx   Family History   Problem Relation Name Age of Onset    Heart disease Mother      Hypertension Mother      Heart disease Father      Hypertension Father      Breast cancer Neg Hx      Colon cancer Neg Hx      Ovarian cancer Neg Hx          Social Hx   Social History     Socioeconomic History    Marital status: Single   Tobacco Use    Smoking status: Every Day     Current packs/day: 1.00     Average packs/day: 1 pack/day for 20.0 years (20.0 ttl pk-yrs)     Types: Cigarettes    Smokeless tobacco: Never   Substance and Sexual Activity    Alcohol use: No     Alcohol/week: 0.0 standard drinks of alcohol    Drug use: No    Sexual activity: Not Currently     Partners: Male     Birth control/protection: See Surgical Hx     Social Drivers of Health     Food Insecurity: No Food Insecurity (2/10/2025)    Received from [facility]    Hunger Vital Sign     Worried About Running Out of Food in the Last Year: Never true     Ran Out of Food in the Last Year: Never true   Transportation Needs: No Transportation Needs (2/10/2025)    Received from [facility]    PRAPARE - Transportation     Lack of Transportation (Medical): No     Lack of Transportation (Non-Medical): No   Stress: Stress Concern Present (8/5/2024)    Received from [facility]    [employer] - Occupational Stress Questionnaire     Feeling of Stress : To some extent   Housing Stability: Low Risk  (2/10/2025)    Received from [facility]    Housing Stability Vital Sign     Unable to Pay for Housing in  the Last Year: No     Number of Times Moved in the Last Year: 1     Homeless in the Last Year: No        Vital Signs   Vitals:    03/23/25 0320 03/23/25 0448 03/23/25 0759 03/23/25 0904   BP:  124/75 113/72    BP Location:  Right arm     Patient Position:       Pulse:  72 67    Resp: 16   19   Temp:  98.7 °F (37.1 °C) 98.2 °F (36.8 °C)    TempSrc:  Oral Oral    SpO2:  95% 96%    Weight:       Height:            Review of Systems  ROS    Brief Physical Exam/Reassessment   Physical Exam    Labs/Imaging   Labs Reviewed   COMPREHENSIVE METABOLIC PANEL - Abnormal       Result Value    Sodium 132 (*)     Potassium 3.3 (*)     Chloride 99      CO2 22 (*)     Glucose 71      BUN 13      Creatinine 1.0      Calcium 9.0      Protein Total 6.6      Albumin 3.5      Bilirubin Total 0.8      ALP 72      AST 14      ALT 8 (*)     Anion Gap 11      eGFR 60 (*)    URINALYSIS, REFLEX TO URINE CULTURE - Abnormal    Color, UA Colorless (*)     Appearance, UA Clear      pH, UA 6.0      Spec Grav UA 1.005      Protein, UA Negative      Glucose, UA Negative      Ketones, UA Negative      Bilirubin, UA Negative      Blood, UA Negative      Nitrites, UA Negative      Urobilinogen, UA Negative      Leukocyte Esterase, UA Negative     LIPASE - Abnormal    Lipase Level 61 (*)    CBC WITH DIFFERENTIAL - Abnormal    WBC 9.10      RBC 3.91 (*)     HGB 12.1      HCT 36.2 (*)     MCV 93      MCH 30.9      MCHC 33.4      RDW 13.0      Platelet Count 292      MPV 10.8      Nucleated RBC 0      Neut % 74.8 (*)     Lymph % 14.8 (*)     Mono % 7.6      Eos % 2.2      Basophil % 0.3      Imm Grans % 0.3      Neut # 6.80      Lymph # 1.35      Mono # 0.69      Eos # 0.20      Baso # 0.03      Imm Grans # 0.03     BASIC METABOLIC PANEL - Abnormal    Sodium 134 (*)     Potassium 3.8      Chloride 106      CO2 20 (*)     Glucose 93      BUN 12      Creatinine 1.0      Calcium 8.4 (*)     Anion Gap 8      eGFR 60 (*)    CBC WITH DIFFERENTIAL - Abnormal     WBC 9.50      RBC 3.79 (*)     HGB 11.9 (*)     HCT 35.9 (*)     MCV 95      MCH 31.4      MCHC 33.1      RDW 13.4      Platelet Count 222      MPV 12.0      Nucleated RBC 0      Neut % 72.6      Lymph % 16.7 (*)     Mono % 7.8      Eos % 2.1      Basophil % 0.4      Imm Grans % 0.4      Neut # 6.89      Lymph # 1.59      Mono # 0.74      Eos # 0.20      Baso # 0.04      Imm Grans # 0.04     TSH - Normal    TSH 0.448     TROPONIN I HIGH SENSITIVITY - Normal    Troponin High Sensitive <3     SARS-COV2 (COVID) WITH FLU/RSV BY PCR - Normal    INFLUENZA A BY PCR Negative      INFLUENZA B BY PCR Negative      Respiratory Syncytial Virus PCR Negative      SARS-CoV-2 PCR Negative     MAGNESIUM - Normal    Magnesium  1.6     CBC W/ AUTO DIFFERENTIAL    Narrative:     The following orders were created for panel order CBC auto differential.  Procedure                               Abnormality         Status                     ---------                               -----------         ------                     CBC with Differential[[id number]]       Abnormal            Final result                 Please view results for these tests on the individual orders.   CBC W/ AUTO DIFFERENTIAL    Narrative:     The following orders were created for panel order CBC with Automated Differential.  Procedure                               Abnormality         Status                     ---------                               -----------         ------                     CBC with Differential[[id number]]       Abnormal            Final result                 Please view results for these tests on the individual orders.      Imaging Results              CT Abdomen Pelvis With IV Contrast NO Oral Contrast (Final result)  Result time 03/22/25 15:56:07      Final result by Krishna Sterling MD (03/22/25 15:56:07)                   Impression:      1. Previous large volume stool within the rectum has been evacuated.  There is residual rectal  wall thickening, but significant no adjacent inflammatory change or presacral fluid.  Correlate for proctitis.  2. Redemonstrated right paraumbilical hernia containing short segment of small bowel, with slight decreased gaseous distension from prior.  No evidence of strangulation or complete obstruction, noting partial obstruction not excluded.  Clinical correlation advised.  3. Colonic diverticulosis.  4. Cholelithiasis.  5. Grossly stable other incidental/nonemergent findings in the body of the report.      Electronically signed by: Krishna Sterling MD  Date:    03/22/2025  Time:    15:56               Narrative:    EXAMINATION:  CT ABDOMEN PELVIS WITH IV CONTRAST    CLINICAL HISTORY:  Abdominal abscess/infection suspected;suprapubic and periumbilical abdominal pain, nausea, vomiting, diarrhea;    TECHNIQUE:  Low dose axial images, sagittal and coronal reformations were obtained from the lung bases to the pubic symphysis following the IV administration of 100 mL of Omnipaque 350 .  Oral contrast was not given.    COMPARISON:  Chest radiograph 02/23/2025 CT abdomen and pelvis 09/19/2024    FINDINGS:  Bibasilar mild dependent atelectasis with mild scattered platelike scarring versus atelectasis.  4 mm solid nodule within the peripheral right middle lobe, not significantly changed.  No consolidation or pleural effusion.  Bibasilar scattered calcified pleural plaques, unchanged.    Base of the heart upper limits of normal in size without significant pericardial fluid noting aortic and multi-vessel coronary arterial calcifications.    Cholelithiasis.  No significant biliary ductal dilatation.    Portal vasculature appears patent.  Liver is normal in size noting grossly similar few scattered subcentimeter hypoattenuating parenchymal foci which are too small to characterize a small peripheral cyst at the inferomedial right lower lobe.    Pancreas, spleen, stomach, duodenum and bilateral adrenal glands are within normal  limits.    Bilateral kidneys are stable in overall size, shape and location with mild cortical thinning otherwise normal enhancement.  No hydronephrosis or significant perinephric ureters are normal in course and caliber.  Urinary bladder is within normal limits.  Uterus not identified and likely surgically absent or atrophic.  No adnexal mass or significant volume free pelvic fluid.  Pelvic phleboliths noted.    Appendix not localized; for, no pericecal inflammatory change.  Terminal ileum is within normal limits.  Apparent circumferential wall thickening of the rectum without significant perirectal inflammatory change or presacral edema.  Few scattered colonic diverticula without diverticulitis.  No bowel obstruction.  Remaining small and large loops of bowel are within normal limits.  No bowel pneumatosis or portal venous gas.    No ascites, free air or lymphadenopathy by CT criteria.    Scattered calcific atherosclerosis of the abdominal aorta extending into its iliac branches.  No aortic aneurysm or dissection.    Extraperitoneal soft tissues again show a small right periumbilical hernia containing short segment of small bowel.  The hernia neck is approximately 1.7 cm and there is slight decreased gaseous distension of the involved loop of bowel.  No adjacent inflammatory change or CT evidence of high-grade mechanical bowel obstruction at this time.    Osseous structures appear stable without acute findings.                                       X-Ray Knee 3 View Bilateral (Final result)  Result time 03/22/25 12:46:34      Final result by Krishna Sterling MD (03/22/25 12:46:34)                   Impression:      Suspected bilateral nonspecific suprapatellar joint effusions without acute displaced fracture-dislocation seen at either knee.    Bilateral knee advanced tricompartmental DJD, progressed since 09/18/2020 study.      Electronically signed by: Krishna Sterling MD  Date:    03/22/2025  Time:    12:46                Narrative:    EXAMINATION:  XR KNEE 3 VIEW BILATERAL    CLINICAL HISTORY:  Pain in unspecified knee    TECHNIQUE:  AP, lateral, and Merchant views of both knees were performed.    COMPARISON:  Bilateral knee series 09/18/2020    FINDINGS:  Right knee: Advanced tricompartmental degenerative change, appears progressed since 09/18/2020 study.  No displaced fracture, dislocation or destructive osseous process.  Suspected nonspecific suprapatellar joint effusion.  Mild scattered atherosclerotic vascular calcifications and few scattered nonspecific soft tissue calcifications noted.  No subcutaneous emphysema or radiopaque foreign body.    Left knee: Advanced tricompartmental degenerative change, appears progressed since 09/18/2020 study.  No displaced fracture, dislocation or destructive osseous process.  Suspected nonspecific suprapatellar joint effusion.  Mild scattered atherosclerotic vascular calcifications and few scattered nonspecific soft tissue calcifications noted.  No subcutaneous emphysema or radiopaque foreign body.                                       I reviewed all labs, imaging, EKGs.     Plan   Nausea vomiting and diarrhea  Abdominal pain     - afebrile, HDS  - No leukocytosis  - K 3.3, replaced  - CT a/p Previous large volume stool within the rectum has been evacuated.  There is residual rectal wall thickening, but significant no adjacent inflammatory change or presacral fluid.  Correlate for proctitis. Redemonstrated right paraumbilical hernia containing short segment of small bowel, with slight decreased gaseous distension from prior.  No evidence of strangulation or complete obstruction, noting partial obstruction not excluded. Colonic diverticulosis. Cholelithiasis.  - tylenol 1 g tid  - prn antiemetics  - daily CBC, BMP, magnesium  - continue to monitor     Knee pain  Back pain  Debility     XR b/l knee: Suspected bilateral nonspecific suprapatellar joint effusions without acute displaced  fracture-dislocation seen at either knee. Bilateral knee advanced tricompartmental DJD, progressed since 09/18/2020 study.  - continue home percocet 10 mg q6h, flexeril  - PTOT consulted     Case was discussed with the ED provider, Dr. Glass.

## 2025-03-23 NOTE — ED NOTES
Assumed care of patient Kuldeep Villela, a 74 y.o. female     Triage note:  Chief Complaint   Patient presents with    Knee Injury     Bilateral knee pain and back pain for 2 days.    Nausea     Nausea and vomiting onset last night

## 2025-03-23 NOTE — ED NOTES
Assumed care of patient at this time. Hospital bed side rails up x3, locked in lowest position, call bell is within reach. Pt instructed to call staff for mobility. . Pt denies any further needs at this time, in NAD.   Dinner tray reheated for patient.   Pt remains in special contact isolation.

## 2025-03-23 NOTE — PT/OT/SLP EVAL
"Occupational Therapy   Co-Evaluation    Name: Kuldeep Villela  MRN: [id number]  Admitting Diagnosis: Nausea vomiting and diarrhea  Recent Surgery: * No surgery found *      Recommendations:     Discharge Recommendations: Moderate Intensity Therapy  Discharge Equipment Recommendations:  bath bench, walker, rolling, bedside commode  Barriers to discharge:  Decreased caregiver support    Assessment:     Kuldeep Villela is a 74 y.o. female with a medical diagnosis of Nausea vomiting and diarrhea.  She presents with the following performance deficits affecting function: impaired endurance, weakness, pain, impaired self care skills, impaired functional mobility, impaired skin, gait instability. Pt was willing to participate with some encouragement, tolerated session fairly well overall. She was able to perform dressing tasks at bed level, ambulate in gamez with PT before returning to bed. She reported pain in knees throughout session, appeared to be self-limiting in anticipation of pain.     Rehab Prognosis: Good; patient would benefit from acute skilled OT services to address these deficits and reach maximum level of function.       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  Patient found supine with PureWick upon OT entry to room.    General Precautions: Standard, fall  Orthopedic Precautions: N/A  Braces: N/A  Respiratory Status: Room air    Occupational Performance:    Bed Mobility:    Patient completed Rolling/Turning to Left with  stand by assistance  Patient completed Scooting/Bridging with stand by assistance  Patient completed Supine to Sit with stand by assistance  Patient completed Sit to Supine with stand by assistance    Functional Mobility/Transfers:  Patient completed Sit <> Stand Transfer with stand by assistance  with  rolling walker   Functional Mobility: from bed into hallway with PT and back to bed; SBA c/ RW; see PT note    Activities of Daily Living:  Upper Body Dressing: minimum assistance donned gown as robe at bed level; assist c/ bringing around back  Lower Body Dressing: setup/SBA at bed level; pt able to bend at waist to don B socks; increased time to complete       Cognitive/Visual Perceptual:  Cognitive/Psychosocial Skills:     -       Oriented to: Person, Place, Time, and Situation   -       Follows Commands/attention:Follows multistep  commands  -       Safety awareness/insight to disability: intact     Physical Exam:  Balance:    -       static sitting balance eob: SBA ~ 4 min, no LOB  Upper Extremity Range of Motion:     -       Right Upper Extremity: WFL  -       Left Upper Extremity: WFL  Upper Extremity Strength:    -       Right Upper Extremity: WFL  -       Left Upper Extremity: WFL   Strength:    -       Right Upper Extremity: WFL  -       Left Upper Extremity: WFL  Fine Motor Coordination:    -       Intact  Left hand, manipulation of objects and Right hand, manipulation of objects    AMPAC 6 Click ADL:  AMPAC Total Score: 20    Treatment  & Education:  Pt edu on role of OT, POC, safety when performing self care tasks , benefit of performing OOB activity, and safety when performing functional transfers and mobility.  - White board updated  - Self care tasks completed-- as noted above      Patient left supine with all lines intact, call button in reach, and RN notified    Co-eval with PT to have 2 skilled therapists present to safely assess pt's functional mobility.     GOALS:   Multidisciplinary Problems       Occupational Therapy Goals          Problem: Occupational Therapy    Goal Priority Disciplines Outcome Interventions   Occupational Therapy Goal     OT, PT/OT Progressing    Description: Goals to be met by: 4/6/2025     Patient will increase functional independence with ADLs by performing:    UE Dressing with Supervision (retrieve and don).  LE Dressing with Supervision (retrieve and don).  Grooming while standing at sink with Supervision.  Toileting from toilet with Supervision for hygiene and clothing management.   Toilet transfer to toilet with Supervision.                         DME Justifications:   Kuldeep requires a commode for home use because she is confined to a single room.   Kuldeep's mobility limitation cannot be sufficiently resolved by the use of a cane. Her functional mobility deficit can be sufficiently resolved with the use of a Rolling Walker. Patient's mobility limitation significantly impairs their ability to participate in one of more activities of daily living.  The use of a RW will significantly improve the patient's ability to participate in MRADLS and the patient will use it on regular basis in the home.    History:     Past Medical History:   Diagnosis Date    Arthritis     Asthma     Cervical spondylosis 07/13/2012    Chronic LBP 07/13/2012    Chronic neck pain 07/13/2012    Hyperlipidemia     Hypertension     Lumbar radiculopathy, BLE 07/13/2012    Lumbar spinal stenosis at L4-L5. 07/13/2012    Lumbar spondylosis  07/13/2012    Morbid obesity 07/13/2012    Primary osteoarthritis of both knees 07/13/2012    Spondylolisthesis, grade 1 at L4-L5. 07/13/2012    Tenosynovitis of ankle     Rt peroneus longus    Walker as ambulation aid          Past Surgical History:   Procedure Laterality Date    CHOLECYSTECTOMY      HYSTERECTOMY      [place] REMOVAL OF OVARY/TUBE(S)         Time Tracking:     OT Date of Treatment: 03/23/25  OT Start Time: 0921  OT Stop Time: 0948  OT Total Time (min): 27 min    Billable Minutes:Evaluation 10  Self Care/Home Management 10    3/23/2025

## 2025-03-24 VITALS
DIASTOLIC BLOOD PRESSURE: 73 MMHG | SYSTOLIC BLOOD PRESSURE: 117 MMHG | HEIGHT: 60 IN | TEMPERATURE: 97 F | HEART RATE: 74 BPM | BODY MASS INDEX: 46.33 KG/M2 | RESPIRATION RATE: 16 BRPM | WEIGHT: 236 LBS | OXYGEN SATURATION: 95 %

## 2025-03-24 PROBLEM — E87.6 HYPOKALEMIA: Status: ACTIVE | Noted: 2025-03-24

## 2025-03-24 PROBLEM — E87.1 HYPONATREMIA: Status: ACTIVE | Noted: 2025-03-24

## 2025-03-24 PROBLEM — J98.11 MILD BIBASILAR ATELECTASIS: Status: ACTIVE | Noted: 2025-03-24

## 2025-03-24 LAB
ABSOLUTE EOSINOPHIL (OHS): 0.31 K/UL
ABSOLUTE MONOCYTE (OHS): 0.69 K/UL (ref 0.3–1)
ABSOLUTE NEUTROPHIL COUNT (OHS): 6.71 K/UL (ref 1.8–7.7)
ANION GAP (OHS): 8 MMOL/L (ref 8–16)
BASOPHILS # BLD AUTO: 0.03 K/UL
BASOPHILS NFR BLD AUTO: 0.3 %
BUN SERPL-MCNC: 16 MG/DL (ref 8–23)
CALCIUM SERPL-MCNC: 9 MG/DL (ref 8.7–10.5)
CHLORIDE SERPL-SCNC: 105 MMOL/L (ref 95–110)
CO2 SERPL-SCNC: 22 MMOL/L (ref 23–29)
CREAT SERPL-MCNC: 1.2 MG/DL (ref 0.5–1.4)
ERYTHROCYTE [DISTWIDTH] IN BLOOD BY AUTOMATED COUNT: 13.3 % (ref 11.5–14.5)
GFR SERPLBLD CREATININE-BSD FMLA CKD-EPI: 48 ML/MIN/1.73/M2
GLUCOSE SERPL-MCNC: 82 MG/DL (ref 70–110)
HCT VFR BLD AUTO: 35.7 % (ref 37–48.5)
HGB BLD-MCNC: 11.6 GM/DL (ref 12–16)
IMM GRANULOCYTES # BLD AUTO: 0.03 K/UL (ref 0–0.04)
IMM GRANULOCYTES NFR BLD AUTO: 0.3 % (ref 0–0.5)
LYMPHOCYTES # BLD AUTO: 1.28 K/UL (ref 1–4.8)
MAGNESIUM SERPL-MCNC: 1.6 MG/DL (ref 1.6–2.6)
MCH RBC QN AUTO: 30.9 PG (ref 27–50)
MCHC RBC AUTO-ENTMCNC: 32.5 G/DL (ref 32–36)
MCV RBC AUTO: 95 FL (ref 82–98)
NUCLEATED RBC (/100WBC) (OHS): 0 /100 WBC
PLATELET # BLD AUTO: 281 K/UL (ref 150–450)
PMV BLD AUTO: 10.6 FL (ref 9.2–12.9)
POTASSIUM SERPL-SCNC: 3.7 MMOL/L (ref 3.5–5.1)
RBC # BLD AUTO: 3.75 M/UL (ref 4–5.4)
RELATIVE EOSINOPHIL (OHS): 3.4 %
RELATIVE LYMPHOCYTE (OHS): 14.1 % (ref 18–48)
RELATIVE MONOCYTE (OHS): 7.6 % (ref 4–15)
RELATIVE NEUTROPHIL (OHS): 74.3 % (ref 38–73)
SODIUM SERPL-SCNC: 135 MMOL/L (ref 136–145)
WBC # BLD AUTO: 9.05 K/UL (ref 3.9–12.7)

## 2025-03-24 PROCEDURE — G0378 HOSPITAL OBSERVATION PER HR: HCPCS

## 2025-03-24 PROCEDURE — 96365 THER/PROPH/DIAG IV INF INIT: CPT

## 2025-03-24 PROCEDURE — 80048 BASIC METABOLIC PNL TOTAL CA: CPT | Performed by: PHYSICIAN ASSISTANT

## 2025-03-24 PROCEDURE — 96372 THER/PROPH/DIAG INJ SC/IM: CPT | Performed by: PHYSICIAN ASSISTANT

## 2025-03-24 PROCEDURE — 25000003 PHARM REV CODE 250: Performed by: PHYSICIAN ASSISTANT

## 2025-03-24 PROCEDURE — 96366 THER/PROPH/DIAG IV INF ADDON: CPT

## 2025-03-24 PROCEDURE — A4216 STERILE WATER/SALINE, 10 ML: HCPCS | Performed by: PHYSICIAN ASSISTANT

## 2025-03-24 PROCEDURE — 83735 ASSAY OF MAGNESIUM: CPT | Performed by: PHYSICIAN ASSISTANT

## 2025-03-24 PROCEDURE — 63600175 PHARM REV CODE 636 W HCPCS: Performed by: PHYSICIAN ASSISTANT

## 2025-03-24 PROCEDURE — 63600175 PHARM REV CODE 636 W HCPCS

## 2025-03-24 PROCEDURE — 85025 COMPLETE CBC W/AUTO DIFF WBC: CPT | Performed by: PHYSICIAN ASSISTANT

## 2025-03-24 PROCEDURE — 36415 COLL VENOUS BLD VENIPUNCTURE: CPT | Performed by: PHYSICIAN ASSISTANT

## 2025-03-24 RX ORDER — OXYCODONE AND ACETAMINOPHEN 10; 325 MG/1; MG/1
1 TABLET ORAL EVERY 4 HOURS PRN
Qty: 4 TABLET | Refills: 0 | Status: SHIPPED | OUTPATIENT
Start: 2025-03-24 | End: 2025-03-26

## 2025-03-24 RX ORDER — MAGNESIUM SULFATE HEPTAHYDRATE 40 MG/ML
2 INJECTION, SOLUTION INTRAVENOUS ONCE
Status: COMPLETED | OUTPATIENT
Start: 2025-03-24 | End: 2025-03-24

## 2025-03-24 RX ADMIN — AMLODIPINE BESYLATE 10 MG: 10 TABLET ORAL at 09:03

## 2025-03-24 RX ADMIN — BUSPIRONE HYDROCHLORIDE 15 MG: 10 TABLET ORAL at 01:03

## 2025-03-24 RX ADMIN — SODIUM CHLORIDE, PRESERVATIVE FREE 10 ML: 5 INJECTION INTRAVENOUS at 06:03

## 2025-03-24 RX ADMIN — HEPARIN SODIUM 5000 UNITS: 5000 INJECTION INTRAVENOUS; SUBCUTANEOUS at 06:03

## 2025-03-24 RX ADMIN — SODIUM CHLORIDE, PRESERVATIVE FREE 10 ML: 5 INJECTION INTRAVENOUS at 02:03

## 2025-03-24 RX ADMIN — PRAVASTATIN SODIUM 20 MG: 20 TABLET ORAL at 09:03

## 2025-03-24 RX ADMIN — HEPARIN SODIUM 5000 UNITS: 5000 INJECTION INTRAVENOUS; SUBCUTANEOUS at 01:03

## 2025-03-24 RX ADMIN — FUROSEMIDE 20 MG: 20 TABLET ORAL at 09:03

## 2025-03-24 RX ADMIN — OXYCODONE AND ACETAMINOPHEN 1 TABLET: 325; 10 TABLET ORAL at 04:03

## 2025-03-24 RX ADMIN — MAGNESIUM SULFATE HEPTAHYDRATE 2 G: 40 INJECTION, SOLUTION INTRAVENOUS at 09:03

## 2025-03-24 RX ADMIN — OXYCODONE AND ACETAMINOPHEN 1 TABLET: 325; 10 TABLET ORAL at 10:03

## 2025-03-24 RX ADMIN — ROPINIROLE HYDROCHLORIDE 1 MG: 1 TABLET, FILM COATED ORAL at 12:03

## 2025-03-24 RX ADMIN — TOPIRAMATE 50 MG: 25 TABLET, FILM COATED ORAL at 09:03

## 2025-03-24 RX ADMIN — BUSPIRONE HYDROCHLORIDE 15 MG: 10 TABLET ORAL at 09:03

## 2025-03-24 RX ADMIN — SERTRALINE HYDROCHLORIDE 50 MG: 50 TABLET ORAL at 09:03

## 2025-03-24 NOTE — PLAN OF CARE
SW met with patient to discuss discharge plan and recommendations for SNF placement.  Pt reported she is not interested in SNF placement and would prefer to d/c home and resume HH with [facility].  Pt reported she ambulates using a rollator.  Pt reported she is able to take care of her ADLs.  Pt reported she lives with her grandson who is a  but also receives support from her daughters.  Pt's family will provide transportation home.    SW spoke to pt's daughters, Romulo [phone] and Kamari [phone], to confirm they are in agreement with pt's d/c home.     Will send updated orders once received.      Discharge Plan A and Plan B have been determined by review of patient's clinical status, future medical and therapeutic needs, and coverage/benefits for post-acute care in coordination with multidisciplinary team members.     03/24/25 1522   Post-Acute Status   Post-Acute Authorization Home Health;Placement   Post-Acute Placement Status Patient declined/refused   Home Health Status Referrals Sent   Discharge Delays None known at this time   Discharge Plan   Discharge Plan A Home;Home with family;Home Health   Discharge Plan B Home;Home with family     Kadie Jordan LMSW  Part-Time-  [facility]  Ext. [phone]

## 2025-03-24 NOTE — ASSESSMENT & PLAN NOTE
Admitted for 3 days nausea/vomiting diarrhea. CT abd/pelvis with no constipation, no complete obstruction or strangulation.   Improved, tolerating PO.       - s/p IVF  - electrolyte repletion PRN  - antiemetics PRN.

## 2025-03-24 NOTE — HPI
74 y.o. female with a PMH of morbid obesity, HTN, HLD, spondylosis, radiculopathy, chronic lumbar back pain, osteoarthritis of both knees presents to [facility] for evaluation of nausea vomiting and diarrhea. Originally admitted to ED OU for IV fluids, electrolyte repletion and anti emetic. CT of her abdomen without acute indicators. As well as PT/OT evaluation for weakness and inability to be completely independent at home. She was evaluated by PT/OT who recommend SNF placement for her. She was admitted to [facility] for continue anti emetic     Patient says all her troubles started when she started to have worse than usual back pain and used up her February oxycodone too quickly and was denied a refill by the pharmacist. After which the pain made her restless legs worsens and gave her abdominal pain as well as nausea and vomiting. Her abdominal pain and nausea and vomiting have improved since being in EDOU and she was now able to tolerate lunch and dinner.     Social hx:  Works: retired   Lives: home alone with grandson  Family: has 2 sons and a daughter

## 2025-03-24 NOTE — PLAN OF CARE
Pt will resume  services with [facility].  Pt ambulates with a rollator.  Pt will need transportation home.  Pt will need Lyft transport.  On-call will schedule transport.      Discharge Plan A and Plan B have been determined by review of patient's clinical status, future medical and therapeutic needs, and coverage/benefits for post-acute care in coordination with multidisciplinary team members.     03/24/25 4904   Post-Acute Status   Post-Acute Authorization Home Health;Placement   Post-Acute Placement Status Patient declined/refused   Home Health Status Set-up Complete/Auth obtained  ([facility])   Discharge Delays None known at this time   Discharge Plan   Discharge Plan A Home;Home with family;Home Health   Discharge Plan B Home;Home with family     Kadie Jordan LMSW  Part-Time-  [facility]  Ext. [phone]

## 2025-03-24 NOTE — PLAN OF CARE
Problem: Adult Inpatient Plan of Care  Goal: Plan of Care Review  Outcome: Progressing  Goal: Patient-Specific Goal (Individualized)  Outcome: Progressing  Goal: Absence of Hospital-Acquired Illness or Injury  Outcome: Progressing  Goal: Optimal Comfort and Wellbeing  Outcome: Progressing  Goal: Readiness for Transition of Care  Outcome: Progressing     Problem: Bariatric Environmental Safety  Goal: Safety Maintained with Care  Outcome: Progressing     Problem: Infection  Goal: Absence of Infection Signs and Symptoms  Outcome: Progressing     Problem: Sepsis/Septic Shock  Goal: Optimal Coping  Outcome: Progressing  Goal: Absence of Bleeding  Outcome: Progressing  Goal: Blood Glucose Level Within Targeted Range  Outcome: Progressing  Goal: Absence of Infection Signs and Symptoms  Outcome: Progressing  Goal: Optimal Nutrition Intake  Outcome: Progressing     Problem: Wound  Goal: Optimal Coping  Outcome: Progressing  Goal: Optimal Functional Ability  Outcome: Progressing  Goal: Absence of Infection Signs and Symptoms  Outcome: Progressing  Goal: Improved Oral Intake  Outcome: Progressing  Goal: Optimal Pain Control and Function  Outcome: Progressing  Goal: Skin Health and Integrity  Outcome: Progressing  Goal: Optimal Wound Healing  Outcome: Progressing     Problem: Fall Injury Risk  Goal: Absence of Fall and Fall-Related Injury  Outcome: Progressing   Patient IV removed. Catheter tip intact. Discharged instruction explained. Patient verbalized understanding. Patient medication delivered from pharmacy bedside. Patient discharged via wheelchair with all belongings.

## 2025-03-24 NOTE — PLAN OF CARE
Case Management Assessment     PCP: Correct in [facility]   Pharmacy: Bedside     Patient Arrived From: Home   Existing Help at Home: Family     Barriers to Discharge: Transportation     Discharge Plan:    A. Home with family    B. Home with family & home health       [facility] - Emergency Dept  Initial Discharge Assessment       Primary Care Provider: Ginna Musa MD    Admission Diagnosis: Nausea & vomiting [R11.2]    Admission Date: 3/22/2025  Expected Discharge Date:     Transition of Care Barriers: (P) None    Payor: BLUE [employer] BLUE SHIELD / [place] [facility] [place] [facility] / Product Type: Commercial /     Extended Emergency Contact Information  Primary Emergency Contact: YVON ISBELL  Address: [address] [place] [address]  Mobile Phone: [phone]  Relation: Daughter    Discharge Plan A: (P) Home with family  Discharge Plan B: (P) Home with family, Home Health      [facility] #[id number] - [place] [id number] [facility] [address] AT [facility] [address] [address]  Phone: [phone] Fax: [phone]    [facility] #[id number] [address] AT [facility] [address] & [address] [address]  Phone: [phone] Fax: [phone]    [facility] ([facility] #[id number] - [place] - [address] AT [address]  [address]  [address]  [place] [address]  Phone: [phone] Fax: [phone]    [facility] [place][id number] - [place] - [address] [facility] [address] AT [facility] [address] [facility]  [address] [place] [address]  Phone: [phone] Fax: [phone]    [facility] #[id number] - [place] - [address] AT [facility] [address] [facility]  [address]  [place] [address]  Phone: [phone] Fax: [phone]    [facility] #[id number] - [place] - [address] AT  [address]  [address]  [place] [address]  Phone: [phone] Fax: [phone]      Initial Assessment (most recent)       Adult Discharge Assessment - 03/23/25 1940          Discharge Assessment    Assessment Type Discharge Planning Assessment     Confirmed/corrected address, phone number and insurance Yes     Confirmed Demographics Correct on Facesheet     Source of Information patient;health record     People in Home grandchild(deshaun)     Do you expect to return to your current living situation? Yes     Do you have help at home or someone to help you manage your care at home? Yes     Prior to hospitilization cognitive status: Alert/Oriented     Current cognitive status: Alert/Oriented     Walking or Climbing Stairs Difficulty yes     Walking or Climbing Stairs ambulation difficulty, requires equipment     Dressing/Bathing Difficulty no (P)      Equipment Currently Used at Home rollator     Readmission within 30 days? No     Patient currently being followed by outpatient case management? No (P)      Do you currently have service(s) that help you manage your care at home? No (P)      Do you take prescription medications? Yes (P)      Do you have prescription coverage? Yes (P)      Do you have any problems affording any of your prescribed medications? No (P)      Is the patient taking medications as prescribed? yes (P)      How do you get to doctors appointments? car, drives self;family or friend will provide (P)      Are you on dialysis? No (P)      Do you take coumadin? No (P)      Discharge Plan A Home with family (P)      Discharge Plan B Home with family;Home Health (P)      DME Needed Upon Discharge  none (P)      Discharge Plan discussed with: Patient (P)      Transition of Care Barriers None (P)

## 2025-03-24 NOTE — PLAN OF CARE
[facility] - Med Surg      HOME HEALTH ORDERS  FACE TO FACE ENCOUNTER    Patient Name: Kuldeep Villela  YOB: 1950    PCP: Ginna Musa MD   PCP Address: [address] [place] [address]  PCP Phone Number: [phone]  PCP Fax: [phone]    Encounter Date: 3/22/25    Admit to Home Health    Diagnoses:  Active Hospital Problems    Diagnosis  POA    *Nausea vomiting and diarrhea [R11.2, R19.7]  Yes    Hyponatremia [E87.1]  Yes    Hypokalemia [E87.6]  Yes    Mild bibasilar atelectasis [J98.11]  Yes     CTAP 3/22/25: Bibasilar mild dependent atelectasis with mild scattered platelike scarring versus atelectasis.  4 mm solid nodule within the peripheral right middle lobe, not significantly changed.  No consolidation or pleural effusion.  Bibasilar scattered calcified pleural plaques, unchanged.  - IS ordered      Restless leg syndrome [G25.81]  Yes    Depression with anxiety [F41.8]  Yes    Debility [R53.81]  Yes    Pain in both knees [M25.561, M25.562]  Yes    Chronic back pain [M54.9, G89.29]  Yes    Hyperlipemia [E78.5]  Yes    BMI 45.0-49.9, adult [Z68.42]  Not Applicable    Hypertension [I10]  Yes    Morbid obesity [E66.01]  Yes      Resolved Hospital Problems   No resolved problems to display.       Follow Up Appointments:  Future Appointments   Date Time Provider Department Center   3/27/2025 10:00 AM Alix Cardenas DNP [facility]   4/8/2025  1:00 PM Donny Esquivel MD [facility]       Allergies:  Review of patient's allergies indicates:   Allergen Reactions    Penicillins Anxiety and Other (See Comments)    Anesthetic [benzocaine-aloe vera]      Jittery/hyper    Guaifenesin Anxiety and Other (See Comments)       Medications: Review discharge medications with patient and family and provide education.    Current Medications[1]     Medication List        CHANGE how you take these medications      * oxyCODONE-acetaminophen  mg per  tablet  Commonly known as: PERCOCET  Take 1 tablet by mouth every 4 to 6 hours as needed for Pain (maximum 5 tablets per day).  What changed: Another medication with the same name was added. Make sure you understand how and when to take each.     * oxyCODONE-acetaminophen  mg per tablet  Commonly known as: PERCOCET  Take 1 tablet by mouth every 4 (four) hours as needed for Pain.  What changed: You were already taking a medication with the same name, and this prescription was added. Make sure you understand how and when to take each.           * This list has 2 medication(s) that are the same as other medications prescribed for you. Read the directions carefully, and ask your doctor or other care provider to review them with you.                CONTINUE taking these medications      albuterol 90 mcg/actuation inhaler  Commonly known as: PROVENTIL/VENTOLIN HFA  Inhale 1-2 puffs into the lungs every 4 (four) hours as needed for Wheezing. Rescue     amLODIPine 10 MG tablet  Commonly known as: NORVASC  TAKE 1 TABLET(10 MG) BY MOUTH EVERY DAY     azelastine 137 mcg (0.1 %) nasal spray  Commonly known as: ASTELIN  1 spray (137 mcg total) by Nasal route 2 (two) times daily.     busPIRone 15 MG tablet  Commonly known as: BUSPAR  Take 1 tablet (15 mg total) by mouth 4 (four) times daily.     cetirizine 10 MG tablet  Commonly known as: ZYRTEC  TAKE 1 TABLET BY MOUTH EVERY DAY     citalopram 40 MG tablet  Commonly known as: CeleXA  TAKE 1 TABLET(40 MG) BY MOUTH EVERY DAY     cyclobenzaprine 10 MG tablet  Commonly known as: FLEXERIL  TAKE 1 TABLET(10 MG) BY MOUTH THREE TIMES DAILY AS NEEDED FOR MUSCLE SPASMS     diclofenac sodium 1 % Gel  Commonly known as: VOLTAREN  Apply 2 g topically 3 (three) times daily as needed (apply to painful joints).     estrogens(esterified)-methyltestosterone 0.625-1.25mg per tablet  Commonly known as: ESTRATEST HS  Take 1 tablet by mouth.     fluticasone propionate 50 mcg/actuation nasal  spray  Commonly known as: FLONASE  1 spray (50 mcg total) by Each Nostril route once daily.     furosemide 20 MG tablet  Commonly known as: LASIX  Take 1 tablet (20 mg total) by mouth 2 (two) times a day. for 2 days     * linaCLOtide 290 mcg Cap capsule  Commonly known as: LINZESS  Take 1 capsule (290 mcg total) by mouth before breakfast.     * LINZESS 145 mcg Cap capsule  Generic drug: linaCLOtide  TAKE 1 CAPSULE(145 MCG) BY MOUTH EVERY DAY     meloxicam 7.5 MG tablet  Commonly known as: MOBIC  Take 1 tablet (7.5 mg total) by mouth 2 (two) times a day.     MOVANTIK 25 mg tablet  Generic drug: naloxegoL  Take 25 mg by mouth once daily.     oxyCODONE 10 mg Tab immediate release tablet  Commonly known as: ROXICODONE  Take 1 tablet (10 mg total) by mouth every 4 to 6 hours as needed for Pain (maximum 5 tablets per day).     pravastatin 20 MG tablet  Commonly known as: PRAVACHOL  TAKE 1 TABLET(20 MG) BY MOUTH DAILY     rOPINIRole 1 MG tablet  Commonly known as: REQUIP  Take 1 tablet (1 mg total) by mouth 2 (two) times daily.     sertraline 50 MG tablet  Commonly known as: ZOLOFT  Take 1 tablet (50 mg total) by mouth once daily.     topiramate 50 MG tablet  Commonly known as: TOPAMAX  TAKE 1 TABLET(50 MG) BY MOUTH EVERY 12 HOURS     walker Misc  4 wheeled walker for mobility           * This list has 2 medication(s) that are the same as other medications prescribed for you. Read the directions carefully, and ask your doctor or other care provider to review them with you.                    I have seen and examined this patient within the last 30 days. My clinical findings that support the need for the home health skilled services and home bound status are the following:no   Weakness/numbness causing balance and gait disturbance due to osteoarthritis making it taxing to leave home.  Requiring assistive device to leave home due to unsteady gait caused by  osteoarthritis.     Diet:   regular  diet    Labs:  none    Referrals/ Consults  Physical Therapy to evaluate and treat. Evaluate for home safety and equipment needs; Establish/upgrade home exercise program. Perform / instruct on therapeutic exercises, gait training, transfer training, and Range of Motion.  Occupational Therapy to evaluate and treat. Evaluate home environment for safety and equipment needs. Perform/Instruct on transfers, ADL training, ROM, and therapeutic exercises.    Activities:   activity as tolerated    Nursing:   Agency to admit patient within 24 hours of hospital discharge unless specified on physician order or at patient request    SN to complete comprehensive assessment including routine vital signs. Instruct on disease process and s/s of complications to report to MD. Review/verify medication list sent home with the patient at time of discharge  and instruct patient/caregiver as needed. Frequency may be adjusted depending on start of care date.     Skilled nurse to perform up to 3 visits PRN for symptoms related to diagnosis    Notify MD if SBP > 160 or < 90; DBP > 90 or < 50; HR > 120 or < 50; Temp > 101; O2 < 88%;    Ok to schedule additional visits based on staff availability and patient request on consecutive days within the home health episode.    When multiple disciplines ordered:    Start of Care occurs on Sunday - Wednesday schedule remaining discipline evaluations as ordered on separate consecutive days following the start of care.    Thursday SOC -schedule subsequent evaluations Friday and Monday the following week.     Friday - Saturday SOC - schedule subsequent discipline evaluations on consecutive days starting Monday of the following week.    For all post-discharge communication and subsequent orders please contact patient's primary care physician.     Miscellaneous   none    Home Health Aide:  Physical Therapy Three times weekly and Occupational Therapy Three times weekly    Wound Care Orders  no    I certify  that this patient is confined to her home and needs physical therapy and occupational therapy.              [1]   Current Facility-Administered Medications   Medication Dose Route Frequency Provider Last Rate Last Admin    acetaminophen tablet 650 mg  650 mg Oral Q4H PRN Won Bernstein PA-C        albuterol-ipratropium 2.5 mg-0.5 mg/3 mL nebulizer solution 3 mL  3 mL Nebulization Q6H PRN Won Bernstein PA-C        aluminum-magnesium hydroxide-simethicone 200-200-20 mg/5 mL suspension 30 mL  30 mL Oral QID PRN Won Bernstein PA-C        amLODIPine tablet 10 mg  10 mg Oral Daily Won Bernstein PA-C   10 mg at 03/24/25 0909    busPIRone tablet 15 mg  15 mg Oral QID Won Bernstein PA-JAYESH   15 mg at 03/24/25 1354    cyclobenzaprine tablet 10 mg  10 mg Oral TID PRN Won Bernstein PA-C        dextrose 50% injection 12.5 g  12.5 g Intravenous PRN Won Bernstein PA-JAYESH        dextrose 50% injection 25 g  25 g Intravenous PRN Won Bernstein PA-C        furosemide tablet 20 mg  20 mg Oral BID Won Bernstein PA-JAYESH   20 mg at 03/24/25 0909    glucagon (human recombinant) injection 1 mg  1 mg Intramuscular PRN Won Bernsteni PA-C        glucose chewable tablet 16 g  16 g Oral PRN Won Bernstein PA-JAYESH        glucose chewable tablet 24 g  24 g Oral PRN Won Bernstein PA-C        heparin (porcine) injection 5,000 Units  5,000 Units Subcutaneous Q8H Won Bernstein PA-JAYESH   5,000 Units at 03/24/25 1355    melatonin tablet 6 mg  6 mg Oral Nightly PRN Won Bernstein PA-C        naloxone 0.4 mg/mL injection 0.02 mg  0.02 mg Intravenous PRN Won Bernstein PA-C        nicotine 21 mg/24 hr 1 patch  1 patch Transdermal ED 1 Time Won Bernstein PA-C   1 patch at 03/23/25 1602    ondansetron disintegrating tablet 8 mg  8 mg Oral Q8H PRN Won Bernstein, MOE        oxyCODONE-acetaminophen  mg per tablet 1 tablet   1 tablet Oral Q6H PRN Won Bernstein PA-C   1 tablet at 03/24/25 1047    polyethylene glycol packet 17 g  17 g Oral Daily PRN Won Bernstein PA-C        pravastatin tablet 20 mg  20 mg Oral Daily Won Bernstein PA-JAYESH   20 mg at 03/24/25 0909    prochlorperazine injection Soln 5 mg  5 mg Intravenous Q6H PRN Won Bernstein PA-C        rOPINIRole tablet 1 mg  1 mg Oral BID Won Bernstein PA-C   1 mg at 03/24/25 1222    sertraline tablet 50 mg  50 mg Oral Daily Won Bernstein PA-C   50 mg at 03/24/25 0909    simethicone chewable tablet 80 mg  1 tablet Oral QID PRN Won Bernstein PA-C        sodium chloride 0.9% flush 10 mL  10 mL Intravenous Q8H oWn Bernstein PA-C   10 mL at 03/24/25 1400    topiramate tablet 50 mg  50 mg Oral Q12H Won Benrstein PA-C   50 mg at 03/24/25 0909

## 2025-03-24 NOTE — NURSING
Received admit from er via stretcher 73 y/o bt with dx of nausea, vomiting and diarrhea. Aaox4, no distress noted at present. Will continue to monitor.

## 2025-03-24 NOTE — ASSESSMENT & PLAN NOTE
Patient's blood pressure range in the last 24 hours was: BP  Min: 96/60  Max: 124/75.The patient's inpatient anti-hypertensive regimen is listed below:  Current Antihypertensives  amLODIPine tablet 10 mg, Daily, Oral  furosemide tablet 20 mg, 2 times daily, Oral    Plan  - BP is controlled, no changes needed to their regimen

## 2025-03-24 NOTE — CARE UPDATE
I have reviewed the chart of Kuldeep Villela who is hospitalized for the following:    Active Hospital Problems    Diagnosis    *Nausea vomiting and diarrhea    Hyponatremia    Hypokalemia    Mild bibasilar atelectasis     CTAP 3/22/25: Bibasilar mild dependent atelectasis with mild scattered platelike scarring versus atelectasis.  4 mm solid nodule within the peripheral right middle lobe, not significantly changed.  No consolidation or pleural effusion.  Bibasilar scattered calcified pleural plaques, unchanged.  - IS ordered      Restless leg syndrome    Depression with anxiety    Debility    Pain in both knees    Chronic back pain    Hyperlipemia    BMI 45.0-49.9, adult    Hypertension    Morbid obesity        Inna Gamez PA-C  Unit Based [facility]

## 2025-03-24 NOTE — SUBJECTIVE & OBJECTIVE
Past Medical History:   Diagnosis Date    Arthritis     Asthma     Cervical spondylosis 07/13/2012    Chronic LBP 07/13/2012    Chronic neck pain 07/13/2012    Hyperlipidemia     Hypertension     Lumbar radiculopathy, BLE 07/13/2012    Lumbar spinal stenosis at L4-L5. 07/13/2012    Lumbar spondylosis 07/13/2012    Morbid obesity 07/13/2012    Primary osteoarthritis of both knees 07/13/2012    Spondylolisthesis, grade 1 at L4-L5. 07/13/2012    Tenosynovitis of ankle     Rt peroneus longus    Walker as ambulation aid        Past Surgical History:   Procedure Laterality Date    CHOLECYSTECTOMY      HYSTERECTOMY      AL REMOVAL OF OVARY/TUBE(S)         Review of patient's allergies indicates:   Allergen Reactions    Penicillins Anxiety and Other (See Comments)    Anesthetic [benzocaine-aloe vera]      Jittery/hyper    Guaifenesin Anxiety and Other (See Comments)       Current Facility-Administered Medications on File Prior to Encounter   Medication    sodium hyaluronate (EUFLEXXA) 10 mg/mL(mw 2.4 -3.6 million) injection 40 mg     Current Outpatient Medications on File Prior to Encounter   Medication Sig    albuterol (PROVENTIL/VENTOLIN HFA) 90 mcg/actuation inhaler Inhale 1-2 puffs into the lungs every 4 (four) hours as needed for Wheezing. Rescue    amLODIPine (NORVASC) 10 MG tablet TAKE 1 TABLET(10 MG) BY MOUTH EVERY DAY    azelastine (ASTELIN) 137 mcg (0.1 %) nasal spray 1 spray (137 mcg total) by Nasal route 2 (two) times daily.    busPIRone (BUSPAR) 15 MG tablet Take 1 tablet (15 mg total) by mouth 4 (four) times daily.    cetirizine (ZYRTEC) 10 MG tablet TAKE 1 TABLET BY MOUTH EVERY DAY    estrogens,conjugated,-methyltestosterone 0.625-1.25mg (ESTRATEST HS) 0.625-1.25 mg per tablet Take 1 tablet by mouth.    LINZESS 145 mcg Cap capsule TAKE 1 CAPSULE(145 MCG) BY MOUTH EVERY DAY    oxyCODONE-acetaminophen (PERCOCET)  mg per tablet Take 1 tablet by mouth every 4 to 6 hours as needed for Pain (maximum 5 tablets  per day).    pravastatin (PRAVACHOL) 20 MG tablet TAKE 1 TABLET(20 MG) BY MOUTH DAILY    rOPINIRole (REQUIP) 1 MG tablet Take 1 tablet (1 mg total) by mouth 2 (two) times daily.    sertraline (ZOLOFT) 50 MG tablet Take 1 tablet (50 mg total) by mouth once daily.    topiramate (TOPAMAX) 50 MG tablet TAKE 1 TABLET(50 MG) BY MOUTH EVERY 12 HOURS    citalopram (CELEXA) 40 MG tablet TAKE 1 TABLET(40 MG) BY MOUTH EVERY DAY    cyclobenzaprine (FLEXERIL) 10 MG tablet TAKE 1 TABLET(10 MG) BY MOUTH THREE TIMES DAILY AS NEEDED FOR MUSCLE SPASMS    diclofenac sodium (VOLTAREN) 1 % Gel Apply 2 g topically 3 (three) times daily as needed (apply to painful joints).    fluticasone propionate (FLONASE) 50 mcg/actuation nasal spray 1 spray (50 mcg total) by Each Nostril route once daily.    furosemide (LASIX) 20 MG tablet Take 1 tablet (20 mg total) by mouth 2 (two) times a day. for 2 days    linaCLOtide (LINZESS) 290 mcg Cap capsule Take 1 capsule (290 mcg total) by mouth before breakfast.    meloxicam (MOBIC) 7.5 MG tablet Take 1 tablet (7.5 mg total) by mouth 2 (two) times a day.    naloxegoL (MOVANTIK) 25 mg tablet Take 25 mg by mouth once daily.    oxyCODONE (ROXICODONE) 10 mg Tab immediate release tablet Take 1 tablet (10 mg total) by mouth every 4 to 6 hours as needed for Pain (maximum 5 tablets per day).    walker Misc 4 wheeled walker for mobility     Family History       Problem Relation (Age of Onset)    Heart disease Mother, Father    Hypertension Mother, Father          Tobacco Use    Smoking status: Every Day     Current packs/day: 1.00     Average packs/day: 1 pack/day for 20.0 years (20.0 ttl pk-yrs)     Types: Cigarettes    Smokeless tobacco: Never   Substance and Sexual Activity    Alcohol use: No     Alcohol/week: 0.0 standard drinks of alcohol    Drug use: No    Sexual activity: Not Currently     Partners: Male     Birth control/protection: See Surgical Hx     Review of Systems  Objective:     Vital Signs (Most  Recent):  Temp: 98.8 °F (37.1 °C) (03/23/25 1702)  Pulse: 76 (03/23/25 1702)  Resp: 18 (03/23/25 1702)  BP: (!) 107/59 (03/23/25 1713)  SpO2: (!) 94 % (03/23/25 1702) Vital Signs (24h Range):  Temp:  [98 °F (36.7 °C)-98.8 °F (37.1 °C)] 98.8 °F (37.1 °C)  Pulse:  [67-76] 76  Resp:  [16-19] 18  SpO2:  [94 %-97 %] 94 %  BP: ()/(59-75) 107/59     Weight: 107 kg (236 lb)  Body mass index is 46.09 kg/m².     Physical Exam      Constitutional:       General: She is not in acute distress.     Appearance: She is not ill-appearing.   HENT:      Head: Normocephalic.   Eyes:      Extraocular Movements: Extraocular movements intact.   Cardiovascular:      Rate and Rhythm: Normal rate.   Pulmonary:      Effort: Pulmonary effort is normal.   Abdominal:      General: Abdomen is flat. Bowel sounds are normal.      Palpations: Abdomen is soft.      Tenderness: There is NO abdominal tenderness.   Musculoskeletal:      Comments:  Moderate paraspinal L>R lumbar tenderness to palpation, no midline tenderness.    Bilateral knee tenderness and pain with ROM, L>R.    Neurological:      Mental Status: She is alert.         Significant Labs: All pertinent labs within the past 24 hours have been reviewed.    Significant Imaging: I have reviewed all pertinent imaging results/findings within the past 24 hours.

## 2025-03-24 NOTE — H&P
[facility] - Emergency Dept  [facility] Medicine  History & Physical    Patient Name: Kuldeep Vilella  MRN: [id number]  Patient Class: OP- Observation  Admission Date: 3/22/2025  Attending Physician: Rubi Chavez MD   Primary Care Provider: Ginna Musa MD         Patient information was obtained from patient, past medical records, and ER records.     Subjective:     Principal Problem:Nausea vomiting and diarrhea    Chief Complaint:   Chief Complaint   Patient presents with    Knee Injury     Bilateral knee pain and back pain for 2 days.    Nausea     Nausea and vomiting onset last night        HPI: 74 y.o. female with a PMH of morbid obesity, HTN, HLD, spondylosis, radiculopathy, chronic lumbar back pain, osteoarthritis of both knees presents to [facility] Emergency Department for evaluation of nausea vomiting and diarrhea. Originally admitted to ED OU for IV fluids, electrolyte repletion and anti emetic. CT of her abdomen without acute indicators. As well as PT/OT evaluation for weakness and inability to be completely independent at home. She was evaluated by PT/OT who recommend SNF placement for her. She was admitted to [facility] for continue anti emetic     Patient says all her troubles started when she started to have worse than usual back pain and used up her February oxycodone too quickly and was denied a refill by the pharmacist. After which the pain made her restless legs worsens and gave her abdominal pain as well as nausea and vomiting. Her abdominal pain and nausea and vomiting have improved since being in EDOU and she was now able to tolerate lunch and dinner.     Social hx:  Works: retired   Lives: home alone with grandson  Family: has 2 sons and a daughter      Past Medical History:   Diagnosis Date    Arthritis     Asthma     Cervical spondylosis 07/13/2012    Chronic LBP 07/13/2012    Chronic neck pain 07/13/2012    Hyperlipidemia     Hypertension     Lumbar radiculopathy, BLE  07/13/2012    Lumbar spinal stenosis at L4-L5. 07/13/2012    Lumbar spondylosis 07/13/2012    Morbid obesity 07/13/2012    Primary osteoarthritis of both knees 07/13/2012    Spondylolisthesis, grade 1 at L4-L5. 07/13/2012    Tenosynovitis of ankle     Rt peroneus longus    Walker as ambulation aid        Past Surgical History:   Procedure Laterality Date    CHOLECYSTECTOMY      HYSTERECTOMY      HI REMOVAL OF OVARY/TUBE(S)         Review of patient's allergies indicates:   Allergen Reactions    Penicillins Anxiety and Other (See Comments)    Anesthetic [benzocaine-aloe vera]      Jittery/hyper    Guaifenesin Anxiety and Other (See Comments)       Current Facility-Administered Medications on File Prior to Encounter   Medication    sodium hyaluronate (EUFLEXXA) 10 mg/mL(mw 2.4 -3.6 million) injection 40 mg     Current Outpatient Medications on File Prior to Encounter   Medication Sig    albuterol (PROVENTIL/VENTOLIN HFA) 90 mcg/actuation inhaler Inhale 1-2 puffs into the lungs every 4 (four) hours as needed for Wheezing. Rescue    amLODIPine (NORVASC) 10 MG tablet TAKE 1 TABLET(10 MG) BY MOUTH EVERY DAY    azelastine (ASTELIN) 137 mcg (0.1 %) nasal spray 1 spray (137 mcg total) by Nasal route 2 (two) times daily.    busPIRone (BUSPAR) 15 MG tablet Take 1 tablet (15 mg total) by mouth 4 (four) times daily.    cetirizine (ZYRTEC) 10 MG tablet TAKE 1 TABLET BY MOUTH EVERY DAY    estrogens,conjugated,-methyltestosterone 0.625-1.25mg (ESTRATEST HS) 0.625-1.25 mg per tablet Take 1 tablet by mouth.    LINZESS 145 mcg Cap capsule TAKE 1 CAPSULE(145 MCG) BY MOUTH EVERY DAY    oxyCODONE-acetaminophen (PERCOCET)  mg per tablet Take 1 tablet by mouth every 4 to 6 hours as needed for Pain (maximum 5 tablets per day).    pravastatin (PRAVACHOL) 20 MG tablet TAKE 1 TABLET(20 MG) BY MOUTH DAILY    rOPINIRole (REQUIP) 1 MG tablet Take 1 tablet (1 mg total) by mouth 2 (two) times daily.    sertraline (ZOLOFT) 50 MG tablet Take 1  tablet (50 mg total) by mouth once daily.    topiramate (TOPAMAX) 50 MG tablet TAKE 1 TABLET(50 MG) BY MOUTH EVERY 12 HOURS    citalopram (CELEXA) 40 MG tablet TAKE 1 TABLET(40 MG) BY MOUTH EVERY DAY    cyclobenzaprine (FLEXERIL) 10 MG tablet TAKE 1 TABLET(10 MG) BY MOUTH THREE TIMES DAILY AS NEEDED FOR MUSCLE SPASMS    diclofenac sodium (VOLTAREN) 1 % Gel Apply 2 g topically 3 (three) times daily as needed (apply to painful joints).    fluticasone propionate (FLONASE) 50 mcg/actuation nasal spray 1 spray (50 mcg total) by Each Nostril route once daily.    furosemide (LASIX) 20 MG tablet Take 1 tablet (20 mg total) by mouth 2 (two) times a day. for 2 days    linaCLOtide (LINZESS) 290 mcg Cap capsule Take 1 capsule (290 mcg total) by mouth before breakfast.    meloxicam (MOBIC) 7.5 MG tablet Take 1 tablet (7.5 mg total) by mouth 2 (two) times a day.    naloxegoL (MOVANTIK) 25 mg tablet Take 25 mg by mouth once daily.    oxyCODONE (ROXICODONE) 10 mg Tab immediate release tablet Take 1 tablet (10 mg total) by mouth every 4 to 6 hours as needed for Pain (maximum 5 tablets per day).    walker Misc 4 wheeled walker for mobility     Family History       Problem Relation (Age of Onset)    Heart disease Mother, Father    Hypertension Mother, Father          Tobacco Use    Smoking status: Every Day     Current packs/day: 1.00     Average packs/day: 1 pack/day for 20.0 years (20.0 ttl pk-yrs)     Types: Cigarettes    Smokeless tobacco: Never   Substance and Sexual Activity    Alcohol use: No     Alcohol/week: 0.0 standard drinks of alcohol    Drug use: No    Sexual activity: Not Currently     Partners: Male     Birth control/protection: See Surgical Hx     Review of Systems  Objective:     Vital Signs (Most Recent):  Temp: 98.8 °F (37.1 °C) (03/23/25 1702)  Pulse: 76 (03/23/25 1702)  Resp: 18 (03/23/25 1702)  BP: (!) 107/59 (03/23/25 1713)  SpO2: (!) 94 % (03/23/25 1702) Vital Signs (24h Range):  Temp:  [98 °F (36.7 °C)-98.8  °F (37.1 °C)] 98.8 °F (37.1 °C)  Pulse:  [67-76] 76  Resp:  [16-19] 18  SpO2:  [94 %-97 %] 94 %  BP: ()/(59-75) 107/59     Weight: 107 kg (236 lb)  Body mass index is 46.09 kg/m².     Physical Exam      Constitutional:       General: She is not in acute distress.     Appearance: She is not ill-appearing.   HENT:      Head: Normocephalic.   Eyes:      Extraocular Movements: Extraocular movements intact.   Cardiovascular:      Rate and Rhythm: Normal rate.   Pulmonary:      Effort: Pulmonary effort is normal.   Abdominal:      General: Abdomen is flat. Bowel sounds are normal.      Palpations: Abdomen is soft.      Tenderness: There is NO abdominal tenderness.   Musculoskeletal:      Comments:  Moderate paraspinal L>R lumbar tenderness to palpation, no midline tenderness.    Bilateral knee tenderness and pain with ROM, L>R.    Neurological:      Mental Status: She is alert.         Significant Labs: All pertinent labs within the past 24 hours have been reviewed.    Significant Imaging: I have reviewed all pertinent imaging results/findings within the past 24 hours.  Assessment/Plan:     * Nausea vomiting and diarrhea  Admitted for 3 days nausea/vomiting diarrhea. CT abd/pelvis with no constipation, no complete obstruction or strangulation.   Improved, tolerating PO.       - s/p IVF  - electrolyte repletion PRN  - antiemetics PRN.         Depression with anxiety  Patient has recurrent depression which is unknown and is currently controlled. Will Continue anti-depressant medications. We will not consult psychiatry at this time. Patient does not display psychosis at this time. Continue to monitor closely and adjust plan of care as needed.        Restless leg syndrome    - cont home ropinerole     Chronic back pain  PRN pain regimen, pain now at baseline  PT?OT consulted       Pain in both knees  PRN pain medication   PT/OT consulted       Debility  Patient with Acute on chronic debility due to age-related physical  debility and other reduced mobility. The patient's latest AMPAC (Activity Measure for Post Acute Care) Score is listed below.    AM-PAC Score - How much help does the patient need for each activity listed  Basic Mobility Total Score: 18  Turning over in bed (including adjusting bedclothes, sheets and blankets)?: A little  Sitting down on and standing up from a chair with arms (e.g., wheelchair, bedside commode, etc.): A little  Moving from lying on back to sitting on the side of the bed?: A little  Moving to and from a bed to a chair (including a wheelchair)?: A little  Need to walk in hospital room?: A little  Climbing 3-5 steps with a railing?: A little    Plan  - PT/OT consulted  - SNF recommended         Hyperlipemia  Cont home pravastatin       Hypertension  Patient's blood pressure range in the last 24 hours was: BP  Min: 96/60  Max: 124/75.The patient's inpatient anti-hypertensive regimen is listed below:  Current Antihypertensives  amLODIPine tablet 10 mg, Daily, Oral  furosemide tablet 20 mg, 2 times daily, Oral    Plan  - BP is controlled, no changes needed to their regimen        VTE Risk Mitigation (From admission, onward)           Ordered     heparin (porcine) injection 5,000 Units  Every 8 hours         03/22/25 1755     IP VTE HIGH RISK PATIENT  Once         03/22/25 1755     Place sequential compression device  Until discontinued         03/22/25 1755                       On 03/23/2025, patient should be placed in hospital observation services under my care.        ED Observation Unit  Progress Note      HPI   74 y.o. female with a PMH of morbid obesity, HTN, HLD, spondylosis, radiculopathy, chronic lumbar back pain, osteoarthritis of both knees presents to [facility] Emergency Department for evaluation of periumbilical and suprapubic abdominal pain, nausea, 3 episodes of nonbloody nonbilious emesis, and 3 episodes of watery nonbloody diarrhea that began last night. She is also complaining of generalized  weakness, acute on chronic back pain that she says feels like a flare, and bilateral knee pain that she reports having for several years but has been worse recently. No recent trauma. Lives at home with grandson but states she is trying to stay independent in ADLs as her grandson is busy and unavailable to help most of the time, but she is having trouble caring for herself. She in interested in rehab placement and reports she has previously discussed this with her primary care physician.      She denies fever, cough, congestion, rhinorrhea, chest pain, dyspnea, dysuria, hematuria, flank pain, vaginal bleeding or discharge, bowel or bladder incontinence, saddle anesthesia, sensory changes, or new weakness.   She has lower extremity edema bilaterally that she states is improved from prior.      The history is provided by the patient, medical records and the EMS personnel.      I reviewed the ED Provider Note dated 3/22/25 prior to my evaluation of this patient.  I reviewed all labs and imaging performed in the Main ED, prior to patient being placed in Observation. Patient was placed in the ED Observation Unit for Nausea & vomiting.    Interval History   SOHAM. Pt doing well this am, n/v/d resolved. C diff canceled. PTOT still pending.     PMHx   Past Medical History:   Diagnosis Date    Arthritis     Asthma     Cervical spondylosis 07/13/2012    Chronic LBP 07/13/2012    Chronic neck pain 07/13/2012    Hyperlipidemia     Hypertension     Lumbar radiculopathy, BLE 07/13/2012    Lumbar spinal stenosis at L4-L5. 07/13/2012    Lumbar spondylosis 07/13/2012    Morbid obesity 07/13/2012    Primary osteoarthritis of both knees 07/13/2012    Spondylolisthesis, grade 1 at L4-L5. 07/13/2012    Tenosynovitis of ankle     Rt peroneus longus    Walker as ambulation aid       Past Surgical History:   Procedure Laterality Date    CHOLECYSTECTOMY      HYSTERECTOMY      KS REMOVAL OF OVARY/TUBE(S)          Family Hx   Family History    Problem Relation Name Age of Onset    Heart disease Mother      Hypertension Mother      Heart disease Father      Hypertension Father      Breast cancer Neg Hx      Colon cancer Neg Hx      Ovarian cancer Neg Hx          Social Hx   Social History     Socioeconomic History    Marital status: Single   Tobacco Use    Smoking status: Every Day     Current packs/day: 1.00     Average packs/day: 1 pack/day for 20.0 years (20.0 ttl pk-yrs)     Types: Cigarettes    Smokeless tobacco: Never   Substance and Sexual Activity    Alcohol use: No     Alcohol/week: 0.0 standard drinks of alcohol    Drug use: No    Sexual activity: Not Currently     Partners: Male     Birth control/protection: See Surgical Hx     Social Drivers of Health     Food Insecurity: No Food Insecurity (2/10/2025)    Received from [facility] [employer]    Hunger Vital Sign     Worried About Running Out of Food in the Last Year: Never true     Ran Out of Food in the Last Year: Never true   Transportation Needs: No Transportation Needs (2/10/2025)    Received from [facility]    PRAPARE - Transportation     Lack of Transportation (Medical): No     Lack of Transportation (Non-Medical): No   Stress: Stress Concern Present (8/5/2024)    Received from [employer] [facility] [place]    [employer] [place] [employer] - Occupational Stress Questionnaire     Feeling of Stress : To some extent   Housing Stability: Low Risk  (2/10/2025)    Received from [facility] [employer]    Housing Stability Vital Sign     Unable to Pay for Housing in the Last Year: No     Number of Times Moved in the Last Year: 1     Homeless in the Last Year: No        Vital Signs   Vitals:    03/23/25 0320 03/23/25 0448 03/23/25 0759 03/23/25 0904   BP:  124/75 113/72    BP Location:  Right arm     Patient Position:       Pulse:  72 67    Resp: 16   19   Temp:  98.7 °F (37.1 °C) 98.2 °F (36.8 °C)    TempSrc:  Oral Oral    SpO2:  95% 96%    Weight:       Height:            Review of  Systems  ROS    Brief Physical Exam/Reassessment   Physical Exam    Labs/Imaging   Labs Reviewed   COMPREHENSIVE METABOLIC PANEL - Abnormal       Result Value    Sodium 132 (*)     Potassium 3.3 (*)     Chloride 99      CO2 22 (*)     Glucose 71      BUN 13      Creatinine 1.0      Calcium 9.0      Protein Total 6.6      Albumin 3.5      Bilirubin Total 0.8      ALP 72      AST 14      ALT 8 (*)     Anion Gap 11      eGFR 60 (*)    URINALYSIS, REFLEX TO URINE CULTURE - Abnormal    Color, UA Colorless (*)     Appearance, UA Clear      pH, UA 6.0      Spec Grav UA 1.005      Protein, UA Negative      Glucose, UA Negative      Ketones, UA Negative      Bilirubin, UA Negative      Blood, UA Negative      Nitrites, UA Negative      Urobilinogen, UA Negative      Leukocyte Esterase, UA Negative     LIPASE - Abnormal    Lipase Level 61 (*)    CBC WITH DIFFERENTIAL - Abnormal    WBC 9.10      RBC 3.91 (*)     HGB 12.1      HCT 36.2 (*)     MCV 93      MCH 30.9      MCHC 33.4      RDW 13.0      Platelet Count 292      MPV 10.8      Nucleated RBC 0      Neut % 74.8 (*)     Lymph % 14.8 (*)     Mono % 7.6      Eos % 2.2      Basophil % 0.3      Imm Grans % 0.3      Neut # 6.80      Lymph # 1.35      Mono # 0.69      Eos # 0.20      Baso # 0.03      Imm Grans # 0.03     BASIC METABOLIC PANEL - Abnormal    Sodium 134 (*)     Potassium 3.8      Chloride 106      CO2 20 (*)     Glucose 93      BUN 12      Creatinine 1.0      Calcium 8.4 (*)     Anion Gap 8      eGFR 60 (*)    CBC WITH DIFFERENTIAL - Abnormal    WBC 9.50      RBC 3.79 (*)     HGB 11.9 (*)     HCT 35.9 (*)     MCV 95      MCH 31.4      MCHC 33.1      RDW 13.4      Platelet Count 222      MPV 12.0      Nucleated RBC 0      Neut % 72.6      Lymph % 16.7 (*)     Mono % 7.8      Eos % 2.1      Basophil % 0.4      Imm Grans % 0.4      Neut # 6.89      Lymph # 1.59      Mono # 0.74      Eos # 0.20      Baso # 0.04      Imm Grans # 0.04     TSH - Normal    TSH 0.448      TROPONIN I HIGH SENSITIVITY - Normal    Troponin High Sensitive <3     SARS-COV2 (COVID) WITH FLU/RSV BY PCR - Normal    INFLUENZA A BY PCR Negative      INFLUENZA B BY PCR Negative      Respiratory Syncytial Virus PCR Negative      SARS-CoV-2 PCR Negative     MAGNESIUM - Normal    Magnesium  1.6     CBC W/ AUTO DIFFERENTIAL    Narrative:     The following orders were created for panel order CBC auto differential.  Procedure                               Abnormality         Status                     ---------                               -----------         ------                     CBC with Differential[[id number]]       Abnormal            Final result                 Please view results for these tests on the individual orders.   CBC W/ AUTO DIFFERENTIAL    Narrative:     The following orders were created for panel order CBC with Automated Differential.  Procedure                               Abnormality         Status                     ---------                               -----------         ------                     CBC with Differential[[id number]]       Abnormal            Final result                 Please view results for these tests on the individual orders.      Imaging Results              CT Abdomen Pelvis With IV Contrast NO Oral Contrast (Final result)  Result time 03/22/25 15:56:07      Final result by Krishna Sterling MD (03/22/25 15:56:07)                   Impression:      1. Previous large volume stool within the rectum has been evacuated.  There is residual rectal wall thickening, but significant no adjacent inflammatory change or presacral fluid.  Correlate for proctitis.  2. Redemonstrated right paraumbilical hernia containing short segment of small bowel, with slight decreased gaseous distension from prior.  No evidence of strangulation or complete obstruction, noting partial obstruction not excluded.  Clinical correlation advised.  3. Colonic diverticulosis.  4.  Cholelithiasis.  5. Grossly stable other incidental/nonemergent findings in the body of the report.      Electronically signed by: Krishna Sterling MD  Date:    03/22/2025  Time:    15:56               Narrative:    EXAMINATION:  CT ABDOMEN PELVIS WITH IV CONTRAST    CLINICAL HISTORY:  Abdominal abscess/infection suspected;suprapubic and periumbilical abdominal pain, nausea, vomiting, diarrhea;    TECHNIQUE:  Low dose axial images, sagittal and coronal reformations were obtained from the lung bases to the pubic symphysis following the IV administration of 100 mL of Omnipaque 350 .  Oral contrast was not given.    COMPARISON:  Chest radiograph 02/23/2025 CT abdomen and pelvis 09/19/2024    FINDINGS:  Bibasilar mild dependent atelectasis with mild scattered platelike scarring versus atelectasis.  4 mm solid nodule within the peripheral right middle lobe, not significantly changed.  No consolidation or pleural effusion.  Bibasilar scattered calcified pleural plaques, unchanged.    Base of the heart upper limits of normal in size without significant pericardial fluid noting aortic and multi-vessel coronary arterial calcifications.    Cholelithiasis.  No significant biliary ductal dilatation.    Portal vasculature appears patent.  Liver is normal in size noting grossly similar few scattered subcentimeter hypoattenuating parenchymal foci which are too small to characterize a small peripheral cyst at the inferomedial right lower lobe.    Pancreas, spleen, stomach, duodenum and bilateral adrenal glands are within normal limits.    Bilateral kidneys are stable in overall size, shape and location with mild cortical thinning otherwise normal enhancement.  No hydronephrosis or significant perinephric ureters are normal in course and caliber.  Urinary bladder is within normal limits.  Uterus not identified and likely surgically absent or atrophic.  No adnexal mass or significant volume free pelvic fluid.  Pelvic phleboliths  noted.    Appendix not localized; for, no pericecal inflammatory change.  Terminal ileum is within normal limits.  Apparent circumferential wall thickening of the rectum without significant perirectal inflammatory change or presacral edema.  Few scattered colonic diverticula without diverticulitis.  No bowel obstruction.  Remaining small and large loops of bowel are within normal limits.  No bowel pneumatosis or portal venous gas.    No ascites, free air or lymphadenopathy by CT criteria.    Scattered calcific atherosclerosis of the abdominal aorta extending into its iliac branches.  No aortic aneurysm or dissection.    Extraperitoneal soft tissues again show a small right periumbilical hernia containing short segment of small bowel.  The hernia neck is approximately 1.7 cm and there is slight decreased gaseous distension of the involved loop of bowel.  No adjacent inflammatory change or CT evidence of high-grade mechanical bowel obstruction at this time.    Osseous structures appear stable without acute findings.                                       X-Ray Knee 3 View Bilateral (Final result)  Result time 03/22/25 12:46:34      Final result by Krishna Sterling MD (03/22/25 12:46:34)                   Impression:      Suspected bilateral nonspecific suprapatellar joint effusions without acute displaced fracture-dislocation seen at either knee.    Bilateral knee advanced tricompartmental DJD, progressed since 09/18/2020 study.      Electronically signed by: Krishna Sterling MD  Date:    03/22/2025  Time:    12:46               Narrative:    EXAMINATION:  XR KNEE 3 VIEW BILATERAL    CLINICAL HISTORY:  Pain in unspecified knee    TECHNIQUE:  AP, lateral, and Merchant views of both knees were performed.    COMPARISON:  Bilateral knee series 09/18/2020    FINDINGS:  Right knee: Advanced tricompartmental degenerative change, appears progressed since 09/18/2020 study.  No displaced fracture, dislocation or destructive osseous  process.  Suspected nonspecific suprapatellar joint effusion.  Mild scattered atherosclerotic vascular calcifications and few scattered nonspecific soft tissue calcifications noted.  No subcutaneous emphysema or radiopaque foreign body.    Left knee: Advanced tricompartmental degenerative change, appears progressed since 09/18/2020 study.  No displaced fracture, dislocation or destructive osseous process.  Suspected nonspecific suprapatellar joint effusion.  Mild scattered atherosclerotic vascular calcifications and few scattered nonspecific soft tissue calcifications noted.  No subcutaneous emphysema or radiopaque foreign body.                                       I reviewed all labs, imaging, EKGs.     Plan   Nausea vomiting and diarrhea  Abdominal pain     - afebrile, HDS  - No leukocytosis  - K 3.3, replaced  - CT a/p Previous large volume stool within the rectum has been evacuated.  There is residual rectal wall thickening, but significant no adjacent inflammatory change or presacral fluid.  Correlate for proctitis. Redemonstrated right paraumbilical hernia containing short segment of small bowel, with slight decreased gaseous distension from prior.  No evidence of strangulation or complete obstruction, noting partial obstruction not excluded. Colonic diverticulosis. Cholelithiasis.  - tylenol 1 g tid  - prn antiemetics  - daily CBC, BMP, magnesium  - continue to monitor     Knee pain  Back pain  Debility     XR b/l knee: Suspected bilateral nonspecific suprapatellar joint effusions without acute displaced fracture-dislocation seen at either knee. Bilateral knee advanced tricompartmental DJD, progressed since 09/18/2020 study.  - continue home percocet 10 mg q6h, flexeril  - PTOT consulted     Case was discussed with the ED provider, Dr. Glass.       Rubi Chavez MD  Department of Hospital Medicine  [facility]

## 2025-03-24 NOTE — ASSESSMENT & PLAN NOTE
Patient with Acute on chronic debility due to age-related physical debility and other reduced mobility. The patient's latest AMPAC (Activity Measure for Post Acute Care) Score is listed below.    AM-PAC Score - How much help does the patient need for each activity listed  Basic Mobility Total Score: 18  Turning over in bed (including adjusting bedclothes, sheets and blankets)?: A little  Sitting down on and standing up from a chair with arms (e.g., wheelchair, bedside commode, etc.): A little  Moving from lying on back to sitting on the side of the bed?: A little  Moving to and from a bed to a chair (including a wheelchair)?: A little  Need to walk in hospital room?: A little  Climbing 3-5 steps with a railing?: A little    Plan  - PT/OT consulted  - SNF recommended

## 2025-03-24 NOTE — PLAN OF CARE
Problem: Adult Inpatient Plan of Care  Goal: Plan of Care Review  Outcome: Adequate for Care Transition  Goal: Patient-Specific Goal (Individualized)  Outcome: Adequate for Care Transition  Goal: Absence of Hospital-Acquired Illness or Injury  Outcome: Adequate for Care Transition  Goal: Optimal Comfort and Wellbeing  Outcome: Adequate for Care Transition  Goal: Readiness for Transition of Care  Outcome: Adequate for Care Transition     Problem: Bariatric Environmental Safety  Goal: Safety Maintained with Care  Outcome: Adequate for Care Transition     Problem: Infection  Goal: Absence of Infection Signs and Symptoms  Outcome: Adequate for Care Transition     Problem: Sepsis/Septic Shock  Goal: Optimal Coping  Outcome: Adequate for Care Transition  Goal: Absence of Bleeding  Outcome: Adequate for Care Transition  Goal: Blood Glucose Level Within Targeted Range  Outcome: Adequate for Care Transition  Goal: Absence of Infection Signs and Symptoms  Outcome: Adequate for Care Transition  Goal: Optimal Nutrition Intake  Outcome: Adequate for Care Transition     Problem: Wound  Goal: Optimal Coping  Outcome: Adequate for Care Transition  Goal: Optimal Functional Ability  Outcome: Adequate for Care Transition  Goal: Absence of Infection Signs and Symptoms  Outcome: Adequate for Care Transition  Goal: Improved Oral Intake  Outcome: Adequate for Care Transition  Goal: Optimal Pain Control and Function  Outcome: Adequate for Care Transition  Goal: Skin Health and Integrity  Outcome: Adequate for Care Transition  Goal: Optimal Wound Healing  Outcome: Adequate for Care Transition     Problem: Fall Injury Risk  Goal: Absence of Fall and Fall-Related Injury  Outcome: Adequate for Care Transition

## 2025-03-25 NOTE — PLAN OF CARE
[facility] - Med Surg  Discharge Final Note    Primary Care Provider: Ginna Musa MD    Expected Discharge Date: 3/24/2025    Pt discharged home and will resume home health services with [facility].  Pt ambulates with a rollator.  Pt required lyft transport home.  Verified pt had her keys to get into the home.     Discharge Plan A and Plan B have been determined by review of patient's clinical status, future medical and therapeutic needs, and coverage/benefits for post-acute care in coordination with multidisciplinary team members.    Future Appointments   Date Time Provider Department Center   3/27/2025 10:00 AM [facility]   4/8/2025  1:00 PM Donny Esquivel MD [facility]         Final Discharge Note (most recent)       Final Note - 03/25/25 0832          Final Note    Assessment Type Final Discharge Note     Anticipated Discharge Disposition Home-Health Care [facility]     Hospital Resources/Appts/Education Provided Provided patient/caregiver with written discharge plan information        Post-Acute Status    Post-Acute Authorization Home Health;Placement     Post-Acute Placement Status Patient declined/refused     Home Health Status Set-up Complete/Auth obtained     Discharge Delays None known at this time                     Important Message from Medicare Sandra Encalade, LMSW  Part-Time-  [facility]  Ext. [phone]

## 2025-03-26 ENCOUNTER — PATIENT OUTREACH (OUTPATIENT)
Dept: ADMINISTRATIVE | Facility: CLINIC | Age: 75
End: 2025-03-26
Payer: COMMERCIAL

## 2025-03-26 ENCOUNTER — TELEPHONE (OUTPATIENT)
Dept: HOME HEALTH SERVICES | Facility: CLINIC | Age: 75
End: 2025-03-26
Payer: COMMERCIAL

## 2025-03-26 DIAGNOSIS — R11.2 NAUSEA AND VOMITING, UNSPECIFIED VOMITING TYPE: Primary | ICD-10-CM

## 2025-03-26 NOTE — PROGRESS NOTES
C3 nurse spoke with Kuldeep Villela  for a TCC post hospital discharge follow up call. The patient does not have a scheduled HOSFU appointment with Ginna Musa MD.

## 2025-03-26 NOTE — TELEPHONE ENCOUNTER
Contacted pt to schedule an appointment regarding a referral placed for a tcc home visit with a nurse practitioner. Patient has been scheduled

## 2025-03-27 ENCOUNTER — TELEPHONE (OUTPATIENT)
Dept: FAMILY MEDICINE | Facility: CLINIC | Age: 75
End: 2025-03-27
Payer: COMMERCIAL

## 2025-03-27 ENCOUNTER — PATIENT OUTREACH (OUTPATIENT)
Dept: ADMINISTRATIVE | Facility: OTHER | Age: 75
End: 2025-03-27
Payer: COMMERCIAL

## 2025-03-27 NOTE — PROGRESS NOTES
CHW - Initial Contact    This Community Health Worker completed OR updated the Social Determinant of Health questionnaire with patient via telephone today.    Pt identified barriers of most importance are: has food insecurities, issues with utility and housing.   Referrals to community agencies completed with patient/caregiver consent outside of [facility] include:  findhelp.org  Referrals were put through [facility] - no: Monitoring Division.[employer]  Support and Services: No support at this time  Other information discussed the patient needs / wants help with: Updated SDOH with patient today. Patient needs assistance with Food, housing, and utility payment. CHW will follow up in two weeks to check referrals status and patient's future needs.   Follow up required: yes  Follow-up Outreach - Due: 4/10/2025

## 2025-03-27 NOTE — TELEPHONE ENCOUNTER
----- Message from Juana sent at 3/27/2025  1:19 PM CDT -----  Type: Patient Call BackWho called:Cinthya/Rafaela is the request in detail: would like to discuss pt medication. Please callCan the clinic reply by [facility]? No Would the patient rather a call back or a response via [facility]?  [facility]: [phone] Information:

## 2025-03-27 NOTE — TELEPHONE ENCOUNTER
Cinthya HH nurse from [facility]  went to admit today and has concerns with medication list. States is missing several medications at home will contact daughter for further assistance in getting medications picked up from pharmacy.  Patient has no means of transportation.    Needs an order  HH aide for ADL's.

## 2025-03-28 ENCOUNTER — TELEPHONE (OUTPATIENT)
Dept: FAMILY MEDICINE | Facility: CLINIC | Age: 75
End: 2025-03-28
Payer: COMMERCIAL

## 2025-03-28 DIAGNOSIS — G89.29 CHRONIC LOW BACK PAIN WITH SCIATICA, SCIATICA LATERALITY UNSPECIFIED, UNSPECIFIED BACK PAIN LATERALITY: Primary | ICD-10-CM

## 2025-03-28 DIAGNOSIS — F41.8 DEPRESSION WITH ANXIETY: ICD-10-CM

## 2025-03-28 DIAGNOSIS — M54.40 CHRONIC LOW BACK PAIN WITH SCIATICA, SCIATICA LATERALITY UNSPECIFIED, UNSPECIFIED BACK PAIN LATERALITY: Primary | ICD-10-CM

## 2025-03-28 NOTE — TELEPHONE ENCOUNTER
----- Message from Dione sent at 3/28/2025  4:26 PM CDT -----  Who called:tung weber [facility]   What is the request in detail: would like orders put in for a  consult and the pts insurance does not cover aid services   Can the clinic reply by [facility]?no  Would the patient rather a call back or a response via [facility]?call  Best call back number:[phone]    Additional Information:

## 2025-03-28 NOTE — TELEPHONE ENCOUNTER
Returned call to Giuliana at [facility]. Insurance will not cover aide & therefore [facility] will not be able to provide this service for her. Giuliana is requesting order be placed to get a  involved in patient's care in an effort to see about getting patient connected to other potential resources. Giuliana states patient is alone at home without any caregiver.     Informed Giuliana that Dr. Musa is out of the office at this time until next Friday, April 4th but I will send the request for the referral to be placed.

## 2025-04-04 DIAGNOSIS — G89.29 CHRONIC NECK PAIN: ICD-10-CM

## 2025-04-04 DIAGNOSIS — M54.42 CHRONIC MIDLINE LOW BACK PAIN WITH BILATERAL SCIATICA: ICD-10-CM

## 2025-04-04 DIAGNOSIS — M17.0 PRIMARY OSTEOARTHRITIS OF BOTH KNEES: ICD-10-CM

## 2025-04-04 DIAGNOSIS — M54.2 CHRONIC NECK PAIN: ICD-10-CM

## 2025-04-04 DIAGNOSIS — G89.29 CHRONIC MIDLINE LOW BACK PAIN WITH BILATERAL SCIATICA: ICD-10-CM

## 2025-04-04 DIAGNOSIS — M54.41 CHRONIC MIDLINE LOW BACK PAIN WITH BILATERAL SCIATICA: ICD-10-CM

## 2025-04-04 DIAGNOSIS — I11.9 BENIGN HYPERTENSIVE HEART DISEASE WITHOUT HEART FAILURE: ICD-10-CM

## 2025-04-04 RX ORDER — MELOXICAM 7.5 MG/1
7.5 TABLET ORAL 2 TIMES DAILY
Qty: 180 TABLET | Refills: 1 | Status: SHIPPED | OUTPATIENT
Start: 2025-04-04 | End: 2025-05-04

## 2025-04-04 NOTE — TELEPHONE ENCOUNTER
Care Due:                  Date            Visit Type   Department     Provider  --------------------------------------------------------------------------------                                             [facility] /  Last Visit: 03-      AUDIO ONLY   INTERNAL MED   Ginna Nguyen  Next Visit: None Scheduled  None         None Found                                                            Last  Test          Frequency    Reason                     Performed    Due Date  --------------------------------------------------------------------------------    Lipid Panel.  12 months..  pravastatin..............  05- 05-    [facility] Embedded Care Due Messages. Reference number: [id number].   4/04/2025 3:32:49 AM CDT

## 2025-04-07 RX ORDER — AMLODIPINE BESYLATE 10 MG/1
10 TABLET ORAL
Qty: 90 TABLET | Refills: 0 | Status: SHIPPED | OUTPATIENT
Start: 2025-04-07 | End: 2025-04-08 | Stop reason: SDUPTHER

## 2025-04-08 ENCOUNTER — TELEPHONE (OUTPATIENT)
Dept: PHYSICAL MEDICINE AND REHAB | Facility: CLINIC | Age: 75
End: 2025-04-08
Payer: COMMERCIAL

## 2025-04-08 DIAGNOSIS — I11.9 BENIGN HYPERTENSIVE HEART DISEASE WITHOUT HEART FAILURE: ICD-10-CM

## 2025-04-08 NOTE — TELEPHONE ENCOUNTER
----- Message from Dyan sent at 4/8/2025  1:20 PM CDT -----  Pt calling to reschedule her missed bassem on today , next available ist until Sept , pt says that's too long , pt is in a lot of pain , pt daughter was running late to pick her up Confirmed patient's contact info below:Contact Name: Kuldeep VillelaPhone Number: [phone]

## 2025-04-08 NOTE — TELEPHONE ENCOUNTER
No care due was identified.  [facility] Embedded Care Due Messages. Reference number: [id number].   4/08/2025 4:17:46 PM CDT

## 2025-04-08 NOTE — TELEPHONE ENCOUNTER
----- Message from Raven sent at 4/8/2025  1:13 PM CDT -----  Regarding: Daughter called states rogers like to speak with someone regarding Moms appt  Contact: [phone]  Name of Who is Calling:Romulo  What is the request in detail:Daughter called states rogers like to speak with someone regarding Moms appt. Please advise   Can the clinic reply by [facility]:No  What Number to [facility] Back [facility]: [phone]  ----- Message -----  From: Raven Campos  Sent: 4/8/2025   1:15 PM CDT  To: [facility]NETT Staff  Subject: Daughter called states rogers like to speak wit#    Name of Who is Calling:Romulo  What is the request in detail:Daughter called states rogers like to speak with someone regarding Moms appt. Please advise   Can the clinic reply by [facility]:No  What Number to Call Back if not in [facility]: [phone]

## 2025-04-10 ENCOUNTER — OFFICE VISIT (OUTPATIENT)
Dept: HOME HEALTH SERVICES | Facility: CLINIC | Age: 75
End: 2025-04-10
Payer: COMMERCIAL

## 2025-04-10 DIAGNOSIS — R11.2 NAUSEA VOMITING AND DIARRHEA: ICD-10-CM

## 2025-04-10 DIAGNOSIS — M47.26 OSTEOARTHRITIS OF SPINE WITH RADICULOPATHY, LUMBAR REGION: Primary | ICD-10-CM

## 2025-04-10 DIAGNOSIS — R19.7 NAUSEA VOMITING AND DIARRHEA: ICD-10-CM

## 2025-04-10 DIAGNOSIS — R11.2 NAUSEA AND VOMITING, UNSPECIFIED VOMITING TYPE: ICD-10-CM

## 2025-04-10 DIAGNOSIS — R53.81 DEBILITY: ICD-10-CM

## 2025-04-10 DIAGNOSIS — F11.90 CHRONIC, CONTINUOUS USE OF OPIOIDS: ICD-10-CM

## 2025-04-10 DIAGNOSIS — I10 PRIMARY HYPERTENSION: ICD-10-CM

## 2025-04-10 RX ORDER — AMLODIPINE BESYLATE 10 MG/1
10 TABLET ORAL DAILY
Qty: 90 TABLET | Refills: 0 | Status: SHIPPED | OUTPATIENT
Start: 2025-04-10

## 2025-04-11 VITALS
DIASTOLIC BLOOD PRESSURE: 62 MMHG | OXYGEN SATURATION: 97 % | RESPIRATION RATE: 16 BRPM | SYSTOLIC BLOOD PRESSURE: 128 MMHG | HEART RATE: 80 BPM

## 2025-04-11 PROBLEM — F11.90 CHRONIC, CONTINUOUS USE OF OPIOIDS: Status: ACTIVE | Noted: 2025-04-11

## 2025-04-11 PROBLEM — L03.311 CELLULITIS OF ABDOMINAL WALL: Status: RESOLVED | Noted: 2019-06-15 | Resolved: 2025-04-11

## 2025-04-11 PROBLEM — A41.9 SEPSIS: Status: RESOLVED | Noted: 2019-06-13 | Resolved: 2025-04-11

## 2025-04-11 PROBLEM — E87.1 HYPONATREMIA: Status: RESOLVED | Noted: 2025-03-24 | Resolved: 2025-04-11

## 2025-04-11 NOTE — PROGRESS NOTES
[facility]  Transitional Care Management (TCM) Home Visit    Encounter Provider: Zayda Washington   PCP: Ginna Musa MD  Consult Requested By: Dr. Tarha Do  Admit Date: 3/22/25   IP Discharge Date: 3/24/25  Hospital Length of Stay: 2 days  Days since discharge (from IP or SNF):   [facility] Contact Note: 3/26/25  Hospital Diagnosis: Nausea and vomiting, unspecified vomiting type [R11.2]     HISTORY OF PRESENT ILLNESS      Patient ID: Kuldeep Villela is a 74 y.o. female was recently admitted to the hospital, this is their TCM encounter.    Hospital Course Synopsis:    74 y.o. female with a PMH of morbid obesity, HTN, HLD, spondylosis, radiculopathy, chronic lumbar back pain, osteoarthritis of both knees [place] [facility] [place] evaluation of nausea vomiting and diarrhea. Originally admitted to ED OU for IV fluids, electrolyte repletion and anti emetic. CT of her abdomen without acute indicators. As well as PT/OT evaluation for weakness and inability to be completely independent at home. She was evaluated by PT/OT who recommend SNF placement for her. She was upgraded to [facility] for continue anti emetic and pain control. Patient says all her troubles started after she was unable to fill her home percocet prescription from her PCP because she used up too much of it last month. Possibly nausea/vomiting on admit were 2/2 to opiate withdrawal. By the next morning after upgrading to hospital medicine patient was back to baseline self, pain under control, eating well. Did not want to be placed in SNF and preferred returning home. She was discharged with short percocet script until she is able to see her PCP again.     Pt is found in her home today. She reports no further issues with N/V/D. She does have some pain today which affects her mobility but this is chronic. She is taking daily pain meds thru pain management and it does help some. Reports her lymphedema is at baseline.    She has  some issues with transportation and getting to [facility]    DECISION MAKING TODAY       Assessment & Plan:  1. Osteoarthritis of spine with radiculopathy, lumbar region  Assessment & Plan:  Chronic, pain daily reports 6/10 now  Followed by pain management    Orders:  -     Ambulatory referral/consult to Outpatient Case Management    2. Nausea and vomiting, unspecified vomiting type  -     Ambulatory referral/consult to [facility]  -     Ambulatory referral/consult to Outpatient Case Management    3. Debility  Assessment & Plan:  Declined SNF admit recently  PT.OT in place via   Fall precautions reviewed      4. Primary hypertension  Assessment & Plan:   log with normotensive readings  Continue current regimen  Followed by PCP      5. Nausea vomiting and diarrhea  Assessment & Plan:  Resolved      6. Chronic, continuous use of opioids  Assessment & Plan:   reviewed  Pain management following  Reviewed all medications thru one provider           Medication List on Discharge:     Medication List            Accurate as of April 10, 2025 11:59 PM. If you have any questions, ask your nurse or doctor.                CONTINUE taking these medications      albuterol 90 mcg/actuation inhaler  Commonly known as: PROVENTIL/VENTOLIN HFA  Inhale 1-2 puffs into the lungs every 4 (four) hours as needed for Wheezing. Rescue     amLODIPine 10 MG tablet  Commonly known as: NORVASC  Take 1 tablet (10 mg total) by mouth once daily.     azelastine 137 mcg (0.1 %) nasal spray  Commonly known as: ASTELIN  1 spray (137 mcg total) by Nasal route 2 (two) times daily.     busPIRone 15 MG tablet  Commonly known as: BUSPAR  Take 1 tablet (15 mg total) by mouth 4 (four) times daily.     cetirizine 10 MG tablet  Commonly known as: ZYRTEC  TAKE 1 TABLET BY MOUTH EVERY DAY     citalopram 40 MG tablet  Commonly known as: CeleXA  TAKE 1 TABLET(40 MG) BY MOUTH EVERY DAY     cyclobenzaprine 10 MG tablet  Commonly known as:  FLEXERIL  TAKE 1 TABLET(10 MG) BY MOUTH THREE TIMES DAILY AS NEEDED FOR MUSCLE SPASMS     diclofenac sodium 1 % Gel  Commonly known as: VOLTAREN  Apply 2 g topically 3 (three) times daily as needed (apply to painful joints).     estrogens(esterified)-methyltestosterone 0.625-1.25mg per tablet  Commonly known as: ESTRATEST HS  Take 1 tablet by mouth.     fluticasone propionate 50 mcg/actuation nasal spray  Commonly known as: FLONASE  1 spray (50 mcg total) by Each Nostril route once daily.     furosemide 20 MG tablet  Commonly known as: LASIX  Take 1 tablet (20 mg total) by mouth 2 (two) times a day. for 2 days     * linaCLOtide 290 mcg Cap capsule  Commonly known as: LINZESS  Take 1 capsule (290 mcg total) by mouth before breakfast.     * LINZESS 145 mcg Cap capsule  Generic drug: linaCLOtide  TAKE 1 CAPSULE(145 MCG) BY MOUTH EVERY DAY     meloxicam 7.5 MG tablet  Commonly known as: MOBIC  Take 1 tablet (7.5 mg total) by mouth 2 (two) times a day.     MOVANTIK 25 mg tablet  Generic drug: naloxegoL  Take 25 mg by mouth once daily.     oxyCODONE 10 mg Tab immediate release tablet  Commonly known as: ROXICODONE  Take 1 tablet (10 mg total) by mouth every 4 to 6 hours as needed for Pain (maximum 5 tablets per day).     oxyCODONE-acetaminophen  mg per tablet  Commonly known as: PERCOCET  Take 1 tablet by mouth every 4 to 6 hours as needed for Pain (maximum 5 tablets per day).     pravastatin 20 MG tablet  Commonly known as: PRAVACHOL  TAKE 1 TABLET(20 MG) BY MOUTH DAILY     rOPINIRole 1 MG tablet  Commonly known as: REQUIP  Take 1 tablet (1 mg total) by mouth 2 (two) times daily.     sertraline 50 MG tablet  Commonly known as: ZOLOFT  Take 1 tablet (50 mg total) by mouth once daily.     topiramate 50 MG tablet  Commonly known as: TOPAMAX  TAKE 1 TABLET(50 MG) BY MOUTH EVERY 12 HOURS     walker Misc  4 wheeled walker for mobility           * This list has 2 medication(s) that are the same as other medications  prescribed for you. Read the directions carefully, and ask your doctor or other care provider to review them with you.                  Medication Reconciliation:  Were medications changed on discharge? Yes  Were medications in the home? Yes  Is the patient taking the medications as directed? Yes  Does the patient understand the medications and changes? Yes  Does updated med list accurately reflects meds patient is currently taking? Yes    ENVIRONMENT OF CARE      Family and/or Caregiver present at visit?  No  Name of Caregiver:   History provided by: patient    Advance Care Planning   Advanced Care Planning Status:  Patient has had an ACP conversation  Living Will: No  Power of : No  LaPOST: No    Does Caregiver have HCPoA: No  Changes today:   Is patient hospice appropriate: No  (If needed, use PPS <30 or FAST score >7)  Was referral to hospice placed: No       Impression upon entering the home:  Physical Dwelling: single family home   Appearance of home environment: cleaniness: clean  Functional Status: minimal assistance  Mobility: ambulatory with device  Nutritional access: adequate intake and access  Home Health: Yes,  Agency [facility]    DME/Supplies: rolling walker     Diagnostic tests reviewed/disposition: No diagnosic tests pending after this hospitalization.  Disease/illness education: CHF  Establishment or re-establishment of referral orders for community resources: No other necessary community resources.   Discussion with other health care providers: No discussion with other health care providers necessary.   Does patient have a PCP at [place]? Yes   Repatriation plan with PCP? follow-up with PCP within 90d   Does patient have an ostomy (ileostomy, colostomy, suprapubic catheter, nephrostomy tube, tracheostomy, PEG tube, pleurex catheter, cholecystostomy, etc)? No  Were BPAs reviewed? Yes    Social History     Socioeconomic History    Marital status: Single   Tobacco Use    Smoking status: Every Day      Current packs/day: 1.00     Average packs/day: 1 pack/day for 20.0 years (20.0 ttl pk-yrs)     Types: Cigarettes    Smokeless tobacco: Never   Substance and Sexual Activity    Alcohol use: No     Alcohol/week: 0.0 standard drinks of alcohol    Drug use: No    Sexual activity: Not Currently     Partners: Male     Birth control/protection: See Surgical Hx     Social Drivers of Health     Food Insecurity: Food Insecurity Present (3/27/2025)    Hunger Vital Sign     Worried About Running Out of Food in the Last Year: Often true     Ran Out of Food in the Last Year: Often true   Transportation Needs: No Transportation Needs (3/27/2025)    PRAPARE - Transportation     Lack of Transportation (Medical): No     Lack of Transportation (Non-Medical): No   Stress: Stress Concern Present (8/5/2024)    Received from [facility] Occup[facility] - Occupational Stress Questionnaire     Feeling of Stress : To some extent   Housing Stability: High Risk (3/27/2025)    Housing Stability Vital Sign     Unable to Pay for Housing in the Last Year: Yes     Homeless in the Last Year: No       OBJECTIVE:     Vital Signs:  Vitals:    04/10/25 1120   BP: 128/62   Pulse: 80   Resp: 16       Review of Systems   Constitutional:  Negative for activity change, fatigue and fever.   HENT: Negative.     Eyes: Negative.    Respiratory:  Negative for chest tightness.    Cardiovascular:  Positive for leg swelling.   Gastrointestinal: Negative.    Genitourinary: Negative.    Musculoskeletal:  Positive for arthralgias, back pain and gait problem.   Skin: Negative.    Hematological: Negative.    Psychiatric/Behavioral: Negative.  Negative for agitation.    All other systems reviewed and are negative.      Physical Exam:  Physical Exam  Vitals reviewed.   Constitutional:       General: She is not in acute distress.     Appearance: She is well-developed.   HENT:      Head: Normocephalic and atraumatic.       Nose: Nose normal.      Mouth/Throat:      Mouth: Mucous membranes are dry.   Eyes:      Pupils: Pupils are equal, round, and reactive to light.   Cardiovascular:      Rate and Rhythm: Normal rate and regular rhythm.      Heart sounds: Normal heart sounds.   Pulmonary:      Effort: Pulmonary effort is normal.      Breath sounds: Normal breath sounds.   Abdominal:      General: Bowel sounds are normal.      Palpations: Abdomen is soft.   Musculoskeletal:         General: Normal range of motion.      Cervical back: Normal range of motion and neck supple.      Right lower leg: Edema present.      Left lower leg: Edema present.      Comments: Bilat Lymphedema   Skin:     General: Skin is warm and dry.   Neurological:      Mental Status: She is alert and oriented to person, place, and time.      Cranial Nerves: No cranial nerve deficit.   Psychiatric:         Behavior: Behavior normal.         Thought Content: Thought content normal.         Judgment: Judgment normal.         INSTRUCTIONS FOR PATIENT:     Scheduled Follow-up, Appts Reviewed with Modifications if Needed: Yes  Future Appointments   Date Time Provider Department Center   4/15/2025  3:00 PM Donny Esquivel MD [facility]         Signature: Zayda Washington NP    Transition of Care Visit:  I have reviewed and updated the history and problem list.  I have reconciled the medication list.  I have discussed the hospitalization and current medical issues, prognosis and plans with the patient/family.

## 2025-04-14 ENCOUNTER — EXTERNAL HOME HEALTH (OUTPATIENT)
Dept: HOME HEALTH SERVICES | Facility: HOSPITAL | Age: 75
End: 2025-04-14
Payer: COMMERCIAL

## 2025-04-15 ENCOUNTER — OFFICE VISIT (OUTPATIENT)
Dept: PHYSICAL MEDICINE AND REHAB | Facility: CLINIC | Age: 75
End: 2025-04-15
Payer: COMMERCIAL

## 2025-04-15 VITALS — BODY MASS INDEX: 44.59 KG/M2 | HEIGHT: 61 IN

## 2025-04-15 DIAGNOSIS — M47.22 OSTEOARTHRITIS OF SPINE WITH RADICULOPATHY, CERVICAL REGION: ICD-10-CM

## 2025-04-15 DIAGNOSIS — M54.2 CHRONIC NECK PAIN: ICD-10-CM

## 2025-04-15 DIAGNOSIS — R26.9 GAIT DISORDER: ICD-10-CM

## 2025-04-15 DIAGNOSIS — M48.061 SPINAL STENOSIS OF LUMBAR REGION, UNSPECIFIED WHETHER NEUROGENIC CLAUDICATION PRESENT: ICD-10-CM

## 2025-04-15 DIAGNOSIS — M47.26 OSTEOARTHRITIS OF SPINE WITH RADICULOPATHY, LUMBAR REGION: ICD-10-CM

## 2025-04-15 DIAGNOSIS — G89.29 CHRONIC MIDLINE LOW BACK PAIN WITH BILATERAL SCIATICA: Primary | ICD-10-CM

## 2025-04-15 DIAGNOSIS — M54.41 CHRONIC MIDLINE LOW BACK PAIN WITH BILATERAL SCIATICA: Primary | ICD-10-CM

## 2025-04-15 DIAGNOSIS — E66.01 MORBID OBESITY, UNSPECIFIED OBESITY TYPE: ICD-10-CM

## 2025-04-15 DIAGNOSIS — M17.0 PRIMARY OSTEOARTHRITIS OF BOTH KNEES: ICD-10-CM

## 2025-04-15 DIAGNOSIS — G89.29 CHRONIC NECK PAIN: ICD-10-CM

## 2025-04-15 DIAGNOSIS — M54.42 CHRONIC MIDLINE LOW BACK PAIN WITH BILATERAL SCIATICA: Primary | ICD-10-CM

## 2025-04-15 DIAGNOSIS — M54.12 CERVICAL RADICULOPATHY: ICD-10-CM

## 2025-04-15 DIAGNOSIS — Z79.891 CHRONICALLY ON OPIATE THERAPY: ICD-10-CM

## 2025-04-15 PROCEDURE — 99214 OFFICE O/P EST MOD 30 MIN: CPT | Mod: S$GLB,,, | Performed by: PHYSICAL MEDICINE & REHABILITATION

## 2025-04-15 PROCEDURE — 1125F AMNT PAIN NOTED PAIN PRSNT: CPT | Mod: CPTII,S$GLB,, | Performed by: PHYSICAL MEDICINE & REHABILITATION

## 2025-04-15 PROCEDURE — 3008F BODY MASS INDEX DOCD: CPT | Mod: CPTII,S$GLB,, | Performed by: PHYSICAL MEDICINE & REHABILITATION

## 2025-04-15 PROCEDURE — 1159F MED LIST DOCD IN RCRD: CPT | Mod: CPTII,S$GLB,, | Performed by: PHYSICAL MEDICINE & REHABILITATION

## 2025-04-15 PROCEDURE — 3288F FALL RISK ASSESSMENT DOCD: CPT | Mod: CPTII,S$GLB,, | Performed by: PHYSICAL MEDICINE & REHABILITATION

## 2025-04-15 PROCEDURE — 1101F PT FALLS ASSESS-DOCD LE1/YR: CPT | Mod: CPTII,S$GLB,, | Performed by: PHYSICAL MEDICINE & REHABILITATION

## 2025-04-15 PROCEDURE — 99999 PR PBB SHADOW E&M-EST. PATIENT-LVL III: CPT | Mod: PBBFAC,,, | Performed by: PHYSICAL MEDICINE & REHABILITATION

## 2025-04-15 RX ORDER — CYCLOBENZAPRINE HCL 10 MG
10 TABLET ORAL 3 TIMES DAILY PRN
Qty: 90 TABLET | Refills: 2 | Status: SHIPPED | OUTPATIENT
Start: 2025-04-15

## 2025-04-15 RX ORDER — OXYCODONE AND ACETAMINOPHEN 10; 325 MG/1; MG/1
1 TABLET ORAL
Qty: 150 TABLET | Refills: 0 | Status: SHIPPED | OUTPATIENT
Start: 2025-04-24 | End: 2025-05-24

## 2025-04-15 NOTE — PROGRESS NOTES
Subjective:       Patient ID: Kuldeep Villela is a 74 y.o. female.    Chief Complaint: No chief complaint on file.    HPI    Mrs. Villela is a 74-year-old black female with past medical history of osteoarthritis and morbid obesity who is followed up in the [facility] Medicine Clinic for chronic low back pain with lumbar radiculopathy, chronic neck pain with cervical radiculopathy and OA of the knees.  Her last visit to the clinic was on 10/19/2023.  She was maintained on meloxicam, p.r.n. oxycodone and p.r.n. Cyclobenzaprine.  .    The patient was lost to follow-up.  She is coming today to the clinic to reestablish care for her pain.  Her low back pain has been worse.  It is a constant aching pain in the lumbar spine and across her back..  The pain radiates to both feet with numbness.  It is worse with activity and better with rest.  Her maximum pain is 10/10 and minimum 8/10.  Today, it is 8/10.  The patient complains of bilateral lower extremity weakness.  She denies any bowel or bladder incontinence. She has been using a rollator walker at home.      Her neck pain has been stable with occasional flare ups.  It is a constant aching pain in the cervical spine.  She has occasional radiation to both shoulders (L>R) and both hands.  Her maximum pain is 10/10 and minimum 7/10.  Today, it is 7/10.  The patient complains of bilateral upper extremity weakness. She complains of hand numbness.    The patient is followed up by Orthopedic surgery for her knee OA.  Her last visit was on 6/12/2024.  She received 1st out of 3  Euflexxa injections for both knees.  It helped to some extent.  However she was not able to make it for the follow-up appointments due to transportation problems. Her bilateral knee pain has been stable with occasional flare ups.  Her maximum pain is 7-8/10 and minimum 6/10; today it is 6/10.    She is currently taking:  - meloxicam 7.5 mg p.o. twice per day.    - oxycodone/APAP 10/325 p.r.n., usually 5 times  per day.   - cyclobenzaprine 10 mg as needed for muscle spasms, usually twice per day   She previously failed gabapentin, pregabalin, duloxetine and venlafaxine.      Past Medical History:   Diagnosis Date    Arthritis     Asthma     Cervical spondylosis 07/13/2012    Chronic LBP 07/13/2012    Chronic neck pain 07/13/2012    Hyperlipidemia     Hypertension     Lumbar radiculopathy, BLE 07/13/2012    Lumbar spinal stenosis at L4-L5. 07/13/2012    Lumbar spondylosis 07/13/2012    Morbid obesity 07/13/2012    Primary osteoarthritis of both knees 07/13/2012    Spondylolisthesis, grade 1 at L4-L5. 07/13/2012    Tenosynovitis of ankle     Rt peroneus longus    Walker as ambulation aid        Review of patient's allergies indicates:   Allergen Reactions    Penicillins Anxiety and Other (See Comments)    Anesthetic [benzocaine-aloe vera]      Jittery/hyper    Guaifenesin Anxiety and Other (See Comments)   \    Review of Systems   Constitutional:  Positive for chills and fatigue. Negative for fever.   Eyes:  Positive for visual disturbance.   Respiratory:  Positive for shortness of breath.    Cardiovascular:  Negative for chest pain.   Gastrointestinal:  Positive for constipation. Negative for nausea and vomiting.   Genitourinary:  Negative for difficulty urinating.   Musculoskeletal:  Positive for back pain, gait problem, myalgias, neck pain and neck stiffness.   Neurological:  Positive for headaches. Negative for dizziness.   Psychiatric/Behavioral:  Positive for sleep disturbance. Negative for behavioral problems.        Objective:      Physical Exam  Vitals reviewed.   Constitutional:       Appearance: She is well-developed.      Comments: Coming to the clinic in a manual wheelchair   Neck:      Comments: Decreased ROM.  +ve tenderness over cervical spine.  Musculoskeletal:      Comments: BUE:  ROM: decreased at shoulders.  Strength:    RUE: 4-/5 at shoulder abduction, 4 elbow flexion, 4 elbow extension, 4 hand .    LUE: 4-/5 at shoulder abduction, 4 elbow flexion, 4 elbow extension, 4 hand .  Sensation to pinprick:   RUE: intact.   LUE: intact.        BLE:  ROM:full.  Bilateral knee crepitus.   BLE edema.  Strength:    RLE: 4-/5 at hip flexion, 4 knee extension, 4 ankle DF/PF.   LLE: 4-/5 at hip flexion, 4 knee extension, 4 ankle DF/PF.  Sensation to pinprick:     RLE: intact.     LLE: intact.  SLR (sitting):    RLE: +ve.     LLE: +ve.     +ve moderate tenderness lumbar spine.     Neurological:      Mental Status: She is alert.   Psychiatric:         Behavior: Behavior normal.      Comments: Anxious.               Assessment:       1. Chronic midline low back pain with bilateral sciatica    2. Osteoarthritis of spine with radiculopathy, lumbar region    3. Spinal stenosis of lumbar region, unspecified whether neurogenic claudication present    4. Chronic neck pain    5. Osteoarthritis of spine with radiculopathy, cervical region    6. Cervical radiculopathy, BUE    7. Primary osteoarthritis of both knees    8. Morbid obesity, unspecified obesity type    9. Gait disorder    10. Chronically on opiate therapy        Plan:       - Continue meloxicam (MOBIC) 7.5 MG tablet; TAKE 1 TABLET(7.5 MG) BY MOUTH TWICE DAILY.    - Continue cyclobenzaprine (FLEXERIL) 10 MG tablet; Take 1 tablet (10 mg total) by mouth 2 (two) times daily as needed for Muscle spasms.  - Change oxyCODONE-acetaminophen (PERCOCET)  mg per tablet; Take 1 tablet by mouth every 4-6 hours as needed for Pain (maximum 5 tablets per day).  - Follow up with Orthopedics for intra-articular viscosupplement injections.  She may benefit from single injections such as Monovisc.  - A prescription for a rollator walker was given to the patient to help improve gait safety.  - Follow up in about 4 months (around 8/15/2025).      This was a 30 minute visit, more than 50% of which was spent counseling the patient about the diagnosis and the treatment plan.    This note was  generated with [employer] voice recognition software. I apologize for any possible typographical errors.

## 2025-05-05 ENCOUNTER — TELEPHONE (OUTPATIENT)
Dept: PHARMACY | Facility: CLINIC | Age: 75
End: 2025-05-05
Payer: COMMERCIAL

## 2025-05-05 NOTE — TELEPHONE ENCOUNTER
[facility]/Population Health Chart Review & Patient Outreach Details For Medication Adherence Project    Reason for Outreach Encounter: 3rd Party payor non-compliance report ([employer], [employer], [employer], etc)  2.  Patient Outreach Method: Reviewed patient chart   3.   Medication in question:  pravastatin   LAST FILLED: 10/31/24 for 90 day supply  Hyperlipidemia Medications              pravastatin (PRAVACHOL) 20 MG tablet TAKE 1 TABLET(20 MG) BY MOUTH DAILY               4.  Reviewed and or Updates Made To: Patient Chart  5. Outreach Outcomes and/or actions taken: Add patient to call list: no backstitch message portal  Additional Notes:

## 2025-05-06 ENCOUNTER — TELEPHONE (OUTPATIENT)
Dept: FAMILY MEDICINE | Facility: CLINIC | Age: 75
End: 2025-05-06
Payer: COMMERCIAL

## 2025-05-06 NOTE — TELEPHONE ENCOUNTER
----- Message from Juana sent at 5/6/2025  1:15 PM CDT -----  Type:  Needs Medical Advice/Symptom-based CallWho Called: self Symptoms (please be specific): stomach cramps  How long has patient had these symptoms:  2 days Would the patient rather a call back or a response via [facility]? [facility]: .[phone]

## 2025-05-06 NOTE — TELEPHONE ENCOUNTER
Patient states that she has tried gas x with very little relief. Patient declined an office visit. Patient asked that the message be sent to the provider.

## 2025-05-07 NOTE — TELEPHONE ENCOUNTER
----- Message from Helena sent at 5/7/2025  1:09 PM CDT -----  Regarding: self  Who called: selfWhat is the request in detail: pt called asking if meds were sent in yet for gas pains she is having. She is asking to speak with doctor or nurse she spoke with yesterday. Hung up before asking her if she would like to be scheduledCan the clinic reply by [facility]? NoWould the patient rather a call back or a response via [facility]? Call backBest call back number: [phone] Information: Thank you.  ----- Message -----  From: Helena Celeste  Sent: 5/7/2025   1:11 PM CDT  To: Lencho QUIJANO Staff  Subject: self                                             Who called: Rocio is the request in detail: pt called asking if meds were sent in yet for gas pains she is having. She is asking to speak with doctor or nurse she spoke with yesterdayCan the clinic reply by [facility]? NoWould the patient rather a call back or a response via [facility]? Call backBest call back number: [phone] Information: Thank you.

## 2025-05-19 ENCOUNTER — DOCUMENT SCAN (OUTPATIENT)
Dept: HOME HEALTH SERVICES | Facility: HOSPITAL | Age: 75
End: 2025-05-19
Payer: COMMERCIAL

## 2025-05-21 ENCOUNTER — TELEPHONE (OUTPATIENT)
Dept: PHYSICAL MEDICINE AND REHAB | Facility: CLINIC | Age: 75
End: 2025-05-21
Payer: COMMERCIAL

## 2025-05-21 DIAGNOSIS — M54.41 CHRONIC MIDLINE LOW BACK PAIN WITH BILATERAL SCIATICA: Primary | ICD-10-CM

## 2025-05-21 DIAGNOSIS — M47.26 OSTEOARTHRITIS OF SPINE WITH RADICULOPATHY, LUMBAR REGION: ICD-10-CM

## 2025-05-21 DIAGNOSIS — G89.29 CHRONIC MIDLINE LOW BACK PAIN WITH BILATERAL SCIATICA: Primary | ICD-10-CM

## 2025-05-21 DIAGNOSIS — M54.42 CHRONIC MIDLINE LOW BACK PAIN WITH BILATERAL SCIATICA: Primary | ICD-10-CM

## 2025-05-21 DIAGNOSIS — M48.061 SPINAL STENOSIS OF LUMBAR REGION, UNSPECIFIED WHETHER NEUROGENIC CLAUDICATION PRESENT: ICD-10-CM

## 2025-05-21 NOTE — TELEPHONE ENCOUNTER
----- Message from Med Assistant Belle sent at 5/21/2025 10:35 AM CDT -----  Regarding: FW: Daughter called states rogers like to speak with someone regarding Mom's back issues wants to know if she can be referred to a Dr for the matter  Spoke to pt  regarding message . Pt daughter stated they would like a referral to a  back surgeon . Pt daughter informed you do not  do surgeries .  ----- Message -----  From: Raevn Campos  Sent: 5/20/2025   8:16 AM CDT  To: Sierra BARNETT Staff  Subject: Daughter called states rogers like to speak wit#    Name of Who is Calling:Romulo  What is the request in detail:Daughter called states rogers like to speak with someone regarding Mom's back issues wants to know if she can be referred to a Dr for the matter. Please advise   Can the clinic reply by [facility]:No  What Number to Call Back if not in [facility]: [phone]

## 2025-05-21 NOTE — TELEPHONE ENCOUNTER
Pt daughter informed Dr.Sarmini moore  do surgeries . Pt informed message will be sent to  .     ----- Message from Raven sent at 5/20/2025  8:15 AM CDT -----  Regarding: Daughter called  sue like to speak with someone regarding Mom's back issues wants to know if she can be referred to a Dr for the matter  Name of Who is Calling:Romulo  What is the request in detail:Daughter called states sue like to speak with someone regarding Mom's back issues wants to know if she can be referred to a Dr for the matter. Please advise   Can the clinic reply by [facility]:No  What Number to Call Back if not [facility]: [phone]

## 2025-05-21 NOTE — TELEPHONE ENCOUNTER
I called and spoke to the patient's daughter.  She indicated that the patient has been having severe back pain and leg weakness.  The patient is interested in seeing a spine surgeon see if surgery can help more than medications.  I placed a consult to Dr. Martinez from Neurosurgery in [place].

## 2025-05-23 ENCOUNTER — TELEPHONE (OUTPATIENT)
Dept: PHYSICAL MEDICINE AND REHAB | Facility: CLINIC | Age: 75
End: 2025-05-23
Payer: COMMERCIAL

## 2025-05-23 DIAGNOSIS — G89.29 CHRONIC NECK PAIN: ICD-10-CM

## 2025-05-23 DIAGNOSIS — M54.42 CHRONIC MIDLINE LOW BACK PAIN WITH BILATERAL SCIATICA: ICD-10-CM

## 2025-05-23 DIAGNOSIS — G89.29 CHRONIC MIDLINE LOW BACK PAIN WITH BILATERAL SCIATICA: ICD-10-CM

## 2025-05-23 DIAGNOSIS — M54.2 CHRONIC NECK PAIN: ICD-10-CM

## 2025-05-23 DIAGNOSIS — M54.41 CHRONIC MIDLINE LOW BACK PAIN WITH BILATERAL SCIATICA: ICD-10-CM

## 2025-05-23 RX ORDER — NALOXONE HYDROCHLORIDE 4 MG/.1ML
SPRAY NASAL
Qty: 1 EACH | Refills: 11 | Status: SHIPPED | OUTPATIENT
Start: 2025-05-23

## 2025-05-23 RX ORDER — OXYCODONE AND ACETAMINOPHEN 10; 325 MG/1; MG/1
1 TABLET ORAL EVERY 4 HOURS PRN
Qty: 180 TABLET | Refills: 0 | Status: SHIPPED | OUTPATIENT
Start: 2025-05-24 | End: 2025-06-23

## 2025-05-23 NOTE — TELEPHONE ENCOUNTER
I called the patient's daughter.  The patient has been having more back pain.  He is on Percocet 10/325 p.r.n. up to 5 times per day.  She has an appointment with Neurosurgery coming up soon.  I told her I will temporary increase Percocet to 6 tablets per day.

## 2025-05-23 NOTE — TELEPHONE ENCOUNTER
----- Message from Nurse Tong sent at 5/23/2025  1:10 PM CDT -----  Please advise.  ----- Message -----  From: Dyan Mcpherson  Sent: 5/23/2025  11:52 AM CDT  To: Sierra BARNETT Staff     Pt daughter calling in regards to pt needing a stronger pain medication oxyCODONE-acetaminophen (PERCOCET)  mg per tablet  , isn't working for her , pt is bent over when she walks and is in extreme pain [phone]  Romulo daughter

## 2025-06-01 ENCOUNTER — TELEPHONE (OUTPATIENT)
Dept: PHARMACY | Facility: CLINIC | Age: 75
End: 2025-06-01
Payer: COMMERCIAL

## 2025-06-09 ENCOUNTER — HOSPITAL ENCOUNTER (INPATIENT)
Facility: HOSPITAL | Age: 75
LOS: 14 days | Discharge: HOME HEALTH | DRG: 457 | End: 2025-06-24
Attending: EMERGENCY MEDICINE | Admitting: INTERNAL MEDICINE
Payer: COMMERCIAL

## 2025-06-09 ENCOUNTER — EXTERNAL HOME HEALTH (OUTPATIENT)
Dept: HOME HEALTH SERVICES | Facility: HOSPITAL | Age: 75
End: 2025-06-09
Payer: COMMERCIAL

## 2025-06-09 DIAGNOSIS — R07.9 CHEST PAIN: ICD-10-CM

## 2025-06-09 DIAGNOSIS — I11.9 BENIGN HYPERTENSIVE HEART DISEASE WITHOUT HEART FAILURE: ICD-10-CM

## 2025-06-09 DIAGNOSIS — S22.000A COMPRESSION FRACTURE OF BODY OF THORACIC VERTEBRA: ICD-10-CM

## 2025-06-09 DIAGNOSIS — N12 PYELONEPHRITIS: ICD-10-CM

## 2025-06-09 DIAGNOSIS — M54.9 BACK PAIN, UNSPECIFIED BACK LOCATION, UNSPECIFIED BACK PAIN LATERALITY, UNSPECIFIED CHRONICITY: Primary | ICD-10-CM

## 2025-06-09 DIAGNOSIS — E78.5 HYPERLIPIDEMIA, UNSPECIFIED HYPERLIPIDEMIA TYPE: ICD-10-CM

## 2025-06-09 DIAGNOSIS — S22.080A COMPRESSION FRACTURE OF T12 VERTEBRA, INITIAL ENCOUNTER: ICD-10-CM

## 2025-06-09 PROCEDURE — 85025 COMPLETE CBC W/AUTO DIFF WBC: CPT | Performed by: STUDENT IN AN ORGANIZED HEALTH CARE EDUCATION/TRAINING PROGRAM

## 2025-06-09 PROCEDURE — 80053 COMPREHEN METABOLIC PANEL: CPT | Performed by: STUDENT IN AN ORGANIZED HEALTH CARE EDUCATION/TRAINING PROGRAM

## 2025-06-10 PROBLEM — N17.9 AKI (ACUTE KIDNEY INJURY): Status: ACTIVE | Noted: 2025-06-10

## 2025-06-10 PROBLEM — S22.080A COMPRESSION FRACTURE OF T12 VERTEBRA: Status: ACTIVE | Noted: 2025-06-10

## 2025-06-10 PROBLEM — F17.200 TOBACCO DEPENDENCY: Status: ACTIVE | Noted: 2025-06-10

## 2025-06-10 LAB
ABSOLUTE EOSINOPHIL (OHS): 0.23 K/UL
ABSOLUTE EOSINOPHIL (OHS): 0.23 K/UL
ABSOLUTE MONOCYTE (OHS): 0.51 K/UL (ref 0.3–1)
ABSOLUTE MONOCYTE (OHS): 0.57 K/UL (ref 0.3–1)
ABSOLUTE NEUTROPHIL COUNT (OHS): 7.74 K/UL (ref 1.8–7.7)
ABSOLUTE NEUTROPHIL COUNT (OHS): 8.88 K/UL (ref 1.8–7.7)
ALBUMIN SERPL BCP-MCNC: 3.8 G/DL (ref 3.5–5.2)
ALBUMIN SERPL BCP-MCNC: 3.9 G/DL (ref 3.5–5.2)
ALP SERPL-CCNC: 75 UNIT/L (ref 40–150)
ALP SERPL-CCNC: 77 UNIT/L (ref 40–150)
ALT SERPL W/O P-5'-P-CCNC: 7 UNIT/L (ref 10–44)
ALT SERPL W/O P-5'-P-CCNC: 7 UNIT/L (ref 10–44)
ANION GAP (OHS): 9 MMOL/L (ref 8–16)
ANION GAP (OHS): 9 MMOL/L (ref 8–16)
AST SERPL-CCNC: 15 UNIT/L (ref 11–45)
AST SERPL-CCNC: 15 UNIT/L (ref 11–45)
BASOPHILS # BLD AUTO: 0.04 K/UL
BASOPHILS # BLD AUTO: 0.05 K/UL
BASOPHILS NFR BLD AUTO: 0.4 %
BASOPHILS NFR BLD AUTO: 0.5 %
BILIRUB SERPL-MCNC: 0.5 MG/DL (ref 0.1–1)
BILIRUB SERPL-MCNC: 0.6 MG/DL (ref 0.1–1)
BILIRUB UR QL STRIP.AUTO: NEGATIVE
BILIRUB UR QL STRIP.AUTO: NEGATIVE
BNP SERPL-MCNC: 38 PG/ML (ref 0–99)
BUN SERPL-MCNC: 21 MG/DL (ref 8–23)
BUN SERPL-MCNC: 25 MG/DL (ref 8–23)
CALCIUM SERPL-MCNC: 9.2 MG/DL (ref 8.7–10.5)
CALCIUM SERPL-MCNC: 9.3 MG/DL (ref 8.7–10.5)
CHLORIDE SERPL-SCNC: 105 MMOL/L (ref 95–110)
CHLORIDE SERPL-SCNC: 108 MMOL/L (ref 95–110)
CLARITY UR: CLEAR
CLARITY UR: CLEAR
CO2 SERPL-SCNC: 23 MMOL/L (ref 23–29)
CO2 SERPL-SCNC: 24 MMOL/L (ref 23–29)
COLOR UR AUTO: COLORLESS
COLOR UR AUTO: COLORLESS
CREAT SERPL-MCNC: 1.2 MG/DL (ref 0.5–1.4)
CREAT SERPL-MCNC: 1.5 MG/DL (ref 0.5–1.4)
ERYTHROCYTE [DISTWIDTH] IN BLOOD BY AUTOMATED COUNT: 12.9 % (ref 11.5–14.5)
ERYTHROCYTE [DISTWIDTH] IN BLOOD BY AUTOMATED COUNT: 12.9 % (ref 11.5–14.5)
GFR SERPLBLD CREATININE-BSD FMLA CKD-EPI: 37 ML/MIN/1.73/M2
GFR SERPLBLD CREATININE-BSD FMLA CKD-EPI: 48 ML/MIN/1.73/M2
GLUCOSE SERPL-MCNC: 77 MG/DL (ref 70–110)
GLUCOSE SERPL-MCNC: 87 MG/DL (ref 70–110)
GLUCOSE UR QL STRIP: NEGATIVE
GLUCOSE UR QL STRIP: NEGATIVE
HCT VFR BLD AUTO: 32.2 % (ref 37–48.5)
HCT VFR BLD AUTO: 33.2 % (ref 37–48.5)
HGB BLD-MCNC: 10.8 GM/DL (ref 12–16)
HGB BLD-MCNC: 11.1 GM/DL (ref 12–16)
HGB UR QL STRIP: NEGATIVE
HGB UR QL STRIP: NEGATIVE
HOLD SPECIMEN: NORMAL
HOLD SPECIMEN: NORMAL
IMM GRANULOCYTES # BLD AUTO: 0.03 K/UL (ref 0–0.04)
IMM GRANULOCYTES # BLD AUTO: 0.03 K/UL (ref 0–0.04)
IMM GRANULOCYTES NFR BLD AUTO: 0.3 % (ref 0–0.5)
IMM GRANULOCYTES NFR BLD AUTO: 0.3 % (ref 0–0.5)
KETONES UR QL STRIP: ABNORMAL
KETONES UR QL STRIP: NEGATIVE
LEUKOCYTE ESTERASE UR QL STRIP: NEGATIVE
LEUKOCYTE ESTERASE UR QL STRIP: NEGATIVE
LYMPHOCYTES # BLD AUTO: 1.04 K/UL (ref 1–4.8)
LYMPHOCYTES # BLD AUTO: 1.44 K/UL (ref 1–4.8)
MAGNESIUM SERPL-MCNC: 1.6 MG/DL (ref 1.6–2.6)
MCH RBC QN AUTO: 32 PG (ref 27–31)
MCH RBC QN AUTO: 32.3 PG (ref 27–31)
MCHC RBC AUTO-ENTMCNC: 33.4 G/DL (ref 32–36)
MCHC RBC AUTO-ENTMCNC: 33.5 G/DL (ref 32–36)
MCV RBC AUTO: 96 FL (ref 82–98)
MCV RBC AUTO: 96 FL (ref 82–98)
NITRITE UR QL STRIP: NEGATIVE
NITRITE UR QL STRIP: NEGATIVE
NUCLEATED RBC (/100WBC) (OHS): 0 /100 WBC
NUCLEATED RBC (/100WBC) (OHS): 0 /100 WBC
PH UR STRIP: 6 [PH]
PH UR STRIP: 6 [PH]
PHOSPHATE SERPL-MCNC: 3.6 MG/DL (ref 2.7–4.5)
PLATELET # BLD AUTO: 271 K/UL (ref 150–450)
PLATELET # BLD AUTO: 279 K/UL (ref 150–450)
PMV BLD AUTO: 9.4 FL (ref 9.2–12.9)
PMV BLD AUTO: 9.6 FL (ref 9.2–12.9)
POTASSIUM SERPL-SCNC: 3.8 MMOL/L (ref 3.5–5.1)
POTASSIUM SERPL-SCNC: 4.4 MMOL/L (ref 3.5–5.1)
PROT SERPL-MCNC: 6.5 GM/DL (ref 6–8.4)
PROT SERPL-MCNC: 6.8 GM/DL (ref 6–8.4)
PROT UR QL STRIP: NEGATIVE
PROT UR QL STRIP: NEGATIVE
RBC # BLD AUTO: 3.34 M/UL (ref 4–5.4)
RBC # BLD AUTO: 3.47 M/UL (ref 4–5.4)
RELATIVE EOSINOPHIL (OHS): 2.1 %
RELATIVE EOSINOPHIL (OHS): 2.3 %
RELATIVE LYMPHOCYTE (OHS): 14.3 % (ref 18–48)
RELATIVE LYMPHOCYTE (OHS): 9.7 % (ref 18–48)
RELATIVE MONOCYTE (OHS): 4.7 % (ref 4–15)
RELATIVE MONOCYTE (OHS): 5.7 % (ref 4–15)
RELATIVE NEUTROPHIL (OHS): 77 % (ref 38–73)
RELATIVE NEUTROPHIL (OHS): 82.7 % (ref 38–73)
SODIUM SERPL-SCNC: 138 MMOL/L (ref 136–145)
SODIUM SERPL-SCNC: 140 MMOL/L (ref 136–145)
SP GR UR STRIP: 1.01
SP GR UR STRIP: 1.01
TROPONIN I SERPL HS-MCNC: <3 NG/L
UROBILINOGEN UR STRIP-ACNC: NEGATIVE EU/DL
UROBILINOGEN UR STRIP-ACNC: NEGATIVE EU/DL
WBC # BLD AUTO: 10.05 K/UL (ref 3.9–12.7)
WBC # BLD AUTO: 10.74 K/UL (ref 3.9–12.7)

## 2025-06-10 PROCEDURE — 99285 EMERGENCY DEPT VISIT HI MDM: CPT | Mod: 25

## 2025-06-10 PROCEDURE — 82247 BILIRUBIN TOTAL: CPT

## 2025-06-10 PROCEDURE — 25000003 PHARM REV CODE 250: Performed by: STUDENT IN AN ORGANIZED HEALTH CARE EDUCATION/TRAINING PROGRAM

## 2025-06-10 PROCEDURE — 25000003 PHARM REV CODE 250

## 2025-06-10 PROCEDURE — 63600175 PHARM REV CODE 636 W HCPCS

## 2025-06-10 PROCEDURE — 81003 URINALYSIS AUTO W/O SCOPE: CPT | Performed by: STUDENT IN AN ORGANIZED HEALTH CARE EDUCATION/TRAINING PROGRAM

## 2025-06-10 PROCEDURE — 63600175 PHARM REV CODE 636 W HCPCS: Performed by: STUDENT IN AN ORGANIZED HEALTH CARE EDUCATION/TRAINING PROGRAM

## 2025-06-10 PROCEDURE — 84100 ASSAY OF PHOSPHORUS: CPT

## 2025-06-10 PROCEDURE — 83880 ASSAY OF NATRIURETIC PEPTIDE: CPT

## 2025-06-10 PROCEDURE — 99223 1ST HOSP IP/OBS HIGH 75: CPT | Mod: ,,, | Performed by: STUDENT IN AN ORGANIZED HEALTH CARE EDUCATION/TRAINING PROGRAM

## 2025-06-10 PROCEDURE — 96375 TX/PRO/DX INJ NEW DRUG ADDON: CPT

## 2025-06-10 PROCEDURE — 84484 ASSAY OF TROPONIN QUANT: CPT

## 2025-06-10 PROCEDURE — 83735 ASSAY OF MAGNESIUM: CPT

## 2025-06-10 PROCEDURE — 85025 COMPLETE CBC W/AUTO DIFF WBC: CPT

## 2025-06-10 PROCEDURE — 11000001 HC ACUTE MED/SURG PRIVATE ROOM

## 2025-06-10 PROCEDURE — 96374 THER/PROPH/DIAG INJ IV PUSH: CPT

## 2025-06-10 PROCEDURE — 81003 URINALYSIS AUTO W/O SCOPE: CPT

## 2025-06-10 PROCEDURE — 96361 HYDRATE IV INFUSION ADD-ON: CPT

## 2025-06-10 RX ORDER — HYDROMORPHONE HYDROCHLORIDE 1 MG/ML
1 INJECTION, SOLUTION INTRAMUSCULAR; INTRAVENOUS; SUBCUTANEOUS
Refills: 0 | Status: DISCONTINUED | OUTPATIENT
Start: 2025-06-10 | End: 2025-06-10

## 2025-06-10 RX ORDER — SERTRALINE HYDROCHLORIDE 50 MG/1
50 TABLET, FILM COATED ORAL DAILY
Status: DISCONTINUED | OUTPATIENT
Start: 2025-06-10 | End: 2025-06-24 | Stop reason: HOSPADM

## 2025-06-10 RX ORDER — PRAVASTATIN SODIUM 20 MG/1
20 TABLET ORAL DAILY
Status: DISCONTINUED | OUTPATIENT
Start: 2025-06-10 | End: 2025-06-24 | Stop reason: HOSPADM

## 2025-06-10 RX ORDER — MORPHINE SULFATE 4 MG/ML
4 INJECTION, SOLUTION INTRAMUSCULAR; INTRAVENOUS EVERY 4 HOURS PRN
Status: DISCONTINUED | OUTPATIENT
Start: 2025-06-10 | End: 2025-06-10

## 2025-06-10 RX ORDER — ESTERIFIED ESTROGENS AND METHYLTESTOSTERONE .625; 1.25 MG/1; MG/1
1 TABLET, FILM COATED ORAL DAILY
Status: DISCONTINUED | OUTPATIENT
Start: 2025-06-10 | End: 2025-06-12

## 2025-06-10 RX ORDER — ACETAMINOPHEN 500 MG
1000 TABLET ORAL EVERY 8 HOURS PRN
Status: DISCONTINUED | OUTPATIENT
Start: 2025-06-10 | End: 2025-06-10

## 2025-06-10 RX ORDER — DICLOFENAC SODIUM 10 MG/G
2 GEL TOPICAL 3 TIMES DAILY
Status: DISCONTINUED | OUTPATIENT
Start: 2025-06-10 | End: 2025-06-12

## 2025-06-10 RX ORDER — KETOROLAC TROMETHAMINE 30 MG/ML
15 INJECTION, SOLUTION INTRAMUSCULAR; INTRAVENOUS
Status: COMPLETED | OUTPATIENT
Start: 2025-06-10 | End: 2025-06-10

## 2025-06-10 RX ORDER — HYDROMORPHONE HYDROCHLORIDE 1 MG/ML
1 INJECTION, SOLUTION INTRAMUSCULAR; INTRAVENOUS; SUBCUTANEOUS EVERY 6 HOURS PRN
Status: DISCONTINUED | OUTPATIENT
Start: 2025-06-10 | End: 2025-06-10

## 2025-06-10 RX ORDER — SODIUM CHLORIDE 0.9 % (FLUSH) 0.9 %
10 SYRINGE (ML) INJECTION EVERY 12 HOURS PRN
Status: DISCONTINUED | OUTPATIENT
Start: 2025-06-10 | End: 2025-06-24 | Stop reason: HOSPADM

## 2025-06-10 RX ORDER — POLYETHYLENE GLYCOL 3350 17 G/17G
17 POWDER, FOR SOLUTION ORAL DAILY PRN
Status: DISCONTINUED | OUTPATIENT
Start: 2025-06-10 | End: 2025-06-24 | Stop reason: HOSPADM

## 2025-06-10 RX ORDER — CEFDINIR 300 MG/1
300 CAPSULE ORAL 2 TIMES DAILY
Qty: 14 CAPSULE | Refills: 0 | Status: SHIPPED | OUTPATIENT
Start: 2025-06-10 | End: 2025-06-23 | Stop reason: HOSPADM

## 2025-06-10 RX ORDER — AMLODIPINE BESYLATE 10 MG/1
10 TABLET ORAL DAILY
Status: DISCONTINUED | OUTPATIENT
Start: 2025-06-10 | End: 2025-06-24 | Stop reason: HOSPADM

## 2025-06-10 RX ORDER — HEPARIN SODIUM 5000 [USP'U]/ML
7500 INJECTION, SOLUTION INTRAVENOUS; SUBCUTANEOUS EVERY 8 HOURS
Status: DISCONTINUED | OUTPATIENT
Start: 2025-06-10 | End: 2025-06-12

## 2025-06-10 RX ORDER — ONDANSETRON 8 MG/1
8 TABLET, ORALLY DISINTEGRATING ORAL EVERY 8 HOURS PRN
Status: DISCONTINUED | OUTPATIENT
Start: 2025-06-10 | End: 2025-06-24 | Stop reason: HOSPADM

## 2025-06-10 RX ORDER — NALOXONE HCL 0.4 MG/ML
0.02 VIAL (ML) INJECTION
Status: DISCONTINUED | OUTPATIENT
Start: 2025-06-10 | End: 2025-06-24 | Stop reason: HOSPADM

## 2025-06-10 RX ORDER — OXYCODONE HYDROCHLORIDE 10 MG/1
10 TABLET ORAL EVERY 6 HOURS PRN
Refills: 0 | Status: DISCONTINUED | OUTPATIENT
Start: 2025-06-10 | End: 2025-06-14

## 2025-06-10 RX ORDER — IBUPROFEN 200 MG
16 TABLET ORAL
Status: DISCONTINUED | OUTPATIENT
Start: 2025-06-10 | End: 2025-06-24 | Stop reason: HOSPADM

## 2025-06-10 RX ORDER — METHOCARBAMOL 750 MG/1
750 TABLET, FILM COATED ORAL 4 TIMES DAILY
Status: DISCONTINUED | OUTPATIENT
Start: 2025-06-10 | End: 2025-06-24 | Stop reason: HOSPADM

## 2025-06-10 RX ORDER — ACETAMINOPHEN 500 MG
1000 TABLET ORAL 3 TIMES DAILY
Status: DISCONTINUED | OUTPATIENT
Start: 2025-06-10 | End: 2025-06-24 | Stop reason: HOSPADM

## 2025-06-10 RX ORDER — TALC
6 POWDER (GRAM) TOPICAL NIGHTLY PRN
Status: DISCONTINUED | OUTPATIENT
Start: 2025-06-10 | End: 2025-06-24 | Stop reason: HOSPADM

## 2025-06-10 RX ORDER — ALBUTEROL SULFATE 90 UG/1
2 INHALANT RESPIRATORY (INHALATION) EVERY 4 HOURS PRN
Status: DISCONTINUED | OUTPATIENT
Start: 2025-06-10 | End: 2025-06-24 | Stop reason: HOSPADM

## 2025-06-10 RX ORDER — LIDOCAINE 50 MG/G
1 PATCH TOPICAL
Status: DISCONTINUED | OUTPATIENT
Start: 2025-06-10 | End: 2025-06-20

## 2025-06-10 RX ORDER — CARVEDILOL 6.25 MG/1
1 TABLET ORAL EVERY 12 HOURS
Status: ON HOLD | COMMUNITY
Start: 2025-02-11 | End: 2025-07-03 | Stop reason: HOSPADM

## 2025-06-10 RX ORDER — CETIRIZINE HYDROCHLORIDE 10 MG/1
10 TABLET ORAL DAILY
Status: DISCONTINUED | OUTPATIENT
Start: 2025-06-10 | End: 2025-06-24 | Stop reason: HOSPADM

## 2025-06-10 RX ORDER — PROCHLORPERAZINE EDISYLATE 5 MG/ML
5 INJECTION INTRAMUSCULAR; INTRAVENOUS EVERY 6 HOURS PRN
Status: DISCONTINUED | OUTPATIENT
Start: 2025-06-10 | End: 2025-06-24 | Stop reason: HOSPADM

## 2025-06-10 RX ORDER — GLUCAGON 1 MG
1 KIT INJECTION
Status: DISCONTINUED | OUTPATIENT
Start: 2025-06-10 | End: 2025-06-24 | Stop reason: HOSPADM

## 2025-06-10 RX ORDER — ROPINIROLE 1 MG/1
1 TABLET, FILM COATED ORAL 2 TIMES DAILY
Status: DISCONTINUED | OUTPATIENT
Start: 2025-06-10 | End: 2025-06-24 | Stop reason: HOSPADM

## 2025-06-10 RX ORDER — CITALOPRAM 20 MG/1
40 TABLET ORAL DAILY
Status: DISCONTINUED | OUTPATIENT
Start: 2025-06-10 | End: 2025-06-24 | Stop reason: HOSPADM

## 2025-06-10 RX ORDER — HYDROMORPHONE HYDROCHLORIDE 2 MG/ML
1.5 INJECTION, SOLUTION INTRAMUSCULAR; INTRAVENOUS; SUBCUTANEOUS EVERY 6 HOURS PRN
Status: DISCONTINUED | OUTPATIENT
Start: 2025-06-10 | End: 2025-06-11 | Stop reason: SDUPTHER

## 2025-06-10 RX ORDER — KETAMINE HCL IN 0.9 % NACL 50 MG/5 ML
25 SYRINGE (ML) INTRAVENOUS
Status: COMPLETED | OUTPATIENT
Start: 2025-06-10 | End: 2025-06-10

## 2025-06-10 RX ORDER — ACETAMINOPHEN 325 MG/1
650 TABLET ORAL EVERY 4 HOURS PRN
Status: DISCONTINUED | OUTPATIENT
Start: 2025-06-10 | End: 2025-06-10

## 2025-06-10 RX ORDER — CEFTRIAXONE 1 G/1
1 INJECTION, POWDER, FOR SOLUTION INTRAMUSCULAR; INTRAVENOUS
Status: COMPLETED | OUTPATIENT
Start: 2025-06-10 | End: 2025-06-10

## 2025-06-10 RX ORDER — KETAMINE HCL IN 0.9 % NACL 50 MG/5 ML
0.3 SYRINGE (ML) INTRAVENOUS
Status: DISCONTINUED | OUTPATIENT
Start: 2025-06-10 | End: 2025-06-10

## 2025-06-10 RX ORDER — IBUPROFEN 200 MG
24 TABLET ORAL
Status: DISCONTINUED | OUTPATIENT
Start: 2025-06-10 | End: 2025-06-24 | Stop reason: HOSPADM

## 2025-06-10 RX ORDER — KETOROLAC TROMETHAMINE 30 MG/ML
15 INJECTION, SOLUTION INTRAMUSCULAR; INTRAVENOUS EVERY 6 HOURS PRN
Status: DISCONTINUED | OUTPATIENT
Start: 2025-06-10 | End: 2025-06-12

## 2025-06-10 RX ORDER — OXYCODONE AND ACETAMINOPHEN 10; 325 MG/1; MG/1
1 TABLET ORAL
Refills: 0 | Status: COMPLETED | OUTPATIENT
Start: 2025-06-10 | End: 2025-06-10

## 2025-06-10 RX ADMIN — CEFTRIAXONE 1 G: 1 INJECTION, POWDER, FOR SOLUTION INTRAMUSCULAR; INTRAVENOUS at 05:06

## 2025-06-10 RX ADMIN — HYDROMORPHONE HYDROCHLORIDE 1.5 MG: 2 INJECTION INTRAMUSCULAR; INTRAVENOUS; SUBCUTANEOUS at 09:06

## 2025-06-10 RX ADMIN — BUSPIRONE HYDROCHLORIDE 15 MG: 10 TABLET ORAL at 10:06

## 2025-06-10 RX ADMIN — ROPINIROLE HYDROCHLORIDE 1 MG: 1 TABLET, FILM COATED ORAL at 10:06

## 2025-06-10 RX ADMIN — PRAVASTATIN SODIUM 20 MG: 10 TABLET ORAL at 09:06

## 2025-06-10 RX ADMIN — BUSPIRONE HYDROCHLORIDE 15 MG: 10 TABLET ORAL at 09:06

## 2025-06-10 RX ADMIN — DICLOFENAC SODIUM 2 G: 10 GEL TOPICAL at 09:06

## 2025-06-10 RX ADMIN — SODIUM CHLORIDE 1000 ML: 9 INJECTION, SOLUTION INTRAVENOUS at 01:06

## 2025-06-10 RX ADMIN — ACETAMINOPHEN 1000 MG: 500 TABLET ORAL at 08:06

## 2025-06-10 RX ADMIN — LIDOCAINE 1 PATCH: 50 PATCH CUTANEOUS at 08:06

## 2025-06-10 RX ADMIN — CITALOPRAM HYDROBROMIDE 40 MG: 20 TABLET ORAL at 03:06

## 2025-06-10 RX ADMIN — CETIRIZINE HYDROCHLORIDE 10 MG: 10 TABLET, FILM COATED ORAL at 09:06

## 2025-06-10 RX ADMIN — METHOCARBAMOL 750 MG: 750 TABLET ORAL at 09:06

## 2025-06-10 RX ADMIN — SERTRALINE HYDROCHLORIDE 50 MG: 50 TABLET ORAL at 03:06

## 2025-06-10 RX ADMIN — OXYCODONE HYDROCHLORIDE 10 MG: 10 TABLET ORAL at 07:06

## 2025-06-10 RX ADMIN — METHOCARBAMOL 750 MG: 750 TABLET ORAL at 10:06

## 2025-06-10 RX ADMIN — ACETAMINOPHEN 1000 MG: 500 TABLET ORAL at 10:06

## 2025-06-10 RX ADMIN — ROPINIROLE HYDROCHLORIDE 1 MG: 1 TABLET, FILM COATED ORAL at 09:06

## 2025-06-10 RX ADMIN — KETOROLAC TROMETHAMINE 15 MG: 30 INJECTION, SOLUTION INTRAMUSCULAR; INTRAVENOUS at 05:06

## 2025-06-10 RX ADMIN — AMLODIPINE BESYLATE 10 MG: 10 TABLET ORAL at 09:06

## 2025-06-10 RX ADMIN — Medication 25 MG: at 03:06

## 2025-06-10 RX ADMIN — METHOCARBAMOL 750 MG: 750 TABLET ORAL at 03:06

## 2025-06-10 RX ADMIN — BUSPIRONE HYDROCHLORIDE 15 MG: 10 TABLET ORAL at 03:06

## 2025-06-10 RX ADMIN — HEPARIN SODIUM 7500 UNITS: 5000 INJECTION INTRAVENOUS; SUBCUTANEOUS at 03:06

## 2025-06-10 RX ADMIN — OXYCODONE AND ACETAMINOPHEN 1 TABLET: 325; 10 TABLET ORAL at 12:06

## 2025-06-10 RX ADMIN — ACETAMINOPHEN 1000 MG: 500 TABLET ORAL at 03:06

## 2025-06-10 NOTE — ED NOTES
"Pt stating that she feels claustrophobic in her current room assignment. I gave the patient several options to move. Pt states, "you may think I'm crazy, but I would rather be in the hallway than in this room." I confirmed and the patient agreed to move to admit gamez.   "

## 2025-06-10 NOTE — ED NOTES
Diclofenac sodium and Ropinirole requested from pharmacy.  Awaiting other medications to be verified.

## 2025-06-10 NOTE — CONSULTS
[facility] Emergency Dept  Neurosurgery  Consult Note    Inpatient consult to Neurosurgery  Consult performed by: Kelly Billy PA-C  Consult ordered by: Tsering Perez PA-C        Subjective:     Chief Complaint/Reason for Admission: T12 Compression fracture    History of Present Illness: Kuldeep Villela is a 74 y.o.F with PMHx of arthritis, HTN, and HLD who presents to [facility] ED for complaints of acute on chronic low back/L flank pain for the last week. Does also report a fall a week ago. Is able to ambulate with a walker but feels this has become more difficult since the fall. Describes the pain as axial low back pain with bilateral radiculopathy L > R. Denies saddle anesthesia, bladder or bowel dysfunction, weakness or sensory dysfunction. CT abdomen and pelvis demonstrates T12 compression fracture. Neurosurgery consulted for input.     Prescriptions Prior to Admission[1]    Review of patient's allergies indicates:   Allergen Reactions    Penicillins Anxiety and Other (See Comments)    Anesthetic [benzocaine-aloe vera]      Jittery/hyper    Guaifenesin Anxiety and Other (See Comments)       Past Medical History:   Diagnosis Date    Arthritis     Asthma     Cervical spondylosis 07/13/2012    Chronic LBP 07/13/2012    Chronic neck pain 07/13/2012    Debility     Hyperlipidemia     Hypertension     Lumbar radiculopathy, BLE 07/13/2012    Lumbar spinal stenosis at L4-L5. 07/13/2012    Lumbar spondylosis 07/13/2012    Morbid obesity 07/13/2012    Primary osteoarthritis of both knees 07/13/2012    Spondylolisthesis, grade 1 at L4-L5. 07/13/2012    Tenosynovitis of ankle     Rt peroneus longus    Walker as ambulation aid      Past Surgical History:   Procedure Laterality Date    CHOLECYSTECTOMY      HYSTERECTOMY      CT REMOVAL OF OVARY/TUBE(S)       Family History       Problem Relation (Age of Onset)    Heart disease Mother, Father    Hypertension Mother, Father          Tobacco Use    Smoking status: Every Day      Current packs/day: 1.00     Average packs/day: 1 pack/day for 20.0 years (20.0 ttl pk-yrs)     Types: Cigarettes    Smokeless tobacco: Never   Substance and Sexual Activity    Alcohol use: No     Alcohol/week: 0.0 standard drinks of alcohol    Drug use: No    Sexual activity: Not Currently     Partners: Male     Birth control/protection: See Surgical Hx     Objective:     Weight: 107 kg (236 lb)  Body mass index is 44.59 kg/m².  Vital Signs (Most Recent):  Temp: 97.6 °F (36.4 °C) (06/10/25 1500)  Pulse: 91 (06/10/25 1500)  Resp: 18 (06/10/25 1500)  BP: 134/64 (06/10/25 1500)  SpO2: 96 % (06/10/25 1500) Vital Signs (24h Range):  Temp:  [97.6 °F (36.4 °C)-98.7 °F (37.1 °C)] 97.6 °F (36.4 °C)  Pulse:  [] 91  Resp:  [16-20] 18  SpO2:  [90 %-100 %] 96 %  BP: (128-193)/(61-91) 134/64     Date 06/10/25 0700 - 06/11/25 0659   Shift 5397-0588 3552-7096 5284-0734 24 Hour Total   INTAKE   Shift Total(mL/kg)       OUTPUT   Urine(mL/kg/hr) 800(0.9)   800   Shift Total(mL/kg) 800(7.5)   800(7.5)   Weight (kg) 107 107 107 107                       Female External Urinary Catheter w/ Suction 06/09/25 8943 (Active)   Skin no redness;no breakdown;female external urine collection device repositioned 06/10/25 0750   Tolerance no signs/symptoms of discomfort 06/10/25 0750   Suction Continuous suction at 70 mmHg 06/10/25 0750   Date of last wick change 06/10/25 06/10/25 0500   Time of last wick change 0500 06/10/25 0500   Output (mL) 800 mL 06/10/25 0750     Neurosurgery Physical Exam  General: well developed, well nourished, no distress.   Head: normocephalic, atraumatic  Mental Status: Awake, Alert, Oriented  Speech: Clear with content appropriate to conversation  Cranial nerves: face symmetric,  CN II-XII grossly intact.   Sensory: intact to light touch throughout    Motor Strength: Patient deconditioned throughout all extremities    Strength  Deltoids Triceps Biceps Wrist Extension Wrist Flexion Hand    Upper: R 5/5 5/5 5/5  "5/5 5/5 5/5    L 5/5 5/5 5/5 5/5 5/5 5/5     Iliopsoas Quadriceps Knee  Flexion Tibialis  anterior Gastro- cnemius EHL   Lower: R 4/5 4/5 5/5 5/5 5/5 5/5    L 4/5 4/5 5/5 5/5 5/5 5/5     Clonus: absent      Significant Labs:  Recent Labs   Lab 06/09/25  2341 06/10/25  0739   GLU 87 77    140   K 4.4 3.8    108   CO2 24 23   BUN 25* 21   CREATININE 1.5* 1.2   CALCIUM 9.3 9.2   MG  --  1.6     Recent Labs   Lab 06/09/25  2341 06/10/25  0739   WBC 10.05 10.74   HGB 11.1* 10.8*   HCT 33.2* 32.2*    271     No results for input(s): "LABPT", "INR", "APTT" in the last 48 hours.  Microbiology Results (last 7 days)       ** No results found for the last 168 hours. **          All pertinent labs from the last 24 hours have been reviewed.    Significant Diagnostics:  I have reviewed and interpreted all pertinent imaging results/findings within the past 24 hours.    Assessment/Plan:     * Compression fracture of T12 vertebra  Kuldeep Villela is a 74 y.o.F with PMHx of arthritis, HTN, and HLD who [place] [facility] [place] for complaints of acute on chronic low back/L flank pain for the last week. Does also report a fall a week ago.     CT Abdomen and Pelvis 6/10/25: T12 compression fracture    - Admitted to Hospital Medicine  - No acute neurosurgical intervention indicated  - Follow up MRI Thoracic spine   - TLSO brace when out of bed  - Monitor for bladder retention. Please document PVR. Notify NSGY with PVR greater than 300.   - Pain control per primary     Case and plan discussed with attending neurosurgeon Dr. Martinez        Thank you for your consult.     Kelly Billy PA-C  Neurosurgery  [facility]       [1] (Not in a hospital admission)    "

## 2025-06-10 NOTE — ED TRIAGE NOTES
Kuldeep Villela, a 74 y.o. female presents to the ED w/ complaint of abdominal pain and generalized fatigue. Pt endorses dysuria and abd pain for 4 days. Pt states that it has been worsening over time, and now she is so weak that she cannot walk. Pt denies SOB, CP, N/V/D.    Triage note:  Chief Complaint   Patient presents with    Dysuria     X4 days. Pt has been taking Azo at home without relief. +odorous urine. AAOX4. Pt denies abdominal, N/V. Pt endorses some lower back pain/flank pain. Wheelchair bound. Extreme kyphosis.      Review of patient's allergies indicates:   Allergen Reactions    Penicillins Anxiety and Other (See Comments)    Anesthetic [benzocaine-aloe vera]      Jittery/hyper    Guaifenesin Anxiety and Other (See Comments)     Past Medical History:   Diagnosis Date    Arthritis     Asthma     Cervical spondylosis 07/13/2012    Chronic LBP 07/13/2012    Chronic neck pain 07/13/2012    Debility     Hyperlipidemia     Hypertension     Lumbar radiculopathy, BLE 07/13/2012    Lumbar spinal stenosis at L4-L5. 07/13/2012    Lumbar spondylosis 07/13/2012    Morbid obesity 07/13/2012    Primary osteoarthritis of both knees 07/13/2012    Spondylolisthesis, grade 1 at L4-L5. 07/13/2012    Tenosynovitis of ankle     Rt peroneus longus    Walker as ambulation aid

## 2025-06-10 NOTE — SUBJECTIVE & OBJECTIVE
Prescriptions Prior to Admission[1]    Review of patient's allergies indicates:   Allergen Reactions    Penicillins Anxiety and Other (See Comments)    Anesthetic [benzocaine-aloe vera]      Jittery/hyper    Guaifenesin Anxiety and Other (See Comments)       Past Medical History:   Diagnosis Date    Arthritis     Asthma     Cervical spondylosis 07/13/2012    Chronic LBP 07/13/2012    Chronic neck pain 07/13/2012    Debility     Hyperlipidemia     Hypertension     Lumbar radiculopathy, BLE 07/13/2012    Lumbar spinal stenosis at L4-L5. 07/13/2012    Lumbar spondylosis 07/13/2012    Morbid obesity 07/13/2012    Primary osteoarthritis of both knees 07/13/2012    Spondylolisthesis, grade 1 at L4-L5. 07/13/2012    Tenosynovitis of ankle     Rt peroneus longus    Walker as ambulation aid      Past Surgical History:   Procedure Laterality Date    CHOLECYSTECTOMY      HYSTERECTOMY      MI REMOVAL OF OVARY/TUBE(S)       Family History       Problem Relation (Age of Onset)    Heart disease Mother, Father    Hypertension Mother, Father          Tobacco Use    Smoking status: Every Day     Current packs/day: 1.00     Average packs/day: 1 pack/day for 20.0 years (20.0 ttl pk-yrs)     Types: Cigarettes    Smokeless tobacco: Never   Substance and Sexual Activity    Alcohol use: No     Alcohol/week: 0.0 standard drinks of alcohol    Drug use: No    Sexual activity: Not Currently     Partners: Male     Birth control/protection: See Surgical Hx     Objective:     Weight: 107 kg (236 lb)  Body mass index is 44.59 kg/m².  Vital Signs (Most Recent):  Temp: 97.6 °F (36.4 °C) (06/10/25 1500)  Pulse: 91 (06/10/25 1500)  Resp: 18 (06/10/25 1500)  BP: 134/64 (06/10/25 1500)  SpO2: 96 % (06/10/25 1500) Vital Signs (24h Range):  Temp:  [97.6 °F (36.4 °C)-98.7 °F (37.1 °C)] 97.6 °F (36.4 °C)  Pulse:  [] 91  Resp:  [16-20] 18  SpO2:  [90 %-100 %] 96 %  BP: (128-193)/(61-91) 134/64     Date 06/10/25 0700 - 06/11/25 0659   Shift 7704-0344  "4504-2963 0671-7526 24 Hour Total   INTAKE   Shift Total(mL/kg)       OUTPUT   Urine(mL/kg/hr) 800(0.9)   800   Shift Total(mL/kg) 800(7.5)   800(7.5)   Weight (kg) 107 107 107 107                       Female External Urinary Catheter w/ Suction 06/09/25 5333 (Active)   Skin no redness;no breakdown;female external urine collection device repositioned 06/10/25 0750   Tolerance no signs/symptoms of discomfort 06/10/25 0750   Suction Continuous suction at 70 mmHg 06/10/25 0750   Date of last wick change 06/10/25 06/10/25 0500   Time of last wick change 0500 06/10/25 0500   Output (mL) 800 mL 06/10/25 0750     Neurosurgery Physical Exam  General: well developed, well nourished, no distress.   Head: normocephalic, atraumatic  Mental Status: Awake, Alert, Oriented  Speech: Clear with content appropriate to conversation  Cranial nerves: face symmetric,  CN II-XII grossly intact.   Sensory: intact to light touch throughout    Motor Strength: Patient deconditioned throughout all extremities    Strength  Deltoids Triceps Biceps Wrist Extension Wrist Flexion Hand    Upper: R 5/5 5/5 5/5 5/5 5/5 5/5    L 5/5 5/5 5/5 5/5 5/5 5/5     Iliopsoas Quadriceps Knee  Flexion Tibialis  anterior Gastro- cnemius EHL   Lower: R 4/5 4/5 5/5 5/5 5/5 5/5    L 4/5 4/5 5/5 5/5 5/5 5/5     Clonus: absent      Significant Labs:  Recent Labs   Lab 06/09/25  2341 06/10/25  0739   GLU 87 77    140   K 4.4 3.8    108   CO2 24 23   BUN 25* 21   CREATININE 1.5* 1.2   CALCIUM 9.3 9.2   MG  --  1.6     Recent Labs   Lab 06/09/25  2341 06/10/25  0739   WBC 10.05 10.74   HGB 11.1* 10.8*   HCT 33.2* 32.2*    271     No results for input(s): "LABPT", "INR", "APTT" in the last 48 hours.  Microbiology Results (last 7 days)       ** No results found for the last 168 hours. **          All pertinent labs from the last 24 hours have been reviewed.    Significant Diagnostics:  I have reviewed and interpreted all pertinent imaging " results/findings within the past 24 hours.       [1] (Not in a hospital admission)

## 2025-06-10 NOTE — HPI
Kuldeep Villela is a 74 y.o.F with PMHx of arthritis, HTN, and HLD who [place] [facility] [place] for complaints of acute on chronic low back/L flank pain for the last week. Does also report a fall a week ago. Is able to ambulate with a walker but feels this has become more difficult since the fall. Describes the pain as axial low back pain with bilateral radiculopathy L > R. Denies saddle anesthesia, bladder or bowel dysfunction, weakness or sensory dysfunction. CT abdomen and pelvis demonstrates T12 compression fracture. Neurosurgery consulted for input.

## 2025-06-10 NOTE — ED PROVIDER NOTES
Encounter Date: 6/9/2025       History     Chief Complaint   Patient presents with    Dysuria     X4 days. Pt has been taking Azo at home without relief. +odorous urine. AAOX4. Pt denies abdominal, N/V. Pt endorses some lower back pain/flank pain. Wheelchair bound. Extreme kyphosis.      HPI  Kuldeep Villela is a 74-year-old female with past medical history significant for arthritis, hypertension, hyperlipidemia is presenting with left-sided flank pain.    Patient states that she has been having this pain for the past week.  Says that she started taking azo at home because she thought this meant that she was getting a UTI to prevent developing dysuria.  Denies any nausea or vomiting.  Denies any fevers.  Denies any other acute concerns at this time.  Review of patient's allergies indicates:   Allergen Reactions    Penicillins Anxiety and Other (See Comments)    Anesthetic [benzocaine-aloe vera]      Jittery/hyper    Guaifenesin Anxiety and Other (See Comments)     Past Medical History:   Diagnosis Date    Arthritis     Asthma     Cervical spondylosis 07/13/2012    Chronic LBP 07/13/2012    Chronic neck pain 07/13/2012    Debility     Hyperlipidemia     Hypertension     Lumbar radiculopathy, BLE 07/13/2012    Lumbar spinal stenosis at L4-L5. 07/13/2012    Lumbar spondylosis 07/13/2012    Morbid obesity 07/13/2012    Primary osteoarthritis of both knees 07/13/2012    Spondylolisthesis, grade 1 at L4-L5. 07/13/2012    Tenosynovitis of ankle     Rt peroneus longus    Walker as ambulation aid      Past Surgical History:   Procedure Laterality Date    CHOLECYSTECTOMY      HYSTERECTOMY      IL REMOVAL OF OVARY/TUBE(S)       Family History   Problem Relation Name Age of Onset    Heart disease Mother      Hypertension Mother      Heart disease Father      Hypertension Father      Breast cancer Neg Hx      Colon cancer Neg Hx      Ovarian cancer Neg Hx       Social History[1]  Review of Systems    Physical Exam     Initial  Vitals [06/09/25 2239]   BP Pulse Resp Temp SpO2   132/82 86 18 98.7 °F (37.1 °C) 100 %      MAP       --         Physical Exam    Nursing note and vitals reviewed.  Constitutional: She appears well-developed and well-nourished.   HENT:   Head: Normocephalic and atraumatic.   Eyes: Conjunctivae and EOM are normal. Pupils are equal, round, and reactive to light.   Cardiovascular:  Normal rate, regular rhythm, normal heart sounds and intact distal pulses.           No murmur heard.  Pulmonary/Chest: Breath sounds normal. No respiratory distress. She has no wheezes. She has no rhonchi. She has no rales.   Abdominal: Abdomen is soft. Bowel sounds are normal. She exhibits no distension. There is no abdominal tenderness.   Pos CVA tenderness L     Neurological: She is alert.   Skin: Skin is warm. Capillary refill takes less than 2 seconds.   Psychiatric: She has a normal mood and affect.         ED Course   Procedures  Labs Reviewed   COMPREHENSIVE METABOLIC PANEL - Abnormal       Result Value    Sodium 138      Potassium 4.4      Chloride 105      CO2 24      Glucose 87      BUN 25 (*)     Creatinine 1.5 (*)     Calcium 9.3      Protein Total 6.8      Albumin 3.9      Bilirubin Total 0.5      ALP 77      AST 15      ALT 7 (*)     Anion Gap 9      eGFR 37 (*)    URINALYSIS, REFLEX TO URINE CULTURE - Abnormal    Color, UA Colorless (*)     Appearance, UA Clear      pH, UA 6.0      Spec Grav UA 1.010      Protein, UA Negative      Glucose, UA Negative      Ketones, UA Negative      Bilirubin, UA Negative      Blood, UA Negative      Nitrites, UA Negative      Urobilinogen, UA Negative      Leukocyte Esterase, UA Negative     CBC WITH DIFFERENTIAL - Abnormal    WBC 10.05      RBC 3.47 (*)     HGB 11.1 (*)     HCT 33.2 (*)     MCV 96      MCH 32.0 (*)     MCHC 33.4      RDW 12.9      Platelet Count 279      MPV 9.6      Nucleated RBC 0      Neut % 77.0 (*)     Lymph % 14.3 (*)     Mono % 5.7      Eos % 2.3      Basophil % 0.4       Imm Grans % 0.3      Neut # 7.74 (*)     Lymph # 1.44      Mono # 0.57      Eos # 0.23      Baso # 0.04      Imm Grans # 0.03     CBC W/ AUTO DIFFERENTIAL    Narrative:     The following orders were created for panel order CBC auto differential.  Procedure                               Abnormality         Status                     ---------                               -----------         ------                     CBC with Differential[[id number]]       Abnormal            Final result                 Please view results for these tests on the individual orders.   GREY TOP URINE HOLD    Extra Tube Hold for add-ons.            Imaging Results               CT Abdomen Pelvis  Without Contrast (Final result)  Result time 06/10/25 05:16:31      Final result by Wayne Pineda MD (06/10/25 05:16:31)                   Impression:      1. Motion degraded exam.  2. New compression deformity of T12 when compared to CT abdomen pelvis 03/22/2025.  Further evaluation with MRI as clinically warranted.  3. Mild perinephric haziness and stranding, left greater than right, nonspecific, correlation for renal disease to include UTI/pyelonephritis.  4. Small periumbilical abdominal wall hernia involving bowel without inflammatory or obstructive change.  5. Additional findings as above.  This report was flagged in Epic as abnormal.    Electronically signed by resident: Fracisco Costa  Date:    06/10/2025  Time:    04:27    Electronically signed by: Wayne Pineda  Date:    06/10/2025  Time:    05:16               Narrative:    EXAMINATION:  CT ABDOMEN PELVIS WITHOUT CONTRAST    CLINICAL HISTORY:  Flank pain, kidney stone suspected    TECHNIQUE:  Low dose axial images, sagittal and coronal reformations were obtained from the lung bases to the pubic symphysis.  Oral contrast was not administered.    COMPARISON:  CT abdomen pelvis 03/22/2025    FINDINGS:  Portions of the exam are degraded by motion and beam hardening  artifacts.    SOFT TISSUES: Diffuse body wall edema.  Similar bowel-containing para-umbilical hernia.    LUNG BASES/VISUALIZED MEDIASTINUM: Multi-vessel coronary calcific atherosclerosis.  Stable right middle lobe micronodule.  Unchanged bibasilar scattered calcified pleural plaques.    HEPATOBILIARY: Liver is normal size. No focal hepatic lesions. No biliary ductal dilatation.  Cholelithiasis without evidence of cholecystitis..    PANCREAS: No focal masses or ductal dilatation.    SPLEEN: Normal size.    ADRENALS: No adrenal nodules.    KIDNEYS/URETERS: Kidneys are normal size.  Bilateral renovascular calcifications.  No nephrolithiasis or hydronephrosis. No contour-deforming mass lesions. Visualized ureters are unremarkable.  There is mild perinephric haziness and stranding, left greater than right, nonspecific, correlation for renal disease to include UTI/pyelonephritis.    BLADDER/PELVIC ORGANS: No bladder wall thickening.  Hysterectomy.  No adnexal masses.    PERITONEUM / RETROPERITONEUM: No free air or fluid.    LYMPH NODES: No lymphadenopathy.    VESSELS: No aortic aneurysm.  Moderate aortoiliac calcific atherosclerosis    GI TRACT: No evidence of bowel obstruction or inflammation.  There is a small periumbilical abdominal wall hernia involving bowel, without inflammatory or obstructive change.  Appendix is not confidently identified.  No inflammatory change in its expected location.  Gaseous distension of the rectum..    BONES: Transitional lumbosacral anatomy with sacralization of L5.  Moderate height loss the superior endplate of T12, new.  No retropulsion.  Degenerative changes.  No aggressive osseous lesion.                        Preliminary result by Fracisco Costa MD (06/10/25 04:38:51)                   Impression:      1. Motion degraded exam.  2. New compression deformity of T12 when compared to CT abdomen pelvis 03/22/2025.  Further evaluation with MRI as clinically warranted.  3. Additional  findings as above.  This report was flagged in Epic as abnormal.    Electronically signed by resident: Fracisco Costa  Date:    06/10/2025  Time:    04:27                 Narrative:    EXAMINATION:  CT ABDOMEN PELVIS WITHOUT CONTRAST    CLINICAL HISTORY:  Flank pain, kidney stone suspected;    TECHNIQUE:  Low dose axial images, sagittal and coronal reformations were obtained from the lung bases to the pubic symphysis.  Oral contrast was not administered.    COMPARISON:  CT abdomen pelvis 03/22/2025    FINDINGS:  Portions of the exam are degraded by motion and beam hardening artifacts.    SOFT TISSUES: Diffuse body wall edema.  Similar bowel-containing para-umbilical hernia.    LUNG BASES/VISUALIZED MEDIASTINUM: Multi-vessel coronary calcific atherosclerosis.  Stable right middle lobe micronodule.  Unchanged bibasilar scattered calcified pleural plaques.    HEPATOBILIARY: Liver is normal size. No focal hepatic lesions. No biliary ductal dilatation.  Cholelithiasis without evidence of cholecystitis..    PANCREAS: No focal masses or ductal dilatation.    SPLEEN: Normal size.    ADRENALS: No adrenal nodules.    KIDNEYS/URETERS: Kidneys are normal size.  Bilateral renovascular calcifications.  No nephrolithiasis or hydronephrosis. No contour-deforming mass lesions. Visualized ureters are unremarkable.    BLADDER/PELVIC ORGANS: No bladder wall thickening.  Hysterectomy.  No adnexal masses.    PERITONEUM / RETROPERITONEUM: No free air or fluid.    LYMPH NODES: No lymphadenopathy.    VESSELS: No aortic aneurysm.  Moderate aortoiliac calcific atherosclerosis    GI TRACT: No evidence of bowel obstruction or inflammation.  Appendix is not confidently identified.  No inflammatory change in its expected location.  Gaseous distension of the rectum..    BONES: Transitional lumbosacral anatomy with sacralization of L5.  Moderate height loss the superior endplate of T12, new.  No retropulsion.  Degenerative changes.  No aggressive  osseous lesion.                                       Medications   oxyCODONE-acetaminophen  mg per tablet 1 tablet (1 tablet Oral Given 6/10/25 0055)   sodium chloride 0.9% bolus 1,000 mL 1,000 mL (0 mLs Intravenous Stopped 6/10/25 0201)   ketamine in 0.9 % sod chloride 50 mg/5 mL (10 mg/mL) injection 25 mg (25 mg Intravenous Given by Provider 6/10/25 0351)   ketorolac injection 15 mg (15 mg Intravenous Given 6/10/25 0508)   cefTRIAXone injection 1 g (1 g Intravenous Given 6/10/25 3675)     Medical Decision Making  Kuldeep Villela is a 74-year-old female with past medical history significant for arthritis, hypertension, hyperlipidemia is presenting with left-sided flank pain.  On arrival patient is hemodynamically stable.  Exam is only notable for left-sided CVA tenderness.  Differentials at this time include UTI, pyelonephritis, kidney stone, muscle strain.  Plan to obtain CBC, CMP, UA.  CMP notable for a slightly elevated creatinine of 1.5.  We will give IV fluids.  UA appears normal.  We will add CT abdomen and pelvis without contrast.  CT abdomen and pelvis showing some inflammation of the left kidney concerning for pyelonephritis.  We will treat given patient's history with UTIs.  Patient was given Rocephin here.  CT also positive for new T12 compression fracture. Will give TSLO for fracture. Patient able to ambulate due to the pain. Discussed case with  who admitted the patient.    Amount and/or Complexity of Data Reviewed  Labs: ordered. Decision-making details documented in ED Course.  Radiology: ordered. Decision-making details documented in ED Course.    Risk  Prescription drug management.  Decision regarding hospitalization.              Attending Attestation:   Physician Attestation Statement for Resident:  As the supervising MD  .  -: Attending Attestation:   I oversaw the resident's evaluation of the patient and have evaluated the patient myself. The case and management decisions were discussed  with the resident.   Please see the resident note for further details regarding the patient's history, physical exam, and pertinent imaging, lab results or consultations.  I have reviewed and agree with the documented findings, interpretation of studies and results, and plan of care.                       ED Course as of 06/10/25 0616   Tue Terrance 10, 2025   0008 WBC: 10.05 [AC]   0008 Hemoglobin(!): 11.1 [AC]   0023 Sodium: 138 [AC]   0023 Creatinine(!): 1.5  nely [AC]   0410 CT Abdomen Pelvis  Without Contrast  Left-sided hydronephrosis with stone. [AC]   0523 CT Abdomen Pelvis  Without Contrast(!)   New compression deformity of T12 when compared to CT abdomen pelvis 03/22/2025.  Further evaluation with MRI as clinically warranted.  3. Mild perinephric haziness and stranding, left greater than right, nonspecific, correlation for renal disease to include UTI/pyelonephritis.   [AC]      ED Course User Index  [AC] Shantel Harmon MD                           Clinical Impression:  Final diagnoses:  [M54.9] Back pain, unspecified back location, unspecified back pain laterality, unspecified chronicity (Primary)  [S22.000A] Compression fracture of body of thoracic vertebra  [N12] Pyelonephritis          ED Disposition Condition    Discharge Stable          ED Prescriptions       Medication Sig Dispense Start Date End Date Auth. Provider    cefdinir (OMNICEF) 300 MG capsule Take 1 capsule (300 mg total) by mouth 2 (two) times daily. for 7 days 14 capsule 6/10/2025 6/17/2025 Shantel Harmon MD          Follow-up Information       Follow up With Specialties Details Why Contact Info Additional Information    Ginna Musa MD Family Medicine In 1 week  [address] [facility]  [place] [address]  [phone]       [facility] - Emergency Dept Emergency Medicine  As needed [address] [facility] [address] [phone]     [facility] - Neurosurgery [facility] Neurosurgery Schedule an appointment as soon as  possible for a visit   [address]  [facility] [address] [phone] 8th Floor [facility] [place] Please park in [facility]. Check in desk is located in the lobby. Please take the C elevator to 8th floor which opens to the lobby.                      Shantel Harmon MD  Resident  06/10/25 0616         [1]   Social History  Tobacco Use    Smoking status: Every Day     Current packs/day: 1.00     Average packs/day: 1 pack/day for 20.0 years (20.0 ttl pk-yrs)     Types: Cigarettes    Smokeless tobacco: Never   Substance Use Topics    Alcohol use: No     Alcohol/week: 0.0 standard drinks of alcohol    Drug use: No        Manuel Ribeiro MD  06/10/25 3785

## 2025-06-10 NOTE — ED NOTES
Assumed care of the patient. Report received from BRI Shen. Pt on continuous cardiac monitoring, continuous pulse oximetry, and automatic BP cuff cycling Q30min. Pt in hospital gown, side rails up X2, bed low and locked, and call light is placed within reach. No family/visitors at bedside at this time. Pt denies any complaints or needs. States pain is 10/10 and would like PO medication.  Pt states pure wick has leaked and needs linens changed.  Pt updated on POC.  White board updated.     Kuldeep Villela, a 74 y.o. female presents to the ED w/ complaint of lower back pain.

## 2025-06-10 NOTE — HPI
Kuldeep Villela is a 74 y.o.F with PMHx of arthritis, lumbar stenosis, HTN, HLD, depression who presents to [facility] ED for complaints of worsening low back/L flank pain for the last week. Endorses chronic low back pain for which she sees a specialist for, but has worsened recently. Denies any known injury to area. She reports she felt like it may be the beginning of a UTI so she started taking Azo at home for prevention. States she normally ambulates with a walker; however, the pain has been so severe that she has not been able to ambulate at all. Endorses bilateral lower extremity weakness. Denies bladder or bowel incontinence, denies numbness of lower extremities. Denies fever, chills, chest pain, palpitations, SOB, cough, abdominal pain, n/v/d, dysuria, headaches, or any other symptoms at this time.     In ED: afebrile, VSS on RA. CBC without leukocytosis, Hgb 11.1. CMP notable for Cr 1.5 (~1.0), otherwise unremarkable. UA non-infectious appearing. CT abd/pelvis with new compression deformity of T12, perinephric haziness and stranding, L>R, correlate with renal disease, small periumbilical abd wall hernia involving bowel without inflammatory or obstructive change. S/p rocephin, ketamine, toradol 15mg inj, oxy-acetaminophen 10mg, 1L IVF bolus in ED. TLSO brace ordered. Admitted to  for further evaluation.

## 2025-06-10 NOTE — H&P
[facility] - Emergency Dept  [facility] Medicine  History & Physical    Patient Name: Kuldeep Villela  MRN: [id number]  Patient Class: IP- Inpatient  Admission Date: 6/9/2025  Attending Physician: Nicko Reeves MD   Primary Care Provider: Ginna Musa MD         Patient information was obtained from patient and ER records.     Subjective:     Principal Problem:<principal problem not specified>    Chief Complaint:   Chief Complaint   Patient presents with    Dysuria     X4 days. Pt has been taking Azo at home without relief. +odorous urine. AAOX4. Pt denies abdominal, N/V. Pt endorses some lower back pain/flank pain. Wheelchair bound. Extreme kyphosis.         HPI: Kuldeep Villela is a 74 y.o.F with PMHx of arthritis, lumbar stenosis, HTN, HLD, depression who presents to [facility] ED for complaints of worsening low back/L flank pain for the last week. Endorses chronic low back pain for which she sees a specialist for, but has worsened recently. Denies any known injury to area. She reports she felt like it may be the beginning of a UTI so she started taking Azo at home for prevention. States she normally ambulates with a walker; however, the pain has been so severe that she has not been able to ambulate at all. Endorses bilateral lower extremity weakness. Denies bladder or bowel incontinence, denies numbness of lower extremities. Denies fever, chills, chest pain, palpitations, SOB, cough, abdominal pain, n/v/d, dysuria, headaches, or any other symptoms at this time.     In ED: afebrile, VSS on RA. CBC without leukocytosis, Hgb 11.1. CMP notable for Cr 1.5 (~1.0), otherwise unremarkable. UA non-infectious appearing. CT abd/pelvis with new compression deformity of T12, perinephric haziness and stranding, L>R, correlate with renal disease, small periumbilical abd wall hernia involving bowel without inflammatory or obstructive change. S/p rocephin, ketamine, toradol 15mg inj, oxy-acetaminophen 10mg, 1L IVF bolus in ED. TLSO  brace ordered. Admitted to  for further evaluation.     Past Medical History:   Diagnosis Date    Arthritis     Asthma     Cervical spondylosis 07/13/2012    Chronic LBP 07/13/2012    Chronic neck pain 07/13/2012    Debility     Hyperlipidemia     Hypertension     Lumbar radiculopathy, BLE 07/13/2012    Lumbar spinal stenosis at L4-L5. 07/13/2012    Lumbar spondylosis 07/13/2012    Morbid obesity 07/13/2012    Primary osteoarthritis of both knees 07/13/2012    Spondylolisthesis, grade 1 at L4-L5. 07/13/2012    Tenosynovitis of ankle     Rt peroneus longus    Walker as ambulation aid        Past Surgical History:   Procedure Laterality Date    CHOLECYSTECTOMY      HYSTERECTOMY      NV REMOVAL OF OVARY/TUBE(S)         Review of patient's allergies indicates:   Allergen Reactions    Penicillins Anxiety and Other (See Comments)    Anesthetic [benzocaine-aloe vera]      Jittery/hyper    Guaifenesin Anxiety and Other (See Comments)       Current Facility-Administered Medications on File Prior to Encounter   Medication    sodium hyaluronate (EUFLEXXA) 10 mg/mL(mw 2.4 -3.6 million) injection 40 mg     Current Outpatient Medications on File Prior to Encounter   Medication Sig    carvediloL (COREG) 6.25 MG tablet Take 1 tablet by mouth every 12 (twelve) hours.    albuterol (PROVENTIL/VENTOLIN HFA) 90 mcg/actuation inhaler Inhale 1-2 puffs into the lungs every 4 (four) hours as needed for Wheezing. Rescue    amLODIPine (NORVASC) 10 MG tablet Take 1 tablet (10 mg total) by mouth once daily.    azelastine (ASTELIN) 137 mcg (0.1 %) nasal spray 1 spray (137 mcg total) by Nasal route 2 (two) times daily. (Patient not taking: Reported on 4/15/2025)    busPIRone (BUSPAR) 15 MG tablet Take 1 tablet (15 mg total) by mouth 4 (four) times daily.    cetirizine (ZYRTEC) 10 MG tablet TAKE 1 TABLET BY MOUTH EVERY DAY    citalopram (CELEXA) 40 MG tablet TAKE 1 TABLET(40 MG) BY MOUTH EVERY DAY    cyclobenzaprine (FLEXERIL) 10 MG tablet Take  1 tablet (10 mg total) by mouth 3 (three) times daily as needed for Muscle spasms.    diclofenac sodium (VOLTAREN) 1 % Gel Apply 2 g topically 3 (three) times daily as needed (apply to painful joints).    estrogens,conjugated,-methyltestosterone 0.625-1.25mg (ESTRATEST HS) 0.625-1.25 mg per tablet Take 1 tablet by mouth.    fluticasone propionate (FLONASE) 50 mcg/actuation nasal spray 1 spray (50 mcg total) by Each Nostril route once daily. (Patient not taking: Reported on 4/15/2025)    furosemide (LASIX) 20 MG tablet Take 1 tablet (20 mg total) by mouth 2 (two) times a day. for 2 days    linaCLOtide (LINZESS) 290 mcg Cap capsule Take 1 capsule (290 mcg total) by mouth before breakfast. (Patient not taking: Reported on 4/15/2025)    LINZESS 145 mcg Cap capsule TAKE 1 CAPSULE(145 MCG) BY MOUTH EVERY DAY (Patient not taking: Reported on 4/15/2025)    meloxicam (MOBIC) 7.5 MG tablet Take 1 tablet (7.5 mg total) by mouth 2 (two) times a day.    naloxegoL (MOVANTIK) 25 mg tablet Take 25 mg by mouth once daily. (Patient not taking: Reported on 4/15/2025)    naloxone (NARCAN) 4 mg/actuation Spry 4mg by nasal route as needed for opioid overdose; may repeat every 2-3 minutes in alternating nostrils until medical help arrives. Call 911    oxyCODONE-acetaminophen (PERCOCET)  mg per tablet Take 1 tablet by mouth every 4 (four) hours as needed for Pain (maximum 5 tablets per day).    pravastatin (PRAVACHOL) 20 MG tablet TAKE 1 TABLET(20 MG) BY MOUTH DAILY    rOPINIRole (REQUIP) 1 MG tablet Take 1 tablet (1 mg total) by mouth 2 (two) times daily.    sertraline (ZOLOFT) 50 MG tablet Take 1 tablet (50 mg total) by mouth once daily.    topiramate (TOPAMAX) 50 MG tablet TAKE 1 TABLET(50 MG) BY MOUTH EVERY 12 HOURS    walker Misc 4 wheeled walker for mobility     Family History       Problem Relation (Age of Onset)    Heart disease Mother, Father    Hypertension Mother, Father          Tobacco Use    Smoking status: Every Day      Current packs/day: 1.00     Average packs/day: 1 pack/day for 20.0 years (20.0 ttl pk-yrs)     Types: Cigarettes    Smokeless tobacco: Never   Substance and Sexual Activity    Alcohol use: No     Alcohol/week: 0.0 standard drinks of alcohol    Drug use: No    Sexual activity: Not Currently     Partners: Male     Birth control/protection: See Surgical Hx     Review of Systems   Constitutional:  Negative for activity change, chills and fever.   HENT:  Negative for trouble swallowing.    Eyes:  Negative for photophobia and visual disturbance.   Respiratory:  Negative for cough, chest tightness and shortness of breath.    Cardiovascular:  Positive for leg swelling. Negative for chest pain and palpitations.   Gastrointestinal:  Negative for abdominal pain, constipation, diarrhea, nausea and vomiting.   Genitourinary:  Negative for dysuria, frequency and hematuria.   Musculoskeletal:  Positive for arthralgias, back pain and gait problem. Negative for neck pain.   Skin:  Negative for rash and wound.   Neurological:  Negative for dizziness, syncope, speech difficulty and light-headedness.   Psychiatric/Behavioral:  Negative for agitation and confusion. The patient is not nervous/anxious.      Objective:     Vital Signs (Most Recent):  Temp: 97.9 °F (36.6 °C) (06/10/25 0700)  Pulse: 101 (06/10/25 0800)  Resp: 20 (06/10/25 0744)  BP: (!) 192/91 (06/10/25 0800)  SpO2: 97 % (06/10/25 0800) Vital Signs (24h Range):  Temp:  [97.6 °F (36.4 °C)-98.7 °F (37.1 °C)] 97.9 °F (36.6 °C)  Pulse:  [] 101  Resp:  [18-20] 20  SpO2:  [90 %-100 %] 97 %  BP: (128-192)/(61-91) 192/91     Weight: 107 kg (236 lb)  Body mass index is 44.59 kg/m².     Physical Exam  Vitals and nursing note reviewed.   Constitutional:       General: She is not in acute distress.     Appearance: She is well-developed.   HENT:      Head: Normocephalic and atraumatic.      Mouth/Throat:      Pharynx: No oropharyngeal exudate.   Eyes:      Extraocular Movements:  Extraocular movements intact.      Conjunctiva/sclera: Conjunctivae normal.   Cardiovascular:      Rate and Rhythm: Normal rate and regular rhythm.      Heart sounds: Normal heart sounds.   Pulmonary:      Effort: Pulmonary effort is normal. No respiratory distress.      Breath sounds: Normal breath sounds. No wheezing.   Abdominal:      General: Bowel sounds are normal. There is no distension.      Palpations: Abdomen is soft.      Tenderness: There is no abdominal tenderness.   Musculoskeletal:         General: Tenderness (mid-low back) present. Normal range of motion.      Cervical back: Normal range of motion and neck supple.      Right lower leg: Edema present.      Left lower leg: Edema present.   Lymphadenopathy:      Cervical: No cervical adenopathy.   Skin:     General: Skin is warm and dry.      Capillary Refill: Capillary refill takes less than 2 seconds.      Findings: No rash.   Neurological:      Mental Status: She is alert and oriented to person, place, and time.      Cranial Nerves: No cranial nerve deficit.      Sensory: No sensory deficit.      Motor: Weakness (unable to lift bilateral lower ext against gravity) present.      Coordination: Coordination normal.   Psychiatric:         Behavior: Behavior normal.         Thought Content: Thought content normal.         Judgment: Judgment normal.                Significant Labs: All pertinent labs within the past 24 hours have been reviewed.    Bilirubin:   Recent Labs   Lab 06/09/25  2341   BILITOT 0.5     BMP:   Recent Labs   Lab 06/09/25  2341   GLU 87      K 4.4      CO2 24   BUN 25*   CREATININE 1.5*   CALCIUM 9.3     CBC:   Recent Labs   Lab 06/09/25  2341 06/10/25  0739   WBC 10.05 10.74   HGB 11.1* 10.8*   HCT 33.2* 32.2*    271     CMP:   Recent Labs   Lab 06/09/25  2341      K 4.4      CO2 24   GLU 87   BUN 25*   CREATININE 1.5*   CALCIUM 9.3   PROT 6.8   ALBUMIN 3.9   BILITOT 0.5   ALKPHOS 77   AST 15   ALT 7*    ANIONGAP 9     TSH:   Recent Labs   Lab 03/22/25  1050   TSH 0.448     Urine Studies:   Recent Labs   Lab 06/10/25  0739   COLORU Colorless*   APPEARANCEUA Clear   PHUR 6.0   SPECGRAV 1.010   PROTEINUA Negative   GLUCUA Negative   BILIRUBINUA Negative   OCCULTUA Negative   NITRITE Negative   UROBILINOGEN Negative   LEUKOCYTESUR Negative       Significant Imaging: I have reviewed all pertinent imaging results/findings within the past 24 hours.  Imaging Results               CT Abdomen Pelvis  Without Contrast (Final result)  Result time 06/10/25 05:16:31      Final result by Wayne Pineda MD (06/10/25 05:16:31)                   Impression:      1. Motion degraded exam.  2. New compression deformity of T12 when compared to CT abdomen pelvis 03/22/2025.  Further evaluation with MRI as clinically warranted.  3. Mild perinephric haziness and stranding, left greater than right, nonspecific, correlation for renal disease to include UTI/pyelonephritis.  4. Small periumbilical abdominal wall hernia involving bowel without inflammatory or obstructive change.  5. Additional findings as above.  This report was flagged in Epic as abnormal.    Electronically signed by resident: Fracisco Costa  Date:    06/10/2025  Time:    04:27    Electronically signed by: Wayne Pineda  Date:    06/10/2025  Time:    05:16               Narrative:    EXAMINATION:  CT ABDOMEN PELVIS WITHOUT CONTRAST    CLINICAL HISTORY:  Flank pain, kidney stone suspected    TECHNIQUE:  Low dose axial images, sagittal and coronal reformations were obtained from the lung bases to the pubic symphysis.  Oral contrast was not administered.    COMPARISON:  CT abdomen pelvis 03/22/2025    FINDINGS:  Portions of the exam are degraded by motion and beam hardening artifacts.    SOFT TISSUES: Diffuse body wall edema.  Similar bowel-containing para-umbilical hernia.    LUNG BASES/VISUALIZED MEDIASTINUM: Multi-vessel coronary calcific atherosclerosis.  Stable right  middle lobe micronodule.  Unchanged bibasilar scattered calcified pleural plaques.    HEPATOBILIARY: Liver is normal size. No focal hepatic lesions. No biliary ductal dilatation.  Cholelithiasis without evidence of cholecystitis..    PANCREAS: No focal masses or ductal dilatation.    SPLEEN: Normal size.    ADRENALS: No adrenal nodules.    KIDNEYS/URETERS: Kidneys are normal size.  Bilateral renovascular calcifications.  No nephrolithiasis or hydronephrosis. No contour-deforming mass lesions. Visualized ureters are unremarkable.  There is mild perinephric haziness and stranding, left greater than right, nonspecific, correlation for renal disease to include UTI/pyelonephritis.    BLADDER/PELVIC ORGANS: No bladder wall thickening.  Hysterectomy.  No adnexal masses.    PERITONEUM / RETROPERITONEUM: No free air or fluid.    LYMPH NODES: No lymphadenopathy.    VESSELS: No aortic aneurysm.  Moderate aortoiliac calcific atherosclerosis    GI TRACT: No evidence of bowel obstruction or inflammation.  There is a small periumbilical abdominal wall hernia involving bowel, without inflammatory or obstructive change.  Appendix is not confidently identified.  No inflammatory change in its expected location.  Gaseous distension of the rectum..    BONES: Transitional lumbosacral anatomy with sacralization of L5.  Moderate height loss the superior endplate of T12, new.  No retropulsion.  Degenerative changes.  No aggressive osseous lesion.                        Preliminary result by Fracisco Costa MD (06/10/25 04:38:51)                   Impression:      1. Motion degraded exam.  2. New compression deformity of T12 when compared to CT abdomen pelvis 03/22/2025.  Further evaluation with MRI as clinically warranted.  3. Additional findings as above.  This report was flagged in Epic as abnormal.    Electronically signed by resident: Fracisco Costa  Date:    06/10/2025  Time:    04:27                 Narrative:    EXAMINATION:  CT  ABDOMEN PELVIS WITHOUT CONTRAST    CLINICAL HISTORY:  Flank pain, kidney stone suspected;    TECHNIQUE:  Low dose axial images, sagittal and coronal reformations were obtained from the lung bases to the pubic symphysis.  Oral contrast was not administered.    COMPARISON:  CT abdomen pelvis 03/22/2025    FINDINGS:  Portions of the exam are degraded by motion and beam hardening artifacts.    SOFT TISSUES: Diffuse body wall edema.  Similar bowel-containing para-umbilical hernia.    LUNG BASES/VISUALIZED MEDIASTINUM: Multi-vessel coronary calcific atherosclerosis.  Stable right middle lobe micronodule.  Unchanged bibasilar scattered calcified pleural plaques.    HEPATOBILIARY: Liver is normal size. No focal hepatic lesions. No biliary ductal dilatation.  Cholelithiasis without evidence of cholecystitis..    PANCREAS: No focal masses or ductal dilatation.    SPLEEN: Normal size.    ADRENALS: No adrenal nodules.    KIDNEYS/URETERS: Kidneys are normal size.  Bilateral renovascular calcifications.  No nephrolithiasis or hydronephrosis. No contour-deforming mass lesions. Visualized ureters are unremarkable.    BLADDER/PELVIC ORGANS: No bladder wall thickening.  Hysterectomy.  No adnexal masses.    PERITONEUM / RETROPERITONEUM: No free air or fluid.    LYMPH NODES: No lymphadenopathy.    VESSELS: No aortic aneurysm.  Moderate aortoiliac calcific atherosclerosis    GI TRACT: No evidence of bowel obstruction or inflammation.  Appendix is not confidently identified.  No inflammatory change in its expected location.  Gaseous distension of the rectum..    BONES: Transitional lumbosacral anatomy with sacralization of L5.  Moderate height loss the superior endplate of T12, new.  No retropulsion.  Degenerative changes.  No aggressive osseous lesion.                                      Assessment/Plan:     Assessment & Plan  Compression fracture of T12 vertebra  Acute on chronic back pain  74 y.o.F with new compression deformity of  T12 and new inability to ambulate 2/2 pain   - AFVSS, labs reviewed   - CT a/p with: New compression deformity of T12 when compared to CT abdomen pelvis 03/22/2025  - MRI T/L spine pending    - patient very adamant she will not be able to tolerate MRI   without sedation, will call MRI scheduling to coordinate  - TLSO brace ordered   - NSGY consulted, appreciate recommendations  - MM pain regimen initiated  - PT/OT ordered   - fall precautions     Hypertension  Patient's blood pressure range in the last 24 hours was: BP  Min: 128/61  Max: 172/82.The patient's inpatient anti-hypertensive regimen is listed below:  Current Antihypertensives  , Every 12 hours, Oral  amLODIPine tablet 10 mg, Daily, Oral    Plan  - BP is controlled, no changes needed to their regimen    Hyperlipemia  - continue home statin    Debility  Patient with Acute on chronic debility due to fracture/prosthesis. The patient's latest AMPAC (Activity Measure for Post Acute Care) Score is listed below.    AM-PAC Score - How much help does the patient need for each activity listed       Plan  - PT/OT consulted  - Fall precautions in place      Depression with anxiety  Patient has recurrent depression which is mild and is currently controlled. Will Continue anti-depressant medications. We will not consult psychiatry at this time. Patient does not display psychosis at this time. Continue to monitor closely and adjust plan of care as needed.  - continue home medications    MALACHI (acute kidney injury)  MALACHI is likely due to pre-renal azotemia due to dehydration. Baseline creatinine is ~1.0. Most recent creatinine and eGFR are listed below.  Recent Labs     06/09/25  2341   CREATININE 1.5*   EGFRNORACEVR 37*      Plan  - MALACHI is stable  - Avoid nephrotoxins and renally dose meds for GFR listed above  - Monitor urine output, serial BMP, and adjust therapy as needed  - s/p 1L IVF bolus in ED, will obtain morning labs and consider additional fluids pending      VTE  Risk Mitigation (From admission, onward)           Ordered     heparin (porcine) injection 7,500 Units  Every 8 hours         06/10/25 0731     IP VTE HIGH RISK PATIENT  Once         06/10/25 0731     Place sequential compression device  Until discontinued         06/10/25 0731                                    Tsering Perez PA-C  Department of Hospital Medicine  [facility] - Emergency Dept

## 2025-06-10 NOTE — SUBJECTIVE & OBJECTIVE
Past Medical History:   Diagnosis Date    Arthritis     Asthma     Cervical spondylosis 07/13/2012    Chronic LBP 07/13/2012    Chronic neck pain 07/13/2012    Debility     Hyperlipidemia     Hypertension     Lumbar radiculopathy, BLE 07/13/2012    Lumbar spinal stenosis at L4-L5. 07/13/2012    Lumbar spondylosis 07/13/2012    Morbid obesity 07/13/2012    Primary osteoarthritis of both knees 07/13/2012    Spondylolisthesis, grade 1 at L4-L5. 07/13/2012    Tenosynovitis of ankle     Rt peroneus longus    Walker as ambulation aid        Past Surgical History:   Procedure Laterality Date    CHOLECYSTECTOMY      HYSTERECTOMY      RI REMOVAL OF OVARY/TUBE(S)         Review of patient's allergies indicates:   Allergen Reactions    Penicillins Anxiety and Other (See Comments)    Anesthetic [benzocaine-aloe vera]      Jittery/hyper    Guaifenesin Anxiety and Other (See Comments)       Current Facility-Administered Medications on File Prior to Encounter   Medication    sodium hyaluronate (EUFLEXXA) 10 mg/mL(mw 2.4 -3.6 million) injection 40 mg     Current Outpatient Medications on File Prior to Encounter   Medication Sig    carvediloL (COREG) 6.25 MG tablet Take 1 tablet by mouth every 12 (twelve) hours.    albuterol (PROVENTIL/VENTOLIN HFA) 90 mcg/actuation inhaler Inhale 1-2 puffs into the lungs every 4 (four) hours as needed for Wheezing. Rescue    amLODIPine (NORVASC) 10 MG tablet Take 1 tablet (10 mg total) by mouth once daily.    azelastine (ASTELIN) 137 mcg (0.1 %) nasal spray 1 spray (137 mcg total) by Nasal route 2 (two) times daily. (Patient not taking: Reported on 4/15/2025)    busPIRone (BUSPAR) 15 MG tablet Take 1 tablet (15 mg total) by mouth 4 (four) times daily.    cetirizine (ZYRTEC) 10 MG tablet TAKE 1 TABLET BY MOUTH EVERY DAY    citalopram (CELEXA) 40 MG tablet TAKE 1 TABLET(40 MG) BY MOUTH EVERY DAY    cyclobenzaprine (FLEXERIL) 10 MG tablet Take 1 tablet (10 mg total) by mouth 3 (three) times daily as  needed for Muscle spasms.    diclofenac sodium (VOLTAREN) 1 % Gel Apply 2 g topically 3 (three) times daily as needed (apply to painful joints).    estrogens,conjugated,-methyltestosterone 0.625-1.25mg (ESTRATEST HS) 0.625-1.25 mg per tablet Take 1 tablet by mouth.    fluticasone propionate (FLONASE) 50 mcg/actuation nasal spray 1 spray (50 mcg total) by Each Nostril route once daily. (Patient not taking: Reported on 4/15/2025)    furosemide (LASIX) 20 MG tablet Take 1 tablet (20 mg total) by mouth 2 (two) times a day. for 2 days    linaCLOtide (LINZESS) 290 mcg Cap capsule Take 1 capsule (290 mcg total) by mouth before breakfast. (Patient not taking: Reported on 4/15/2025)    LINZESS 145 mcg Cap capsule TAKE 1 CAPSULE(145 MCG) BY MOUTH EVERY DAY (Patient not taking: Reported on 4/15/2025)    meloxicam (MOBIC) 7.5 MG tablet Take 1 tablet (7.5 mg total) by mouth 2 (two) times a day.    naloxegoL (MOVANTIK) 25 mg tablet Take 25 mg by mouth once daily. (Patient not taking: Reported on 4/15/2025)    naloxone (NARCAN) 4 mg/actuation Spry 4mg by nasal route as needed for opioid overdose; may repeat every 2-3 minutes in alternating nostrils until medical help arrives. Call 911    oxyCODONE-acetaminophen (PERCOCET)  mg per tablet Take 1 tablet by mouth every 4 (four) hours as needed for Pain (maximum 5 tablets per day).    pravastatin (PRAVACHOL) 20 MG tablet TAKE 1 TABLET(20 MG) BY MOUTH DAILY    rOPINIRole (REQUIP) 1 MG tablet Take 1 tablet (1 mg total) by mouth 2 (two) times daily.    sertraline (ZOLOFT) 50 MG tablet Take 1 tablet (50 mg total) by mouth once daily.    topiramate (TOPAMAX) 50 MG tablet TAKE 1 TABLET(50 MG) BY MOUTH EVERY 12 HOURS    walker Misc 4 wheeled walker for mobility     Family History       Problem Relation (Age of Onset)    Heart disease Mother, Father    Hypertension Mother, Father          Tobacco Use    Smoking status: Every Day     Current packs/day: 1.00     Average packs/day: 1  pack/day for 20.0 years (20.0 ttl pk-yrs)     Types: Cigarettes    Smokeless tobacco: Never   Substance and Sexual Activity    Alcohol use: No     Alcohol/week: 0.0 standard drinks of alcohol    Drug use: No    Sexual activity: Not Currently     Partners: Male     Birth control/protection: See Surgical Hx     Review of Systems   Constitutional:  Negative for activity change, chills and fever.   HENT:  Negative for trouble swallowing.    Eyes:  Negative for photophobia and visual disturbance.   Respiratory:  Negative for cough, chest tightness and shortness of breath.    Cardiovascular:  Positive for leg swelling. Negative for chest pain and palpitations.   Gastrointestinal:  Negative for abdominal pain, constipation, diarrhea, nausea and vomiting.   Genitourinary:  Negative for dysuria, frequency and hematuria.   Musculoskeletal:  Positive for arthralgias, back pain and gait problem. Negative for neck pain.   Skin:  Negative for rash and wound.   Neurological:  Negative for dizziness, syncope, speech difficulty and light-headedness.   Psychiatric/Behavioral:  Negative for agitation and confusion. The patient is not nervous/anxious.      Objective:     Vital Signs (Most Recent):  Temp: 97.9 °F (36.6 °C) (06/10/25 0700)  Pulse: 101 (06/10/25 0800)  Resp: 20 (06/10/25 0744)  BP: (!) 192/91 (06/10/25 0800)  SpO2: 97 % (06/10/25 0800) Vital Signs (24h Range):  Temp:  [97.6 °F (36.4 °C)-98.7 °F (37.1 °C)] 97.9 °F (36.6 °C)  Pulse:  [] 101  Resp:  [18-20] 20  SpO2:  [90 %-100 %] 97 %  BP: (128-192)/(61-91) 192/91     Weight: 107 kg (236 lb)  Body mass index is 44.59 kg/m².     Physical Exam  Vitals and nursing note reviewed.   Constitutional:       General: She is not in acute distress.     Appearance: She is well-developed.   HENT:      Head: Normocephalic and atraumatic.      Mouth/Throat:      Pharynx: No oropharyngeal exudate.   Eyes:      Extraocular Movements: Extraocular movements intact.       Conjunctiva/sclera: Conjunctivae normal.   Cardiovascular:      Rate and Rhythm: Normal rate and regular rhythm.      Heart sounds: Normal heart sounds.   Pulmonary:      Effort: Pulmonary effort is normal. No respiratory distress.      Breath sounds: Normal breath sounds. No wheezing.   Abdominal:      General: Bowel sounds are normal. There is no distension.      Palpations: Abdomen is soft.      Tenderness: There is no abdominal tenderness.   Musculoskeletal:         General: Tenderness (mid-low back) present. Normal range of motion.      Cervical back: Normal range of motion and neck supple.      Right lower leg: Edema present.      Left lower leg: Edema present.   Lymphadenopathy:      Cervical: No cervical adenopathy.   Skin:     General: Skin is warm and dry.      Capillary Refill: Capillary refill takes less than 2 seconds.      Findings: No rash.   Neurological:      Mental Status: She is alert and oriented to person, place, and time.      Cranial Nerves: No cranial nerve deficit.      Sensory: No sensory deficit.      Motor: Weakness (unable to lift bilateral lower ext against gravity) present.      Coordination: Coordination normal.   Psychiatric:         Behavior: Behavior normal.         Thought Content: Thought content normal.         Judgment: Judgment normal.                Significant Labs: All pertinent labs within the past 24 hours have been reviewed.    Bilirubin:   Recent Labs   Lab 06/09/25  2341   BILITOT 0.5     BMP:   Recent Labs   Lab 06/09/25  2341   GLU 87      K 4.4      CO2 24   BUN 25*   CREATININE 1.5*   CALCIUM 9.3     CBC:   Recent Labs   Lab 06/09/25  2341 06/10/25  0739   WBC 10.05 10.74   HGB 11.1* 10.8*   HCT 33.2* 32.2*    271     CMP:   Recent Labs   Lab 06/09/25  2341      K 4.4      CO2 24   GLU 87   BUN 25*   CREATININE 1.5*   CALCIUM 9.3   PROT 6.8   ALBUMIN 3.9   BILITOT 0.5   ALKPHOS 77   AST 15   ALT 7*   ANIONGAP 9     TSH:   Recent Labs    Lab 03/22/25  1050   TSH 0.448     Urine Studies:   Recent Labs   Lab 06/10/25  0739   COLORU Colorless*   APPEARANCEUA Clear   PHUR 6.0   SPECGRAV 1.010   PROTEINUA Negative   GLUCUA Negative   BILIRUBINUA Negative   OCCULTUA Negative   NITRITE Negative   UROBILINOGEN Negative   LEUKOCYTESUR Negative       Significant Imaging: I have reviewed all pertinent imaging results/findings within the past 24 hours.  Imaging Results               CT Abdomen Pelvis  Without Contrast (Final result)  Result time 06/10/25 05:16:31      Final result by Wayne Pineda MD (06/10/25 05:16:31)                   Impression:      1. Motion degraded exam.  2. New compression deformity of T12 when compared to CT abdomen pelvis 03/22/2025.  Further evaluation with MRI as clinically warranted.  3. Mild perinephric haziness and stranding, left greater than right, nonspecific, correlation for renal disease to include UTI/pyelonephritis.  4. Small periumbilical abdominal wall hernia involving bowel without inflammatory or obstructive change.  5. Additional findings as above.  This report was flagged in Epic as abnormal.    Electronically signed by resident: Fracisco Costa  Date:    06/10/2025  Time:    04:27    Electronically signed by: Wayne Pineda  Date:    06/10/2025  Time:    05:16               Narrative:    EXAMINATION:  CT ABDOMEN PELVIS WITHOUT CONTRAST    CLINICAL HISTORY:  Flank pain, kidney stone suspected    TECHNIQUE:  Low dose axial images, sagittal and coronal reformations were obtained from the lung bases to the pubic symphysis.  Oral contrast was not administered.    COMPARISON:  CT abdomen pelvis 03/22/2025    FINDINGS:  Portions of the exam are degraded by motion and beam hardening artifacts.    SOFT TISSUES: Diffuse body wall edema.  Similar bowel-containing para-umbilical hernia.    LUNG BASES/VISUALIZED MEDIASTINUM: Multi-vessel coronary calcific atherosclerosis.  Stable right middle lobe micronodule.  Unchanged  bibasilar scattered calcified pleural plaques.    HEPATOBILIARY: Liver is normal size. No focal hepatic lesions. No biliary ductal dilatation.  Cholelithiasis without evidence of cholecystitis..    PANCREAS: No focal masses or ductal dilatation.    SPLEEN: Normal size.    ADRENALS: No adrenal nodules.    KIDNEYS/URETERS: Kidneys are normal size.  Bilateral renovascular calcifications.  No nephrolithiasis or hydronephrosis. No contour-deforming mass lesions. Visualized ureters are unremarkable.  There is mild perinephric haziness and stranding, left greater than right, nonspecific, correlation for renal disease to include UTI/pyelonephritis.    BLADDER/PELVIC ORGANS: No bladder wall thickening.  Hysterectomy.  No adnexal masses.    PERITONEUM / RETROPERITONEUM: No free air or fluid.    LYMPH NODES: No lymphadenopathy.    VESSELS: No aortic aneurysm.  Moderate aortoiliac calcific atherosclerosis    GI TRACT: No evidence of bowel obstruction or inflammation.  There is a small periumbilical abdominal wall hernia involving bowel, without inflammatory or obstructive change.  Appendix is not confidently identified.  No inflammatory change in its expected location.  Gaseous distension of the rectum..    BONES: Transitional lumbosacral anatomy with sacralization of L5.  Moderate height loss the superior endplate of T12, new.  No retropulsion.  Degenerative changes.  No aggressive osseous lesion.                        Preliminary result by Fracisco Costa MD (06/10/25 04:38:51)                   Impression:      1. Motion degraded exam.  2. New compression deformity of T12 when compared to CT abdomen pelvis 03/22/2025.  Further evaluation with MRI as clinically warranted.  3. Additional findings as above.  This report was flagged in Epic as abnormal.    Electronically signed by resident: Fracisco Costa  Date:    06/10/2025  Time:    04:27                 Narrative:    EXAMINATION:  CT ABDOMEN PELVIS WITHOUT  CONTRAST    CLINICAL HISTORY:  Flank pain, kidney stone suspected;    TECHNIQUE:  Low dose axial images, sagittal and coronal reformations were obtained from the lung bases to the pubic symphysis.  Oral contrast was not administered.    COMPARISON:  CT abdomen pelvis 03/22/2025    FINDINGS:  Portions of the exam are degraded by motion and beam hardening artifacts.    SOFT TISSUES: Diffuse body wall edema.  Similar bowel-containing para-umbilical hernia.    LUNG BASES/VISUALIZED MEDIASTINUM: Multi-vessel coronary calcific atherosclerosis.  Stable right middle lobe micronodule.  Unchanged bibasilar scattered calcified pleural plaques.    HEPATOBILIARY: Liver is normal size. No focal hepatic lesions. No biliary ductal dilatation.  Cholelithiasis without evidence of cholecystitis..    PANCREAS: No focal masses or ductal dilatation.    SPLEEN: Normal size.    ADRENALS: No adrenal nodules.    KIDNEYS/URETERS: Kidneys are normal size.  Bilateral renovascular calcifications.  No nephrolithiasis or hydronephrosis. No contour-deforming mass lesions. Visualized ureters are unremarkable.    BLADDER/PELVIC ORGANS: No bladder wall thickening.  Hysterectomy.  No adnexal masses.    PERITONEUM / RETROPERITONEUM: No free air or fluid.    LYMPH NODES: No lymphadenopathy.    VESSELS: No aortic aneurysm.  Moderate aortoiliac calcific atherosclerosis    GI TRACT: No evidence of bowel obstruction or inflammation.  Appendix is not confidently identified.  No inflammatory change in its expected location.  Gaseous distension of the rectum..    BONES: Transitional lumbosacral anatomy with sacralization of L5.  Moderate height loss the superior endplate of T12, new.  No retropulsion.  Degenerative changes.  No aggressive osseous lesion.

## 2025-06-10 NOTE — ASSESSMENT & PLAN NOTE
Patient with Acute on chronic debility due to fracture/prosthesis. The patient's latest AMPAC (Activity Measure for Post Acute Care) Score is listed below.    AM-PAC Score - How much help does the patient need for each activity listed       Plan  - PT/OT consulted  - Fall precautions in place

## 2025-06-10 NOTE — ASSESSMENT & PLAN NOTE
MALACHI is likely due to pre-renal azotemia due to dehydration. Baseline creatinine is ~1.0. Most recent creatinine and eGFR are listed below.  Recent Labs     06/09/25  2341   CREATININE 1.5*   EGFRNORACEVR 37*      Plan  - MALACHI is stable  - Avoid nephrotoxins and renally dose meds for GFR listed above  - Monitor urine output, serial BMP, and adjust therapy as needed  - s/p 1L IVF bolus in ED, will obtain morning labs and consider additional fluids pending

## 2025-06-10 NOTE — ASSESSMENT & PLAN NOTE
Kuldeep Villela is a 74 y.o.F with PMHx of arthritis, HTN, and HLD who presents to [facility] [place] [facility] ED for complaints of acute on chronic low back/L flank pain for the last week. Does also report a fall a week ago.     CT Abdomen and Pelvis 6/10/25: T12 compression fracture    - Admitted to Hospital Medicine  - No acute neurosurgical intervention indicated  - Follow up MRI Thoracic spine   - TLSO brace when out of bed  - Monitor for bladder retention. Please document PVR. Notify NSGY with PVR greater than 300.   - Pain control per primary     Case and plan discussed with attending neurosurgeon Dr. Maritnez

## 2025-06-10 NOTE — ASSESSMENT & PLAN NOTE
74 y.o.F with new compression deformity of T12 and new inability to ambulate 2/2 pain   - AFVSS, labs reviewed   - CT a/p with: New compression deformity of T12 when compared to CT abdomen pelvis 03/22/2025  - MRI T/L spine pending    - patient very adamant she will not be able to tolerate MRI   without sedation, will call MRI scheduling to coordinate  - TLSO brace ordered   - NSGY consulted, appreciate recommendations  - MM pain regimen initiated  - PT/OT ordered   - fall precautions

## 2025-06-10 NOTE — ASSESSMENT & PLAN NOTE
Patient has recurrent depression which is mild and is currently controlled. Will Continue anti-depressant medications. We will not consult psychiatry at this time. Patient does not display psychosis at this time. Continue to monitor closely and adjust plan of care as needed.  - continue home medications     not applicable (Male)

## 2025-06-10 NOTE — ASSESSMENT & PLAN NOTE
Patient's blood pressure range in the last 24 hours was: BP  Min: 128/61  Max: 172/82.The patient's inpatient anti-hypertensive regimen is listed below:  Current Antihypertensives  , Every 12 hours, Oral  amLODIPine tablet 10 mg, Daily, Oral    Plan  - BP is controlled, no changes needed to their regimen

## 2025-06-10 NOTE — ED NOTES
Assumed care of patient. Report received from Michael GREGORY. Pt remains on cardiac monitor, continuous pulse ox, and cycling blood pressures. Bed placed in low locked position, side rails up x2, call bell is within reach. Will continue to monitor.

## 2025-06-10 NOTE — ED NOTES
Pt had episode of urinary incontinence. Pt cleaned with sanitary wipes. New hospital gown and bed sheets applied. Call bell within reach. No new needs expressed at this time. New pure wick applied.  Socks applied to feet.  Fall band in place.  Pt educated on fall risk status.

## 2025-06-10 NOTE — DISCHARGE INSTRUCTIONS
You were seen in the emergency department for your back pain. We found a new compression fracture in your spine. We have placed you in a brace to help support your back to minimize your pain. You should follow up with spine surgery for further management, a referral has been placed on your behalf. The CT scan showed signs of inflammation around your kidney that is concerning for an infection so we are discharging you on antibiotics that you should take twice a day for a week. Follow up with your primary care doctor to make sure your pain is getting better.    Please return to the emergency department if you develop fevers, chills, numbness or weakness in your legs, loss of control in you bowel or bladder or any new concern.

## 2025-06-11 ENCOUNTER — ANESTHESIA EVENT (OUTPATIENT)
Dept: ENDOSCOPY | Facility: HOSPITAL | Age: 75
End: 2025-06-11
Payer: COMMERCIAL

## 2025-06-11 LAB
ABSOLUTE EOSINOPHIL (OHS): 0.34 K/UL
ABSOLUTE MONOCYTE (OHS): 0.56 K/UL (ref 0.3–1)
ABSOLUTE NEUTROPHIL COUNT (OHS): 6.07 K/UL (ref 1.8–7.7)
ALBUMIN SERPL BCP-MCNC: 3.6 G/DL (ref 3.5–5.2)
ALP SERPL-CCNC: 72 UNIT/L (ref 40–150)
ALT SERPL W/O P-5'-P-CCNC: 9 UNIT/L (ref 10–44)
ANION GAP (OHS): 8 MMOL/L (ref 8–16)
AST SERPL-CCNC: 15 UNIT/L (ref 11–45)
BASOPHILS # BLD AUTO: 0.05 K/UL
BASOPHILS NFR BLD AUTO: 0.6 %
BILIRUB SERPL-MCNC: 0.6 MG/DL (ref 0.1–1)
BUN SERPL-MCNC: 19 MG/DL (ref 8–23)
CALCIUM SERPL-MCNC: 9 MG/DL (ref 8.7–10.5)
CHLORIDE SERPL-SCNC: 105 MMOL/L (ref 95–110)
CO2 SERPL-SCNC: 23 MMOL/L (ref 23–29)
CREAT SERPL-MCNC: 1.1 MG/DL (ref 0.5–1.4)
ERYTHROCYTE [DISTWIDTH] IN BLOOD BY AUTOMATED COUNT: 13.1 % (ref 11.5–14.5)
GFR SERPLBLD CREATININE-BSD FMLA CKD-EPI: 53 ML/MIN/1.73/M2
GLUCOSE SERPL-MCNC: 79 MG/DL (ref 70–110)
HCT VFR BLD AUTO: 33.3 % (ref 37–48.5)
HGB BLD-MCNC: 11.1 GM/DL (ref 12–16)
IMM GRANULOCYTES # BLD AUTO: 0.02 K/UL (ref 0–0.04)
IMM GRANULOCYTES NFR BLD AUTO: 0.2 % (ref 0–0.5)
LYMPHOCYTES # BLD AUTO: 1.37 K/UL (ref 1–4.8)
MAGNESIUM SERPL-MCNC: 1.4 MG/DL (ref 1.6–2.6)
MCH RBC QN AUTO: 32.1 PG (ref 27–31)
MCHC RBC AUTO-ENTMCNC: 33.3 G/DL (ref 32–36)
MCV RBC AUTO: 96 FL (ref 82–98)
NUCLEATED RBC (/100WBC) (OHS): 0 /100 WBC
PHOSPHATE SERPL-MCNC: 3.2 MG/DL (ref 2.7–4.5)
PLATELET # BLD AUTO: 263 K/UL (ref 150–450)
PMV BLD AUTO: 9.7 FL (ref 9.2–12.9)
POTASSIUM SERPL-SCNC: 3.8 MMOL/L (ref 3.5–5.1)
PROT SERPL-MCNC: 6.4 GM/DL (ref 6–8.4)
RBC # BLD AUTO: 3.46 M/UL (ref 4–5.4)
RELATIVE EOSINOPHIL (OHS): 4 %
RELATIVE LYMPHOCYTE (OHS): 16.3 % (ref 18–48)
RELATIVE MONOCYTE (OHS): 6.7 % (ref 4–15)
RELATIVE NEUTROPHIL (OHS): 72.2 % (ref 38–73)
SODIUM SERPL-SCNC: 136 MMOL/L (ref 136–145)
WBC # BLD AUTO: 8.41 K/UL (ref 3.9–12.7)

## 2025-06-11 PROCEDURE — 84100 ASSAY OF PHOSPHORUS: CPT

## 2025-06-11 PROCEDURE — 80053 COMPREHEN METABOLIC PANEL: CPT

## 2025-06-11 PROCEDURE — 36415 COLL VENOUS BLD VENIPUNCTURE: CPT

## 2025-06-11 PROCEDURE — 25000003 PHARM REV CODE 250

## 2025-06-11 PROCEDURE — 63600175 PHARM REV CODE 636 W HCPCS

## 2025-06-11 PROCEDURE — 97535 SELF CARE MNGMENT TRAINING: CPT

## 2025-06-11 PROCEDURE — 97165 OT EVAL LOW COMPLEX 30 MIN: CPT

## 2025-06-11 PROCEDURE — 85025 COMPLETE CBC W/AUTO DIFF WBC: CPT

## 2025-06-11 PROCEDURE — 83735 ASSAY OF MAGNESIUM: CPT

## 2025-06-11 PROCEDURE — 11000001 HC ACUTE MED/SURG PRIVATE ROOM

## 2025-06-11 RX ORDER — HYDROMORPHONE HYDROCHLORIDE 1 MG/ML
1.5 INJECTION, SOLUTION INTRAMUSCULAR; INTRAVENOUS; SUBCUTANEOUS EVERY 6 HOURS PRN
Refills: 0 | Status: DISCONTINUED | OUTPATIENT
Start: 2025-06-11 | End: 2025-06-14

## 2025-06-11 RX ORDER — MAGNESIUM SULFATE HEPTAHYDRATE 40 MG/ML
2 INJECTION, SOLUTION INTRAVENOUS
Status: COMPLETED | OUTPATIENT
Start: 2025-06-11 | End: 2025-06-11

## 2025-06-11 RX ADMIN — METHOCARBAMOL 750 MG: 750 TABLET ORAL at 04:06

## 2025-06-11 RX ADMIN — DICLOFENAC SODIUM 2 G: 10 GEL TOPICAL at 01:06

## 2025-06-11 RX ADMIN — METHOCARBAMOL 750 MG: 750 TABLET ORAL at 08:06

## 2025-06-11 RX ADMIN — POLYETHYLENE GLYCOL 3350 17 G: 17 POWDER, FOR SOLUTION ORAL at 02:06

## 2025-06-11 RX ADMIN — ROPINIROLE HYDROCHLORIDE 1 MG: 1 TABLET, FILM COATED ORAL at 09:06

## 2025-06-11 RX ADMIN — PRAVASTATIN SODIUM 20 MG: 10 TABLET ORAL at 08:06

## 2025-06-11 RX ADMIN — MAGNESIUM SULFATE HEPTAHYDRATE 2 G: 40 INJECTION, SOLUTION INTRAVENOUS at 09:06

## 2025-06-11 RX ADMIN — OXYCODONE HYDROCHLORIDE 10 MG: 10 TABLET ORAL at 09:06

## 2025-06-11 RX ADMIN — CITALOPRAM HYDROBROMIDE 40 MG: 20 TABLET ORAL at 08:06

## 2025-06-11 RX ADMIN — ROPINIROLE HYDROCHLORIDE 1 MG: 1 TABLET, FILM COATED ORAL at 08:06

## 2025-06-11 RX ADMIN — BUSPIRONE HYDROCHLORIDE 15 MG: 10 TABLET ORAL at 04:06

## 2025-06-11 RX ADMIN — BUSPIRONE HYDROCHLORIDE 15 MG: 10 TABLET ORAL at 09:06

## 2025-06-11 RX ADMIN — ACETAMINOPHEN 1000 MG: 500 TABLET ORAL at 08:06

## 2025-06-11 RX ADMIN — AMLODIPINE BESYLATE 10 MG: 10 TABLET ORAL at 08:06

## 2025-06-11 RX ADMIN — HEPARIN SODIUM 7500 UNITS: 5000 INJECTION INTRAVENOUS; SUBCUTANEOUS at 09:06

## 2025-06-11 RX ADMIN — DICLOFENAC SODIUM 2 G: 10 GEL TOPICAL at 09:06

## 2025-06-11 RX ADMIN — KETOROLAC TROMETHAMINE 15 MG: 30 INJECTION, SOLUTION INTRAMUSCULAR; INTRAVENOUS at 05:06

## 2025-06-11 RX ADMIN — DICLOFENAC SODIUM 2 G: 10 GEL TOPICAL at 02:06

## 2025-06-11 RX ADMIN — OXYCODONE HYDROCHLORIDE 10 MG: 10 TABLET ORAL at 03:06

## 2025-06-11 RX ADMIN — HYDROMORPHONE HYDROCHLORIDE 1.5 MG: 1 INJECTION, SOLUTION INTRAMUSCULAR; INTRAVENOUS; SUBCUTANEOUS at 07:06

## 2025-06-11 RX ADMIN — METHOCARBAMOL 750 MG: 750 TABLET ORAL at 09:06

## 2025-06-11 RX ADMIN — BUSPIRONE HYDROCHLORIDE 15 MG: 10 TABLET ORAL at 08:06

## 2025-06-11 RX ADMIN — MAGNESIUM SULFATE HEPTAHYDRATE 2 G: 40 INJECTION, SOLUTION INTRAVENOUS at 01:06

## 2025-06-11 RX ADMIN — LIDOCAINE 1 PATCH: 50 PATCH CUTANEOUS at 08:06

## 2025-06-11 RX ADMIN — BUSPIRONE HYDROCHLORIDE 15 MG: 10 TABLET ORAL at 02:06

## 2025-06-11 RX ADMIN — ACETAMINOPHEN 1000 MG: 500 TABLET ORAL at 09:06

## 2025-06-11 RX ADMIN — ACETAMINOPHEN 1000 MG: 500 TABLET ORAL at 02:06

## 2025-06-11 RX ADMIN — METHOCARBAMOL 750 MG: 750 TABLET ORAL at 02:06

## 2025-06-11 RX ADMIN — CETIRIZINE HYDROCHLORIDE 10 MG: 10 TABLET, FILM COATED ORAL at 08:06

## 2025-06-11 RX ADMIN — SERTRALINE HYDROCHLORIDE 50 MG: 50 TABLET ORAL at 08:06

## 2025-06-11 NOTE — ASSESSMENT & PLAN NOTE
Patient with Acute on chronic debility due to fracture/prosthesis. The patient's latest AMPAC (Activity Measure for Post Acute Care) Score is listed below.    AM-PAC Score - How much help does the patient need for each activity listed       Plan  - PT/OT consulted  - Fall precautions in place  - PT/OT recommending moderate intensity therapy at DC

## 2025-06-11 NOTE — HOSPITAL COURSE
Pt admitted for continued management of T12 compression fx. NSGY consulted, no acute intervention warranted at this time. MRI spine with sedation pending and scheduled for 6/12. MRI concerning for compression fracture with instability and severe spinal canal stenosis concerning for cord edema. Recommended to stop mobility with PT/OT pending surgical stabilization of spine. She was taken to the OR 6/14/25 for  T10-L2 posterior fusion. Monitored post op with pain control. Started on IV abx while drains in place. PT/OT restarted with TLSO brace. XR spine showed stabilization. One drain removed 6/17/25 and second drain removed 6/18. Antibiotics stopped once drain removed. H&H monitored due to possible post op drop.     6/20- pain is better today, worked with PT and sitting in bedside chair. SNF acceptance received however needs repeat PT eval and authorization.    6/21- patient continues to complain of back pain. Discussed with neurosurgery, they will reassess patient, clarify instructions on the back brace and assess cecy. Patient was also seen taking additional pain medications from home. She is currently on similar dose  as home in and additional dilaudid 0.5mg q4hrly PRN    Patient accepted to SNF, but now refusing and requesting discharge home instead. Plan for discharge home with HH.

## 2025-06-11 NOTE — PLAN OF CARE
[facility] - [facility] Surg  Initial Discharge Assessment       Primary Care Provider: Ginna Musa MD    Admission Diagnosis: Pyelonephritis [N12]  Chest pain [R07.9]  Compression fracture of body of thoracic vertebra [S22.000A]  Back pain, unspecified back location, unspecified back pain laterality, unspecified chronicity [M54.9]    Admission Date: 6/9/2025  Expected Discharge Date: 6/13/2025    Transition of Care Barriers: None    Payor: BLUE [employer] BLUE SHIELD / [facility] / Product Type: Commercial /     Extended Emergency Contact Information  Primary Emergency Contact: ISBELLYVON  Address: [address] [place] [address]  Mobile Phone: [phone]  Relation: Daughter  Secondary Emergency Contact: Shoulder,Pleasant  Home Phone: [phone]  Relation: Daughter    Discharge Plan A: Skilled Nursing Facility  Discharge Plan B: Home, Home with family, Home Health      [facility] #[id number] - [place] [address] [facility] [address] AT [address] [address]  Phone: [phone] Fax: [phone]    [facility] #[id number] [address] AT [facility] [address] & [address] [address]  Phone: [phone] Fax: [phone]    [facility] #[id number] - [place] - [address] AT [address]  [address]  [address]  [place] [address]  Phone: [phone] Fax: [phone]    [facility] #[id number] - [place] [address] [facility] [address] AT [address] [address]  Phone: [phone] Fax: [phone]    [facility] #[id number] - [place] - [address] AT [facility] [address] & [facility]  [address] [address]  Phone: [phone] Fax: [phone]    [facility] #[id number] - [place] - [address] AT [facility] [address]  [address]  [place] [address]  Phone: [phone] Fax: [phone]      Initial Assessment (most recent)       Adult Discharge Assessment - 06/11/25 1552          Discharge Assessment    Assessment Type Discharge Planning Assessment     Confirmed/corrected address, phone number and insurance Yes     Confirmed Demographics Correct on Facesheet     Source of Information patient     Communicated MATT with patient/caregiver Yes     People in Home grandchild(deshaun);child(deshaun), adult     Name(s) of People in Home Kamari Hinton-[phone]     Do you expect to return to your current living situation? Yes     Do you have help at home or someone to help you manage your care at home? Yes     Who are your caregiver(s) and their phone number(s)? Shaista- [phone]     Prior to hospitilization cognitive status: Alert/Oriented     Current cognitive status: Alert/Oriented     Walking or Climbing Stairs Difficulty yes     Walking or Climbing Stairs ambulation difficulty, requires equipment     Mobility Management Rollator     Dressing/Bathing Difficulty yes     Dressing/Bathing bathing difficulty, assistance 1 person     Dressing/Bathing Management Pt reported she requires some assistance with bathing.     Equipment Currently Used at Home rollator     Readmission within 30 days? No     Patient currently being followed by outpatient case management? No     Do you currently have service(s) that help you manage your care at home? No     Do you take prescription medications? Yes     Do you have prescription coverage? Yes     Do you have any problems affording any of your prescribed medications? No     Is the patient taking medications as prescribed? yes     Who is going to help you get home at discharge? Pt may need transportation home.     How do you get to doctors appointments? family or friend will provide     Are you on dialysis? No     Do you take  coumadin? No     Discharge Plan A Skilled Nursing Facility     Discharge Plan B Home;Home with family;Home Health     DME Needed Upon Discharge  bedside commode;wheelchair     Discharge Plan discussed with: Patient     Transition of Care Barriers None        Physical Activity    On average, how many days per week do you engage in moderate to strenuous exercise (like a brisk walk)? 0 days     On average, how many minutes do you engage in exercise at this level? 0 min        Financial Resource Strain    How hard is it for you to pay for the very basics like food, housing, medical care, and heating? Not hard at all        Housing Stability    In the last 12 months, was there a time when you were not able to pay the mortgage or rent on time? No     At any time in the past 12 months, were you homeless or living in a shelter (including now)? No        Transportation Needs    In the past 12 months, has lack of transportation kept you from medical appointments or from getting medications? No     In the past 12 months, has lack of transportation kept you from meetings, work, or from getting things needed for daily living? No        Food Insecurity    Within the past 12 months, you worried that your food would run out before you got the money to buy more. Never true     Within the past 12 months, the food you bought just didn't last and you didn't have money to get more. Never true        Stress    Do you feel stress - tense, restless, nervous, or anxious, or unable to sleep at night because your mind is troubled all the time - these days? Not at all        Social Isolation    How often do you feel lonely or isolated from those around you?  Never        Alcohol Use    Q1: How often do you have a drink containing alcohol? Never     Q2: How many drinks containing alcohol do you have on a typical day when you are drinking? Patient does not drink     Q3: How often do you have six or more drinks on one occasion? Never         Utilities    In the past 12 months has the electric, gas, oil, or water company threatened to shut off services in your home? No        Health Literacy    How often do you need to have someone help you when you read instructions, pamphlets, or other written material from your doctor or pharmacy? Never                   SW completed initial discharge assessment with pt and spouse.  No hospital readmissions within th last 30 days. Verified pt's PCP.  Pt's      Pt lives with her daughter and grandchildren in a H w/ no steps for entry.  Pt mod (I) with mobility.  Pt requires some assistance with her ADLs.  DME:  Rollator.       Pt does not receive coumadin, dialysis, or HH services.  Pt previously had [facility] until 5/23.  Pt reported she has been unable to ambulate since fall.  Pt interested in SNF placement at this time.      Disp:  Pt not medically ready for d/c.  Pt provided SNF list.  1.SNF  2. Home w/ HH.      Discharge Plan A and Plan B have been determined by review of patient's clinical status, future medical and therapeutic needs, and coverage/benefits for post-acute care in coordination with multidisciplinary team members.    Kadie Jordan LMSW  Part-Time-  [facility]  Ext. [phone]

## 2025-06-11 NOTE — ASSESSMENT & PLAN NOTE
Patient has recurrent depression which is mild and is currently controlled. Will Continue anti-depressant medications. We will not consult psychiatry at this time. Patient does not display psychosis at this time. Continue to monitor closely and adjust plan of care as needed.  - continue home medications

## 2025-06-11 NOTE — CARE UPDATE
Patient seen with Dr. Martinez. Pending MRI with anesthesia. Further recs to follow completed workup. Please call with acute changes in exam.

## 2025-06-11 NOTE — PT/OT/SLP EVAL
"Occupational Therapy   Evaluation    Name: Kuldeep Villela  MRN: [id number]  Admitting Diagnosis: Compression fracture of T12 vertebra  Recent Surgery: * No surgery found *      Recommendations:     Discharge Recommendations: Moderate Intensity Therapy  Discharge Equipment Recommendations:  3-in-1 commode, walker, rolling, wheelchair, hip kit, bath bench  Barriers to discharge:  Other (Comment) (increased A with ADLs and functional mobility tasks)    Assessment:     Kuldeep Villela is a 74 y.o. female with a medical diagnosis of Compression fracture of T12 vertebra.  She presents with spinal precautions and pain, kyphotic posture, BLE and BUE weakness, and limited participation in ADLs and functional mobility tasks. Pt with fair tolerance to initial evaluation on this date. Mod A required for sit to stands, but pt unable to achieve complete upright stand due to pain and kyphosis. Improved posture noted with HHA. Pt would benefit from moderate intensity skilled OT services post acute stay to improve ADL performance and maximize functional capacity. Performance deficits affecting function: weakness, impaired endurance, impaired self care skills, impaired functional mobility, gait instability, impaired balance, decreased coordination, decreased upper extremity function, decreased lower extremity function, decreased safety awareness, pain, decreased ROM, impaired coordination, impaired cardiopulmonary response to activity, orthopedic precautions.      Rehab Prognosis: Fair; patient would benefit from acute skilled OT services to address these deficits and reach maximum level of function.       Plan:     Patient to be seen 4 x/week to address the above listed problems via self-care/home management, therapeutic activities, therapeutic exercises, neuromuscular re-education  Plan of Care Expires: 07/11/25  Plan of Care Reviewed with: patient    Subjective     Chief Complaint: back pain  Patient/Family Comments/goals: "to feel " "better"    Occupational Profile:  Living Environment: lives with daughter and her children in [facility] with [address] [place], t/s combo but pt endorses not using tub due to FOF. Sponge bathes.   Previous level of function: mod I with Rolator   Roles and Routines: retired , likes to watch television   Equipment Used at Home: rollator  Assistance upon Discharge: daughter is teacher aid, home for summer to [place].     Pain/Comfort:  Pain Rating 1: 8/10  Location - Side 1: Bilateral  Location - Orientation 1: generalized  Location 1: back  Pain Addressed 1: Pre-medicate for activity, Reposition, Distraction, Cessation of Activity  Pain Rating Post-Intervention 1: 8/10    Patients cultural, spiritual, Jewish conflicts given the current situation: no    Objective:     Communicated with: NSG prior to session.  Patient found HOB elevated with peripheral IV, PureWick, telemetry upon OT entry to room. Pt in flexed posture due to pain, educated on spinal precautions and to limited anterior flexion.     General Precautions: Standard, fall  Orthopedic Precautions: spinal precautions  Braces: TLSO  Respiratory Status: Room air    Occupational Performance:    Bed Mobility:    Patient completed Scooting/Bridging with moderate assistance  Patient completed Supine to Sit with moderate assistance  Patient completed Sit to Supine with moderate assistance    Functional Mobility/Transfers:  Patient completed Sit <> Stand Transfer with moderate assistance  with  rolling walker x3 trials. Pt unable to achieve a full upright stand due to kyphosis and pain. 1 trial with HHA, improved posture noted, attempted lateral stepping to L but pt unable to clear LLE due to weakness.   Functional Mobility: EOB sitting- SBA    Activities of Daily Living:  Grooming: stand by assistance for oral hygiene and washing face  Upper Body Dressing: maximal assistance to don TSLO  Lower Body Dressing: maximal assistance to adjust socks     Cognitive/Visual " Perceptual:  Cognitive/Psychosocial Skills:     -       Oriented to: Person, Place, Time, and Situation   -       Follows Commands/attention:Follows multistep  commands  -       Communication: clear/fluent  -       Memory: No Deficits noted  -       Safety awareness/insight to disability: impaired   -       Mood/Affect/Coping skills/emotional control: Appropriate to situation  Visual/Perceptual:      -Intact      Physical Exam:  Balance:    -       mod A standing, SBA sitting   Postural examination/scapula alignment:    -       Rounded shoulders  -       Forward head  -       Posterior pelvic tilt  -       Abnormal trunk flexion  -       Kyphosis  Skin integrity: Dry  Edema:  None noted  Sensation:    -       Intact  Motor Planning:    -       WFL  Dominant hand:    -       LUE  Upper Extremity Range of Motion:     -       Right Upper Extremity: WFL  -       Left Upper Extremity: WFL   Strength   -       Right Upper Extremity: proximal weakness, WFL tricep/biceps   -       Left Upper Extremity: proximal weakness, WFL triceps/biceps   -weak  strength bilaterally  Fine Motor Coordination:    -       Intact  Gross motor coordination:   impaired  AMPAC 6 Click ADL:  AMPAC Total Score: 14    Treatment & Education:  Role of OT  -OT Poc  -safety awareness and precautions  -Level of A required for ADLs and functional mobility  -AE used for ADL completion  -importance of OOB activity and energy conservation    -spinal precautions and TSLO wear schedule   -transfer training     Patient left HOB elevated with all lines intact and call button in reach    GOALS:   Multidisciplinary Problems       Occupational Therapy Goals          Problem: Occupational Therapy    Goal Priority Disciplines Outcome Interventions   Occupational Therapy Goal     OT, PT/OT Progressing    Description: Goals to be met by: 7/11/2025     Patient will increase functional independence with ADLs by performing:    UE Dressing with Supervision sitting  EOB to don TSLO   LE Dressing with Set-up Assistance using AE as needed to maintain spinal precautions.  Grooming while bedside chair with Set-up Assistance.  Toileting from bedside commode with Minimal Assistance for hygiene and clothing management.   Supine to sit with Supervision.  Toilet transfer to bedside commode with Contact Guard Assistance using LRAD as needed.   Sit to stand with CGA using LRAD as needed.                          DME Justifications:   Kuldeep requires a commode for home use because she is confined to one level of the home environment and there is no toilet on that level.  Kuldeep Villela has a mobility limitation that significantly impairs her ability to participate in one or more mobility related activities of daily living (MRADLs) such as toileting, feeding, dressing, grooming, and bathing in customary locations in the home.  The mobility limitation cannot be sufficiently resolved by the use of a cane or walker.   The use of a manual wheelchair will significantly improve the patients ability to participate in MRADLS and the patient will use it on regular basis in the home.  Kuldeep Villela has expressed her willingness to use a manual wheelchair in the home. Patients upper body strength is sufficient for propulsion.  She also has a caregiver who is available, willing, and able to provide assistance with the wheelchair when needed.     Kuldeep's mobility limitation cannot be sufficiently resolved by the use of a cane. Her functional mobility deficit can be sufficiently resolved with the use of a Rolling Walker. Patient's mobility limitation significantly impairs their ability to participate in one of more activities of daily living.  The use of a RW will significantly improve the patient's ability to participate in MRADLS and the patient will use it on regular basis in the home.    History:     Past Medical History:   Diagnosis Date    Arthritis     Asthma     Cervical spondylosis  07/13/2012    Chronic LBP 07/13/2012    Chronic neck pain 07/13/2012    Debility     Hyperlipidemia     Hypertension     Lumbar radiculopathy, BLE 07/13/2012    Lumbar spinal stenosis at L4-L5. 07/13/2012    Lumbar spondylosis 07/13/2012    Morbid obesity 07/13/2012    Primary osteoarthritis of both knees 07/13/2012    Spondylolisthesis, grade 1 at L4-L5. 07/13/2012    Tenosynovitis of ankle     Rt peroneus longus    Walker as ambulation aid          Past Surgical History:   Procedure Laterality Date    CHOLECYSTECTOMY      HYSTERECTOMY      IN REMOVAL OF OVARY/TUBE(S)         Time Tracking:     OT Date of Treatment: 06/11/25  OT Start Time: 0858  OT Stop Time: 0929  OT Total Time (min): 31 min    Billable Minutes:Evaluation 8 minutes   Self Care/Home Management 23 minutes    6/11/2025

## 2025-06-11 NOTE — PLAN OF CARE
Problem: Occupational Therapy  Goal: Occupational Therapy Goal  Description: Goals to be met by: 7/11/2025     Patient will increase functional independence with ADLs by performing:    UE Dressing with Supervision sitting EOB to don TSLO   LE Dressing with Set-up Assistance using AE as needed to maintain spinal precautions.  Grooming while bedside chair with Set-up Assistance.  Toileting from bedside commode with Minimal Assistance for hygiene and clothing management.   Supine to sit with Supervision.  Toilet transfer to bedside commode with Contact Guard Assistance using LRAD as needed.   Sit to stand with CGA using LRAD as needed.     Outcome: Progressing       Pt has been evaluated by OT; appropriate  POC established.

## 2025-06-11 NOTE — ASSESSMENT & PLAN NOTE
74 y.o.F with new compression deformity of T12 and new inability to ambulate 2/2 pain. On admit, AFVSS, labs reviewed. CT a/p with: New compression deformity of T12 when compared to CT abdomen pelvis 03/22/2025. MRI T/L spine with sedation ordered. NSGY consulted, will f/u MRI. No urgent intervention at this time.     Plan:   - TLSO brace ordered   - NSGY consulted, appreciate recommendations  - MRI spine with sedation scheduled for 6/12  - MM pain regimen initiated  - PT/OT ordered --> MI therapy at DC   - fall precautions

## 2025-06-11 NOTE — PROGRESS NOTES
[facility] Medicine  Progress Note    Patient Name: Kuldeep Villela  MRN: [id number]  Patient Class: IP- Inpatient   Admission Date: 6/9/2025  Length of Stay: 1 days  Attending Physician: Nicko Reeves MD  Primary Care Provider: Ginna Musa MD        Subjective     Principal Problem:Compression fracture of T12 vertebra        HPI:  Kuldeep Villela is a 74 y.o.F with PMHx of arthritis, lumbar stenosis, HTN, HLD, depression who presents to [facility] ED for complaints of worsening low back/L flank pain for the last week. Endorses chronic low back pain for which she sees a specialist for, but has worsened recently. Denies any known injury to area. She reports she felt like it may be the beginning of a UTI so she started taking Azo at home for prevention. States she normally ambulates with a walker; however, the pain has been so severe that she has not been able to ambulate at all. Endorses bilateral lower extremity weakness. Denies bladder or bowel incontinence, denies numbness of lower extremities. Denies fever, chills, chest pain, palpitations, SOB, cough, abdominal pain, n/v/d, dysuria, headaches, or any other symptoms at this time.     In ED: afebrile, VSS on RA. CBC without leukocytosis, Hgb 11.1. CMP notable for Cr 1.5 (~1.0), otherwise unremarkable. UA non-infectious appearing. CT abd/pelvis with new compression deformity of T12, perinephric haziness and stranding, L>R, correlate with renal disease, small periumbilical abd wall hernia involving bowel without inflammatory or obstructive change. S/p rocephin, ketamine, toradol 15mg inj, oxy-acetaminophen 10mg, 1L IVF bolus in ED. TLSO brace ordered. Admitted to  for further evaluation.     Overview/Hospital Course:  Pt admitted for continued management of T12 compression fx. NSGY consulted, no acute intervention warranted at this time. MRI spine with sedation pending and scheduled for 6/12.     Interval History: NAEON. Patient reports  frustrations for being stuck in a hallway bed for an extended time yesterday. Reports continued back pain, otherwise no acute complaints. No numbness, tingling, worsening LE weakness, urinary issues.    Review of Systems   Musculoskeletal:  Positive for back pain.     Objective:     Vital Signs (Most Recent):  Temp: 97.4 °F (36.3 °C) (06/11/25 1125)  Pulse: 95 (06/11/25 1125)  Resp: 17 (06/11/25 1125)  BP: 133/75 (06/11/25 1125)  SpO2: (!) 94 % (06/11/25 1125) Vital Signs (24h Range):  Temp:  [97.4 °F (36.3 °C)-98.3 °F (36.8 °C)] 97.4 °F (36.3 °C)  Pulse:  [88-95] 95  Resp:  [16-18] 17  SpO2:  [92 %-97 %] 94 %  BP: (119-157)/(61-78) 133/75     Weight: 107 kg (236 lb)  Body mass index is 44.59 kg/m².    Intake/Output Summary (Last 24 hours) at 6/11/2025 1338  Last data filed at 6/11/2025 0014  Gross per 24 hour   Intake --   Output 600 ml   Net -600 ml         Physical Exam  Vitals and nursing note reviewed.   Constitutional:       General: She is not in acute distress.     Appearance: She is well-developed.   HENT:      Head: Normocephalic and atraumatic.      Mouth/Throat:      Pharynx: No oropharyngeal exudate.   Eyes:      Extraocular Movements: Extraocular movements intact.      Conjunctiva/sclera: Conjunctivae normal.   Cardiovascular:      Rate and Rhythm: Normal rate and regular rhythm.      Heart sounds: Normal heart sounds.   Pulmonary:      Effort: Pulmonary effort is normal. No respiratory distress.      Breath sounds: Normal breath sounds. No wheezing.   Abdominal:      General: Bowel sounds are normal. There is no distension.      Palpations: Abdomen is soft.      Tenderness: There is no abdominal tenderness.   Musculoskeletal:         General: Tenderness (mid-low back) present. Normal range of motion.      Cervical back: Normal range of motion and neck supple.      Right lower leg: Edema present.      Left lower leg: Edema present.   Lymphadenopathy:      Cervical: No cervical adenopathy.   Skin:      General: Skin is warm and dry.      Capillary Refill: Capillary refill takes less than 2 seconds.      Findings: No rash.   Neurological:      Mental Status: She is alert and oriented to person, place, and time.      Cranial Nerves: No cranial nerve deficit.      Sensory: No sensory deficit.      Motor: Weakness (unable to lift bilateral lower ext against gravity) present.      Coordination: Coordination normal.   Psychiatric:         Behavior: Behavior normal.         Thought Content: Thought content normal.         Judgment: Judgment normal.     No significant changes in exam over yesterday.           Significant Labs: All pertinent labs within the past 24 hours have been reviewed.  CBC:   Recent Labs   Lab 06/09/25  2341 06/10/25  0739 06/11/25  0519   WBC 10.05 10.74 8.41   HGB 11.1* 10.8* 11.1*   HCT 33.2* 32.2* 33.3*    271 263     CMP:   Recent Labs   Lab 06/09/25  2341 06/10/25  0739 06/11/25  0519    140 136   K 4.4 3.8 3.8    108 105   CO2 24 23 23   GLU 87 77 79   BUN 25* 21 19   CREATININE 1.5* 1.2 1.1   CALCIUM 9.3 9.2 9.0   PROT 6.8 6.5 6.4   ALBUMIN 3.9 3.8 3.6   BILITOT 0.5 0.6 0.6   ALKPHOS 77 75 72   AST 15 15 15   ALT 7* 7* 9*   ANIONGAP 9 9 8       Significant Imaging: I have reviewed all pertinent imaging results/findings within the past 24 hours.      Assessment & Plan  Compression fracture of T12 vertebra  Acute on chronic back pain    74 y.o.F with new compression deformity of T12 and new inability to ambulate 2/2 pain. On admit, AFVSS, labs reviewed. CT a/p with: New compression deformity of T12 when compared to CT abdomen pelvis 03/22/2025. MRI T/L spine with sedation ordered. NSGY consulted, will f/u MRI. No urgent intervention at this time.     Plan:   - TLSO brace ordered   - NSGY consulted, appreciate recommendations  - MRI spine with sedation scheduled for 6/12  - MM pain regimen initiated  - PT/OT ordered --> MI therapy at [facility]   - fall precautions      Hypertension  Patient's blood pressure range in the last 24 hours was: BP  Min: 119/68  Max: 157/76.The patient's inpatient anti-hypertensive regimen is listed below:  Current Antihypertensives  , Every 12 hours, Oral  amLODIPine tablet 10 mg, Daily, Oral    Plan  - BP is controlled, no changes needed to their regimen    Hyperlipemia  - continue home statin    Debility  Patient with Acute on chronic debility due to fracture/prosthesis. The patient's latest AMPAC (Activity Measure for Post Acute Care) Score is listed below.    AM-PAC Score - How much help does the patient need for each activity listed       Plan  - PT/OT consulted  - Fall precautions in place  - PT/OT recommending moderate intensity therapy at [facility]      Depression with anxiety  Patient has recurrent depression which is mild and is currently controlled. Will Continue anti-depressant medications. We will not consult psychiatry at this time. Patient does not display psychosis at this time. Continue to monitor closely and adjust plan of care as needed.  - continue home medications    MALACHI (acute kidney injury)  RESOLVED  MALACHI is likely due to pre-renal azotemia due to dehydration. Baseline creatinine is ~1.0. Most recent creatinine and eGFR are listed below.  Recent Labs     06/09/25  2341 06/10/25  0739 06/11/25  0519   CREATININE 1.5* 1.2 1.1   EGFRNORACEVR 37* 48* 53*      Plan  - MALACHI is stable  - Avoid nephrotoxins and renally dose meds for GFR listed above  - Monitor urine output, serial BMP, and adjust therapy as needed  - s/p 1L IVF bolus in ED, encouraging good oral hydration       Tobacco dependency  Dangers of cigarette smoking were reviewed with patient in detail. Patient was Counseled for 3-10 minutes. Nicotine replacement options were discussed. Nicotine replacement was discussed- prescribed  VTE Risk Mitigation (From admission, onward)           Ordered     heparin (porcine) injection 7,500 Units  Every 8 hours         06/10/25 0731     IP  VTE HIGH RISK PATIENT  Once         06/10/25 0731     Place sequential compression device  Until discontinued         06/10/25 0731                    Discharge Planning   MATT: 6/13/2025     Code Status: Full Code   Medical Readiness for Discharge Date:   Discharge Plan A: Skilled Nursing Facility                Please place Justification for DME        Nicko Reeves MD  Department of Hospital Medicine   [facility] Surg

## 2025-06-11 NOTE — ASSESSMENT & PLAN NOTE
Patient's blood pressure range in the last 24 hours was: BP  Min: 119/68  Max: 157/76.The patient's inpatient anti-hypertensive regimen is listed below:  Current Antihypertensives  , Every 12 hours, Oral  amLODIPine tablet 10 mg, Daily, Oral    Plan  - BP is controlled, no changes needed to their regimen

## 2025-06-11 NOTE — PT/OT/SLP PROGRESS
Physical Therapy      Patient Name:  Kuldeep Villela   MRN:  [id number]    Patient not seen today secondary to when PT attempted to evaluate pt at 11:54 am pt declined due to pain and increase fatigue after OT evaluation this morning.  PT unable to re-attempt pt in the PM.  . Will follow-up 6/12/25.

## 2025-06-11 NOTE — SUBJECTIVE & OBJECTIVE
Interval History: NAEON. Patient reports frustrations for being stuck in a hallway bed for an extended time yesterday. Reports continued back pain, otherwise no acute complaints. No numbness, tingling, worsening LE weakness, urinary issues.    Review of Systems   Musculoskeletal:  Positive for back pain.     Objective:     Vital Signs (Most Recent):  Temp: 97.4 °F (36.3 °C) (06/11/25 1125)  Pulse: 95 (06/11/25 1125)  Resp: 17 (06/11/25 1125)  BP: 133/75 (06/11/25 1125)  SpO2: (!) 94 % (06/11/25 1125) Vital Signs (24h Range):  Temp:  [97.4 °F (36.3 °C)-98.3 °F (36.8 °C)] 97.4 °F (36.3 °C)  Pulse:  [88-95] 95  Resp:  [16-18] 17  SpO2:  [92 %-97 %] 94 %  BP: (119-157)/(61-78) 133/75     Weight: 107 kg (236 lb)  Body mass index is 44.59 kg/m².    Intake/Output Summary (Last 24 hours) at 6/11/2025 1338  Last data filed at 6/11/2025 0014  Gross per 24 hour   Intake --   Output 600 ml   Net -600 ml         Physical Exam  Vitals and nursing note reviewed.   Constitutional:       General: She is not in acute distress.     Appearance: She is well-developed.   HENT:      Head: Normocephalic and atraumatic.      Mouth/Throat:      Pharynx: No oropharyngeal exudate.   Eyes:      Extraocular Movements: Extraocular movements intact.      Conjunctiva/sclera: Conjunctivae normal.   Cardiovascular:      Rate and Rhythm: Normal rate and regular rhythm.      Heart sounds: Normal heart sounds.   Pulmonary:      Effort: Pulmonary effort is normal. No respiratory distress.      Breath sounds: Normal breath sounds. No wheezing.   Abdominal:      General: Bowel sounds are normal. There is no distension.      Palpations: Abdomen is soft.      Tenderness: There is no abdominal tenderness.   Musculoskeletal:         General: Tenderness (mid-low back) present. Normal range of motion.      Cervical back: Normal range of motion and neck supple.      Right lower leg: Edema present.      Left lower leg: Edema present.   Lymphadenopathy:       Cervical: No cervical adenopathy.   Skin:     General: Skin is warm and dry.      Capillary Refill: Capillary refill takes less than 2 seconds.      Findings: No rash.   Neurological:      Mental Status: She is alert and oriented to person, place, and time.      Cranial Nerves: No cranial nerve deficit.      Sensory: No sensory deficit.      Motor: Weakness (unable to lift bilateral lower ext against gravity) present.      Coordination: Coordination normal.   Psychiatric:         Behavior: Behavior normal.         Thought Content: Thought content normal.         Judgment: Judgment normal.     No significant changes in exam over yesterday.           Significant Labs: All pertinent labs within the past 24 hours have been reviewed.  CBC:   Recent Labs   Lab 06/09/25  2341 06/10/25  0739 06/11/25  0519   WBC 10.05 10.74 8.41   HGB 11.1* 10.8* 11.1*   HCT 33.2* 32.2* 33.3*    271 263     CMP:   Recent Labs   Lab 06/09/25  2341 06/10/25  0739 06/11/25  0519    140 136   K 4.4 3.8 3.8    108 105   CO2 24 23 23   GLU 87 77 79   BUN 25* 21 19   CREATININE 1.5* 1.2 1.1   CALCIUM 9.3 9.2 9.0   PROT 6.8 6.5 6.4   ALBUMIN 3.9 3.8 3.6   BILITOT 0.5 0.6 0.6   ALKPHOS 77 75 72   AST 15 15 15   ALT 7* 7* 9*   ANIONGAP 9 9 8       Significant Imaging: I have reviewed all pertinent imaging results/findings within the past 24 hours.

## 2025-06-11 NOTE — ANESTHESIA PREPROCEDURE EVALUATION
[facility]  Anesthesia Pre-Operative Evaluation         Patient Name: Kuldeep Villela  YOB: 1950  MRN: [id number]    SUBJECTIVE:     Pre-operative evaluation for Procedure(s) (LRB):  MRI (Magnetic Resonance Imagine) (Bilateral)     06/11/2025    Kuldeep Villela is a 74 y.o. female w/ a significant PMHx of morbid obesity, arthritis, lumbar stenosis, HTN, HLD, depression who presents to [facility] ED for complaints of worsening low back/L flank pain for the last week. .    Patient now presents for the above procedure(s) due to compression fracture.      LDA:        Peripheral IV - Single Lumen 06/11/25 1145 20 G Distal;Posterior;Right Forearm (Active)   Number of days: 0       Female External Urinary Catheter w/ Suction 06/09/25 2353 (Active)   Skin no redness;no breakdown;female external urine collection device repositioned 06/10/25 0750   Tolerance no signs/symptoms of discomfort 06/10/25 0750   Suction Continuous suction at 70 mmHg 06/10/25 0750   Date of last wick change 06/10/25 06/10/25 0500   Time of last wick change 0500 06/10/25 0500   Output (mL) 600 mL 06/11/25 0014   Number of days: 1       Prev airway: None documented.    Drips: None documented.      Problem List[1]    Review of patient's allergies indicates:   Allergen Reactions    Penicillins Anxiety and Other (See Comments)    Anesthetic [benzocaine-aloe vera]      Jittery/hyper    Guaifenesin Anxiety and Other (See Comments)       Current Inpatient Medications:   acetaminophen  1,000 mg Oral TID    amLODIPine  10 mg Oral Daily    busPIRone  15 mg Oral QID    cetirizine  10 mg Oral Daily    citalopram  40 mg Oral Daily    diclofenac sodium  2 g Topical (Top) TID    estrogens(esterified)-methyltestosterone 0.625-1.25mg  1 tablet Oral Daily    heparin (porcine)  7,500 Units Subcutaneous Q8H    LIDOcaine  1 patch Transdermal Q24H    magnesium sulfate 2 g IVPB  2 g Intravenous Q2H    methocarbamoL  750 mg Oral QID    pravastatin  20  mg Oral Daily    rOPINIRole  1 mg Oral BID    sertraline  50 mg Oral Daily       Medications Ordered Prior to Encounter[2]    Past Surgical History:   Procedure Laterality Date    CHOLECYSTECTOMY      HYSTERECTOMY      RI REMOVAL OF OVARY/TUBE(S)         Social History[3]    OBJECTIVE:     Vital Signs Range (Last 24H):  Temp:  [36.3 °C (97.4 °F)-36.8 °C (98.3 °F)]   Pulse:  [88-95]   Resp:  [16-18]   BP: (119-157)/(61-78)   SpO2:  [92 %-97 %]       Significant Labs:  Lab Results   Component Value Date    WBC 8.41 06/11/2025    HGB 11.1 (L) 06/11/2025    HCT 33.3 (L) 06/11/2025     06/11/2025    CHOL 153 05/09/2023    TRIG 78 05/09/2023    HDL 33 (L) 05/09/2023    ALT 9 (L) 06/11/2025    AST 15 06/11/2025     06/11/2025    K 3.8 06/11/2025     06/11/2025    CREATININE 1.1 06/11/2025    BUN 19 06/11/2025    CO2 23 06/11/2025    TSH 0.448 03/22/2025    HGBA1C 4.9 05/09/2023       Diagnostic Studies: No relevant studies.    EKG:   Results for orders placed or performed during the hospital encounter of 03/22/25   EKG 12-lead    Collection Time: 03/22/25  9:24 AM   Result Value Ref Range    QRS Duration 82 ms    [facility] QTC Calculation 449 ms    Narrative    Test Reason : R11.2,    Vent. Rate :  79 BPM     Atrial Rate :  79 BPM     P-R Int : 148 ms          QRS Dur :  82 ms      QT Int : 392 ms       P-R-T Axes :  68  59   5 degrees    QTcB Int : 449 ms    Sinus rhythm with occasional Premature ventricular complexes  Low voltage QRS  Nonspecific T wave abnormality  Abnormal ECG  When compared with ECG of 23-Feb-2025 18:13,  Premature ventricular complexes are now Present  Confirmed by Rudy Salcedo ([id number]) on 3/23/2025 8:40:16 AM    Referred By: AAAREFERRAL SELF           Confirmed By: Rudy Salcedo       2D ECHO:  TTE:  No results found. However, due to the size of the patient record, not all encounters were searched. Please check Results Review for a complete set of results.    EDDI:  No results found.  However, due to the size of the patient record, not all encounters were searched. Please check Results Review for a complete set of results.    ASSESSMENT/PLAN:        Pre-op Assessment    I have reviewed the Patient Summary Reports.     I have reviewed the Nursing Notes. I have reviewed the NPO Status.   I have reviewed the Medications.     Review of Systems  Anesthesia Hx:  No problems with previous Anesthesia   History of prior surgery of interest to airway management or planning:             Hematology/Oncology:       -- Denies Anemia:                  Denies Current/Recent Cancer                EENT/Dental:         Denies Otitis Media        Cardiovascular:     Hypertension    Denies CAD.        Denies CHF.    hyperlipidemia                               Pulmonary:    Denies COPD.                     Renal/:   Denies Chronic Renal Disease.                Hepatic/GI:      Denies GERD.                Musculoskeletal:  Arthritis          Spine Disorders:             Neurological:    Denies CVA.             Denies Dementia                         Endocrine:  Denies Diabetes.         Morbid Obesity / BMI > 40  Psych:  Denies Psychiatric History.                  Physical Exam  General: Well nourished, Cooperative and Alert    Airway:  Mallampati: II   Mouth Opening: Normal  TM Distance: Normal  Tongue: Normal  Neck ROM: Normal ROM    Dental:  Intact    Chest/Lungs:  Clear to auscultation, Normal Respiratory Rate    Heart:  Rate: Normal  Rhythm: Regular Rhythm  Sounds: Normal        Anesthesia Plan  Type of Anesthesia, risks & benefits discussed:    Anesthesia Type: Gen ETT, Gen Supraglottic Airway, Gen Natural Airway  Intra-op Monitoring Plan: Standard ASA Monitors  Post Op Pain Control Plan: multimodal analgesia and IV/PO Opioids PRN  Induction:  IV  Airway Plan: Direct, Post-Induction  Informed Consent: Informed consent signed with the Patient and all parties understand the risks and agree with anesthesia plan.   All questions answered.   ASA Score: 3  Day of Surgery Review of History & Physical: H&P Update referred to the surgeon/provider.    Ready For Surgery From Anesthesia Perspective.     .           [1]   Patient Active Problem List  Diagnosis    Arthritis    Lumbar radiculopathy, BLE    Lumbar spondylosis    Lumbar spinal stenosis at L4-L5.    Spondylolisthesis, grade 1 at L4-L5.    Chronic neck pain    Cervical spondylosis    Primary osteoarthritis of both knees    Morbid obesity    Tenosynovitis of ankle    Knee pain    Hypertension    BMI 45.0-49.9, adult    Hyperlipemia    Cervical radiculopathy    Left leg swelling    Lumbar facet arthropathy    Chronic low back pain    Osteoarthritis of spine with radiculopathy, lumbar region    Frequent falls    Walker as ambulation aid    Nausea vomiting and diarrhea    Debility    Pain in both knees    Acute on chronic back pain    Restless leg syndrome    Depression with anxiety    Hypokalemia    Mild bibasilar atelectasis    Chronic, continuous use of opioids    MALACHI (acute kidney injury)    Compression fracture of T12 vertebra    Tobacco dependency   [2]   No current facility-administered medications on file prior to encounter.     Current Outpatient Medications on File Prior to Encounter   Medication Sig Dispense Refill    carvediloL (COREG) 6.25 MG tablet Take 1 tablet by mouth every 12 (twelve) hours.      albuterol (PROVENTIL/VENTOLIN HFA) 90 mcg/actuation inhaler Inhale 1-2 puffs into the lungs every 4 (four) hours as needed for Wheezing. Rescue 18 g 11    amLODIPine (NORVASC) 10 MG tablet Take 1 tablet (10 mg total) by mouth once daily. 90 tablet 0    azelastine (ASTELIN) 137 mcg (0.1 %) nasal spray 1 spray (137 mcg total) by Nasal route 2 (two) times daily. (Patient not taking: Reported on 4/15/2025) 30 mL 5    busPIRone (BUSPAR) 15 MG tablet Take 1 tablet (15 mg total) by mouth 4 (four) times daily. 120 tablet 11    cetirizine (ZYRTEC) 10 MG tablet TAKE 1 TABLET BY  MOUTH EVERY DAY 90 tablet 3    citalopram (CELEXA) 40 MG tablet TAKE 1 TABLET(40 MG) BY MOUTH EVERY DAY 90 tablet 3    cyclobenzaprine (FLEXERIL) 10 MG tablet Take 1 tablet (10 mg total) by mouth 3 (three) times daily as needed for Muscle spasms. 90 tablet 2    diclofenac sodium (VOLTAREN) 1 % Gel Apply 2 g topically 3 (three) times daily as needed (apply to painful joints). 300 g 3    estrogens,conjugated,-methyltestosterone 0.625-1.25mg (ESTRATEST HS) 0.625-1.25 mg per tablet Take 1 tablet by mouth.      fluticasone propionate (FLONASE) 50 mcg/actuation nasal spray 1 spray (50 mcg total) by Each Nostril route once daily. (Patient not taking: Reported on 4/15/2025) 16 g 5    furosemide (LASIX) 20 MG tablet Take 1 tablet (20 mg total) by mouth 2 (two) times a day. for 2 days 4 tablet 0    linaCLOtide (LINZESS) 290 mcg Cap capsule Take 1 capsule (290 mcg total) by mouth before breakfast. (Patient not taking: Reported on 4/15/2025) 30 capsule 5    LINZESS 145 mcg Cap capsule TAKE 1 CAPSULE(145 MCG) BY MOUTH EVERY DAY (Patient not taking: Reported on 4/15/2025) 90 capsule 3    meloxicam (MOBIC) 7.5 MG tablet Take 1 tablet (7.5 mg total) by mouth 2 (two) times a day. 180 tablet 1    naloxegoL (MOVANTIK) 25 mg tablet Take 25 mg by mouth once daily. (Patient not taking: Reported on 4/15/2025) 30 tablet 1    naloxone (NARCAN) 4 mg/actuation Spry 4mg by nasal route as needed for opioid overdose; may repeat every 2-3 minutes in alternating nostrils until medical help arrives. Call 911 1 each 11    oxyCODONE-acetaminophen (PERCOCET)  mg per tablet Take 1 tablet by mouth every 4 (four) hours as needed for Pain (maximum 5 tablets per day). 180 tablet 0    pravastatin (PRAVACHOL) 20 MG tablet TAKE 1 TABLET(20 MG) BY MOUTH DAILY 90 tablet 0    rOPINIRole (REQUIP) 1 MG tablet Take 1 tablet (1 mg total) by mouth 2 (two) times daily. 180 tablet 1    sertraline (ZOLOFT) 50 MG tablet Take 1 tablet (50 mg total) by mouth once  daily. 30 tablet 11    topiramate (TOPAMAX) 50 MG tablet TAKE 1 TABLET(50 MG) BY MOUTH EVERY 12 HOURS 180 tablet 1    walker Misc 4 wheeled walker for mobility 1 each 0   [3]   Social History  Socioeconomic History    Marital status: Single   Tobacco Use    Smoking status: Every Day     Current packs/day: 1.00     Average packs/day: 1 pack/day for 20.0 years (20.0 ttl pk-yrs)     Types: Cigarettes    Smokeless tobacco: Never   Substance and Sexual Activity    Alcohol use: No     Alcohol/week: 0.0 standard drinks of alcohol    Drug use: No    Sexual activity: Not Currently     Partners: Male     Birth control/protection: See Surgical Hx     Social Drivers of Health     Food Insecurity: Food Insecurity Present (3/27/2025)    Hunger Vital Sign     Worried About Running Out of Food in the Last Year: Often true     Ran Out of Food in the Last Year: Often true   Transportation Needs: No Transportation Needs (3/27/2025)    PRAPARE - Transportation     Lack of Transportation (Medical): No     Lack of Transportation (Non-Medical): No   Stress: Stress Concern Present (8/5/2024)    Received from [employer] [place]    [employer] [place] [employer] - Occupational Stress Questionnaire     Feeling of Stress : To some extent   Housing Stability: High Risk (3/27/2025)    Housing Stability Vital Sign     Unable to Pay for Housing in the Last Year: Yes     Homeless in the Last Year: No

## 2025-06-11 NOTE — ED NOTES
Taking care of this patient now in admit gamez and she is upset at the moment. States that she is claustrophobic and is wanting to leave. Patient was originally in bed 15 and was moved to bed 37 for capacity. While In 37 patient mentioned to staff that she wanted to be moved to the hallway due to her claustrophobia. Patient was moved per her request to admit gamez and is now even more upset and is expressing how claustrophobic she is again and is asking to speak with someone. Md ROLANDA Jones made aware as well as ED SOD

## 2025-06-11 NOTE — ASSESSMENT & PLAN NOTE
RESOLVED  MALACHI is likely due to pre-renal azotemia due to dehydration. Baseline creatinine is ~1.0. Most recent creatinine and eGFR are listed below.  Recent Labs     06/09/25  2341 06/10/25  0739 06/11/25  0519   CREATININE 1.5* 1.2 1.1   EGFRNORACEVR 37* 48* 53*      Plan  - MALACHI is stable  - Avoid nephrotoxins and renally dose meds for GFR listed above  - Monitor urine output, serial BMP, and adjust therapy as needed  - s/p 1L IVF bolus in ED, encouraging good oral hydration

## 2025-06-12 ENCOUNTER — ANESTHESIA (OUTPATIENT)
Dept: ENDOSCOPY | Facility: HOSPITAL | Age: 75
End: 2025-06-12
Payer: COMMERCIAL

## 2025-06-12 ENCOUNTER — ANESTHESIA EVENT (OUTPATIENT)
Dept: SURGERY | Facility: HOSPITAL | Age: 75
End: 2025-06-12
Payer: COMMERCIAL

## 2025-06-12 PROBLEM — S22.000A COMPRESSION FRACTURE OF BODY OF THORACIC VERTEBRA: Status: ACTIVE | Noted: 2025-06-12

## 2025-06-12 LAB
ABORH RETYPE: NORMAL
ABSOLUTE EOSINOPHIL (OHS): 0.44 K/UL
ABSOLUTE MONOCYTE (OHS): 0.52 K/UL (ref 0.3–1)
ABSOLUTE NEUTROPHIL COUNT (OHS): 5.31 K/UL (ref 1.8–7.7)
ALBUMIN SERPL BCP-MCNC: 3.4 G/DL (ref 3.5–5.2)
ALP SERPL-CCNC: 68 UNIT/L (ref 40–150)
ALT SERPL W/O P-5'-P-CCNC: 11 UNIT/L (ref 10–44)
ANION GAP (OHS): 10 MMOL/L (ref 8–16)
APTT PPP: 26.1 SECONDS (ref 21–32)
AST SERPL-CCNC: 17 UNIT/L (ref 11–45)
BASOPHILS # BLD AUTO: 0.03 K/UL
BASOPHILS NFR BLD AUTO: 0.4 %
BILIRUB SERPL-MCNC: 0.6 MG/DL (ref 0.1–1)
BUN SERPL-MCNC: 20 MG/DL (ref 8–23)
CALCIUM SERPL-MCNC: 8.6 MG/DL (ref 8.7–10.5)
CHLORIDE SERPL-SCNC: 105 MMOL/L (ref 95–110)
CO2 SERPL-SCNC: 22 MMOL/L (ref 23–29)
CREAT SERPL-MCNC: 1.1 MG/DL (ref 0.5–1.4)
ERYTHROCYTE [DISTWIDTH] IN BLOOD BY AUTOMATED COUNT: 13.1 % (ref 11.5–14.5)
GFR SERPLBLD CREATININE-BSD FMLA CKD-EPI: 53 ML/MIN/1.73/M2
GLUCOSE SERPL-MCNC: 67 MG/DL (ref 70–110)
HCT VFR BLD AUTO: 32.9 % (ref 37–48.5)
HGB BLD-MCNC: 10.6 GM/DL (ref 12–16)
IMM GRANULOCYTES # BLD AUTO: 0.03 K/UL (ref 0–0.04)
IMM GRANULOCYTES NFR BLD AUTO: 0.4 % (ref 0–0.5)
INDIRECT COOMBS: NORMAL
INR PPP: 1 (ref 0.8–1.2)
LYMPHOCYTES # BLD AUTO: 1.86 K/UL (ref 1–4.8)
MAGNESIUM SERPL-MCNC: 2.1 MG/DL (ref 1.6–2.6)
MCH RBC QN AUTO: 31.2 PG (ref 27–31)
MCHC RBC AUTO-ENTMCNC: 32.2 G/DL (ref 32–36)
MCV RBC AUTO: 97 FL (ref 82–98)
NUCLEATED RBC (/100WBC) (OHS): 0 /100 WBC
PHOSPHATE SERPL-MCNC: 3 MG/DL (ref 2.7–4.5)
PLATELET # BLD AUTO: 268 K/UL (ref 150–450)
PMV BLD AUTO: 10.4 FL (ref 9.2–12.9)
POCT GLUCOSE: 91 MG/DL (ref 70–110)
POTASSIUM SERPL-SCNC: 4 MMOL/L (ref 3.5–5.1)
PROT SERPL-MCNC: 6.2 GM/DL (ref 6–8.4)
PROTHROMBIN TIME: 10.7 SECONDS (ref 9–12.5)
RBC # BLD AUTO: 3.4 M/UL (ref 4–5.4)
RELATIVE EOSINOPHIL (OHS): 5.4 %
RELATIVE LYMPHOCYTE (OHS): 22.7 % (ref 18–48)
RELATIVE MONOCYTE (OHS): 6.3 % (ref 4–15)
RELATIVE NEUTROPHIL (OHS): 64.8 % (ref 38–73)
RH BLD: NORMAL
SODIUM SERPL-SCNC: 137 MMOL/L (ref 136–145)
SPECIMEN OUTDATE: NORMAL
WBC # BLD AUTO: 8.19 K/UL (ref 3.9–12.7)

## 2025-06-12 PROCEDURE — 85610 PROTHROMBIN TIME: CPT | Performed by: PHYSICIAN ASSISTANT

## 2025-06-12 PROCEDURE — 71000044 HC DOSC ROUTINE RECOVERY FIRST HOUR

## 2025-06-12 PROCEDURE — 36415 COLL VENOUS BLD VENIPUNCTURE: CPT

## 2025-06-12 PROCEDURE — 85025 COMPLETE CBC W/AUTO DIFF WBC: CPT

## 2025-06-12 PROCEDURE — 25000003 PHARM REV CODE 250

## 2025-06-12 PROCEDURE — 63600175 PHARM REV CODE 636 W HCPCS: Mod: JZ,TB

## 2025-06-12 PROCEDURE — 63600175 PHARM REV CODE 636 W HCPCS

## 2025-06-12 PROCEDURE — 84100 ASSAY OF PHOSPHORUS: CPT

## 2025-06-12 PROCEDURE — 82962 GLUCOSE BLOOD TEST: CPT

## 2025-06-12 PROCEDURE — 11000001 HC ACUTE MED/SURG PRIVATE ROOM

## 2025-06-12 PROCEDURE — 86850 RBC ANTIBODY SCREEN: CPT | Performed by: PHYSICIAN ASSISTANT

## 2025-06-12 PROCEDURE — 83735 ASSAY OF MAGNESIUM: CPT

## 2025-06-12 PROCEDURE — 85730 THROMBOPLASTIN TIME PARTIAL: CPT | Performed by: PHYSICIAN ASSISTANT

## 2025-06-12 PROCEDURE — 37000008 HC ANESTHESIA 1ST 15 MINUTES

## 2025-06-12 PROCEDURE — 36415 COLL VENOUS BLD VENIPUNCTURE: CPT | Performed by: STUDENT IN AN ORGANIZED HEALTH CARE EDUCATION/TRAINING PROGRAM

## 2025-06-12 PROCEDURE — 80053 COMPREHEN METABOLIC PANEL: CPT

## 2025-06-12 PROCEDURE — 36415 COLL VENOUS BLD VENIPUNCTURE: CPT | Performed by: PHYSICIAN ASSISTANT

## 2025-06-12 PROCEDURE — 37000009 HC ANESTHESIA EA ADD 15 MINS

## 2025-06-12 PROCEDURE — 99233 SBSQ HOSP IP/OBS HIGH 50: CPT | Mod: ,,, | Performed by: PHYSICIAN ASSISTANT

## 2025-06-12 PROCEDURE — 25000003 PHARM REV CODE 250: Performed by: NURSE ANESTHETIST, CERTIFIED REGISTERED

## 2025-06-12 PROCEDURE — 63600175 PHARM REV CODE 636 W HCPCS: Performed by: NURSE ANESTHETIST, CERTIFIED REGISTERED

## 2025-06-12 RX ORDER — PROPOFOL 10 MG/ML
VIAL (ML) INTRAVENOUS
Status: DISCONTINUED | OUTPATIENT
Start: 2025-06-12 | End: 2025-06-12

## 2025-06-12 RX ORDER — LIDOCAINE HYDROCHLORIDE 10 MG/ML
1 INJECTION, SOLUTION EPIDURAL; INFILTRATION; INTRACAUDAL; PERINEURAL ONCE
Status: DISCONTINUED | OUTPATIENT
Start: 2025-06-12 | End: 2025-06-12 | Stop reason: HOSPADM

## 2025-06-12 RX ORDER — HYDROCODONE BITARTRATE AND ACETAMINOPHEN 5; 325 MG/1; MG/1
1 TABLET ORAL EVERY 4 HOURS PRN
Refills: 0 | Status: DISCONTINUED | OUTPATIENT
Start: 2025-06-12 | End: 2025-06-12 | Stop reason: HOSPADM

## 2025-06-12 RX ORDER — DEXMEDETOMIDINE HYDROCHLORIDE 100 UG/ML
INJECTION, SOLUTION INTRAVENOUS
Status: DISCONTINUED | OUTPATIENT
Start: 2025-06-12 | End: 2025-06-12

## 2025-06-12 RX ADMIN — HYDROMORPHONE HYDROCHLORIDE 1.5 MG: 1 INJECTION, SOLUTION INTRAMUSCULAR; INTRAVENOUS; SUBCUTANEOUS at 11:06

## 2025-06-12 RX ADMIN — OXYCODONE HYDROCHLORIDE 10 MG: 10 TABLET ORAL at 09:06

## 2025-06-12 RX ADMIN — METHOCARBAMOL 750 MG: 750 TABLET ORAL at 04:06

## 2025-06-12 RX ADMIN — OXYCODONE HYDROCHLORIDE 10 MG: 10 TABLET ORAL at 04:06

## 2025-06-12 RX ADMIN — OXYCODONE HYDROCHLORIDE 10 MG: 10 TABLET ORAL at 10:06

## 2025-06-12 RX ADMIN — PROPOFOL 20 MG: 10 INJECTION, EMULSION INTRAVENOUS at 12:06

## 2025-06-12 RX ADMIN — BUSPIRONE HYDROCHLORIDE 15 MG: 10 TABLET ORAL at 09:06

## 2025-06-12 RX ADMIN — KETOROLAC TROMETHAMINE 15 MG: 30 INJECTION, SOLUTION INTRAMUSCULAR; INTRAVENOUS at 01:06

## 2025-06-12 RX ADMIN — HYDROMORPHONE HYDROCHLORIDE 1.5 MG: 1 INJECTION, SOLUTION INTRAMUSCULAR; INTRAVENOUS; SUBCUTANEOUS at 07:06

## 2025-06-12 RX ADMIN — ROPINIROLE HYDROCHLORIDE 1 MG: 1 TABLET, FILM COATED ORAL at 09:06

## 2025-06-12 RX ADMIN — PROPOFOL 30 MG: 10 INJECTION, EMULSION INTRAVENOUS at 12:06

## 2025-06-12 RX ADMIN — METHOCARBAMOL 750 MG: 750 TABLET ORAL at 09:06

## 2025-06-12 RX ADMIN — DEXMEDETOMIDINE 40 MCG: 100 INJECTION, SOLUTION, CONCENTRATE INTRAVENOUS at 12:06

## 2025-06-12 RX ADMIN — ACETAMINOPHEN 1000 MG: 500 TABLET ORAL at 03:06

## 2025-06-12 RX ADMIN — BUSPIRONE HYDROCHLORIDE 15 MG: 10 TABLET ORAL at 04:06

## 2025-06-12 RX ADMIN — OXYCODONE HYDROCHLORIDE 10 MG: 10 TABLET ORAL at 02:06

## 2025-06-12 RX ADMIN — ACETAMINOPHEN 1000 MG: 500 TABLET ORAL at 09:06

## 2025-06-12 RX ADMIN — HYDROMORPHONE HYDROCHLORIDE 1.5 MG: 1 INJECTION, SOLUTION INTRAMUSCULAR; INTRAVENOUS; SUBCUTANEOUS at 05:06

## 2025-06-12 RX ADMIN — PROPOFOL 100 MCG/KG/MIN: 10 INJECTION, EMULSION INTRAVENOUS at 12:06

## 2025-06-12 RX ADMIN — SODIUM CHLORIDE: 0.9 INJECTION, SOLUTION INTRAVENOUS at 12:06

## 2025-06-12 NOTE — PLAN OF CARE
Magnesium IVPB given x 2, pain medications given as needed and with positional changes/movement or when she worked with PT/OT today. Otherwise no major events. TLSO brace to be worn while out of bed or during activity. NPO midnight tonight for anaesthesia/MRI tomorrow.   Problem: Fall Injury Risk  Goal: Absence of Fall and Fall-Related Injury  Outcome: Progressing  Intervention: Identify and Manage Contributors  Flowsheets (Taken 6/11/2025 1900)  Self-Care Promotion:   independence encouraged   BADL personal objects within reach   BADL personal routines maintained   meal set-up provided   adaptive equipment use encouraged  Medication Review/Management:   medications reviewed   high-risk medications identified  Intervention: Promote Injury-Free Environment  Flowsheets (Taken 6/11/2025 1900)  Safety Promotion/Fall Prevention:   bed alarm set   assistive device/personal item within reach   commode/urinal/bedpan at bedside   Fall Risk reviewed with patient/family   family expresses understanding of fall risk and prevention   medications reviewed   nonskid shoes/socks when out of bed   patient expresses understanding of fall risk and prevention   side rails raised x 2   supervised activity   Supervised toileting - stay within arms reach   toileting scheduled     Problem: Pain Acute  Goal: Optimal Pain Control and Function  Outcome: Progressing  Intervention: Develop Pain Management Plan  Flowsheets (Taken 6/11/2025 1900)  Pain Management Interventions:   care clustered   pain management plan reviewed with patient/caregiver   pillow support provided   position adjusted   quiet environment facilitated   relaxation techniques promoted   warm blanket provided  Intervention: Prevent or Manage Pain  Flowsheets (Taken 6/11/2025 1900)  Sleep/Rest Enhancement:   awakenings minimized   consistent schedule promoted   family presence promoted   noise level reduced   regular sleep/rest pattern promoted   relaxation techniques  promoted  Sensory Stimulation Regulation:   quiet environment promoted   care clustered   television on  Bowel Elimination Promotion:   privacy promoted   ambulation promoted   commode/bedpan at bedside  Medication Review/Management:   medications reviewed   high-risk medications identified  Intervention: Optimize Psychosocial Wellbeing  Flowsheets (Taken 6/11/2025 1900)  Spiritual Activities Assistance: prayer provided  Supportive Measures:   active listening utilized   goal-setting facilitated   positive reinforcement provided   relaxation techniques promoted   self-care encouraged   self-reflection promoted   self-responsibility promoted   verbalization of feelings encouraged  Diversional Activities:   smartphone   television     Problem: Electrolyte Imbalance  Goal: Electrolyte Balance  Outcome: Progressing  Intervention: Monitor and Manage Electrolyte Imbalance  Flowsheets (Taken 6/11/2025 1900)  Fluid/Electrolyte Management: electrolyte supplement initiated     Problem: Orthopaedic Fracture  Goal: Absence of Bleeding  Outcome: Progressing  Intervention: Monitor and Manage Fracture Bleeding  Flowsheets (Taken 6/11/2025 1900)  Fracture Immobilization:   immobilization device maintained   supported during position changes   supported with pillows   other (see comments)  Goal: Bowel Elimination  Outcome: Progressing  Intervention: Promote Effective Bowel Elimination  Flowsheets (Taken 6/11/2025 1900)  Bowel Elimination Management:   toileting offered   relaxation techniques promoted  Bowel Elimination Promotion:   privacy promoted   ambulation promoted   commode/bedpan at bedside  Goal: Absence of Embolism Signs and Symptoms  Outcome: Progressing  Intervention: Prevent or Manage Embolism Risk  Flowsheets (Taken 6/11/2025 1900)  VTE Prevention/Management:   bleeding precautions maintained   bleeding risk assessed   dorsiflexion/plantar flexion performed   ROM (active) performed   ROM (passive) performed  Goal:  Fracture Stability  Outcome: Progressing  Intervention: Promote Fracture Stability and Healing  Flowsheets (Taken 6/11/2025 1900)  Fracture Immobilization:   immobilization device maintained   supported during position changes   supported with pillows   other (see comments)  Goal: Optimal Functional Ability  Outcome: Progressing  Intervention: Optimize Functional Ability  Flowsheets (Taken 6/11/2025 1900)  Self-Care Promotion:   independence encouraged   BADL personal objects within reach   BADL personal routines maintained   meal set-up provided   adaptive equipment use encouraged  Activity Management:   Leg kicks - L2   Rolling - L1   Arm raise - L1   Sitting at edge of bed - L2  Goal: Absence of Infection Signs and Symptoms  Outcome: Progressing  Intervention: Prevent or Manage Infection  Flowsheets (Taken 6/11/2025 1900)  Infection Management: aseptic technique maintained  Isolation Precautions: protective  Goal: Effective Tissue Perfusion  Outcome: Progressing  Goal: Optimal Pain Control and Function  Outcome: Progressing  Intervention: Manage Acute Orthopaedic-Related Pain  Flowsheets (Taken 6/11/2025 1900)  Sleep/Rest Enhancement:   awakenings minimized   consistent schedule promoted   family presence promoted   noise level reduced   regular sleep/rest pattern promoted   relaxation techniques promoted  Pain Management Interventions:   care clustered   pain management plan reviewed with patient/caregiver   pillow support provided   position adjusted   quiet environment facilitated   relaxation techniques promoted   warm blanket provided  Goal: Effective Oxygenation and Ventilation  Outcome: Progressing  Intervention: Promote Airway Secretion Clearance  Flowsheets (Taken 6/11/2025 1900)  Breathing Techniques/Airway Clearance: deep/controlled cough encouraged  Cough And Deep Breathing: done independently per patient  Activity Management:   Leg kicks - L2   Rolling - L1   Arm raise - L1   Sitting at edge of bed -  L2  Intervention: Optimize Oxygenation and Ventilation  Flowsheets (Taken 6/11/2025 1900)  Airway/Ventilation Management: calming measures promoted  Fluid/Electrolyte Management: electrolyte supplement initiated  Head of Bed (HOB) Positioning: HOB at 45 degrees

## 2025-06-12 NOTE — PROGRESS NOTES
[facility] - Surgery ([facility])  [facility] Medicine  Progress Note    Patient Name: Kuldeep Villela  MRN: [id number]  Patient Class: OP- Outpatient Procedures   Admission Date: 6/9/2025  Length of Stay: 2 days  Attending Physician: Cintia Anton MD  Primary Care Provider: Ginna Musa MD        Subjective     Principal Problem:Compression fracture of T12 vertebra        HPI:  Kuldeep Villela is a 74 y.o.F with PMHx of arthritis, lumbar stenosis, HTN, HLD, depression who [place] [facility] [place] complaints of worsening low back/L flank pain for the last week. Endorses chronic low back pain for which she sees a specialist for, but has worsened recently. Denies any known injury to area. She reports she felt like it may be the beginning of a UTI so she started taking Azo at home for prevention. States she normally ambulates with a walker; however, the pain has been so severe that she has not been able to ambulate at all. Endorses bilateral lower extremity weakness. Denies bladder or bowel incontinence, denies numbness of lower extremities. Denies fever, chills, chest pain, palpitations, SOB, cough, abdominal pain, n/v/d, dysuria, headaches, or any other symptoms at this time.     In ED: afebrile, VSS on RA. CBC without leukocytosis, Hgb 11.1. CMP notable for Cr 1.5 (~1.0), otherwise unremarkable. UA non-infectious appearing. CT abd/pelvis with new compression deformity of T12, perinephric haziness and stranding, L>R, correlate with renal disease, small periumbilical abd wall hernia involving bowel without inflammatory or obstructive change. S/p rocephin, ketamine, toradol 15mg inj, oxy-acetaminophen 10mg, 1L IVF bolus in ED. TLSO brace ordered. Admitted to  for further evaluation.     Overview/Hospital Course:  Pt admitted for continued management of T12 compression fx. NSGY consulted, no acute intervention warranted at this time. MRI spine with sedation pending and scheduled for 6/12.     Interval History: NAEO.  Pending MRI. Has been NPO. Still having significant pain.     Review of Systems  Objective:     Vital Signs (Most Recent):  Temp: 97.7 °F (36.5 °C) (06/12/25 1039)  Pulse: 73 (06/12/25 1039)  Resp: 17 (06/12/25 1039)  BP: 133/76 (06/12/25 1039)  SpO2: 95 % (06/12/25 1039) Vital Signs (24h Range):  Temp:  [97.6 °F (36.4 °C)-98.4 °F (36.9 °C)] 97.7 °F (36.5 °C)  Pulse:  [73-93] 73  Resp:  [16-18] 17  SpO2:  [92 %-97 %] 95 %  BP: (120-153)/(65-80) 133/76     Weight: 107 kg (236 lb)  Body mass index is 44.59 kg/m².    Intake/Output Summary (Last 24 hours) at 6/12/2025 1326  Last data filed at 6/12/2025 1322  Gross per 24 hour   Intake 700 ml   Output --   Net 700 ml         Physical Exam  Constitutional:       Appearance: Normal appearance.   HENT:      Head: Normocephalic and atraumatic.      Nose: Nose normal.      Mouth/Throat:      Mouth: Mucous membranes are moist.   Eyes:      Extraocular Movements: Extraocular movements intact.      Pupils: Pupils are equal, round, and reactive to light.   Cardiovascular:      Rate and Rhythm: Normal rate and regular rhythm.      Heart sounds: No murmur heard.     No gallop.   Pulmonary:      Effort: Pulmonary effort is normal.      Breath sounds: Normal breath sounds. No wheezing or rales.   Abdominal:      General: Abdomen is flat. Bowel sounds are normal. There is no distension.      Palpations: Abdomen is soft.      Tenderness: There is no abdominal tenderness.   Musculoskeletal:         General: Tenderness present. No swelling. Normal range of motion.      Cervical back: Normal range of motion and neck supple.      Right lower leg: No edema.      Left lower leg: No edema.   Skin:     General: Skin is warm and dry.      Capillary Refill: Capillary refill takes less than 2 seconds.   Neurological:      General: No focal deficit present.      Mental Status: She is alert. Mental status is at baseline.      Motor: Weakness present.   Psychiatric:         Mood and Affect: Mood  normal.               Significant Labs: All pertinent labs within the past 24 hours have been reviewed.    Significant Imaging: I have reviewed all pertinent imaging results/findings within the past 24 hours.      Assessment & Plan  Compression fracture of T12 vertebra  Acute on chronic back pain    74 y.o.F with new compression deformity of T12 and new inability to ambulate 2/2 pain. On admit, AFVSS, labs reviewed. CT a/p with: New compression deformity of T12 when compared to CT abdomen pelvis 03/22/2025. MRI T/L spine with sedation ordered. NSGY consulted, will f/u MRI. No urgent intervention at this time.     Plan:   - TLSO brace ordered   - NSGY consulted, appreciate recommendations  - MRI spine with sedation scheduled for 6/12  - MM pain regimen initiated  - PT/OT ordered --> MI therapy at DC   - fall precautions     Hypertension  Patient's blood pressure range in the last 24 hours was: BP  Min: 120/65  Max: 153/80.The patient's inpatient anti-hypertensive regimen is listed below:  Current Antihypertensives  , Every 12 hours, Oral  amLODIPine tablet 10 mg, Daily, Oral    Plan  - BP is controlled, no changes needed to their regimen    Hyperlipemia  - continue home statin    Debility  Patient with Acute on chronic debility due to fracture/prosthesis. The patient's latest AMPAC (Activity Measure for Post Acute Care) Score is listed below.    AM-PAC Score - How much help does the patient need for each activity listed       Plan  - PT/OT consulted  - Fall precautions in place  - PT/OT recommending moderate intensity therapy at DC      Depression with anxiety  Patient has recurrent depression which is mild and is currently controlled. Will Continue anti-depressant medications. We will not consult psychiatry at this time. Patient does not display psychosis at this time. Continue to monitor closely and adjust plan of care as needed.  - continue home medications    MALACHI (acute kidney injury)  RESOLVED  MALACHI is likely due  to pre-renal azotemia due to dehydration. Baseline creatinine is ~1.0. Most recent creatinine and eGFR are listed below.  Recent Labs     06/10/25  0739 06/11/25  0519 06/12/25  0254   CREATININE 1.2 1.1 1.1   EGFRNORACEVR 48* 53* 53*      Plan  - MALACHI is stable  - Avoid nephrotoxins and renally dose meds for GFR listed above  - Monitor urine output, serial BMP, and adjust therapy as needed  - s/p 1L IVF bolus in ED, encouraging good oral hydration       Tobacco dependency  Dangers of cigarette smoking were reviewed with patient in detail. Patient was Counseled for 3-10 minutes. Nicotine replacement options were discussed. Nicotine replacement was discussed- prescribed  VTE Risk Mitigation (From admission, onward)           Ordered     heparin (porcine) injection 7,500 Units  Every 8 hours         06/10/25 0731     IP VTE HIGH RISK PATIENT  Once         06/10/25 0731     Place sequential compression device  Until discontinued         06/10/25 0731                    Discharge Planning   MATT: 6/13/2025     Code Status: Full Code   Medical Readiness for Discharge Date:   Discharge Plan A: Skilled Nursing Facility                Please place Justification for DME        Cintia Anton MD  Department of Hospital Medicine   [facility] - Surgery ([facility])

## 2025-06-12 NOTE — PROGRESS NOTES
[facility] - Surgery (1st Fl)  Neurosurgery  Progress Note    Subjective:     History of Present Illness: Kuldeep Villela is a 74 y.o.F with PMHx of arthritis, HTN, and HLD who [place] [facility] [place] complaints of acute on chronic low back/L flank pain for the last week. Does also report a fall a week ago. Is able to ambulate with a walker but feels this has become more difficult since the fall. Describes the pain as axial low back pain with bilateral radiculopathy L > R. Denies saddle anesthesia, bladder or bowel dysfunction, weakness or sensory dysfunction. CT abdomen and pelvis demonstrates T12 compression fracture. Neurosurgery consulted for input.     Post-Op Info:  Procedure(s) (LRB):  MRI (Magnetic Resonance Imagine) (Bilateral)   * Day of Surgery *   Interval History: NAEON. MRI T/L today. Neuro exam remains stable. Continued BLE weakness since her fall 1 week ago. Voiding spontaneously, pending PVR. Continues to endorse severe back pain.     Medications:  Continuous Infusions:  Scheduled Meds:   acetaminophen  1,000 mg Oral TID    amLODIPine  10 mg Oral Daily    busPIRone  15 mg Oral QID    cetirizine  10 mg Oral Daily    citalopram  40 mg Oral Daily    diclofenac sodium  2 g Topical (Top) TID    heparin (porcine)  7,500 Units Subcutaneous Q8H    LIDOcaine (PF) 10 mg/ml (1%)  1 mL Intradermal Once    LIDOcaine  1 patch Transdermal Q24H    methocarbamoL  750 mg Oral QID    pravastatin  20 mg Oral Daily    rOPINIRole  1 mg Oral BID    sertraline  50 mg Oral Daily     PRN Meds:  Current Facility-Administered Medications:     albuterol, 2 puff, Inhalation, Q4H PRN    dextrose 50%, 12.5 g, Intravenous, PRN    dextrose 50%, 25 g, Intravenous, PRN    glucagon (human recombinant), 1 mg, Intramuscular, PRN    glucose, 16 g, Oral, PRN    glucose, 24 g, Oral, PRN    HYDROcodone-acetaminophen, 1 tablet, Oral, Q4H PRN    HYDROmorphone, 1.5 mg, Intravenous, Q6H PRN    ketorolac, 15 mg, Intravenous, Q6H PRN    melatonin, 6  mg, Oral, Nightly PRN    naloxone, 0.02 mg, Intravenous, PRN    ondansetron, 8 mg, Oral, Q8H PRN    oxyCODONE, 10 mg, Oral, Q6H PRN    polyethylene glycol, 17 g, Oral, Daily PRN    prochlorperazine, 5 mg, Intravenous, Q6H PRN    sodium chloride 0.9%, 10 mL, Intravenous, Q12H PRN     Review of Systems  Objective:     Weight: 107 kg (236 lb)  Body mass index is 44.59 kg/m².  Vital Signs (Most Recent):  Temp: 97.2 °F (36.2 °C) (06/12/25 1336)  Pulse: 73 (06/12/25 1500)  Resp: 18 (06/12/25 1500)  BP: 136/63 (06/12/25 1500)  SpO2: 97 % (06/12/25 1500) Vital Signs (24h Range):  Temp:  [97.2 °F (36.2 °C)-98.4 °F (36.9 °C)] 97.2 °F (36.2 °C)  Pulse:  [73-93] 73  Resp:  [16-18] 18  SpO2:  [92 %-99 %] 97 %  BP: (120-153)/(63-89) 136/63     Date 06/12/25 0700 - 06/13/25 0659   Shift 4347-1691 3938-2336 9246-6237 24 Hour Total   INTAKE   IV Piggyback 500   500   Shift Total(mL/kg) 500(4.7)   500(4.7)   OUTPUT   Shift Total(mL/kg)       Weight (kg) 107 107 107 107                       Female External Urinary Catheter w/ Suction 06/09/25 2353 (Active)   Skin no redness;no breakdown;female external urine collection device repositioned 06/10/25 0750   Tolerance no signs/symptoms of discomfort 06/10/25 0750   Suction Continuous suction at 60 mmHg 06/12/25 1040   Date of last wick change 06/10/25 06/10/25 0500   Time of last wick change 0500 06/10/25 0500   Output (mL) 600 mL 06/11/25 0014          Physical Exam         Neurosurgery Physical Exam    General: well developed, well nourished, no distress.   Neurologic: Alert and oriented. Thought content appropriate.  GCS: Motor: 6/Verbal: 5/Eyes: 4 GCS Total: 15  Mental Status: Awake, Alert, Oriented x 4  Language: No aphasia  Speech: No dysarthria  Cranial nerves: face symmetric, CN II-XII grossly intact, EOMI.   Pulmonary: normal respirations, no signs of respiratory distress  Sensory: intact to light touch throughout  Motor Strength: Moves all extremities spontaneously with good  "tone. No abnormal movements seen.     Strength  Deltoids Triceps Biceps Wrist Extension Wrist Flexion Hand    Upper: R 5/5 5/5 5/5 5/5 5/5 5/5    L 5/5 5/5 5/5 5/5 5/5 5/5     Iliopsoas Quadriceps Knee  Flexion Tibialis  anterior Gastro- cnemius EHL   Lower: R 4/5 4/5 4/5 5/5 5/5 5/5    L 4/5 4/5 4/5 5/5 5/5 5/5     Sorenson: absent  Clonus: absent  Skin: Skin is warm, dry and intact.      Significant Labs:  Recent Labs   Lab 06/11/25  0519 06/12/25  0254   GLU 79 67*    137   K 3.8 4.0    105   CO2 23 22*   BUN 19 20   CREATININE 1.1 1.1   CALCIUM 9.0 8.6*   MG 1.4* 2.1     Recent Labs   Lab 06/11/25  0519 06/12/25  0254   WBC 8.41 8.19   HGB 11.1* 10.6*   HCT 33.3* 32.9*    268     No results for input(s): "LABPT", "INR", "APTT" in the last 48 hours.  Microbiology Results (last 7 days)       ** No results found for the last 168 hours. **          Recent Lab Results         06/12/25  1026   06/12/25  0254        Albumin   3.4       ALP   68       ALT   11       Anion Gap   10       AST   17       Baso #   0.03       Basophil %   0.4       BILIRUBIN TOTAL   0.6  Comment: For infants and newborns, interpretation of results should be based   on gestational age, weight and in agreement with clinical   observations.    Premature Infant recommended reference ranges:   0-24 hours:  <8.0 mg/dL   24-48 hours: <12.0 mg/dL   3-5 days:    <15.0 mg/dL   6-29 days:   <15.0 mg/dL       BUN   20       Calcium   8.6       Chloride   105       CO2   22       Creatinine   1.1       eGFR   53  Comment: Estimated GFR calculated using the CKD-EPI creatinine (2021) equation.       Eos #   0.44       Eos %   5.4       Glucose   67       Gran # (ANC)   5.31       Hematocrit   32.9       Hemoglobin   10.6       Immature Grans (Abs)   0.03  Comment: Mild elevation in immature granulocytes is non specific and can be seen in a variety of conditions including stress response, acute inflammation, trauma and pregnancy. " Correlation with other laboratory and clinical findings is essential.       Immature Granulocytes   0.4       Lymph #   1.86       Lymph %   22.7       Magnesium    2.1       MCH   31.2       MCHC   32.2       MCV   97       Mono #   0.52       Mono %   6.3       MPV   10.4       Neut %   64.8       nRBC   0       Phosphorus Level   3.0       Platelet Count   268       POCT Glucose 91         Potassium   4.0       PROTEIN TOTAL   6.2       RBC   3.40       RDW   13.1       Sodium   137       WBC   8.19             All pertinent labs from the last 24 hours have been reviewed.    Significant Diagnostics:  I have reviewed all pertinent imaging results/findings within the past 24 hours.  Assessment/Plan:     * Compression fracture of T12 vertebra  Kuldeep Villela is a 74 y.o.F with PMHx of arthritis, HTN, and HLD who [place] [facility] [place] of acute on chronic low back/L flank pain for the last week. Does also report a fall a week ago.     CT Abdomen and Pelvis 6/10/25: T12 compression fracture  MRI T/L spine 6/12/25: anterior wedge fracture T12 vertebral body with moderate canal stenosis, subtle cord signal change    - Admitted to Hospital Medicine  - No acute neurosurgical intervention indicated  - TLSO brace at all times. Spinal precautions. Log roll patient.   - XR TL sp upright/supine in brace ordered. Therapy services discontinued until XR obtained.   - Monitor for bladder retention. Please document PVR. Notify NSGY with PVR greater than 300.   - Pain control per primary     Case and plan discussed with attending neurosurgeon Dr. Juan Aldana, PAKatianaC  Neurosurgery  [facility] - Surgery ([facility])

## 2025-06-12 NOTE — ASSESSMENT & PLAN NOTE
Kuldeep Villela is a 74 y.o.F with PMHx of arthritis, HTN, and HLD who [place] [facility] [place] of acute on chronic low back/L flank pain for the last week. Does also report a fall a week ago.     CT Abdomen and Pelvis 6/10/25: T12 compression fracture  MRI T/L spine 6/12/25: anterior wedge fracture T12 vertebral body with moderate canal stenosis, subtle cord signal change    - Admitted to Hospital Medicine  - No acute neurosurgical intervention indicated  - TLSO brace at all times. Spinal precautions. Log roll patient.   - XR TL sp upright/supine in brace ordered. Therapy services discontinued until XR obtained.   - Monitor for bladder retention. Please document PVR. Notify NSGY with PVR greater than 300.   - Pain control per primary     Case and plan discussed with attending neurosurgeon Dr. Martinez

## 2025-06-12 NOTE — ASSESSMENT & PLAN NOTE
Patient's blood pressure range in the last 24 hours was: BP  Min: 120/65  Max: 153/80.The patient's inpatient anti-hypertensive regimen is listed below:  Current Antihypertensives  , Every 12 hours, Oral  amLODIPine tablet 10 mg, Daily, Oral    Plan  - BP is controlled, no changes needed to their regimen

## 2025-06-12 NOTE — ANESTHESIA PREPROCEDURE EVALUATION
[facility]  Anesthesia Pre-Operative Evaluation         Patient Name: Kuldeep Villela  YOB: 1950  MRN: [id number]    SUBJECTIVE:     Pre-operative evaluation for Procedure(s) (LRB):  LAMINECTOMY, SPINE, THORACIC, W/ ARTHRODESIS (N/A)     06/12/2025    Paper consent in chart   Kuldeep Villela is a 74 y.o. female w/ a significant PMHx of morbid obesity, arthritis, lumbar stenosis, HTN, HLD, depression who [place] [facility] [place] complaints of worsening low back/L flank pain for the last week. MRI shows T12 vertebral body wedge compression fracture.    Patient now presents for the above procedure(s).    Echo Summary  No results found for this or any previous visit.       Prev airway: None documented.    LDA:        Peripheral IV - Single Lumen 06/11/25 1145 20 G Distal;Posterior;Right Forearm (Active)   Site Assessment Clean;Dry;Intact;No redness;No swelling 06/12/25 1551   Extremity Assessment Distal to IV No abnormal discoloration;No redness;No swelling;No warmth 06/12/25 1551   Line Status Saline locked 06/12/25 1551   Dressing Status Clean;Dry;Intact 06/12/25 1551   Dressing Intervention Integrity maintained 06/12/25 1551   Number of days: 1       Female External Urinary Catheter w/ Suction 06/09/25 2353 (Active)   Skin no redness;no breakdown;female external urine collection device repositioned 06/10/25 0750   Tolerance no signs/symptoms of discomfort 06/10/25 0750   Suction Continuous suction at 60 mmHg 06/12/25 1040   Date of last wick change 06/10/25 06/10/25 0500   Time of last wick change 0500 06/10/25 0500   Output (mL) 600 mL 06/11/25 0014   Number of days: 2       Drips: None documented.      Problem List[1]    Review of patient's allergies indicates:   Allergen Reactions    Penicillins Anxiety and Other (See Comments)    Anesthetic [benzocaine-aloe vera]      Jittery/hyper    Guaifenesin Anxiety and Other (See Comments)       Current Inpatient Medications:      Medications  Ordered Prior to Encounter[2]    Past Surgical History:   Procedure Laterality Date    CHOLECYSTECTOMY      HYSTERECTOMY      GA REMOVAL OF OVARY/TUBE(S)         Social History:  Tobacco Use: High Risk (6/9/2025)    Patient History     Smoking Tobacco Use: Every Day     Smokeless Tobacco Use: Never     Passive Exposure: Not on file      Alcohol Use: Not At Risk (6/11/2025)    AUDIT-C     Frequency of Alcohol Consumption: Never     Average Number of Drinks: Patient does not drink     Frequency of Binge Drinking: Never        OBJECTIVE:     Vital Signs Range (Last 24H):  Temp:  [36.2 °C (97.2 °F)-36.9 °C (98.4 °F)]   Pulse:  [73-91]   Resp:  [16-18]   BP: (125-153)/(63-89)   SpO2:  [94 %-99 %]       Significant Labs:  Lab Results   Component Value Date    WBC 8.19 06/12/2025    HGB 10.6 (L) 06/12/2025    HCT 32.9 (L) 06/12/2025     06/12/2025    CHOL 153 05/09/2023    TRIG 78 05/09/2023    HDL 33 (L) 05/09/2023    ALT 11 06/12/2025    AST 17 06/12/2025     06/12/2025    K 4.0 06/12/2025     06/12/2025    CREATININE 1.1 06/12/2025    BUN 20 06/12/2025    CO2 22 (L) 06/12/2025    TSH 0.448 03/22/2025    INR 1.0 06/12/2025    HGBA1C 4.9 05/09/2023       Diagnostic Studies: No relevant studies.    EKG:   Results for orders placed or performed during the hospital encounter of 03/22/25   EKG 12-lead    Collection Time: 03/22/25  9:24 AM   Result Value Ref Range    QRS Duration 82 ms    [facility] QTC Calculation 449 ms    Narrative    Test Reason : R11.2,    Vent. Rate :  79 BPM     Atrial Rate :  79 BPM     P-R Int : 148 ms          QRS Dur :  82 ms      QT Int : 392 ms       P-R-T Axes :  68  59   5 degrees    QTcB Int : 449 ms    Sinus rhythm with occasional Premature ventricular complexes  Low voltage QRS  Nonspecific T wave abnormality  Abnormal ECG  When compared with ECG of 23-Feb-2025 18:13,  Premature ventricular complexes are now Present  Confirmed by Rudy Salcedo ([id number]) on 3/23/2025 8:40:16  AM    Referred By: AAAREFERRAL SELF           Confirmed By: Rudy Salcedo       2D ECHO:  TTE:  No results found. However, due to the size of the patient record, not all encounters were searched. Please check Results Review for a complete set of results.    EDDI:  No results found. However, due to the size of the patient record, not all encounters were searched. Please check Results Review for a complete set of results.    ASSESSMENT/PLAN:           Pre-op Assessment    I have reviewed the Patient Summary Reports.     I have reviewed the Nursing Notes. I have reviewed the NPO Status.   I have reviewed the Medications.     Review of Systems  Anesthesia Hx:  No problems with previous Anesthesia   History of prior surgery of interest to airway management or planning:             Hematology/Oncology:       -- Denies Anemia:                  Denies Current/Recent Cancer                EENT/Dental:         Denies Otitis Media        Cardiovascular:     Hypertension    Denies CAD.        Denies CHF.    hyperlipidemia                               Pulmonary:    Denies COPD.                     Renal/:   Denies Chronic Renal Disease.                Hepatic/GI:      Denies GERD.                Musculoskeletal:  Arthritis          Spine Disorders:             Neurological:    Denies CVA.             Denies Dementia                         Endocrine:  Denies Diabetes.         Morbid Obesity / BMI > 40  Psych:  Denies Psychiatric History.                  Physical Exam  General: Well nourished, Cooperative and Alert    Airway:  Mallampati: II   Mouth Opening: Normal  TM Distance: Normal  Tongue: Normal  Neck ROM: Normal ROM    Dental:  Intact        Anesthesia Plan  Type of Anesthesia, risks & benefits discussed:    Anesthesia Type: Gen ETT, Gen Supraglottic Airway, Gen Natural Airway  Intra-op Monitoring Plan: Standard ASA Monitors  Post Op Pain Control Plan: multimodal analgesia and IV/PO Opioids PRN  Induction:  IV  Airway  Plan: Direct, Post-Induction  Informed Consent: Informed consent signed with the Patient and all parties understand the risks and agree with anesthesia plan.  All questions answered.   ASA Score: 3  Day of Surgery Review of History & Physical: H&P Update referred to the surgeon/provider.    Ready For Surgery From Anesthesia Perspective.     .           [1]   Patient Active Problem List  Diagnosis    Arthritis    Lumbar radiculopathy, BLE    Lumbar spondylosis    Lumbar spinal stenosis at L4-L5.    Spondylolisthesis, grade 1 at L4-L5.    Chronic neck pain    Cervical spondylosis    Primary osteoarthritis of both knees    Morbid obesity    Tenosynovitis of ankle    Knee pain    Hypertension    BMI 45.0-49.9, adult    Hyperlipemia    Cervical radiculopathy    Left leg swelling    Lumbar facet arthropathy    Chronic low back pain    Osteoarthritis of spine with radiculopathy, lumbar region    Frequent falls    Walker as ambulation aid    Nausea vomiting and diarrhea    Debility    Pain in both knees    Acute on chronic back pain    Restless leg syndrome    Depression with anxiety    Hypokalemia    Mild bibasilar atelectasis    Chronic, continuous use of opioids    MALACHI (acute kidney injury)    Compression fracture of T12 vertebra    Tobacco dependency    Compression fracture of body of thoracic vertebra   [2]   Current Facility-Administered Medications on File Prior to Encounter   Medication Dose Route Frequency Provider Last Rate Last Admin    acetaminophen tablet 1,000 mg  1,000 mg Oral TID Tsering Perez PA-C   1,000 mg at 06/12/25 1500    albuterol inhaler 2 puff  2 puff Inhalation Q4H PRN Tsering Perez PA-C        amLODIPine tablet 10 mg  10 mg Oral Daily Tsering Perez PA-C   10 mg at 06/11/25 0831    busPIRone tablet 15 mg  15 mg Oral QID Tsering Perez PA-C   15 mg at 06/12/25 1644    cetirizine tablet 10 mg  10 mg Oral Daily Tsering Perez PA-C   10 mg at 06/11/25 0827    citalopram tablet 40 mg  40  mg Oral Daily Tsering Perez PA-C   40 mg at 06/11/25 0827    dextrose 50% injection 12.5 g  12.5 g Intravenous PRN Tsering Perez PA-C        dextrose 50% injection 25 g  25 g Intravenous PRN Tsering Perez PA-C        glucagon (human recombinant) injection 1 mg  1 mg Intramuscular PRN Tsering Perez PA-C        glucose chewable tablet 16 g  16 g Oral PRN Tsering Perez PA-C        glucose chewable tablet 24 g  24 g Oral PRN Tsering Perez PA-C        HYDROmorphone injection 1.5 mg  1.5 mg Intravenous Q6H PRN Nicko Reeves MD   1.5 mg at 06/12/25 1755    LIDOcaine 5 % patch 1 patch  1 patch Transdermal Q24H Tsering Perez PA-C   1 patch at 06/11/25 0827    melatonin tablet 6 mg  6 mg Oral Nightly PRN Tsering Perez PA-C        methocarbamoL tablet 750 mg  750 mg Oral QID Tsering Perez PA-C   750 mg at 06/12/25 1644    naloxone 0.4 mg/mL injection 0.02 mg  0.02 mg Intravenous PRN Tsering Perez PA-C        ondansetron disintegrating tablet 8 mg  8 mg Oral Q8H PRN Tsering Perez PA-C        oxyCODONE immediate release tablet Tab 10 mg  10 mg Oral Q6H PRN Tsering Perez PA-C   10 mg at 06/12/25 1643    polyethylene glycol packet 17 g  17 g Oral Daily PRN Tsering Perez PA-C   17 g at 06/11/25 1408    pravastatin tablet 20 mg  20 mg Oral Daily Tsering Perez PA-C   20 mg at 06/11/25 0832    prochlorperazine injection Soln 5 mg  5 mg Intravenous Q6H PRN Tsering Perez PA-C        rOPINIRole tablet 1 mg  1 mg Oral BID Tsering Perez PA-C   1 mg at 06/11/25 2140    sertraline tablet 50 mg  50 mg Oral Daily Tsering Perez PA-C   50 mg at 06/11/25 0831    sodium chloride 0.9% flush 10 mL  10 mL Intravenous Q12H PRN Tsering Perez PA-C         Current Outpatient Medications on File Prior to Encounter   Medication Sig Dispense Refill    albuterol (PROVENTIL/VENTOLIN HFA) 90 mcg/actuation inhaler Inhale 1-2 puffs into the lungs every 4 (four) hours as needed for Wheezing. Rescue 18 g 11     amLODIPine (NORVASC) 10 MG tablet Take 1 tablet (10 mg total) by mouth once daily. 90 tablet 0    azelastine (ASTELIN) 137 mcg (0.1 %) nasal spray 1 spray (137 mcg total) by Nasal route 2 (two) times daily. (Patient not taking: Reported on 4/15/2025) 30 mL 5    busPIRone (BUSPAR) 15 MG tablet Take 1 tablet (15 mg total) by mouth 4 (four) times daily. 120 tablet 11    carvediloL (COREG) 6.25 MG tablet Take 1 tablet by mouth every 12 (twelve) hours.      cefdinir (OMNICEF) 300 MG capsule Take 1 capsule (300 mg total) by mouth 2 (two) times daily. for 7 days 14 capsule 0    cetirizine (ZYRTEC) 10 MG tablet TAKE 1 TABLET BY MOUTH EVERY DAY 90 tablet 3    citalopram (CELEXA) 40 MG tablet TAKE 1 TABLET(40 MG) BY MOUTH EVERY DAY 90 tablet 3    cyclobenzaprine (FLEXERIL) 10 MG tablet Take 1 tablet (10 mg total) by mouth 3 (three) times daily as needed for Muscle spasms. 90 tablet 2    diclofenac sodium (VOLTAREN) 1 % Gel Apply 2 g topically 3 (three) times daily as needed (apply to painful joints). 300 g 3    estrogens,conjugated,-methyltestosterone 0.625-1.25mg (ESTRATEST HS) 0.625-1.25 mg per tablet Take 1 tablet by mouth.      fluticasone propionate (FLONASE) 50 mcg/actuation nasal spray 1 spray (50 mcg total) by Each Nostril route once daily. (Patient not taking: Reported on 4/15/2025) 16 g 5    furosemide (LASIX) 20 MG tablet Take 1 tablet (20 mg total) by mouth 2 (two) times a day. for 2 days 4 tablet 0    linaCLOtide (LINZESS) 290 mcg Cap capsule Take 1 capsule (290 mcg total) by mouth before breakfast. (Patient not taking: Reported on 4/15/2025) 30 capsule 5    LINZESS 145 mcg Cap capsule TAKE 1 CAPSULE(145 MCG) BY MOUTH EVERY DAY (Patient not taking: Reported on 4/15/2025) 90 capsule 3    meloxicam (MOBIC) 7.5 MG tablet Take 1 tablet (7.5 mg total) by mouth 2 (two) times a day. 180 tablet 1    naloxegoL (MOVANTIK) 25 mg tablet Take 25 mg by mouth once daily. (Patient not taking: Reported on 4/15/2025) 30 tablet  1    naloxone (NARCAN) 4 mg/actuation Spry 4mg by nasal route as needed for opioid overdose; may repeat every 2-3 minutes in alternating nostrils until medical help arrives. Call 911 1 each 11    oxyCODONE-acetaminophen (PERCOCET)  mg per tablet Take 1 tablet by mouth every 4 (four) hours as needed for Pain (maximum 5 tablets per day). 180 tablet 0    pravastatin (PRAVACHOL) 20 MG tablet TAKE 1 TABLET(20 MG) BY MOUTH DAILY 90 tablet 0    rOPINIRole (REQUIP) 1 MG tablet Take 1 tablet (1 mg total) by mouth 2 (two) times daily. 180 tablet 1    sertraline (ZOLOFT) 50 MG tablet Take 1 tablet (50 mg total) by mouth once daily. 30 tablet 11    topiramate (TOPAMAX) 50 MG tablet TAKE 1 TABLET(50 MG) BY MOUTH EVERY 12 HOURS 180 tablet 1    walker Misc 4 wheeled walker for mobility 1 each 0

## 2025-06-12 NOTE — PT/OT/SLP PROGRESS
Physical Therapy      Patient Name:  Kuldeep Villela   MRN:  [id number]    Patient not seen today secondary to  (DIMA in MRI). PT unable to return for additional attempt. Will follow-up as appropriate.

## 2025-06-12 NOTE — PLAN OF CARE
Problem: Adult Inpatient Plan of Care  Goal: Plan of Care Review  6/12/2025 0609 by Dang Richards LPN  Outcome: Progressing  6/12/2025 0259 by Dang Richards LPN  Outcome: Progressing  Goal: Absence of Hospital-Acquired Illness or Injury  6/12/2025 0609 by Dang Richards LPN  Outcome: Progressing  6/12/2025 0259 by Dang Richards LPN  Outcome: Progressing  Goal: Readiness for Transition of Care  6/12/2025 0609 by Dang Richards LPN  Outcome: Progressing  6/12/2025 0259 by Dang Richards LPN  Outcome: Progressing     Problem: Bariatric Environmental Safety  Goal: Safety Maintained with Care  Outcome: Progressing     Problem: Acute Kidney Injury/Impairment  Goal: Fluid and Electrolyte Balance  Outcome: Progressing  Goal: Effective Renal Function  Outcome: Progressing     Problem: Skin Injury Risk Increased  Goal: Skin Health and Integrity  Outcome: Progressing     Problem: Fall Injury Risk  Goal: Absence of Fall and Fall-Related Injury  Outcome: Progressing

## 2025-06-12 NOTE — PROGRESS NOTES
Spoke to Daughter pleasant and updated her with POC and upcoming sedated MRI. Daughter verbalized understanding and would like to be updated by post op RN.    katelynn

## 2025-06-12 NOTE — PLAN OF CARE
Problem: Adult Inpatient Plan of Care  Goal: Plan of Care Review  Outcome: Progressing  Goal: Absence of Hospital-Acquired Illness or Injury  Outcome: Progressing  Goal: Readiness for Transition of Care  Outcome: Progressing     Problem: Bariatric Environmental Safety  Goal: Safety Maintained with Care  Outcome: Progressing     Problem: Acute Kidney Injury/Impairment  Goal: Fluid and Electrolyte Balance  Outcome: Progressing  Goal: Effective Renal Function  Outcome: Progressing     Problem: Skin Injury Risk Increased  Goal: Skin Health and Integrity  Outcome: Progressing     Problem: Fall Injury Risk  Goal: Absence of Fall and Fall-Related Injury  Outcome: Progressing

## 2025-06-12 NOTE — PT/OT/SLP PROGRESS
Occupational Therapy      Patient Name:  Kuldeep Villela   MRN:  [id number]    Patient not seen today secondary to off the floor for MRI. Will follow-up.    6/12/2025

## 2025-06-12 NOTE — PROGRESS NOTES
During pre op, noticed AM labs, patients blood sugar was 67. Rechecked blood sugar in pre op. Blood sugar was 91.    Attempted to update daughter Pleasant. No answer. Set up family for text messaging system.    [facility]

## 2025-06-12 NOTE — PROGRESS NOTES
Chelsea MCCONNELL RN bedside and helped patient fill out MRI checklist form. Tubed to MRI tube station.    [facility]

## 2025-06-12 NOTE — SUBJECTIVE & OBJECTIVE
Interval History: NAEON. MRI T/L today. Neuro exam remains stable. Continued BLE weakness since her fall 1 week ago. Voiding spontaneously, pending PVR. Continues to endorse severe back pain.     Medications:  Continuous Infusions:  Scheduled Meds:   acetaminophen  1,000 mg Oral TID    amLODIPine  10 mg Oral Daily    busPIRone  15 mg Oral QID    cetirizine  10 mg Oral Daily    citalopram  40 mg Oral Daily    diclofenac sodium  2 g Topical (Top) TID    heparin (porcine)  7,500 Units Subcutaneous Q8H    LIDOcaine (PF) 10 mg/ml (1%)  1 mL Intradermal Once    LIDOcaine  1 patch Transdermal Q24H    methocarbamoL  750 mg Oral QID    pravastatin  20 mg Oral Daily    rOPINIRole  1 mg Oral BID    sertraline  50 mg Oral Daily     PRN Meds:  Current Facility-Administered Medications:     albuterol, 2 puff, Inhalation, Q4H PRN    dextrose 50%, 12.5 g, Intravenous, PRN    dextrose 50%, 25 g, Intravenous, PRN    glucagon (human recombinant), 1 mg, Intramuscular, PRN    glucose, 16 g, Oral, PRN    glucose, 24 g, Oral, PRN    HYDROcodone-acetaminophen, 1 tablet, Oral, Q4H PRN    HYDROmorphone, 1.5 mg, Intravenous, Q6H PRN    ketorolac, 15 mg, Intravenous, Q6H PRN    melatonin, 6 mg, Oral, Nightly PRN    naloxone, 0.02 mg, Intravenous, PRN    ondansetron, 8 mg, Oral, Q8H PRN    oxyCODONE, 10 mg, Oral, Q6H PRN    polyethylene glycol, 17 g, Oral, Daily PRN    prochlorperazine, 5 mg, Intravenous, Q6H PRN    sodium chloride 0.9%, 10 mL, Intravenous, Q12H PRN     Review of Systems  Objective:     Weight: 107 kg (236 lb)  Body mass index is 44.59 kg/m².  Vital Signs (Most Recent):  Temp: 97.2 °F (36.2 °C) (06/12/25 1336)  Pulse: 73 (06/12/25 1500)  Resp: 18 (06/12/25 1500)  BP: 136/63 (06/12/25 1500)  SpO2: 97 % (06/12/25 1500) Vital Signs (24h Range):  Temp:  [97.2 °F (36.2 °C)-98.4 °F (36.9 °C)] 97.2 °F (36.2 °C)  Pulse:  [73-93] 73  Resp:  [16-18] 18  SpO2:  [92 %-99 %] 97 %  BP: (120-153)/(63-89) 136/63     Date 06/12/25 0700 -  "06/13/25 0659   Shift 0055-7376 0021-9516 4356-9278 24 Hour Total   INTAKE   IV Piggyback 500   500   Shift Total(mL/kg) 500(4.7)   500(4.7)   OUTPUT   Shift Total(mL/kg)       Weight (kg) 107 107 107 107                       Female External Urinary Catheter w/ Suction 06/09/25 2353 (Active)   Skin no redness;no breakdown;female external urine collection device repositioned 06/10/25 0750   Tolerance no signs/symptoms of discomfort 06/10/25 0750   Suction Continuous suction at 60 mmHg 06/12/25 1040   Date of last wick change 06/10/25 06/10/25 0500   Time of last wick change 0500 06/10/25 0500   Output (mL) 600 mL 06/11/25 0014          Physical Exam         Neurosurgery Physical Exam    General: well developed, well nourished, no distress.   Neurologic: Alert and oriented. Thought content appropriate.  GCS: Motor: 6/Verbal: 5/Eyes: 4 GCS Total: 15  Mental Status: Awake, Alert, Oriented x 4  Language: No aphasia  Speech: No dysarthria  Cranial nerves: face symmetric, CN II-XII grossly intact, EOMI.   Pulmonary: normal respirations, no signs of respiratory distress  Sensory: intact to light touch throughout  Motor Strength: Moves all extremities spontaneously with good tone. No abnormal movements seen.     Strength  Deltoids Triceps Biceps Wrist Extension Wrist Flexion Hand    Upper: R 5/5 5/5 5/5 5/5 5/5 5/5    L 5/5 5/5 5/5 5/5 5/5 5/5     Iliopsoas Quadriceps Knee  Flexion Tibialis  anterior Gastro- cnemius EHL   Lower: R 4/5 4/5 4/5 5/5 5/5 5/5    L 4/5 4/5 4/5 5/5 5/5 5/5     Sorenson: absent  Clonus: absent  Skin: Skin is warm, dry and intact.      Significant Labs:  Recent Labs   Lab 06/11/25  0519 06/12/25  0254   GLU 79 67*    137   K 3.8 4.0    105   CO2 23 22*   BUN 19 20   CREATININE 1.1 1.1   CALCIUM 9.0 8.6*   MG 1.4* 2.1     Recent Labs   Lab 06/11/25  0519 06/12/25  0254   WBC 8.41 8.19   HGB 11.1* 10.6*   HCT 33.3* 32.9*    268     No results for input(s): "LABPT", "INR", "APTT" " in the last 48 hours.  Microbiology Results (last 7 days)       ** No results found for the last 168 hours. **          Recent Lab Results         06/12/25  1026   06/12/25  0254        Albumin   3.4       ALP   68       ALT   11       Anion Gap   10       AST   17       Baso #   0.03       Basophil %   0.4       BILIRUBIN TOTAL   0.6  Comment: For infants and newborns, interpretation of results should be based   on gestational age, weight and in agreement with clinical   observations.    Premature Infant recommended reference ranges:   0-24 hours:  <8.0 mg/dL   24-48 hours: <12.0 mg/dL   3-5 days:    <15.0 mg/dL   6-29 days:   <15.0 mg/dL       BUN   20       Calcium   8.6       Chloride   105       CO2   22       Creatinine   1.1       eGFR   53  Comment: Estimated GFR calculated using the CKD-EPI creatinine (2021) equation.       Eos #   0.44       Eos %   5.4       Glucose   67       Gran # (ANC)   5.31       Hematocrit   32.9       Hemoglobin   10.6       Immature Grans (Abs)   0.03  Comment: Mild elevation in immature granulocytes is non specific and can be seen in a variety of conditions including stress response, acute inflammation, trauma and pregnancy. Correlation with other laboratory and clinical findings is essential.       Immature Granulocytes   0.4       Lymph #   1.86       Lymph %   22.7       Magnesium    2.1       MCH   31.2       MCHC   32.2       MCV   97       Mono #   0.52       Mono %   6.3       MPV   10.4       Neut %   64.8       nRBC   0       Phosphorus Level   3.0       Platelet Count   268       POCT Glucose 91         Potassium   4.0       PROTEIN TOTAL   6.2       RBC   3.40       RDW   13.1       Sodium   137       WBC   8.19             All pertinent labs from the last 24 hours have been reviewed.    Significant Diagnostics:  I have reviewed all pertinent imaging results/findings within the past 24 hours.

## 2025-06-12 NOTE — ASSESSMENT & PLAN NOTE
RESOLVED  MALACHI is likely due to pre-renal azotemia due to dehydration. Baseline creatinine is ~1.0. Most recent creatinine and eGFR are listed below.  Recent Labs     06/10/25  0739 06/11/25  0519 06/12/25  0254   CREATININE 1.2 1.1 1.1   EGFRNORACEVR 48* 53* 53*      Plan  - MALACHI is stable  - Avoid nephrotoxins and renally dose meds for GFR listed above  - Monitor urine output, serial BMP, and adjust therapy as needed  - s/p 1L IVF bolus in ED, encouraging good oral hydration

## 2025-06-12 NOTE — SUBJECTIVE & OBJECTIVE
Interval History: NAEO. Pending MRI. Has been NPO. Still having significant pain.     Review of Systems  Objective:     Vital Signs (Most Recent):  Temp: 97.7 °F (36.5 °C) (06/12/25 1039)  Pulse: 73 (06/12/25 1039)  Resp: 17 (06/12/25 1039)  BP: 133/76 (06/12/25 1039)  SpO2: 95 % (06/12/25 1039) Vital Signs (24h Range):  Temp:  [97.6 °F (36.4 °C)-98.4 °F (36.9 °C)] 97.7 °F (36.5 °C)  Pulse:  [73-93] 73  Resp:  [16-18] 17  SpO2:  [92 %-97 %] 95 %  BP: (120-153)/(65-80) 133/76     Weight: 107 kg (236 lb)  Body mass index is 44.59 kg/m².    Intake/Output Summary (Last 24 hours) at 6/12/2025 1326  Last data filed at 6/12/2025 1322  Gross per 24 hour   Intake 700 ml   Output --   Net 700 ml         Physical Exam  Constitutional:       Appearance: Normal appearance.   HENT:      Head: Normocephalic and atraumatic.      Nose: Nose normal.      Mouth/Throat:      Mouth: Mucous membranes are moist.   Eyes:      Extraocular Movements: Extraocular movements intact.      Pupils: Pupils are equal, round, and reactive to light.   Cardiovascular:      Rate and Rhythm: Normal rate and regular rhythm.      Heart sounds: No murmur heard.     No gallop.   Pulmonary:      Effort: Pulmonary effort is normal.      Breath sounds: Normal breath sounds. No wheezing or rales.   Abdominal:      General: Abdomen is flat. Bowel sounds are normal. There is no distension.      Palpations: Abdomen is soft.      Tenderness: There is no abdominal tenderness.   Musculoskeletal:         General: Tenderness present. No swelling. Normal range of motion.      Cervical back: Normal range of motion and neck supple.      Right lower leg: No edema.      Left lower leg: No edema.   Skin:     General: Skin is warm and dry.      Capillary Refill: Capillary refill takes less than 2 seconds.   Neurological:      General: No focal deficit present.      Mental Status: She is alert. Mental status is at baseline.      Motor: Weakness present.   Psychiatric:          Mood and Affect: Mood normal.               Significant Labs: All pertinent labs within the past 24 hours have been reviewed.    Significant Imaging: I have reviewed all pertinent imaging results/findings within the past 24 hours.

## 2025-06-12 NOTE — CARE UPDATE
MRI reviewed with Dr. Head. Recommending surgical stabilization. Plan for T10-L2 posterior fusion 6/13. Discussed with patient and daughter over the phone, agreeable to surgery. NPO at midnight. Coags ordered. Continue TLSO, log roll, spinal precautions. Bed rest order now in place until surgery. Primary team to document risk stratification for OR. Please contact NSGY on call with acute changes in exam.

## 2025-06-12 NOTE — PLAN OF CARE
Patient transported back to her unit room 609. Report given to nurse and all questions were answered. Vitals stable on room air and tolerating PO intake.Back brace in proper placement during time of transport. Two transporters were at bedside during time of transport.

## 2025-06-12 NOTE — TRANSFER OF CARE
"Anesthesia Transfer of Care Note    Patient: Kuldeep Villela    Procedure(s) Performed: Procedure(s) (LRB):  MRI (Magnetic Resonance Imagine) (Bilateral)    Patient location: PACU    Anesthesia Type: MAC    Transport from OR: Transported from OR on room air with adequate spontaneous ventilation    Post pain: adequate analgesia    Post assessment: no apparent anesthetic complications and tolerated procedure well    Post vital signs: stable    Level of consciousness: sedated    Nausea/Vomiting: no nausea/vomiting    Complications: none    Transfer of care protocol was followed    Last vitals: Visit Vitals  /76 (BP Location: Left arm, Patient Position: Lying)   Pulse 73   Temp 36.5 °C (97.7 °F) (Temporal)   Resp 17   Ht 5' 1" (1.549 m)   Wt 107 kg (236 lb)   SpO2 95%   Breastfeeding No   BMI 44.59 kg/m²     "

## 2025-06-13 ENCOUNTER — ANESTHESIA (OUTPATIENT)
Dept: SURGERY | Facility: HOSPITAL | Age: 75
End: 2025-06-13
Payer: COMMERCIAL

## 2025-06-13 LAB
ABSOLUTE EOSINOPHIL (OHS): 0.52 K/UL
ABSOLUTE MONOCYTE (OHS): 0.75 K/UL (ref 0.3–1)
ABSOLUTE NEUTROPHIL COUNT (OHS): 6.46 K/UL (ref 1.8–7.7)
ALBUMIN SERPL BCP-MCNC: 3.4 G/DL (ref 3.5–5.2)
ALP SERPL-CCNC: 69 UNIT/L (ref 40–150)
ALT SERPL W/O P-5'-P-CCNC: 13 UNIT/L (ref 10–44)
ANION GAP (OHS): 7 MMOL/L (ref 8–16)
AST SERPL-CCNC: 20 UNIT/L (ref 11–45)
BASOPHILS # BLD AUTO: 0.05 K/UL
BASOPHILS NFR BLD AUTO: 0.5 %
BILIRUB SERPL-MCNC: 0.3 MG/DL (ref 0.1–1)
BUN SERPL-MCNC: 22 MG/DL (ref 8–23)
CALCIUM SERPL-MCNC: 8.7 MG/DL (ref 8.7–10.5)
CHLORIDE SERPL-SCNC: 107 MMOL/L (ref 95–110)
CO2 SERPL-SCNC: 24 MMOL/L (ref 23–29)
CREAT SERPL-MCNC: 1.2 MG/DL (ref 0.5–1.4)
ERYTHROCYTE [DISTWIDTH] IN BLOOD BY AUTOMATED COUNT: 13.2 % (ref 11.5–14.5)
GFR SERPLBLD CREATININE-BSD FMLA CKD-EPI: 48 ML/MIN/1.73/M2
GLUCOSE SERPL-MCNC: 68 MG/DL (ref 70–110)
HCT VFR BLD AUTO: 31.8 % (ref 37–48.5)
HGB BLD-MCNC: 10.4 GM/DL (ref 12–16)
IMM GRANULOCYTES # BLD AUTO: 0.04 K/UL (ref 0–0.04)
IMM GRANULOCYTES NFR BLD AUTO: 0.4 % (ref 0–0.5)
LYMPHOCYTES # BLD AUTO: 2.03 K/UL (ref 1–4.8)
MAGNESIUM SERPL-MCNC: 1.7 MG/DL (ref 1.6–2.6)
MCH RBC QN AUTO: 31.8 PG (ref 27–31)
MCHC RBC AUTO-ENTMCNC: 32.7 G/DL (ref 32–36)
MCV RBC AUTO: 97 FL (ref 82–98)
NUCLEATED RBC (/100WBC) (OHS): 0 /100 WBC
PHOSPHATE SERPL-MCNC: 3.6 MG/DL (ref 2.7–4.5)
PLATELET # BLD AUTO: 252 K/UL (ref 150–450)
PMV BLD AUTO: 10.6 FL (ref 9.2–12.9)
POCT GLUCOSE: 78 MG/DL (ref 70–110)
POTASSIUM SERPL-SCNC: 4.3 MMOL/L (ref 3.5–5.1)
PROT SERPL-MCNC: 5.9 GM/DL (ref 6–8.4)
RBC # BLD AUTO: 3.27 M/UL (ref 4–5.4)
RELATIVE EOSINOPHIL (OHS): 5.3 %
RELATIVE LYMPHOCYTE (OHS): 20.6 % (ref 18–48)
RELATIVE MONOCYTE (OHS): 7.6 % (ref 4–15)
RELATIVE NEUTROPHIL (OHS): 65.6 % (ref 38–73)
SODIUM SERPL-SCNC: 138 MMOL/L (ref 136–145)
WBC # BLD AUTO: 9.85 K/UL (ref 3.9–12.7)

## 2025-06-13 PROCEDURE — 85025 COMPLETE CBC W/AUTO DIFF WBC: CPT

## 2025-06-13 PROCEDURE — 82565 ASSAY OF CREATININE: CPT

## 2025-06-13 PROCEDURE — 36415 COLL VENOUS BLD VENIPUNCTURE: CPT

## 2025-06-13 PROCEDURE — 63600175 PHARM REV CODE 636 W HCPCS

## 2025-06-13 PROCEDURE — 11000001 HC ACUTE MED/SURG PRIVATE ROOM

## 2025-06-13 PROCEDURE — 63600175 PHARM REV CODE 636 W HCPCS: Performed by: STUDENT IN AN ORGANIZED HEALTH CARE EDUCATION/TRAINING PROGRAM

## 2025-06-13 PROCEDURE — 83735 ASSAY OF MAGNESIUM: CPT

## 2025-06-13 PROCEDURE — 25000003 PHARM REV CODE 250

## 2025-06-13 PROCEDURE — 84100 ASSAY OF PHOSPHORUS: CPT

## 2025-06-13 RX ORDER — OXYCODONE HYDROCHLORIDE 5 MG/1
5 TABLET ORAL EVERY 6 HOURS PRN
Refills: 0 | Status: DISCONTINUED | OUTPATIENT
Start: 2025-06-13 | End: 2025-06-14

## 2025-06-13 RX ORDER — HYDROMORPHONE HYDROCHLORIDE 1 MG/ML
0.5 INJECTION, SOLUTION INTRAMUSCULAR; INTRAVENOUS; SUBCUTANEOUS ONCE
Status: COMPLETED | OUTPATIENT
Start: 2025-06-13 | End: 2025-06-13

## 2025-06-13 RX ADMIN — HYDROMORPHONE HYDROCHLORIDE 1.5 MG: 1 INJECTION, SOLUTION INTRAMUSCULAR; INTRAVENOUS; SUBCUTANEOUS at 02:06

## 2025-06-13 RX ADMIN — CETIRIZINE HYDROCHLORIDE 10 MG: 10 TABLET, FILM COATED ORAL at 08:06

## 2025-06-13 RX ADMIN — LIDOCAINE 1 PATCH: 50 PATCH CUTANEOUS at 08:06

## 2025-06-13 RX ADMIN — AMLODIPINE BESYLATE 10 MG: 10 TABLET ORAL at 09:06

## 2025-06-13 RX ADMIN — HYDROMORPHONE HYDROCHLORIDE 0.5 MG: 1 INJECTION, SOLUTION INTRAMUSCULAR; INTRAVENOUS; SUBCUTANEOUS at 05:06

## 2025-06-13 RX ADMIN — ACETAMINOPHEN 1000 MG: 500 TABLET ORAL at 08:06

## 2025-06-13 RX ADMIN — OXYCODONE HYDROCHLORIDE 10 MG: 10 TABLET ORAL at 11:06

## 2025-06-13 RX ADMIN — ROPINIROLE HYDROCHLORIDE 1 MG: 1 TABLET, FILM COATED ORAL at 08:06

## 2025-06-13 RX ADMIN — METHOCARBAMOL 750 MG: 750 TABLET ORAL at 08:06

## 2025-06-13 RX ADMIN — ACETAMINOPHEN 1000 MG: 500 TABLET ORAL at 04:06

## 2025-06-13 RX ADMIN — OXYCODONE HYDROCHLORIDE 10 MG: 10 TABLET ORAL at 04:06

## 2025-06-13 RX ADMIN — OXYCODONE HYDROCHLORIDE 10 MG: 10 TABLET ORAL at 08:06

## 2025-06-13 RX ADMIN — CITALOPRAM HYDROBROMIDE 40 MG: 20 TABLET ORAL at 08:06

## 2025-06-13 RX ADMIN — METHOCARBAMOL 750 MG: 750 TABLET ORAL at 04:06

## 2025-06-13 RX ADMIN — HYDROMORPHONE HYDROCHLORIDE 1.5 MG: 1 INJECTION, SOLUTION INTRAMUSCULAR; INTRAVENOUS; SUBCUTANEOUS at 08:06

## 2025-06-13 RX ADMIN — BUSPIRONE HYDROCHLORIDE 15 MG: 10 TABLET ORAL at 04:06

## 2025-06-13 RX ADMIN — Medication 6 MG: at 08:06

## 2025-06-13 RX ADMIN — BUSPIRONE HYDROCHLORIDE 15 MG: 10 TABLET ORAL at 08:06

## 2025-06-13 RX ADMIN — SERTRALINE HYDROCHLORIDE 50 MG: 50 TABLET ORAL at 09:06

## 2025-06-13 NOTE — SUBJECTIVE & OBJECTIVE
Interval History: NAEO. Still in significant pain. MRI reviewed with NSGY team and recommending T10-L2 posterior fusion today. She is NPO.     Review of Systems  Objective:     Vital Signs (Most Recent):  Temp: 97.7 °F (36.5 °C) (06/13/25 0822)  Pulse: 76 (06/13/25 0822)  Resp: 16 (06/13/25 1103)  BP: 118/71 (06/13/25 0822)  SpO2: 96 % (06/13/25 0822) Vital Signs (24h Range):  Temp:  [97.2 °F (36.2 °C)-98.5 °F (36.9 °C)] 97.7 °F (36.5 °C)  Pulse:  [73-88] 76  Resp:  [16-19] 16  SpO2:  [95 %-100 %] 96 %  BP: (118-158)/(63-89) 118/71     Weight: 107 kg (236 lb)  Body mass index is 44.59 kg/m².    Intake/Output Summary (Last 24 hours) at 6/13/2025 1116  Last data filed at 6/13/2025 0008  Gross per 24 hour   Intake 722 ml   Output 350 ml   Net 372 ml         Physical Exam  Constitutional:       Appearance: Normal appearance.   HENT:      Head: Normocephalic and atraumatic.      Nose: Nose normal.      Mouth/Throat:      Mouth: Mucous membranes are moist.   Eyes:      Extraocular Movements: Extraocular movements intact.      Pupils: Pupils are equal, round, and reactive to light.   Cardiovascular:      Rate and Rhythm: Normal rate and regular rhythm.      Heart sounds: No murmur heard.     No gallop.   Pulmonary:      Effort: Pulmonary effort is normal.      Breath sounds: Normal breath sounds. No wheezing or rales.   Abdominal:      General: Abdomen is flat. Bowel sounds are normal. There is no distension.      Palpations: Abdomen is soft.      Tenderness: There is no abdominal tenderness.   Musculoskeletal:         General: Tenderness present. No swelling. Normal range of motion.      Cervical back: Normal range of motion and neck supple.      Right lower leg: No edema.      Left lower leg: No edema.   Skin:     General: Skin is warm and dry.      Capillary Refill: Capillary refill takes less than 2 seconds.   Neurological:      General: No focal deficit present.      Mental Status: She is alert. Mental status is at  baseline.      Motor: Weakness present.   Psychiatric:         Mood and Affect: Mood normal.               Significant Labs: All pertinent labs within the past 24 hours have been reviewed.    Significant Imaging: I have reviewed all pertinent imaging results/findings within the past 24 hours.

## 2025-06-13 NOTE — ASSESSMENT & PLAN NOTE
RESOLVED  MALACHI is likely due to pre-renal azotemia due to dehydration. Baseline creatinine is ~1.0. Most recent creatinine and eGFR are listed below.  Recent Labs     06/11/25  0519 06/12/25  0254 06/13/25  0216   CREATININE 1.1 1.1 1.2   EGFRNORACEVR 53* 53* 48*      Plan  - MALACHI is stable  - Avoid nephrotoxins and renally dose meds for GFR listed above  - Monitor urine output, serial BMP, and adjust therapy as needed  - s/p 1L IVF bolus in ED, encouraging good oral hydration

## 2025-06-13 NOTE — ASSESSMENT & PLAN NOTE
Patient with Acute on chronic debility due to fracture/prosthesis. The patient's latest AMPAC (Activity Measure for Post Acute Care) Score is listed below.    AM-PAC Score - How much help does the patient need for each activity listed  Basic Mobility Total Score: 18  Turning over in bed (including adjusting bedclothes, sheets and blankets)?: A little  Sitting down on and standing up from a chair with arms (e.g., wheelchair, bedside commode, etc.): A little  Moving from lying on back to sitting on the side of the bed?: A little  Moving to and from a bed to a chair (including a wheelchair)?: A little  Need to walk in hospital room?: A little  Climbing 3-5 steps with a railing?: A little    Plan  - PT/OT consulted  - Fall precautions in place  -holding PT/OT until surgical procedure   - PT/OT recommending moderate intensity therapy at DC

## 2025-06-13 NOTE — PLAN OF CARE
[facility]  Med Surg  Discharge Reassessment    Primary Care Provider: Ginna Musa MD    Expected Discharge Date: 6/18/2025    SW in communication with patient and daughter, Kamari [phone], regarding pt's d/c plan and recommendations for mod (I) and pt and daughter in agreement with plan.  Pt's daughter will provide SW with additional choices over the weekend.     Patient/family provided list of facilities in-network with patient's payor plan. Providers that are owned, operated, or affiliated with [facility] are included on the list.     Notified that referral sent to below listed facilities from in-network list based on proximity to home/family support:   [facility]    Patient/family instructed to identify preference.    Preferred Facility: (if more than 1, listed in order of descending preference)  1.[facility]    OR  Patient has declined to select a preferred provider and elects placement with the first accepting in network provider that is available to provide services as ordered by the physician     If an additional preferred facility not listed above is identified, additional referral to be sent. If above facilities unable to accept, will send additional referrals to in-network providers.     Discharge Plan A and Plan B have been determined by review of patient's clinical status, future medical and therapeutic needs, and coverage/benefits for post-acute care in coordination with multidisciplinary team members.    Reassessment (most recent)       Discharge Reassessment - 06/13/25 0609          Discharge Reassessment    Assessment Type Discharge Planning Reassessment     Did the patient's condition or plan change since previous assessment? No     Discharge Plan discussed with: Patient;Adult children     Communicated MATT with patient/caregiver Yes     Discharge Plan A Skilled Nursing Facility     Discharge Plan B Home;Home with family;Home Health     DME Needed Upon Discharge  bedside commode;wheelchair      Transition of Care Barriers None     Why the patient remains in the hospital Requires continued medical care        Post-Acute Status    Post-Acute Authorization Placement     Post-Acute Placement Status Pending medical clearance/testing     Patient choice form signed by patient/caregiver List with quality metrics by geographic area provided     Discharge Delays None known at this time                   Kadie Jordan LMSW  Part-Time-  [facility]  Ext. [phone]

## 2025-06-13 NOTE — PLAN OF CARE
Surgical Clearance   -Patient undergoing non-emergent non-cardiac surgery.  -Patient does not have active cardiac problems. (unstable angina, decompensated heart failure, significant uncontrolled tachy or ivy arrhythmias or severe valvular heart disease or recent MI within past 30 days).  -Patient undergoing intermediate risk surgery.  -Functional Status:  Patient has functional METs <4, functional status significantly limited by pain.     Revised cardiac risk index (RCRI) score is 0.         Other Issues:       Patient on long term anticoagulation: No      Recent coronary stenting: No    Patient with acceptable cardiac risk with (RCRI score of 1) going for low risk surgery. No absolute contraindications to the proposed surgery at this time.   -low risk of post-op pulmonary complications.  low risk of post-op delirium.   - Okay to proceed with planned surgery with no additional cardiac testing needed prior to surgery.  - for high risk of post-op pulmonary complications, scheduled duonebs and incentive spirometry to start after surgery   - for high risk of post-op delirium, minimize CNS-active meds (opiates, benzos, anticholinergics) and sleep hygiene with shades open during day, no daytime naps, frequent re-orientation, private room if feasible.

## 2025-06-13 NOTE — ASSESSMENT & PLAN NOTE
74 y.o.F with new compression deformity of T12 and new inability to ambulate 2/2 pain. On admit, AFVSS, labs reviewed. CT a/p with: New compression deformity of T12 when compared to CT abdomen pelvis 03/22/2025. MRI T/L spine with sedation ordered. NSGY consulted. T10-L2 posterior fusion 6/13      Plan:   - TLSO brace ordered   - NSGY consulted, appreciate recommendations  - MRI spine with sedation scheduled for 6/12, concerning for compression fracture with instability and severe spinal canal stenosis concerning for cord edema   -T10-L2 posterior fusion 6/13  - MM pain regimen initiated  - hold PT/OT until surgical stabilization of spine   - fall precautions

## 2025-06-13 NOTE — ASSESSMENT & PLAN NOTE
Patient's blood pressure range in the last 24 hours was: BP  Min: 118/71  Max: 158/74.The patient's inpatient anti-hypertensive regimen is listed below:  Current Antihypertensives  , Every 12 hours, Oral  amLODIPine tablet 10 mg, Daily, Oral    Plan  - BP is controlled, no changes needed to their regimen

## 2025-06-13 NOTE — PLAN OF CARE
Problem: Adult Inpatient Plan of Care  Goal: Plan of Care Review  Outcome: Progressing  Goal: Patient-Specific Goal (Individualized)  Outcome: Progressing  Goal: Absence of Hospital-Acquired Illness or Injury  Outcome: Progressing  Goal: Optimal Comfort and Wellbeing  Outcome: Progressing     Problem: Acute Kidney Injury/Impairment  Goal: Fluid and Electrolyte Balance  Outcome: Progressing     Problem: Skin Injury Risk Increased  Goal: Skin Health and Integrity  Outcome: Progressing     Problem: Fall Injury Risk  Goal: Absence of Fall and Fall-Related Injury  Outcome: Progressing     Problem: Pain Acute  Goal: Optimal Pain Control and Function  Outcome: Progressing

## 2025-06-13 NOTE — PLAN OF CARE
MRI w/ anaesthesia done today. As per results, pt has an unstable fracture to vertebrae. She is to remain on bedrest, wear the TLSO brace at all times even in bed and NPO midnight tonight for Laminectomy spine thoracic with arthrodesis, surgical stabilization of T10-L2 posterior fusion tomorrow (in OR). Continue pain management. If pt needs to be changed or repositioned, as per MD, Log Roll only.   Problem: Skin Injury Risk Increased  Goal: Skin Health and Integrity  Outcome: Progressing  Intervention: Optimize Skin Protection  Flowsheets (Taken 6/12/2025 1900)  Pressure Reduction Techniques:   frequent weight shift encouraged   heels elevated off bed   pressure points protected   rest period provided between sit times   sit time limited to 2 hours   weight shift assistance provided  Pressure Reduction Devices:   heel offloading device utilized   positioning supports utilized  Skin Protection:   incontinence pads utilized   skin sealant/moisture barrier applied   transparent dressing maintained  Activity Management:   Rolling - L1   Arm raise - L1   Patient unable to perform activities  Head of Bed (HOB) Positioning: HOB at 30-45 degrees  Intervention: Promote and Optimize Oral Intake  Flowsheets (Taken 6/12/2025 1900)  Oral Nutrition Promotion:   adaptive equipment use encouraged   physical activity promoted   rest periods promoted  Nutrition Interventions:   food preferences provided   meal set-up provided     Problem: Fall Injury Risk  Goal: Absence of Fall and Fall-Related Injury  Outcome: Progressing  Intervention: Promote Injury-Free Environment  Flowsheets (Taken 6/12/2025 1900)  Safety Promotion/Fall Prevention:   assistive device/personal item within reach   bed alarm refused   Fall Risk reviewed with patient/family   Fall Risk signage in place   medications reviewed   nonskid shoes/socks when out of bed   toileting scheduled     Problem: Pain Acute  Goal: Optimal Pain Control and Function  Outcome:  Progressing  Intervention: Develop Pain Management Plan  Flowsheets (Taken 6/12/2025 1900)  Pain Management Interventions:   awakened for pain meds per patient request   care clustered   diversional activity provided   heat applied   pain management plan reviewed with patient/caregiver   premedicated for activity   quiet environment facilitated   relaxation techniques promoted   warm blanket provided  Intervention: Prevent or Manage Pain  Flowsheets (Taken 6/12/2025 1900)  Sleep/Rest Enhancement:   awakenings minimized   consistent schedule promoted   family presence promoted   noise level reduced   regular sleep/rest pattern promoted   relaxation techniques promoted   therapeutic touch utilized  Sensory Stimulation Regulation:   quiet environment promoted   care clustered   television on  Bowel Elimination Promotion:   adequate fluid intake promoted   diet adjusted  Medication Review/Management:   medications reviewed   high-risk medications identified     Problem: Orthopaedic Fracture  Goal: Absence of Bleeding  Outcome: Progressing  Goal: Bowel Elimination  Outcome: Progressing  Goal: Absence of Embolism Signs and Symptoms  Outcome: Progressing  Intervention: Prevent or Manage Embolism Risk  Flowsheets (Taken 6/12/2025 1900)  VTE Prevention/Management:   remove, assess skin, and reapply sequential compression device   dorsiflexion/plantar flexion performed   ROM (active) performed   ROM (passive) performed  Goal: Fracture Stability  Outcome: Progressing  Goal: Optimal Functional Ability  Outcome: Progressing  Goal: Absence of Infection Signs and Symptoms  Outcome: Progressing  Goal: Effective Tissue Perfusion  Outcome: Progressing  Goal: Optimal Pain Control and Function  Outcome: Progressing  Goal: Effective Oxygenation and Ventilation  Outcome: Progressing  Intervention: Promote Airway Secretion Clearance  Flowsheets (Taken 6/12/2025 1900)  Breathing Techniques/Airway Clearance: deep/controlled cough  encouraged  Cough And Deep Breathing: done independently per patient  Activity Management:   Rolling - L1   Arm raise - L1   Patient unable to perform activities  Intervention: Optimize Oxygenation and Ventilation  Flowsheets (Taken 6/12/2025 1900)  Airway/Ventilation Management:   airway patency maintained   calming measures promoted   pulmonary hygiene promoted  Fluid/Electrolyte Management: oral rehydration therapy initiated  Head of Bed (HOB) Positioning: HOB at 30-45 degrees

## 2025-06-13 NOTE — PLAN OF CARE
Plan of Care Update  Patient refused CT Spine due to requiring sedation. NSGY provider called patient and let her know that sedation is not typically used for Cts given short duration and ultimately this test is important prior to surgery for further evaluation and delaying this could delay her surgery. She discussed using pillows to prop her head down in CT. Also agreed to increasing pain regimen prior to CT to help patient lay flat. While down in CT patient couldn't lie flat and refused. I called patient with Nurse at [facility] and patient got upset yelling that they did not give her pillows down in CT and no pain medication was given although dilaudid was administered prior to going to CT. I discussed we could consider an increased dose prior to CT and at this point patient gave the phone away and refused to talk to me. I discussed the risks again of delaying care and surgery ultimately leading to progression of her symptoms. While patient did not want to speak to me the nurse was able to reiterate my message.Will discuss with NSGY team plan going forward.     Cintia Anton MD  Attending Physician  Department of Hospital Medicine  6/13/2025

## 2025-06-13 NOTE — PROGRESS NOTES
[facility] Medicine  Progress Note    Patient Name: Kuldeep Villela  MRN: [id number]  Patient Class: IP- Inpatient   Admission Date: 6/9/2025  Length of Stay: 3 days  Attending Physician: Cintia Anton MD  Primary Care Provider: Ginna Musa MD        Subjective     Principal Problem:Compression fracture of T12 vertebra        HPI:  Kuldeep Villela is a 74 y.o.F with PMHx of arthritis, lumbar stenosis, HTN, HLD, depression who presents to [facility] ED for complaints of worsening low back/L flank pain for the last week. Endorses chronic low back pain for which she sees a specialist for, but has worsened recently. Denies any known injury to area. She reports she felt like it may be the beginning of a UTI so she started taking Azo at home for prevention. States she normally ambulates with a walker; however, the pain has been so severe that she has not been able to ambulate at all. Endorses bilateral lower extremity weakness. Denies bladder or bowel incontinence, denies numbness of lower extremities. Denies fever, chills, chest pain, palpitations, SOB, cough, abdominal pain, n/v/d, dysuria, headaches, or any other symptoms at this time.     In ED: afebrile, VSS on RA. CBC without leukocytosis, Hgb 11.1. CMP notable for Cr 1.5 (~1.0), otherwise unremarkable. UA non-infectious appearing. CT abd/pelvis with new compression deformity of T12, perinephric haziness and stranding, L>R, correlate with renal disease, small periumbilical abd wall hernia involving bowel without inflammatory or obstructive change. S/p rocephin, ketamine, toradol 15mg inj, oxy-acetaminophen 10mg, 1L IVF bolus in ED. TLSO brace ordered. Admitted to  for further evaluation.     Overview/Hospital Course:  Pt admitted for continued management of T12 compression fx. NSGY consulted, no acute intervention warranted at this time. MRI spine with sedation pending and scheduled for 6/12.     Interval History: NAEO. Still in significant  pain. MRI reviewed with NSGY team and recommending T10-L2 posterior fusion today. She is NPO.     Review of Systems  Objective:     Vital Signs (Most Recent):  Temp: 97.7 °F (36.5 °C) (06/13/25 0822)  Pulse: 76 (06/13/25 0822)  Resp: 16 (06/13/25 1103)  BP: 118/71 (06/13/25 0822)  SpO2: 96 % (06/13/25 0822) Vital Signs (24h Range):  Temp:  [97.2 °F (36.2 °C)-98.5 °F (36.9 °C)] 97.7 °F (36.5 °C)  Pulse:  [73-88] 76  Resp:  [16-19] 16  SpO2:  [95 %-100 %] 96 %  BP: (118-158)/(63-89) 118/71     Weight: 107 kg (236 lb)  Body mass index is 44.59 kg/m².    Intake/Output Summary (Last 24 hours) at 6/13/2025 1116  Last data filed at 6/13/2025 0008  Gross per 24 hour   Intake 722 ml   Output 350 ml   Net 372 ml         Physical Exam  Constitutional:       Appearance: Normal appearance.   HENT:      Head: Normocephalic and atraumatic.      Nose: Nose normal.      Mouth/Throat:      Mouth: Mucous membranes are moist.   Eyes:      Extraocular Movements: Extraocular movements intact.      Pupils: Pupils are equal, round, and reactive to light.   Cardiovascular:      Rate and Rhythm: Normal rate and regular rhythm.      Heart sounds: No murmur heard.     No gallop.   Pulmonary:      Effort: Pulmonary effort is normal.      Breath sounds: Normal breath sounds. No wheezing or rales.   Abdominal:      General: Abdomen is flat. Bowel sounds are normal. There is no distension.      Palpations: Abdomen is soft.      Tenderness: There is no abdominal tenderness.   Musculoskeletal:         General: Tenderness present. No swelling. Normal range of motion.      Cervical back: Normal range of motion and neck supple.      Right lower leg: No edema.      Left lower leg: No edema.   Skin:     General: Skin is warm and dry.      Capillary Refill: Capillary refill takes less than 2 seconds.   Neurological:      General: No focal deficit present.      Mental Status: She is alert. Mental status is at baseline.      Motor: Weakness present.    Psychiatric:         Mood and Affect: Mood normal.               Significant Labs: All pertinent labs within the past 24 hours have been reviewed.    Significant Imaging: I have reviewed all pertinent imaging results/findings within the past 24 hours.      Assessment & Plan  Compression fracture of T12 vertebra  Acute on chronic back pain    74 y.o.F with new compression deformity of T12 and new inability to ambulate 2/2 pain. On admit, AFVSS, labs reviewed. CT a/p with: New compression deformity of T12 when compared to CT abdomen pelvis 03/22/2025. MRI T/L spine with sedation ordered. NSGY consulted. T10-L2 posterior fusion 6/13      Plan:   - TLSO brace ordered   - NSGY consulted, appreciate recommendations  - MRI spine with sedation scheduled for 6/12, concerning for compression fracture with instability and severe spinal canal stenosis concerning for cord edema   -T10-L2 posterior fusion 6/13  - MM pain regimen initiated  - hold PT/OT until surgical stabilization of spine   - fall precautions     Hypertension  Patient's blood pressure range in the last 24 hours was: BP  Min: 118/71  Max: 158/74.The patient's inpatient anti-hypertensive regimen is listed below:  Current Antihypertensives  , Every 12 hours, Oral  amLODIPine tablet 10 mg, Daily, Oral    Plan  - BP is controlled, no changes needed to their regimen    Hyperlipemia  - continue home statin    Debility  Patient with Acute on chronic debility due to fracture/prosthesis. The patient's latest AMPAC (Activity Measure for Post Acute Care) Score is listed below.    AM-PAC Score - How much help does the patient need for each activity listed  Basic Mobility Total Score: 18  Turning over in bed (including adjusting bedclothes, sheets and blankets)?: A little  Sitting down on and standing up from a chair with arms (e.g., wheelchair, bedside commode, etc.): A little  Moving from lying on back to sitting on the side of the bed?: A little  Moving to and from a bed  to a chair (including a wheelchair)?: A little  Need to walk in hospital room?: A little  Climbing 3-5 steps with a railing?: A little    Plan  - PT/OT consulted  - Fall precautions in place  -holding PT/OT until surgical procedure   - PT/OT recommending moderate intensity therapy at DC      Depression with anxiety  Patient has recurrent depression which is mild and is currently controlled. Will Continue anti-depressant medications. We will not consult psychiatry at this time. Patient does not display psychosis at this time. Continue to monitor closely and adjust plan of care as needed.  - continue home medications    MALACHI (acute kidney injury)  RESOLVED  MALACHI is likely due to pre-renal azotemia due to dehydration. Baseline creatinine is ~1.0. Most recent creatinine and eGFR are listed below.  Recent Labs     06/11/25  0519 06/12/25  0254 06/13/25  0216   CREATININE 1.1 1.1 1.2   EGFRNORACEVR 53* 53* 48*      Plan  - MALACHI is stable  - Avoid nephrotoxins and renally dose meds for GFR listed above  - Monitor urine output, serial BMP, and adjust therapy as needed  - s/p 1L IVF bolus in ED, encouraging good oral hydration       Tobacco dependency  Dangers of cigarette smoking were reviewed with patient in detail. Patient was Counseled for 3-10 minutes. Nicotine replacement options were discussed. Nicotine replacement was discussed- prescribed  Compression fracture of body of thoracic vertebra      VTE Risk Mitigation (From admission, onward)           Ordered     IP VTE HIGH RISK PATIENT  Once         06/10/25 0731     Place sequential compression device  Until discontinued         06/10/25 0731                    Discharge Planning   MATT: 6/18/2025     Code Status: Full Code   Medical Readiness for Discharge Date:   Discharge Plan A: Skilled Nursing Facility                        Cintia Anton MD  Department of Hospital Medicine   [facility] S[facility]

## 2025-06-14 LAB
GLUCOSE SERPL-MCNC: 93 MG/DL (ref 70–110)
HCO3 UR-SCNC: 22.5 MMOL/L (ref 24–28)
HCT VFR BLD CALC: 29 %PCV (ref 36–54)
PCO2 BLDA: 37 MMHG (ref 35–45)
PH SMN: 7.39 [PH] (ref 7.35–7.45)
PO2 BLDA: 323 MMHG (ref 80–100)
POC BE: -2 MMOL/L (ref -2–2)
POC IONIZED CALCIUM: 1.2 MMOL/L (ref 1.06–1.42)
POC SATURATED O2: 100 % (ref 95–100)
POC TCO2: 24 MMOL/L (ref 23–27)
POTASSIUM BLD-SCNC: 4.4 MMOL/L (ref 3.5–5.1)
SAMPLE: ABNORMAL
SODIUM BLD-SCNC: 138 MMOL/L (ref 136–145)

## 2025-06-14 PROCEDURE — 8E0WXBZ COMPUTER ASSISTED PROCEDURE OF TRUNK REGION: ICD-10-PCS | Performed by: STUDENT IN AN ORGANIZED HEALTH CARE EDUCATION/TRAINING PROGRAM

## 2025-06-14 PROCEDURE — 27800505 HC CATH, RADIAL ARTERY KIT: Performed by: NURSE ANESTHETIST, CERTIFIED REGISTERED

## 2025-06-14 PROCEDURE — 25000003 PHARM REV CODE 250: Performed by: STUDENT IN AN ORGANIZED HEALTH CARE EDUCATION/TRAINING PROGRAM

## 2025-06-14 PROCEDURE — 22610 ARTHRD PST TQ 1NTRSPC THRC: CPT | Mod: ,,, | Performed by: STUDENT IN AN ORGANIZED HEALTH CARE EDUCATION/TRAINING PROGRAM

## 2025-06-14 PROCEDURE — D9220A PRA ANESTHESIA: Mod: ANES,,, | Performed by: ANESTHESIOLOGY

## 2025-06-14 PROCEDURE — 63600175 PHARM REV CODE 636 W HCPCS: Performed by: STUDENT IN AN ORGANIZED HEALTH CARE EDUCATION/TRAINING PROGRAM

## 2025-06-14 PROCEDURE — 25000003 PHARM REV CODE 250

## 2025-06-14 PROCEDURE — 0PH404Z INSERTION OF INTERNAL FIXATION DEVICE INTO THORACIC VERTEBRA, OPEN APPROACH: ICD-10-PCS | Performed by: STUDENT IN AN ORGANIZED HEALTH CARE EDUCATION/TRAINING PROGRAM

## 2025-06-14 PROCEDURE — 27100021 HC MULTIPORT INFUSION MANIFOLD: Performed by: NURSE ANESTHETIST, CERTIFIED REGISTERED

## 2025-06-14 PROCEDURE — 11000001 HC ACUTE MED/SURG PRIVATE ROOM

## 2025-06-14 PROCEDURE — C1729 CATH, DRAINAGE: HCPCS | Performed by: STUDENT IN AN ORGANIZED HEALTH CARE EDUCATION/TRAINING PROGRAM

## 2025-06-14 PROCEDURE — 63600175 PHARM REV CODE 636 W HCPCS: Performed by: ANESTHESIOLOGY

## 2025-06-14 PROCEDURE — D9220A PRA ANESTHESIA: Mod: CRNA,,, | Performed by: NURSE ANESTHETIST, CERTIFIED REGISTERED

## 2025-06-14 PROCEDURE — 20930 SP BONE ALGRFT MORSEL ADD-ON: CPT | Mod: ,,, | Performed by: STUDENT IN AN ORGANIZED HEALTH CARE EDUCATION/TRAINING PROGRAM

## 2025-06-14 PROCEDURE — 61783 SCAN PROC SPINAL: CPT | Mod: XU,,, | Performed by: STUDENT IN AN ORGANIZED HEALTH CARE EDUCATION/TRAINING PROGRAM

## 2025-06-14 PROCEDURE — 20936 SP BONE AGRFT LOCAL ADD-ON: CPT | Mod: ,,, | Performed by: STUDENT IN AN ORGANIZED HEALTH CARE EDUCATION/TRAINING PROGRAM

## 2025-06-14 PROCEDURE — 71000033 HC RECOVERY, INTIAL HOUR: Performed by: STUDENT IN AN ORGANIZED HEALTH CARE EDUCATION/TRAINING PROGRAM

## 2025-06-14 PROCEDURE — 36620 INSERTION CATHETER ARTERY: CPT | Mod: XU,LT,, | Performed by: ANESTHESIOLOGY

## 2025-06-14 PROCEDURE — 0RG7071 FUSION OF 2 TO 7 THORACIC VERTEBRAL JOINTS WITH AUTOLOGOUS TISSUE SUBSTITUTE, POSTERIOR APPROACH, POSTERIOR COLUMN, OPEN APPROACH: ICD-10-PCS | Performed by: STUDENT IN AN ORGANIZED HEALTH CARE EDUCATION/TRAINING PROGRAM

## 2025-06-14 PROCEDURE — C1713 ANCHOR/SCREW BN/BN,TIS/BN: HCPCS | Performed by: STUDENT IN AN ORGANIZED HEALTH CARE EDUCATION/TRAINING PROGRAM

## 2025-06-14 PROCEDURE — 25000003 PHARM REV CODE 250: Performed by: NURSE ANESTHETIST, CERTIFIED REGISTERED

## 2025-06-14 PROCEDURE — 0SG0071 FUSION OF LUMBAR VERTEBRAL JOINT WITH AUTOLOGOUS TISSUE SUBSTITUTE, POSTERIOR APPROACH, POSTERIOR COLUMN, OPEN APPROACH: ICD-10-PCS | Performed by: STUDENT IN AN ORGANIZED HEALTH CARE EDUCATION/TRAINING PROGRAM

## 2025-06-14 PROCEDURE — 63600175 PHARM REV CODE 636 W HCPCS

## 2025-06-14 PROCEDURE — 27201423 OPTIME MED/SURG SUP & DEVICES STERILE SUPPLY: Performed by: STUDENT IN AN ORGANIZED HEALTH CARE EDUCATION/TRAINING PROGRAM

## 2025-06-14 PROCEDURE — 71000015 HC POSTOP RECOV 1ST HR: Performed by: STUDENT IN AN ORGANIZED HEALTH CARE EDUCATION/TRAINING PROGRAM

## 2025-06-14 PROCEDURE — 27100025 HC TUBING, SET FLUID WARMER: Performed by: NURSE ANESTHETIST, CERTIFIED REGISTERED

## 2025-06-14 PROCEDURE — C1734 ORTH/DEVIC/DRUG BN/BN,TIS/BN: HCPCS | Performed by: STUDENT IN AN ORGANIZED HEALTH CARE EDUCATION/TRAINING PROGRAM

## 2025-06-14 PROCEDURE — P9045 ALBUMIN (HUMAN), 5%, 250 ML: HCPCS | Mod: JZ,TB | Performed by: NURSE ANESTHETIST, CERTIFIED REGISTERED

## 2025-06-14 PROCEDURE — 37000009 HC ANESTHESIA EA ADD 15 MINS: Performed by: STUDENT IN AN ORGANIZED HEALTH CARE EDUCATION/TRAINING PROGRAM

## 2025-06-14 PROCEDURE — 63600175 PHARM REV CODE 636 W HCPCS: Performed by: NURSE ANESTHETIST, CERTIFIED REGISTERED

## 2025-06-14 PROCEDURE — 99024 POSTOP FOLLOW-UP VISIT: CPT | Mod: ,,, | Performed by: STUDENT IN AN ORGANIZED HEALTH CARE EDUCATION/TRAINING PROGRAM

## 2025-06-14 PROCEDURE — 22842 INSERT SPINE FIXATION DEVICE: CPT | Mod: ,,, | Performed by: STUDENT IN AN ORGANIZED HEALTH CARE EDUCATION/TRAINING PROGRAM

## 2025-06-14 PROCEDURE — 27800903 OPTIME MED/SURG SUP & DEVICES OTHER IMPLANTS: Performed by: STUDENT IN AN ORGANIZED HEALTH CARE EDUCATION/TRAINING PROGRAM

## 2025-06-14 PROCEDURE — 0RGA071 FUSION OF THORACOLUMBAR VERTEBRAL JOINT WITH AUTOLOGOUS TISSUE SUBSTITUTE, POSTERIOR APPROACH, POSTERIOR COLUMN, OPEN APPROACH: ICD-10-PCS | Performed by: STUDENT IN AN ORGANIZED HEALTH CARE EDUCATION/TRAINING PROGRAM

## 2025-06-14 PROCEDURE — 00NX0ZZ RELEASE THORACIC SPINAL CORD, OPEN APPROACH: ICD-10-PCS | Performed by: STUDENT IN AN ORGANIZED HEALTH CARE EDUCATION/TRAINING PROGRAM

## 2025-06-14 PROCEDURE — 37000008 HC ANESTHESIA 1ST 15 MINUTES: Performed by: STUDENT IN AN ORGANIZED HEALTH CARE EDUCATION/TRAINING PROGRAM

## 2025-06-14 PROCEDURE — 3E0U0GB INTRODUCTION OF RECOMBINANT BONE MORPHOGENETIC PROTEIN INTO JOINTS, OPEN APPROACH: ICD-10-PCS | Performed by: STUDENT IN AN ORGANIZED HEALTH CARE EDUCATION/TRAINING PROGRAM

## 2025-06-14 PROCEDURE — 22614 ARTHRD PST TQ 1NTRSPC EA ADD: CPT | Mod: ,,, | Performed by: STUDENT IN AN ORGANIZED HEALTH CARE EDUCATION/TRAINING PROGRAM

## 2025-06-14 PROCEDURE — 63046 LAM FACETEC & FORAMOT THRC: CPT | Mod: 51,,, | Performed by: STUDENT IN AN ORGANIZED HEALTH CARE EDUCATION/TRAINING PROGRAM

## 2025-06-14 PROCEDURE — 21400001 HC TELEMETRY ROOM

## 2025-06-14 PROCEDURE — 36000710: Performed by: STUDENT IN AN ORGANIZED HEALTH CARE EDUCATION/TRAINING PROGRAM

## 2025-06-14 PROCEDURE — 71000016 HC POSTOP RECOV ADDL HR: Performed by: STUDENT IN AN ORGANIZED HEALTH CARE EDUCATION/TRAINING PROGRAM

## 2025-06-14 PROCEDURE — 36000711: Performed by: STUDENT IN AN ORGANIZED HEALTH CARE EDUCATION/TRAINING PROGRAM

## 2025-06-14 DEVICE — EXPEDIUM VERSE SPINE SYSTEM FENESTRATED CORTICAL FIX POLYAXIAL SCREW 6.0 X 45MM
Type: IMPLANTABLE DEVICE | Site: SPINE LUMBAR | Status: FUNCTIONAL
Brand: EXPEDIUM VERSE

## 2025-06-14 DEVICE — IMPLANTABLE DEVICE: Type: IMPLANTABLE DEVICE | Site: SPINE THORACIC | Status: FUNCTIONAL

## 2025-06-14 DEVICE — SCREW BONE SPINAL 5.5 6 X 40MM: Type: IMPLANTABLE DEVICE | Site: SPINE THORACIC | Status: FUNCTIONAL

## 2025-06-14 DEVICE — SCREW INNER SINGLE SET TITANIU: Type: IMPLANTABLE DEVICE | Site: SPINE THORACIC | Status: FUNCTIONAL

## 2025-06-14 DEVICE — BONE GRAFT KIT 7510400 INFUSE MEDIUM
Type: IMPLANTABLE DEVICE | Site: SPINE THORACIC | Status: FUNCTIONAL
Brand: INFUSE® BONE GRAFT

## 2025-06-14 DEVICE — CONFIDENCE SPINAL CEMENT SYSTEM CONFIDENCE KIT SPINAL CEMENT SYSTEM
Type: IMPLANTABLE DEVICE | Site: SPINE THORACIC | Status: FUNCTIONAL
Brand: CONFIDENCE SPINAL CEMENT SYSTEM

## 2025-06-14 DEVICE — SET SCREW EXPEDIUM VERSE UNITZ: Type: IMPLANTABLE DEVICE | Site: SPINE THORACIC | Status: FUNCTIONAL

## 2025-06-14 DEVICE — SCREW BONE SPINAL 5.5 6 X 45MM: Type: IMPLANTABLE DEVICE | Site: SPINE THORACIC | Status: FUNCTIONAL

## 2025-06-14 DEVICE — BONE 30CC CANCELLOUS CRUSHED: Type: IMPLANTABLE DEVICE | Site: SPINE THORACIC | Status: FUNCTIONAL

## 2025-06-14 DEVICE — SCREW EXPEDIUM FEN STRL 6X40MM: Type: IMPLANTABLE DEVICE | Site: SPINE THORACIC | Status: FUNCTIONAL

## 2025-06-14 RX ORDER — CALCIUM CARBONATE 200(500)MG
500 TABLET,CHEWABLE ORAL 2 TIMES DAILY
Status: DISCONTINUED | OUTPATIENT
Start: 2025-06-14 | End: 2025-06-24 | Stop reason: HOSPADM

## 2025-06-14 RX ORDER — LABETALOL HYDROCHLORIDE 5 MG/ML
INJECTION, SOLUTION INTRAVENOUS
Status: DISCONTINUED | OUTPATIENT
Start: 2025-06-14 | End: 2025-06-14

## 2025-06-14 RX ORDER — REMIFENTANIL HYDROCHLORIDE 1 MG/ML
INJECTION, POWDER, LYOPHILIZED, FOR SOLUTION INTRAVENOUS CONTINUOUS PRN
Status: DISCONTINUED | OUTPATIENT
Start: 2025-06-14 | End: 2025-06-14

## 2025-06-14 RX ORDER — ENOXAPARIN SODIUM 100 MG/ML
40 INJECTION SUBCUTANEOUS EVERY 12 HOURS
Status: DISCONTINUED | OUTPATIENT
Start: 2025-06-14 | End: 2025-06-14

## 2025-06-14 RX ORDER — METHOCARBAMOL 500 MG/1
1000 TABLET, FILM COATED ORAL ONCE
Status: COMPLETED | OUTPATIENT
Start: 2025-06-14 | End: 2025-06-14

## 2025-06-14 RX ORDER — AMOXICILLIN 250 MG
2 CAPSULE ORAL 2 TIMES DAILY
Status: DISCONTINUED | OUTPATIENT
Start: 2025-06-14 | End: 2025-06-24 | Stop reason: HOSPADM

## 2025-06-14 RX ORDER — LIDOCAINE HYDROCHLORIDE 20 MG/ML
INJECTION INTRAVENOUS
Status: DISCONTINUED | OUTPATIENT
Start: 2025-06-14 | End: 2025-06-14

## 2025-06-14 RX ORDER — SUCCINYLCHOLINE CHLORIDE 20 MG/ML
INJECTION INTRAMUSCULAR; INTRAVENOUS
Status: DISCONTINUED | OUTPATIENT
Start: 2025-06-14 | End: 2025-06-14

## 2025-06-14 RX ORDER — ONDANSETRON HYDROCHLORIDE 2 MG/ML
INJECTION, SOLUTION INTRAVENOUS
Status: DISCONTINUED | OUTPATIENT
Start: 2025-06-14 | End: 2025-06-14

## 2025-06-14 RX ORDER — METOPROLOL TARTRATE 1 MG/ML
INJECTION, SOLUTION INTRAVENOUS
Status: DISCONTINUED | OUTPATIENT
Start: 2025-06-14 | End: 2025-06-14

## 2025-06-14 RX ORDER — KETAMINE HCL IN 0.9 % NACL 50 MG/5 ML
SYRINGE (ML) INTRAVENOUS
Status: DISCONTINUED | OUTPATIENT
Start: 2025-06-14 | End: 2025-06-14

## 2025-06-14 RX ORDER — LIDOCAINE HYDROCHLORIDE AND EPINEPHRINE 10; 10 UG/ML; MG/ML
INJECTION, SOLUTION INFILTRATION; PERINEURAL
Status: DISCONTINUED | OUTPATIENT
Start: 2025-06-14 | End: 2025-06-14 | Stop reason: HOSPADM

## 2025-06-14 RX ORDER — VANCOMYCIN HYDROCHLORIDE 1 G/20ML
INJECTION, POWDER, LYOPHILIZED, FOR SOLUTION INTRAVENOUS
Status: DISCONTINUED | OUTPATIENT
Start: 2025-06-14 | End: 2025-06-14 | Stop reason: HOSPADM

## 2025-06-14 RX ORDER — ALBUMIN HUMAN 50 G/1000ML
SOLUTION INTRAVENOUS
Status: DISCONTINUED | OUTPATIENT
Start: 2025-06-14 | End: 2025-06-14

## 2025-06-14 RX ORDER — MUPIROCIN 20 MG/G
OINTMENT TOPICAL 2 TIMES DAILY
Status: COMPLETED | OUTPATIENT
Start: 2025-06-14 | End: 2025-06-16

## 2025-06-14 RX ORDER — ONDANSETRON HYDROCHLORIDE 2 MG/ML
4 INJECTION, SOLUTION INTRAVENOUS DAILY PRN
Status: DISCONTINUED | OUTPATIENT
Start: 2025-06-14 | End: 2025-06-14 | Stop reason: HOSPADM

## 2025-06-14 RX ORDER — GLUCAGON 1 MG
1 KIT INJECTION
Status: DISCONTINUED | OUTPATIENT
Start: 2025-06-14 | End: 2025-06-14 | Stop reason: HOSPADM

## 2025-06-14 RX ORDER — VANCOMYCIN 1.75 GRAM/500 ML IN 0.9 % SODIUM CHLORIDE INTRAVENOUS
1750 ONCE
Status: COMPLETED | OUTPATIENT
Start: 2025-06-14 | End: 2025-06-14

## 2025-06-14 RX ORDER — ACETAMINOPHEN 10 MG/ML
INJECTION, SOLUTION INTRAVENOUS
Status: DISCONTINUED | OUTPATIENT
Start: 2025-06-14 | End: 2025-06-14

## 2025-06-14 RX ORDER — SODIUM CHLORIDE 0.9 % (FLUSH) 0.9 %
10 SYRINGE (ML) INJECTION
Status: DISCONTINUED | OUTPATIENT
Start: 2025-06-14 | End: 2025-06-14 | Stop reason: HOSPADM

## 2025-06-14 RX ORDER — OXYCODONE HYDROCHLORIDE 10 MG/1
10 TABLET ORAL EVERY 4 HOURS PRN
Refills: 0 | Status: DISCONTINUED | OUTPATIENT
Start: 2025-06-14 | End: 2025-06-24 | Stop reason: HOSPADM

## 2025-06-14 RX ORDER — SODIUM CHLORIDE 9 MG/ML
INJECTION, SOLUTION INTRAVENOUS CONTINUOUS
Status: DISCONTINUED | OUTPATIENT
Start: 2025-06-14 | End: 2025-06-16

## 2025-06-14 RX ORDER — PHENYLEPHRINE HYDROCHLORIDE 10 MG/ML
INJECTION INTRAVENOUS
Status: DISCONTINUED | OUTPATIENT
Start: 2025-06-14 | End: 2025-06-14

## 2025-06-14 RX ORDER — ENOXAPARIN SODIUM 100 MG/ML
40 INJECTION SUBCUTANEOUS EVERY 24 HOURS
Status: DISCONTINUED | OUTPATIENT
Start: 2025-06-15 | End: 2025-06-14

## 2025-06-14 RX ORDER — FENTANYL CITRATE 50 UG/ML
25 INJECTION, SOLUTION INTRAMUSCULAR; INTRAVENOUS EVERY 5 MIN PRN
Status: COMPLETED | OUTPATIENT
Start: 2025-06-14 | End: 2025-06-14

## 2025-06-14 RX ORDER — DEXMEDETOMIDINE HYDROCHLORIDE 100 UG/ML
INJECTION, SOLUTION INTRAVENOUS
Status: DISCONTINUED | OUTPATIENT
Start: 2025-06-14 | End: 2025-06-14

## 2025-06-14 RX ORDER — DIAZEPAM 5 MG/1
5 TABLET ORAL 2 TIMES DAILY PRN
Status: DISPENSED | OUTPATIENT
Start: 2025-06-14 | End: 2025-06-17

## 2025-06-14 RX ORDER — DEXAMETHASONE SODIUM PHOSPHATE 4 MG/ML
INJECTION, SOLUTION INTRA-ARTICULAR; INTRALESIONAL; INTRAMUSCULAR; INTRAVENOUS; SOFT TISSUE
Status: DISCONTINUED | OUTPATIENT
Start: 2025-06-14 | End: 2025-06-14

## 2025-06-14 RX ORDER — HALOPERIDOL LACTATE 5 MG/ML
0.5 INJECTION, SOLUTION INTRAMUSCULAR EVERY 10 MIN PRN
Status: DISCONTINUED | OUTPATIENT
Start: 2025-06-14 | End: 2025-06-14 | Stop reason: HOSPADM

## 2025-06-14 RX ORDER — FENTANYL CITRATE 50 UG/ML
INJECTION, SOLUTION INTRAMUSCULAR; INTRAVENOUS
Status: DISCONTINUED | OUTPATIENT
Start: 2025-06-14 | End: 2025-06-14

## 2025-06-14 RX ORDER — HYDROMORPHONE HYDROCHLORIDE 1 MG/ML
INJECTION, SOLUTION INTRAMUSCULAR; INTRAVENOUS; SUBCUTANEOUS
Status: DISCONTINUED | OUTPATIENT
Start: 2025-06-14 | End: 2025-06-14

## 2025-06-14 RX ORDER — CEFAZOLIN SODIUM 1 G/3ML
INJECTION, POWDER, FOR SOLUTION INTRAMUSCULAR; INTRAVENOUS
Status: DISCONTINUED | OUTPATIENT
Start: 2025-06-14 | End: 2025-06-14

## 2025-06-14 RX ORDER — OXYCODONE HYDROCHLORIDE 5 MG/1
5 TABLET ORAL EVERY 4 HOURS PRN
Refills: 0 | Status: DISCONTINUED | OUTPATIENT
Start: 2025-06-14 | End: 2025-06-24 | Stop reason: HOSPADM

## 2025-06-14 RX ORDER — HYDROMORPHONE HYDROCHLORIDE 1 MG/ML
1 INJECTION, SOLUTION INTRAMUSCULAR; INTRAVENOUS; SUBCUTANEOUS EVERY 4 HOURS PRN
Status: DISCONTINUED | OUTPATIENT
Start: 2025-06-14 | End: 2025-06-17

## 2025-06-14 RX ORDER — ROCURONIUM BROMIDE 10 MG/ML
INJECTION, SOLUTION INTRAVENOUS
Status: DISCONTINUED | OUTPATIENT
Start: 2025-06-14 | End: 2025-06-14

## 2025-06-14 RX ORDER — PROPOFOL 10 MG/ML
VIAL (ML) INTRAVENOUS CONTINUOUS PRN
Status: DISCONTINUED | OUTPATIENT
Start: 2025-06-14 | End: 2025-06-14

## 2025-06-14 RX ORDER — OXYCODONE HYDROCHLORIDE 5 MG/1
5 TABLET ORAL
Status: DISCONTINUED | OUTPATIENT
Start: 2025-06-14 | End: 2025-06-14

## 2025-06-14 RX ORDER — METHOCARBAMOL 100 MG/ML
1000 INJECTION, SOLUTION INTRAMUSCULAR; INTRAVENOUS ONCE
Status: COMPLETED | OUTPATIENT
Start: 2025-06-14 | End: 2025-06-14

## 2025-06-14 RX ORDER — ALUMINUM HYDROXIDE, MAGNESIUM HYDROXIDE, AND SIMETHICONE 1200; 120; 1200 MG/30ML; MG/30ML; MG/30ML
30 SUSPENSION ORAL EVERY 4 HOURS PRN
Status: DISCONTINUED | OUTPATIENT
Start: 2025-06-14 | End: 2025-06-24 | Stop reason: HOSPADM

## 2025-06-14 RX ADMIN — DEXMEDETOMIDINE 12 MCG: 100 INJECTION, SOLUTION, CONCENTRATE INTRAVENOUS at 11:06

## 2025-06-14 RX ADMIN — ROPINIROLE HYDROCHLORIDE 1 MG: 1 TABLET, FILM COATED ORAL at 08:06

## 2025-06-14 RX ADMIN — BUSPIRONE HYDROCHLORIDE 15 MG: 10 TABLET ORAL at 08:06

## 2025-06-14 RX ADMIN — DIAZEPAM 5 MG: 5 TABLET ORAL at 08:06

## 2025-06-14 RX ADMIN — Medication 10 MG: at 08:06

## 2025-06-14 RX ADMIN — Medication 10 MG: at 09:06

## 2025-06-14 RX ADMIN — AMLODIPINE BESYLATE 10 MG: 10 TABLET ORAL at 01:06

## 2025-06-14 RX ADMIN — FENTANYL CITRATE 50 MCG: 50 INJECTION, SOLUTION INTRAMUSCULAR; INTRAVENOUS at 09:06

## 2025-06-14 RX ADMIN — CITALOPRAM HYDROBROMIDE 40 MG: 20 TABLET ORAL at 12:06

## 2025-06-14 RX ADMIN — SODIUM CHLORIDE, SODIUM ACETATE ANHYDROUS, SODIUM GLUCONATE, POTASSIUM CHLORIDE, AND MAGNESIUM CHLORIDE: 526; 222; 502; 37; 30 INJECTION, SOLUTION INTRAVENOUS at 11:06

## 2025-06-14 RX ADMIN — Medication 10 MG: at 10:06

## 2025-06-14 RX ADMIN — BUSPIRONE HYDROCHLORIDE 15 MG: 10 TABLET ORAL at 04:06

## 2025-06-14 RX ADMIN — MUPIROCIN: 20 OINTMENT TOPICAL at 02:06

## 2025-06-14 RX ADMIN — SUGAMMADEX 400 MG: 100 INJECTION, SOLUTION INTRAVENOUS at 10:06

## 2025-06-14 RX ADMIN — HALOPERIDOL LACTATE 0.5 MG: 5 INJECTION, SOLUTION INTRAMUSCULAR at 12:06

## 2025-06-14 RX ADMIN — ROCURONIUM BROMIDE 20 MG: 10 INJECTION, SOLUTION INTRAVENOUS at 10:06

## 2025-06-14 RX ADMIN — CALCIUM CARBONATE (ANTACID) CHEW TAB 500 MG 500 MG: 500 CHEW TAB at 01:06

## 2025-06-14 RX ADMIN — FENTANYL CITRATE 25 MCG: 50 INJECTION INTRAMUSCULAR; INTRAVENOUS at 12:06

## 2025-06-14 RX ADMIN — OXYCODONE HYDROCHLORIDE 5 MG: 5 TABLET ORAL at 12:06

## 2025-06-14 RX ADMIN — CETIRIZINE HYDROCHLORIDE 10 MG: 10 TABLET, FILM COATED ORAL at 02:06

## 2025-06-14 RX ADMIN — Medication 6 MG: at 08:06

## 2025-06-14 RX ADMIN — SODIUM CHLORIDE, SODIUM ACETATE ANHYDROUS, SODIUM GLUCONATE, POTASSIUM CHLORIDE, AND MAGNESIUM CHLORIDE: 526; 222; 502; 37; 30 INJECTION, SOLUTION INTRAVENOUS at 08:06

## 2025-06-14 RX ADMIN — LABETALOL HYDROCHLORIDE 5 MG: 5 INJECTION, SOLUTION INTRAVENOUS at 11:06

## 2025-06-14 RX ADMIN — OXYCODONE HYDROCHLORIDE 10 MG: 10 TABLET ORAL at 08:06

## 2025-06-14 RX ADMIN — ALBUMIN (HUMAN) 500 ML: 12.5 SOLUTION INTRAVENOUS at 11:06

## 2025-06-14 RX ADMIN — PHENYLEPHRINE HYDROCHLORIDE 80 MCG: 10 INJECTION INTRAVENOUS at 08:06

## 2025-06-14 RX ADMIN — METHOCARBAMOL 750 MG: 750 TABLET ORAL at 08:06

## 2025-06-14 RX ADMIN — ACETAMINOPHEN 1000 MG: 500 TABLET ORAL at 12:06

## 2025-06-14 RX ADMIN — SODIUM CHLORIDE: 9 INJECTION, SOLUTION INTRAVENOUS at 05:06

## 2025-06-14 RX ADMIN — LIDOCAINE 1 PATCH: 50 PATCH CUTANEOUS at 12:06

## 2025-06-14 RX ADMIN — MUPIROCIN: 20 OINTMENT TOPICAL at 09:06

## 2025-06-14 RX ADMIN — SODIUM CHLORIDE: 9 INJECTION, SOLUTION INTRAVENOUS at 01:06

## 2025-06-14 RX ADMIN — OXYCODONE HYDROCHLORIDE 10 MG: 10 TABLET ORAL at 12:06

## 2025-06-14 RX ADMIN — FENTANYL CITRATE 75 MCG: 50 INJECTION, SOLUTION INTRAMUSCULAR; INTRAVENOUS at 08:06

## 2025-06-14 RX ADMIN — METOPROLOL TARTRATE 2 MG: 5 INJECTION, SOLUTION INTRAVENOUS at 10:06

## 2025-06-14 RX ADMIN — METHOCARBAMOL 750 MG: 750 TABLET ORAL at 04:06

## 2025-06-14 RX ADMIN — ACETAMINOPHEN 1000 MG: 10 INJECTION, SOLUTION INTRAVENOUS at 10:06

## 2025-06-14 RX ADMIN — PHENYLEPHRINE HYDROCHLORIDE 0.25 MCG/KG/MIN: 10 INJECTION INTRAVENOUS at 08:06

## 2025-06-14 RX ADMIN — HYDROMORPHONE HYDROCHLORIDE 1 MG: 1 INJECTION, SOLUTION INTRAMUSCULAR; INTRAVENOUS; SUBCUTANEOUS at 11:06

## 2025-06-14 RX ADMIN — SERTRALINE HYDROCHLORIDE 50 MG: 50 TABLET ORAL at 12:06

## 2025-06-14 RX ADMIN — LIDOCAINE HYDROCHLORIDE 100 MG: 20 INJECTION INTRAVENOUS at 08:06

## 2025-06-14 RX ADMIN — FENTANYL CITRATE 25 MCG: 50 INJECTION, SOLUTION INTRAMUSCULAR; INTRAVENOUS at 07:06

## 2025-06-14 RX ADMIN — CALCIUM CARBONATE (ANTACID) CHEW TAB 500 MG 500 MG: 500 CHEW TAB at 08:06

## 2025-06-14 RX ADMIN — METHOCARBAMOL 1000 MG: 100 INJECTION INTRAMUSCULAR; INTRAVENOUS at 12:06

## 2025-06-14 RX ADMIN — DEXAMETHASONE SODIUM PHOSPHATE 8 MG: 4 INJECTION, SOLUTION INTRAMUSCULAR; INTRAVENOUS at 08:06

## 2025-06-14 RX ADMIN — REMIFENTANIL HYDROCHLORIDE 0.05 MCG/KG/MIN: 1 INJECTION, POWDER, LYOPHILIZED, FOR SOLUTION INTRAVENOUS at 08:06

## 2025-06-14 RX ADMIN — BUSPIRONE HYDROCHLORIDE 15 MG: 10 TABLET ORAL at 01:06

## 2025-06-14 RX ADMIN — LABETALOL HYDROCHLORIDE 10 MG: 5 INJECTION, SOLUTION INTRAVENOUS at 11:06

## 2025-06-14 RX ADMIN — ACETAMINOPHEN 1000 MG: 500 TABLET ORAL at 08:06

## 2025-06-14 RX ADMIN — ROCURONIUM BROMIDE 50 MG: 10 INJECTION, SOLUTION INTRAVENOUS at 08:06

## 2025-06-14 RX ADMIN — SUCCINYLCHOLINE 120 MG: 20 INJECTION, SOLUTION INTRAMUSCULAR; INTRAVENOUS at 08:06

## 2025-06-14 RX ADMIN — CEFAZOLIN 2 G: 330 INJECTION, POWDER, FOR SOLUTION INTRAMUSCULAR; INTRAVENOUS at 08:06

## 2025-06-14 RX ADMIN — HYDROMORPHONE HYDROCHLORIDE 0.5 MG: 1 INJECTION, SOLUTION INTRAMUSCULAR; INTRAVENOUS; SUBCUTANEOUS at 11:06

## 2025-06-14 RX ADMIN — SENNOSIDES AND DOCUSATE SODIUM 2 TABLET: 50; 8.6 TABLET ORAL at 08:06

## 2025-06-14 RX ADMIN — HYDROMORPHONE HYDROCHLORIDE 1.5 MG: 1 INJECTION, SOLUTION INTRAMUSCULAR; INTRAVENOUS; SUBCUTANEOUS at 01:06

## 2025-06-14 RX ADMIN — DEXMEDETOMIDINE 8 MCG: 100 INJECTION, SOLUTION, CONCENTRATE INTRAVENOUS at 09:06

## 2025-06-14 RX ADMIN — OXYCODONE HYDROCHLORIDE 10 MG: 10 TABLET ORAL at 02:06

## 2025-06-14 RX ADMIN — SENNOSIDES AND DOCUSATE SODIUM 2 TABLET: 50; 8.6 TABLET ORAL at 01:06

## 2025-06-14 RX ADMIN — VANCOMYCIN HYDROCHLORIDE 1750 MG: 10 INJECTION, POWDER, LYOPHILIZED, FOR SOLUTION INTRAVENOUS at 02:06

## 2025-06-14 RX ADMIN — PROPOFOL 150 MCG/KG/MIN: 10 INJECTION, EMULSION INTRAVENOUS at 08:06

## 2025-06-14 RX ADMIN — SODIUM CHLORIDE: 9 INJECTION, SOLUTION INTRAVENOUS at 07:06

## 2025-06-14 RX ADMIN — PRAVASTATIN SODIUM 20 MG: 10 TABLET ORAL at 01:06

## 2025-06-14 RX ADMIN — ONDANSETRON 4 MG: 2 INJECTION INTRAMUSCULAR; INTRAVENOUS at 11:06

## 2025-06-14 RX ADMIN — ROPINIROLE HYDROCHLORIDE 1 MG: 1 TABLET, FILM COATED ORAL at 02:06

## 2025-06-14 RX ADMIN — METOPROLOL TARTRATE 1 MG: 5 INJECTION, SOLUTION INTRAVENOUS at 10:06

## 2025-06-14 NOTE — BRIEF OP NOTE
[facility] - Surgery ([facility])  Brief Operative Note    SUMMARY     Surgery Date: 6/14/2025     Surgeons and Role:     * Nnamdi Head MD - Primary     * Mony Wilkerson MD - Assisting     * Ayde Manley MD - Resident - Assisting        Pre-op Diagnosis:  Compression fracture of T12 vertebra, initial encounter [S22.080A]    Post-op Diagnosis:  Post-Op Diagnosis Codes:     * Compression fracture of T12 vertebra, initial encounter [S22.080A]    Procedure(s) (LRB):  T12 LAMINECTOMY, SPINE, THORACIC, W/ ARTHRODESIS T10-L2 (N/A)    Anesthesia: General    Implants:  Implant Name Type Inv. Item Serial No.  Lot No. LRB No. Used Action   SCREW EXPEDIUM FEN STRL [id number] - [id number]  SCREW EXPEDIUM FEN STRL 6X45MM  [employer] [id number] N/A 1 Implanted   KIT CONFIDENCE W/O NEEDLES - [id number]  KIT CONFIDENCE W/O NEEDLES  [employer] [id number] N/A 1 Implanted   SCREW EXPEDIUM FEN STRL 6X45MM - [id number]  SCREW EXPEDIUM FEN STRL 6X45MM  SY[employer]ES [id number] N/A 1 Implanted   KIT MED BONE INFUSE - [id number]  KIT MED BONE INFUSE  [employer] [facility] [id number] N/A 1 Implanted   BONE 30CC CANCELLOUS CRUSHED - [id number]  BONE 30CC CANCELLOUS CRUSHED [id number] MUSCULOSKELETAL TRANSPLANT FND  N/A 1 Implanted       Operative Findings: T10-L2 fusion and T12 laminectomy/medial facetectomy     Estimated Blood Loss: 125 mL    Estimated Blood Loss has been documented.         Specimens:   Specimen (24h ago, onward)      None          * No specimens in log *    [id number]

## 2025-06-14 NOTE — PROGRESS NOTES
[facility] - Surgery ([facility])  Neurosurgery  Progress Note    Subjective:     Interval History:  No acute events.  Patient points to an area in her lower thoracic spine where the epicenter of her back pain is.    Post-Op Info:  Procedure(s) (LRB):  LAMINECTOMY, SPINE, THORACIC, W/ ARTHRODESIS (N/A)   * Day of Surgery *      Medications:  Continuous Infusions:  Scheduled Meds:   acetaminophen  1,000 mg Oral TID    amLODIPine  10 mg Oral Daily    busPIRone  15 mg Oral QID    cetirizine  10 mg Oral Daily    citalopram  40 mg Oral Daily    LIDOcaine  1 patch Transdermal Q24H    methocarbamoL  750 mg Oral QID    pravastatin  20 mg Oral Daily    rOPINIRole  1 mg Oral BID    sertraline  50 mg Oral Daily     PRN Meds:  Current Facility-Administered Medications:     albuterol, 2 puff, Inhalation, Q4H PRN    dextrose 50%, 12.5 g, Intravenous, PRN    dextrose 50%, 25 g, Intravenous, PRN    glucagon (human recombinant), 1 mg, Intramuscular, PRN    glucose, 16 g, Oral, PRN    glucose, 24 g, Oral, PRN    HYDROmorphone, 1.5 mg, Intravenous, Q6H PRN    melatonin, 6 mg, Oral, Nightly PRN    naloxone, 0.02 mg, Intravenous, PRN    ondansetron, 8 mg, Oral, Q8H PRN    oxyCODONE, 5 mg, Oral, Q6H PRN    oxyCODONE, 10 mg, Oral, Q6H PRN    polyethylene glycol, 17 g, Oral, Daily PRN    prochlorperazine, 5 mg, Intravenous, Q6H PRN    sodium chloride 0.9%, 10 mL, Intravenous, Q12H PRN     Review of Systems  Objective:     Weight: 107 kg (236 lb)  Body mass index is 44.59 kg/m².  Vital Signs (Most Recent):  Temp: 98.7 °F (37.1 °C) (06/14/25 0713)  Pulse: 86 (06/14/25 0713)  Resp: 17 (06/14/25 0713)  BP: (!) 176/94 (06/14/25 0713)  SpO2: 96 % (06/14/25 0713) Vital Signs (24h Range):  Temp:  [97.7 °F (36.5 °C)-98.7 °F (37.1 °C)] 98.7 °F (37.1 °C)  Pulse:  [] 86  Resp:  [16-18] 17  SpO2:  [95 %-98 %] 96 %  BP: (118-186)/(68-98) 176/94         Female External Urinary Catheter w/ Suction 06/09/25 2353 (Active)   Skin no redness;no breakdown  06/13/25 2000   Tolerance no signs/symptoms of discomfort 06/13/25 2000   Suction Continuous suction at 70 mmHg 06/12/25 1643   Date of last wick change 06/15/25 06/13/25 2000   Time of last wick change 0500 06/10/25 0500   Output (mL) 600 mL 06/14/25 0649       Neurosurgery Physical Exam  Awake and alert  Sitting up  TLSO brace loosely in place  Conversant, [employer]  Normal respirations   HF KE EHL DF PF  RLE   2    2     5     5    5  LLE:  2    2     5     5    5      Significant Labs:  Recent Labs   Lab 06/13/25  0216   GLU 68*      K 4.3      CO2 24   BUN 22   CREATININE 1.2   CALCIUM 8.7   MG 1.7     Recent Labs   Lab 06/13/25  0216   WBC 9.85   HGB 10.4*   HCT 31.8*        Recent Labs   Lab 06/12/25  1726   INR 1.0   APTT 26.1     Microbiology Results (last 7 days)       ** No results found for the last 168 hours. **            Significant Diagnostics:  MRI lumbar and thoracic spine show T12 compression fracture with spinal cord deformation and some edema    CT abdomen pelvis shows progression of a T12 compression fracture compared to a CT in March    Assessment/Plan:   74-year-old female with T2 compression fracture, myelopathy  -plan for operating room today  -T10-L2 fusion with T12 decompression  -signed informed consent was documented  -spoke in detail to patient and her daughter about the plan and they are in agreement  -will return to med surg after surgery    Active Diagnoses:    Diagnosis Date Noted POA    PRINCIPAL PROBLEM:  Compression fracture of T12 vertebra [S22.080A] 06/10/2025 Yes    Compression fracture of body of thoracic vertebra [S22.000A] 06/12/2025 Yes    MALACHI (acute kidney injury) [N17.9] 06/10/2025 Yes    Tobacco dependency [F17.200] 06/10/2025 Yes    Depression with anxiety [F41.8] 03/23/2025 Yes    Acute on chronic back pain [M54.9, G89.29] 03/22/2025 Yes    Debility [R53.81] 03/22/2025 Yes    Hyperlipemia [E78.5] 09/23/2013 Yes    Hypertension [I10] 04/03/2013 Yes       Problems Resolved During this Admission:       Nnamdi Head MD  Neurosurgery  [facility] - Surgery (2nd Fl)

## 2025-06-14 NOTE — ASSESSMENT & PLAN NOTE
Patient's blood pressure range in the last 24 hours was: BP  Min: 133/73  Max: 186/98.The patient's inpatient anti-hypertensive regimen is listed below:  Current Antihypertensives  , Every 12 hours, Oral  amLODIPine tablet 10 mg, Daily, Oral    Plan  - BP is controlled, no changes needed to their regimen

## 2025-06-14 NOTE — PROGRESS NOTES
Pharmacokinetic Initial Assessment: IV Vancomycin    Assessment/Plan:    Initiate intravenous vancomycin with loading dose of 1750 mg once followed by a maintenance dose of vancomycin 1000mg IV every 24 hours  Desired empiric serum trough concentration is 10 to 20 mcg/mL  Draw vancomycin trough level 60 min prior to third dose on 06/16 at approximately 13:00  Length of treatment 2-3 days while surgical drains in place.    Pharmacy will continue to follow and monitor vancomycin.      Please contact pharmacy at extension [phone] with any questions regarding this assessment.     Thank you for the consult,   Keri Payne       Patient brief summary:  Kuldeep Villela is a 74 y.o. female initiated on antimicrobial therapy with IV Vancomycin for surgical prophylaxis.   Drug Allergies:   Review of patient's allergies indicates:   Allergen Reactions    Penicillins Anxiety and Other (See Comments)    Anesthetic [benzocaine-aloe vera]      Jittery/hyper    Guaifenesin Anxiety and Other (See Comments)       Actual Body Weight:   107 Kg    Renal Function:   Estimated Creatinine Clearance: 47.8 mL/min (based on SCr of 1.2 mg/dL).,     Dialysis Method (if applicable):  N/A    CBC (last 72 hours):  Recent Labs   Lab Result Units 06/12/25  0254 06/13/25  0216   WBC K/uL 8.19 9.85   HGB gm/dL 10.6* 10.4*   HCT % 32.9* 31.8*   Platelet Count K/uL 268 252   Lymph % % 22.7 20.6   Mono % % 6.3 7.6   Eos % % 5.4 5.3   Basophil % % 0.4 0.5       Metabolic Panel (last 72 hours):  Recent Labs   Lab Result Units 06/12/25  0254 06/13/25  0216   Sodium mmol/L 137 138   Potassium mmol/L 4.0 4.3   Chloride mmol/L 105 107   CO2 mmol/L 22* 24   Glucose mg/dL 67* 68*   BUN mg/dL 20 22   Creatinine mg/dL 1.1 1.2   Albumin g/dL 3.4* 3.4*   Bilirubin Total mg/dL 0.6 0.3   ALP unit/L 68 69   AST unit/L 17 20   ALT unit/L 11 13   Magnesium  mg/dL 2.1 1.7   Phosphorus Level mg/dL 3.0 3.6       Drug levels (last 3 results):  No results for input(s):  ""VANCOMYCINRA", "VANCORANDOM", "VANCOMYCINPE", "VANCOPEAK", "VANCOMYCINTR", "VANCOTROUGH" in the last 72 hours.    Microbiologic Results:  Microbiology Results (last 7 days)       ** No results found for the last 168 hours. **            "

## 2025-06-14 NOTE — TRANSFER OF CARE
"Anesthesia Transfer of Care Note    Patient: Kuldeep Villela    Procedure(s) Performed: Procedure(s) (LRB):  T12 LAMINECTOMY, SPINE, THORACIC, W/ ARTHRODESIS T10-L2 (N/A)    Patient location: PACU    Anesthesia Type: general    Transport from OR: Transported from OR on 6-10 L/min O2 by face mask with adequate spontaneous ventilation    Post pain: pain needs to be addressed (discussed with Dr. Tong)    Post assessment: no apparent anesthetic complications    Post vital signs: stable    Level of consciousness: awake and lethargic    Nausea/Vomiting: no nausea/vomiting    Complications: none    Transfer of care protocol was followed      Last vitals: Visit Vitals  BP (!) 176/94 (BP Location: Left arm, Patient Position: Sitting)   Pulse 86   Temp 37.1 °C (98.7 °F) (Temporal)   Resp 14   Ht 5' 1" (1.549 m)   Wt 107 kg (236 lb)   SpO2 96%   Breastfeeding No   BMI 44.59 kg/m²     "

## 2025-06-14 NOTE — SUBJECTIVE & OBJECTIVE
Interval History: Seen in the PACU s/p procedure. Has significant pain on her side.     Review of Systems  Objective:     Vital Signs (Most Recent):  Temp: 97.7 °F (36.5 °C) (06/14/25 1212)  Pulse: 89 (06/14/25 1300)  Resp: (!) 22 (06/14/25 1300)  BP: (!) 164/86 (06/14/25 1300)  SpO2: 98 % (06/14/25 1300) Vital Signs (24h Range):  Temp:  [97.7 °F (36.5 °C)-98.7 °F (37.1 °C)] 97.7 °F (36.5 °C)  Pulse:  [] 89  Resp:  [14-23] 22  SpO2:  [96 %-100 %] 98 %  BP: (133-186)/(68-98) 164/86     Weight: 107 kg (236 lb)  Body mass index is 44.59 kg/m².    Intake/Output Summary (Last 24 hours) at 6/14/2025 1316  Last data filed at 6/14/2025 1152  Gross per 24 hour   Intake 2040 ml   Output 1150 ml   Net 890 ml         Physical Exam  Constitutional:       Appearance: Normal appearance.   HENT:      Head: Normocephalic and atraumatic.      Nose: Nose normal.      Mouth/Throat:      Mouth: Mucous membranes are moist.   Eyes:      Extraocular Movements: Extraocular movements intact.      Pupils: Pupils are equal, round, and reactive to light.   Cardiovascular:      Rate and Rhythm: Normal rate and regular rhythm.      Heart sounds: No murmur heard.     No gallop.   Pulmonary:      Effort: Pulmonary effort is normal.      Breath sounds: Normal breath sounds. No wheezing or rales.   Abdominal:      General: Abdomen is flat. Bowel sounds are normal. There is no distension.      Palpations: Abdomen is soft.      Tenderness: There is no abdominal tenderness.   Musculoskeletal:         General: Tenderness present. No swelling. Normal range of motion.      Cervical back: Normal range of motion and neck supple.      Right lower leg: No edema.      Left lower leg: No edema.   Skin:     General: Skin is warm and dry.      Capillary Refill: Capillary refill takes less than 2 seconds.   Neurological:      General: No focal deficit present.      Mental Status: She is alert. Mental status is at baseline.      Motor: Weakness present.    Psychiatric:         Mood and Affect: Mood normal.               Significant Labs: All pertinent labs within the past 24 hours have been reviewed.    Significant Imaging: I have reviewed all pertinent imaging results/findings within the past 24 hours.

## 2025-06-14 NOTE — NURSING TRANSFER
Nursing Transfer Note      6/14/2025   2:04 PM    Reason patient is being transferred: post procedure    Transfer To: [id number]    Transfer via bed    Transported by RN and PCT      Additional Lines: Villanueva Catheter    Medicines sent: VANCOMYCIN      Any special needs or follow-up needed: routine    Patient belongings transferred with patient: Yes, glasses in bag and black back brace    Chart send with patient: Yes    Notified: daughter    Patient reassessed at: 1315, 06/14/2025 (date, time)  1  Upon arrival to floor: patient oriented to room, call bell in reach, and bed in lowest position

## 2025-06-14 NOTE — PLAN OF CARE
Patient off the floor this AM for surgery. Unable to see and examine. No neurological changes overnight and pain controlled per night shift. VSS overnight. No new AM labs. Will evaluate patient with full progress note once she returns from surgery.     Cintia Anton MD  Attending Physician  Department of Hospital Medicine  6/14/2025

## 2025-06-14 NOTE — ANESTHESIA PROCEDURE NOTES
Arterial    Diagnosis: spinal fracture    Patient location during procedure: done in OR  Timeout: 6/14/2025 8:00 AM  Procedure end time: 6/14/2025 8:05 AM    Staffing  Authorizing Provider: Shelley Tong MD  Performing Provider: Shelley Tong MD    Staffing  Performed by: Shelley Tong MD  Authorized by: Shelley Tong MD    Anesthesiologist was present at the time of the procedure.    Preanesthetic Checklist  Completed: patient identified, IV checked, site marked, risks and benefits discussed, surgical consent, monitors and equipment checked, pre-op evaluation, timeout performed and anesthesia consent givenArterial  Skin Prep: chlorhexidine gluconate  Orientation: left  Location: radial    Catheter placement by Ultrasound guidance. Heme positive aspiration all ports.   Vessel Caliber: patent  Needle advanced into vessel with real time Ultrasound guidance.Insertion Attempts: 1  Assessment  Dressing: secured with tape and tegaderm

## 2025-06-14 NOTE — ASSESSMENT & PLAN NOTE
74 y.o.F with new compression deformity of T12 and new inability to ambulate 2/2 pain. On admit, AFVSS, labs reviewed. CT a/p with: New compression deformity of T12 when compared to CT abdomen pelvis 03/22/2025. MRI T/L spine with sedation ordered. NSGY consulted. T10-L2 posterior fusion 6/13      Plan:   - TLSO brace ordered   - NSGY consulted, appreciate recommendations  - MRI spine with sedation scheduled for 6/12, concerning for compression fracture with instability and severe spinal canal stenosis concerning for cord edema   -s/p T10-L2 posterior fusion 6/13  -IV abx while drains in place   - MM pain regimen initiated  - hold PT/OT until surgical stabilization of spine   - fall precautions

## 2025-06-14 NOTE — PROGRESS NOTES
Calvin Ribeiro - Surgery ([facility])  [facility] Medicine  Progress Note    Patient Name: Kuldeep Villela  MRN: [id number]  Patient Class: IP- Inpatient   Admission Date: 6/9/2025  Length of Stay: 4 days  Attending Physician: Cintia Anton MD  Primary Care Provider: Ginna Musa MD        Subjective     Principal Problem:Compression fracture of T12 vertebra        HPI:  Kuldeep Villela is a 74 y.o.F with PMHx of arthritis, lumbar stenosis, HTN, HLD, depression who presents to [facility] ED for complaints of worsening low back/L flank pain for the last week. Endorses chronic low back pain for which she sees a specialist for, but has worsened recently. Denies any known injury to area. She reports she felt like it may be the beginning of a UTI so she started taking Azo at home for prevention. States she normally ambulates with a walker; however, the pain has been so severe that she has not been able to ambulate at all. Endorses bilateral lower extremity weakness. Denies bladder or bowel incontinence, denies numbness of lower extremities. Denies fever, chills, chest pain, palpitations, SOB, cough, abdominal pain, n/v/d, dysuria, headaches, or any other symptoms at this time.     In ED: afebrile, VSS on RA. CBC without leukocytosis, Hgb 11.1. CMP notable for Cr 1.5 (~1.0), otherwise unremarkable. UA non-infectious appearing. CT abd/pelvis with new compression deformity of T12, perinephric haziness and stranding, L>R, correlate with renal disease, small periumbilical abd wall hernia involving bowel without inflammatory or obstructive change. S/p rocephin, ketamine, toradol 15mg inj, oxy-acetaminophen 10mg, 1L IVF bolus in ED. TLSO brace ordered. Admitted to  for further evaluation.     Overview/Hospital Course:  Pt admitted for continued management of T12 compression fx. NSGY consulted, no acute intervention warranted at this time. MRI spine with sedation pending and scheduled for 6/12.     Interval History: Seen in the PACU s/p  procedure. Has significant pain on her side.     Review of Systems  Objective:     Vital Signs (Most Recent):  Temp: 97.7 °F (36.5 °C) (06/14/25 1212)  Pulse: 89 (06/14/25 1300)  Resp: (!) 22 (06/14/25 1300)  BP: (!) 164/86 (06/14/25 1300)  SpO2: 98 % (06/14/25 1300) Vital Signs (24h Range):  Temp:  [97.7 °F (36.5 °C)-98.7 °F (37.1 °C)] 97.7 °F (36.5 °C)  Pulse:  [] 89  Resp:  [14-23] 22  SpO2:  [96 %-100 %] 98 %  BP: (133-186)/(68-98) 164/86     Weight: 107 kg (236 lb)  Body mass index is 44.59 kg/m².    Intake/Output Summary (Last 24 hours) at 6/14/2025 1316  Last data filed at 6/14/2025 1152  Gross per 24 hour   Intake 2040 ml   Output 1150 ml   Net 890 ml         Physical Exam  Constitutional:       Appearance: Normal appearance.   HENT:      Head: Normocephalic and atraumatic.      Nose: Nose normal.      Mouth/Throat:      Mouth: Mucous membranes are moist.   Eyes:      Extraocular Movements: Extraocular movements intact.      Pupils: Pupils are equal, round, and reactive to light.   Cardiovascular:      Rate and Rhythm: Normal rate and regular rhythm.      Heart sounds: No murmur heard.     No gallop.   Pulmonary:      Effort: Pulmonary effort is normal.      Breath sounds: Normal breath sounds. No wheezing or rales.   Abdominal:      General: Abdomen is flat. Bowel sounds are normal. There is no distension.      Palpations: Abdomen is soft.      Tenderness: There is no abdominal tenderness.   Musculoskeletal:         General: Tenderness present. No swelling. Normal range of motion.      Cervical back: Normal range of motion and neck supple.      Right lower leg: No edema.      Left lower leg: No edema.   Skin:     General: Skin is warm and dry.      Capillary Refill: Capillary refill takes less than 2 seconds.   Neurological:      General: No focal deficit present.      Mental Status: She is alert. Mental status is at baseline.      Motor: Weakness present.   Psychiatric:         Mood and Affect: Mood  normal.               Significant Labs: All pertinent labs within the past 24 hours have been reviewed.    Significant Imaging: I have reviewed all pertinent imaging results/findings within the past 24 hours.        Assessment & Plan  Compression fracture of T12 vertebra  Acute on chronic back pain    74 y.o.F with new compression deformity of T12 and new inability to ambulate 2/2 pain. On admit, AFVSS, labs reviewed. CT a/p with: New compression deformity of T12 when compared to CT abdomen pelvis 03/22/2025. MRI T/L spine with sedation ordered. NSGY consulted. T10-L2 posterior fusion 6/13      Plan:   - TLSO brace ordered   - NSGY consulted, appreciate recommendations  - MRI spine with sedation scheduled for 6/12, concerning for compression fracture with instability and severe spinal canal stenosis concerning for cord edema   -s/p T10-L2 posterior fusion 6/13  -IV abx while drains in place   - MM pain regimen initiated  - hold PT/OT until surgical stabilization of spine   - fall precautions     Hypertension  Patient's blood pressure range in the last 24 hours was: BP  Min: 133/73  Max: 186/98.The patient's inpatient anti-hypertensive regimen is listed below:  Current Antihypertensives  , Every 12 hours, Oral  amLODIPine tablet 10 mg, Daily, Oral    Plan  - BP is controlled, no changes needed to their regimen    Hyperlipemia  - continue home statin    Debility  Patient with Acute on chronic debility due to fracture/prosthesis. The patient's latest AMPAC (Activity Measure for Post Acute Care) Score is listed below.    AM-PAC Score - How much help does the patient need for each activity listed  Basic Mobility Total Score: 18  Turning over in bed (including adjusting bedclothes, sheets and blankets)?: A little  Sitting down on and standing up from a chair with arms (e.g., wheelchair, bedside commode, etc.): A little  Moving from lying on back to sitting on the side of the bed?: A little  Moving to and from a bed to a  chair (including a wheelchair)?: A little  Need to walk in hospital room?: A little  Climbing 3-5 steps with a railing?: A little    Plan  - PT/OT consulted  - Fall precautions in place  -holding PT/OT until surgical procedure   - PT/OT recommending moderate intensity therapy at [place]      Depression with anxiety  Patient has recurrent depression which is mild and is currently controlled. Will Continue anti-depressant medications. We will not consult psychiatry at this time. Patient does not display psychosis at this time. Continue to monitor closely and adjust plan of care as needed.  - continue home medications    MALACHI (acute kidney injury)  RESOLVED  MALACHI is likely due to pre-renal azotemia due to dehydration. Baseline creatinine is ~1.0. Most recent creatinine and eGFR are listed below.  Recent Labs     06/12/25  0254 06/13/25  0216   CREATININE 1.1 1.2   EGFRNORACEVR 53* 48*      Plan  - MALACHI is stable  - Avoid nephrotoxins and renally dose meds for GFR listed above  - Monitor urine output, serial BMP, and adjust therapy as needed  - s/p 1L IVF bolus in ED, encouraging good oral hydration       Tobacco dependency  Dangers of cigarette smoking were reviewed with patient in detail. Patient was Counseled for 3-10 minutes. Nicotine replacement options were discussed. Nicotine replacement was discussed- prescribed  Compression fracture of body of thoracic vertebra      VTE Risk Mitigation (From admission, onward)           Ordered     IP VTE HIGH RISK PATIENT  Once         06/14/25 1204     Place sequential compression device  Until discontinued         06/10/25 0731                    Discharge Planning   MATT: 6/18/2025     Code Status: Full Code   Medical Readiness for Discharge Date:   Discharge Plan A: Skilled Nursing Facility   Discharge Delays: None known at this time            Please place Justification for DME        Cintia Anton MD  Department of Hospital Medicine   [facility] - Surgery ([facility])

## 2025-06-14 NOTE — PLAN OF CARE
Problem: Adult Inpatient Plan of Care  Goal: Plan of Care Review  Outcome: Progressing  Goal: Patient-Specific Goal (Individualized)  Outcome: Progressing  Goal: Absence of Hospital-Acquired Illness or Injury  Outcome: Progressing  Goal: Optimal Comfort and Wellbeing  Outcome: Progressing  Goal: Readiness for Transition of Care  Outcome: Progressing     Problem: Bariatric Environmental Safety  Goal: Safety Maintained with Care  Outcome: Progressing     Problem: Acute Kidney Injury/Impairment  Goal: Fluid and Electrolyte Balance  Outcome: Progressing  Goal: Improved Oral Intake  Outcome: Progressing  Goal: Effective Renal Function  Outcome: Progressing     Problem: Skin Injury Risk Increased  Goal: Skin Health and Integrity  Outcome: Progressing

## 2025-06-14 NOTE — ASSESSMENT & PLAN NOTE
RESOLVED  MALACHI is likely due to pre-renal azotemia due to dehydration. Baseline creatinine is ~1.0. Most recent creatinine and eGFR are listed below.  Recent Labs     06/12/25  0254 06/13/25  0216   CREATININE 1.1 1.2   EGFRNORACEVR 53* 48*      Plan  - MALACHI is stable  - Avoid nephrotoxins and renally dose meds for GFR listed above  - Monitor urine output, serial BMP, and adjust therapy as needed  - s/p 1L IVF bolus in ED, encouraging good oral hydration

## 2025-06-14 NOTE — ANESTHESIA PROCEDURE NOTES
Intubation    Date/Time: 6/14/2025 8:09 AM    Performed by: Ivone Nelson CRNA  Authorized by: Shelley Tong MD    Intubation:     Induction:  Intravenous    Intubated:  Postinduction    Mask Ventilation:  Easy mask    Attempts:  1    Attempted By:  Student    Method of Intubation:  Video laryngoscopy    Blade:  Mchugh 3    Laryngeal View Grade: Grade I - full view of cords      Difficult Airway Encountered?: No      Complications:  None    Airway Device:  Oral endotracheal tube    Airway Device Size:  7.0    Style/Cuff Inflation:  Cuffed (inflated to minimal occlusive pressure)    Tube secured:  20    Secured at:  The lips    Placement Verified By:  Capnometry    Complicating Factors:  None    Findings Post-Intubation:  BS equal bilateral and atraumatic/condition of teeth unchanged

## 2025-06-14 NOTE — PROGRESS NOTES
Pharmacist Renal Dose Adjustment Note    Kuldeep Villela is a 74 y.o. female being treated with the medication enoxaparin    Patient Data:    Vital Signs (Most Recent):  Temp: 97.7 °F (36.5 °C) (06/14/25 1212)  Pulse: 84 (06/14/25 1230)  Resp: (!) 23 (06/14/25 1230)  BP: (!) 147/77 (06/14/25 1230)  SpO2: 97 % (06/14/25 1230) Vital Signs (72h Range):  Temp:  [97.2 °F (36.2 °C)-98.7 °F (37.1 °C)]   Pulse:  []   Resp:  [14-23]   BP: (118-186)/(63-98)   SpO2:  [92 %-100 %]      Recent Labs   Lab 06/11/25  0519 06/12/25  0254 06/13/25  0216   CREATININE 1.1 1.1 1.2     Serum creatinine: 1.2 mg/dL 06/13/25 0216  Estimated creatinine clearance: 47.8 mL/min    Enoxaparin 40 mg daily will be changed to enoxaparin 40 mg twice a day. Patient BMI > 40      Pharmacist's Name: Keri Payne  Pharmacist's Extension: [phone]

## 2025-06-15 PROBLEM — E87.5 HYPERKALEMIA: Status: ACTIVE | Noted: 2025-06-15

## 2025-06-15 LAB
ABSOLUTE EOSINOPHIL (OHS): 0 K/UL
ABSOLUTE MONOCYTE (OHS): 0.81 K/UL (ref 0.3–1)
ABSOLUTE NEUTROPHIL COUNT (OHS): 9.87 K/UL (ref 1.8–7.7)
ANION GAP (OHS): 10 MMOL/L (ref 8–16)
BASOPHILS # BLD AUTO: 0.02 K/UL
BASOPHILS NFR BLD AUTO: 0.2 %
BUN SERPL-MCNC: 20 MG/DL (ref 8–23)
CALCIUM SERPL-MCNC: 9.2 MG/DL (ref 8.7–10.5)
CHLORIDE SERPL-SCNC: 103 MMOL/L (ref 95–110)
CO2 SERPL-SCNC: 24 MMOL/L (ref 23–29)
CREAT SERPL-MCNC: 1 MG/DL (ref 0.5–1.4)
ERYTHROCYTE [DISTWIDTH] IN BLOOD BY AUTOMATED COUNT: 13 % (ref 11.5–14.5)
GFR SERPLBLD CREATININE-BSD FMLA CKD-EPI: 60 ML/MIN/1.73/M2
GLUCOSE SERPL-MCNC: 116 MG/DL (ref 70–110)
HCT VFR BLD AUTO: 30.9 % (ref 37–48.5)
HGB BLD-MCNC: 10 GM/DL (ref 12–16)
IMM GRANULOCYTES # BLD AUTO: 0.08 K/UL (ref 0–0.04)
IMM GRANULOCYTES NFR BLD AUTO: 0.7 % (ref 0–0.5)
LYMPHOCYTES # BLD AUTO: 0.93 K/UL (ref 1–4.8)
MAGNESIUM SERPL-MCNC: 1.5 MG/DL (ref 1.6–2.6)
MCH RBC QN AUTO: 31.3 PG (ref 27–31)
MCHC RBC AUTO-ENTMCNC: 32.4 G/DL (ref 32–36)
MCV RBC AUTO: 97 FL (ref 82–98)
NUCLEATED RBC (/100WBC) (OHS): 0 /100 WBC
PHOSPHATE SERPL-MCNC: 4 MG/DL (ref 2.7–4.5)
PLATELET # BLD AUTO: 268 K/UL (ref 150–450)
PMV BLD AUTO: 11 FL (ref 9.2–12.9)
POTASSIUM SERPL-SCNC: 5.3 MMOL/L (ref 3.5–5.1)
RBC # BLD AUTO: 3.19 M/UL (ref 4–5.4)
RELATIVE EOSINOPHIL (OHS): 0 %
RELATIVE LYMPHOCYTE (OHS): 7.9 % (ref 18–48)
RELATIVE MONOCYTE (OHS): 6.9 % (ref 4–15)
RELATIVE NEUTROPHIL (OHS): 84.3 % (ref 38–73)
SODIUM SERPL-SCNC: 137 MMOL/L (ref 136–145)
WBC # BLD AUTO: 11.71 K/UL (ref 3.9–12.7)

## 2025-06-15 PROCEDURE — 25000003 PHARM REV CODE 250: Performed by: STUDENT IN AN ORGANIZED HEALTH CARE EDUCATION/TRAINING PROGRAM

## 2025-06-15 PROCEDURE — 80048 BASIC METABOLIC PNL TOTAL CA: CPT | Performed by: STUDENT IN AN ORGANIZED HEALTH CARE EDUCATION/TRAINING PROGRAM

## 2025-06-15 PROCEDURE — 36415 COLL VENOUS BLD VENIPUNCTURE: CPT | Performed by: STUDENT IN AN ORGANIZED HEALTH CARE EDUCATION/TRAINING PROGRAM

## 2025-06-15 PROCEDURE — 21400001 HC TELEMETRY ROOM

## 2025-06-15 PROCEDURE — 63600175 PHARM REV CODE 636 W HCPCS: Performed by: STUDENT IN AN ORGANIZED HEALTH CARE EDUCATION/TRAINING PROGRAM

## 2025-06-15 PROCEDURE — 84100 ASSAY OF PHOSPHORUS: CPT | Performed by: STUDENT IN AN ORGANIZED HEALTH CARE EDUCATION/TRAINING PROGRAM

## 2025-06-15 PROCEDURE — 97168 OT RE-EVAL EST PLAN CARE: CPT

## 2025-06-15 PROCEDURE — 97535 SELF CARE MNGMENT TRAINING: CPT

## 2025-06-15 PROCEDURE — 83735 ASSAY OF MAGNESIUM: CPT | Performed by: STUDENT IN AN ORGANIZED HEALTH CARE EDUCATION/TRAINING PROGRAM

## 2025-06-15 PROCEDURE — 11000001 HC ACUTE MED/SURG PRIVATE ROOM

## 2025-06-15 PROCEDURE — 25000003 PHARM REV CODE 250

## 2025-06-15 PROCEDURE — 85025 COMPLETE CBC W/AUTO DIFF WBC: CPT | Performed by: STUDENT IN AN ORGANIZED HEALTH CARE EDUCATION/TRAINING PROGRAM

## 2025-06-15 RX ORDER — DICLOFENAC SODIUM 10 MG/G
2 GEL TOPICAL 3 TIMES DAILY
Status: DISCONTINUED | OUTPATIENT
Start: 2025-06-15 | End: 2025-06-24 | Stop reason: HOSPADM

## 2025-06-15 RX ORDER — ENOXAPARIN SODIUM 100 MG/ML
40 INJECTION SUBCUTANEOUS EVERY 24 HOURS
Status: DISCONTINUED | OUTPATIENT
Start: 2025-06-15 | End: 2025-06-17

## 2025-06-15 RX ADMIN — BUSPIRONE HYDROCHLORIDE 15 MG: 10 TABLET ORAL at 09:06

## 2025-06-15 RX ADMIN — VANCOMYCIN HYDROCHLORIDE 1000 MG: 1 INJECTION, POWDER, LYOPHILIZED, FOR SOLUTION INTRAVENOUS at 01:06

## 2025-06-15 RX ADMIN — ACETAMINOPHEN 1000 MG: 500 TABLET ORAL at 08:06

## 2025-06-15 RX ADMIN — METHOCARBAMOL 750 MG: 750 TABLET ORAL at 01:06

## 2025-06-15 RX ADMIN — DICLOFENAC SODIUM 2 G: 10 GEL TOPICAL at 09:06

## 2025-06-15 RX ADMIN — SENNOSIDES AND DOCUSATE SODIUM 2 TABLET: 50; 8.6 TABLET ORAL at 08:06

## 2025-06-15 RX ADMIN — SERTRALINE HYDROCHLORIDE 50 MG: 50 TABLET ORAL at 09:06

## 2025-06-15 RX ADMIN — BUSPIRONE HYDROCHLORIDE 15 MG: 10 TABLET ORAL at 08:06

## 2025-06-15 RX ADMIN — ROPINIROLE HYDROCHLORIDE 1 MG: 1 TABLET, FILM COATED ORAL at 08:06

## 2025-06-15 RX ADMIN — LACTULOSE 10 G: 20 SOLUTION ORAL at 08:06

## 2025-06-15 RX ADMIN — HYDROMORPHONE HYDROCHLORIDE 1 MG: 1 INJECTION, SOLUTION INTRAMUSCULAR; INTRAVENOUS; SUBCUTANEOUS at 10:06

## 2025-06-15 RX ADMIN — OXYCODONE HYDROCHLORIDE 10 MG: 10 TABLET ORAL at 03:06

## 2025-06-15 RX ADMIN — CETIRIZINE HYDROCHLORIDE 10 MG: 10 TABLET, FILM COATED ORAL at 09:06

## 2025-06-15 RX ADMIN — AMLODIPINE BESYLATE 10 MG: 10 TABLET ORAL at 09:06

## 2025-06-15 RX ADMIN — SODIUM CHLORIDE: 9 INJECTION, SOLUTION INTRAVENOUS at 09:06

## 2025-06-15 RX ADMIN — METHOCARBAMOL 750 MG: 750 TABLET ORAL at 08:06

## 2025-06-15 RX ADMIN — DIAZEPAM 5 MG: 5 TABLET ORAL at 04:06

## 2025-06-15 RX ADMIN — BUSPIRONE HYDROCHLORIDE 15 MG: 10 TABLET ORAL at 04:06

## 2025-06-15 RX ADMIN — METHOCARBAMOL 750 MG: 750 TABLET ORAL at 04:06

## 2025-06-15 RX ADMIN — ACETAMINOPHEN 1000 MG: 500 TABLET ORAL at 09:06

## 2025-06-15 RX ADMIN — ENOXAPARIN SODIUM 40 MG: 40 INJECTION SUBCUTANEOUS at 04:06

## 2025-06-15 RX ADMIN — CITALOPRAM HYDROBROMIDE 40 MG: 20 TABLET ORAL at 09:06

## 2025-06-15 RX ADMIN — MUPIROCIN: 20 OINTMENT TOPICAL at 09:06

## 2025-06-15 RX ADMIN — ROPINIROLE HYDROCHLORIDE 1 MG: 1 TABLET, FILM COATED ORAL at 09:06

## 2025-06-15 RX ADMIN — PRAVASTATIN SODIUM 20 MG: 10 TABLET ORAL at 09:06

## 2025-06-15 RX ADMIN — ACETAMINOPHEN 1000 MG: 500 TABLET ORAL at 02:06

## 2025-06-15 RX ADMIN — OXYCODONE HYDROCHLORIDE 10 MG: 10 TABLET ORAL at 08:06

## 2025-06-15 RX ADMIN — CALCIUM CARBONATE (ANTACID) CHEW TAB 500 MG 500 MG: 500 CHEW TAB at 09:06

## 2025-06-15 RX ADMIN — DICLOFENAC SODIUM 2 G: 10 GEL TOPICAL at 04:06

## 2025-06-15 RX ADMIN — SODIUM ZIRCONIUM CYCLOSILICATE 10 G: 10 POWDER, FOR SUSPENSION ORAL at 09:06

## 2025-06-15 RX ADMIN — METHOCARBAMOL 750 MG: 750 TABLET ORAL at 09:06

## 2025-06-15 RX ADMIN — LACTULOSE 10 G: 20 SOLUTION ORAL at 02:06

## 2025-06-15 RX ADMIN — BUSPIRONE HYDROCHLORIDE 15 MG: 10 TABLET ORAL at 01:06

## 2025-06-15 RX ADMIN — CALCIUM CARBONATE (ANTACID) CHEW TAB 500 MG 500 MG: 500 CHEW TAB at 08:06

## 2025-06-15 NOTE — PT/OT/SLP RE-EVAL
"Occupational Therapy   Re-evaluation    Name: Kuldeep Villela  MRN: [id number]  Admitting Diagnosis:  Compression fracture of T12 vertebra  Recent Surgery: Procedure(s) (LRB):  T12 LAMINECTOMY, SPINE, THORACIC, W/ ARTHRODESIS T10-L2 (N/A) 1 Day Post-Op    Recommendations:     Discharge Recommendations: Moderate Intensity Therapy  Discharge Equipment Recommendations: bedside commode, walker, rolling, bath bench, hip kit  Barriers to discharge:  None    Assessment:     Kuldeep Villela is a 74 y.o. female with a medical diagnosis of Compression fracture of T12 vertebra.  She presents with T10-T12 laminectomy.  Performance deficits affecting function are weakness, impaired endurance, impaired self care skills, impaired functional mobility, impaired balance, orthopedic precautions.  Pt was able to perform log rolling to L c max A and sit/stand and stand pivot to stretcher c max A.  Able to don TLSO c max A.  TLSO appears to be missing a couple of straps for her shoulder.  Eval limited d/t pt having to go for x-ray in the middle of assessment.    Rehab Prognosis:  Good; patient would benefit from acute skilled OT services to address these deficits and reach maximum level of function.       Plan:     Patient to be seen 4 x/week to address the above listed problems via self-care/home management, therapeutic activities, therapeutic exercises  Plan of Care Expires: 07/15/25  Plan of Care Reviewed with: patient    Subjective     Chief Complaint: T10-T12 laminectomy  Patient/Family stated goals: "I'm glad you put that back brace on.  Communicated with: RN prior to session.  Pain/Comfort:  Pain Rating 1: 5/10    Objective:     Communicated with: RN prior to session.  Patient found supine with: peripheral IV, telemetry, ERIN drain upon OT entry to room.    General Precautions: Standard, fall  Orthopedic Precautions: spinal precautions (TLSO on when mobilizing)  Braces: TLSO  Respiratory Status: Room air    Occupational " Performance:    Bed Mobility:    Patient completed Rolling/Turning to Left with  maximal assistance  Patient completed Scooting/Bridging with maximal assistance  Patient completed Supine to Sit with maximal assistance    Functional Mobility/Transfers:  Patient completed Sit <> Stand Transfer with maximal assistance  with  hand-held assist   Functional Mobility: Pt was able to perform stand pivot to stretcher c max A.    Activities of Daily Living:  Upper Body Dressing: maximal assistance to don TLSO        Physical Exam:  Upper Extremity Range of Motion:     -       Right Upper Extremity: WFL  -       Left Upper Extremity: WFL  Upper Extremity Strength:    -       Right Upper Extremity: WFL  -       Left Upper Extremity: WFL    AMPAC 6 Click:  AMPAC Total Score: 14    Patient left supine with all lines intact, call button in reach, RN notified, and transport present    GOALS:   Multidisciplinary Problems       Occupational Therapy Goals          Problem: Occupational Therapy    Goal Priority Disciplines Outcome Interventions   Occupational Therapy Goal     OT, PT/OT Progressing    Description: Goals to be met by: 7/11/2025     Patient will increase functional independence with ADLs by performing:    UE Dressing with Supervision sitting EOB to don TSLO   LE Dressing with Set-up Assistance using AE as needed to maintain spinal precautions.  Grooming while bedside chair with Set-up Assistance.  Toileting from bedside commode with Minimal Assistance for hygiene and clothing management.   Supine to sit with Supervision.  Toilet transfer to bedside commode with Contact Guard Assistance using LRAD as needed.   Sit to stand with CGA using LRAD as needed.                          DME Justifications:   Kuldeep requires a commode for home use because she is confined to one level of the home environment and there is no toilet on that level.   Kuldeep's mobility limitation cannot be sufficiently resolved by the use of a cane.  Her functional mobility deficit can be sufficiently resolved with the use of a Rolling Walker. Patient's mobility limitation significantly impairs their ability to participate in one of more activities of daily living.  The use of a RW will significantly improve the patient's ability to participate in MRADLS and the patient will use it on regular basis in the home.    History:     Past Medical History:   Diagnosis Date    Arthritis     Asthma     Cervical spondylosis 07/13/2012    Chronic LBP 07/13/2012    Chronic neck pain 07/13/2012    Debility     Hyperlipidemia     Hypertension     Lumbar radiculopathy, BLE 07/13/2012    Lumbar spinal stenosis at L4-L5. 07/13/2012    Lumbar spondylosis 07/13/2012    Morbid obesity 07/13/2012    Primary osteoarthritis of both knees 07/13/2012    Spondylolisthesis, grade 1 at L4-L5. 07/13/2012    Tenosynovitis of ankle     Rt peroneus longus    Walker as ambulation aid          Past Surgical History:   Procedure Laterality Date    CHOLECYSTECTOMY      HYSTERECTOMY      MAGNETIC RESONANCE IMAGING Bilateral 6/12/2025    Procedure: MRI (Magnetic Resonance Imagine);  Surgeon: Sameera Villalba;  Location: [facility];  Service: Anesthesiology;  Laterality: Bilateral;    DE REMOVAL OF OVARY/TUBE(S)         Time Tracking:     OT Date of Treatment: 06/15/25  OT Start Time: 1417  OT Stop Time: 1434  OT Total Time (min): 17 min    Billable Minutes:Malachi 9  Self Care/Home Management 8    6/15/2025

## 2025-06-15 NOTE — ASSESSMENT & PLAN NOTE
RESOLVED  MALACHI is likely due to pre-renal azotemia due to dehydration. Baseline creatinine is ~1.0. Most recent creatinine and eGFR are listed below.  Recent Labs     06/13/25  0216 06/15/25  0251   CREATININE 1.2 1.0   EGFRNORACEVR 48* 60*      Plan  - MALACHI is stable  - Avoid nephrotoxins and renally dose meds for GFR listed above  - Monitor urine output, serial BMP, and adjust therapy as needed  - s/p 1L IVF bolus in ED, encouraging good oral hydration

## 2025-06-15 NOTE — ASSESSMENT & PLAN NOTE
Kuldeep Villela is a 74 y.o.F with PMHx of arthritis, HTN, and HLD who presents to [facility] ED for complaints of acute on chronic low back/L flank pain for the last week. Does also report a fall a week ago.     CT Abdomen and Pelvis 6/10/25: T12 compression fracture  MRI T/L spine 6/12/25: anterior wedge fracture T12 vertebral body with moderate canal stenosis, subtle cord signal change    Plan:  - Admitted to Hospital Medicine  - ERIN drains x2 to suction, abx while in place  - TLSO brace when out of bed  - can remove tom today Monitor for bladder retention. Please document PVR. Notify NSGY with PVR greater than 300.  - regular diet  - LVX for chemical DVT ppx   - PT/OT/TLSO brace when out of bed    Dispo: ongoing    Discussed with staff

## 2025-06-15 NOTE — ASSESSMENT & PLAN NOTE
74 y.o.F with new compression deformity of T12 and new inability to ambulate 2/2 pain. On admit, AFVSS, labs reviewed. CT a/p with: New compression deformity of T12 when compared to CT abdomen pelvis 03/22/2025. MRI T/L spine with sedation ordered. NSGY consulted. T10-L2 posterior fusion 6/13      Plan:   - TLSO brace ordered   - NSGY consulted, appreciate recommendations  - MRI spine with sedation scheduled for 6/12, concerning for compression fracture with instability and severe spinal canal stenosis concerning for cord edema   -s/p T10-L2 posterior fusion 6/13  -IV abx while drains in place   - MM pain regimen initiated  - PT/OT with TLSO brace in   -remove tom, montior I/Os closely   -bowel regimen increased    - fall precautions

## 2025-06-15 NOTE — PLAN OF CARE
Problem: Adult Inpatient Plan of Care  Goal: Plan of Care Review  Outcome: Progressing  Goal: Patient-Specific Goal (Individualized)  Outcome: Progressing  Goal: Absence of Hospital-Acquired Illness or Injury  Outcome: Progressing  Goal: Optimal Comfort and Wellbeing  Outcome: Progressing  Goal: Readiness for Transition of Care  Outcome: Progressing     Problem: Bariatric Environmental Safety  Goal: Safety Maintained with Care  Outcome: Progressing     Problem: Acute Kidney Injury/Impairment  Goal: Fluid and Electrolyte Balance  Outcome: Progressing  Goal: Improved Oral Intake  Outcome: Progressing  Goal: Effective Renal Function  Outcome: Progressing     Problem: Skin Injury Risk Increased  Goal: Skin Health and Integrity  Outcome: Progressing     Problem: Fall Injury Risk  Goal: Absence of Fall and Fall-Related Injury  Outcome: Progressing     Problem: Pain Acute  Goal: Optimal Pain Control and Function  Outcome: Progressing     Problem: Electrolyte Imbalance  Goal: Electrolyte Balance  Outcome: Progressing     Problem: Orthopaedic Fracture  Goal: Absence of Bleeding  Outcome: Progressing  Goal: Bowel Elimination  Outcome: Progressing  Goal: Absence of Embolism Signs and Symptoms  Outcome: Progressing  Goal: Fracture Stability  Outcome: Progressing  Goal: Optimal Functional Ability  Outcome: Progressing  Goal: Absence of Infection Signs and Symptoms  Outcome: Progressing  Goal: Effective Tissue Perfusion  Outcome: Progressing  Goal: Optimal Pain Control and Function  Outcome: Progressing  Goal: Effective Oxygenation and Ventilation  Outcome: Progressing     Problem: Infection  Goal: Absence of Infection Signs and Symptoms  Outcome: Progressing     Problem: Wound  Goal: Optimal Coping  Outcome: Progressing  Goal: Optimal Functional Ability  Outcome: Progressing  Goal: Absence of Infection Signs and Symptoms  Outcome: Progressing  Goal: Improved Oral Intake  Outcome: Progressing  Goal: Optimal Pain Control and  Function  Outcome: Progressing  Goal: Skin Health and Integrity  Outcome: Progressing  Goal: Optimal Wound Healing  Outcome: Progressing

## 2025-06-15 NOTE — PT/OT/SLP PROGRESS
Physical Therapy      Patient Name:  Kuldeep Villela   MRN:  [id number]    Patient not seen today secondary to DIMA for Imaging (xray) at 1443 attempt. Writing PT unable to return for additional attempt on this date. Will follow-up tomorrow as able and appropriate to complete PT evaluation.

## 2025-06-15 NOTE — PLAN OF CARE
Went over plan of care - all questions answered. Meds per md order. No falls or injuries. Pt and ot working w pt . Co pain - see mar

## 2025-06-15 NOTE — ASSESSMENT & PLAN NOTE
Patient with Acute on chronic debility due to fracture/prosthesis. The patient's latest AMPAC (Activity Measure for Post Acute Care) Score is listed below.    AM-PAC Score - How much help does the patient need for each activity listed  Basic Mobility Total Score: 18  Turning over in bed (including adjusting bedclothes, sheets and blankets)?: A little  Sitting down on and standing up from a chair with arms (e.g., wheelchair, bedside commode, etc.): A little  Moving from lying on back to sitting on the side of the bed?: A little  Moving to and from a bed to a chair (including a wheelchair)?: A little  Need to walk in hospital room?: A little  Climbing 3-5 steps with a railing?: A little    Plan  - PT/OT consulted  - Fall precautions in place  - PT/OT recommending moderate intensity therapy at [place]

## 2025-06-15 NOTE — SUBJECTIVE & OBJECTIVE
Interval History: NAEO. Pain still present but weakness and movement of lower extremities appears to be improving. Discussed importance of managing pain medications with oral medications if possible of IV.     Review of Systems  Objective:     Vital Signs (Most Recent):  Temp: 97.5 °F (36.4 °C) (06/15/25 0746)  Pulse: 99 (06/15/25 0746)  Resp: 18 (06/15/25 0746)  BP: 126/74 (06/15/25 0746)  SpO2: 97 % (06/15/25 0746) Vital Signs (24h Range):  Temp:  [97.5 °F (36.4 °C)-99.1 °F (37.3 °C)] 97.5 °F (36.4 °C)  Pulse:  [] 99  Resp:  [14-23] 18  SpO2:  [95 %-100 %] 97 %  BP: (126-184)/() 126/74     Weight: 107 kg (236 lb)  Body mass index is 44.59 kg/m².    Intake/Output Summary (Last 24 hours) at 6/15/2025 1011  Last data filed at 6/15/2025 0649  Gross per 24 hour   Intake 1375 ml   Output 2920 ml   Net -1545 ml         Physical Exam  Constitutional:       Appearance: Normal appearance.   HENT:      Head: Normocephalic and atraumatic.      Nose: Nose normal.      Mouth/Throat:      Mouth: Mucous membranes are moist.   Eyes:      Extraocular Movements: Extraocular movements intact.      Pupils: Pupils are equal, round, and reactive to light.   Cardiovascular:      Rate and Rhythm: Normal rate and regular rhythm.      Heart sounds: No murmur heard.     No gallop.   Pulmonary:      Effort: Pulmonary effort is normal.      Breath sounds: Normal breath sounds. No wheezing or rales.   Abdominal:      General: Abdomen is flat. Bowel sounds are normal. There is no distension.      Palpations: Abdomen is soft.      Tenderness: There is no abdominal tenderness.   Musculoskeletal:         General: Tenderness present. No swelling. Normal range of motion.      Cervical back: Normal range of motion and neck supple.      Right lower leg: No edema.      Left lower leg: No edema.   Skin:     General: Skin is warm and dry.      Capillary Refill: Capillary refill takes less than 2 seconds.   Neurological:      General: No focal  deficit present.      Mental Status: She is alert. Mental status is at baseline.      Motor: Weakness present.      Comments: Drains in place   Weakness and ROM improving   Psychiatric:         Mood and Affect: Mood normal.               Significant Labs: All pertinent labs within the past 24 hours have been reviewed.    Significant Imaging: I have reviewed all pertinent imaging results/findings within the past 24 hours.

## 2025-06-15 NOTE — ASSESSMENT & PLAN NOTE
Patient's blood pressure range in the last 24 hours was: BP  Min: 126/74  Max: 184/86.The patient's inpatient anti-hypertensive regimen is listed below:  Current Antihypertensives  , Every 12 hours, Oral  amLODIPine tablet 10 mg, Daily, Oral    Plan  - BP is controlled, no changes needed to their regimen

## 2025-06-15 NOTE — PROGRESS NOTES
[facility] - Med S[facility]  Neurosurgery  Progress Note    Subjective:     History of Present Illness: Kuldeep Villela is a 74 y.o.F with PMHx of arthritis, HTN, and HLD who presents to [facility] [place] ED for complaints of acute on chronic low back/L flank pain for the last week. Does also report a fall a week ago. Is able to ambulate with a walker but feels this has become more difficult since the fall. Describes the pain as axial low back pain with bilateral radiculopathy L > R. Denies saddle anesthesia, bladder or bowel dysfunction, weakness or sensory dysfunction. CT abdomen and pelvis demonstrates T12 compression fracture. Neurosurgery consulted for input.     Post-Op Info:  Procedure(s) (LRB):  T12 LAMINECTOMY, SPINE, THORACIC, W/ ARTHRODESIS T10-L2 (N/A)   1 Day Post-Op   Interval History: doing fine postoperative. ERIN drains with about 300cc output each. Hb stable at 10 this morning. Pain controlled          Medications:  Continuous Infusions:   0.9% NaCl   Intravenous Continuous 100 mL/hr at 06/15/25 0931 New Bag at 06/15/25 0931     Scheduled Meds:   acetaminophen  1,000 mg Oral TID    amLODIPine  10 mg Oral Daily    busPIRone  15 mg Oral QID    calcium carbonate  500 mg Oral BID    cetirizine  10 mg Oral Daily    citalopram  40 mg Oral Daily    diclofenac sodium  2 g Topical (Top) TID    LIDOcaine  1 patch Transdermal Q24H    methocarbamoL  750 mg Oral QID    mupirocin   Nasal BID    pravastatin  20 mg Oral Daily    rOPINIRole  1 mg Oral BID    senna-docusate  2 tablet Oral BID    sertraline  50 mg Oral Daily    vancomycin (VANCOCIN) IV (PEDS and ADULTS)  1,000 mg Intravenous Q24H     PRN Meds:  Current Facility-Administered Medications:     albuterol, 2 puff, Inhalation, Q4H PRN    aluminum-magnesium hydroxide-simethicone, 30 mL, Oral, Q4H PRN    dextrose 50%, 12.5 g, Intravenous, PRN    dextrose 50%, 25 g, Intravenous, PRN    diazePAM, 5 mg, Oral, BID PRN    glucagon (human recombinant), 1 mg, Intramuscular, PRN    glucose,  16 g, Oral, PRN    glucose, 24 g, Oral, PRN    HYDROmorphone, 1 mg, Intravenous, Q4H PRN    melatonin, 6 mg, Oral, Nightly PRN    naloxone, 0.02 mg, Intravenous, PRN    ondansetron, 8 mg, Oral, Q8H PRN    oxyCODONE, 5 mg, Oral, Q4H PRN    oxyCODONE, 10 mg, Oral, Q4H PRN    polyethylene glycol, 17 g, Oral, Daily PRN    prochlorperazine, 5 mg, Intravenous, Q6H PRN    sodium chloride 0.9%, 10 mL, Intravenous, Q12H PRN    Pharmacy to dose Vancomycin consult, , , Once **AND** vancomycin - pharmacy to dose, , Intravenous, pharmacy to manage frequency     Review of Systems  Objective:     Weight: 107 kg (236 lb)  Body mass index is 44.59 kg/m².  Vital Signs (Most Recent):  Temp: 97.5 °F (36.4 °C) (06/15/25 0746)  Pulse: 99 (06/15/25 0746)  Resp: 18 (06/15/25 0746)  BP: 126/74 (06/15/25 0746)  SpO2: 97 % (06/15/25 0746) Vital Signs (24h Range):  Temp:  [97.5 °F (36.4 °C)-99.1 °F (37.3 °C)] 97.5 °F (36.4 °C)  Pulse:  [] 99  Resp:  [14-23] 18  SpO2:  [95 %-100 %] 97 %  BP: (126-184)/() 126/74                           Female External Urinary Catheter w/ Suction 06/09/25 2353 (Active)   Skin no redness;no breakdown;female external urine collection device repositioned 06/10/25 0750   Tolerance no signs/symptoms of discomfort 06/10/25 0750   Suction Continuous suction at 60 mmHg 06/12/25 1040   Date of last wick change 06/10/25 06/10/25 0500   Time of last wick change 0500 06/10/25 0500   Output (mL) 600 mL 06/11/25 0014          Physical Exam         Neurosurgery Physical Exam    AAOx4  PERRL  Cranial nerves intact  BUE 5/5  BLE 4/5 pain limited hip flexion, 5/5 distally  deconditioned  SILT    Back incision clean/dry with bandage in place  ERIN drains in place      Significant Labs:  Recent Labs   Lab 06/15/25  0251   *      K 5.3*      CO2 24   BUN 20   CREATININE 1.0   CALCIUM 9.2   MG 1.5*     Recent Labs   Lab 06/14/25  0940 06/15/25  0251   WBC  --  11.71   HGB  --  10.0*   HCT 29* 30.9*   PLT  " --  268     No results for input(s): "LABPT", "INR", "APTT" in the last 48 hours.  Microbiology Results (last 7 days)       ** No results found for the last 168 hours. **          Recent Lab Results         06/15/25  0251        Anion Gap 10       Baso # 0.02       Basophil % 0.2       BUN 20       Calcium 9.2       Chloride 103       CO2 24       Creatinine 1.0       eGFR 60  Comment: Estimated GFR calculated using the CKD-EPI creatinine (2021) equation.       Eos # 0.00       Eos % 0.0       Glucose 116       Gran # (ANC) 9.87       Hematocrit 30.9       Hemoglobin 10.0       Immature Grans (Abs) 0.08  Comment: Mild elevation in immature granulocytes is non specific and can be seen in a variety of conditions including stress response, acute inflammation, trauma and pregnancy. Correlation with other laboratory and clinical findings is essential.       Immature Granulocytes 0.7       Lymph # 0.93       Lymph % 7.9       Magnesium  1.5       MCH 31.3       MCHC 32.4       MCV 97       Mono # 0.81       Mono % 6.9       MPV 11.0       Neut % 84.3       nRBC 0       Phosphorus Level 4.0       Platelet Count 268       Potassium 5.3  Comment: *No Visible Hemolysis       RBC 3.19       RDW 13.0       Sodium 137       WBC 11.71             All pertinent labs from the last 24 hours have been reviewed.    Significant Diagnostics:  I have reviewed all pertinent imaging results/findings within the past 24 hours.  Assessment/Plan:     * Compression fracture of T12 vertebra  Kuldeep Villela is a 74 y.o.F with PMHx of arthritis, HTN, and HLD who [place] to [facility] [place] for complaints of acute on chronic low back/L flank pain for the last week. Does also report a fall a week ago.     CT Abdomen and Pelvis 6/10/25: T12 compression fracture  MRI T/L spine 6/12/25: anterior wedge fracture T12 vertebral body with moderate canal stenosis, subtle cord signal change    Plan:  - Admitted to Hospital Medicine  - ERIN drains x2 to suction, abx " while in place  - TLSO brace when out of bed  - can remove tom today Monitor for bladder retention. Please document PVR. Notify NSGY with PVR greater than 300.  - regular diet  - LVX for chemical DVT ppx   - PT/OT/TLSO brace when out of bed    Dispo: ongoing    Discussed with staff        Ayde Manley MD  Neurosurgery  [facility]

## 2025-06-15 NOTE — SUBJECTIVE & OBJECTIVE
Interval History: doing fine postoperative. ERIN drains with about 300cc output each. Hb stable at 10 this morning. Pain controlled          Medications:  Continuous Infusions:   0.9% NaCl   Intravenous Continuous 100 mL/hr at 06/15/25 0931 New Bag at 06/15/25 0931     Scheduled Meds:   acetaminophen  1,000 mg Oral TID    amLODIPine  10 mg Oral Daily    busPIRone  15 mg Oral QID    calcium carbonate  500 mg Oral BID    cetirizine  10 mg Oral Daily    citalopram  40 mg Oral Daily    diclofenac sodium  2 g Topical (Top) TID    LIDOcaine  1 patch Transdermal Q24H    methocarbamoL  750 mg Oral QID    mupirocin   Nasal BID    pravastatin  20 mg Oral Daily    rOPINIRole  1 mg Oral BID    senna-docusate  2 tablet Oral BID    sertraline  50 mg Oral Daily    vancomycin (VANCOCIN) IV (PEDS and ADULTS)  1,000 mg Intravenous Q24H     PRN Meds:  Current Facility-Administered Medications:     albuterol, 2 puff, Inhalation, Q4H PRN    aluminum-magnesium hydroxide-simethicone, 30 mL, Oral, Q4H PRN    dextrose 50%, 12.5 g, Intravenous, PRN    dextrose 50%, 25 g, Intravenous, PRN    diazePAM, 5 mg, Oral, BID PRN    glucagon (human recombinant), 1 mg, Intramuscular, PRN    glucose, 16 g, Oral, PRN    glucose, 24 g, Oral, PRN    HYDROmorphone, 1 mg, Intravenous, Q4H PRN    melatonin, 6 mg, Oral, Nightly PRN    naloxone, 0.02 mg, Intravenous, PRN    ondansetron, 8 mg, Oral, Q8H PRN    oxyCODONE, 5 mg, Oral, Q4H PRN    oxyCODONE, 10 mg, Oral, Q4H PRN    polyethylene glycol, 17 g, Oral, Daily PRN    prochlorperazine, 5 mg, Intravenous, Q6H PRN    sodium chloride 0.9%, 10 mL, Intravenous, Q12H PRN    Pharmacy to dose Vancomycin consult, , , Once **AND** vancomycin - pharmacy to dose, , Intravenous, pharmacy to manage frequency     Review of Systems  Objective:     Weight: 107 kg (236 lb)  Body mass index is 44.59 kg/m².  Vital Signs (Most Recent):  Temp: 97.5 °F (36.4 °C) (06/15/25 0746)  Pulse: 99 (06/15/25 0746)  Resp: 18 (06/15/25  "0746)  BP: 126/74 (06/15/25 0746)  SpO2: 97 % (06/15/25 0746) Vital Signs (24h Range):  Temp:  [97.5 °F (36.4 °C)-99.1 °F (37.3 °C)] 97.5 °F (36.4 °C)  Pulse:  [] 99  Resp:  [14-23] 18  SpO2:  [95 %-100 %] 97 %  BP: (126-184)/() 126/74                           Female External Urinary Catheter w/ Suction 06/09/25 2353 (Active)   Skin no redness;no breakdown;female external urine collection device repositioned 06/10/25 0750   Tolerance no signs/symptoms of discomfort 06/10/25 0750   Suction Continuous suction at 60 mmHg 06/12/25 1040   Date of last wick change 06/10/25 06/10/25 0500   Time of last wick change 0500 06/10/25 0500   Output (mL) 600 mL 06/11/25 0014          Physical Exam         Neurosurgery Physical Exam    AAOx4  PERRL  Cranial nerves intact  BUE 5/5  BLE 4/5 pain limited hip flexion, 5/5 distally  deconditioned  SILT    Back incision clean/dry with bandage in place  ERIN drains in place      Significant Labs:  Recent Labs   Lab 06/15/25  0251   *      K 5.3*      CO2 24   BUN 20   CREATININE 1.0   CALCIUM 9.2   MG 1.5*     Recent Labs   Lab 06/14/25  0940 06/15/25  0251   WBC  --  11.71   HGB  --  10.0*   HCT 29* 30.9*   PLT  --  268     No results for input(s): "LABPT", "INR", "APTT" in the last 48 hours.  Microbiology Results (last 7 days)       ** No results found for the last 168 hours. **          Recent Lab Results         06/15/25  0251        Anion Gap 10       Baso # 0.02       Basophil % 0.2       BUN 20       Calcium 9.2       Chloride 103       CO2 24       Creatinine 1.0       eGFR 60  Comment: Estimated GFR calculated using the CKD-EPI creatinine (2021) equation.       Eos # 0.00       Eos % 0.0       Glucose 116       Gran # (ANC) 9.87       Hematocrit 30.9       Hemoglobin 10.0       Immature Grans (Abs) 0.08  Comment: Mild elevation in immature granulocytes is non specific and can be seen in a variety of conditions including stress response, acute " inflammation, trauma and pregnancy. Correlation with other laboratory and clinical findings is essential.       Immature Granulocytes 0.7       Lymph # 0.93       Lymph % 7.9       Magnesium  1.5       MCH 31.3       MCHC 32.4       MCV 97       Mono # 0.81       Mono % 6.9       MPV 11.0       Neut % 84.3       nRBC 0       Phosphorus Level 4.0       Platelet Count 268       Potassium 5.3  Comment: *No Visible Hemolysis       RBC 3.19       RDW 13.0       Sodium 137       WBC 11.71             All pertinent labs from the last 24 hours have been reviewed.    Significant Diagnostics:  I have reviewed all pertinent imaging results/findings within the past 24 hours.

## 2025-06-15 NOTE — PLAN OF CARE
Problem: Occupational Therapy  Goal: Occupational Therapy Goal  Description: Goals to be met by: 7/15/2025     Patient will increase functional independence with ADLs by performing:    UE Dressing with Supervision sitting EOB to don TSLO   LE Dressing with Set-up Assistance using AE as needed to maintain spinal precautions.  Grooming while bedside chair with Set-up Assistance.  Toileting from bedside commode with Minimal Assistance for hygiene and clothing management.   Supine to sit with Supervision.  Toilet transfer to bedside commode with Contact Guard Assistance using LRAD as needed.   Sit to stand with CGA using LRAD as needed.   Pt will don TLSO c mod I    Outcome: Progressing

## 2025-06-15 NOTE — PROGRESS NOTES
[facility] Medicine  Progress Note    Patient Name: Kuldeep Villela  MRN: [id number]  Patient Class: IP- Inpatient   Admission Date: 6/9/2025  Length of Stay: 5 days  Attending Physician: Cintia Anton MD  Primary Care Provider: Ginna Musa MD        Subjective     Principal Problem:Compression fracture of T12 vertebra        HPI:  Kuldeep Villela is a 74 y.o.F with PMHx of arthritis, lumbar stenosis, HTN, HLD, depression who [place] to [facility] [place] for complaints of worsening low back/L flank pain for the last week. Endorses chronic low back pain for which she sees a specialist for, but has worsened recently. Denies any known injury to area. She reports she felt like it may be the beginning of a UTI so she started taking Azo at home for prevention. States she normally ambulates with a walker; however, the pain has been so severe that she has not been able to ambulate at all. Endorses bilateral lower extremity weakness. Denies bladder or bowel incontinence, denies numbness of lower extremities. Denies fever, chills, chest pain, palpitations, SOB, cough, abdominal pain, n/v/d, dysuria, headaches, or any other symptoms at this time.     In ED: afebrile, VSS on RA. CBC without leukocytosis, Hgb 11.1. CMP notable for Cr 1.5 (~1.0), otherwise unremarkable. UA non-infectious appearing. CT abd/pelvis with new compression deformity of T12, perinephric haziness and stranding, L>R, correlate with renal disease, small periumbilical abd wall hernia involving bowel without inflammatory or obstructive change. S/p rocephin, ketamine, toradol 15mg inj, oxy-acetaminophen 10mg, 1L IVF bolus in ED. TLSO brace ordered. Admitted to  for further evaluation.     Overview/Hospital Course:  Pt admitted for continued management of T12 compression fx. NSGY consulted, no acute intervention warranted at this time. MRI spine with sedation pending and scheduled for 6/12. MRI concerning for compression fracture with  instability and severe spinal canal stenosis concerning for cord edema. Recommended to stop mobility with PT/OT pending surgical stabilization of spine. She was taken to the OR 6/14/25 for  T10-L2 posterior fusion.     Interval History: NAEO. Pain still present but weakness and movement of lower extremities appears to be improving. Discussed importance of managing pain medications with oral medications if possible of IV.     Review of Systems  Objective:     Vital Signs (Most Recent):  Temp: 97.5 °F (36.4 °C) (06/15/25 0746)  Pulse: 99 (06/15/25 0746)  Resp: 18 (06/15/25 0746)  BP: 126/74 (06/15/25 0746)  SpO2: 97 % (06/15/25 0746) Vital Signs (24h Range):  Temp:  [97.5 °F (36.4 °C)-99.1 °F (37.3 °C)] 97.5 °F (36.4 °C)  Pulse:  [] 99  Resp:  [14-23] 18  SpO2:  [95 %-100 %] 97 %  BP: (126-184)/() 126/74     Weight: 107 kg (236 lb)  Body mass index is 44.59 kg/m².    Intake/Output Summary (Last 24 hours) at 6/15/2025 1011  Last data filed at 6/15/2025 0649  Gross per 24 hour   Intake 1375 ml   Output 2920 ml   Net -1545 ml         Physical Exam  Constitutional:       Appearance: Normal appearance.   HENT:      Head: Normocephalic and atraumatic.      Nose: Nose normal.      Mouth/Throat:      Mouth: Mucous membranes are moist.   Eyes:      Extraocular Movements: Extraocular movements intact.      Pupils: Pupils are equal, round, and reactive to light.   Cardiovascular:      Rate and Rhythm: Normal rate and regular rhythm.      Heart sounds: No murmur heard.     No gallop.   Pulmonary:      Effort: Pulmonary effort is normal.      Breath sounds: Normal breath sounds. No wheezing or rales.   Abdominal:      General: Abdomen is flat. Bowel sounds are normal. There is no distension.      Palpations: Abdomen is soft.      Tenderness: There is no abdominal tenderness.   Musculoskeletal:         General: Tenderness present. No swelling. Normal range of motion.      Cervical back: Normal range of motion and neck  supple.      Right lower leg: No edema.      Left lower leg: No edema.   Skin:     General: Skin is warm and dry.      Capillary Refill: Capillary refill takes less than 2 seconds.   Neurological:      General: No focal deficit present.      Mental Status: She is alert. Mental status is at baseline.      Motor: Weakness present.      Comments: Drains in place   Weakness and ROM improving   Psychiatric:         Mood and Affect: Mood normal.               Significant Labs: All pertinent labs within the past 24 hours have been reviewed.    Significant Imaging: I have reviewed all pertinent imaging results/findings within the past 24 hours.      Assessment & Plan  Compression fracture of T12 vertebra  Acute on chronic back pain    74 y.o.F with new compression deformity of T12 and new inability to ambulate 2/2 pain. On admit, AFVSS, labs reviewed. CT a/p with: New compression deformity of T12 when compared to CT abdomen pelvis 03/22/2025. MRI T/L spine with sedation ordered. NSGY consulted. T10-L2 posterior fusion 6/13      Plan:   - TLSO brace ordered   - NSGY consulted, appreciate recommendations  - MRI spine with sedation scheduled for 6/12, concerning for compression fracture with instability and severe spinal canal stenosis concerning for cord edema   -s/p T10-L2 posterior fusion 6/13  -IV abx while drains in place   - MM pain regimen initiated  - PT/OT with TLSO brace in   -remove tomyuli I/Os closely   -bowel regimen increased    - fall precautions     Hypertension  Patient's blood pressure range in the last 24 hours was: BP  Min: 126/74  Max: 184/86.The patient's inpatient anti-hypertensive regimen is listed below:  Current Antihypertensives  , Every 12 hours, Oral  amLODIPine tablet 10 mg, Daily, Oral    Plan  - BP is controlled, no changes needed to their regimen    Hyperlipemia  - continue home statin    Debility  Patient with Acute on chronic debility due to fracture/prosthesis. The patient's latest  AMPAC (Activity Measure for Post Acute Care) Score is listed below.    AM-PAC Score - How much help does the patient need for each activity listed  Basic Mobility Total Score: 18  Turning over in bed (including adjusting bedclothes, sheets and blankets)?: A little  Sitting down on and standing up from a chair with arms (e.g., wheelchair, bedside commode, etc.): A little  Moving from lying on back to sitting on the side of the bed?: A little  Moving to and from a bed to a chair (including a wheelchair)?: A little  Need to walk in hospital room?: A little  Climbing 3-5 steps with a railing?: A little    Plan  - PT/OT consulted  - Fall precautions in place  - PT/OT recommending moderate intensity therapy at [facility]      Depression with anxiety  Patient has recurrent depression which is mild and is currently controlled. Will Continue anti-depressant medications. We will not consult psychiatry at this time. Patient does not display psychosis at this time. Continue to monitor closely and adjust plan of care as needed.  - continue home medications    Hyperkalemia  -mild, will start lokelma and recheck           MLAACHI (acute kidney injury)  RESOLVED  MALACHI is likely due to pre-renal azotemia due to dehydration. Baseline creatinine is ~1.0. Most recent creatinine and eGFR are listed below.  Recent Labs     06/13/25  0216 06/15/25  0251   CREATININE 1.2 1.0   EGFRNORACEVR 48* 60*      Plan  - MALACHI is stable  - Avoid nephrotoxins and renally dose meds for GFR listed above  - Monitor urine output, serial BMP, and adjust therapy as needed  - s/p 1L IVF bolus in ED, encouraging good oral hydration       Tobacco dependency  Dangers of cigarette smoking were reviewed with patient in detail. Patient was Counseled for 3-10 minutes. Nicotine replacement options were discussed. Nicotine replacement was discussed- prescribed  VTE Risk Mitigation (From admission, onward)           Ordered     enoxaparin injection 40 mg  Every 24 hours          06/15/25 1011     IP VTE HIGH RISK PATIENT  Once         06/14/25 1204     Place sequential compression device  Until discontinued         06/10/25 0731                    Discharge Planning   MATT: 6/18/2025     Code Status: Full Code   Medical Readiness for Discharge Date:   Discharge Plan A: Skilled Nursing Facility   Discharge Delays: None known at this time            Please place Justification for DME        Cintia Anton MD  Department of Hospital Medicine   [facility] Surg

## 2025-06-16 LAB
ABSOLUTE EOSINOPHIL (OHS): 0.15 K/UL
ABSOLUTE MONOCYTE (OHS): 0.93 K/UL (ref 0.3–1)
ABSOLUTE NEUTROPHIL COUNT (OHS): 10.1 K/UL (ref 1.8–7.7)
ANION GAP (OHS): 9 MMOL/L (ref 8–16)
BASOPHILS # BLD AUTO: 0.04 K/UL
BASOPHILS NFR BLD AUTO: 0.3 %
BUN SERPL-MCNC: 18 MG/DL (ref 8–23)
CALCIUM SERPL-MCNC: 9 MG/DL (ref 8.7–10.5)
CHLORIDE SERPL-SCNC: 105 MMOL/L (ref 95–110)
CO2 SERPL-SCNC: 24 MMOL/L (ref 23–29)
CREAT SERPL-MCNC: 1 MG/DL (ref 0.5–1.4)
ERYTHROCYTE [DISTWIDTH] IN BLOOD BY AUTOMATED COUNT: 13.5 % (ref 11.5–14.5)
GFR SERPLBLD CREATININE-BSD FMLA CKD-EPI: 60 ML/MIN/1.73/M2
GLUCOSE SERPL-MCNC: 101 MG/DL (ref 70–110)
HCT VFR BLD AUTO: 26.5 % (ref 37–48.5)
HGB BLD-MCNC: 8.5 GM/DL (ref 12–16)
IMM GRANULOCYTES # BLD AUTO: 0.07 K/UL (ref 0–0.04)
IMM GRANULOCYTES NFR BLD AUTO: 0.5 % (ref 0–0.5)
LYMPHOCYTES # BLD AUTO: 1.47 K/UL (ref 1–4.8)
MAGNESIUM SERPL-MCNC: 1.5 MG/DL (ref 1.6–2.6)
MCH RBC QN AUTO: 31.7 PG (ref 27–31)
MCHC RBC AUTO-ENTMCNC: 32.1 G/DL (ref 32–36)
MCV RBC AUTO: 99 FL (ref 82–98)
NUCLEATED RBC (/100WBC) (OHS): 0 /100 WBC
PHOSPHATE SERPL-MCNC: 3.4 MG/DL (ref 2.7–4.5)
PLATELET # BLD AUTO: 257 K/UL (ref 150–450)
PMV BLD AUTO: 11 FL (ref 9.2–12.9)
POTASSIUM SERPL-SCNC: 4.2 MMOL/L (ref 3.5–5.1)
RBC # BLD AUTO: 2.68 M/UL (ref 4–5.4)
RELATIVE EOSINOPHIL (OHS): 1.2 %
RELATIVE LYMPHOCYTE (OHS): 11.5 % (ref 18–48)
RELATIVE MONOCYTE (OHS): 7.3 % (ref 4–15)
RELATIVE NEUTROPHIL (OHS): 79.2 % (ref 38–73)
SODIUM SERPL-SCNC: 138 MMOL/L (ref 136–145)
VANCOMYCIN TROUGH SERPL-MCNC: 11.4 UG/ML (ref 10–22)
WBC # BLD AUTO: 12.76 K/UL (ref 3.9–12.7)

## 2025-06-16 PROCEDURE — 97162 PT EVAL MOD COMPLEX 30 MIN: CPT

## 2025-06-16 PROCEDURE — 83735 ASSAY OF MAGNESIUM: CPT | Performed by: STUDENT IN AN ORGANIZED HEALTH CARE EDUCATION/TRAINING PROGRAM

## 2025-06-16 PROCEDURE — 80202 ASSAY OF VANCOMYCIN: CPT | Performed by: STUDENT IN AN ORGANIZED HEALTH CARE EDUCATION/TRAINING PROGRAM

## 2025-06-16 PROCEDURE — 63600175 PHARM REV CODE 636 W HCPCS: Performed by: STUDENT IN AN ORGANIZED HEALTH CARE EDUCATION/TRAINING PROGRAM

## 2025-06-16 PROCEDURE — 85025 COMPLETE CBC W/AUTO DIFF WBC: CPT | Performed by: STUDENT IN AN ORGANIZED HEALTH CARE EDUCATION/TRAINING PROGRAM

## 2025-06-16 PROCEDURE — 25000003 PHARM REV CODE 250

## 2025-06-16 PROCEDURE — 36415 COLL VENOUS BLD VENIPUNCTURE: CPT | Performed by: STUDENT IN AN ORGANIZED HEALTH CARE EDUCATION/TRAINING PROGRAM

## 2025-06-16 PROCEDURE — 25000003 PHARM REV CODE 250: Performed by: STUDENT IN AN ORGANIZED HEALTH CARE EDUCATION/TRAINING PROGRAM

## 2025-06-16 PROCEDURE — 84100 ASSAY OF PHOSPHORUS: CPT | Performed by: STUDENT IN AN ORGANIZED HEALTH CARE EDUCATION/TRAINING PROGRAM

## 2025-06-16 PROCEDURE — 82310 ASSAY OF CALCIUM: CPT | Performed by: STUDENT IN AN ORGANIZED HEALTH CARE EDUCATION/TRAINING PROGRAM

## 2025-06-16 PROCEDURE — 97530 THERAPEUTIC ACTIVITIES: CPT

## 2025-06-16 PROCEDURE — 94761 N-INVAS EAR/PLS OXIMETRY MLT: CPT

## 2025-06-16 PROCEDURE — 11000001 HC ACUTE MED/SURG PRIVATE ROOM

## 2025-06-16 RX ORDER — BACITRACIN 500 [USP'U]/G
OINTMENT TOPICAL
Status: DISCONTINUED | OUTPATIENT
Start: 2025-06-16 | End: 2025-06-24 | Stop reason: HOSPADM

## 2025-06-16 RX ADMIN — CETIRIZINE HYDROCHLORIDE 10 MG: 10 TABLET, FILM COATED ORAL at 09:06

## 2025-06-16 RX ADMIN — SENNOSIDES AND DOCUSATE SODIUM 2 TABLET: 50; 8.6 TABLET ORAL at 08:06

## 2025-06-16 RX ADMIN — CALCIUM CARBONATE (ANTACID) CHEW TAB 500 MG 500 MG: 500 CHEW TAB at 08:06

## 2025-06-16 RX ADMIN — HYDROMORPHONE HYDROCHLORIDE 1 MG: 1 INJECTION, SOLUTION INTRAMUSCULAR; INTRAVENOUS; SUBCUTANEOUS at 07:06

## 2025-06-16 RX ADMIN — BUSPIRONE HYDROCHLORIDE 15 MG: 10 TABLET ORAL at 04:06

## 2025-06-16 RX ADMIN — ACETAMINOPHEN 1000 MG: 500 TABLET ORAL at 03:06

## 2025-06-16 RX ADMIN — LIDOCAINE 1 PATCH: 50 PATCH CUTANEOUS at 09:06

## 2025-06-16 RX ADMIN — SERTRALINE HYDROCHLORIDE 50 MG: 50 TABLET ORAL at 09:06

## 2025-06-16 RX ADMIN — METHOCARBAMOL 750 MG: 750 TABLET ORAL at 08:06

## 2025-06-16 RX ADMIN — OXYCODONE HYDROCHLORIDE 10 MG: 10 TABLET ORAL at 01:06

## 2025-06-16 RX ADMIN — OXYCODONE HYDROCHLORIDE 10 MG: 10 TABLET ORAL at 08:06

## 2025-06-16 RX ADMIN — METHOCARBAMOL 750 MG: 750 TABLET ORAL at 09:06

## 2025-06-16 RX ADMIN — BUSPIRONE HYDROCHLORIDE 15 MG: 10 TABLET ORAL at 01:06

## 2025-06-16 RX ADMIN — MUPIROCIN: 20 OINTMENT TOPICAL at 09:06

## 2025-06-16 RX ADMIN — VANCOMYCIN HYDROCHLORIDE 1000 MG: 1 INJECTION, POWDER, LYOPHILIZED, FOR SOLUTION INTRAVENOUS at 04:06

## 2025-06-16 RX ADMIN — DICLOFENAC SODIUM 2 G: 10 GEL TOPICAL at 04:06

## 2025-06-16 RX ADMIN — DICLOFENAC SODIUM 2 G: 10 GEL TOPICAL at 09:06

## 2025-06-16 RX ADMIN — ACETAMINOPHEN 1000 MG: 500 TABLET ORAL at 08:06

## 2025-06-16 RX ADMIN — METHOCARBAMOL 750 MG: 750 TABLET ORAL at 01:06

## 2025-06-16 RX ADMIN — AMLODIPINE BESYLATE 10 MG: 10 TABLET ORAL at 09:06

## 2025-06-16 RX ADMIN — CALCIUM CARBONATE (ANTACID) CHEW TAB 500 MG 500 MG: 500 CHEW TAB at 09:06

## 2025-06-16 RX ADMIN — CITALOPRAM HYDROBROMIDE 40 MG: 20 TABLET ORAL at 09:06

## 2025-06-16 RX ADMIN — LACTULOSE 10 G: 20 SOLUTION ORAL at 09:06

## 2025-06-16 RX ADMIN — ROPINIROLE HYDROCHLORIDE 1 MG: 1 TABLET, FILM COATED ORAL at 09:06

## 2025-06-16 RX ADMIN — OXYCODONE HYDROCHLORIDE 10 MG: 10 TABLET ORAL at 06:06

## 2025-06-16 RX ADMIN — SENNOSIDES AND DOCUSATE SODIUM 2 TABLET: 50; 8.6 TABLET ORAL at 09:06

## 2025-06-16 RX ADMIN — BUSPIRONE HYDROCHLORIDE 15 MG: 10 TABLET ORAL at 09:06

## 2025-06-16 RX ADMIN — LACTULOSE 10 G: 20 SOLUTION ORAL at 08:06

## 2025-06-16 RX ADMIN — ROPINIROLE HYDROCHLORIDE 1 MG: 1 TABLET, FILM COATED ORAL at 08:06

## 2025-06-16 RX ADMIN — PRAVASTATIN SODIUM 20 MG: 10 TABLET ORAL at 09:06

## 2025-06-16 RX ADMIN — LACTULOSE 10 G: 20 SOLUTION ORAL at 03:06

## 2025-06-16 RX ADMIN — METHOCARBAMOL 750 MG: 750 TABLET ORAL at 04:06

## 2025-06-16 RX ADMIN — HYDROMORPHONE HYDROCHLORIDE 1 MG: 1 INJECTION, SOLUTION INTRAMUSCULAR; INTRAVENOUS; SUBCUTANEOUS at 02:06

## 2025-06-16 RX ADMIN — BUSPIRONE HYDROCHLORIDE 15 MG: 10 TABLET ORAL at 08:06

## 2025-06-16 RX ADMIN — ENOXAPARIN SODIUM 40 MG: 40 INJECTION SUBCUTANEOUS at 04:06

## 2025-06-16 RX ADMIN — ACETAMINOPHEN 1000 MG: 500 TABLET ORAL at 09:06

## 2025-06-16 NOTE — SUBJECTIVE & OBJECTIVE
Interval History: NAEON. AFVSS. Exam stable. Drain output (R/L) 100/105cc. Pain controlled. TLSO when OOB    Medications:  Continuous Infusions:   0.9% NaCl   Intravenous Continuous 100 mL/hr at 06/15/25 0931 New Bag at 06/15/25 0931     Scheduled Meds:   acetaminophen  1,000 mg Oral TID    amLODIPine  10 mg Oral Daily    busPIRone  15 mg Oral QID    calcium carbonate  500 mg Oral BID    cetirizine  10 mg Oral Daily    citalopram  40 mg Oral Daily    diclofenac sodium  2 g Topical (Top) TID    enoxparin  40 mg Subcutaneous Q24H (prophylaxis, 1700)    lactulose  10 g Oral TID    LIDOcaine  1 patch Transdermal Q24H    methocarbamoL  750 mg Oral QID    pravastatin  20 mg Oral Daily    rOPINIRole  1 mg Oral BID    senna-docusate  2 tablet Oral BID    sertraline  50 mg Oral Daily    vancomycin (VANCOCIN) IV (PEDS and ADULTS)  1,000 mg Intravenous Q24H     PRN Meds:  Current Facility-Administered Medications:     albuterol, 2 puff, Inhalation, Q4H PRN    aluminum-magnesium hydroxide-simethicone, 30 mL, Oral, Q4H PRN    dextrose 50%, 12.5 g, Intravenous, PRN    dextrose 50%, 25 g, Intravenous, PRN    diazePAM, 5 mg, Oral, BID PRN    glucagon (human recombinant), 1 mg, Intramuscular, PRN    glucose, 16 g, Oral, PRN    glucose, 24 g, Oral, PRN    HYDROmorphone, 1 mg, Intravenous, Q4H PRN    melatonin, 6 mg, Oral, Nightly PRN    naloxone, 0.02 mg, Intravenous, PRN    ondansetron, 8 mg, Oral, Q8H PRN    oxyCODONE, 5 mg, Oral, Q4H PRN    oxyCODONE, 10 mg, Oral, Q4H PRN    polyethylene glycol, 17 g, Oral, Daily PRN    prochlorperazine, 5 mg, Intravenous, Q6H PRN    sodium chloride 0.9%, 10 mL, Intravenous, Q12H PRN    Pharmacy to dose Vancomycin consult, , , Once **AND** vancomycin - pharmacy to dose, , Intravenous, pharmacy to manage frequency     Review of Systems  Objective:     Weight: 107 kg (236 lb)  Body mass index is 44.59 kg/m².  Vital Signs (Most Recent):  Temp: 98.7 °F (37.1 °C) (06/16/25 0713)  Pulse: 101 (06/16/25  "0713)  Resp: 18 (06/16/25 0753)  BP: 118/63 (06/16/25 0713)  SpO2: 95 % (06/16/25 0713) Vital Signs (24h Range):  Temp:  [97.5 °F (36.4 °C)-98.7 °F (37.1 °C)] 98.7 °F (37.1 °C)  Pulse:  [] 101  Resp:  [16-19] 18  SpO2:  [93 %-98 %] 95 %  BP: (116-141)/(63-75) 118/63     Date 06/16/25 0700 - 06/17/25 0659   Shift 8988-2439 4440-9322 9486-9648 24 Hour Total   INTAKE   Shift Total(mL/kg)       OUTPUT   Urine(mL/kg/hr) 850   850   Shift Total(mL/kg) 850(7.9)   850(7.9)   Weight (kg) 107 107 107 107                            Closed/Suction Drain 06/14/25 1124 Tube - 1 Right Back Bulb 15 Fr. (Active)   Site Description Healing 06/16/25 0200   Dressing Intervention Integrity maintained 06/16/25 0200   Drainage Bloody;Bright red 06/16/25 0200   Status To bulb suction 06/16/25 0200   Output (mL) 55 mL 06/15/25 1635            Closed/Suction Drain 06/14/25 1125 Tube - 2 Left Back Bulb 15 Fr. (Active)   Site Description Healing 06/16/25 0200   Dressing Intervention Integrity maintained 06/16/25 0200   Drainage Bloody;Bright red 06/16/25 0200   Status To bulb suction 06/16/25 0200   Output (mL) 55 mL 06/15/25 1635          Physical Exam     Neurosurgery Physical Exam  AAOx4  PERRL  Cranial nerves intact  BUE 5/5  BLE 4/5 pain limited hip flexion, 5/5 distally  deconditioned  SILT     Back incision clean/dry with bandage in place  ERIN drains in place    Significant Labs:  Recent Labs   Lab 06/15/25  0251   *      K 5.3*      CO2 24   BUN 20   CREATININE 1.0   CALCIUM 9.2   MG 1.5*     Recent Labs   Lab 06/15/25  0251   WBC 11.71   HGB 10.0*   HCT 30.9*        No results for input(s): "LABPT", "INR", "APTT" in the last 48 hours.  Microbiology Results (last 7 days)       ** No results found for the last 168 hours. **          All pertinent labs from the last 24 hours have been reviewed.    Significant Diagnostics:  CT: No results found in the last 24 hours.  MRI: No results found in the last 24 " hours.  I have reviewed all pertinent imaging results/findings within the past 24 hours.  I have reviewed and interpreted all pertinent imaging results/findings within the past 24 hours.

## 2025-06-16 NOTE — PROGRESS NOTES
[facility] - Med S[facility]  Neurosurgery  Progress Note    Subjective:     History of Present Illness: Kuldeep Villela is a 74 y.o.F with PMHx of arthritis, HTN, and HLD who [place] [facility] [place] complaints of acute on chronic low back/L flank pain for the last week. Does also report a fall a week ago. Is able to ambulate with a walker but feels this has become more difficult since the fall. Describes the pain as axial low back pain with bilateral radiculopathy L > R. Denies saddle anesthesia, bladder or bowel dysfunction, weakness or sensory dysfunction. CT abdomen and pelvis demonstrates T12 compression fracture. Neurosurgery consulted for input.     Post-Op Info:  Procedure(s) (LRB):  T12 LAMINECTOMY, SPINE, THORACIC, W/ ARTHRODESIS T10-L2 (N/A)   2 Days Post-Op   Interval History: NAEON. AFVSS. Exam stable. Drain output (R/L) 100/105cc. Pain controlled. TLSO when OOB    Medications:  Continuous Infusions:   0.9% NaCl   Intravenous Continuous 100 mL/hr at 06/15/25 0931 New Bag at 06/15/25 0931     Scheduled Meds:   acetaminophen  1,000 mg Oral TID    amLODIPine  10 mg Oral Daily    busPIRone  15 mg Oral QID    calcium carbonate  500 mg Oral BID    cetirizine  10 mg Oral Daily    citalopram  40 mg Oral Daily    diclofenac sodium  2 g Topical (Top) TID    enoxparin  40 mg Subcutaneous Q24H (prophylaxis, 1700)    lactulose  10 g Oral TID    LIDOcaine  1 patch Transdermal Q24H    methocarbamoL  750 mg Oral QID    pravastatin  20 mg Oral Daily    rOPINIRole  1 mg Oral BID    senna-docusate  2 tablet Oral BID    sertraline  50 mg Oral Daily    vancomycin (VANCOCIN) IV (PEDS and ADULTS)  1,000 mg Intravenous Q24H     PRN Meds:  Current Facility-Administered Medications:     albuterol, 2 puff, Inhalation, Q4H PRN    aluminum-magnesium hydroxide-simethicone, 30 mL, Oral, Q4H PRN    dextrose 50%, 12.5 g, Intravenous, PRN    dextrose 50%, 25 g, Intravenous, PRN    diazePAM, 5 mg, Oral, BID PRN    glucagon (human recombinant), 1  mg, Intramuscular, PRN    glucose, 16 g, Oral, PRN    glucose, 24 g, Oral, PRN    HYDROmorphone, 1 mg, Intravenous, Q4H PRN    melatonin, 6 mg, Oral, Nightly PRN    naloxone, 0.02 mg, Intravenous, PRN    ondansetron, 8 mg, Oral, Q8H PRN    oxyCODONE, 5 mg, Oral, Q4H PRN    oxyCODONE, 10 mg, Oral, Q4H PRN    polyethylene glycol, 17 g, Oral, Daily PRN    prochlorperazine, 5 mg, Intravenous, Q6H PRN    sodium chloride 0.9%, 10 mL, Intravenous, Q12H PRN    Pharmacy to dose Vancomycin consult, , , Once **AND** vancomycin - pharmacy to dose, , Intravenous, pharmacy to manage frequency     Review of Systems  Objective:     Weight: 107 kg (236 lb)  Body mass index is 44.59 kg/m².  Vital Signs (Most Recent):  Temp: 98.7 °F (37.1 °C) (06/16/25 0713)  Pulse: 101 (06/16/25 0713)  Resp: 18 (06/16/25 0753)  BP: 118/63 (06/16/25 0713)  SpO2: 95 % (06/16/25 0713) Vital Signs (24h Range):  Temp:  [97.5 °F (36.4 °C)-98.7 °F (37.1 °C)] 98.7 °F (37.1 °C)  Pulse:  [] 101  Resp:  [16-19] 18  SpO2:  [93 %-98 %] 95 %  BP: (116-141)/(63-75) 118/63     Date 06/16/25 0700 - 06/17/25 0659   Shift 3450-5516 2496-4549 2796-8164 24 Hour Total   INTAKE   Shift Total(mL/kg)       OUTPUT   Urine(mL/kg/hr) 850   850   Shift Total(mL/kg) 850(7.9)   850(7.9)   Weight (kg) 107 107 107 107                            Closed/Suction Drain 06/14/25 1124 Tube - 1 Right Back Bulb 15 Fr. (Active)   Site Description Healing 06/16/25 0200   Dressing Intervention Integrity maintained 06/16/25 0200   Drainage Bloody;Bright red 06/16/25 0200   Status To bulb suction 06/16/25 0200   Output (mL) 55 mL 06/15/25 1635            Closed/Suction Drain 06/14/25 1125 Tube - 2 Left Back Bulb 15 Fr. (Active)   Site Description Healing 06/16/25 0200   Dressing Intervention Integrity maintained 06/16/25 0200   Drainage Bloody;Bright red 06/16/25 0200   Status To bulb suction 06/16/25 0200   Output (mL) 55 mL 06/15/25 1635          Physical Exam     Neurosurgery Physical  "Exam  AAOx4  PERRL  Cranial nerves intact  BUE 5/5  BLE 4/5 pain limited hip flexion, 5/5 distally  deconditioned  SILT     Back incision clean/dry with bandage in place  ERIN drains in place    Significant Labs:  Recent Labs   Lab 06/15/25  0251   *      K 5.3*      CO2 24   BUN 20   CREATININE 1.0   CALCIUM 9.2   MG 1.5*     Recent Labs   Lab 06/15/25  0251   WBC 11.71   HGB 10.0*   HCT 30.9*        No results for input(s): "LABPT", "INR", "APTT" in the last 48 hours.  Microbiology Results (last 7 days)       ** No results found for the last 168 hours. **          All pertinent labs from the last 24 hours have been reviewed.    Significant Diagnostics:  CT: No results found in the last 24 hours.  MRI: No results found in the last 24 hours.  I have reviewed all pertinent imaging results/findings within the past 24 hours.  I have reviewed and interpreted all pertinent imaging results/findings within the past 24 hours.  Assessment/Plan:     * Compression fracture of T12 vertebra  Kuldeep Villela is a 74 y.o.F with PMHx of arthritis, HTN, and HLD who presents to [facility] ED for complaints of acute on chronic low back/L flank pain for the last week. Does also report a fall a week ago.     CT Abdomen and Pelvis 6/10/25: T12 compression fracture  MRI T/L spine 6/12/25: anterior wedge fracture T12 vertebral body with moderate canal stenosis, subtle cord signal change    Plan:  - Admitted to Hospital Medicine  - ERIN drains x2 to suction, abx while in place  - TLSO brace when out of bed  - removed tom. Monitor for bladder retention. Please document PVR. Notify NSGY with PVR greater than 300.  - regular diet  - LVX for chemical DVT ppx   - PT/OT/TLSO brace when out of bed    Dispo: ongoing    Discussed with staff        Modesto Ward MD  Neurosurgery  [facility] - Med Surg  "

## 2025-06-16 NOTE — PLAN OF CARE
[facility] - Med Surg  Discharge Reassessment    Primary Care Provider: Ginna Musa MD    Expected Discharge Date: 6/19/2025    SW met with pt to discuss ongoing services. Patient was murray as she expressed pain in her left shoulder.  Patient not medically ready for discharge. Pending PT eval. OSNF pending review for placement. Pt deferred to daughter for additional SNF choices.    Spoke with pt aida Benites at [phone] about additional SNF choices. [facility].    3:17 PM   Completed Locet-PASRR completed in [employer];  Waiting for 142.    Discharge Plan A and Plan B have been determined by review of patient's clinical status, future medical and therapeutic needs, and coverage/benefits for post-acute care in coordination with multidisciplinary team members.     Reassessment (most recent)       Discharge Reassessment - 06/16/25 1344          Discharge Reassessment    Assessment Type Discharge Planning Reassessment     Did the patient's condition or plan change since previous assessment? No     Discharge Plan discussed with: Patient   PT lives with her daughter and grandchildren.    Communicated MATT with patient/caregiver Yes     Discharge Plan A Skilled Nursing Facility     Discharge Plan B Home Health     DME Needed Upon Discharge  none     Transition of Care Barriers None     Why the patient remains in the hospital Requires continued medical care        Post-Acute Status    Post-Acute Authorization Placement     Post-Acute Placement Status Pending medical clearance/testing     Discharge Delays None known at this time                   Maxwell DIANA/  Case Managen

## 2025-06-16 NOTE — SUBJECTIVE & OBJECTIVE
Interval History: NAEO. Had pain overnight. Patient overall improving since surgery with mobility. Ok for moblizing with Pt/OT with TLSO brace. No bowel movement.     Review of Systems  Objective:     Vital Signs (Most Recent):  Temp: 98.7 °F (37.1 °C) (06/16/25 0713)  Pulse: 101 (06/16/25 0713)  Resp: 18 (06/16/25 0753)  BP: 118/63 (06/16/25 0713)  SpO2: 95 % (06/16/25 0713) Vital Signs (24h Range):  Temp:  [97.5 °F (36.4 °C)-98.7 °F (37.1 °C)] 98.7 °F (37.1 °C)  Pulse:  [] 101  Resp:  [16-19] 18  SpO2:  [93 %-98 %] 95 %  BP: (116-141)/(63-75) 118/63     Weight: 107 kg (236 lb)  Body mass index is 44.59 kg/m².    Intake/Output Summary (Last 24 hours) at 6/16/2025 1015  Last data filed at 6/16/2025 0817  Gross per 24 hour   Intake --   Output 960 ml   Net -960 ml         Physical Exam  Constitutional:       Appearance: Normal appearance.   HENT:      Head: Normocephalic and atraumatic.      Nose: Nose normal.      Mouth/Throat:      Mouth: Mucous membranes are moist.   Eyes:      Extraocular Movements: Extraocular movements intact.      Pupils: Pupils are equal, round, and reactive to light.   Cardiovascular:      Rate and Rhythm: Normal rate and regular rhythm.      Heart sounds: No murmur heard.     No gallop.   Pulmonary:      Effort: Pulmonary effort is normal.      Breath sounds: Normal breath sounds. No wheezing or rales.   Abdominal:      General: Abdomen is flat. Bowel sounds are normal. There is no distension.      Palpations: Abdomen is soft.      Tenderness: There is no abdominal tenderness.   Musculoskeletal:         General: Tenderness present. No swelling. Normal range of motion.      Cervical back: Normal range of motion and neck supple.      Right lower leg: No edema.      Left lower leg: No edema.   Skin:     General: Skin is warm and dry.      Capillary Refill: Capillary refill takes less than 2 seconds.   Neurological:      General: No focal deficit present.      Mental Status: She is alert.  Mental status is at baseline.      Motor: Weakness present.      Comments: Drains in place   Weakness and ROM improving   Psychiatric:         Mood and Affect: Mood normal.               Significant Labs: All pertinent labs within the past 24 hours have been reviewed.    Significant Imaging: I have reviewed all pertinent imaging results/findings within the past 24 hours.

## 2025-06-16 NOTE — HOSPITAL COURSE
6/15: doing fine postoperative. ERIN drains with about 300cc output each. Hb stable at 10 this morning. Pain controlled  06/16/2025: NAEON. AFVSS. Exam stable. Drain output (R/L) 100/105cc. Pain controlled. TLSO when OOB  6/17: naeon. Neuro stable. Drain 65cc/nr. Doing well  06/18/2025: NAEON. AFVSS. Exam stable. Drain output 25cc. Working with PT, walking down gamez. No other complaints.  6/19: Doing well. Drains removed. Worked with PT,  enthusiastic. Voiding well. Work on BM. Will check semiweekly

## 2025-06-16 NOTE — ASSESSMENT & PLAN NOTE
74 y.o.F with new compression deformity of T12 and new inability to ambulate 2/2 pain. On admit, AFVSS, labs reviewed. CT a/p with: New compression deformity of T12 when compared to CT abdomen pelvis 03/22/2025. MRI T/L spine with sedation ordered. NSGY consulted. S/P T10-L2 posterior fusion 6/14      Plan:   - TLSO brace ordered   - NSGY consulted, appreciate recommendations  - MRI spine with sedation scheduled for 6/12, concerning for compression fracture with instability and severe spinal canal stenosis concerning for cord edema   -s/p T10-L2 posterior fusion 6/14  -IV abx while drains in place   - MM pain regimen initiated  - PT/OT with TLSO brace in   -remove tom montior I/Os closely   -bowel regimen increased    - fall precautions

## 2025-06-16 NOTE — PT/OT/SLP EVAL
Physical Therapy Evaluation    Patient Name:  Kuldeep Villela   MRN:  [id number]    Recommendations:     Discharge Recommendations: Moderate Intensity Therapy   Discharge Equipment Recommendations: bath bench, walker, rolling, bedside commode   Barriers to discharge: Inaccessible home and Decreased caregiver support    Assessment:     Kuldeep Villela is a 74 y.o. female admitted with a medical diagnosis of Compression fracture of T12 vertebra.  She presents with the following impairments/functional limitations: weakness, impaired endurance, impaired self care skills, impaired functional mobility, gait instability, impaired balance, decreased upper extremity function, decreased lower extremity function, decreased safety awareness, pain, orthopedic precautions. Pt would benefit from a moderate intensity/frequency therapy for: Dynamic/static standing/sitting balance through skilled balance training, strengthening with the use of skilled therapeutic exercises interventions, and mobility through adaptive equipment training. Pt continues to benefit from a collaborative PT/OT program to improve quality of life and focus on recovery of impairments.      Rehab Prognosis: Good; patient would benefit from acute skilled PT services to address these deficits and reach maximum level of function.    Recent Surgery: Procedure(s) (LRB):  T12 LAMINECTOMY, SPINE, THORACIC, W/ ARTHRODESIS T10-L2 (N/A) 2 Days Post-Op    Plan:     During this hospitalization, patient to be seen 4 x/week to address the identified rehab impairments via gait training, therapeutic activities, therapeutic exercises, neuromuscular re-education and progress toward the following goals:    Plan of Care Expires:  07/16/25    Subjective     Chief Complaint: pain   Patient/Family Comments/goals: to stop hurting  Pain/Comfort:  Pain Rating 1: 9/10  Location 1: back (at R drain site)  Pain Addressed 1: Pre-medicate for activity, Reposition, Distraction, Cessation of  Activity  Pain Rating Post-Intervention 1: 9/10    Patients cultural, spiritual, Mormonism conflicts given the current situation: no    Living Environment:  Pt lives with her daughter mckenzie SS with 0STE. Pt has a tub/shower with no seat.   Prior to admission, patients level of function was using a rollator for ambulating at home and requiring some assistance with bathing and dressing 2/2 pain. Pt enjoys watching TV and relaxing. Equipment used at home: rollator.  DME owned (not currently used): none.  Upon discharge, patient will have assistance from unknown during the day as her daughter works, but her daughter can assist in the evenings.    Objective:     Communicated with RN prior to session.  Patient found HOB elevated with telemetry, peripheral IV, ERIN drain  upon PT entry to room.    General Precautions: Standard, fall  Orthopedic Precautions:spinal precautions   Braces: TLSO (when OOB)  Respiratory Status: Room air    Exams:  Cognitive Exam:  Patient is oriented to Person, Place, Time, and Situation  Gross Motor Coordination:  WFL  Postural Exam:  Patient presented with the following abnormalities:    -       Rounded shoulders  -       Forward head  -       Kyphosis  Sensation:  pt denies numbness/tingling   RLE ROM: WFL  RLE Strength: hip flexion 3+/5, knee extension 4/5, knee flexion 4/5, DF 4/5  LLE ROM: WFL  LLE Strength: hip flexion 3+/5, knee extension 4/5, knee flexion 4/5, DF 4/5    Functional Mobility:  Bed Mobility:     Scooting: minimum assistance  Supine to Sit: minimum assistance  Sit to Supine: minimum assistance  Transfers:     Sit to Stand: pt politely deferring 2/2 pain in back, in particular knee R drain site  Balance:   Static Sitting: SBA  Dynamic Sitting: SBA-CGA      AM-PAC 6 CLICK MOBILITY  Total Score:13       Treatment & Education:  Patient educated on role of therapy, goals of session, and benefits of mobilizing.   Discussed PT plan of care during hospitalization.   Patient educated  on calling for assistance.   Patient educated on how their diagnosis impacts their mobility within PT scope of practice.   Communication board up to date.  All questions answered within PT scope of practice.    Patient left HOB elevated with all lines intact, call button in reach, bed alarm on, and RN notified.    GOALS:   Multidisciplinary Problems       Physical Therapy Goals          Problem: Physical Therapy    Goal Priority Disciplines Outcome Interventions   Physical Therapy Goal     PT, PT/OT Progressing    Description: Goals to be met by: 2025     Patient will increase functional independence with mobility by performin. Supine to sit with Stand-by Assistance  2. Sit to supine with Stand-by Assistance  3. Sit to stand transfer with Stand-by Assistance with RW  4. Bed to chair transfer with Stand-by Assistance using Rolling Walker  5. Gait  x 50 feet with Stand-by Assistance using Rolling Walker.   6. Lower extremity exercise program x10 reps per handout, with supervision                         DME Justifications:   Kuldeep requires a commode for home use because she is confined to a single room.   Kuldeep's mobility limitation cannot be sufficiently resolved by the use of a cane. Her functional mobility deficit can be sufficiently resolved with the use of a Rolling Walker. Patient's mobility limitation significantly impairs their ability to participate in one of more activities of daily living.  The use of a RW will significantly improve the patient's ability to participate in MRADLS and the patient will use it on regular basis in the home.    History:     Past Medical History:   Diagnosis Date    Arthritis     Asthma     Cervical spondylosis 2012    Chronic LBP 2012    Chronic neck pain 2012    Debility     Hyperlipidemia     Hypertension     Lumbar radiculopathy, BLE 2012    Lumbar spinal stenosis at L4-L5. 2012    Lumbar spondylosis 2012    Morbid obesity  07/13/2012    Primary osteoarthritis of both knees 07/13/2012    Spondylolisthesis, grade 1 at L4-L5. 07/13/2012    Tenosynovitis of ankle     Rt peroneus longus    Walker as ambulation aid        Past Surgical History:   Procedure Laterality Date    CHOLECYSTECTOMY      HYSTERECTOMY      MAGNETIC RESONANCE IMAGING Bilateral 6/12/2025    Procedure: MRI (Magnetic Resonance Imagine);  Surgeon: Sameera Villalba;  Location: [facility];  Service: Anesthesiology;  Laterality: Bilateral;    MS REMOVAL OF OVARY/TUBE(S)         Time Tracking:     PT Received On: 06/16/25  PT Start Time: 1134     PT Stop Time: 1150  PT Total Time (min): 16 min     Billable Minutes: Evaluation 8 and Therapeutic Activity 8      06/16/2025

## 2025-06-16 NOTE — ANESTHESIA POSTPROCEDURE EVALUATION
Anesthesia Post Evaluation    Patient: Kuldeep Villela    Procedure(s) Performed: Procedure(s) (LRB):  MRI (Magnetic Resonance Imagine) (Bilateral)    Final Anesthesia Type: general      Patient location during evaluation: PACU  Patient participation: Yes- Able to Participate  Level of consciousness: awake and alert  Pain management: adequate  Airway patency: patent      Anesthetic complications: no      Cardiovascular status: blood pressure returned to baseline  Respiratory status: unassisted  Hydration status: euvolemic  Follow-up not needed.          Vitals Value Taken Time   /70 06/12/25 15:16   Temp 36.2 °C (97.2 °F) 06/12/25 13:36   Pulse 81 06/12/25 15:20   Resp 18 06/12/25 15:15   SpO2 98 % 06/12/25 15:20   Vitals shown include unfiled device data.      No case tracking events are documented in the log.      Pain/Sea Score: Pain Rating Prior to Med Admin: 9 (6/16/2025  2:22 AM)  Pain Rating Post Med Admin: 8 (6/16/2025  2:48 AM)

## 2025-06-16 NOTE — ASSESSMENT & PLAN NOTE
RESOLVED  MALACHI is likely due to pre-renal azotemia due to dehydration. Baseline creatinine is ~1.0. Most recent creatinine and eGFR are listed below.  Recent Labs     06/15/25  0251   CREATININE 1.0   EGFRNORACEVR 60*      Plan  - MALACHI is stable  - Avoid nephrotoxins and renally dose meds for GFR listed above  - Monitor urine output, serial BMP, and adjust therapy as needed  - s/p 1L IVF bolus in ED, encouraging good oral hydration

## 2025-06-16 NOTE — PROGRESS NOTES
Pharmacokinetic Assessment Follow Up: IV Vancomycin    Vancomycin serum concentration assessment(s):    The trough level was drawn correctly and can be used to guide therapy at this time. The measurement is within the desired definitive target range of 10 to 20 mcg/mL.    Vancomycin Regimen Plan:    Continue regimen to Vancomycin 1000 mg IV every 24 hours with next serum trough concentration measured at 1300 prior to the dose on 6/18 if vancomycin is continued.    Drug levels (last 3 results):  Recent Labs   Lab Result Units 06/16/25  1239   Vancomycin Trough ug/ml 11.4       Pharmacy will continue to follow and monitor vancomycin.    Please contact pharmacy at extension [phone] for questions regarding this assessment.    Thank you for the consult,   Natasha Juarez       Patient brief summary:  Kuldeep Villela is a 74 y.o. female initiated on antimicrobial therapy with IV Vancomycin for post op while drains in place    The patient's current regimen is vancomycin 1000 mg q24h    Drug Allergies:   Review of patient's allergies indicates:   Allergen Reactions    Penicillins Anxiety and Other (See Comments)    Anesthetic [benzocaine-aloe vera]      Jittery/hyper    Guaifenesin Anxiety and Other (See Comments)       Actual Body Weight:   107 kg    Renal Function:   Estimated Creatinine Clearance: 57.4 mL/min (based on SCr of 1 mg/dL).,         CBC (last 72 hours):  Recent Labs   Lab Result Units 06/15/25  0251 06/16/25  0951   WBC K/uL 11.71 12.76*   HGB gm/dL 10.0* 8.5*   HCT % 30.9* 26.5*   Platelet Count K/uL 268 257   Lymph % % 7.9* 11.5*   Mono % % 6.9 7.3   Eos % % 0.0 1.2   Basophil % % 0.2 0.3       Metabolic Panel (last 72 hours):  Recent Labs   Lab Result Units 06/15/25  0251 06/16/25  0951   Sodium mmol/L 137 138   Potassium mmol/L 5.3* 4.2   Chloride mmol/L 103 105   CO2 mmol/L 24 24   Glucose mg/dL 116* 101   BUN mg/dL 20 18   Creatinine mg/dL 1.0 1.0   Magnesium  mg/dL 1.5* 1.5*   Phosphorus Level mg/dL 4.0 3.4        Vancomycin Administrations:  vancomycin given in the last 96 hours                     vancomycin (VANCOCIN) 1,000 mg in 0.9% NaCl 250 mL IVPB (admixture device) (mg) 1,000 mg New Bag 06/15/25 1316    vancomycin 1750 mg in 0.9% sodium chloride 500 mL IVPB (mg) 1,750 mg New Bag 06/14/25 1430    vancomycin injection (g) 1 g Given 06/14/25 1100     1 g Given  0800                    Microbiologic Results:  Microbiology Results (last 7 days)       ** No results found for the last 168 hours. **

## 2025-06-16 NOTE — ASSESSMENT & PLAN NOTE
Kuldeep Villela is a 74 y.o.F with PMHx of arthritis, HTN, and HLD who [place] [facility] for complaints of acute on chronic low back/L flank pain for the last week. Does also report a fall a week ago.     CT Abdomen and Pelvis 6/10/25: T12 compression fracture  MRI T/L spine 6/12/25: anterior wedge fracture T12 vertebral body with moderate canal stenosis, subtle cord signal change    Plan:  - Admitted to Hospital Medicine  - ERIN drains x2 to suction, abx while in place  - TLSO brace when out of bed  - removed tom. Monitor for bladder retention. Please document PVR. Notify NSGY with PVR greater than 300.  - regular diet  - LVX for chemical DVT ppx   - PT/OT/TLSO brace when out of bed    Dispo: ongoing    Discussed with staff

## 2025-06-16 NOTE — OP NOTE
[facility] Surg  Neurosurgery  Operative Note    OP Note      Date of Procedure: 6/14/2025     Pre-Operative Diagnosis: Compression fracture of T12 vertebra, initial encounter [S22.080A]  Spinal cord compression with myelopathy at T12    Post-Operative Diagnosis: Post-Op Diagnosis Codes:     * Compression fracture of T12 vertebra, initial encounter [S22.080A]  Spinal cord compression with myelopathy at T12    Anesthesia: General    Procedures performed:  1) posterior exposure of the spine T10-L2  2) use of intraoperative neuro navigation  3) use of intraoperative neuro monitoring  4) posterior spinal instrumentation T10-L2  5) partial inferior laminectomy T11, total laminectomy T12  6) posterior spinal fusion T10-L2 using autograft, crushed cancellous allograft, bone morphogenetic protein     Surgeon: Nnamdi Head MD    Assistant: Ayde Manley MD    Indication for Procedure:  This is a 74-year-old female who had a fall prior to her presentation to the hospital resulting in inability to walk.  Imaging revealed progression of the T12 compression fracture with severe facet arthropathy and spinal cord compression with myelopathy and spinal cord edema mainly from medial facet impinging on the spinal cord.  She had significant hip flexion weakness and was myelopathic and unable to walk.  Signed informed consent was documented for stabilization of this fracture with decompression.    Operative Note:   On the day of surgery patient was identified in the preoperative holding area using 2 independent patient identifiers.  She was taken to the operating room where general endotracheal anesthesia was induced without any problems.  Map was kept above 80.  Neuro monitoring leads were placed and baseline signals were obtained.  She was turned prone onto an open Constantine table.  All pressure points were padded.  Skin was cleansed thoroughly on the back using rubbing alcohol.  The fluoro unit was brought in and we marked  out an incision on the midline of the back spanning from T10-L2.  Patient was then prepped and draped in the usual sterile manner.  Local anesthetic was injected subcutaneously.    A time-out was held identifying the correct patient, side, site, procedures to be performed.  All parties agreed and imaging was displayed.     Skin was opened with a 10. Blade knife down to the dermis.  Bovie electrocautery was used to take this down to the posterior fascia.  In a subperiosteal manner we exposed the spinous process, lamina, transverse process of T10, T11, T12, L1, L2.  Likewise we exposed the facets of T10/11, T11/12, T12/L1, L1/L2.    A spinous process clamp was then attached to the L2 spinous process and an intraoperative CT scan was performed.  Accuracy was then verified on known anatomical landmarks.    We then used the navigated maryanne to make a  hole on the left side at T10 pedicle.  A navigated drill guide was then used to drill a  hole all the way into the T10 vertebral body through the left pedicle.  This was probed for integrity and found to be intact.  A 6.0 x 40 mm verse fenestrated screw was then placed into the left T10 pedicle.    We then used the navigated maryanne to make a  hole on the left side at T11 pedicle.  A navigated drill guide was then used to drill a  hole all the way into the T11 vertebral body through the left pedicle.  This was probed for integrity and found to be intact.  A 6.0 x 40 mm non-fenestrated screw was then placed into the left T11 pedicle.    We then used the navigated maryanne to make a  hole on the left side at L1 pedicle.  A navigated drill guide was then used to drill a  hole all the way into the L1 vertebral body through the left pedicle.  This was probed for integrity and found to be intact.  A 6.0 x 45 mm non-fenestrated screw was then placed into the left L1 pedicle.     We then used the navigated maryanne to make a  hole on the left side at L2  pedicle.  A navigated drill guide was then used to drill a  hole all the way into the L2 vertebral body through the left pedicle.  This was probed for integrity and found to be intact.  A 6.0 x 45 mm verse fenestrated screw was then placed into the left L2 pedicle.     We then used the navigated maryanne to make a  hole on the right side at T10 pedicle.  A navigated drill guide was then used to drill a  hole all the way into the T10 vertebral body through the right pedicle.  This was probed for integrity and found to be intact.  A 6.0 x 40 mm verse fenestrated screw was then placed into the right T10 pedicle.    We then used the navigated maryanne to make a  hole on the right side at T11 pedicle.  A navigated drill guide was then used to drill a  hole all the way into the T11 vertebral body through the right pedicle.  This was probed for integrity and found to be intact.  A 6.0 x 40 mm non fenestrated screw was then placed into the right T11 pedicle.    We then used the navigated maryanne to make a  hole on the right side at L1 pedicle.  A navigated drill guide was then used to drill a  hole all the way into the L1 vertebral body through the right pedicle.  This was probed for integrity and found to be intact.  A 6.0 x 45 mm non fenestrated screw was then placed into the right L1 pedicle.     We then used the navigated maryanne to make a  hole on the right side at L2 pedicle.  A navigated drill guide was then used to drill a  hole all the way into the L2 vertebral body through the right pedicle.  This was probed for integrity and found to be intact.  A 6.0 x 45 mm verse fenestrated screw was then placed into the right L2 pedicle.      AP and lateral x-rays showed the thoracic hardware to be in excellent position.  The lumbar screws, however were to high coming out of the pedicle and were not ideally positioned.    The fluoro unit was then brought in.  The left L1 and L2 screws were  removed and the right L2 screw was removed.  There did appear to be a breach in the superior aspect of the pedicle.  The fluoro unit was then lined up on the L1 vertebral body in AP orientation.   hole was made with the high-speed drill at the lateral edge of the pedicle on the left.  Lenke probe was used to advance into the pedicle on the left side. The  hole was investigated for integrity and found to be intact.  Therefore, I inserted a 6 x 45 mm polyaxial pedicle screw into the left L1 vertebral body.    The fluoro unit was then lined up on the L2 vertebral body in AP orientation.   holes were made with the high-speed drill at the lateral edge of the pedicle bilaterally.  Lenke probes were used to advance into the pedicles bilaterally.  The  holes were investigated for integrity and found to be intact.  Therefore, we inserted two 6 by 45 mm polyaxial verse fenestrated pedicle screws into the L2 vertebral body.  AP and lateral x-rays showed all of the hardware now to be in excellent position.  Hardware was then stimulated with no response above acceptable thresholds    Attention was now turned to decompression.  A rongeur was used to remove the inferior aspect of the T11 spinous process.  The high-speed drill was used to perform an inferior T11 laminectomy as well as a total T12 laminectomy.  There was thick ligamentum flavum which was resected using 2. Kerrison rongeur.  There was extremely medialized facet and further facetectomy was performed until we were able to use a curette to break the T11/12 facet away from the spinal cord.  Once this step was completed the spinal cord appeared to be very well decompressed between the T11 and L1 pedicles.  Neuro monitoring signals were taken and were found to be stable.    Rods were now cut to the appropriate size and laid into the Tulip heads from T10-L2.  Reduction towers were used to place set screws into the Tulip heads.  The screws were then  tightened all the way using the torque counter-torque mechanism.  Final neuro monitoring runs were taken after this step were found to be stable.  Final AP and lateral x-rays were taken and were found to be stable with hardware in excellent position.  Copious irrigation was then performed using approximately 1 L of antibiotic infused irrigation.  Hemostasis was then obtained with the Aquamantys, bipolar electrocautery, as well as Surgiflo and patties in the epidural space.  The posterior bony elements were then decorticated from T10-L2, including decortication of the T10/11, T11/12, L1/L2 facets.  We then processed autograft which was harvested from the lactic laminectomy in a bone mill and combined this with 30 cc of crushed cancellous allograft.  A medium bone morphogenetic protein sponge was then placed across the area to be fused from T10-L2.  This was followed by the combination autograft and allograft, thus accomplishing a T10-L2 fusion.    Two fifteen Macedonian ERIN drains were laid in the surgical cavity subfascially.  These were secured at the skin with sutures.  The wound was then closed using simple interrupted 0 Vicryl sutures for the muscle and fascia, inverted interrupted 2-0 Vicryl sutures for the dermis, and the skin was reapproximated with staples.  The wound was then cleansed and dried and a sterile dressing was applied which was a silver impregnated island dressing.    All sponge and needle counts were correct x2 at the end of the case.  There were no complications.  I was scrubbed and present for the critical portions of the case.    EBL: 200c  Specimen Sent: none    Nnamdi Head  Neurosurgery

## 2025-06-16 NOTE — ANESTHESIA POSTPROCEDURE EVALUATION
Anesthesia Post Evaluation    Patient: Kuldeep Villela    Procedure(s) Performed: Procedure(s) (LRB):  T12 LAMINECTOMY, SPINE, THORACIC, W/ ARTHRODESIS T10-L2 (N/A)    Final Anesthesia Type: general      Patient location during evaluation: PACU  Patient participation: Yes- Able to Participate  Level of consciousness: awake and alert and oriented  Post-procedure vital signs: reviewed and stable  Pain management: adequate  Airway patency: patent    PONV status at discharge: No PONV  Anesthetic complications: no      Cardiovascular status: hemodynamically stable  Respiratory status: nasal cannula  Hydration status: euvolemic  Follow-up not needed.          Vitals Value Taken Time   /63 06/16/25 07:13   Temp 37.1 °C (98.7 °F) 06/16/25 07:13   Pulse 101 06/16/25 07:13   Resp 18 06/16/25 07:13   SpO2 95 % 06/16/25 07:13         Event Time   Out of Recovery 06/14/2025 12:30:00         Pain/Sea Score: Pain Rating Prior to Med Admin: 9 (6/16/2025  6:37 AM)  Pain Rating Post Med Admin: 8 (6/16/2025  2:48 AM)

## 2025-06-16 NOTE — ASSESSMENT & PLAN NOTE
74 y.o.F with new compression deformity of T12 and new inability to ambulate 2/2 pain. On admit, AFVSS, labs reviewed. CT a/p with: New compression deformity of T12 when compared to CT abdomen pelvis 03/22/2025. MRI T/L spine with sedation ordered. NSGY consulted. S/P T10-L2 posterior fusion 6/14      Plan:   - TLSO brace ordered   - NSGY consulted, appreciate recommendations  - MRI spine with sedation scheduled for 6/12, concerning for compression fracture with instability and severe spinal canal stenosis concerning for cord edema   -s/p T10-L2 posterior fusion 6/14  -IV abx while drains in place   - MM pain regimen initiated  - PT/OT with TLSO brace in   -remove tom montior I/Os closely   -bowel regimen increased    - fall precautions      no gum bleeding/no nose bleeding/no skin lumps

## 2025-06-16 NOTE — ASSESSMENT & PLAN NOTE
Patient's blood pressure range in the last 24 hours was: BP  Min: 116/69  Max: 141/67.The patient's inpatient anti-hypertensive regimen is listed below:  Current Antihypertensives  , Every 12 hours, Oral  amLODIPine tablet 10 mg, Daily, Oral    Plan  - BP is controlled, no changes needed to their regimen

## 2025-06-16 NOTE — ASSESSMENT & PLAN NOTE
Patient with Acute on chronic debility due to fracture/prosthesis. The patient's latest AMPAC (Activity Measure for Post Acute Care) Score is listed below.    AM-PAC Score - How much help does the patient need for each activity listed  Basic Mobility Total Score: 18  Turning over in bed (including adjusting bedclothes, sheets and blankets)?: A little  Sitting down on and standing up from a chair with arms (e.g., wheelchair, bedside commode, etc.): A little  Moving from lying on back to sitting on the side of the bed?: A little  Moving to and from a bed to a chair (including a wheelchair)?: A little  Need to walk in hospital room?: A little  Climbing 3-5 steps with a railing?: A little    Plan  - PT/OT consulted  - Fall precautions in place  - PT/OT recommending moderate intensity therapy at MT

## 2025-06-16 NOTE — PROGRESS NOTES
[facility] Medicine  Progress Note    Patient Name: Kuldeep Villela  MRN: [id number]  Patient Class: IP- Inpatient   Admission Date: 6/9/2025  Length of Stay: 6 days  Attending Physician: Cintia Anton MD  Primary Care Provider: Ginna Musa MD        Subjective     Principal Problem:Compression fracture of T12 vertebra        HPI:  Kuldeep Villela is a 74 y.o.F with PMHx of arthritis, lumbar stenosis, HTN, HLD, depression who [place] [facility] [place] of worsening low back/L flank pain for the last week. Endorses chronic low back pain for which she sees a specialist for, but has worsened recently. Denies any known injury to area. She reports she felt like it may be the beginning of a UTI so she started taking Azo at home for prevention. States she normally ambulates with a walker; however, the pain has been so severe that she has not been able to ambulate at all. Endorses bilateral lower extremity weakness. Denies bladder or bowel incontinence, denies numbness of lower extremities. Denies fever, chills, chest pain, palpitations, SOB, cough, abdominal pain, n/v/d, dysuria, headaches, or any other symptoms at this time.     In ED: afebrile, VSS on RA. CBC without leukocytosis, Hgb 11.1. CMP notable for Cr 1.5 (~1.0), otherwise unremarkable. UA non-infectious appearing. CT abd/pelvis with new compression deformity of T12, perinephric haziness and stranding, L>R, correlate with renal disease, small periumbilical abd wall hernia involving bowel without inflammatory or obstructive change. S/p rocephin, ketamine, toradol 15mg inj, oxy-acetaminophen 10mg, 1L IVF bolus in ED. TLSO brace ordered. Admitted to  for further evaluation.     Overview/Hospital Course:  Pt admitted for continued management of T12 compression fx. NSGY consulted, no acute intervention warranted at this time. MRI spine with sedation pending and scheduled for 6/12. MRI concerning for compression fracture with  instability and severe spinal canal stenosis concerning for cord edema. Recommended to stop mobility with PT/OT pending surgical stabilization of spine. She was taken to the OR 6/14/25 for  T10-L2 posterior fusion.     Interval History: NAEO. Had pain overnight. Patient overall improving since surgery with mobility. Ok for moblizing with Pt/OT with TLSO brace. No bowel movement.     Review of Systems  Objective:     Vital Signs (Most Recent):  Temp: 98.7 °F (37.1 °C) (06/16/25 0713)  Pulse: 101 (06/16/25 0713)  Resp: 18 (06/16/25 0753)  BP: 118/63 (06/16/25 0713)  SpO2: 95 % (06/16/25 0713) Vital Signs (24h Range):  Temp:  [97.5 °F (36.4 °C)-98.7 °F (37.1 °C)] 98.7 °F (37.1 °C)  Pulse:  [] 101  Resp:  [16-19] 18  SpO2:  [93 %-98 %] 95 %  BP: (116-141)/(63-75) 118/63     Weight: 107 kg (236 lb)  Body mass index is 44.59 kg/m².    Intake/Output Summary (Last 24 hours) at 6/16/2025 1015  Last data filed at 6/16/2025 0817  Gross per 24 hour   Intake --   Output 960 ml   Net -960 ml         Physical Exam  Constitutional:       Appearance: Normal appearance.   HENT:      Head: Normocephalic and atraumatic.      Nose: Nose normal.      Mouth/Throat:      Mouth: Mucous membranes are moist.   Eyes:      Extraocular Movements: Extraocular movements intact.      Pupils: Pupils are equal, round, and reactive to light.   Cardiovascular:      Rate and Rhythm: Normal rate and regular rhythm.      Heart sounds: No murmur heard.     No gallop.   Pulmonary:      Effort: Pulmonary effort is normal.      Breath sounds: Normal breath sounds. No wheezing or rales.   Abdominal:      General: Abdomen is flat. Bowel sounds are normal. There is no distension.      Palpations: Abdomen is soft.      Tenderness: There is no abdominal tenderness.   Musculoskeletal:         General: Tenderness present. No swelling. Normal range of motion.      Cervical back: Normal range of motion and neck supple.      Right lower leg: No edema.      Left  lower leg: No edema.   Skin:     General: Skin is warm and dry.      Capillary Refill: Capillary refill takes less than 2 seconds.   Neurological:      General: No focal deficit present.      Mental Status: She is alert. Mental status is at baseline.      Motor: Weakness present.      Comments: Drains in place   Weakness and ROM improving   Psychiatric:         Mood and Affect: Mood normal.               Significant Labs: All pertinent labs within the past 24 hours have been reviewed.    Significant Imaging: I have reviewed all pertinent imaging results/findings within the past 24 hours.      Assessment & Plan  Compression fracture of T12 vertebra  Acute on chronic back pain    74 y.o.F with new compression deformity of T12 and new inability to ambulate 2/2 pain. On admit, AFVSS, labs reviewed. CT a/p with: New compression deformity of T12 when compared to CT abdomen pelvis 03/22/2025. MRI T/L spine with sedation ordered. NSGY consulted. S/P T10-L2 posterior fusion 6/14      Plan:   - TLSO brace ordered   - NSGY consulted, appreciate recommendations  - MRI spine with sedation scheduled for 6/12, concerning for compression fracture with instability and severe spinal canal stenosis concerning for cord edema   -s/p T10-L2 posterior fusion 6/14  -IV abx while drains in place   - MM pain regimen initiated  - PT/OT with TLSO brace in   -remove tomceasarior I/Os closely   -bowel regimen increased    - fall precautions     Hypertension  Patient's blood pressure range in the last 24 hours was: BP  Min: 116/69  Max: 141/67.The patient's inpatient anti-hypertensive regimen is listed below:  Current Antihypertensives  , Every 12 hours, Oral  amLODIPine tablet 10 mg, Daily, Oral    Plan  - BP is controlled, no changes needed to their regimen    Hyperlipemia  - continue home statin    Debility  Patient with Acute on chronic debility due to fracture/prosthesis. The patient's latest AMPAC (Activity Measure for Post Acute Care)  Score is listed below.    AM-PAC Score - How much help does the patient need for each activity listed  Basic Mobility Total Score: 18  Turning over in bed (including adjusting bedclothes, sheets and blankets)?: A little  Sitting down on and standing up from a chair with arms (e.g., wheelchair, bedside commode, etc.): A little  Moving from lying on back to sitting on the side of the bed?: A little  Moving to and from a bed to a chair (including a wheelchair)?: A little  Need to walk in hospital room?: A little  Climbing 3-5 steps with a railing?: A little    Plan  - PT/OT consulted  - Fall precautions in place  - PT/OT recommending moderate intensity therapy at [facility]      Depression with anxiety  Patient has recurrent depression which is mild and is currently controlled. Will Continue anti-depressant medications. We will not consult psychiatry at this time. Patient does not display psychosis at this time. Continue to monitor closely and adjust plan of care as needed.  - continue home medications    Hyperkalemia  -mild, will start lokelma and recheck           MALACHI (acute kidney injury)  RESOLVED  MALACHI is likely due to pre-renal azotemia due to dehydration. Baseline creatinine is ~1.0. Most recent creatinine and eGFR are listed below.  Recent Labs     06/15/25  0251   CREATININE 1.0   EGFRNORACEVR 60*      Plan  - MALACHI is stable  - Avoid nephrotoxins and renally dose meds for GFR listed above  - Monitor urine output, serial BMP, and adjust therapy as needed  - s/p 1L IVF bolus in ED, encouraging good oral hydration       Tobacco dependency  Dangers of cigarette smoking were reviewed with patient in detail. Patient was Counseled for 3-10 minutes. Nicotine replacement options were discussed. Nicotine replacement was discussed- prescribed  VTE Risk Mitigation (From admission, onward)           Ordered     enoxaparin injection 40 mg  Every 24 hours         06/15/25 1011     IP VTE HIGH RISK PATIENT  Once         06/14/25 1204      Place sequential compression device  Until discontinued         06/10/25 0731                    Discharge Planning   MATT: 6/19/2025     Code Status: Full Code   Medical Readiness for Discharge Date:   Discharge Plan A: Skilled Nursing Facility   Discharge Delays: None known at this time            Please place Justification for DME        Cintia Anton MD  Department of Hospital Medicine   [facility]

## 2025-06-16 NOTE — PLAN OF CARE
Problem: Physical Therapy  Goal: Physical Therapy Goal  Description: Goals to be met by: 2025     Patient will increase functional independence with mobility by performin. Supine to sit with Stand-by Assistance  2. Sit to supine with Stand-by Assistance  3. Sit to stand transfer with Stand-by Assistance with RW  4. Bed to chair transfer with Stand-by Assistance using Rolling Walker  5. Gait  x 50 feet with Stand-by Assistance using Rolling Walker.   6. Lower extremity exercise program x10 reps per handout, with supervision    Outcome: Progressing

## 2025-06-17 LAB
ABSOLUTE EOSINOPHIL (OHS): 0.29 K/UL
ABSOLUTE MONOCYTE (OHS): 1.09 K/UL (ref 0.3–1)
ABSOLUTE NEUTROPHIL COUNT (OHS): 7.92 K/UL (ref 1.8–7.7)
ANION GAP (OHS): 9 MMOL/L (ref 8–16)
BASOPHILS # BLD AUTO: 0.03 K/UL
BASOPHILS NFR BLD AUTO: 0.3 %
BUN SERPL-MCNC: 16 MG/DL (ref 8–23)
CALCIUM SERPL-MCNC: 9 MG/DL (ref 8.7–10.5)
CHLORIDE SERPL-SCNC: 105 MMOL/L (ref 95–110)
CO2 SERPL-SCNC: 23 MMOL/L (ref 23–29)
CREAT SERPL-MCNC: 1 MG/DL (ref 0.5–1.4)
ERYTHROCYTE [DISTWIDTH] IN BLOOD BY AUTOMATED COUNT: 13.2 % (ref 11.5–14.5)
GFR SERPLBLD CREATININE-BSD FMLA CKD-EPI: 60 ML/MIN/1.73/M2
GLUCOSE SERPL-MCNC: 88 MG/DL (ref 70–110)
HCT VFR BLD AUTO: 24.7 % (ref 37–48.5)
HGB BLD-MCNC: 8 GM/DL (ref 12–16)
IMM GRANULOCYTES # BLD AUTO: 0.06 K/UL (ref 0–0.04)
IMM GRANULOCYTES NFR BLD AUTO: 0.5 % (ref 0–0.5)
LYMPHOCYTES # BLD AUTO: 1.64 K/UL (ref 1–4.8)
MCH RBC QN AUTO: 32 PG (ref 27–31)
MCHC RBC AUTO-ENTMCNC: 32.4 G/DL (ref 32–36)
MCV RBC AUTO: 99 FL (ref 82–98)
NUCLEATED RBC (/100WBC) (OHS): 0 /100 WBC
PLATELET # BLD AUTO: 260 K/UL (ref 150–450)
PMV BLD AUTO: 10.9 FL (ref 9.2–12.9)
POTASSIUM SERPL-SCNC: 3.9 MMOL/L (ref 3.5–5.1)
RBC # BLD AUTO: 2.5 M/UL (ref 4–5.4)
RELATIVE EOSINOPHIL (OHS): 2.6 %
RELATIVE LYMPHOCYTE (OHS): 14.9 % (ref 18–48)
RELATIVE MONOCYTE (OHS): 9.9 % (ref 4–15)
RELATIVE NEUTROPHIL (OHS): 71.8 % (ref 38–73)
SODIUM SERPL-SCNC: 137 MMOL/L (ref 136–145)
WBC # BLD AUTO: 11.03 K/UL (ref 3.9–12.7)

## 2025-06-17 PROCEDURE — 36415 COLL VENOUS BLD VENIPUNCTURE: CPT | Performed by: STUDENT IN AN ORGANIZED HEALTH CARE EDUCATION/TRAINING PROGRAM

## 2025-06-17 PROCEDURE — 25000003 PHARM REV CODE 250: Performed by: STUDENT IN AN ORGANIZED HEALTH CARE EDUCATION/TRAINING PROGRAM

## 2025-06-17 PROCEDURE — 97535 SELF CARE MNGMENT TRAINING: CPT

## 2025-06-17 PROCEDURE — 25000003 PHARM REV CODE 250

## 2025-06-17 PROCEDURE — 85025 COMPLETE CBC W/AUTO DIFF WBC: CPT | Performed by: STUDENT IN AN ORGANIZED HEALTH CARE EDUCATION/TRAINING PROGRAM

## 2025-06-17 PROCEDURE — 63600175 PHARM REV CODE 636 W HCPCS: Performed by: STUDENT IN AN ORGANIZED HEALTH CARE EDUCATION/TRAINING PROGRAM

## 2025-06-17 PROCEDURE — 80048 BASIC METABOLIC PNL TOTAL CA: CPT | Performed by: STUDENT IN AN ORGANIZED HEALTH CARE EDUCATION/TRAINING PROGRAM

## 2025-06-17 PROCEDURE — 11000001 HC ACUTE MED/SURG PRIVATE ROOM

## 2025-06-17 PROCEDURE — 97530 THERAPEUTIC ACTIVITIES: CPT

## 2025-06-17 RX ORDER — ENOXAPARIN SODIUM 100 MG/ML
40 INJECTION SUBCUTANEOUS EVERY 12 HOURS
Status: DISCONTINUED | OUTPATIENT
Start: 2025-06-17 | End: 2025-06-24 | Stop reason: HOSPADM

## 2025-06-17 RX ORDER — ADHESIVE BANDAGE
30 BANDAGE TOPICAL DAILY PRN
Status: DISCONTINUED | OUTPATIENT
Start: 2025-06-17 | End: 2025-06-24 | Stop reason: HOSPADM

## 2025-06-17 RX ORDER — HYDROMORPHONE HYDROCHLORIDE 1 MG/ML
0.5 INJECTION, SOLUTION INTRAMUSCULAR; INTRAVENOUS; SUBCUTANEOUS EVERY 4 HOURS PRN
Status: DISCONTINUED | OUTPATIENT
Start: 2025-06-17 | End: 2025-06-23

## 2025-06-17 RX ORDER — BISACODYL 10 MG/1
10 SUPPOSITORY RECTAL DAILY PRN
Status: DISCONTINUED | OUTPATIENT
Start: 2025-06-17 | End: 2025-06-24 | Stop reason: HOSPADM

## 2025-06-17 RX ORDER — POLYETHYLENE GLYCOL 3350 17 G/17G
17 POWDER, FOR SOLUTION ORAL DAILY
Status: DISCONTINUED | OUTPATIENT
Start: 2025-06-17 | End: 2025-06-17

## 2025-06-17 RX ORDER — POLYETHYLENE GLYCOL 3350 17 G/17G
17 POWDER, FOR SOLUTION ORAL DAILY
Status: DISCONTINUED | OUTPATIENT
Start: 2025-06-18 | End: 2025-06-24 | Stop reason: HOSPADM

## 2025-06-17 RX ADMIN — HYDROMORPHONE HYDROCHLORIDE 0.5 MG: 1 INJECTION, SOLUTION INTRAMUSCULAR; INTRAVENOUS; SUBCUTANEOUS at 03:06

## 2025-06-17 RX ADMIN — LACTULOSE 10 G: 20 SOLUTION ORAL at 09:06

## 2025-06-17 RX ADMIN — ROPINIROLE HYDROCHLORIDE 1 MG: 1 TABLET, FILM COATED ORAL at 08:06

## 2025-06-17 RX ADMIN — METHOCARBAMOL 750 MG: 750 TABLET ORAL at 04:06

## 2025-06-17 RX ADMIN — METHOCARBAMOL 750 MG: 750 TABLET ORAL at 09:06

## 2025-06-17 RX ADMIN — SERTRALINE HYDROCHLORIDE 50 MG: 50 TABLET ORAL at 08:06

## 2025-06-17 RX ADMIN — BUSPIRONE HYDROCHLORIDE 15 MG: 10 TABLET ORAL at 09:06

## 2025-06-17 RX ADMIN — ACETAMINOPHEN 1000 MG: 500 TABLET ORAL at 08:06

## 2025-06-17 RX ADMIN — VANCOMYCIN HYDROCHLORIDE 1000 MG: 1 INJECTION, POWDER, LYOPHILIZED, FOR SOLUTION INTRAVENOUS at 04:06

## 2025-06-17 RX ADMIN — CETIRIZINE HYDROCHLORIDE 10 MG: 10 TABLET, FILM COATED ORAL at 08:06

## 2025-06-17 RX ADMIN — BUSPIRONE HYDROCHLORIDE 15 MG: 10 TABLET ORAL at 04:06

## 2025-06-17 RX ADMIN — METHOCARBAMOL 750 MG: 750 TABLET ORAL at 12:06

## 2025-06-17 RX ADMIN — HYDROMORPHONE HYDROCHLORIDE 0.5 MG: 1 INJECTION, SOLUTION INTRAMUSCULAR; INTRAVENOUS; SUBCUTANEOUS at 11:06

## 2025-06-17 RX ADMIN — ACETAMINOPHEN 1000 MG: 500 TABLET ORAL at 09:06

## 2025-06-17 RX ADMIN — HYDROMORPHONE HYDROCHLORIDE 1 MG: 1 INJECTION, SOLUTION INTRAMUSCULAR; INTRAVENOUS; SUBCUTANEOUS at 05:06

## 2025-06-17 RX ADMIN — ENOXAPARIN SODIUM 40 MG: 40 INJECTION SUBCUTANEOUS at 10:06

## 2025-06-17 RX ADMIN — LACTULOSE 10 G: 20 SOLUTION ORAL at 08:06

## 2025-06-17 RX ADMIN — AMLODIPINE BESYLATE 10 MG: 10 TABLET ORAL at 08:06

## 2025-06-17 RX ADMIN — DICLOFENAC SODIUM 2 G: 10 GEL TOPICAL at 09:06

## 2025-06-17 RX ADMIN — ENOXAPARIN SODIUM 40 MG: 40 INJECTION SUBCUTANEOUS at 09:06

## 2025-06-17 RX ADMIN — ROPINIROLE HYDROCHLORIDE 1 MG: 1 TABLET, FILM COATED ORAL at 09:06

## 2025-06-17 RX ADMIN — OXYCODONE 5 MG: 5 TABLET ORAL at 05:06

## 2025-06-17 RX ADMIN — OXYCODONE HYDROCHLORIDE 10 MG: 10 TABLET ORAL at 05:06

## 2025-06-17 RX ADMIN — CALCIUM CARBONATE (ANTACID) CHEW TAB 500 MG 500 MG: 500 CHEW TAB at 08:06

## 2025-06-17 RX ADMIN — LACTULOSE 10 G: 20 SOLUTION ORAL at 02:06

## 2025-06-17 RX ADMIN — HYDROMORPHONE HYDROCHLORIDE 1 MG: 1 INJECTION, SOLUTION INTRAMUSCULAR; INTRAVENOUS; SUBCUTANEOUS at 01:06

## 2025-06-17 RX ADMIN — CITALOPRAM HYDROBROMIDE 40 MG: 20 TABLET ORAL at 08:06

## 2025-06-17 RX ADMIN — BUSPIRONE HYDROCHLORIDE 15 MG: 10 TABLET ORAL at 08:06

## 2025-06-17 RX ADMIN — SENNOSIDES AND DOCUSATE SODIUM 2 TABLET: 50; 8.6 TABLET ORAL at 09:06

## 2025-06-17 RX ADMIN — CALCIUM CARBONATE (ANTACID) CHEW TAB 500 MG 500 MG: 500 CHEW TAB at 09:06

## 2025-06-17 RX ADMIN — OXYCODONE HYDROCHLORIDE 10 MG: 10 TABLET ORAL at 10:06

## 2025-06-17 RX ADMIN — OXYCODONE HYDROCHLORIDE 10 MG: 10 TABLET ORAL at 12:06

## 2025-06-17 RX ADMIN — ACETAMINOPHEN 1000 MG: 500 TABLET ORAL at 02:06

## 2025-06-17 RX ADMIN — METHOCARBAMOL 750 MG: 750 TABLET ORAL at 08:06

## 2025-06-17 RX ADMIN — OXYCODONE HYDROCHLORIDE 10 MG: 10 TABLET ORAL at 09:06

## 2025-06-17 RX ADMIN — OXYCODONE HYDROCHLORIDE 10 MG: 10 TABLET ORAL at 02:06

## 2025-06-17 RX ADMIN — BUSPIRONE HYDROCHLORIDE 15 MG: 10 TABLET ORAL at 12:06

## 2025-06-17 RX ADMIN — PRAVASTATIN SODIUM 20 MG: 10 TABLET ORAL at 08:06

## 2025-06-17 NOTE — PROGRESS NOTES
[facility] Med S[facility]  Neurosurgery  Progress Note    Subjective:     History of Present Illness: Kuldeep Villela is a 74 y.o.F with PMHx of arthritis, HTN, and HLD who [place] [facility] [place] for complaints of acute on chronic low back/L flank pain for the last week. Does also report a fall a week ago. Is able to ambulate with a walker but feels this has become more difficult since the fall. Describes the pain as axial low back pain with bilateral radiculopathy L > R. Denies saddle anesthesia, bladder or bowel dysfunction, weakness or sensory dysfunction. CT abdomen and pelvis demonstrates T12 compression fracture. Neurosurgery consulted for input.     Post-Op Info:  Procedure(s) (LRB):  T12 LAMINECTOMY, SPINE, THORACIC, W/ ARTHRODESIS T10-L2 (N/A)   3 Days Post-Op   Interval History:6/17: faizan. Neuro stable. Drain 65cc/nr. Doing well    Medications:  Continuous Infusions:      Scheduled Meds:   acetaminophen  1,000 mg Oral TID    amLODIPine  10 mg Oral Daily    busPIRone  15 mg Oral QID    calcium carbonate  500 mg Oral BID    cetirizine  10 mg Oral Daily    citalopram  40 mg Oral Daily    diclofenac sodium  2 g Topical (Top) TID    enoxparin  40 mg Subcutaneous Q12H (prophylaxis, 0900/2100)    lactulose  10 g Oral TID    LIDOcaine  1 patch Transdermal Q24H    methocarbamoL  750 mg Oral QID    [START ON 6/18/2025] polyethylene glycol  17 g Oral Daily    pravastatin  20 mg Oral Daily    rOPINIRole  1 mg Oral BID    senna-docusate  2 tablet Oral BID    sertraline  50 mg Oral Daily    vancomycin (VANCOCIN) IV (PEDS and ADULTS)  1,000 mg Intravenous Q24H     PRN Meds:  Current Facility-Administered Medications:     albuterol, 2 puff, Inhalation, Q4H PRN    aluminum-magnesium hydroxide-simethicone, 30 mL, Oral, Q4H PRN    bacitracin, , Topical (Top), PRN    bisacodyL, 10 mg, Rectal, Daily PRN    dextrose 50%, 12.5 g, Intravenous, PRN    dextrose 50%, 25 g, Intravenous, PRN    glucagon (human recombinant), 1 mg, Intramuscular,  PRN    glucose, 16 g, Oral, PRN    glucose, 24 g, Oral, PRN    HYDROmorphone, 0.5 mg, Intravenous, Q4H PRN    magnesium hydroxide 400 mg/5 ml, 30 mL, Oral, Daily PRN    melatonin, 6 mg, Oral, Nightly PRN    naloxone, 0.02 mg, Intravenous, PRN    ondansetron, 8 mg, Oral, Q8H PRN    oxyCODONE, 5 mg, Oral, Q4H PRN    oxyCODONE, 10 mg, Oral, Q4H PRN    polyethylene glycol, 17 g, Oral, Daily PRN    prochlorperazine, 5 mg, Intravenous, Q6H PRN    sodium chloride 0.9%, 10 mL, Intravenous, Q12H PRN    Pharmacy to dose Vancomycin consult, , , Once **AND** vancomycin - pharmacy to dose, , Intravenous, pharmacy to manage frequency     Review of Systems  Objective:     Weight: 107 kg (236 lb)  Body mass index is 44.59 kg/m².  Vital Signs (Most Recent):  Temp: 97.5 °F (36.4 °C) (06/17/25 1101)  Pulse: 90 (06/17/25 1101)  Resp: 18 (06/17/25 1144)  BP: 137/63 (06/17/25 1101)  SpO2: 95 % (06/17/25 1101) Vital Signs (24h Range):  Temp:  [97.5 °F (36.4 °C)-98.5 °F (36.9 °C)] 97.5 °F (36.4 °C)  Pulse:  [] 90  Resp:  [16-18] 18  SpO2:  [93 %-99 %] 95 %  BP: (117-141)/(56-73) 137/63                                Closed/Suction Drain 06/14/25 1124 Tube - 1 Right Back Bulb 15 Fr. (Active)   Site Description Healing 06/16/25 0200   Dressing Intervention Integrity maintained 06/16/25 0200   Drainage Bloody;Bright red 06/16/25 0200   Status To bulb suction 06/16/25 0200   Output (mL) 55 mL 06/15/25 1635            Closed/Suction Drain 06/14/25 1125 Tube - 2 Left Back Bulb 15 Fr. (Active)   Site Description Healing 06/16/25 0200   Dressing Intervention Integrity maintained 06/16/25 0200   Drainage Bloody;Bright red 06/16/25 0200   Status To bulb suction 06/16/25 0200   Output (mL) 55 mL 06/15/25 1635          Physical Exam     Neurosurgery Physical Exam  AAOx4  PERRL  Cranial nerves intact  BUE 5/5  BLE 4/5 pain limited hip flexion, 5/5 distally  deconditioned  SILT     Back incision clean/dry with bandage in place  ERIN drains in  "place    Significant Labs:  Recent Labs   Lab 06/16/25  0951         K 4.2      CO2 24   BUN 18   CREATININE 1.0   CALCIUM 9.0   MG 1.5*     Recent Labs   Lab 06/16/25  0951   WBC 12.76*   HGB 8.5*   HCT 26.5*        No results for input(s): "LABPT", "INR", "APTT" in the last 48 hours.  Microbiology Results (last 7 days)       ** No results found for the last 168 hours. **          All pertinent labs from the last 24 hours have been reviewed.    Significant Diagnostics:  CT: No results found in the last 24 hours.  MRI: No results found in the last 24 hours.  I have reviewed all pertinent imaging results/findings within the past 24 hours.  I have reviewed and interpreted all pertinent imaging results/findings within the past 24 hours.  Assessment/Plan:     * Compression fracture of T12 vertebra  Kuldeep Villela is a 74 y.o.F with PMHx of arthritis, HTN, and HLD who presents [place] [facility] [place] for complaints of acute on chronic low back/L flank pain for the last week. Does also report a fall a week ago.     CT Abdomen and Pelvis 6/10/25: T12 compression fracture  MRI T/L spine 6/12/25: anterior wedge fracture T12 vertebral body with moderate canal stenosis, subtle cord signal change    S/p 6/14: T10-L2 fusion and T12 lami ; post op XR hardware intact and in position    Plan:  - Admitted to Hospital Medicine  - ERIN drains x2 to suction, abx while in place  - TLSO brace when out of bed  - removed tom. Monitor for bladder retention. Please document PVR. Notify NSGY with PVR greater than 300.  - regular diet  - LVX for chemical DVT ppx   - PT/OT/TLSO brace when out of bed    Dispo: ongoing    Discussed with staff        Butch Nelson MD  Neurosurgery  [facility] - Med Surg  "

## 2025-06-17 NOTE — SUBJECTIVE & OBJECTIVE
Interval History: NAEO. Working well with therapy this AM. Still with some pain. No bowel movement.     Review of Systems  Objective:     Vital Signs (Most Recent):  Temp: 97.5 °F (36.4 °C) (06/17/25 1101)  Pulse: 90 (06/17/25 1101)  Resp: 18 (06/17/25 1144)  BP: 137/63 (06/17/25 1101)  SpO2: 95 % (06/17/25 1101) Vital Signs (24h Range):  Temp:  [97.5 °F (36.4 °C)-98.5 °F (36.9 °C)] 97.5 °F (36.4 °C)  Pulse:  [] 90  Resp:  [16-18] 18  SpO2:  [93 %-99 %] 95 %  BP: (117-141)/(56-73) 137/63     Weight: 107 kg (236 lb)  Body mass index is 44.59 kg/m².    Intake/Output Summary (Last 24 hours) at 6/17/2025 1201  Last data filed at 6/17/2025 0641  Gross per 24 hour   Intake --   Output 1365 ml   Net -1365 ml         Physical Exam  Constitutional:       Appearance: Normal appearance.   HENT:      Head: Normocephalic and atraumatic.      Nose: Nose normal.      Mouth/Throat:      Mouth: Mucous membranes are moist.   Eyes:      Extraocular Movements: Extraocular movements intact.      Pupils: Pupils are equal, round, and reactive to light.   Cardiovascular:      Rate and Rhythm: Normal rate and regular rhythm.      Heart sounds: No murmur heard.     No gallop.   Pulmonary:      Effort: Pulmonary effort is normal.      Breath sounds: Normal breath sounds. No wheezing or rales.   Abdominal:      General: Abdomen is flat. Bowel sounds are normal. There is no distension.      Palpations: Abdomen is soft.      Tenderness: There is no abdominal tenderness.   Musculoskeletal:         General: Tenderness present. No swelling. Normal range of motion.      Cervical back: Normal range of motion and neck supple.      Right lower leg: No edema.      Left lower leg: No edema.   Skin:     General: Skin is warm and dry.      Capillary Refill: Capillary refill takes less than 2 seconds.   Neurological:      General: No focal deficit present.      Mental Status: She is alert. Mental status is at baseline.      Motor: Weakness present.       Comments: Drains in place   Weakness and ROM improving   Psychiatric:         Mood and Affect: Mood normal.               Significant Labs: All pertinent labs within the past 24 hours have been reviewed.    Significant Imaging: I have reviewed all pertinent imaging results/findings within the past 24 hours.

## 2025-06-17 NOTE — ASSESSMENT & PLAN NOTE
Patient with Acute on chronic debility due to fracture/prosthesis. The patient's latest AMPAC (Activity Measure for Post Acute Care) Score is listed below.    AM-PAC Score - How much help does the patient need for each activity listed  Basic Mobility Total Score: 13  Turning over in bed (including adjusting bedclothes, sheets and blankets)?: A little  Sitting down on and standing up from a chair with arms (e.g., wheelchair, bedside commode, etc.): A lot  Moving from lying on back to sitting on the side of the bed?: A little  Moving to and from a bed to a chair (including a wheelchair)?: A lot  Need to walk in hospital room?: A lot  Climbing 3-5 steps with a railing?: Unable    Plan  - PT/OT consulted  - Fall precautions in place  - PT/OT recommending moderate intensity therapy at [facility]

## 2025-06-17 NOTE — SUBJECTIVE & OBJECTIVE
Interval History:6/17: naeon. Neuro stable. Drain 65cc/nr. Doing well    Medications:  Continuous Infusions:      Scheduled Meds:   acetaminophen  1,000 mg Oral TID    amLODIPine  10 mg Oral Daily    busPIRone  15 mg Oral QID    calcium carbonate  500 mg Oral BID    cetirizine  10 mg Oral Daily    citalopram  40 mg Oral Daily    diclofenac sodium  2 g Topical (Top) TID    enoxparin  40 mg Subcutaneous Q12H (prophylaxis, 0900/2100)    lactulose  10 g Oral TID    LIDOcaine  1 patch Transdermal Q24H    methocarbamoL  750 mg Oral QID    [START ON 6/18/2025] polyethylene glycol  17 g Oral Daily    pravastatin  20 mg Oral Daily    rOPINIRole  1 mg Oral BID    senna-docusate  2 tablet Oral BID    sertraline  50 mg Oral Daily    vancomycin (VANCOCIN) IV (PEDS and ADULTS)  1,000 mg Intravenous Q24H     PRN Meds:  Current Facility-Administered Medications:     albuterol, 2 puff, Inhalation, Q4H PRN    aluminum-magnesium hydroxide-simethicone, 30 mL, Oral, Q4H PRN    bacitracin, , Topical (Top), PRN    bisacodyL, 10 mg, Rectal, Daily PRN    dextrose 50%, 12.5 g, Intravenous, PRN    dextrose 50%, 25 g, Intravenous, PRN    glucagon (human recombinant), 1 mg, Intramuscular, PRN    glucose, 16 g, Oral, PRN    glucose, 24 g, Oral, PRN    HYDROmorphone, 0.5 mg, Intravenous, Q4H PRN    magnesium hydroxide 400 mg/5 ml, 30 mL, Oral, Daily PRN    melatonin, 6 mg, Oral, Nightly PRN    naloxone, 0.02 mg, Intravenous, PRN    ondansetron, 8 mg, Oral, Q8H PRN    oxyCODONE, 5 mg, Oral, Q4H PRN    oxyCODONE, 10 mg, Oral, Q4H PRN    polyethylene glycol, 17 g, Oral, Daily PRN    prochlorperazine, 5 mg, Intravenous, Q6H PRN    sodium chloride 0.9%, 10 mL, Intravenous, Q12H PRN    Pharmacy to dose Vancomycin consult, , , Once **AND** vancomycin - pharmacy to dose, , Intravenous, pharmacy to manage frequency     Review of Systems  Objective:     Weight: 107 kg (236 lb)  Body mass index is 44.59 kg/m².  Vital Signs (Most Recent):  Temp: 97.5 °F  "(36.4 °C) (06/17/25 1101)  Pulse: 90 (06/17/25 1101)  Resp: 18 (06/17/25 1144)  BP: 137/63 (06/17/25 1101)  SpO2: 95 % (06/17/25 1101) Vital Signs (24h Range):  Temp:  [97.5 °F (36.4 °C)-98.5 °F (36.9 °C)] 97.5 °F (36.4 °C)  Pulse:  [] 90  Resp:  [16-18] 18  SpO2:  [93 %-99 %] 95 %  BP: (117-141)/(56-73) 137/63                                Closed/Suction Drain 06/14/25 1124 Tube - 1 Right Back Bulb 15 Fr. (Active)   Site Description Healing 06/16/25 0200   Dressing Intervention Integrity maintained 06/16/25 0200   Drainage Bloody;Bright red 06/16/25 0200   Status To bulb suction 06/16/25 0200   Output (mL) 55 mL 06/15/25 1635            Closed/Suction Drain 06/14/25 1125 Tube - 2 Left Back Bulb 15 Fr. (Active)   Site Description Healing 06/16/25 0200   Dressing Intervention Integrity maintained 06/16/25 0200   Drainage Bloody;Bright red 06/16/25 0200   Status To bulb suction 06/16/25 0200   Output (mL) 55 mL 06/15/25 1635          Physical Exam     Neurosurgery Physical Exam  AAOx4  PERRL  Cranial nerves intact  BUE 5/5  BLE 4/5 pain limited hip flexion, 5/5 distally  deconditioned  SILT     Back incision clean/dry with bandage in place  ERIN drains in place    Significant Labs:  Recent Labs   Lab 06/16/25  0951         K 4.2      CO2 24   BUN 18   CREATININE 1.0   CALCIUM 9.0   MG 1.5*     Recent Labs   Lab 06/16/25  0951   WBC 12.76*   HGB 8.5*   HCT 26.5*        No results for input(s): "LABPT", "INR", "APTT" in the last 48 hours.  Microbiology Results (last 7 days)       ** No results found for the last 168 hours. **          All pertinent labs from the last 24 hours have been reviewed.    Significant Diagnostics:  CT: No results found in the last 24 hours.  MRI: No results found in the last 24 hours.  I have reviewed all pertinent imaging results/findings within the past 24 hours.  I have reviewed and interpreted all pertinent imaging results/findings within the past 24 hours.  "

## 2025-06-17 NOTE — PHARMACY MED REC
"    Admission Medication History     The home medication history was taken by Lucinda Birch.    You may go to "Admission" then "Reconcile Home Medications" tabs to review and/or act upon these items.     The home medication list has been updated by the Pharmacy department.   Please read ALL comments highlighted in yellow.   Please address this information as you see fit.    Feel free to contact us if you have any questions or require assistance.      Medications listed below were obtained from: Patient/family  Facility-Administered Medications Prior to Admission   Medication    sodium hyaluronate (EUFLEXXA) 10 mg/mL(mw 2.4 -3.6 million) injection 40 mg     PTA Medications   Medication Sig    albuterol (PROVENTIL/VENTOLIN HFA) 90 mcg/actuation inhaler Inhale 1-2 puffs into the lungs every 4 (four) hours as needed for Wheezing. Rescue    amLODIPine (NORVASC) 10 MG tablet Take 1 tablet (10 mg total) by mouth once daily.    busPIRone (BUSPAR) 15 MG tablet Take 1 tablet (15 mg total) by mouth 4 (four) times daily.    carvediloL (COREG) 6.25 MG tablet Take 1 tablet by mouth every 12 (twelve) hours.    cetirizine (ZYRTEC) 10 MG tablet TAKE 1 TABLET BY MOUTH EVERY DAY    citalopram (CELEXA) 40 MG tablet TAKE 1 TABLET(40 MG) BY MOUTH EVERY DAY    cyclobenzaprine (FLEXERIL) 10 MG tablet Take 1 tablet (10 mg total) by mouth 3 (three) times daily as needed for Muscle spasms.    diclofenac sodium (VOLTAREN) 1 % Gel Apply 2 g topically 3 (three) times daily as needed (apply to painful joints).    estrogens,conjugated,-methyltestosterone 0.625-1.25mg (ESTRATEST HS) 0.625-1.25 mg per tablet Take 1 tablet by mouth.    naloxone (NARCAN) 4 mg/actuation Spry 4mg by nasal route as needed for opioid overdose; may repeat every 2-3 minutes in alternating nostrils until medical help arrives. Call 911    oxyCODONE-acetaminophen (PERCOCET)  mg per tablet Take 1 tablet by mouth every 4 (four) hours as needed for Pain (maximum 5 " tablets per day).    pravastatin (PRAVACHOL) 20 MG tablet TAKE 1 TABLET(20 MG) BY MOUTH DAILY    rOPINIRole (REQUIP) 1 MG tablet Take 1 tablet (1 mg total) by mouth 2 (two) times daily.    sertraline (ZOLOFT) 50 MG tablet Take 1 tablet (50 mg total) by mouth once daily.    azelastine (ASTELIN) 137 mcg (0.1 %) nasal spray 1 spray (137 mcg total) by Nasal route 2 (two) times daily. (Patient not taking: Reported on 3/26/2025)    fluticasone propionate (FLONASE) 50 mcg/actuation nasal spray 1 spray (50 mcg total) by Each Nostril route once daily. (Patient not taking: Reported on 3/26/2025)    furosemide (LASIX) 20 MG tablet Take 1 tablet (20 mg total) by mouth 2 (two) times a day. for 2 days    linaCLOtide (LINZESS) 290 mcg Cap capsule Take 1 capsule (290 mcg total) by mouth before breakfast.    LINZESS 145 mcg Cap capsule TAKE 1 CAPSULE(145 MCG) BY MOUTH EVERY DAY (Patient not taking: Reported on 4/15/2025)    meloxicam (MOBIC) 7.5 MG tablet Take 1 tablet (7.5 mg total) by mouth 2 (two) times a day.    naloxegoL (MOVANTIK) 25 mg tablet Take 25 mg by mouth once daily. (Patient not taking: Reported on 4/15/2025)    topiramate (TOPAMAX) 50 MG tablet TAKE 1 TABLET(50 MG) BY MOUTH EVERY 12 HOURS    walker Misc 4 wheeled walker for mobility       Potential issues to be addressed PRIOR TO DISCHARGE  TIMO Birch               .

## 2025-06-17 NOTE — ASSESSMENT & PLAN NOTE
RESOLVED  MALACHI is likely due to pre-renal azotemia due to dehydration. Baseline creatinine is ~1.0. Most recent creatinine and eGFR are listed below.  Recent Labs     06/15/25  0251 06/16/25  0951   CREATININE 1.0 1.0   EGFRNORACEVR 60* 60*      Plan  - MALACHI is stable  - Avoid nephrotoxins and renally dose meds for GFR listed above  - Monitor urine output, serial BMP, and adjust therapy as needed  - s/p 1L IVF bolus in ED, encouraging good oral hydration

## 2025-06-17 NOTE — ASSESSMENT & PLAN NOTE
Kuldeep Villela is a 74 y.o.F with PMHx of arthritis, HTN, and HLD who presents to [facility] ED for complaints of acute on chronic low back/L flank pain for the last week. Does also report a fall a week ago.     CT Abdomen and Pelvis 6/10/25: T12 compression fracture  MRI T/L spine 6/12/25: anterior wedge fracture T12 vertebral body with moderate canal stenosis, subtle cord signal change    S/p 6/14: T10-L2 fusion and T12 lami ; post op XR hardware intact and in position    Plan:  - Admitted to Hospital Medicine  - ERIN drains x2 to suction, abx while in place  - TLSO brace when out of bed  - removed tom. Monitor for bladder retention. Please document PVR. Notify NSGY with PVR greater than 300.  - regular diet  - LVX for chemical DVT ppx   - PT/OT/TLSO brace when out of bed    Dispo: ongoing    Discussed with staff

## 2025-06-17 NOTE — PT/OT/SLP PROGRESS
Occupational Therapy  Treatment    Name: Kuldeep Villela  MRN: [id number]  Admitting Diagnosis:  Compression fracture of T12 vertebra  3 Days Post-Op    Recommendations:     Discharge Recommendations: Moderate Intensity Therapy  Discharge Equipment Recommendations:  bath bench, walker, rolling, bedside commode, hip kit  Barriers to discharge:       Assessment:     Kuldeep Villela is a 74 y.o. female with a medical diagnosis of Compression fracture of T12 vertebra.  She presents with the following performance deficits affecting function: weakness, impaired endurance, impaired self care skills, impaired functional mobility, gait instability, impaired balance, decreased safety awareness, pain, edema, impaired skin, orthopedic precautions.     Pt agreeable to session, motivated to participate, and tolerated well. Pt presenting to session with tangential speech and difficult to redirect to task throughout session. Pt demonstrating non-adherence to spinal precautions with bed mobility tasks this date secondary to inattentiveness during education provision. Continue to reinforce the importance of maintaining spinal precautions with mobility and participating in OOB tasks to promote functional strength and endurance needed for independence with ADL routines and desired occupations in future sessions. Pt would continue to benefit from skilled OT services in the acute care setting to improve activity tolerance and functional endurance, increase participation in self-care routines, improve functional strength needed for safety with functional transfers and mobility, and facilitate a return to PLOF and least restrictive home environment. Patient continues to demonstrate the need for moderate intensity therapy on a daily basis post acute exhibited by decreased independence with self-care and functional mobility.     Rehab Prognosis:  Good; patient would benefit from acute skilled OT services to address these deficits and reach  "maximum level of function.       Plan:     Patient to be seen 4 x/week to address the above listed problems via self-care/home management, therapeutic activities, therapeutic exercises, neuromuscular re-education  Plan of Care Expires: 07/15/25  Plan of Care Reviewed with: patient    Subjective     Chief Complaint: pain in back and side  Patient/Family Comments/goals: "I don't want to take that medication I took this morning again. It tastes too bad."  Pain/Comfort:  Pain Rating 1: 10/10  Location - Side 1: Bilateral  Location - Orientation 1: generalized  Location 1: back  Pain Addressed 1: Pre-medicate for activity, Reposition, Distraction, Cessation of Activity, Nurse notified  Pain Rating Post-Intervention 1: 10/10    Objective:     Communicated with: Nursing prior to session.  Patient found HOB elevated with ERIN drain, PureWick upon OT entry to room.    General Precautions: Standard, fall    Orthopedic Precautions:spinal precautions  Braces: TLSO (when OOB)  Respiratory Status: Room air     Occupational Performance:     Bed Mobility:    Patient completed Rolling/Turning to Right with contact guard assistance  Patient completed Scooting/Bridging with contact guard assistance toward EOB and total assist x2 toward HOB (additional staff present assisting)  Patient completed Supine to Sit with contact guard assistance  Patient completed Sit to Supine with moderate assistance     Functional Mobility/Transfers:  Functional Mobility: Pt able to sit EOB for ~25 minutes with SBA provided. Pt declining attempts to STS or participate in functional mobility tasks secondary to increased c/o back and side pain.     Activities of Daily Living:  Grooming: stand by assistance for performing oral care tasks in seated EOB  Upper Body Dressing: maximal assistance to don/doff TLSO brace in seated EOB  Lower Body Dressing: maximal assistance to don hospital socks in supine  Toileting: dependence Pure Wick      [facility] 6 Click ADL: " 15    Treatment & Education:  Provided education on the role of OT, POC, and therapy goals while in the acute care setting.   Provided education on the importance of continued mobilization and participation in OOB activities to increase functional endurance and activity tolerance for increased participation in ADL routines.   Provided education on spinal precautions: including no bending, lifting >5lbs, or twisting and how to adhere to them with compensatory techniques while performing various ADLs.   Discussed log roll techniques, application of brace, and brace wearing schedule  All questions/concerns within the scope of OT answered/addressed - pt verbalized understanding.   Instructed pt to call for assistance when wanting to ambulate or participate in OOB activities to promote safety and prevent falls.   White board updated.    Patient left HOB elevated with all lines intact, call button in reach, and nursing notified    GOALS:   Multidisciplinary Problems       Occupational Therapy Goals          Problem: Occupational Therapy    Goal Priority Disciplines Outcome Interventions   Occupational Therapy Goal     OT, PT/OT Progressing    Description: Goals to be met by: 7/11/2025     Patient will increase functional independence with ADLs by performing:    UE Dressing with Supervision sitting EOB to don TSLO   LE Dressing with Set-up Assistance using AE as needed to maintain spinal precautions.  Grooming while bedside chair with Set-up Assistance.  Toileting from bedside commode with Minimal Assistance for hygiene and clothing management.   Supine to sit with Supervision.  Toilet transfer to bedside commode with Contact Guard Assistance using LRAD as needed.   Sit to stand with CGA using LRAD as needed.                          DME Justifications:   Kuldeep requires a commode for home use because she is confined to a single room.   Kuldeep's mobility limitation cannot be sufficiently resolved by the use of a cane.  Her functional mobility deficit can be sufficiently resolved with the use of a Rolling Walker. Patient's mobility limitation significantly impairs their ability to participate in one of more activities of daily living.  The use of a RW will significantly improve the patient's ability to participate in MRADLS and the patient will use it on regular basis in the home.    Time Tracking:     OT Date of Treatment: 06/17/25  OT Start Time: 0902  OT Stop Time: 0946  OT Total Time (min): 44 min    Billable Minutes:Self Care/Home Management 24 minutes  Therapeutic Activity 20 minutes    OT/CATRINA: OT     Number of CATRINA visits since last OT visit: 0    6/17/2025

## 2025-06-17 NOTE — ASSESSMENT & PLAN NOTE
Patient's blood pressure range in the last 24 hours was: BP  Min: 117/64  Max: 141/68.The patient's inpatient anti-hypertensive regimen is listed below:  Current Antihypertensives  , Every 12 hours, Oral  amLODIPine tablet 10 mg, Daily, Oral    Plan  - BP is controlled, no changes needed to their regimen

## 2025-06-17 NOTE — PROGRESS NOTES
Pharmacist Renal Dose Adjustment Note    Kuldeep Villela is a 74 y.o. female being treated with the medication Enoxaparin (Lovenox).    Patient Data:    Vital Signs (Most Recent):  Temp: 98.5 °F (36.9 °C) (06/17/25 0735)  Pulse: 91 (06/17/25 0735)  Resp: 18 (06/17/25 0735)  BP: 117/64 (06/17/25 0735)  SpO2: (!) 94 % (06/17/25 0735) Vital Signs (72h Range):  Temp:  [97.5 °F (36.4 °C)-99.1 °F (37.3 °C)]   Pulse:  []   Resp:  [14-23]   BP: (115-184)/()   SpO2:  [93 %-100 %]      Recent Labs   Lab 06/13/25  0216 06/15/25  0251 06/16/25  0951   CREATININE 1.2 1.0 1.0     Serum creatinine: 1 mg/dL 06/16/25 0951  Estimated creatinine clearance: 57.4 mL/min  BMI: 44.59 kg/m2    Medication: Lovenox 40 mg subQ daily will be changed to Lovenox 40 mg subQ every 12 hours for BMI > 40 kg/m2 per pharmacy protocol.    Pharmacist's Name: Tayler Ward, PharmD  Pharmacist's Extension: [phone]

## 2025-06-18 PROBLEM — D64.9 ACUTE ON CHRONIC ANEMIA: Status: ACTIVE | Noted: 2025-06-18

## 2025-06-18 LAB
ABSOLUTE EOSINOPHIL (OHS): 0.35 K/UL
ABSOLUTE MONOCYTE (OHS): 0.87 K/UL (ref 0.3–1)
ABSOLUTE NEUTROPHIL COUNT (OHS): 7.78 K/UL (ref 1.8–7.7)
ANION GAP (OHS): 10 MMOL/L (ref 8–16)
BASOPHILS # BLD AUTO: 0.05 K/UL
BASOPHILS NFR BLD AUTO: 0.5 %
BUN SERPL-MCNC: 14 MG/DL (ref 8–23)
CALCIUM SERPL-MCNC: 8.8 MG/DL (ref 8.7–10.5)
CHLORIDE SERPL-SCNC: 104 MMOL/L (ref 95–110)
CO2 SERPL-SCNC: 23 MMOL/L (ref 23–29)
CREAT SERPL-MCNC: 0.9 MG/DL (ref 0.5–1.4)
ERYTHROCYTE [DISTWIDTH] IN BLOOD BY AUTOMATED COUNT: 13.3 % (ref 11.5–14.5)
FERRITIN SERPL-MCNC: 318 NG/ML (ref 20–300)
GFR SERPLBLD CREATININE-BSD FMLA CKD-EPI: >60 ML/MIN/1.73/M2
GLUCOSE SERPL-MCNC: 77 MG/DL (ref 70–110)
HCT VFR BLD AUTO: 24.3 % (ref 37–48.5)
HGB BLD-MCNC: 7.9 GM/DL (ref 12–16)
IMM GRANULOCYTES # BLD AUTO: 0.06 K/UL (ref 0–0.04)
IMM GRANULOCYTES NFR BLD AUTO: 0.6 % (ref 0–0.5)
IRON SATN MFR SERPL: 20 % (ref 20–50)
IRON SERPL-MCNC: 38 UG/DL (ref 30–160)
LYMPHOCYTES # BLD AUTO: 1.13 K/UL (ref 1–4.8)
MCH RBC QN AUTO: 32.2 PG (ref 27–31)
MCHC RBC AUTO-ENTMCNC: 32.5 G/DL (ref 32–36)
MCV RBC AUTO: 99 FL (ref 82–98)
NUCLEATED RBC (/100WBC) (OHS): 0 /100 WBC
PLATELET # BLD AUTO: 258 K/UL (ref 150–450)
PMV BLD AUTO: 10.9 FL (ref 9.2–12.9)
POTASSIUM SERPL-SCNC: 4 MMOL/L (ref 3.5–5.1)
RBC # BLD AUTO: 2.45 M/UL (ref 4–5.4)
RELATIVE EOSINOPHIL (OHS): 3.4 %
RELATIVE LYMPHOCYTE (OHS): 11 % (ref 18–48)
RELATIVE MONOCYTE (OHS): 8.5 % (ref 4–15)
RELATIVE NEUTROPHIL (OHS): 76 % (ref 38–73)
RETICS/RBC NFR AUTO: 4 % (ref 0.5–2.5)
SODIUM SERPL-SCNC: 137 MMOL/L (ref 136–145)
TIBC SERPL-MCNC: 186 UG/DL (ref 250–450)
TRANSFERRIN SERPL-MCNC: 126 MG/DL (ref 200–375)
TRANSFERRIN SERPL-MCNC: 126 MG/DL (ref 200–375)
WBC # BLD AUTO: 10.24 K/UL (ref 3.9–12.7)

## 2025-06-18 PROCEDURE — 97535 SELF CARE MNGMENT TRAINING: CPT

## 2025-06-18 PROCEDURE — 84466 ASSAY OF TRANSFERRIN: CPT | Performed by: STUDENT IN AN ORGANIZED HEALTH CARE EDUCATION/TRAINING PROGRAM

## 2025-06-18 PROCEDURE — 25000003 PHARM REV CODE 250: Performed by: STUDENT IN AN ORGANIZED HEALTH CARE EDUCATION/TRAINING PROGRAM

## 2025-06-18 PROCEDURE — 63600175 PHARM REV CODE 636 W HCPCS: Performed by: STUDENT IN AN ORGANIZED HEALTH CARE EDUCATION/TRAINING PROGRAM

## 2025-06-18 PROCEDURE — 99900035 HC TECH TIME PER 15 MIN (STAT)

## 2025-06-18 PROCEDURE — 85025 COMPLETE CBC W/AUTO DIFF WBC: CPT | Performed by: STUDENT IN AN ORGANIZED HEALTH CARE EDUCATION/TRAINING PROGRAM

## 2025-06-18 PROCEDURE — 97110 THERAPEUTIC EXERCISES: CPT

## 2025-06-18 PROCEDURE — 94799 UNLISTED PULMONARY SVC/PX: CPT

## 2025-06-18 PROCEDURE — 25000003 PHARM REV CODE 250

## 2025-06-18 PROCEDURE — 36415 COLL VENOUS BLD VENIPUNCTURE: CPT | Performed by: STUDENT IN AN ORGANIZED HEALTH CARE EDUCATION/TRAINING PROGRAM

## 2025-06-18 PROCEDURE — 82310 ASSAY OF CALCIUM: CPT | Performed by: STUDENT IN AN ORGANIZED HEALTH CARE EDUCATION/TRAINING PROGRAM

## 2025-06-18 PROCEDURE — 97530 THERAPEUTIC ACTIVITIES: CPT | Mod: CQ

## 2025-06-18 PROCEDURE — 82728 ASSAY OF FERRITIN: CPT | Performed by: STUDENT IN AN ORGANIZED HEALTH CARE EDUCATION/TRAINING PROGRAM

## 2025-06-18 PROCEDURE — 85045 AUTOMATED RETICULOCYTE COUNT: CPT | Performed by: STUDENT IN AN ORGANIZED HEALTH CARE EDUCATION/TRAINING PROGRAM

## 2025-06-18 PROCEDURE — 94761 N-INVAS EAR/PLS OXIMETRY MLT: CPT

## 2025-06-18 PROCEDURE — 11000001 HC ACUTE MED/SURG PRIVATE ROOM

## 2025-06-18 RX ADMIN — HYDROMORPHONE HYDROCHLORIDE 0.5 MG: 1 INJECTION, SOLUTION INTRAMUSCULAR; INTRAVENOUS; SUBCUTANEOUS at 03:06

## 2025-06-18 RX ADMIN — ENOXAPARIN SODIUM 40 MG: 40 INJECTION SUBCUTANEOUS at 09:06

## 2025-06-18 RX ADMIN — LACTULOSE 10 G: 20 SOLUTION ORAL at 08:06

## 2025-06-18 RX ADMIN — CETIRIZINE HYDROCHLORIDE 10 MG: 10 TABLET, FILM COATED ORAL at 08:06

## 2025-06-18 RX ADMIN — OXYCODONE HYDROCHLORIDE 10 MG: 10 TABLET ORAL at 03:06

## 2025-06-18 RX ADMIN — METHOCARBAMOL 750 MG: 750 TABLET ORAL at 08:06

## 2025-06-18 RX ADMIN — ENOXAPARIN SODIUM 40 MG: 40 INJECTION SUBCUTANEOUS at 08:06

## 2025-06-18 RX ADMIN — HYDROMORPHONE HYDROCHLORIDE 0.5 MG: 1 INJECTION, SOLUTION INTRAMUSCULAR; INTRAVENOUS; SUBCUTANEOUS at 06:06

## 2025-06-18 RX ADMIN — DICLOFENAC SODIUM 2 G: 10 GEL TOPICAL at 04:06

## 2025-06-18 RX ADMIN — HYDROMORPHONE HYDROCHLORIDE 0.5 MG: 1 INJECTION, SOLUTION INTRAMUSCULAR; INTRAVENOUS; SUBCUTANEOUS at 09:06

## 2025-06-18 RX ADMIN — METHOCARBAMOL 750 MG: 750 TABLET ORAL at 04:06

## 2025-06-18 RX ADMIN — METHOCARBAMOL 750 MG: 750 TABLET ORAL at 09:06

## 2025-06-18 RX ADMIN — METHOCARBAMOL 750 MG: 750 TABLET ORAL at 12:06

## 2025-06-18 RX ADMIN — ACETAMINOPHEN 1000 MG: 500 TABLET ORAL at 08:06

## 2025-06-18 RX ADMIN — BUSPIRONE HYDROCHLORIDE 15 MG: 10 TABLET ORAL at 08:06

## 2025-06-18 RX ADMIN — SENNOSIDES AND DOCUSATE SODIUM 2 TABLET: 50; 8.6 TABLET ORAL at 09:06

## 2025-06-18 RX ADMIN — CITALOPRAM HYDROBROMIDE 40 MG: 20 TABLET ORAL at 08:06

## 2025-06-18 RX ADMIN — ROPINIROLE HYDROCHLORIDE 1 MG: 1 TABLET, FILM COATED ORAL at 08:06

## 2025-06-18 RX ADMIN — CALCIUM CARBONATE (ANTACID) CHEW TAB 500 MG 500 MG: 500 CHEW TAB at 09:06

## 2025-06-18 RX ADMIN — OXYCODONE 5 MG: 5 TABLET ORAL at 05:06

## 2025-06-18 RX ADMIN — AMLODIPINE BESYLATE 10 MG: 10 TABLET ORAL at 08:06

## 2025-06-18 RX ADMIN — BUSPIRONE HYDROCHLORIDE 15 MG: 10 TABLET ORAL at 09:06

## 2025-06-18 RX ADMIN — SENNOSIDES AND DOCUSATE SODIUM 2 TABLET: 50; 8.6 TABLET ORAL at 08:06

## 2025-06-18 RX ADMIN — DICLOFENAC SODIUM 2 G: 10 GEL TOPICAL at 09:06

## 2025-06-18 RX ADMIN — ACETAMINOPHEN 1000 MG: 500 TABLET ORAL at 02:06

## 2025-06-18 RX ADMIN — LACTULOSE 10 G: 20 SOLUTION ORAL at 02:06

## 2025-06-18 RX ADMIN — OXYCODONE HYDROCHLORIDE 10 MG: 10 TABLET ORAL at 04:06

## 2025-06-18 RX ADMIN — ACETAMINOPHEN 1000 MG: 500 TABLET ORAL at 09:06

## 2025-06-18 RX ADMIN — LACTULOSE 10 G: 20 SOLUTION ORAL at 09:06

## 2025-06-18 RX ADMIN — SERTRALINE HYDROCHLORIDE 50 MG: 50 TABLET ORAL at 08:06

## 2025-06-18 RX ADMIN — CALCIUM CARBONATE (ANTACID) CHEW TAB 500 MG 500 MG: 500 CHEW TAB at 08:06

## 2025-06-18 RX ADMIN — BUSPIRONE HYDROCHLORIDE 15 MG: 10 TABLET ORAL at 04:06

## 2025-06-18 RX ADMIN — OXYCODONE HYDROCHLORIDE 10 MG: 10 TABLET ORAL at 09:06

## 2025-06-18 RX ADMIN — BUSPIRONE HYDROCHLORIDE 15 MG: 10 TABLET ORAL at 12:06

## 2025-06-18 RX ADMIN — PRAVASTATIN SODIUM 20 MG: 10 TABLET ORAL at 08:06

## 2025-06-18 RX ADMIN — LIDOCAINE 1 PATCH: 50 PATCH CUTANEOUS at 08:06

## 2025-06-18 RX ADMIN — ROPINIROLE HYDROCHLORIDE 1 MG: 1 TABLET, FILM COATED ORAL at 09:06

## 2025-06-18 NOTE — PLAN OF CARE
[facility] - Med Surg  Discharge Reassessment    Primary Care Provider: Ginna Musa MD    Expected Discharge Date: 6/20/2025    LEBRON telephoned Kristin w/ [facility] to get an update on if they have bed availability and if they will be able to accept patient.  Waiting for a return call.  LEBRON telephoned Dale larios/  [facility] [phone] and was advised she did not receive clinicals.  LEBRON refaxed clinicals.  Waiting for a response.  Updated clinicals were emailed to [facility] for review and waiting for a response.      12:58 PM  SW spoke to pt's daughter, Kamari [phone], and asked her to select additional facilities for SNF placement.  SW sent list to daughter again via text.  Waiting for a response.    Discharge Plan A and Plan B have been determined by review of patient's clinical status, future medical and therapeutic needs, and coverage/benefits for post-acute care in coordination with multidisciplinary team members.    Reassessment (most recent)       Discharge Reassessment - 06/18/25 1251          Discharge Reassessment    Assessment Type Discharge Planning Reassessment     Did the patient's condition or plan change since previous assessment? No     Discharge Plan A Skilled Nursing Facility     Discharge Plan B Home;Home with family;Home Health     DME Needed Upon Discharge  other (see comments)   TBD.    Transition of Care Barriers None     Why the patient remains in the hospital Requires continued medical care        Post-Acute Status    Post-Acute Authorization Placement     Post-Acute Placement Status Pending post-acute provider review/more information requested     Discharge Delays None known at this time                   Kadie Jordan LMSW  Part-Time-  [facility]  Ext. [phone]

## 2025-06-18 NOTE — ASSESSMENT & PLAN NOTE
Kuldeep Villela is a 74 y.o.F with PMHx of arthritis, HTN, and HLD who presents to [facility] ED for complaints of acute on chronic low back/L flank pain for the last week. Does also report a fall a week ago.     CT Abdomen and Pelvis 6/10/25: T12 compression fracture  MRI T/L spine 6/12/25: anterior wedge fracture T12 vertebral body with moderate canal stenosis, subtle cord signal change    S/p 6/14: T10-L2 fusion and T12 lami ; post op XR hardware intact and in position    Plan:  - Admitted to Hospital Medicine  -Drains removed 6/17 and 6/18  - TLSO brace when out of bed  - removed tom. Monitor for bladder retention. Please document PVR. Notify NSGY with PVR greater than 300.  - regular diet  - LVX for chemical DVT ppx   - PT/OT/TLSO brace when out of bed  - Will check semiweekly at this stage    Dispo: okay for next phase of care    Discussed with staff

## 2025-06-18 NOTE — PROGRESS NOTES
[facility]  Progress Note    Patient Name: Kuldeep Villela  MRN: [id number]  Patient Class: IP- Inpatient   Admission Date: 6/9/2025  Length of Stay: 8 days  Attending Physician: Cintia Anton MD  Primary Care Provider: Ginna Musa MD        Subjective     Principal Problem:Compression fracture of T12 vertebra        HPI:  Kuldeep Villela is a 74 y.o.F with PMHx of arthritis, lumbar stenosis, HTN, HLD, depression who presents to [facility] ED for complaints of worsening low back/L flank pain for the last week. Endorses chronic low back pain for which she sees a specialist for, but has worsened recently. Denies any known injury to area. She reports she felt like it may be the beginning of a UTI so she started taking Azo at home for prevention. States she normally ambulates with a walker; however, the pain has been so severe that she has not been able to ambulate at all. Endorses bilateral lower extremity weakness. Denies bladder or bowel incontinence, denies numbness of lower extremities. Denies fever, chills, chest pain, palpitations, SOB, cough, abdominal pain, n/v/d, dysuria, headaches, or any other symptoms at this time.     In ED: afebrile, VSS on RA. CBC without leukocytosis, Hgb 11.1. CMP notable for Cr 1.5 (~1.0), otherwise unremarkable. UA non-infectious appearing. CT abd/pelvis with new compression deformity of T12, perinephric haziness and stranding, L>R, correlate with renal disease, small periumbilical abd wall hernia involving bowel without inflammatory or obstructive change. S/p rocephin, ketamine, toradol 15mg inj, oxy-acetaminophen 10mg, 1L IVF bolus in ED. TLSO brace ordered. Admitted to  for further evaluation.     Overview/Hospital Course:  Pt admitted for continued management of T12 compression fx. NSGY consulted, no acute intervention warranted at this time. MRI spine with sedation pending and scheduled for 6/12. MRI concerning for compression fracture with  instability and severe spinal canal stenosis concerning for cord edema. Recommended to stop mobility with PT/OT pending surgical stabilization of spine. She was taken to the OR 6/14/25 for  T10-L2 posterior fusion. Monitored post op with pain control. Started on IV abx while drains in place. PT/OT restarted with TLSO brace. XR spine showed stabilization. One drain removed 6/17/25 and second drain removed 6/18. Antibiotics stopped once drain removed.     Interval History: NAEO. H&H dropping. Will continue to monitor closely suspect post op. Patient still with pain but working well with therapy. Drained removed this AM by NSGY.         Review of Systems  Objective:     Vital Signs (Most Recent):  Temp: 97.6 °F (36.4 °C) (06/18/25 1102)  Pulse: 97 (06/18/25 1102)  Resp: 18 (06/18/25 1102)  BP: 126/60 (06/18/25 1102)  SpO2: 96 % (06/18/25 1102) Vital Signs (24h Range):  Temp:  [97.6 °F (36.4 °C)-98.7 °F (37.1 °C)] 97.6 °F (36.4 °C)  Pulse:  [81-99] 97  Resp:  [16-18] 18  SpO2:  [91 %-98 %] 96 %  BP: (112-147)/(56-76) 126/60     Weight: 107 kg (236 lb)  Body mass index is 44.59 kg/m².    Intake/Output Summary (Last 24 hours) at 6/18/2025 1337  Last data filed at 6/17/2025 2319  Gross per 24 hour   Intake --   Output 925 ml   Net -925 ml         Physical Exam  Constitutional:       Appearance: Normal appearance.   HENT:      Head: Normocephalic and atraumatic.      Nose: Nose normal.      Mouth/Throat:      Mouth: Mucous membranes are moist.   Eyes:      Extraocular Movements: Extraocular movements intact.      Pupils: Pupils are equal, round, and reactive to light.   Cardiovascular:      Rate and Rhythm: Normal rate and regular rhythm.      Heart sounds: No murmur heard.     No gallop.   Pulmonary:      Effort: Pulmonary effort is normal.      Breath sounds: Normal breath sounds. No wheezing or rales.   Abdominal:      General: Abdomen is flat. Bowel sounds are normal. There is no distension.      Palpations: Abdomen is  soft.      Tenderness: There is no abdominal tenderness.   Musculoskeletal:         General: Tenderness present. No swelling. Normal range of motion.      Cervical back: Normal range of motion and neck supple.      Right lower leg: No edema.      Left lower leg: No edema.   Skin:     General: Skin is warm and dry.      Capillary Refill: Capillary refill takes less than 2 seconds.   Neurological:      General: No focal deficit present.      Mental Status: She is alert. Mental status is at baseline.      Motor: Weakness present.      Comments:   Weakness and ROM improving   Psychiatric:         Mood and Affect: Mood normal.               Significant Labs: All pertinent labs within the past 24 hours have been reviewed.    Significant Imaging: I have reviewed all pertinent imaging results/findings within the past 24 hours.      Assessment & Plan  Compression fracture of T12 vertebra  Acute on chronic back pain    74 y.o.F with new compression deformity of T12 and new inability to ambulate 2/2 pain. On admit, AFVSS, labs reviewed. CT a/p with: New compression deformity of T12 when compared to CT abdomen pelvis 03/22/2025. MRI T/L spine with sedation ordered. NSGY consulted. S/P T10-L2 posterior fusion 6/14      Plan:   - TLSO brace ordered   - NSGY consulted, appreciate recommendations  - MRI spine with sedation scheduled for 6/12, concerning for compression fracture with instability and severe spinal canal stenosis concerning for cord edema   -s/p T10-L2 posterior fusion 6/14  -stop abx, drains removed 6/18   - MM pain regimen initiated  - PT/OT with TLSO brace in   -removed tom, montior I/Os closely   -bowel regimen increased and now with bowel movements   - fall precautions     Hypertension  Patient's blood pressure range in the last 24 hours was: BP  Min: 112/56  Max: 147/76.The patient's inpatient anti-hypertensive regimen is listed below:  Current Antihypertensives  , Every 12 hours, Oral  amLODIPine tablet 10  mg, Daily, Oral    Plan  - BP is controlled, no changes needed to their regimen    Acute on chronic anemia  -likely worsened in the setting of post op/drain placement   -will continue to monitor closely  -anemia studies ordered. Transfuse <7   Hyperlipemia  - continue home statin    Debility  Patient with Acute on chronic debility due to fracture/prosthesis. The patient's latest AMPAC (Activity Measure for Post Acute Care) Score is listed below.    AM-PAC Score - How much help does the patient need for each activity listed  Basic Mobility Total Score: 13  Turning over in bed (including adjusting bedclothes, sheets and blankets)?: A little  Sitting down on and standing up from a chair with arms (e.g., wheelchair, bedside commode, etc.): A lot  Moving from lying on back to sitting on the side of the bed?: A little  Moving to and from a bed to a chair (including a wheelchair)?: A lot  Need to walk in hospital room?: A lot  Climbing 3-5 steps with a railing?: Unable    Plan  - PT/OT consulted  - Fall precautions in place  - PT/OT recommending moderate intensity therapy at [place]      Depression with anxiety  Patient has recurrent depression which is mild and is currently controlled. Will Continue anti-depressant medications. We will not consult psychiatry at this time. Patient does not display psychosis at this time. Continue to monitor closely and adjust plan of care as needed.  - continue home medications    Hyperkalemia  -resolved with Lokelma     MALACHI (acute kidney injury)  RESOLVED  MALACHI is likely due to pre-renal azotemia due to dehydration. Baseline creatinine is ~1.0. Most recent creatinine and eGFR are listed below.  Recent Labs     06/16/25  0951 06/17/25  1536 06/18/25  0600   CREATININE 1.0 1.0 0.9   EGFRNORACEVR 60* 60* >60      Plan  - MALACHI is stable  - Avoid nephrotoxins and renally dose meds for GFR listed above  - Monitor urine output, serial BMP, and adjust therapy as needed  - s/p 1L IVF bolus in ED,  encouraging good oral hydration       Tobacco dependency  Dangers of cigarette smoking were reviewed with patient in detail. Patient was Counseled for 3-10 minutes. Nicotine replacement options were discussed. Nicotine replacement was discussed- prescribed  Compression fracture of body of thoracic vertebra      VTE Risk Mitigation (From admission, onward)           Ordered     enoxaparin injection 40 mg  Every 12 hours         06/17/25 0951     IP VTE HIGH RISK PATIENT  Once         06/14/25 1204     Place sequential compression device  Until discontinued         06/10/25 0731                    Discharge Planning   MATT: 6/20/2025     Code Status: Full Code   Medical Readiness for Discharge Date:   Discharge Plan A: Skilled Nursing Facility   Discharge Delays: None known at this time            Please place Justification for DME        Cintia Anton MD  Department of Hospital Medicine   [facility]

## 2025-06-18 NOTE — PLAN OF CARE
Problem: Occupational Therapy  Goal: Occupational Therapy Goal  Description: Goals to be met by: 7/15/2025     Patient will increase functional independence with ADLs by performing:    UE Dressing with Supervision sitting EOB to don TSLO   LE Dressing with Set-up Assistance using AE as needed to maintain spinal precautions.  Grooming while bedside chair with Set-up Assistance.  Toileting from bedside commode with Minimal Assistance for hygiene and clothing management.   Supine to sit with Supervision.  Toilet transfer to bedside commode with Contact Guard Assistance using LRAD as needed.   Sit to stand with CGA using LRAD as needed.     Outcome: Progressing

## 2025-06-18 NOTE — PT/OT/SLP PROGRESS
"Physical Therapy CoTreatment  OT present for cotreat due to pt's multiple medical comorbidities and functional/cognition deficits requiring two skilled therapists to appropriately progress pt's musculoskeletal strength, neuromuscular control, and endurance while taking into consideration medical acuity and pt safety.      Patient Name:  Kuldeep Villela   MRN:  [id number]    Recommendations:     Discharge Recommendations: Moderate Intensity Therapy  Discharge Equipment Recommendations: bath bench, walker, rolling, bedside commode  Barriers to discharge: None    Assessment:     Kuldeep Villela is a 74 y.o. female admitted with a medical diagnosis of Compression fracture of T12 vertebra.  She presents with the following impairments/functional limitations: weakness, impaired endurance, impaired self care skills, impaired functional mobility, gait instability, impaired balance, pain, orthopedic precautions. Pt agreeable for therapy, premedicated but pt states continues to have high pain, required SBA for  sup > sit, min Ax 2 for sit <> stand,  min A x 2 for step transfer to recliner chair, TLSO donned in sitting    Rehab Prognosis: Fair; patient would benefit from acute skilled PT services to address these deficits and reach maximum level of function.    Recent Surgery: Procedure(s) (LRB):  T12 LAMINECTOMY, SPINE, THORACIC, W/ ARTHRODESIS T10-L2 (N/A) 4 Days Post-Op    Plan:     During this hospitalization, patient to be seen 4 x/week to address the identified rehab impairments via gait training, therapeutic activities, therapeutic exercises, neuromuscular re-education and progress toward the following goals:    Plan of Care Expires:  07/16/25    Subjective     Chief Complaint: pain  Patient/Family Comments/goals: "I got my medicine not too long ago"  Pain/Comfort:  Pain Rating 1: other (see comments) (unrated)  Location - Orientation 1: generalized  Location 1: back  Pain Addressed 1: Pre-medicate for activity, Reposition, " Distraction  Pain Rating Post-Intervention 1: other (see comments) (unrated)      Objective:     Communicated with RN prior to session.  Patient found supine with PureWick, peripheral IV upon PT entry to room.     General Precautions: Standard, fall  Orthopedic Precautions: spinal precautions  Braces: TLSO (when OOB)  Respiratory Status: Room air     Functional Mobility:    Bed Mobility:   Rolling: to L side with stand by assistance  Supine > Sit: stand by assistance    Transfers:   Sit <> Stand Transfer: minimum assistance and of 2 persons from EOB without AD; pt stands x 1 from EOB w/HHA, stands x 2 from EOB w/RW, strong anterior flexion during each stand, able to correct mildly w/VC's for posture, VC's for hand placement during sit and stand  Bed <> Chair: minimum assistance and of 2 persons using rolling walker ; step transfer to recliner chair, 4-5 steps, anterior flexion, some RW management assist provided    Balance:   Sitting balance: GOOD: Maintains balance through MODERATE excursions of active trunk movement  Standing balance:   FAIR: Maintains without assist but unable to take challenges  POOR: Needs MOD (moderate) assist during gait                 Gait:  Distance: 4-5 steps   Assistive Device: RW  Assistance Level: minimum assistance and of 2 persons  Gait Assessment: anterior flexion, small side steps to recliner chair              AM-PAC 6 CLICK MOBILITY  Turning over in bed (including adjusting bedclothes, sheets and blankets)?: 3  Sitting down on and standing up from a chair with arms (e.g., wheelchair, bedside commode, etc.): 2  Moving from lying on back to sitting on the side of the bed?: 3  Moving to and from a bed to a chair (including a wheelchair)?: 2  Need to walk in hospital room?: 2  Climbing 3-5 steps with a railing?: 1  Basic Mobility Total Score: 13       Treatment & Education:  Education:  PT role and PoC to  increase strength and personal mobility  Spinal Precautions    Patient left up  in chair with all lines intact, call button in reach, and RN notified..    GOALS:   Multidisciplinary Problems       Physical Therapy Goals          Problem: Physical Therapy    Goal Priority Disciplines Outcome Interventions   Physical Therapy Goal     PT, PT/OT Progressing    Description: Goals to be met by: 2025     Patient will increase functional independence with mobility by performin. Supine to sit with Stand-by Assistance  2. Sit to supine with Stand-by Assistance  3. Sit to stand transfer with Stand-by Assistance with RW  4. Bed to chair transfer with Stand-by Assistance using Rolling Walker  5. Gait  x 50 feet with Stand-by Assistance using Rolling Walker.   6. Lower extremity exercise program x10 reps per handout, with supervision                         DME Justifications:   Kuldeep requires a commode for home use because she is confined to a single room.   Kuldeep's mobility limitation cannot be sufficiently resolved by the use of a cane. Her functional mobility deficit can be sufficiently resolved with the use of a Rolling Walker. Patient's mobility limitation significantly impairs their ability to participate in one of more activities of daily living.  The use of a RW will significantly improve the patient's ability to participate in MRADLS and the patient will use it on regular basis in the home.    Time Tracking:     PT Received On: 25  PT Start Time: 1008     PT Stop Time: 1035  PT Total Time (min): 27 min     Billable Minutes: Therapeutic Activity 27    Treatment Type: Treatment  PT/PTA: PTA     Number of PTA visits since last PT visit: 2025

## 2025-06-18 NOTE — ASSESSMENT & PLAN NOTE
Patient with Acute on chronic debility due to fracture/prosthesis. The patient's latest AMPAC (Activity Measure for Post Acute Care) Score is listed below.    AM-PAC Score - How much help does the patient need for each activity listed  Basic Mobility Total Score: 13  Turning over in bed (including adjusting bedclothes, sheets and blankets)?: A little  Sitting down on and standing up from a chair with arms (e.g., wheelchair, bedside commode, etc.): A lot  Moving from lying on back to sitting on the side of the bed?: A little  Moving to and from a bed to a chair (including a wheelchair)?: A lot  Need to walk in hospital room?: A lot  Climbing 3-5 steps with a railing?: Unable    Plan  - PT/OT consulted  - Fall precautions in place  - PT/OT recommending moderate intensity therapy at [place]

## 2025-06-18 NOTE — ASSESSMENT & PLAN NOTE
74 y.o.F with new compression deformity of T12 and new inability to ambulate 2/2 pain. On admit, AFVSS, labs reviewed. CT a/p with: New compression deformity of T12 when compared to CT abdomen pelvis 03/22/2025. MRI T/L spine with sedation ordered. NSGY consulted. S/P T10-L2 posterior fusion 6/14      Plan:   - TLSO brace ordered   - NSGY consulted, appreciate recommendations  - MRI spine with sedation scheduled for 6/12, concerning for compression fracture with instability and severe spinal canal stenosis concerning for cord edema   -s/p T10-L2 posterior fusion 6/14  -stop abx, drains removed 6/18   - MM pain regimen initiated  - PT/OT with TLSO brace in   -removed tom, montior I/Os closely   -bowel regimen increased and now with bowel movements   - fall precautions

## 2025-06-18 NOTE — SUBJECTIVE & OBJECTIVE
Interval History: NAEO. H&H dropping. Will continue to monitor closely suspect post op. Patient still with pain but working well with therapy. Drained removed this AM by NSGY.         Review of Systems  Objective:     Vital Signs (Most Recent):  Temp: 97.6 °F (36.4 °C) (06/18/25 1102)  Pulse: 97 (06/18/25 1102)  Resp: 18 (06/18/25 1102)  BP: 126/60 (06/18/25 1102)  SpO2: 96 % (06/18/25 1102) Vital Signs (24h Range):  Temp:  [97.6 °F (36.4 °C)-98.7 °F (37.1 °C)] 97.6 °F (36.4 °C)  Pulse:  [81-99] 97  Resp:  [16-18] 18  SpO2:  [91 %-98 %] 96 %  BP: (112-147)/(56-76) 126/60     Weight: 107 kg (236 lb)  Body mass index is 44.59 kg/m².    Intake/Output Summary (Last 24 hours) at 6/18/2025 1337  Last data filed at 6/17/2025 2319  Gross per 24 hour   Intake --   Output 925 ml   Net -925 ml         Physical Exam  Constitutional:       Appearance: Normal appearance.   HENT:      Head: Normocephalic and atraumatic.      Nose: Nose normal.      Mouth/Throat:      Mouth: Mucous membranes are moist.   Eyes:      Extraocular Movements: Extraocular movements intact.      Pupils: Pupils are equal, round, and reactive to light.   Cardiovascular:      Rate and Rhythm: Normal rate and regular rhythm.      Heart sounds: No murmur heard.     No gallop.   Pulmonary:      Effort: Pulmonary effort is normal.      Breath sounds: Normal breath sounds. No wheezing or rales.   Abdominal:      General: Abdomen is flat. Bowel sounds are normal. There is no distension.      Palpations: Abdomen is soft.      Tenderness: There is no abdominal tenderness.   Musculoskeletal:         General: Tenderness present. No swelling. Normal range of motion.      Cervical back: Normal range of motion and neck supple.      Right lower leg: No edema.      Left lower leg: No edema.   Skin:     General: Skin is warm and dry.      Capillary Refill: Capillary refill takes less than 2 seconds.   Neurological:      General: No focal deficit present.      Mental Status:  She is alert. Mental status is at baseline.      Motor: Weakness present.      Comments:   Weakness and ROM improving   Psychiatric:         Mood and Affect: Mood normal.               Significant Labs: All pertinent labs within the past 24 hours have been reviewed.    Significant Imaging: I have reviewed all pertinent imaging results/findings within the past 24 hours.

## 2025-06-18 NOTE — ASSESSMENT & PLAN NOTE
-likely worsened in the setting of post op/drain placement   -will continue to monitor closely  -anemia studies ordered. Transfuse <7

## 2025-06-18 NOTE — PLAN OF CARE
Recommendations    Recommendation/Intervention:   1. Continue regular diet as tolerated     - please continue to document PO % intake via flowsheets       2. Encourage good intake    3. RD to monitor weight, labs, intake, tolerance    Goals:   1. % nutritional needs met with diet during admission     2. Maintain weight during admission    3. Display s/s of wound healing during admission    Nutrition Goal Status: new  Communication of RD Recs:  (POC)    Nutrition Discharge Planning    Nutrition Discharge Planning: General healthy diet

## 2025-06-18 NOTE — ASSESSMENT & PLAN NOTE
RESOLVED  MALACHI is likely due to pre-renal azotemia due to dehydration. Baseline creatinine is ~1.0. Most recent creatinine and eGFR are listed below.  Recent Labs     06/16/25  0951 06/17/25  1536 06/18/25  0600   CREATININE 1.0 1.0 0.9   EGFRNORACEVR 60* 60* >60      Plan  - MALACHI is stable  - Avoid nephrotoxins and renally dose meds for GFR listed above  - Monitor urine output, serial BMP, and adjust therapy as needed  - s/p 1L IVF bolus in ED, encouraging good oral hydration

## 2025-06-18 NOTE — PROGRESS NOTES
"[facility] - Med Surg  Adult Nutrition  Progress Note    SUMMARY       Recommendations    Recommendation/Intervention:   1. Continue regular diet as tolerated     - please continue to document PO % intake via flowsheets       2. Encourage good intake    3. RD to monitor weight, labs, intake, tolerance    Goals:   1. % nutritional needs met with diet during admission     2. Maintain weight during admission    3. Display s/s of wound healing during admission    Nutrition Goal Status: new  Communication of RD Recs:  (POC)    Nutrition Discharge Planning    Nutrition Discharge Planning: General healthy diet      Reason for Assessment    Reason For Assessment: length of stay  Diagnosis:  (compression fracture of T12 vertebra)  General Information Comments: Pt admitted for compression fracture of T12 vertebra. PMHx: arthritis, asthma, chronic LBP, lumbar radiculopathy, lumbar spondylosis, lumbar spinal stenosis, spondylolisthesis, chronic neck pain, cervical spondylosis, primary osteoarthritis of both knees, morbid obesity, hyperlipidemia, HTN, tenosynovitis of ankle, walker as ambulation aid.   PSHx: magnetic resonance imaging, thoracic laminectomy with fusion. No wounds noted. Dependent edema noted 2+ left and right leg. Per PT note "She presents with the following impairments/functional limitations: weakness, impaired endurance, impaired self care skills, impaired functional mobility, gait instability, impaired balance, pain, orthopedic precautions", will continue to monitor for signs that may decrease intake d/t limitations. PO intake 75%. No significant wt loss noted. No other GI s/s noted.    Nutrition/Diet History    Spiritual, Cultural Beliefs, Voodoo Practices, Values that Affect Care: no  Food Allergies: NKFA  Factors Affecting Nutritional Intake: None identified at this time  Nutrition-related SDOH: None Identified    Anthropometrics    Height: 5' 1" (154.9 cm)  Height (inches): 61 in  Height Method: " Stated  Weight: 107 kg (236 lb)  Weight (lb): 236 lb  Weight Method: Standard Scale  Ideal Body Weight (IBW), Female: 105 lb  % Ideal Body Weight, Female (lb): 224.76 %  BMI (Calculated): 44.6  BMI Grade: greater than 40 - morbid obesity       Lab/Procedures/Meds    Pertinent Labs Reviewed: reviewed  Pertinent Labs Comments: H/H 7.9/32.2 L, MCV 99 H, MCH 32.2 H, Mg 1.5 L, Protein total 5.9 L, Albumin 3.4 L  Pertinent Medications Reviewed: reviewed  Pertinent Medications Comments: Acetaminophen,amlodipine,buspirone,calcium carbonate,cetirizine,citalopram,diclofenac,enoxaparin,lactulose,lidocaine,methocarbamol, polyethylene glycol, pravastatin, ropinirole, senna docusate, sertraline, vancomycin    Estimated/Assessed Needs    Weight Used For Calorie Calculations: 107 kg (235 lb 14.3 oz)  Energy Calorie Requirements (kcal): 8910-0468 (1.0-1.2)  Energy Need Method: [place]-St Jeor  Protein Requirements: 72-95 g/kg (1.5-2.0 g/pro)  Weight Used For Protein Calculations: 47.6 kg (105 lb) (IBW)        RDA Method (mL): 1517  CHO Requirement: 190-228      Nutrition Prescription Ordered    Current Diet Order: Regular    Evaluation of Received Nutrient/Fluid Intake    I/O: -925 on 6/17  Energy Calories Required: meeting needs  Protein Required: meeting needs  Fluid Required:  (per MD)  Comments: LBM 6/18  Tolerance: tolerating  % Intake of Estimated Energy Needs: 75 - 100 %  % Meal Intake: 75 - 100 %    PES Statement  No nutrition diagnosis at this time related to   as evidenced by    Status:      Nutrition Risk    Level of Risk/Frequency of Follow-up: low     Monitor and Evaluation    Monitor and Evaluation: Energy intake, Food and beverage intake, Protein intake, Carbohydrate intake, Diet order, Weight, Beliefs and attitudes, Gastrointestinal profile, Electrolyte and renal panel, Glucose/endocrine profile, Inflammatory profile, Lipid profile, Nutrition focused physical findings, Skin     Nutrition Follow-Up    RD Follow-up?:  Yes

## 2025-06-18 NOTE — PT/OT/SLP PROGRESS
Occupational Therapy  Co-Treatment with PTA    Name: Kuldeep Villela  MRN: [id number]  Admitting Diagnosis: Compression fracture of T12 vertebra  Recent Surgery: Procedure(s) (LRB):  T12 LAMINECTOMY, SPINE, THORACIC, W/ ARTHRODESIS T10-L2 (N/A) 4 Days Post-Op    Recommendations:     Discharge Recommendations: Moderate Intensity Therapy  Discharge Equipment Recommendations: bath bench, bedside commode, walker, rolling  Barriers to discharge: Decreased caregiver support, Other (Comment) (requires increased assistance)    Assessment:     Kuldeep Villela is a 74 y.o. female with a medical diagnosis of Compression fracture of T12 vertebra. She presents with performance deficits affecting function including: weakness, impaired endurance, impaired self care skills, impaired functional mobility, gait instability, impaired balance, decreased coordination, decreased upper extremity function, decreased lower extremity function, decreased safety awareness, orthopedic precautions. Patient agreeable to participate in therapy session this AM with encouragement from therapists. Patient making progress with established OT goals, able to perform step transfer to bedside chair with light A from therapists, with increased time for task completion. At this time, patient remains limited in ADLs, transfers, and functional mobility and is currently not performing tasks at their reported PLOF. Patient would benefit from continued skilled acute OT services in order to maximize IND with ADLs and functional mobility to ensure safe return to PLOF in the least restrictive environment. OT continuing to recommend Moderate Intensity Therapy once patient is medically appropriate for d/c.    Rehab Prognosis: Good; patient would benefit from acute OT services to address these deficits and reach maximum level of function.    Plan:     Patient to be seen 4 x/week to address the above listed problems via self-care/home management, therapeutic activities,  therapeutic exercises, neuromuscular re-education  Plan of Care Expires: 07/15/25  Plan of Care Reviewed with: patient    Subjective     Chief Complaint: None stated by patient at this time. Patient agreeable to participate in therapy this AM.  Patient Comments/Goals: Patient with goal to return to PLOF.  Pain/Comfort:  Pain Rating 1: other (see comments) (unrated)  Pain Rating Post-Intervention 1: other (see comments) (unrated)    Objective:     Communicated with RN prior to session. Patient cleared to participate in therapy at this time. Patient found HOB elevated with PureWick upon OT entry to room. No visitors present in room upon OT entry.    General Precautions: Standard, fall   Orthopedic Precautions: spinal precautions   Braces: TLSO (when OOB)  Respiratory Status: Room air    Occupational Performance    Bed Mobility:  Patient completed Supine to Sit with stand by assistance, with increased time for task completion and verbal cues to maintain spinal precautions  Patient completed anterior Scooting towards the EOB with stand by assistance, with increased time for task completion    Functional Mobility/Transfers:  Patient completed Sit <> Stand Transfer from EOB with minimum assistance and of 2 persons with no assistive device  Patient completed 2 additional Sit <> Stand Transfer from EOB with minimum assistance and of 2 persons with rolling walker  Patient completed Bed <> Chair Transfer using Step Transfer technique with minimum assistance and of 2 persons with rolling walker  Functional Mobility: Patient participated in in-room mobility, taking ~ 4-5 steps from bed to chair with minimum assistance and of 2 persons and rolling walker. Patient mildly unsteady, however no LOB noted. No SOB noted.    Activities of Daily Living:  Upper Body Dressing: moderate assistance to sarah gown in a robe-like fashion seated EOB; total assistance to sarah TLSO brace seated EOB    Balance:  Static sitting balance: SBA  Dynamic  sitting balance: SBA  Static standing balance: min A  Dynamic standing balance: min A x 2    AMPAC 6 Click ADL:  [facility] Total Score: 14    Treatment & Education:  Patient educated on:   Role of OT, OT POC, and discharge planning.  Safe transfer techniques and proper body mechanics for fall prevention and improved independence with functional transfers.  Importance of OOB activities to increase endurance and tolerance for increased participation in daily ADLs.  Therapeutic exercise performed in the form of bed mobility, sitting/standing balance, and transfers to increase patient's activity tolerance, postural control, functional endurance, and overall strength as needed for improved independence in occupations of choice.   Spinal precautions (no bending, no lifting, and no twisting) and various compensatory/adaptive strategies to utilize with functional activities/ADLs to increase/maintain independence.  TLSO brace wear schedule (when OOB) and donning/doffing technique.  Time provided for therapeutic counseling and discussion of health disposition.  Utilizing the call bell to request for assistance with all functional mobility to ensure safety during hospital stay.  Whiteboard updated.    Patient verbalized understanding and all questions were addressed within the scope of OT.    Patient left up in chair with all lines intact, call button in reach, RN notified, and no visitors present.    GOALS:   Multidisciplinary Problems       Occupational Therapy Goals          Problem: Occupational Therapy    Goal Priority Disciplines Outcome Interventions   Occupational Therapy Goal     OT, PT/OT Progressing    Description: Goals to be met by: 7/15/2025     Patient will increase functional independence with ADLs by performing:    UE Dressing with Supervision sitting EOB to don TSLO   LE Dressing with Set-up Assistance using AE as needed to maintain spinal precautions.  Grooming while bedside chair with Set-up  Assistance.  Toileting from bedside commode with Minimal Assistance for hygiene and clothing management.   Supine to sit with Supervision.  Toilet transfer to bedside commode with Contact Guard Assistance using LRAD as needed.   Sit to stand with CGA using LRAD as needed.                        DME Justifications:  Bedside Commode - Patient requires a commode for home use because she is confined to a single room.  Rolling Walker - Patient's mobility limitation cannot be sufficiently resolved by the use of a cane. His/Her functional mobility deficit can be sufficiently resolved with the use of a Rolling Walker. Patient's mobility limitation significantly impairs their ability to participate in one of more activities of daily living.  The use of a RW will significantly improve the patient's ability to participate in MRADLS and the patient will use it on regular basis in the home..    Time Tracking:     OT Date of Treatment: 06/18/25  OT Start Time: 1008  OT Stop Time: 1043  OT Total Time (min): 35 min    Billable Minutes: Self Care/Home Management 10  Therapeutic Exercise 20    Co-treatment performed with PTA due to patient's multiple deficits requiring two skilled therapists to appropriately and safely assess patient's strength, endurance, functional mobility, and ADL performance while facilitating functional tasks in addition to accommodating for patient's activity tolerance and medical acuity.     6/18/2025

## 2025-06-18 NOTE — ASSESSMENT & PLAN NOTE
Patient's blood pressure range in the last 24 hours was: BP  Min: 112/56  Max: 147/76.The patient's inpatient anti-hypertensive regimen is listed below:  Current Antihypertensives  , Every 12 hours, Oral  amLODIPine tablet 10 mg, Daily, Oral    Plan  - BP is controlled, no changes needed to their regimen

## 2025-06-18 NOTE — ASSESSMENT & PLAN NOTE
Kuldeep Villela is a 74 y.o.F with PMHx of arthritis, HTN, and HLD who [place] [facility] complaints of acute on chronic low back/L flank pain for the last week. Does also report a fall a week ago.     CT Abdomen and Pelvis 6/10/25: T12 compression fracture  MRI T/L spine 6/12/25: anterior wedge fracture T12 vertebral body with moderate canal stenosis, subtle cord signal change    S/p 6/14: T10-L2 fusion and T12 lami ; post op XR hardware intact and in position    Plan:  - Admitted to Hospital Medicine  - ERIN drains x2 to suction, abx while in place. One drain removed 6/17  - TLSO brace when out of bed  - removed tom. Monitor for bladder retention. Please document PVR. Notify NSGY with PVR greater than 300.  - regular diet  - LVX for chemical DVT ppx   - PT/OT/TLSO brace when out of bed    Dispo: ongoing    Discussed with staff

## 2025-06-18 NOTE — CONSULTS
VANCOMYCIN DOSING BY PHARMACY DISCONTINUATION NOTE    Kuldeep Villela is a 74 y.o. female who had been consulted for vancomycin dosing.    The pharmacy consult for vancomycin dosing has been discontinued.     Vancomycin Dosing by Pharmacy Consult will sign-off. Please reconsult if necessary. Thank you for allowing us to participate in this patient's care.     Tayler Ward, PharmD  Ext. [phone]

## 2025-06-18 NOTE — SUBJECTIVE & OBJECTIVE
Interval History: NAEON. AFVSS. Exam stable. Drain output 25cc. Working with PT, walking down gamez. No other complaints.    Medications:  Continuous Infusions:  Scheduled Meds:   acetaminophen  1,000 mg Oral TID    amLODIPine  10 mg Oral Daily    busPIRone  15 mg Oral QID    calcium carbonate  500 mg Oral BID    cetirizine  10 mg Oral Daily    citalopram  40 mg Oral Daily    diclofenac sodium  2 g Topical (Top) TID    enoxparin  40 mg Subcutaneous Q12H (prophylaxis, 0900/2100)    lactulose  10 g Oral TID    LIDOcaine  1 patch Transdermal Q24H    methocarbamoL  750 mg Oral QID    polyethylene glycol  17 g Oral Daily    pravastatin  20 mg Oral Daily    rOPINIRole  1 mg Oral BID    senna-docusate  2 tablet Oral BID    sertraline  50 mg Oral Daily    vancomycin (VANCOCIN) IV (PEDS and ADULTS)  1,000 mg Intravenous Q24H     PRN Meds:  Current Facility-Administered Medications:     albuterol, 2 puff, Inhalation, Q4H PRN    aluminum-magnesium hydroxide-simethicone, 30 mL, Oral, Q4H PRN    bacitracin, , Topical (Top), PRN    bisacodyL, 10 mg, Rectal, Daily PRN    dextrose 50%, 12.5 g, Intravenous, PRN    dextrose 50%, 25 g, Intravenous, PRN    glucagon (human recombinant), 1 mg, Intramuscular, PRN    glucose, 16 g, Oral, PRN    glucose, 24 g, Oral, PRN    HYDROmorphone, 0.5 mg, Intravenous, Q4H PRN    magnesium hydroxide 400 mg/5 ml, 30 mL, Oral, Daily PRN    melatonin, 6 mg, Oral, Nightly PRN    naloxone, 0.02 mg, Intravenous, PRN    ondansetron, 8 mg, Oral, Q8H PRN    oxyCODONE, 5 mg, Oral, Q4H PRN    oxyCODONE, 10 mg, Oral, Q4H PRN    polyethylene glycol, 17 g, Oral, Daily PRN    prochlorperazine, 5 mg, Intravenous, Q6H PRN    sodium chloride 0.9%, 10 mL, Intravenous, Q12H PRN    Pharmacy to dose Vancomycin consult, , , Once **AND** vancomycin - pharmacy to dose, , Intravenous, pharmacy to manage frequency     Review of Systems  Objective:     Weight: 107 kg (236 lb)  Body mass index is 44.59 kg/m².  Vital Signs (Most  "Recent):  Temp: 97.8 °F (36.6 °C) (06/18/25 0335)  Pulse: 99 (06/18/25 0335)  Resp: 18 (06/18/25 0610)  BP: 117/73 (06/18/25 0335)  SpO2: 98 % (06/18/25 0335) Vital Signs (24h Range):  Temp:  [97.5 °F (36.4 °C)-98.7 °F (37.1 °C)] 97.8 °F (36.6 °C)  Pulse:  [84-99] 99  Resp:  [17-18] 18  SpO2:  [91 %-98 %] 98 %  BP: (112-147)/(56-76) 117/73                              Closed/Suction Drain 06/14/25 1125 Tube - 2 Left Back Bulb 15 Fr. (Active)   Site Description Unable to view 06/17/25 1945   Dressing Intervention Integrity maintained 06/17/25 1945   Drainage Sanguineous 06/17/25 1945   Status To bulb suction 06/17/25 1945   Output (mL) 25 mL 06/17/25 1938          Physical Exam       Neurosurgery Physical Exam  AAOx4  PERRL  Cranial nerves intact  BUE 5/5  BLE 4/5 pain limited hip flexion, 5/5 distally  deconditioned  SILT     Back incision clean/dry with bandage in place  ERIN drains in place    Significant Labs:  Recent Labs   Lab 06/16/25  0951 06/17/25  1536    88    137   K 4.2 3.9    105   CO2 24 23   BUN 18 16   CREATININE 1.0 1.0   CALCIUM 9.0 9.0   MG 1.5*  --      Recent Labs   Lab 06/16/25  0951 06/17/25  1536   WBC 12.76* 11.03   HGB 8.5* 8.0*   HCT 26.5* 24.7*    260     No results for input(s): "LABPT", "INR", "APTT" in the last 48 hours.  Microbiology Results (last 7 days)       ** No results found for the last 168 hours. **          All pertinent labs from the last 24 hours have been reviewed.    Significant Diagnostics:  CT: No results found in the last 24 hours.  MRI: No results found in the last 24 hours.  I have reviewed all pertinent imaging results/findings within the past 24 hours.  I have reviewed and interpreted all pertinent imaging results/findings within the past 24 hours.  "

## 2025-06-18 NOTE — PROGRESS NOTES
[facility] - Med Surg  Neurosurgery  Progress Note    Subjective:     History of Present Illness: Kuldeep Villela is a 74 y.o.F with PMHx of arthritis, HTN, and HLD who [place] [facility] [place] complaints of acute on chronic low back/L flank pain for the last week. Does also report a fall a week ago. Is able to ambulate with a walker but feels this has become more difficult since the fall. Describes the pain as axial low back pain with bilateral radiculopathy L > R. Denies saddle anesthesia, bladder or bowel dysfunction, weakness or sensory dysfunction. CT abdomen and pelvis demonstrates T12 compression fracture. Neurosurgery consulted for input.     Post-Op Info:  Procedure(s) (LRB):  T12 LAMINECTOMY, SPINE, THORACIC, W/ ARTHRODESIS T10-L2 (N/A)   4 Days Post-Op   Interval History: NAEON. AFVSS. Exam stable. Drain output 25cc. Working with PT, walking down gamez. No other complaints.    Medications:  Continuous Infusions:  Scheduled Meds:   acetaminophen  1,000 mg Oral TID    amLODIPine  10 mg Oral Daily    busPIRone  15 mg Oral QID    calcium carbonate  500 mg Oral BID    cetirizine  10 mg Oral Daily    citalopram  40 mg Oral Daily    diclofenac sodium  2 g Topical (Top) TID    enoxparin  40 mg Subcutaneous Q12H (prophylaxis, 0900/2100)    lactulose  10 g Oral TID    LIDOcaine  1 patch Transdermal Q24H    methocarbamoL  750 mg Oral QID    polyethylene glycol  17 g Oral Daily    pravastatin  20 mg Oral Daily    rOPINIRole  1 mg Oral BID    senna-docusate  2 tablet Oral BID    sertraline  50 mg Oral Daily    vancomycin (VANCOCIN) IV (PEDS and ADULTS)  1,000 mg Intravenous Q24H     PRN Meds:  Current Facility-Administered Medications:     albuterol, 2 puff, Inhalation, Q4H PRN    aluminum-magnesium hydroxide-simethicone, 30 mL, Oral, Q4H PRN    bacitracin, , Topical (Top), PRN    bisacodyL, 10 mg, Rectal, Daily PRN    dextrose 50%, 12.5 g, Intravenous, PRN    dextrose 50%, 25 g, Intravenous, PRN    glucagon (human  recombinant), 1 mg, Intramuscular, PRN    glucose, 16 g, Oral, PRN    glucose, 24 g, Oral, PRN    HYDROmorphone, 0.5 mg, Intravenous, Q4H PRN    magnesium hydroxide 400 mg/5 ml, 30 mL, Oral, Daily PRN    melatonin, 6 mg, Oral, Nightly PRN    naloxone, 0.02 mg, Intravenous, PRN    ondansetron, 8 mg, Oral, Q8H PRN    oxyCODONE, 5 mg, Oral, Q4H PRN    oxyCODONE, 10 mg, Oral, Q4H PRN    polyethylene glycol, 17 g, Oral, Daily PRN    prochlorperazine, 5 mg, Intravenous, Q6H PRN    sodium chloride 0.9%, 10 mL, Intravenous, Q12H PRN    Pharmacy to dose Vancomycin consult, , , Once **AND** vancomycin - pharmacy to dose, , Intravenous, pharmacy to manage frequency     Review of Systems  Objective:     Weight: 107 kg (236 lb)  Body mass index is 44.59 kg/m².  Vital Signs (Most Recent):  Temp: 97.8 °F (36.6 °C) (06/18/25 0335)  Pulse: 99 (06/18/25 0335)  Resp: 18 (06/18/25 0610)  BP: 117/73 (06/18/25 0335)  SpO2: 98 % (06/18/25 0335) Vital Signs (24h Range):  Temp:  [97.5 °F (36.4 °C)-98.7 °F (37.1 °C)] 97.8 °F (36.6 °C)  Pulse:  [84-99] 99  Resp:  [17-18] 18  SpO2:  [91 %-98 %] 98 %  BP: (112-147)/(56-76) 117/73                              Closed/Suction Drain 06/14/25 1125 Tube - 2 Left Back Bulb 15 Fr. (Active)   Site Description Unable to view 06/17/25 1945   Dressing Intervention Integrity maintained 06/17/25 1945   Drainage Sanguineous 06/17/25 1945   Status To bulb suction 06/17/25 1945   Output (mL) 25 mL 06/17/25 1938          Physical Exam       Neurosurgery Physical Exam  AAOx4  PERRL  Cranial nerves intact  BUE 5/5  BLE 4/5 pain limited hip flexion, 5/5 distally  deconditioned  SILT     Back incision clean/dry with bandage in place  ERIN drains in place    Significant Labs:  Recent Labs   Lab 06/16/25  0951 06/17/25  1536    88    137   K 4.2 3.9    105   CO2 24 23   BUN 18 16   CREATININE 1.0 1.0   CALCIUM 9.0 9.0   MG 1.5*  --      Recent Labs   Lab 06/16/25  0951 06/17/25  1536   WBC 12.76*  "11.03   HGB 8.5* 8.0*   HCT 26.5* 24.7*    260     No results for input(s): "LABPT", "INR", "APTT" in the last 48 hours.  Microbiology Results (last 7 days)       ** No results found for the last 168 hours. **          All pertinent labs from the last 24 hours have been reviewed.    Significant Diagnostics:  CT: No results found in the last 24 hours.  MRI: No results found in the last 24 hours.  I have reviewed all pertinent imaging results/findings within the past 24 hours.  I have reviewed and interpreted all pertinent imaging results/findings within the past 24 hours.  Assessment/Plan:     * Compression fracture of T12 vertebra  Kuldeep Villela is a 74 y.o.F with PMHx of arthritis, HTN, and HLD who presents to [facility] [place] [place] ED for complaints of acute on chronic low back/L flank pain for the last week. Does also report a fall a week ago.     CT Abdomen and Pelvis 6/10/25: T12 compression fracture  MRI T/L spine 6/12/25: anterior wedge fracture T12 vertebral body with moderate canal stenosis, subtle cord signal change    S/p 6/14: T10-L2 fusion and T12 lami ; post op XR hardware intact and in position    Plan:  - Admitted to Hospital Medicine  - ERIN drains x2 to suction, abx while in place. One drain removed 6/17. Plan to remove remaining drain today.  - TLSO brace when out of bed  - removed tom. Monitor for bladder retention. Please document PVR. Notify NSGY with PVR greater than 300.  - regular diet  - LVX for chemical DVT ppx   - PT/OT/TLSO brace when out of bed    Dispo: ongoing    Discussed with staff        Modesto Ward MD  Neurosurgery  [facility] - Med Surg  "

## 2025-06-19 ENCOUNTER — PATIENT OUTREACH (OUTPATIENT)
Dept: ADMINISTRATIVE | Facility: HOSPITAL | Age: 75
End: 2025-06-19
Payer: COMMERCIAL

## 2025-06-19 PROBLEM — N17.9 AKI (ACUTE KIDNEY INJURY): Status: RESOLVED | Noted: 2025-06-10 | Resolved: 2025-06-19

## 2025-06-19 LAB
ABSOLUTE EOSINOPHIL (OHS): 0.4 K/UL
ABSOLUTE MONOCYTE (OHS): 0.97 K/UL (ref 0.3–1)
ABSOLUTE NEUTROPHIL COUNT (OHS): 7.49 K/UL (ref 1.8–7.7)
ANION GAP (OHS): 10 MMOL/L (ref 8–16)
BASOPHILS # BLD AUTO: 0.04 K/UL
BASOPHILS NFR BLD AUTO: 0.4 %
BUN SERPL-MCNC: 15 MG/DL (ref 8–23)
CALCIUM SERPL-MCNC: 9.1 MG/DL (ref 8.7–10.5)
CHLORIDE SERPL-SCNC: 103 MMOL/L (ref 95–110)
CO2 SERPL-SCNC: 23 MMOL/L (ref 23–29)
CREAT SERPL-MCNC: 0.9 MG/DL (ref 0.5–1.4)
ERYTHROCYTE [DISTWIDTH] IN BLOOD BY AUTOMATED COUNT: 13.5 % (ref 11.5–14.5)
GFR SERPLBLD CREATININE-BSD FMLA CKD-EPI: >60 ML/MIN/1.73/M2
GLUCOSE SERPL-MCNC: 86 MG/DL (ref 70–110)
HCT VFR BLD AUTO: 25.2 % (ref 37–48.5)
HGB BLD-MCNC: 8.2 GM/DL (ref 12–16)
IMM GRANULOCYTES # BLD AUTO: 0.03 K/UL (ref 0–0.04)
IMM GRANULOCYTES NFR BLD AUTO: 0.3 % (ref 0–0.5)
LIPASE SERPL-CCNC: 18 U/L (ref 4–60)
LYMPHOCYTES # BLD AUTO: 1.33 K/UL (ref 1–4.8)
MCH RBC QN AUTO: 32 PG (ref 27–31)
MCHC RBC AUTO-ENTMCNC: 32.5 G/DL (ref 32–36)
MCV RBC AUTO: 98 FL (ref 82–98)
NUCLEATED RBC (/100WBC) (OHS): 0 /100 WBC
PLATELET # BLD AUTO: 319 K/UL (ref 150–450)
PMV BLD AUTO: 11 FL (ref 9.2–12.9)
POTASSIUM SERPL-SCNC: 3.9 MMOL/L (ref 3.5–5.1)
RBC # BLD AUTO: 2.56 M/UL (ref 4–5.4)
RELATIVE EOSINOPHIL (OHS): 3.9 %
RELATIVE LYMPHOCYTE (OHS): 13 % (ref 18–48)
RELATIVE MONOCYTE (OHS): 9.5 % (ref 4–15)
RELATIVE NEUTROPHIL (OHS): 72.9 % (ref 38–73)
SODIUM SERPL-SCNC: 136 MMOL/L (ref 136–145)
WBC # BLD AUTO: 10.26 K/UL (ref 3.9–12.7)

## 2025-06-19 PROCEDURE — 85025 COMPLETE CBC W/AUTO DIFF WBC: CPT | Performed by: STUDENT IN AN ORGANIZED HEALTH CARE EDUCATION/TRAINING PROGRAM

## 2025-06-19 PROCEDURE — 63600175 PHARM REV CODE 636 W HCPCS: Performed by: STUDENT IN AN ORGANIZED HEALTH CARE EDUCATION/TRAINING PROGRAM

## 2025-06-19 PROCEDURE — 80048 BASIC METABOLIC PNL TOTAL CA: CPT | Performed by: STUDENT IN AN ORGANIZED HEALTH CARE EDUCATION/TRAINING PROGRAM

## 2025-06-19 PROCEDURE — 25000003 PHARM REV CODE 250

## 2025-06-19 PROCEDURE — 36415 COLL VENOUS BLD VENIPUNCTURE: CPT | Performed by: STUDENT IN AN ORGANIZED HEALTH CARE EDUCATION/TRAINING PROGRAM

## 2025-06-19 PROCEDURE — 25000003 PHARM REV CODE 250: Performed by: STUDENT IN AN ORGANIZED HEALTH CARE EDUCATION/TRAINING PROGRAM

## 2025-06-19 PROCEDURE — 83690 ASSAY OF LIPASE: CPT | Performed by: STUDENT IN AN ORGANIZED HEALTH CARE EDUCATION/TRAINING PROGRAM

## 2025-06-19 PROCEDURE — 11000001 HC ACUTE MED/SURG PRIVATE ROOM

## 2025-06-19 RX ADMIN — ACETAMINOPHEN 1000 MG: 500 TABLET ORAL at 09:06

## 2025-06-19 RX ADMIN — METHOCARBAMOL 750 MG: 750 TABLET ORAL at 12:06

## 2025-06-19 RX ADMIN — OXYCODONE HYDROCHLORIDE 10 MG: 10 TABLET ORAL at 01:06

## 2025-06-19 RX ADMIN — CITALOPRAM HYDROBROMIDE 40 MG: 20 TABLET ORAL at 09:06

## 2025-06-19 RX ADMIN — ROPINIROLE HYDROCHLORIDE 1 MG: 1 TABLET, FILM COATED ORAL at 09:06

## 2025-06-19 RX ADMIN — OXYCODONE HYDROCHLORIDE 10 MG: 10 TABLET ORAL at 05:06

## 2025-06-19 RX ADMIN — BUSPIRONE HYDROCHLORIDE 15 MG: 10 TABLET ORAL at 09:06

## 2025-06-19 RX ADMIN — ENOXAPARIN SODIUM 40 MG: 40 INJECTION SUBCUTANEOUS at 09:06

## 2025-06-19 RX ADMIN — DICLOFENAC SODIUM 2 G: 10 GEL TOPICAL at 02:06

## 2025-06-19 RX ADMIN — CALCIUM CARBONATE (ANTACID) CHEW TAB 500 MG 500 MG: 500 CHEW TAB at 09:06

## 2025-06-19 RX ADMIN — PRAVASTATIN SODIUM 20 MG: 10 TABLET ORAL at 09:06

## 2025-06-19 RX ADMIN — DICLOFENAC SODIUM 2 G: 10 GEL TOPICAL at 09:06

## 2025-06-19 RX ADMIN — HYDROMORPHONE HYDROCHLORIDE 0.5 MG: 1 INJECTION, SOLUTION INTRAMUSCULAR; INTRAVENOUS; SUBCUTANEOUS at 05:06

## 2025-06-19 RX ADMIN — OXYCODONE HYDROCHLORIDE 10 MG: 10 TABLET ORAL at 09:06

## 2025-06-19 RX ADMIN — BUSPIRONE HYDROCHLORIDE 15 MG: 10 TABLET ORAL at 04:06

## 2025-06-19 RX ADMIN — SERTRALINE HYDROCHLORIDE 50 MG: 50 TABLET ORAL at 09:06

## 2025-06-19 RX ADMIN — METHOCARBAMOL 750 MG: 750 TABLET ORAL at 09:06

## 2025-06-19 RX ADMIN — BUSPIRONE HYDROCHLORIDE 15 MG: 10 TABLET ORAL at 12:06

## 2025-06-19 RX ADMIN — ACETAMINOPHEN 1000 MG: 500 TABLET ORAL at 02:06

## 2025-06-19 RX ADMIN — METHOCARBAMOL 750 MG: 750 TABLET ORAL at 04:06

## 2025-06-19 RX ADMIN — SENNOSIDES AND DOCUSATE SODIUM 2 TABLET: 50; 8.6 TABLET ORAL at 09:06

## 2025-06-19 RX ADMIN — HYDROMORPHONE HYDROCHLORIDE 0.5 MG: 1 INJECTION, SOLUTION INTRAMUSCULAR; INTRAVENOUS; SUBCUTANEOUS at 02:06

## 2025-06-19 RX ADMIN — HYDROMORPHONE HYDROCHLORIDE 0.5 MG: 1 INJECTION, SOLUTION INTRAMUSCULAR; INTRAVENOUS; SUBCUTANEOUS at 07:06

## 2025-06-19 RX ADMIN — CETIRIZINE HYDROCHLORIDE 10 MG: 10 TABLET, FILM COATED ORAL at 09:06

## 2025-06-19 RX ADMIN — AMLODIPINE BESYLATE 10 MG: 10 TABLET ORAL at 09:06

## 2025-06-19 NOTE — PROGRESS NOTES
[facility] Medicine  Progress Note    Patient Name: Kuldeep Villela  MRN: [id number]  Patient Class: IP- Inpatient   Admission Date: 6/9/2025  Length of Stay: 9 days  Attending Physician: Celine Yanez MD  Primary Care Provider: Ginna Musa MD        Subjective     Principal Problem:Compression fracture of T12 vertebra        HPI:  Kuldeep Villela is a 74 y.o.F with PMHx of arthritis, lumbar stenosis, HTN, HLD, depression who presents to [facility] ED for complaints of worsening low back/L flank pain for the last week. Endorses chronic low back pain for which she sees a specialist for, but has worsened recently. Denies any known injury to area. She reports she felt like it may be the beginning of a UTI so she started taking Azo at home for prevention. States she normally ambulates with a walker; however, the pain has been so severe that she has not been able to ambulate at all. Endorses bilateral lower extremity weakness. Denies bladder or bowel incontinence, denies numbness of lower extremities. Denies fever, chills, chest pain, palpitations, SOB, cough, abdominal pain, n/v/d, dysuria, headaches, or any other symptoms at this time.     In ED: afebrile, VSS on RA. CBC without leukocytosis, Hgb 11.1. CMP notable for Cr 1.5 (~1.0), otherwise unremarkable. UA non-infectious appearing. CT abd/pelvis with new compression deformity of T12, perinephric haziness and stranding, L>R, correlate with renal disease, small periumbilical abd wall hernia involving bowel without inflammatory or obstructive change. S/p rocephin, ketamine, toradol 15mg inj, oxy-acetaminophen 10mg, 1L IVF bolus in ED. TLSO brace ordered. Admitted to  for further evaluation.     Overview/Hospital Course:  Pt admitted for continued management of T12 compression fx. NSGY consulted, no acute intervention warranted at this time. MRI spine with sedation pending and scheduled for 6/12. MRI concerning for compression fracture with instability  and severe spinal canal stenosis concerning for cord edema. Recommended to stop mobility with PT/OT pending surgical stabilization of spine. She was taken to the OR 6/14/25 for  T10-L2 posterior fusion. Monitored post op with pain control. Started on IV abx while drains in place. PT/OT restarted with TLSO brace. XR spine showed stabilization. One drain removed 6/17/25 and second drain removed 6/18. Antibiotics stopped once drain removed. H&H monitored due to possible post op drop.     Interval History:   NAEON. Hb remains stable. Passing urine and stools without difficulty and no lower extremity pain or anesthesia.     Reports epigastric abdominal pain radiating to left side. Has been gettign the oxycodone and dilaudid PRN    Review of Systems   Constitutional:  Negative for chills and fever.   Respiratory:  Negative for cough.    Gastrointestinal:  Positive for abdominal pain. Negative for constipation, diarrhea and nausea.     Objective:     Vital Signs (Most Recent):  Temp: 98.4 °F (36.9 °C) (06/19/25 1546)  Pulse: 89 (06/19/25 1546)  Resp: 18 (06/19/25 1732)  BP: 123/60 (06/19/25 1546)  SpO2: 95 % (06/19/25 1546) Vital Signs (24h Range):  Temp:  [96.5 °F (35.8 °C)-99 °F (37.2 °C)] 98.4 °F (36.9 °C)  Pulse:  [] 89  Resp:  [16-18] 18  SpO2:  [94 %-97 %] 95 %  BP: ()/(56-75) 123/60     Weight: 107 kg (236 lb)  Body mass index is 44.59 kg/m².    Intake/Output Summary (Last 24 hours) at 6/19/2025 1833  Last data filed at 6/19/2025 1701  Gross per 24 hour   Intake --   Output 650 ml   Net -650 ml         Physical Exam  Constitutional:       Appearance: Normal appearance.   HENT:      Head: Normocephalic and atraumatic.      Nose: Nose normal.      Mouth/Throat:      Mouth: Mucous membranes are moist.   Eyes:      Extraocular Movements: Extraocular movements intact.      Pupils: Pupils are equal, round, and reactive to light.   Cardiovascular:      Rate and Rhythm: Normal rate and regular rhythm.      Heart  sounds: No murmur heard.     No gallop.   Pulmonary:      Effort: Pulmonary effort is normal. No respiratory distress.      Breath sounds: Normal breath sounds. No wheezing.   Abdominal:      General: Abdomen is flat. Bowel sounds are normal. There is no distension.      Palpations: Abdomen is soft.      Tenderness: There is abdominal tenderness (mild to moderate epigastric to LUQ pain).   Musculoskeletal:         General: No swelling. Normal range of motion.      Cervical back: Normal range of motion and neck supple.      Right lower leg: No edema.      Left lower leg: No edema.   Skin:     General: Skin is warm and dry.      Capillary Refill: Capillary refill takes less than 2 seconds.   Neurological:      General: No focal deficit present.      Mental Status: She is alert. Mental status is at baseline.      Motor: Weakness present.      Comments:   Weakness and ROM improving   Psychiatric:         Mood and Affect: Mood normal.           Significant Labs: All pertinent labs within the past 24 hours have been reviewed.    Significant Imaging: I have reviewed all pertinent imaging results/findings within the past 24 hours.      Assessment & Plan  Compression fracture of T12 vertebra  Acute on chronic back pain  Compression fracture of body of thoracic vertebra    74 y.o.F with new compression deformity of T12 and new inability to ambulate 2/2 pain. On admit, AFVSS, labs reviewed. CT a/p with: New compression deformity of T12 when compared to CT abdomen pelvis 03/22/2025. MRI T/L spine with sedation ordered. NSGY consulted. S/P T10-L2 posterior fusion 6/14      Plan:   - TLSO brace ordered   - NSGY consulted, appreciate recommendations  - MRI spine with sedation on 6/12, concerning for compression fracture with instability and severe spinal canal stenosis concerning for cord edema   -s/p T10-L2 posterior fusion 6/14  -stop abx, drains removed 6/18   - MM pain regimen initiated  - PT/OT with TLSO brace on  -removed  yuli tom I/Os closely   -bowel regimen increased and now with bowel movements   - fall precautions         Hypertension  Patient's blood pressure range in the last 24 hours was: BP  Min: 98/68  Max: 132/69.The patient's inpatient anti-hypertensive regimen is listed below:  Current Antihypertensives  , Every 12 hours, Oral  amLODIPine tablet 10 mg, Daily, Oral    Plan  - BP is controlled, no changes needed to their regimen    Acute on chronic anemia  Anemia is likely due to acute on chronic in light of surgical intervention and drain palcement. Most recent hemoglobin and hematocrit are listed below.  Recent Labs     06/17/25  1536 06/18/25  0600 06/19/25  0617   HGB 8.0* 7.9* 8.2*   HCT 24.7* 24.3* 25.2*     Plan  - Monitor serial CBC: Daily  - Transfuse PRBC if patient becomes hemodynamically unstable, symptomatic or H/H drops below 7/21.  - Patient has not received any PRBC transfusions to date  - Patient's anemia is currently stable    Hyperlipemia  - continue home statin    Debility  Patient with Acute on chronic debility due to fracture/prosthesis. The patient's latest AMPAC (Activity Measure for Post Acute Care) Score is listed below.    AM-PAC Score - How much help does the patient need for each activity listed  Basic Mobility Total Score: 13  Turning over in bed (including adjusting bedclothes, sheets and blankets)?: A little  Sitting down on and standing up from a chair with arms (e.g., wheelchair, bedside commode, etc.): A lot  Moving from lying on back to sitting on the side of the bed?: A little  Moving to and from a bed to a chair (including a wheelchair)?: A lot  Need to walk in hospital room?: A lot  Climbing 3-5 steps with a railing?: Unable    Plan  - PT/OT consulted  - Fall precautions in place  - PT/OT recommending moderate intensity therapy at CT  - awaiting SNF authorization    Depression with anxiety  Patient has recurrent depression which is mild and is currently controlled. Will Continue  anti-depressant medications. We will not consult psychiatry at this time. Patient does not display psychosis at this time. Continue to monitor closely and adjust plan of care as needed.  - continue home medications    Hyperkalemia  -resolved with Lokelma     MALACHI (acute kidney injury) (Resolved: 6/19/2025)  RESOLVED  Tobacco dependency  Dangers of cigarette smoking were reviewed with patient in detail. Patient was Counseled for 3-10 minutes. Nicotine replacement options were discussed. Nicotine replacement was discussed- prescribed  VTE Risk Mitigation (From admission, onward)           Ordered     enoxaparin injection 40 mg  Every 12 hours         06/17/25 0951     IP VTE HIGH RISK PATIENT  Once         06/14/25 1204     Place sequential compression device  Until discontinued         06/10/25 0731                    Discharge Planning   MATT: 6/23/2025     Code Status: Full Code   Patient is Medically Not Yet Ready for discharge.  Medical Readiness for Discharge Date:   Discharge Plan A: Skilled Nursing Facility   Discharge Delays: None known at this time      SDOH Screening:  The patient was screened for utility difficulties, food insecurity, transport difficulties, housing insecurity, and interpersonal safety and there were no concerns identified this admission.                Please place Justification for DME      Celine Yanez MD  Department of Hospital Medicine   [facility] Surg

## 2025-06-19 NOTE — SUBJECTIVE & OBJECTIVE
Interval History:   NAEON. Hb remains stable. Passing urine and stools without difficulty and no lower extremity pain or anesthesia.     Reports epigastric abdominal pain radiating to left side. Has been gettign the oxycodone and dilaudid PRN    Review of Systems   Constitutional:  Negative for chills and fever.   Respiratory:  Negative for cough.    Gastrointestinal:  Positive for abdominal pain. Negative for constipation, diarrhea and nausea.     Objective:     Vital Signs (Most Recent):  Temp: 98.4 °F (36.9 °C) (06/19/25 1546)  Pulse: 89 (06/19/25 1546)  Resp: 18 (06/19/25 1732)  BP: 123/60 (06/19/25 1546)  SpO2: 95 % (06/19/25 1546) Vital Signs (24h Range):  Temp:  [96.5 °F (35.8 °C)-99 °F (37.2 °C)] 98.4 °F (36.9 °C)  Pulse:  [] 89  Resp:  [16-18] 18  SpO2:  [94 %-97 %] 95 %  BP: ()/(56-75) 123/60     Weight: 107 kg (236 lb)  Body mass index is 44.59 kg/m².    Intake/Output Summary (Last 24 hours) at 6/19/2025 1833  Last data filed at 6/19/2025 1701  Gross per 24 hour   Intake --   Output 650 ml   Net -650 ml         Physical Exam  Constitutional:       Appearance: Normal appearance.   HENT:      Head: Normocephalic and atraumatic.      Nose: Nose normal.      Mouth/Throat:      Mouth: Mucous membranes are moist.   Eyes:      Extraocular Movements: Extraocular movements intact.      Pupils: Pupils are equal, round, and reactive to light.   Cardiovascular:      Rate and Rhythm: Normal rate and regular rhythm.      Heart sounds: No murmur heard.     No gallop.   Pulmonary:      Effort: Pulmonary effort is normal. No respiratory distress.      Breath sounds: Normal breath sounds. No wheezing.   Abdominal:      General: Abdomen is flat. Bowel sounds are normal. There is no distension.      Palpations: Abdomen is soft.      Tenderness: There is abdominal tenderness (mild to moderate epigastric to LUQ pain).   Musculoskeletal:         General: No swelling. Normal range of motion.      Cervical back:  Normal range of motion and neck supple.      Right lower leg: No edema.      Left lower leg: No edema.   Skin:     General: Skin is warm and dry.      Capillary Refill: Capillary refill takes less than 2 seconds.   Neurological:      General: No focal deficit present.      Mental Status: She is alert. Mental status is at baseline.      Motor: Weakness present.      Comments:   Weakness and ROM improving   Psychiatric:         Mood and Affect: Mood normal.           Significant Labs: All pertinent labs within the past 24 hours have been reviewed.    Significant Imaging: I have reviewed all pertinent imaging results/findings within the past 24 hours.

## 2025-06-19 NOTE — PROGRESS NOTES
Care Coordination Encounter Details:       MyChart Portal Status:         [x]  Reviewed MyChart Portal Status offered / enrolled if applicable        Additional Notes:     MyChart Outcomes: PENDING         Updates Requested / Reviewed:        Updated Care Coordination Note, Care Everywhere, Care Team Updated, and Immunizations Reconciliation Completed or Queried: [place]         Health Maintenance Screening(s) Due:      Health Maintenance Topics Overdue:      VBHM Score: 5     Colon Cancer Screening  Osteoporosis Screening  Mammogram  LDCT Lung Screen    Pneumonia Vaccine  Tetanus Vaccine  Shingles/Zoster Vaccine  RSV Vaccine                  Health Maintenance Topic(s) Outreach Outcomes & Actions Taken:    Colorectal Cancer Screening - Outreach Outcomes & Actions Taken  : PATIENT IS IN THE HOSPITAL    Breast Cancer Screening - Outreach Outcomes & Actions Taken  : PATIENT IS IN THE HOSPITAL    Osteoporosis Screening - Outreach Outcomes & Actions Taken  : PATIENT IS IN THE HOSPITAL    Lab(s) - Outreach Outcomes & Actions Taken  : PATIENT IS IN THE HOSPITAL         Additional Notes:  THE PATIENT IS IN THE HOSPITAL. NO CALL WAS MADE. THE PATIENT IS DUE FOR A COLON CANCER SCREENING,MAMMOGRAM,DEXA SCAN, LIPID PANEL.           Chronic Disease Management:     Diabetes Measures        Lab Results   Component Value Date    HGBA1C 4.9 05/09/2023           [x]  Reviewed chart for active Diabetes diagnosis     []  Scheduled necessary follow up appointments if needed         Additional Notes:  REVIEWED  NO DX NOTED           Hypertension Measures        BP Readings from Last 1 Encounters:   06/19/25 (!) 112/56           [x]  Reviewed chart for active Hypertension diagnosis     []  Reviewed & documented Home BP Cuff     []  Documented a Remote BP if needed & applicable     []  Scheduled necessary follow up appointments with Primary Care if needed         Additional Notes:  REVIEWED  DX NOTED           Provider Team  Continuity:     Last PCP Visit Date: 3/19/2025          [x]  Reviewed Primary Care Provider Visits, Annual Wellness Visit, and Future          Appointments to ensure appointments have been scheduled and/or           completed        Additional Notes:  REVIEWED  NO APPOINTMENTS SCHEDULED AT THIS TIME           Social Determinants of Health          [x]  Reviewed, completed, and/or updated the following sections:                  Food Insecurity, Transportation Needs, Financial Resource Strain,                 Tobacco Use        Additional Notes:  REVIEWED, AND IS UP TO DATE           Care Management, Digital Medicine, and/or Education Referrals    OPCM Risk Score: 93.1        Additional Notes:  PATIENT IS NOT ELIGIBLE FOR DIGITAL MEDICINE

## 2025-06-19 NOTE — PROGRESS NOTES
[facility]  Neurosurgery  Progress Note    Subjective:     History of Present Illness: Kuldeep Villela is a 74 y.o.F with PMHx of arthritis, HTN, and HLD who presents to [facility] ED for complaints of acute on chronic low back/L flank pain for the last week. Does also report a fall a week ago. Is able to ambulate with a walker but feels this has become more difficult since the fall. Describes the pain as axial low back pain with bilateral radiculopathy L > R. Denies saddle anesthesia, bladder or bowel dysfunction, weakness or sensory dysfunction. CT abdomen and pelvis demonstrates T12 compression fracture. Neurosurgery consulted for input.     Post-Op Info:  Procedure(s) (LRB):  T12 LAMINECTOMY, SPINE, THORACIC, W/ ARTHRODESIS T10-L2 (N/A)   5 Days Post-Op   Interval History: 6/19: Doing well. Drains removed. Worked with PT,  enthusiastic. Voiding well. Work on BM. Will check semiweekly    Medications:  Continuous Infusions:  Scheduled Meds:   acetaminophen  1,000 mg Oral TID    amLODIPine  10 mg Oral Daily    busPIRone  15 mg Oral QID    calcium carbonate  500 mg Oral BID    cetirizine  10 mg Oral Daily    citalopram  40 mg Oral Daily    diclofenac sodium  2 g Topical (Top) TID    enoxparin  40 mg Subcutaneous Q12H (prophylaxis, 0900/2100)    lactulose  10 g Oral TID    LIDOcaine  1 patch Transdermal Q24H    methocarbamoL  750 mg Oral QID    polyethylene glycol  17 g Oral Daily    pravastatin  20 mg Oral Daily    rOPINIRole  1 mg Oral BID    senna-docusate  2 tablet Oral BID    sertraline  50 mg Oral Daily     PRN Meds:  Current Facility-Administered Medications:     albuterol, 2 puff, Inhalation, Q4H PRN    aluminum-magnesium hydroxide-simethicone, 30 mL, Oral, Q4H PRN    bacitracin, , Topical (Top), PRN    bisacodyL, 10 mg, Rectal, Daily PRN    dextrose 50%, 12.5 g, Intravenous, PRN    dextrose 50%, 25 g, Intravenous, PRN    glucagon (human recombinant), 1 mg, Intramuscular, PRN    glucose, 16 g, Oral, PRN     glucose, 24 g, Oral, PRN    HYDROmorphone, 0.5 mg, Intravenous, Q4H PRN    magnesium hydroxide 400 mg/5 ml, 30 mL, Oral, Daily PRN    melatonin, 6 mg, Oral, Nightly PRN    naloxone, 0.02 mg, Intravenous, PRN    ondansetron, 8 mg, Oral, Q8H PRN    oxyCODONE, 5 mg, Oral, Q4H PRN    oxyCODONE, 10 mg, Oral, Q4H PRN    polyethylene glycol, 17 g, Oral, Daily PRN    prochlorperazine, 5 mg, Intravenous, Q6H PRN    sodium chloride 0.9%, 10 mL, Intravenous, Q12H PRN     Review of Systems  Objective:     Weight: 107 kg (236 lb)  Body mass index is 44.59 kg/m².  Vital Signs (Most Recent):  Temp: 98.4 °F (36.9 °C) (06/19/25 1106)  Pulse: 85 (06/19/25 1106)  Resp: 18 (06/19/25 1106)  BP: (!) 111/56 (06/19/25 1106)  SpO2: 95 % (06/19/25 1106) Vital Signs (24h Range):  Temp:  [96.5 °F (35.8 °C)-99 °F (37.2 °C)] 98.4 °F (36.9 °C)  Pulse:  [] 85  Resp:  [16-18] 18  SpO2:  [94 %-99 %] 95 %  BP: ()/(56-75) 111/56     Date 06/19/25 0700 - 06/20/25 0659   Shift 7693-5248 6537-2644 1036-4686 24 Hour Total   INTAKE   Shift Total(mL/kg)       OUTPUT   Urine(mL/kg/hr) 300   300   Shift Total(mL/kg) 300(2.8)   300(2.8)   Weight (kg) 107 107 107 107                            Closed/Suction Drain 06/14/25 1125 Tube - 2 Left Back Bulb 15 Fr. (Active)   Site Description Unable to view 06/17/25 1945   Dressing Intervention Integrity maintained 06/17/25 1945   Drainage Sanguineous 06/17/25 1945   Status To bulb suction 06/17/25 1945   Output (mL) 25 mL 06/17/25 1938          Physical Exam       Neurosurgery Physical Exam  AAOx4  PERRL  Cranial nerves intact  BUE 5/5  BLE 4/5 pain limited hip flexion, 5/5 distally  deconditioned  SILT     Back incision clean/dry with bandage in place      Significant Labs:  Recent Labs   Lab 06/17/25  1536 06/18/25  0600 06/19/25  0617   GLU 88 77 86    137 136   K 3.9 4.0 3.9    104 103   CO2 23 23 23   BUN 16 14 15   CREATININE 1.0 0.9 0.9   CALCIUM 9.0 8.8 9.1     Recent Labs   Lab  "06/17/25  1536 06/18/25  0600 06/19/25  0617   WBC 11.03 10.24 10.26   HGB 8.0* 7.9* 8.2*   HCT 24.7* 24.3* 25.2*    258 319     No results for input(s): "LABPT", "INR", "APTT" in the last 48 hours.  Microbiology Results (last 7 days)       ** No results found for the last 168 hours. **          All pertinent labs from the last 24 hours have been reviewed.    Significant Diagnostics:  CT: No results found in the last 24 hours.  MRI: No results found in the last 24 hours.  I have reviewed all pertinent imaging results/findings within the past 24 hours.  I have reviewed and interpreted all pertinent imaging results/findings within the past 24 hours.  Assessment/Plan:     * Compression fracture of T12 vertebra  Kuldeep Villela is a 74 y.o.F with PMHx of arthritis, HTN, and HLD who presents to [facility] ED for complaints of acute on chronic low back/L flank pain for the last week. Does also report a fall a week ago.     CT Abdomen and Pelvis 6/10/25: T12 compression fracture  MRI T/L spine 6/12/25: anterior wedge fracture T12 vertebral body with moderate canal stenosis, subtle cord signal change    S/p 6/14: T10-L2 fusion and T12 lami ; post op XR hardware intact and in position    Plan:  - Admitted to Hospital Medicine  -Drains removed 6/17 and 6/18  - TLSO brace when out of bed  - removed tom. Monitor for bladder retention. Please document PVR. Notify NSGY with PVR greater than 300.  - regular diet  - LVX for chemical DVT ppx   - PT/OT/TLSO brace when out of bed  - Will check semiweekly at this stage    Dispo: okay for next phase of care    Discussed with staff        Butch Nelson MD  Neurosurgery  [facility] - Med Surg  "

## 2025-06-19 NOTE — ASSESSMENT & PLAN NOTE
Patient's blood pressure range in the last 24 hours was: BP  Min: 98/68  Max: 132/69.The patient's inpatient anti-hypertensive regimen is listed below:  Current Antihypertensives  , Every 12 hours, Oral  amLODIPine tablet 10 mg, Daily, Oral    Plan  - BP is controlled, no changes needed to their regimen

## 2025-06-19 NOTE — SUBJECTIVE & OBJECTIVE
Interval History: 6/19: Doing well. Drains removed. Worked with PT,  enthusiastic. Voiding well. Work on BM. Will check semiweekly    Medications:  Continuous Infusions:  Scheduled Meds:   acetaminophen  1,000 mg Oral TID    amLODIPine  10 mg Oral Daily    busPIRone  15 mg Oral QID    calcium carbonate  500 mg Oral BID    cetirizine  10 mg Oral Daily    citalopram  40 mg Oral Daily    diclofenac sodium  2 g Topical (Top) TID    enoxparin  40 mg Subcutaneous Q12H (prophylaxis, 0900/2100)    lactulose  10 g Oral TID    LIDOcaine  1 patch Transdermal Q24H    methocarbamoL  750 mg Oral QID    polyethylene glycol  17 g Oral Daily    pravastatin  20 mg Oral Daily    rOPINIRole  1 mg Oral BID    senna-docusate  2 tablet Oral BID    sertraline  50 mg Oral Daily     PRN Meds:  Current Facility-Administered Medications:     albuterol, 2 puff, Inhalation, Q4H PRN    aluminum-magnesium hydroxide-simethicone, 30 mL, Oral, Q4H PRN    bacitracin, , Topical (Top), PRN    bisacodyL, 10 mg, Rectal, Daily PRN    dextrose 50%, 12.5 g, Intravenous, PRN    dextrose 50%, 25 g, Intravenous, PRN    glucagon (human recombinant), 1 mg, Intramuscular, PRN    glucose, 16 g, Oral, PRN    glucose, 24 g, Oral, PRN    HYDROmorphone, 0.5 mg, Intravenous, Q4H PRN    magnesium hydroxide 400 mg/5 ml, 30 mL, Oral, Daily PRN    melatonin, 6 mg, Oral, Nightly PRN    naloxone, 0.02 mg, Intravenous, PRN    ondansetron, 8 mg, Oral, Q8H PRN    oxyCODONE, 5 mg, Oral, Q4H PRN    oxyCODONE, 10 mg, Oral, Q4H PRN    polyethylene glycol, 17 g, Oral, Daily PRN    prochlorperazine, 5 mg, Intravenous, Q6H PRN    sodium chloride 0.9%, 10 mL, Intravenous, Q12H PRN     Review of Systems  Objective:     Weight: 107 kg (236 lb)  Body mass index is 44.59 kg/m².  Vital Signs (Most Recent):  Temp: 98.4 °F (36.9 °C) (06/19/25 1106)  Pulse: 85 (06/19/25 1106)  Resp: 18 (06/19/25 1106)  BP: (!) 111/56 (06/19/25 1106)  SpO2: 95 % (06/19/25 1106) Vital Signs (24h Range):  Temp:   "[96.5 °F (35.8 °C)-99 °F (37.2 °C)] 98.4 °F (36.9 °C)  Pulse:  [] 85  Resp:  [16-18] 18  SpO2:  [94 %-99 %] 95 %  BP: ()/(56-75) 111/56     Date 06/19/25 0700 - 06/20/25 0659   Shift 3387-4624 7749-3854 0077-8499 24 Hour Total   INTAKE   Shift Total(mL/kg)       OUTPUT   Urine(mL/kg/hr) 300   300   Shift Total(mL/kg) 300(2.8)   300(2.8)   Weight (kg) 107 107 107 107                            Closed/Suction Drain 06/14/25 1125 Tube - 2 Left Back Bulb 15 Fr. (Active)   Site Description Unable to view 06/17/25 1945   Dressing Intervention Integrity maintained 06/17/25 1945   Drainage Sanguineous 06/17/25 1945   Status To bulb suction 06/17/25 1945   Output (mL) 25 mL 06/17/25 1938          Physical Exam       Neurosurgery Physical Exam  AAOx4  PERRL  Cranial nerves intact  BUE 5/5  BLE 4/5 pain limited hip flexion, 5/5 distally  deconditioned  SILT     Back incision clean/dry with bandage in place      Significant Labs:  Recent Labs   Lab 06/17/25  1536 06/18/25  0600 06/19/25  0617   GLU 88 77 86    137 136   K 3.9 4.0 3.9    104 103   CO2 23 23 23   BUN 16 14 15   CREATININE 1.0 0.9 0.9   CALCIUM 9.0 8.8 9.1     Recent Labs   Lab 06/17/25  1536 06/18/25  0600 06/19/25  0617   WBC 11.03 10.24 10.26   HGB 8.0* 7.9* 8.2*   HCT 24.7* 24.3* 25.2*    258 319     No results for input(s): "LABPT", "INR", "APTT" in the last 48 hours.  Microbiology Results (last 7 days)       ** No results found for the last 168 hours. **          All pertinent labs from the last 24 hours have been reviewed.    Significant Diagnostics:  CT: No results found in the last 24 hours.  MRI: No results found in the last 24 hours.  I have reviewed all pertinent imaging results/findings within the past 24 hours.  I have reviewed and interpreted all pertinent imaging results/findings within the past 24 hours.  "

## 2025-06-19 NOTE — ASSESSMENT & PLAN NOTE
74 y.o.F with new compression deformity of T12 and new inability to ambulate 2/2 pain. On admit, AFVSS, labs reviewed. CT a/p with: New compression deformity of T12 when compared to CT abdomen pelvis 03/22/2025. MRI T/L spine with sedation ordered. NSGY consulted. S/P T10-L2 posterior fusion 6/14      Plan:   - TLSO brace ordered   - NSGY consulted, appreciate recommendations  - MRI spine with sedation on 6/12, concerning for compression fracture with instability and severe spinal canal stenosis concerning for cord edema   -s/p T10-L2 posterior fusion 6/14  -stop abx, drains removed 6/18   - MM pain regimen initiated  - PT/OT with TLSO brace on  -removed tom, montior I/Os closely   -bowel regimen increased and now with bowel movements   - fall precautions

## 2025-06-19 NOTE — ASSESSMENT & PLAN NOTE
Anemia is likely due to acute on chronic in light of surgical intervention and drain palcement. Most recent hemoglobin and hematocrit are listed below.  Recent Labs     06/17/25  1536 06/18/25  0600 06/19/25  0617   HGB 8.0* 7.9* 8.2*   HCT 24.7* 24.3* 25.2*     Plan  - Monitor serial CBC: Daily  - Transfuse PRBC if patient becomes hemodynamically unstable, symptomatic or H/H drops below 7/21.  - Patient has not received any PRBC transfusions to date  - Patient's anemia is currently stable

## 2025-06-19 NOTE — ASSESSMENT & PLAN NOTE
Patient with Acute on chronic debility due to fracture/prosthesis. The patient's latest AMPAC (Activity Measure for Post Acute Care) Score is listed below.    AM-PAC Score - How much help does the patient need for each activity listed  Basic Mobility Total Score: 13  Turning over in bed (including adjusting bedclothes, sheets and blankets)?: A little  Sitting down on and standing up from a chair with arms (e.g., wheelchair, bedside commode, etc.): A lot  Moving from lying on back to sitting on the side of the bed?: A little  Moving to and from a bed to a chair (including a wheelchair)?: A lot  Need to walk in hospital room?: A lot  Climbing 3-5 steps with a railing?: Unable    Plan  - PT/OT consulted  - Fall precautions in place  - PT/OT recommending moderate intensity therapy at [place]  - awaiting SNF authorization

## 2025-06-20 LAB
ABSOLUTE EOSINOPHIL (OHS): 0.36 K/UL
ABSOLUTE MONOCYTE (OHS): 0.93 K/UL (ref 0.3–1)
ABSOLUTE NEUTROPHIL COUNT (OHS): 8.14 K/UL (ref 1.8–7.7)
ANION GAP (OHS): 10 MMOL/L (ref 8–16)
BASOPHILS # BLD AUTO: 0.03 K/UL
BASOPHILS NFR BLD AUTO: 0.3 %
BUN SERPL-MCNC: 15 MG/DL (ref 8–23)
CALCIUM SERPL-MCNC: 9.2 MG/DL (ref 8.7–10.5)
CHLORIDE SERPL-SCNC: 102 MMOL/L (ref 95–110)
CO2 SERPL-SCNC: 24 MMOL/L (ref 23–29)
CREAT SERPL-MCNC: 0.8 MG/DL (ref 0.5–1.4)
ERYTHROCYTE [DISTWIDTH] IN BLOOD BY AUTOMATED COUNT: 13.6 % (ref 11.5–14.5)
GFR SERPLBLD CREATININE-BSD FMLA CKD-EPI: >60 ML/MIN/1.73/M2
GLUCOSE SERPL-MCNC: 64 MG/DL (ref 70–110)
HCT VFR BLD AUTO: 24.7 % (ref 37–48.5)
HGB BLD-MCNC: 8.1 GM/DL (ref 12–16)
IMM GRANULOCYTES # BLD AUTO: 0.05 K/UL (ref 0–0.04)
IMM GRANULOCYTES NFR BLD AUTO: 0.5 % (ref 0–0.5)
LYMPHOCYTES # BLD AUTO: 1.23 K/UL (ref 1–4.8)
MCH RBC QN AUTO: 32.8 PG (ref 27–31)
MCHC RBC AUTO-ENTMCNC: 32.8 G/DL (ref 32–36)
MCV RBC AUTO: 100 FL (ref 82–98)
NUCLEATED RBC (/100WBC) (OHS): 0 /100 WBC
PLATELET # BLD AUTO: 346 K/UL (ref 150–450)
PMV BLD AUTO: 11.2 FL (ref 9.2–12.9)
POTASSIUM SERPL-SCNC: 3.9 MMOL/L (ref 3.5–5.1)
RBC # BLD AUTO: 2.47 M/UL (ref 4–5.4)
RELATIVE EOSINOPHIL (OHS): 3.4 %
RELATIVE LYMPHOCYTE (OHS): 11.5 % (ref 18–48)
RELATIVE MONOCYTE (OHS): 8.7 % (ref 4–15)
RELATIVE NEUTROPHIL (OHS): 75.6 % (ref 38–73)
SODIUM SERPL-SCNC: 136 MMOL/L (ref 136–145)
WBC # BLD AUTO: 10.74 K/UL (ref 3.9–12.7)

## 2025-06-20 PROCEDURE — 86580 TB INTRADERMAL TEST: CPT | Performed by: STUDENT IN AN ORGANIZED HEALTH CARE EDUCATION/TRAINING PROGRAM

## 2025-06-20 PROCEDURE — 11000001 HC ACUTE MED/SURG PRIVATE ROOM

## 2025-06-20 PROCEDURE — 25000003 PHARM REV CODE 250

## 2025-06-20 PROCEDURE — 30200315 PPD INTRADERMAL TEST REV CODE 302: Performed by: STUDENT IN AN ORGANIZED HEALTH CARE EDUCATION/TRAINING PROGRAM

## 2025-06-20 PROCEDURE — 36415 COLL VENOUS BLD VENIPUNCTURE: CPT | Performed by: STUDENT IN AN ORGANIZED HEALTH CARE EDUCATION/TRAINING PROGRAM

## 2025-06-20 PROCEDURE — 85025 COMPLETE CBC W/AUTO DIFF WBC: CPT | Performed by: STUDENT IN AN ORGANIZED HEALTH CARE EDUCATION/TRAINING PROGRAM

## 2025-06-20 PROCEDURE — 97530 THERAPEUTIC ACTIVITIES: CPT | Mod: CO

## 2025-06-20 PROCEDURE — 97116 GAIT TRAINING THERAPY: CPT | Mod: CQ

## 2025-06-20 PROCEDURE — 63600175 PHARM REV CODE 636 W HCPCS: Performed by: STUDENT IN AN ORGANIZED HEALTH CARE EDUCATION/TRAINING PROGRAM

## 2025-06-20 PROCEDURE — 97535 SELF CARE MNGMENT TRAINING: CPT | Mod: CO

## 2025-06-20 PROCEDURE — 80048 BASIC METABOLIC PNL TOTAL CA: CPT | Performed by: STUDENT IN AN ORGANIZED HEALTH CARE EDUCATION/TRAINING PROGRAM

## 2025-06-20 PROCEDURE — 25000003 PHARM REV CODE 250: Performed by: STUDENT IN AN ORGANIZED HEALTH CARE EDUCATION/TRAINING PROGRAM

## 2025-06-20 RX ORDER — LIDOCAINE 50 MG/G
2 PATCH TOPICAL
Status: DISCONTINUED | OUTPATIENT
Start: 2025-06-21 | End: 2025-06-24 | Stop reason: HOSPADM

## 2025-06-20 RX ADMIN — HYDROMORPHONE HYDROCHLORIDE 0.5 MG: 1 INJECTION, SOLUTION INTRAMUSCULAR; INTRAVENOUS; SUBCUTANEOUS at 08:06

## 2025-06-20 RX ADMIN — SERTRALINE HYDROCHLORIDE 50 MG: 50 TABLET ORAL at 08:06

## 2025-06-20 RX ADMIN — HYDROMORPHONE HYDROCHLORIDE 0.5 MG: 1 INJECTION, SOLUTION INTRAMUSCULAR; INTRAVENOUS; SUBCUTANEOUS at 04:06

## 2025-06-20 RX ADMIN — OXYCODONE HYDROCHLORIDE 10 MG: 10 TABLET ORAL at 08:06

## 2025-06-20 RX ADMIN — CALCIUM CARBONATE (ANTACID) CHEW TAB 500 MG 500 MG: 500 CHEW TAB at 08:06

## 2025-06-20 RX ADMIN — DICLOFENAC SODIUM 2 G: 10 GEL TOPICAL at 03:06

## 2025-06-20 RX ADMIN — METHOCARBAMOL 750 MG: 750 TABLET ORAL at 08:06

## 2025-06-20 RX ADMIN — Medication 6 MG: at 08:06

## 2025-06-20 RX ADMIN — AMLODIPINE BESYLATE 10 MG: 10 TABLET ORAL at 08:06

## 2025-06-20 RX ADMIN — BUSPIRONE HYDROCHLORIDE 15 MG: 10 TABLET ORAL at 08:06

## 2025-06-20 RX ADMIN — METHOCARBAMOL 750 MG: 750 TABLET ORAL at 04:06

## 2025-06-20 RX ADMIN — ROPINIROLE HYDROCHLORIDE 1 MG: 1 TABLET, FILM COATED ORAL at 08:06

## 2025-06-20 RX ADMIN — PRAVASTATIN SODIUM 20 MG: 10 TABLET ORAL at 08:06

## 2025-06-20 RX ADMIN — OXYCODONE 5 MG: 5 TABLET ORAL at 11:06

## 2025-06-20 RX ADMIN — ACETAMINOPHEN 1000 MG: 500 TABLET ORAL at 04:06

## 2025-06-20 RX ADMIN — ACETAMINOPHEN 1000 MG: 500 TABLET ORAL at 08:06

## 2025-06-20 RX ADMIN — METHOCARBAMOL 750 MG: 750 TABLET ORAL at 12:06

## 2025-06-20 RX ADMIN — TUBERCULIN PURIFIED PROTEIN DERIVATIVE 5 UNITS: 5 INJECTION, SOLUTION INTRADERMAL at 12:06

## 2025-06-20 RX ADMIN — CITALOPRAM HYDROBROMIDE 40 MG: 20 TABLET ORAL at 08:06

## 2025-06-20 RX ADMIN — BUSPIRONE HYDROCHLORIDE 15 MG: 10 TABLET ORAL at 04:06

## 2025-06-20 RX ADMIN — BUSPIRONE HYDROCHLORIDE 15 MG: 10 TABLET ORAL at 12:06

## 2025-06-20 RX ADMIN — OXYCODONE HYDROCHLORIDE 10 MG: 10 TABLET ORAL at 01:06

## 2025-06-20 RX ADMIN — DICLOFENAC SODIUM 2 G: 10 GEL TOPICAL at 08:06

## 2025-06-20 RX ADMIN — OXYCODONE HYDROCHLORIDE 10 MG: 10 TABLET ORAL at 10:06

## 2025-06-20 RX ADMIN — OXYCODONE HYDROCHLORIDE 10 MG: 10 TABLET ORAL at 06:06

## 2025-06-20 RX ADMIN — CETIRIZINE HYDROCHLORIDE 10 MG: 10 TABLET, FILM COATED ORAL at 08:06

## 2025-06-20 NOTE — ASSESSMENT & PLAN NOTE
Anemia is likely due to acute on chronic in light of surgical intervention and drain palcement. Most recent hemoglobin and hematocrit are listed below.  Recent Labs     06/18/25  0600 06/19/25  0617 06/20/25  0305   HGB 7.9* 8.2* 8.1*   HCT 24.3* 25.2* 24.7*     Plan  - Monitor serial CBC: Daily  - Transfuse PRBC if patient becomes hemodynamically unstable, symptomatic or H/H drops below 7/21.  - Patient has not received any PRBC transfusions to date  - Patient's anemia is currently stable

## 2025-06-20 NOTE — PT/OT/SLP PROGRESS
Occupational Therapy   Co-Treatment with PT  Co-treatment performed due to patient's multiple deficits requiring two skilled therapists to appropriately and safely assess patient's strength and endurance while facilitating functional tasks in addition to accommodating for patient's activity tolerance.     Name: Kuldeep Villela  MRN: [id number]  Admitting Diagnosis:  Compression fracture of T12 vertebra  6 Days Post-Op    Recommendations:     Discharge Recommendations: Moderate Intensity Therapy  Discharge Equipment Recommendations:  bath bench, bedside commode, walker, rolling  Barriers to discharge:  Decreased caregiver support, Other (Comment) (increased skilled (A) required)    Assessment:     Kuldeep Villela is a 74 y.o. female with a medical diagnosis of Compression fracture of T12 vertebra.  She presents with the following performance deficits affecting function: weakness, impaired balance, impaired endurance, impaired self care skills, impaired functional mobility, decreased lower extremity function, decreased coordination, decreased upper extremity function, decreased safety awareness, pain, orthopedic precautions.     Rehab Prognosis:  Good; patient would benefit from acute skilled OT services to address these deficits and reach maximum level of function.       Plan:     Patient to be seen 4 x/week to address the above listed problems via self-care/home management, therapeutic activities, therapeutic exercises, neuromuscular re-education  Plan of Care Expires: 07/15/25  Plan of Care Reviewed with: patient    Subjective     Patient/Family Comments/goals: to improve function  Pain/Comfort:  Pain Rating 1: 10/10  Location - Orientation 1: generalized  Location 1: back  Pain Addressed 1: Reposition, Pre-medicate for activity, Distraction  Pain Rating Post-Intervention 1:  (unrated)    Objective:     Communicated with: RN prior to session.  Patient found HOB elevated with PureWick, peripheral IV upon OT entry to  room.  A client care conference was completed by the OTR and the ALEXANDER prior to treatment by the ALEXANDER to discuss the patient's POC and current status.    General Precautions: Standard, fall    Orthopedic Precautions:spinal precautions  Braces: TLSO (when OOB)  Respiratory Status: Room air     Occupational Performance:     Bed Mobility:    Patient completed Rolling/Turning to Left with  contact guard assistance  Patient completed Supine to Sit with contact guard assistance     Functional Mobility/Transfers:  Patient completed Sit <> Stand Transfer with minimum assistance  with  rolling walker   Patient completed Bed <> Chair Transfer using Step Transfer technique with minimum assistance with rolling walker    Activities of Daily Living:  Upper Body Dressing: maximal assistance to don TLSO brace seated EOB  Lower Body Dressing: total assistance to don socks      AMPAC 6 Click ADL: 17    Treatment & Education:  Pt educated on OT POC and frequency during hospital stay.   Pt educated on proper hand placement and techniques for RW mgmt to improve safety awareness.   Pt educated on importance of OOB activity to improve function and activity tolerance.  Pt educated on spinal precautions and log rolling techniques.   Addressed all patient questions/concerns within ALEXANDER scope of practice.     Patient left HOB elevated with all lines intact, call button in reach, and RN notified    GOALS:   Multidisciplinary Problems       Occupational Therapy Goals          Problem: Occupational Therapy    Goal Priority Disciplines Outcome Interventions   Occupational Therapy Goal     OT, PT/OT Progressing    Description: Goals to be met by: 7/15/2025     Patient will increase functional independence with ADLs by performing:    UE Dressing with Supervision sitting EOB to don TSLO   LE Dressing with Set-up Assistance using AE as needed to maintain spinal precautions.  Grooming while bedside chair with Set-up Assistance.  Toileting from bedside  commode with Minimal Assistance for hygiene and clothing management.   Supine to sit with Supervision.  Toilet transfer to bedside commode with Contact Guard Assistance using LRAD as needed.   Sit to stand with CGA using LRAD as needed.                        Time Tracking:     OT Date of Treatment: 06/20/25  OT Start Time: 1017  OT Stop Time: 1040  OT Total Time (min): 23 min    Billable Minutes:Self Care/Home Management 10  Therapeutic Activity 13    OT/CATRINA: CATRINA     Number of CATRINA visits since last OT visit: 1    6/20/2025

## 2025-06-20 NOTE — PLAN OF CARE
Problem: Adult Inpatient Plan of Care  Goal: Absence of Hospital-Acquired Illness or Injury  Outcome: Progressing  Goal: Optimal Comfort and Wellbeing  Outcome: Progressing     Problem: Skin Injury Risk Increased  Goal: Skin Health and Integrity  Outcome: Progressing     Problem: Fall Injury Risk  Goal: Absence of Fall and Fall-Related Injury  Outcome: Progressing     Problem: Pain Acute  Goal: Optimal Pain Control and Function  Outcome: Progressing       Pt declined to be turned off buttocks throughout the shift. Educated pt on reason to turn off buttocks to prevent breakdown. Pt continued to refuse to be turned.

## 2025-06-20 NOTE — PROGRESS NOTES
[facility] Medicine  Progress Note    Patient Name: Kuldeep Villela  MRN: [id number]  Patient Class: IP- Inpatient   Admission Date: 6/9/2025  Length of Stay: 10 days  Attending Physician: Celine Yanez MD  Primary Care Provider: Ginna Musa MD        Subjective     Principal Problem:Compression fracture of T12 vertebra        HPI:  Kuldeep Villela is a 74 y.o.F with PMHx of arthritis, lumbar stenosis, HTN, HLD, depression who presents to [facility] ED for complaints of worsening low back/L flank pain for the last week. Endorses chronic low back pain for which she sees a specialist for, but has worsened recently. Denies any known injury to area. She reports she felt like it may be the beginning of a UTI so she started taking Azo at home for prevention. States she normally ambulates with a walker; however, the pain has been so severe that she has not been able to ambulate at all. Endorses bilateral lower extremity weakness. Denies bladder or bowel incontinence, denies numbness of lower extremities. Denies fever, chills, chest pain, palpitations, SOB, cough, abdominal pain, n/v/d, dysuria, headaches, or any other symptoms at this time.     In ED: afebrile, VSS on RA. CBC without leukocytosis, Hgb 11.1. CMP notable for Cr 1.5 (~1.0), otherwise unremarkable. UA non-infectious appearing. CT abd/pelvis with new compression deformity of T12, perinephric haziness and stranding, L>R, correlate with renal disease, small periumbilical abd wall hernia involving bowel without inflammatory or obstructive change. S/p rocephin, ketamine, toradol 15mg inj, oxy-acetaminophen 10mg, 1L IVF bolus in ED. TLSO brace ordered. Admitted to  for further evaluation.     Overview/Hospital Course:  Pt admitted for continued management of T12 compression fx. NSGY consulted, no acute intervention warranted at this time. MRI spine with sedation pending and scheduled for 6/12. MRI concerning for compression fracture with instability  and severe spinal canal stenosis concerning for cord edema. Recommended to stop mobility with PT/OT pending surgical stabilization of spine. She was taken to the OR 6/14/25 for  T10-L2 posterior fusion. Monitored post op with pain control. Started on IV abx while drains in place. PT/OT restarted with TLSO brace. XR spine showed stabilization. One drain removed 6/17/25 and second drain removed 6/18. Antibiotics stopped once drain removed. H&H monitored due to possible post op drop.     6/20- pain is better today, worked with PT and sitting in bedside chair. SNF acceptance received however needs repeat PT eval and authorization.    Interval History:   NAEON. Hb remains stable. Passing urine and stools without difficulty and no lower extremity pain or anesthesia.     Worked with PT.     BG dropped to 65 on AM labs, will encourage to eat, added POC BG checks.     Review of Systems   Constitutional:  Negative for chills and fever.   Respiratory:  Negative for cough.    Gastrointestinal:  Positive for abdominal pain. Negative for constipation, diarrhea and nausea.     Objective:     Vital Signs (Most Recent):  Temp: 97.9 °F (36.6 °C) (06/20/25 1150)  Pulse: 92 (06/20/25 1150)  Resp: 16 (06/20/25 1150)  BP: (!) 114/56 (06/20/25 1150)  SpO2: 97 % (06/20/25 1150) Vital Signs (24h Range):  Temp:  [97.5 °F (36.4 °C)-98.7 °F (37.1 °C)] 97.9 °F (36.6 °C)  Pulse:  [87-94] 92  Resp:  [16-18] 16  SpO2:  [96 %-98 %] 97 %  BP: (108-131)/(56-66) 114/56     Weight: 107 kg (236 lb)  Body mass index is 44.59 kg/m².    Intake/Output Summary (Last 24 hours) at 6/20/2025 1602  Last data filed at 6/19/2025 1701  Gross per 24 hour   Intake --   Output 200 ml   Net -200 ml         Physical Exam  Constitutional:       Appearance: Normal appearance.   HENT:      Head: Normocephalic and atraumatic.      Nose: Nose normal.      Mouth/Throat:      Mouth: Mucous membranes are moist.   Eyes:      Extraocular Movements: Extraocular movements intact.       Pupils: Pupils are equal, round, and reactive to light.   Cardiovascular:      Rate and Rhythm: Normal rate and regular rhythm.      Heart sounds: No murmur heard.     No gallop.   Pulmonary:      Effort: Pulmonary effort is normal. No respiratory distress.      Breath sounds: Normal breath sounds. No wheezing.   Abdominal:      General: Abdomen is flat. Bowel sounds are normal. There is no distension.      Palpations: Abdomen is soft.      Tenderness: There is abdominal tenderness (mild to moderate epigastric to LUQ pain).   Musculoskeletal:         General: No swelling. Normal range of motion.      Cervical back: Normal range of motion and neck supple.      Right lower leg: No edema.      Left lower leg: No edema.   Skin:     General: Skin is warm and dry.      Capillary Refill: Capillary refill takes less than 2 seconds.   Neurological:      General: No focal deficit present.      Mental Status: She is alert. Mental status is at baseline.      Motor: Weakness present.      Comments:   Weakness and ROM improving   Psychiatric:         Mood and Affect: Mood normal.           Significant Labs: All pertinent labs within the past 24 hours have been reviewed.    Significant Imaging: I have reviewed all pertinent imaging results/findings within the past 24 hours.      Assessment & Plan  Compression fracture of T12 vertebra  Acute on chronic back pain  Compression fracture of body of thoracic vertebra    74 y.o.F with new compression deformity of T12 and new inability to ambulate 2/2 pain. On admit, AFVSS, labs reviewed. CT a/p with: New compression deformity of T12 when compared to CT abdomen pelvis 03/22/2025. MRI T/L spine with sedation ordered. NSGY consulted. S/P T10-L2 posterior fusion 6/14      Plan:   - TLSO brace ordered   - NSGY consulted, appreciate recommendations  - MRI spine with sedation on 6/12, concerning for compression fracture with instability and severe spinal canal stenosis concerning for cord  edema   -s/p T10-L2 posterior fusion 6/14  -stop abx, drains removed 6/18   - MM pain regimen initiated  - PT/OT with TLSO brace on  -removed tomceasar felizior I/Os closely   -bowel regimen increased and now with bowel movements   - fall precautions         Hypertension  Patient's blood pressure range in the last 24 hours was: BP  Min: 108/61  Max: 131/62.The patient's inpatient anti-hypertensive regimen is listed below:  Current Antihypertensives  , Every 12 hours, Oral  amLODIPine tablet 10 mg, Daily, Oral    Plan  - BP is controlled, no changes needed to their regimen    Acute on chronic anemia  Anemia is likely due to acute on chronic in light of surgical intervention and drain palcement. Most recent hemoglobin and hematocrit are listed below.  Recent Labs     06/18/25  0600 06/19/25  0617 06/20/25  0305   HGB 7.9* 8.2* 8.1*   HCT 24.3* 25.2* 24.7*     Plan  - Monitor serial CBC: Daily  - Transfuse PRBC if patient becomes hemodynamically unstable, symptomatic or H/H drops below 7/21.  - Patient has not received any PRBC transfusions to date  - Patient's anemia is currently stable    Hyperlipemia  - continue home statin    Debility  Patient with Acute on chronic debility due to fracture/prosthesis. The patient's latest AMPAC (Activity Measure for Post Acute Care) Score is listed below.    AM-PAC Score - How much help does the patient need for each activity listed  Basic Mobility Total Score: 17  Turning over in bed (including adjusting bedclothes, sheets and blankets)?: None  Sitting down on and standing up from a chair with arms (e.g., wheelchair, bedside commode, etc.): A little  Moving from lying on back to sitting on the side of the bed?: A little  Moving to and from a bed to a chair (including a wheelchair)?: A little  Need to walk in hospital room?: A little  Climbing 3-5 steps with a railing?: Unable    Plan  - PT/OT consulted  - Fall precautions in place  - PT/OT recommending moderate intensity therapy at  [facility]  -SNF acceptance received however needs repeat PT eval and authorization.    Depression with anxiety  Patient has recurrent depression which is mild and is currently controlled. Will Continue anti-depressant medications. We will not consult psychiatry at this time. Patient does not display psychosis at this time. Continue to monitor closely and adjust plan of care as needed.  - continue home medications    Hyperkalemia  -resolved with Lokelma     Tobacco dependency  Dangers of cigarette smoking were reviewed with patient in detail. Patient was Counseled for 3-10 minutes. Nicotine replacement options were discussed. Nicotine replacement was discussed- prescribed  VTE Risk Mitigation (From admission, onward)           Ordered     enoxaparin injection 40 mg  Every 12 hours         06/17/25 0951     IP VTE HIGH RISK PATIENT  Once         06/14/25 1204     Place sequential compression device  Until discontinued         06/10/25 0731                    Discharge Planning   MATT: 6/23/2025     Code Status: Full Code   Medical Readiness for Discharge Date: 6/19/2025  Discharge Plan A: Skilled Nursing Facility   Discharge Delays: None known at this time        Please place Justification for DME      Celine Yanez MD  Department of Hospital Medicine   [facility] Surg     - - -

## 2025-06-20 NOTE — PLAN OF CARE
[facility] - Med Surg  Discharge Reassessment    Primary Care Provider: Ginna Musa MD    Expected Discharge Date: 6/23/2025    SW in communication with patient and pt's daughter, Kamari [phone] regarding d/c plan.  Pt's daughter stated they would prefer [facility] for placement.  SW spoke to Kiera larios/ [facility] [phone] regarding pt's d/c plan and advised her to reach out to family regarding facility choice and signing paperwork.  LEBRON sent updated clinicals to so facility can submit for insurance auth.  Waiting for a response.      Discharge Plan A and Plan B have been determined by review of patient's clinical status, future medical and therapeutic needs, and coverage/benefits for post-acute care in coordination with multidisciplinary team members.    Reassessment (most recent)       Discharge Reassessment - 06/20/25 1421          Discharge Reassessment    Assessment Type Discharge Planning Reassessment     Did the patient's condition or plan change since previous assessment? No     Discharge Plan discussed with: Patient;Adult children     Communicated MATT with patient/caregiver Yes     Discharge Plan A Skilled Nursing Facility     Discharge Plan B Home;Home with family;Home Health     DME Needed Upon Discharge  other (see comments)   TBD    Transition of Care Barriers None     Why the patient remains in the hospital Requires continued medical care        Post-Acute Status    Post-Acute Authorization Placement     Post-Acute Placement Status Pending payor review/awaiting authorization (if required)     Discharge Delays None known at this time                   Kadie Jordan LMSW  Part-Time-  [facility]  Ext. [phone]

## 2025-06-20 NOTE — SUBJECTIVE & OBJECTIVE
Interval History:   NAEON. Hb remains stable. Passing urine and stools without difficulty and no lower extremity pain or anesthesia.     Worked with PT.     BG dropped to 65 on AM labs, will encourage to eat, added POC BG checks.     Review of Systems   Constitutional:  Negative for chills and fever.   Respiratory:  Negative for cough.    Gastrointestinal:  Positive for abdominal pain. Negative for constipation, diarrhea and nausea.     Objective:     Vital Signs (Most Recent):  Temp: 97.9 °F (36.6 °C) (06/20/25 1150)  Pulse: 92 (06/20/25 1150)  Resp: 16 (06/20/25 1150)  BP: (!) 114/56 (06/20/25 1150)  SpO2: 97 % (06/20/25 1150) Vital Signs (24h Range):  Temp:  [97.5 °F (36.4 °C)-98.7 °F (37.1 °C)] 97.9 °F (36.6 °C)  Pulse:  [87-94] 92  Resp:  [16-18] 16  SpO2:  [96 %-98 %] 97 %  BP: (108-131)/(56-66) 114/56     Weight: 107 kg (236 lb)  Body mass index is 44.59 kg/m².    Intake/Output Summary (Last 24 hours) at 6/20/2025 1602  Last data filed at 6/19/2025 1701  Gross per 24 hour   Intake --   Output 200 ml   Net -200 ml         Physical Exam  Constitutional:       Appearance: Normal appearance.   HENT:      Head: Normocephalic and atraumatic.      Nose: Nose normal.      Mouth/Throat:      Mouth: Mucous membranes are moist.   Eyes:      Extraocular Movements: Extraocular movements intact.      Pupils: Pupils are equal, round, and reactive to light.   Cardiovascular:      Rate and Rhythm: Normal rate and regular rhythm.      Heart sounds: No murmur heard.     No gallop.   Pulmonary:      Effort: Pulmonary effort is normal. No respiratory distress.      Breath sounds: Normal breath sounds. No wheezing.   Abdominal:      General: Abdomen is flat. Bowel sounds are normal. There is no distension.      Palpations: Abdomen is soft.      Tenderness: There is abdominal tenderness (mild to moderate epigastric to LUQ pain).   Musculoskeletal:         General: No swelling. Normal range of motion.      Cervical back: Normal  range of motion and neck supple.      Right lower leg: No edema.      Left lower leg: No edema.   Skin:     General: Skin is warm and dry.      Capillary Refill: Capillary refill takes less than 2 seconds.   Neurological:      General: No focal deficit present.      Mental Status: She is alert. Mental status is at baseline.      Motor: Weakness present.      Comments:   Weakness and ROM improving   Psychiatric:         Mood and Affect: Mood normal.           Significant Labs: All pertinent labs within the past 24 hours have been reviewed.    Significant Imaging: I have reviewed all pertinent imaging results/findings within the past 24 hours.

## 2025-06-20 NOTE — PT/OT/SLP PROGRESS
Physical Therapy Treatment    Patient Name:  Kuldeep Villela   MRN:  [id number]    Recommendations:     Discharge Recommendations: Moderate Intensity Therapy  Discharge Equipment Recommendations: bath bench, walker, rolling, bedside commode  Barriers to discharge: Decreased caregiver support    Assessment:     Kuldeep Villela is a 74 y.o. female admitted with a medical diagnosis of Compression fracture of T12 vertebra.  She presents with the following impairments/functional limitations: weakness, impaired endurance, impaired self care skills, impaired functional mobility, gait instability, impaired balance, pain, orthopedic precautions.    Rehab Prognosis: Good; patient would benefit from acute skilled PT services to address these deficits and reach maximum level of function.    Recent Surgery: Procedure(s) (LRB):  T12 LAMINECTOMY, SPINE, THORACIC, W/ ARTHRODESIS T10-L2 (N/A) 6 Days Post-Op    Plan:     During this hospitalization, patient to be seen 4 x/week to address the identified rehab impairments via gait training, therapeutic activities, therapeutic exercises, neuromuscular re-education and progress toward the following goals:    Plan of Care Expires:  07/16/25    Subjective     Chief Complaint: pain   Patient/Family Comments/goals: none  Pain/Comfort:  Pain Rating 1: 10/10      Objective:     Communicated with RN prior to session.  Patient found HOB elevated with   upon PT entry to room.     General Precautions: Standard, fall  Orthopedic Precautions: spinal precautions  Braces: TLSO (when OOB)  Respiratory Status: Room air     Functional Mobility:  Bed Mobility:     Supine to Sit: contact guard assistance  Transfers:     Sit to Stand:  minimum assistance with rolling walker  Gait: steps to chair with RW and min A for stability and AD.       AM-PAC 6 CLICK MOBILITY  Turning over in bed (including adjusting bedclothes, sheets and blankets)?: 4  Sitting down on and standing up from a chair with arms (e.g.,  wheelchair, bedside commode, etc.): 3  Moving from lying on back to sitting on the side of the bed?: 3  Moving to and from a bed to a chair (including a wheelchair)?: 3  Need to walk in hospital room?: 3  Climbing 3-5 steps with a railing?: 1  Basic Mobility Total Score: 17       Treatment & Education:  Bedside table in front of patient and area set up for function, convenience, and safety. RN aware of patient's mobility needs and status. Questions/concerns addressed within PTA scope of practice; patient  with no further questions. Time was provided for active listening, discussion of health disposition, and discussion of safe discharge.      Patient left up in chair with all lines intact and call button in reach..    GOALS:   Multidisciplinary Problems       Physical Therapy Goals          Problem: Physical Therapy    Goal Priority Disciplines Outcome Interventions   Physical Therapy Goal     PT, PT/OT Progressing    Description: Goals to be met by: 2025     Patient will increase functional independence with mobility by performin. Supine to sit with Stand-by Assistance  2. Sit to supine with Stand-by Assistance  3. Sit to stand transfer with Stand-by Assistance with RW  4. Bed to chair transfer with Stand-by Assistance using Rolling Walker  5. Gait  x 50 feet with Stand-by Assistance using Rolling Walker.   6. Lower extremity exercise program x10 reps per handout, with supervision                         DME Justifications:   Kuldeep requires a commode for home use because she is confined to one level of the home environment and there is no toilet on that level.   Kuldeep's mobility limitation cannot be sufficiently resolved by the use of a cane. Her functional mobility deficit can be sufficiently resolved with the use of a Rolling Walker. Patient's mobility limitation significantly impairs their ability to participate in one of more activities of daily living.  The use of a RW will significantly  improve the patient's ability to participate in MRADLS and the patient will use it on regular basis in the home.    Time Tracking:     PT Received On: 06/20/25  PT Start Time: 1017     PT Stop Time: 1040  PT Total Time (min): 23 min     Billable Minutes: Gait Training 23    Treatment Type: Treatment  PT/PTA: PTA     Number of PTA visits since last PT visit: 2     06/20/2025

## 2025-06-20 NOTE — ASSESSMENT & PLAN NOTE
Patient with Acute on chronic debility due to fracture/prosthesis. The patient's latest AMPAC (Activity Measure for Post Acute Care) Score is listed below.    AM-PAC Score - How much help does the patient need for each activity listed  Basic Mobility Total Score: 17  Turning over in bed (including adjusting bedclothes, sheets and blankets)?: None  Sitting down on and standing up from a chair with arms (e.g., wheelchair, bedside commode, etc.): A little  Moving from lying on back to sitting on the side of the bed?: A little  Moving to and from a bed to a chair (including a wheelchair)?: A little  Need to walk in hospital room?: A little  Climbing 3-5 steps with a railing?: Unable    Plan  - PT/OT consulted  - Fall precautions in place  - PT/OT recommending moderate intensity therapy at [place]  -[facility] acceptance received however needs repeat PT eval and authorization.

## 2025-06-20 NOTE — ASSESSMENT & PLAN NOTE
Patient's blood pressure range in the last 24 hours was: BP  Min: 108/61  Max: 131/62.The patient's inpatient anti-hypertensive regimen is listed below:  Current Antihypertensives  , Every 12 hours, Oral  amLODIPine tablet 10 mg, Daily, Oral    Plan  - BP is controlled, no changes needed to their regimen

## 2025-06-21 LAB
ABSOLUTE EOSINOPHIL (OHS): 0.36 K/UL
ABSOLUTE MONOCYTE (OHS): 0.99 K/UL (ref 0.3–1)
ABSOLUTE NEUTROPHIL COUNT (OHS): 6.68 K/UL (ref 1.8–7.7)
ANION GAP (OHS): 8 MMOL/L (ref 8–16)
BASOPHILS # BLD AUTO: 0.03 K/UL
BASOPHILS NFR BLD AUTO: 0.3 %
BUN SERPL-MCNC: 14 MG/DL (ref 8–23)
CALCIUM SERPL-MCNC: 9.3 MG/DL (ref 8.7–10.5)
CHLORIDE SERPL-SCNC: 104 MMOL/L (ref 95–110)
CO2 SERPL-SCNC: 28 MMOL/L (ref 23–29)
CREAT SERPL-MCNC: 1 MG/DL (ref 0.5–1.4)
ERYTHROCYTE [DISTWIDTH] IN BLOOD BY AUTOMATED COUNT: 13.8 % (ref 11.5–14.5)
GFR SERPLBLD CREATININE-BSD FMLA CKD-EPI: 60 ML/MIN/1.73/M2
GLUCOSE SERPL-MCNC: 79 MG/DL (ref 70–110)
HCT VFR BLD AUTO: 25.7 % (ref 37–48.5)
HGB BLD-MCNC: 8.3 GM/DL (ref 12–16)
IMM GRANULOCYTES # BLD AUTO: 0.06 K/UL (ref 0–0.04)
IMM GRANULOCYTES NFR BLD AUTO: 0.6 % (ref 0–0.5)
LYMPHOCYTES # BLD AUTO: 1.79 K/UL (ref 1–4.8)
MCH RBC QN AUTO: 31.9 PG (ref 27–31)
MCHC RBC AUTO-ENTMCNC: 32.3 G/DL (ref 32–36)
MCV RBC AUTO: 99 FL (ref 82–98)
NUCLEATED RBC (/100WBC) (OHS): 0 /100 WBC
PLATELET # BLD AUTO: 351 K/UL (ref 150–450)
PMV BLD AUTO: 10.2 FL (ref 9.2–12.9)
POCT GLUCOSE: 102 MG/DL (ref 70–110)
POCT GLUCOSE: 78 MG/DL (ref 70–110)
POCT GLUCOSE: 97 MG/DL (ref 70–110)
POCT GLUCOSE: 99 MG/DL (ref 70–110)
POTASSIUM SERPL-SCNC: 4.1 MMOL/L (ref 3.5–5.1)
RBC # BLD AUTO: 2.6 M/UL (ref 4–5.4)
RELATIVE EOSINOPHIL (OHS): 3.6 %
RELATIVE LYMPHOCYTE (OHS): 18.1 % (ref 18–48)
RELATIVE MONOCYTE (OHS): 10 % (ref 4–15)
RELATIVE NEUTROPHIL (OHS): 67.4 % (ref 38–73)
SODIUM SERPL-SCNC: 140 MMOL/L (ref 136–145)
WBC # BLD AUTO: 9.91 K/UL (ref 3.9–12.7)

## 2025-06-21 PROCEDURE — 85025 COMPLETE CBC W/AUTO DIFF WBC: CPT | Performed by: STUDENT IN AN ORGANIZED HEALTH CARE EDUCATION/TRAINING PROGRAM

## 2025-06-21 PROCEDURE — 82374 ASSAY BLOOD CARBON DIOXIDE: CPT | Performed by: STUDENT IN AN ORGANIZED HEALTH CARE EDUCATION/TRAINING PROGRAM

## 2025-06-21 PROCEDURE — 63600175 PHARM REV CODE 636 W HCPCS: Performed by: STUDENT IN AN ORGANIZED HEALTH CARE EDUCATION/TRAINING PROGRAM

## 2025-06-21 PROCEDURE — 25000003 PHARM REV CODE 250: Performed by: STUDENT IN AN ORGANIZED HEALTH CARE EDUCATION/TRAINING PROGRAM

## 2025-06-21 PROCEDURE — 36415 COLL VENOUS BLD VENIPUNCTURE: CPT | Performed by: STUDENT IN AN ORGANIZED HEALTH CARE EDUCATION/TRAINING PROGRAM

## 2025-06-21 PROCEDURE — 25000003 PHARM REV CODE 250

## 2025-06-21 PROCEDURE — 94761 N-INVAS EAR/PLS OXIMETRY MLT: CPT

## 2025-06-21 PROCEDURE — 11000001 HC ACUTE MED/SURG PRIVATE ROOM

## 2025-06-21 RX ADMIN — OXYCODONE HYDROCHLORIDE 10 MG: 10 TABLET ORAL at 08:06

## 2025-06-21 RX ADMIN — BUSPIRONE HYDROCHLORIDE 15 MG: 10 TABLET ORAL at 08:06

## 2025-06-21 RX ADMIN — ACETAMINOPHEN 1000 MG: 500 TABLET ORAL at 04:06

## 2025-06-21 RX ADMIN — HYDROMORPHONE HYDROCHLORIDE 0.5 MG: 1 INJECTION, SOLUTION INTRAMUSCULAR; INTRAVENOUS; SUBCUTANEOUS at 01:06

## 2025-06-21 RX ADMIN — POLYETHYLENE GLYCOL 3350 17 G: 17 POWDER, FOR SOLUTION ORAL at 09:06

## 2025-06-21 RX ADMIN — ROPINIROLE HYDROCHLORIDE 1 MG: 1 TABLET, FILM COATED ORAL at 09:06

## 2025-06-21 RX ADMIN — LIDOCAINE 2 PATCH: 50 PATCH CUTANEOUS at 09:06

## 2025-06-21 RX ADMIN — LACTULOSE 10 G: 20 SOLUTION ORAL at 04:06

## 2025-06-21 RX ADMIN — HYDROMORPHONE HYDROCHLORIDE 0.5 MG: 1 INJECTION, SOLUTION INTRAMUSCULAR; INTRAVENOUS; SUBCUTANEOUS at 10:06

## 2025-06-21 RX ADMIN — CITALOPRAM HYDROBROMIDE 40 MG: 20 TABLET ORAL at 09:06

## 2025-06-21 RX ADMIN — LACTULOSE 10 G: 20 SOLUTION ORAL at 09:06

## 2025-06-21 RX ADMIN — ROPINIROLE HYDROCHLORIDE 1 MG: 1 TABLET, FILM COATED ORAL at 08:06

## 2025-06-21 RX ADMIN — ACETAMINOPHEN 1000 MG: 500 TABLET ORAL at 09:06

## 2025-06-21 RX ADMIN — CALCIUM CARBONATE (ANTACID) CHEW TAB 500 MG 500 MG: 500 CHEW TAB at 09:06

## 2025-06-21 RX ADMIN — DICLOFENAC SODIUM 2 G: 10 GEL TOPICAL at 04:06

## 2025-06-21 RX ADMIN — ENOXAPARIN SODIUM 40 MG: 40 INJECTION SUBCUTANEOUS at 08:06

## 2025-06-21 RX ADMIN — HYDROMORPHONE HYDROCHLORIDE 0.5 MG: 1 INJECTION, SOLUTION INTRAMUSCULAR; INTRAVENOUS; SUBCUTANEOUS at 09:06

## 2025-06-21 RX ADMIN — DICLOFENAC SODIUM 2 G: 10 GEL TOPICAL at 09:06

## 2025-06-21 RX ADMIN — SENNOSIDES AND DOCUSATE SODIUM 2 TABLET: 50; 8.6 TABLET ORAL at 09:06

## 2025-06-21 RX ADMIN — METHOCARBAMOL 750 MG: 750 TABLET ORAL at 09:06

## 2025-06-21 RX ADMIN — SERTRALINE HYDROCHLORIDE 50 MG: 50 TABLET ORAL at 09:06

## 2025-06-21 RX ADMIN — PRAVASTATIN SODIUM 20 MG: 10 TABLET ORAL at 09:06

## 2025-06-21 RX ADMIN — BUSPIRONE HYDROCHLORIDE 15 MG: 10 TABLET ORAL at 09:06

## 2025-06-21 RX ADMIN — METHOCARBAMOL 750 MG: 750 TABLET ORAL at 04:06

## 2025-06-21 RX ADMIN — AMLODIPINE BESYLATE 10 MG: 10 TABLET ORAL at 09:06

## 2025-06-21 RX ADMIN — OXYCODONE HYDROCHLORIDE 10 MG: 10 TABLET ORAL at 05:06

## 2025-06-21 RX ADMIN — METHOCARBAMOL 750 MG: 750 TABLET ORAL at 01:06

## 2025-06-21 RX ADMIN — Medication 6 MG: at 08:06

## 2025-06-21 RX ADMIN — CALCIUM CARBONATE (ANTACID) CHEW TAB 500 MG 500 MG: 500 CHEW TAB at 08:06

## 2025-06-21 RX ADMIN — ENOXAPARIN SODIUM 40 MG: 40 INJECTION SUBCUTANEOUS at 09:06

## 2025-06-21 RX ADMIN — CETIRIZINE HYDROCHLORIDE 10 MG: 10 TABLET, FILM COATED ORAL at 09:06

## 2025-06-21 RX ADMIN — BUSPIRONE HYDROCHLORIDE 15 MG: 10 TABLET ORAL at 04:06

## 2025-06-21 RX ADMIN — METHOCARBAMOL 750 MG: 750 TABLET ORAL at 08:06

## 2025-06-21 RX ADMIN — ACETAMINOPHEN 1000 MG: 500 TABLET ORAL at 08:06

## 2025-06-21 NOTE — SUBJECTIVE & OBJECTIVE
Interval History:   6/21- patient continues to complain of back pain. Discussed with neurosurgery, they will reassess patient, clarify instructions on the back brace and assess cecy. Patient was also seen taking additional pain medications from home. She is currently on similar dose  as home in and additional dilaudid 0.5mg q4hrly PRN    Review of Systems   Constitutional:  Negative for chills and fever.   Respiratory:  Negative for cough.    Gastrointestinal:  Positive for abdominal pain. Negative for constipation, diarrhea and nausea.     Objective:     Vital Signs (Most Recent):  Temp: 97.9 °F (36.6 °C) (06/21/25 1140)  Pulse: 94 (06/21/25 1140)  Resp: 18 (06/21/25 1140)  BP: 129/74 (06/21/25 1140)  SpO2: 98 % (06/21/25 1140) Vital Signs (24h Range):  Temp:  [97.5 °F (36.4 °C)-98.1 °F (36.7 °C)] 97.9 °F (36.6 °C)  Pulse:  [82-96] 94  Resp:  [16-18] 18  SpO2:  [96 %-100 %] 98 %  BP: (118-138)/(60-74) 129/74     Weight: 107 kg (236 lb)  Body mass index is 44.59 kg/m².    Intake/Output Summary (Last 24 hours) at 6/21/2025 1344  Last data filed at 6/21/2025 0930  Gross per 24 hour   Intake 960 ml   Output 900 ml   Net 60 ml         Physical Exam  Constitutional:       Appearance: Normal appearance.   HENT:      Head: Normocephalic and atraumatic.      Nose: Nose normal.      Mouth/Throat:      Mouth: Mucous membranes are moist.   Eyes:      Extraocular Movements: Extraocular movements intact.      Pupils: Pupils are equal, round, and reactive to light.   Cardiovascular:      Rate and Rhythm: Normal rate and regular rhythm.      Heart sounds: No murmur heard.     No gallop.   Pulmonary:      Effort: Pulmonary effort is normal. No respiratory distress.      Breath sounds: Normal breath sounds. No wheezing.   Abdominal:      General: Abdomen is flat. Bowel sounds are normal. There is no distension.      Palpations: Abdomen is soft.      Tenderness: There is abdominal tenderness (mild to moderate epigastric to LUQ  pain).   Musculoskeletal:         General: No swelling. Normal range of motion.      Cervical back: Normal range of motion and neck supple.      Right lower leg: No edema.      Left lower leg: No edema.   Skin:     General: Skin is warm and dry.      Capillary Refill: Capillary refill takes less than 2 seconds.   Neurological:      General: No focal deficit present.      Mental Status: She is alert. Mental status is at baseline.      Motor: Weakness present.      Comments:   Weakness and ROM improving   Psychiatric:         Mood and Affect: Mood normal.           Significant Labs: All pertinent labs within the past 24 hours have been reviewed.    Significant Imaging: I have reviewed all pertinent imaging results/findings within the past 24 hours.

## 2025-06-21 NOTE — ASSESSMENT & PLAN NOTE
Dangers of cigarette smoking were reviewed with patient in detail. Patient was Counseled for 3-10 minutes. Nicotine replacement options were discussed. Nicotine replacement was discussed- prescribed

## 2025-06-21 NOTE — PLAN OF CARE
Problem: Adult Inpatient Plan of Care  Goal: Absence of Hospital-Acquired Illness or Injury  Outcome: Progressing  Goal: Optimal Comfort and Wellbeing  Outcome: Progressing  Goal: Readiness for Transition of Care  Outcome: Progressing     Problem: Acute Kidney Injury/Impairment  Goal: Fluid and Electrolyte Balance  Outcome: Progressing  Goal: Improved Oral Intake  Outcome: Progressing     Problem: Fall Injury Risk  Goal: Absence of Fall and Fall-Related Injury  Outcome: Progressing     Problem: Pain Acute  Goal: Optimal Pain Control and Function  Outcome: Progressing     Problem: Orthopaedic Fracture  Goal: Fracture Stability  Outcome: Progressing        Pt aaox4. Pain medications given prn for c/o pain to back. Pt declined to be turned during the shift as well as have positions supports placed to keep pressure of her buttocks. Call light within reach. Instructed to call for assistance. Bed in lowest and locked position. Plan of care on going.

## 2025-06-21 NOTE — ASSESSMENT & PLAN NOTE
Anemia is likely due to acute on chronic in light of surgical intervention and drain palcement. Most recent hemoglobin and hematocrit are listed below.  Recent Labs     06/19/25  0617 06/20/25  0305 06/21/25  0412   HGB 8.2* 8.1* 8.3*   HCT 25.2* 24.7* 25.7*     Plan  - Monitor serial CBC: Daily  - Transfuse PRBC if patient becomes hemodynamically unstable, symptomatic or H/H drops below 7/21.  - Patient has not received any PRBC transfusions to date  - Patient's anemia is currently stable

## 2025-06-21 NOTE — NURSING
I was called to the room by bedside nurse to speak to patient about possibly taking home medications. Patient admitted to taking home percocet and Buspar daily since being in the hospital. She stated her PCP told her she could take 2 pain pills at a time for her pain. She admitted to me that she has been taking the pills from her bag because the pain management prescribed here is not working for her.     MD notified and came bedside to speak to the patient.     All medications were confiscated and placed at the charge nurse desk to be returned to her on discharge.       22 Percocet   59 Buspar

## 2025-06-21 NOTE — CONSULTS
[facility] VASCULAR ACCESS NOTE       Bed:609/604 A    20G x 1.75IN [facility].    Indication: PVA  Attempts: 1    Ryley Mills RN

## 2025-06-21 NOTE — ASSESSMENT & PLAN NOTE
Patient with Acute on chronic debility due to fracture/prosthesis. The patient's latest AMPAC (Activity Measure for Post Acute Care) Score is listed below.    AM-PAC Score - How much help does the patient need for each activity listed  Basic Mobility Total Score: 17  Turning over in bed (including adjusting bedclothes, sheets and blankets)?: None  Sitting down on and standing up from a chair with arms (e.g., wheelchair, bedside commode, etc.): A little  Moving from lying on back to sitting on the side of the bed?: A little  Moving to and from a bed to a chair (including a wheelchair)?: A little  Need to walk in hospital room?: A little  Climbing 3-5 steps with a railing?: Unable    Plan  - PT/OT consulted  - Fall precautions in place  - PT/OT recommending moderate intensity therapy at [place]  -[facility] acceptance received however needs repeat PT eval and authorization.

## 2025-06-21 NOTE — ASSESSMENT & PLAN NOTE
Patient's blood pressure range in the last 24 hours was: BP  Min: 118/60  Max: 138/66.The patient's inpatient anti-hypertensive regimen is listed below:  Current Antihypertensives  , Every 12 hours, Oral  amLODIPine tablet 10 mg, Daily, Oral    Plan  - BP is controlled, no changes needed to their regimen

## 2025-06-21 NOTE — PROGRESS NOTES
[facility]  Progress Note    Patient Name: Kuldeep Villela  MRN: [id number]  Patient Class: IP- Inpatient   Admission Date: 6/9/2025  Length of Stay: 11 days  Attending Physician: Celine Yanez MD  Primary Care Provider: Ginna Musa MD        Subjective     Principal Problem:Compression fracture of T12 vertebra        HPI:  Kuldeep Villela is a 74 y.o.F with PMHx of arthritis, lumbar stenosis, HTN, HLD, depression who [place] [facility] for complaints of worsening low back/L flank pain for the last week. Endorses chronic low back pain for which she sees a specialist for, but has worsened recently. Denies any known injury to area. She reports she felt like it may be the beginning of a UTI so she started taking Azo at home for prevention. States she normally ambulates with a walker; however, the pain has been so severe that she has not been able to ambulate at all. Endorses bilateral lower extremity weakness. Denies bladder or bowel incontinence, denies numbness of lower extremities. Denies fever, chills, chest pain, palpitations, SOB, cough, abdominal pain, n/v/d, dysuria, headaches, or any other symptoms at this time.     In ED: afebrile, VSS on RA. CBC without leukocytosis, Hgb 11.1. CMP notable for Cr 1.5 (~1.0), otherwise unremarkable. UA non-infectious appearing. CT abd/pelvis with new compression deformity of T12, perinephric haziness and stranding, L>R, correlate with renal disease, small periumbilical abd wall hernia involving bowel without inflammatory or obstructive change. S/p rocephin, ketamine, toradol 15mg inj, oxy-acetaminophen 10mg, 1L IVF bolus in ED. TLSO brace ordered. Admitted to  for further evaluation.     Overview/Hospital Course:  Pt admitted for continued management of T12 compression fx. NSGY consulted, no acute intervention warranted at this time. MRI spine with sedation pending and scheduled for 6/12. MRI concerning for compression fracture with instability  and severe spinal canal stenosis concerning for cord edema. Recommended to stop mobility with PT/OT pending surgical stabilization of spine. She was taken to the OR 6/14/25 for  T10-L2 posterior fusion. Monitored post op with pain control. Started on IV abx while drains in place. PT/OT restarted with TLSO brace. XR spine showed stabilization. One drain removed 6/17/25 and second drain removed 6/18. Antibiotics stopped once drain removed. H&H monitored due to possible post op drop.     6/20- pain is better today, worked with PT and sitting in bedside chair. SNF acceptance received however needs repeat PT eval and authorization.    Interval History:   6/21- patient continues to complain of back pain. Discussed with neurosurgery, they will reassess patient, clarify instructions on the back brace and assess cecy. Patient was also seen taking additional pain medications from home. She is currently on similar dose  as home in and additional dilaudid 0.5mg q4hrly PRN    Review of Systems   Constitutional:  Negative for chills and fever.   Respiratory:  Negative for cough.    Gastrointestinal:  Positive for abdominal pain. Negative for constipation, diarrhea and nausea.     Objective:     Vital Signs (Most Recent):  Temp: 97.9 °F (36.6 °C) (06/21/25 1140)  Pulse: 94 (06/21/25 1140)  Resp: 18 (06/21/25 1140)  BP: 129/74 (06/21/25 1140)  SpO2: 98 % (06/21/25 1140) Vital Signs (24h Range):  Temp:  [97.5 °F (36.4 °C)-98.1 °F (36.7 °C)] 97.9 °F (36.6 °C)  Pulse:  [82-96] 94  Resp:  [16-18] 18  SpO2:  [96 %-100 %] 98 %  BP: (118-138)/(60-74) 129/74     Weight: 107 kg (236 lb)  Body mass index is 44.59 kg/m².    Intake/Output Summary (Last 24 hours) at 6/21/2025 1344  Last data filed at 6/21/2025 0930  Gross per 24 hour   Intake 960 ml   Output 900 ml   Net 60 ml         Physical Exam  Constitutional:       Appearance: Normal appearance.   HENT:      Head: Normocephalic and atraumatic.      Nose: Nose normal.      Mouth/Throat:       Mouth: Mucous membranes are moist.   Eyes:      Extraocular Movements: Extraocular movements intact.      Pupils: Pupils are equal, round, and reactive to light.   Cardiovascular:      Rate and Rhythm: Normal rate and regular rhythm.      Heart sounds: No murmur heard.     No gallop.   Pulmonary:      Effort: Pulmonary effort is normal. No respiratory distress.      Breath sounds: Normal breath sounds. No wheezing.   Abdominal:      General: Abdomen is flat. Bowel sounds are normal. There is no distension.      Palpations: Abdomen is soft.      Tenderness: There is abdominal tenderness (mild to moderate epigastric to LUQ pain).   Musculoskeletal:         General: No swelling. Normal range of motion.      Cervical back: Normal range of motion and neck supple.      Right lower leg: No edema.      Left lower leg: No edema.   Skin:     General: Skin is warm and dry.      Capillary Refill: Capillary refill takes less than 2 seconds.   Neurological:      General: No focal deficit present.      Mental Status: She is alert. Mental status is at baseline.      Motor: Weakness present.      Comments:   Weakness and ROM improving   Psychiatric:         Mood and Affect: Mood normal.           Significant Labs: All pertinent labs within the past 24 hours have been reviewed.    Significant Imaging: I have reviewed all pertinent imaging results/findings within the past 24 hours.      Assessment & Plan  Compression fracture of T12 vertebra  Acute on chronic back pain  Compression fracture of body of thoracic vertebra    74 y.o.F with new compression deformity of T12 and new inability to ambulate 2/2 pain. On admit, AFVSS, labs reviewed. CT a/p with: New compression deformity of T12 when compared to CT abdomen pelvis 03/22/2025. MRI T/L spine with sedation ordered. NSGY consulted. S/P T10-L2 posterior fusion 6/14      Plan:   - TLSO brace ordered   - NSGY consulted, appreciate recommendations  - MRI spine with sedation on 6/12,  concerning for compression fracture with instability and severe spinal canal stenosis concerning for cord edema   -s/p T10-L2 posterior fusion 6/14  -stop abx, drains removed 6/18   - MM pain regimen initiated  - PT/OT with TLSO brace on  -removed tom, montior I/Os closely   -bowel regimen increased and now with bowel movements   - fall precautions     Hypertension  Patient's blood pressure range in the last 24 hours was: BP  Min: 118/60  Max: 138/66.The patient's inpatient anti-hypertensive regimen is listed below:  Current Antihypertensives  , Every 12 hours, Oral  amLODIPine tablet 10 mg, Daily, Oral    Plan  - BP is controlled, no changes needed to their regimen    Acute on chronic anemia  Anemia is likely due to acute on chronic in light of surgical intervention and drain palcement. Most recent hemoglobin and hematocrit are listed below.  Recent Labs     06/19/25  0617 06/20/25  0305 06/21/25  0412   HGB 8.2* 8.1* 8.3*   HCT 25.2* 24.7* 25.7*     Plan  - Monitor serial CBC: Daily  - Transfuse PRBC if patient becomes hemodynamically unstable, symptomatic or H/H drops below 7/21.  - Patient has not received any PRBC transfusions to date  - Patient's anemia is currently stable    Hyperlipemia  - continue home statin    Debility  Patient with Acute on chronic debility due to fracture/prosthesis. The patient's latest AMPAC (Activity Measure for Post Acute Care) Score is listed below.    AM-PAC Score - How much help does the patient need for each activity listed  Basic Mobility Total Score: 17  Turning over in bed (including adjusting bedclothes, sheets and blankets)?: None  Sitting down on and standing up from a chair with arms (e.g., wheelchair, bedside commode, etc.): A little  Moving from lying on back to sitting on the side of the bed?: A little  Moving to and from a bed to a chair (including a wheelchair)?: A little  Need to walk in hospital room?: A little  Climbing 3-5 steps with a railing?:  Unable    Plan  - PT/OT consulted  - Fall precautions in place  - PT/OT recommending moderate intensity therapy at [place]  -SNF acceptance received however needs repeat PT eval and authorization.    Depression with anxiety  Patient has recurrent depression which is mild and is currently controlled. Will Continue anti-depressant medications. We will not consult psychiatry at this time. Patient does not display psychosis at this time. Continue to monitor closely and adjust plan of care as needed.  - continue home medications    Hyperkalemia  -resolved with Lokelma     Tobacco dependency  Dangers of cigarette smoking were reviewed with patient in detail. Patient was Counseled for 3-10 minutes. Nicotine replacement options were discussed. Nicotine replacement was discussed- prescribed    VTE Risk Mitigation (From admission, onward)           Ordered     enoxaparin injection 40 mg  Every 12 hours         06/17/25 0951     IP VTE HIGH RISK PATIENT  Once         06/14/25 1204     Place sequential compression device  Until discontinued         06/10/25 0731                    Discharge Planning   MATT: 6/23/2025     Code Status: Full Code   Medical Readiness for Discharge Date: 6/19/2025  Discharge Plan A: Skilled Nursing Facility   Discharge Delays: None known at this time              Please place Justification for DME      Celine Yanez MD  Department of Hospital Medicine   [facility]

## 2025-06-22 LAB
ABSOLUTE EOSINOPHIL (OHS): 0.41 K/UL
ABSOLUTE MONOCYTE (OHS): 0.74 K/UL (ref 0.3–1)
ABSOLUTE NEUTROPHIL COUNT (OHS): 6.81 K/UL (ref 1.8–7.7)
ANION GAP (OHS): 10 MMOL/L (ref 8–16)
BASOPHILS # BLD AUTO: 0.06 K/UL
BASOPHILS NFR BLD AUTO: 0.6 %
BUN SERPL-MCNC: 13 MG/DL (ref 8–23)
CALCIUM SERPL-MCNC: 9.2 MG/DL (ref 8.7–10.5)
CHLORIDE SERPL-SCNC: 102 MMOL/L (ref 95–110)
CO2 SERPL-SCNC: 26 MMOL/L (ref 23–29)
CREAT SERPL-MCNC: 1 MG/DL (ref 0.5–1.4)
ERYTHROCYTE [DISTWIDTH] IN BLOOD BY AUTOMATED COUNT: 14.1 % (ref 11.5–14.5)
GFR SERPLBLD CREATININE-BSD FMLA CKD-EPI: 60 ML/MIN/1.73/M2
GLUCOSE SERPL-MCNC: 80 MG/DL (ref 70–110)
HCT VFR BLD AUTO: 27.8 % (ref 37–48.5)
HGB BLD-MCNC: 9 GM/DL (ref 12–16)
IMM GRANULOCYTES # BLD AUTO: 0.04 K/UL (ref 0–0.04)
IMM GRANULOCYTES NFR BLD AUTO: 0.4 % (ref 0–0.5)
LYMPHOCYTES # BLD AUTO: 1.57 K/UL (ref 1–4.8)
MCH RBC QN AUTO: 32.4 PG (ref 27–31)
MCHC RBC AUTO-ENTMCNC: 32.4 G/DL (ref 32–36)
MCV RBC AUTO: 100 FL (ref 82–98)
NUCLEATED RBC (/100WBC) (OHS): 0 /100 WBC
PLATELET # BLD AUTO: 411 K/UL (ref 150–450)
PMV BLD AUTO: 10.3 FL (ref 9.2–12.9)
POCT GLUCOSE: 108 MG/DL (ref 70–110)
POCT GLUCOSE: 119 MG/DL (ref 70–110)
POCT GLUCOSE: 85 MG/DL (ref 70–110)
POTASSIUM SERPL-SCNC: 4.1 MMOL/L (ref 3.5–5.1)
RBC # BLD AUTO: 2.78 M/UL (ref 4–5.4)
RELATIVE EOSINOPHIL (OHS): 4.3 %
RELATIVE LYMPHOCYTE (OHS): 16.3 % (ref 18–48)
RELATIVE MONOCYTE (OHS): 7.7 % (ref 4–15)
RELATIVE NEUTROPHIL (OHS): 70.7 % (ref 38–73)
SODIUM SERPL-SCNC: 138 MMOL/L (ref 136–145)
WBC # BLD AUTO: 9.63 K/UL (ref 3.9–12.7)

## 2025-06-22 PROCEDURE — 36415 COLL VENOUS BLD VENIPUNCTURE: CPT | Performed by: STUDENT IN AN ORGANIZED HEALTH CARE EDUCATION/TRAINING PROGRAM

## 2025-06-22 PROCEDURE — 85025 COMPLETE CBC W/AUTO DIFF WBC: CPT | Performed by: STUDENT IN AN ORGANIZED HEALTH CARE EDUCATION/TRAINING PROGRAM

## 2025-06-22 PROCEDURE — 63600175 PHARM REV CODE 636 W HCPCS: Performed by: STUDENT IN AN ORGANIZED HEALTH CARE EDUCATION/TRAINING PROGRAM

## 2025-06-22 PROCEDURE — 25000003 PHARM REV CODE 250: Performed by: STUDENT IN AN ORGANIZED HEALTH CARE EDUCATION/TRAINING PROGRAM

## 2025-06-22 PROCEDURE — 11000001 HC ACUTE MED/SURG PRIVATE ROOM

## 2025-06-22 PROCEDURE — 97110 THERAPEUTIC EXERCISES: CPT | Mod: CQ

## 2025-06-22 PROCEDURE — 25000003 PHARM REV CODE 250

## 2025-06-22 PROCEDURE — 80048 BASIC METABOLIC PNL TOTAL CA: CPT | Performed by: STUDENT IN AN ORGANIZED HEALTH CARE EDUCATION/TRAINING PROGRAM

## 2025-06-22 RX ADMIN — METHOCARBAMOL 750 MG: 750 TABLET ORAL at 09:06

## 2025-06-22 RX ADMIN — BUSPIRONE HYDROCHLORIDE 15 MG: 10 TABLET ORAL at 09:06

## 2025-06-22 RX ADMIN — METHOCARBAMOL 750 MG: 750 TABLET ORAL at 12:06

## 2025-06-22 RX ADMIN — OXYCODONE HYDROCHLORIDE 10 MG: 10 TABLET ORAL at 04:06

## 2025-06-22 RX ADMIN — LACTULOSE 10 G: 20 SOLUTION ORAL at 09:06

## 2025-06-22 RX ADMIN — CALCIUM CARBONATE (ANTACID) CHEW TAB 500 MG 500 MG: 500 CHEW TAB at 09:06

## 2025-06-22 RX ADMIN — DICLOFENAC SODIUM 2 G: 10 GEL TOPICAL at 09:06

## 2025-06-22 RX ADMIN — CETIRIZINE HYDROCHLORIDE 10 MG: 10 TABLET, FILM COATED ORAL at 09:06

## 2025-06-22 RX ADMIN — ACETAMINOPHEN 1000 MG: 500 TABLET ORAL at 09:06

## 2025-06-22 RX ADMIN — ROPINIROLE HYDROCHLORIDE 1 MG: 1 TABLET, FILM COATED ORAL at 09:06

## 2025-06-22 RX ADMIN — SENNOSIDES AND DOCUSATE SODIUM 2 TABLET: 50; 8.6 TABLET ORAL at 09:06

## 2025-06-22 RX ADMIN — SERTRALINE HYDROCHLORIDE 50 MG: 50 TABLET ORAL at 09:06

## 2025-06-22 RX ADMIN — CITALOPRAM HYDROBROMIDE 40 MG: 20 TABLET ORAL at 09:06

## 2025-06-22 RX ADMIN — OXYCODONE HYDROCHLORIDE 10 MG: 10 TABLET ORAL at 09:06

## 2025-06-22 RX ADMIN — PRAVASTATIN SODIUM 20 MG: 10 TABLET ORAL at 09:06

## 2025-06-22 RX ADMIN — BUSPIRONE HYDROCHLORIDE 15 MG: 10 TABLET ORAL at 04:06

## 2025-06-22 RX ADMIN — ENOXAPARIN SODIUM 40 MG: 40 INJECTION SUBCUTANEOUS at 09:06

## 2025-06-22 RX ADMIN — ACETAMINOPHEN 1000 MG: 500 TABLET ORAL at 03:06

## 2025-06-22 RX ADMIN — AMLODIPINE BESYLATE 10 MG: 10 TABLET ORAL at 09:06

## 2025-06-22 RX ADMIN — Medication 6 MG: at 09:06

## 2025-06-22 RX ADMIN — OXYCODONE HYDROCHLORIDE 10 MG: 10 TABLET ORAL at 03:06

## 2025-06-22 RX ADMIN — METHOCARBAMOL 750 MG: 750 TABLET ORAL at 04:06

## 2025-06-22 RX ADMIN — LIDOCAINE 2 PATCH: 50 PATCH CUTANEOUS at 09:06

## 2025-06-22 RX ADMIN — DICLOFENAC SODIUM 2 G: 10 GEL TOPICAL at 03:06

## 2025-06-22 RX ADMIN — POLYETHYLENE GLYCOL 3350 17 G: 17 POWDER, FOR SOLUTION ORAL at 09:06

## 2025-06-22 RX ADMIN — HYDROMORPHONE HYDROCHLORIDE 0.5 MG: 1 INJECTION, SOLUTION INTRAMUSCULAR; INTRAVENOUS; SUBCUTANEOUS at 11:06

## 2025-06-22 RX ADMIN — BUSPIRONE HYDROCHLORIDE 15 MG: 10 TABLET ORAL at 12:06

## 2025-06-22 NOTE — ASSESSMENT & PLAN NOTE
Patient's blood pressure range in the last 24 hours was: BP  Min: 110/66  Max: 127/70.The patient's inpatient anti-hypertensive regimen is listed below:  Current Antihypertensives  , Every 12 hours, Oral  amLODIPine tablet 10 mg, Daily, Oral    Plan  - BP is controlled, no changes needed to their regimen

## 2025-06-22 NOTE — PT/OT/SLP PROGRESS
Physical Therapy Treatment    Patient Name:  Kuldeep Villela   MRN:  [id number]    Recommendations:     Discharge Recommendations: Moderate Intensity Therapy  Discharge Equipment Recommendations: bedside commode, bath bench, walker, rolling  Barriers to discharge: Inaccessible home and Decreased caregiver support    Assessment:     Kuldeep Villela is a 74 y.o. female admitted with a medical diagnosis of Compression fracture of T12 vertebra.  She presents with the following impairments/functional limitations: weakness, impaired endurance, impaired self care skills, impaired functional mobility, gait instability, impaired balance, decreased ROM, decreased lower extremity function, pain, impaired skin, orthopedic precautions, edema . Patient declined any mobility today as she was in too much pain las time she got out of bed. Patient agreed to there ex in bed only.    Rehab Prognosis: Fair; patient would benefit from acute skilled PT services to address these deficits and reach maximum level of function.    Recent Surgery: Procedure(s) (LRB):  T12 LAMINECTOMY, SPINE, THORACIC, W/ ARTHRODESIS T10-L2 (N/A) 8 Days Post-Op    Plan:     During this hospitalization, patient to be seen 4 x/week to address the identified rehab impairments via gait training, therapeutic activities, therapeutic exercises, neuromuscular re-education and progress toward the following goals:    Plan of Care Expires:  07/16/25    Subjective     Chief Complaint: pain and fatigue  Patient/Family Comments/goals: to have no more pain  Pain/Comfort:  Pain Rating 1: 8/10  Location - Side 1: Bilateral  Location - Orientation 1: generalized  Location 1: back  Pain Addressed 1: Reposition, Distraction, Cessation of Activity, Pre-medicate for activity  Pain Rating Post-Intervention 1: 9/10      Objective:     Communicated with NSG prior to session.  Patient found HOB elevated with PureWick upon PT entry to room.     General Precautions: Standard, fall  Orthopedic  Precautions: spinal precautions  Braces: TLSO  Respiratory Status: Room air     Functional Mobility:  Bed Mobility:     Scooting: minimum assistance      AM-PAC 6 CLICK MOBILITY  Turning over in bed (including adjusting bedclothes, sheets and blankets)?: 4  Sitting down on and standing up from a chair with arms (e.g., wheelchair, bedside commode, etc.): 3  Moving from lying on back to sitting on the side of the bed?: 3  Moving to and from a bed to a chair (including a wheelchair)?: 3  Need to walk in hospital room?: 3  Climbing 3-5 steps with a railing?: 1  Basic Mobility Total Score: 17       Treatment & Education:  There ex in supine: HS, HIP ABD, AP AND QUAD SETS 2X10 REPS B LE, with L LE MADELEINE. Educated on the importance of mobility in order to prevent complications.    Patient left HOB elevated with all lines intact and call button in reach..    GOALS:   Multidisciplinary Problems       Physical Therapy Goals          Problem: Physical Therapy    Goal Priority Disciplines Outcome Interventions   Physical Therapy Goal     PT, PT/OT Progressing    Description: Goals to be met by: 2025     Patient will increase functional independence with mobility by performin. Supine to sit with Stand-by Assistance  2. Sit to supine with Stand-by Assistance  3. Sit to stand transfer with Stand-by Assistance with RW  4. Bed to chair transfer with Stand-by Assistance using Rolling Walker  5. Gait  x 50 feet with Stand-by Assistance using Rolling Walker.   6. Lower extremity exercise program x10 reps per handout, with supervision                         DME Justifications:   Kuldeep requires a commode for home use because she is confined to a single room.   Kuldeep's mobility limitation cannot be sufficiently resolved by the use of a cane. Her functional mobility deficit can be sufficiently resolved with the use of a Rolling Walker. Patient's mobility limitation significantly impairs their ability to participate in  one of more activities of daily living.  The use of a RW will significantly improve the patient's ability to participate in MRADLS and the patient will use it on regular basis in the home.    Time Tracking:     PT Received On: 06/22/25  PT Start Time: 1432     PT Stop Time: 1449  PT Total Time (min): 17 min     Billable Minutes: Therapeutic Exercise 17    Treatment Type: Treatment  PT/PTA: PTA     Number of PTA visits since last PT visit: 3     06/22/2025

## 2025-06-22 NOTE — ASSESSMENT & PLAN NOTE
Anemia is likely due to acute on chronic in light of surgical intervention and drain palcement. Most recent hemoglobin and hematocrit are listed below.  Recent Labs     06/20/25  0305 06/21/25  0412 06/22/25  0643   HGB 8.1* 8.3* 9.0*   HCT 24.7* 25.7* 27.8*     Plan  - Monitor serial CBC: Daily  - Transfuse PRBC if patient becomes hemodynamically unstable, symptomatic or H/H drops below 7/21.  - Patient has not received any PRBC transfusions to date  - Patient's anemia is currently stable

## 2025-06-22 NOTE — PLAN OF CARE
Problem: Adult Inpatient Plan of Care  Goal: Plan of Care Review  Flowsheets (Taken 6/22/2025 0658)  Plan of Care Reviewed With: patient     Problem: Pain Acute  Goal: Optimal Pain Control and Function  Intervention: Develop Pain Management Plan  Flowsheets (Taken 6/22/2025 0658)  Pain Management Interventions:   around-the-clock dosing utilized   pain management plan reviewed with patient/caregiver  Intervention: Prevent or Manage Pain  Flowsheets (Taken 6/22/2025 0658)  Medication Review/Management: medications reviewed  Intervention: Optimize Psychosocial Wellbeing  Flowsheets (Taken 6/22/2025 0658)  Supportive Measures:   verbalization of feelings encouraged   relaxation techniques promoted

## 2025-06-22 NOTE — ASSESSMENT & PLAN NOTE
Patient with Acute on chronic debility due to fracture/prosthesis. The patient's latest AMPAC (Activity Measure for Post Acute Care) Score is listed below.    AM-PAC Score - How much help does the patient need for each activity listed  Basic Mobility Total Score: 17  Turning over in bed (including adjusting bedclothes, sheets and blankets)?: None  Sitting down on and standing up from a chair with arms (e.g., wheelchair, bedside commode, etc.): A little  Moving from lying on back to sitting on the side of the bed?: A little  Moving to and from a bed to a chair (including a wheelchair)?: A little  Need to walk in hospital room?: A little  Climbing 3-5 steps with a railing?: Unable    Plan  - PT/OT consulted  - Fall precautions in place  - PT/OT recommending moderate intensity therapy at [place]  -[facility] acceptance received however needs repeat PT eval and authorization.

## 2025-06-22 NOTE — SUBJECTIVE & OBJECTIVE
Interval History:   LEXIEON. Continues to complain of pain. Overall mentation is better today. No bowel movement since yesterday despite bowel regimen however her PO intake remains limited    Review of Systems   Constitutional:  Negative for chills and fever.   Respiratory:  Negative for cough.    Gastrointestinal:  Positive for abdominal pain. Negative for constipation, diarrhea and nausea.     Objective:     Vital Signs (Most Recent):  Temp: 97.5 °F (36.4 °C) (06/22/25 0937)  Pulse: 86 (06/22/25 0732)  Resp: 18 (06/22/25 1122)  BP: 125/64 (06/22/25 0732)  SpO2: 97 % (06/22/25 0732) Vital Signs (24h Range):  Temp:  [97.5 °F (36.4 °C)-98 °F (36.7 °C)] 97.5 °F (36.4 °C)  Pulse:  [79-94] 86  Resp:  [16-19] 18  SpO2:  [94 %-98 %] 97 %  BP: (110-129)/(58-74) 125/64     Weight: 107 kg (236 lb)  Body mass index is 44.59 kg/m².    Intake/Output Summary (Last 24 hours) at 6/22/2025 1124  Last data filed at 6/22/2025 0937  Gross per 24 hour   Intake 720 ml   Output 900 ml   Net -180 ml         Physical Exam  Constitutional:       Appearance: Normal appearance.   HENT:      Head: Normocephalic and atraumatic.      Nose: Nose normal.      Mouth/Throat:      Mouth: Mucous membranes are moist.   Eyes:      Extraocular Movements: Extraocular movements intact.      Pupils: Pupils are equal, round, and reactive to light.   Cardiovascular:      Rate and Rhythm: Normal rate and regular rhythm.      Heart sounds: No murmur heard.     No gallop.   Pulmonary:      Effort: Pulmonary effort is normal. No respiratory distress.      Breath sounds: Normal breath sounds. No wheezing.   Abdominal:      General: Abdomen is flat. Bowel sounds are normal. There is no distension.      Palpations: Abdomen is soft.      Tenderness: There is abdominal tenderness (mild to moderate epigastric to LUQ pain).   Musculoskeletal:         General: No swelling. Normal range of motion.      Cervical back: Normal range of motion and neck supple.      Right lower  leg: No edema.      Left lower leg: No edema.   Skin:     General: Skin is warm and dry.      Capillary Refill: Capillary refill takes less than 2 seconds.   Neurological:      General: No focal deficit present.      Mental Status: She is alert. Mental status is at baseline.      Motor: Weakness present.      Comments:   Weakness and ROM improving   Psychiatric:         Mood and Affect: Mood normal.           Significant Labs: All pertinent labs within the past 24 hours have been reviewed.    Significant Imaging: I have reviewed all pertinent imaging results/findings within the past 24 hours.

## 2025-06-22 NOTE — NURSING
"Upon entering patient room, patient in personal belonging bag. Due to pt c/o back pain, assisted patient to help turn. Patient put something in her mouth twice. When asked patient what did she take- pt responded " my buspar". Educated patient not to take home medications while in hospital - pt "mine work better". Patient showed me her Buspar pill bottle but noted another pill bottle in bag. MD and charge nurse notified. Patient gave charge nurse both bottles. Medication counted and in lock box.  "

## 2025-06-22 NOTE — PROGRESS NOTES
[facility] Medicine  Progress Note    Patient Name: Kuldeep Villela  MRN: [id number]  Patient Class: IP- Inpatient   Admission Date: 6/9/2025  Length of Stay: 12 days  Attending Physician: Celine Yanez MD  Primary Care Provider: Ginna Musa MD        Subjective     Principal Problem:Compression fracture of T12 vertebra        HPI:  Kuldeep Villela is a 74 y.o.F with PMHx of arthritis, lumbar stenosis, HTN, HLD, depression who [place] [facility] [place] complaints of worsening low back/L flank pain for the last week. Endorses chronic low back pain for which she sees a specialist for, but has worsened recently. Denies any known injury to area. She reports she felt like it may be the beginning of a UTI so she started taking Azo at home for prevention. States she normally ambulates with a walker; however, the pain has been so severe that she has not been able to ambulate at all. Endorses bilateral lower extremity weakness. Denies bladder or bowel incontinence, denies numbness of lower extremities. Denies fever, chills, chest pain, palpitations, SOB, cough, abdominal pain, n/v/d, dysuria, headaches, or any other symptoms at this time.     In ED: afebrile, VSS on RA. CBC without leukocytosis, Hgb 11.1. CMP notable for Cr 1.5 (~1.0), otherwise unremarkable. UA non-infectious appearing. CT abd/pelvis with new compression deformity of T12, perinephric haziness and stranding, L>R, correlate with renal disease, small periumbilical abd wall hernia involving bowel without inflammatory or obstructive change. S/p rocephin, ketamine, toradol 15mg inj, oxy-acetaminophen 10mg, 1L IVF bolus in ED. TLSO brace ordered. Admitted to  for further evaluation.     Overview/Hospital Course:  Pt admitted for continued management of T12 compression fx. NSGY consulted, no acute intervention warranted at this time. MRI spine with sedation pending and scheduled for 6/12. MRI concerning for compression fracture with instability  and severe spinal canal stenosis concerning for cord edema. Recommended to stop mobility with PT/OT pending surgical stabilization of spine. She was taken to the OR 6/14/25 for  T10-L2 posterior fusion. Monitored post op with pain control. Started on IV abx while drains in place. PT/OT restarted with TLSO brace. XR spine showed stabilization. One drain removed 6/17/25 and second drain removed 6/18. Antibiotics stopped once drain removed. H&H monitored due to possible post op drop.     6/20- pain is better today, worked with PT and sitting in bedside chair. SNF acceptance received however needs repeat PT eval and authorization.    6/21- patient continues to complain of back pain. Discussed with neurosurgery, they will reassess patient, clarify instructions on the back brace and assess cecy. Patient was also seen taking additional pain medications from home. She is currently on similar dose  as home in and additional dilaudid 0.5mg q4hrly PRN    Interval History:   NAEON. Continues to complain of pain. Overall mentation is better today. No bowel movement since yesterday despite bowel regimen however her PO intake remains limited    Review of Systems   Constitutional:  Negative for chills and fever.   Respiratory:  Negative for cough.    Gastrointestinal:  Positive for abdominal pain. Negative for constipation, diarrhea and nausea.     Objective:     Vital Signs (Most Recent):  Temp: 97.5 °F (36.4 °C) (06/22/25 0937)  Pulse: 86 (06/22/25 0732)  Resp: 18 (06/22/25 1122)  BP: 125/64 (06/22/25 0732)  SpO2: 97 % (06/22/25 0732) Vital Signs (24h Range):  Temp:  [97.5 °F (36.4 °C)-98 °F (36.7 °C)] 97.5 °F (36.4 °C)  Pulse:  [79-94] 86  Resp:  [16-19] 18  SpO2:  [94 %-98 %] 97 %  BP: (110-129)/(58-74) 125/64     Weight: 107 kg (236 lb)  Body mass index is 44.59 kg/m².    Intake/Output Summary (Last 24 hours) at 6/22/2025 1124  Last data filed at 6/22/2025 0937  Gross per 24 hour   Intake 720 ml   Output 900 ml   Net -180 ml          Physical Exam  Constitutional:       Appearance: Normal appearance.   HENT:      Head: Normocephalic and atraumatic.      Nose: Nose normal.      Mouth/Throat:      Mouth: Mucous membranes are moist.   Eyes:      Extraocular Movements: Extraocular movements intact.      Pupils: Pupils are equal, round, and reactive to light.   Cardiovascular:      Rate and Rhythm: Normal rate and regular rhythm.      Heart sounds: No murmur heard.     No gallop.   Pulmonary:      Effort: Pulmonary effort is normal. No respiratory distress.      Breath sounds: Normal breath sounds. No wheezing.   Abdominal:      General: Abdomen is flat. Bowel sounds are normal. There is no distension.      Palpations: Abdomen is soft.      Tenderness: There is abdominal tenderness (mild to moderate epigastric to LUQ pain).   Musculoskeletal:         General: No swelling. Normal range of motion.      Cervical back: Normal range of motion and neck supple.      Right lower leg: No edema.      Left lower leg: No edema.   Skin:     General: Skin is warm and dry.      Capillary Refill: Capillary refill takes less than 2 seconds.   Neurological:      General: No focal deficit present.      Mental Status: She is alert. Mental status is at baseline.      Motor: Weakness present.      Comments:   Weakness and ROM improving   Psychiatric:         Mood and Affect: Mood normal.           Significant Labs: All pertinent labs within the past 24 hours have been reviewed.    Significant Imaging: I have reviewed all pertinent imaging results/findings within the past 24 hours.      Assessment & Plan  Compression fracture of T12 vertebra  Acute on chronic back pain  Compression fracture of body of thoracic vertebra    74 y.o.F with new compression deformity of T12 and new inability to ambulate 2/2 pain. On admit, AFVSS, labs reviewed. CT a/p with: New compression deformity of T12 when compared to CT abdomen pelvis 03/22/2025. MRI T/L spine with sedation  ordered. NSGY consulted. S/P T10-L2 posterior fusion 6/14      Plan:   - TLSO brace ordered   - NSGY consulted, appreciate recommendations  - MRI spine with sedation on 6/12, concerning for compression fracture with instability and severe spinal canal stenosis concerning for cord edema   -s/p T10-L2 posterior fusion 6/14  -stop abx, drains removed 6/18   - MM pain regimen initiated  - PT/OT with TLSO brace on  -removed tom, montior I/Os closely   -bowel regimen increased and now with bowel movements   - fall precautions     Hypertension  Patient's blood pressure range in the last 24 hours was: BP  Min: 110/66  Max: 127/70.The patient's inpatient anti-hypertensive regimen is listed below:  Current Antihypertensives  , Every 12 hours, Oral  amLODIPine tablet 10 mg, Daily, Oral    Plan  - BP is controlled, no changes needed to their regimen    Acute on chronic anemia  Anemia is likely due to acute on chronic in light of surgical intervention and drain palcement. Most recent hemoglobin and hematocrit are listed below.  Recent Labs     06/20/25  0305 06/21/25  0412 06/22/25  0643   HGB 8.1* 8.3* 9.0*   HCT 24.7* 25.7* 27.8*     Plan  - Monitor serial CBC: Daily  - Transfuse PRBC if patient becomes hemodynamically unstable, symptomatic or H/H drops below 7/21.  - Patient has not received any PRBC transfusions to date  - Patient's anemia is currently stable    Hyperlipemia  - continue home statin    Debility  Patient with Acute on chronic debility due to fracture/prosthesis. The patient's latest AMPAC (Activity Measure for Post Acute Care) Score is listed below.    AM-PAC Score - How much help does the patient need for each activity listed  Basic Mobility Total Score: 17  Turning over in bed (including adjusting bedclothes, sheets and blankets)?: None  Sitting down on and standing up from a chair with arms (e.g., wheelchair, bedside commode, etc.): A little  Moving from lying on back to sitting on the side of the bed?: A  little  Moving to and from a bed to a chair (including a wheelchair)?: A little  Need to walk in hospital room?: A little  Climbing 3-5 steps with a railing?: Unable    Plan  - PT/OT consulted  - Fall precautions in place  - PT/OT recommending moderate intensity therapy at [facility]  -SNF acceptance received however needs repeat PT eval and authorization.    Depression with anxiety  Patient has recurrent depression which is mild and is currently controlled. Will Continue anti-depressant medications. We will not consult psychiatry at this time. Patient does not display psychosis at this time. Continue to monitor closely and adjust plan of care as needed.  - continue home medications    Hyperkalemia  -resolved with Lokelma     Tobacco dependency  Dangers of cigarette smoking were reviewed with patient in detail. Patient was Counseled for 3-10 minutes. Nicotine replacement options were discussed. Nicotine replacement was discussed- prescribed      VTE Risk Mitigation (From admission, onward)           Ordered     enoxaparin injection 40 mg  Every 12 hours         06/17/25 0951     IP VTE HIGH RISK PATIENT  Once         06/14/25 1204     Place sequential compression device  Until discontinued         06/10/25 0731                    Discharge Planning   MATT: 6/23/2025     Code Status: Full Code   Medical Readiness for Discharge Date: 6/19/2025  Discharge Plan A: Skilled Nursing Facility   Discharge Delays: None known at this time              Please place Justification for DME      Celine Yanez MD  Department of Hospital Medicine   [facility]

## 2025-06-23 LAB
ABSOLUTE EOSINOPHIL (OHS): 0.26 K/UL
ABSOLUTE MONOCYTE (OHS): 0.59 K/UL (ref 0.3–1)
ABSOLUTE NEUTROPHIL COUNT (OHS): 7.32 K/UL (ref 1.8–7.7)
ANION GAP (OHS): 11 MMOL/L (ref 8–16)
BASOPHILS # BLD AUTO: 0.05 K/UL
BASOPHILS NFR BLD AUTO: 0.5 %
BUN SERPL-MCNC: 14 MG/DL (ref 8–23)
CALCIUM SERPL-MCNC: 9.7 MG/DL (ref 8.7–10.5)
CHLORIDE SERPL-SCNC: 101 MMOL/L (ref 95–110)
CO2 SERPL-SCNC: 26 MMOL/L (ref 23–29)
CREAT SERPL-MCNC: 0.9 MG/DL (ref 0.5–1.4)
ERYTHROCYTE [DISTWIDTH] IN BLOOD BY AUTOMATED COUNT: 13.9 % (ref 11.5–14.5)
GFR SERPLBLD CREATININE-BSD FMLA CKD-EPI: >60 ML/MIN/1.73/M2
GLUCOSE SERPL-MCNC: 73 MG/DL (ref 70–110)
HCT VFR BLD AUTO: 28.1 % (ref 37–48.5)
HGB BLD-MCNC: 9 GM/DL (ref 12–16)
IMM GRANULOCYTES # BLD AUTO: 0.05 K/UL (ref 0–0.04)
IMM GRANULOCYTES NFR BLD AUTO: 0.5 % (ref 0–0.5)
LYMPHOCYTES # BLD AUTO: 1.39 K/UL (ref 1–4.8)
MCH RBC QN AUTO: 31.9 PG (ref 27–31)
MCHC RBC AUTO-ENTMCNC: 32 G/DL (ref 32–36)
MCV RBC AUTO: 100 FL (ref 82–98)
NUCLEATED RBC (/100WBC) (OHS): 0 /100 WBC
PLATELET # BLD AUTO: 439 K/UL (ref 150–450)
PMV BLD AUTO: 10.5 FL (ref 9.2–12.9)
POCT GLUCOSE: 76 MG/DL (ref 70–110)
POTASSIUM SERPL-SCNC: 4.1 MMOL/L (ref 3.5–5.1)
RBC # BLD AUTO: 2.82 M/UL (ref 4–5.4)
RELATIVE EOSINOPHIL (OHS): 2.7 %
RELATIVE LYMPHOCYTE (OHS): 14.4 % (ref 18–48)
RELATIVE MONOCYTE (OHS): 6.1 % (ref 4–15)
RELATIVE NEUTROPHIL (OHS): 75.8 % (ref 38–73)
SODIUM SERPL-SCNC: 138 MMOL/L (ref 136–145)
TB INDURATION 48 - 72 HR READ: 0 MM
TB SKIN TEST 48 - 72 HR READ: NEGATIVE
WBC # BLD AUTO: 9.66 K/UL (ref 3.9–12.7)

## 2025-06-23 PROCEDURE — 25000003 PHARM REV CODE 250: Performed by: STUDENT IN AN ORGANIZED HEALTH CARE EDUCATION/TRAINING PROGRAM

## 2025-06-23 PROCEDURE — 63600175 PHARM REV CODE 636 W HCPCS: Performed by: STUDENT IN AN ORGANIZED HEALTH CARE EDUCATION/TRAINING PROGRAM

## 2025-06-23 PROCEDURE — 97110 THERAPEUTIC EXERCISES: CPT

## 2025-06-23 PROCEDURE — 80048 BASIC METABOLIC PNL TOTAL CA: CPT | Performed by: STUDENT IN AN ORGANIZED HEALTH CARE EDUCATION/TRAINING PROGRAM

## 2025-06-23 PROCEDURE — 11000001 HC ACUTE MED/SURG PRIVATE ROOM

## 2025-06-23 PROCEDURE — 25000003 PHARM REV CODE 250

## 2025-06-23 PROCEDURE — 85025 COMPLETE CBC W/AUTO DIFF WBC: CPT | Performed by: STUDENT IN AN ORGANIZED HEALTH CARE EDUCATION/TRAINING PROGRAM

## 2025-06-23 PROCEDURE — 36415 COLL VENOUS BLD VENIPUNCTURE: CPT | Performed by: STUDENT IN AN ORGANIZED HEALTH CARE EDUCATION/TRAINING PROGRAM

## 2025-06-23 RX ORDER — BISACODYL 10 MG/1
10 SUPPOSITORY RECTAL DAILY PRN
Start: 2025-06-23 | End: 2025-06-24

## 2025-06-23 RX ORDER — ACETAMINOPHEN 500 MG
1000 TABLET ORAL 3 TIMES DAILY
Qty: 90 TABLET | Refills: 0 | Status: SHIPPED | OUTPATIENT
Start: 2025-06-23 | End: 2025-06-24 | Stop reason: HOSPADM

## 2025-06-23 RX ORDER — METHOCARBAMOL 750 MG/1
750 TABLET, FILM COATED ORAL 4 TIMES DAILY
Start: 2025-06-23 | End: 2025-06-24 | Stop reason: HOSPADM

## 2025-06-23 RX ORDER — AMOXICILLIN 250 MG
2 CAPSULE ORAL 2 TIMES DAILY
Start: 2025-06-23 | End: 2025-06-24

## 2025-06-23 RX ORDER — POLYETHYLENE GLYCOL 3350 17 G/17G
17 POWDER, FOR SOLUTION ORAL DAILY
Start: 2025-06-24 | End: 2025-06-24

## 2025-06-23 RX ORDER — BACITRACIN 500 [USP'U]/G
OINTMENT TOPICAL
Start: 2025-06-23 | End: 2025-06-24 | Stop reason: HOSPADM

## 2025-06-23 RX ORDER — ADHESIVE BANDAGE
30 BANDAGE TOPICAL DAILY PRN
Start: 2025-06-23 | End: 2025-06-23 | Stop reason: HOSPADM

## 2025-06-23 RX ORDER — LIDOCAINE 50 MG/G
2 PATCH TOPICAL DAILY
Start: 2025-06-23 | End: 2025-06-24

## 2025-06-23 RX ADMIN — METHOCARBAMOL 750 MG: 750 TABLET ORAL at 01:06

## 2025-06-23 RX ADMIN — ENOXAPARIN SODIUM 40 MG: 40 INJECTION SUBCUTANEOUS at 08:06

## 2025-06-23 RX ADMIN — DICLOFENAC SODIUM 2 G: 10 GEL TOPICAL at 09:06

## 2025-06-23 RX ADMIN — OXYCODONE HYDROCHLORIDE 10 MG: 10 TABLET ORAL at 09:06

## 2025-06-23 RX ADMIN — CALCIUM CARBONATE (ANTACID) CHEW TAB 500 MG 500 MG: 500 CHEW TAB at 09:06

## 2025-06-23 RX ADMIN — OXYCODONE HYDROCHLORIDE 10 MG: 10 TABLET ORAL at 08:06

## 2025-06-23 RX ADMIN — OXYCODONE HYDROCHLORIDE 10 MG: 10 TABLET ORAL at 03:06

## 2025-06-23 RX ADMIN — ACETAMINOPHEN 1000 MG: 500 TABLET ORAL at 09:06

## 2025-06-23 RX ADMIN — DICLOFENAC SODIUM 2 G: 10 GEL TOPICAL at 08:06

## 2025-06-23 RX ADMIN — ACETAMINOPHEN 1000 MG: 500 TABLET ORAL at 08:06

## 2025-06-23 RX ADMIN — AMLODIPINE BESYLATE 10 MG: 10 TABLET ORAL at 08:06

## 2025-06-23 RX ADMIN — SERTRALINE HYDROCHLORIDE 50 MG: 50 TABLET ORAL at 08:06

## 2025-06-23 RX ADMIN — METHOCARBAMOL 750 MG: 750 TABLET ORAL at 09:06

## 2025-06-23 RX ADMIN — BUSPIRONE HYDROCHLORIDE 15 MG: 10 TABLET ORAL at 09:06

## 2025-06-23 RX ADMIN — ACETAMINOPHEN 1000 MG: 500 TABLET ORAL at 02:06

## 2025-06-23 RX ADMIN — POLYETHYLENE GLYCOL 3350 17 G: 17 POWDER, FOR SOLUTION ORAL at 08:06

## 2025-06-23 RX ADMIN — SENNOSIDES AND DOCUSATE SODIUM 2 TABLET: 50; 8.6 TABLET ORAL at 09:06

## 2025-06-23 RX ADMIN — SENNOSIDES AND DOCUSATE SODIUM 2 TABLET: 50; 8.6 TABLET ORAL at 08:06

## 2025-06-23 RX ADMIN — PRAVASTATIN SODIUM 20 MG: 10 TABLET ORAL at 08:06

## 2025-06-23 RX ADMIN — CITALOPRAM HYDROBROMIDE 40 MG: 20 TABLET ORAL at 08:06

## 2025-06-23 RX ADMIN — ROPINIROLE HYDROCHLORIDE 1 MG: 1 TABLET, FILM COATED ORAL at 09:06

## 2025-06-23 RX ADMIN — METHOCARBAMOL 750 MG: 750 TABLET ORAL at 05:06

## 2025-06-23 RX ADMIN — CALCIUM CARBONATE (ANTACID) CHEW TAB 500 MG 500 MG: 500 CHEW TAB at 08:06

## 2025-06-23 RX ADMIN — DICLOFENAC SODIUM 2 G: 10 GEL TOPICAL at 02:06

## 2025-06-23 RX ADMIN — CETIRIZINE HYDROCHLORIDE 10 MG: 10 TABLET, FILM COATED ORAL at 08:06

## 2025-06-23 RX ADMIN — BUSPIRONE HYDROCHLORIDE 15 MG: 10 TABLET ORAL at 01:06

## 2025-06-23 RX ADMIN — OXYCODONE HYDROCHLORIDE 10 MG: 10 TABLET ORAL at 05:06

## 2025-06-23 RX ADMIN — BUSPIRONE HYDROCHLORIDE 15 MG: 10 TABLET ORAL at 08:06

## 2025-06-23 RX ADMIN — ROPINIROLE HYDROCHLORIDE 1 MG: 1 TABLET, FILM COATED ORAL at 08:06

## 2025-06-23 RX ADMIN — BUSPIRONE HYDROCHLORIDE 15 MG: 10 TABLET ORAL at 05:06

## 2025-06-23 RX ADMIN — METHOCARBAMOL 750 MG: 750 TABLET ORAL at 08:06

## 2025-06-23 RX ADMIN — OXYCODONE HYDROCHLORIDE 10 MG: 10 TABLET ORAL at 01:06

## 2025-06-23 RX ADMIN — ENOXAPARIN SODIUM 40 MG: 40 INJECTION SUBCUTANEOUS at 09:06

## 2025-06-23 NOTE — PT/OT/SLP PROGRESS
Occupational Therapy   Treatment    Name: Kuldeep Villela  MRN: [id number]  Admitting Diagnosis:  Compression fracture of T12 vertebra  9 Days Post-Op    Recommendations:     Discharge Recommendations: Moderate Intensity Therapy  Discharge Equipment Recommendations:  bedside commode, bath bench, walker, rolling  Barriers to discharge:  Decreased caregiver support, Other (Comment) (uncontrolled pain)    Assessment:     Kuldeep Villela is a 74 y.o. female with a medical diagnosis of Compression fracture of T12 vertebra.  She presents with poor participation in therapy and to progress in EOB/OOB ADLs due to primarily pain. There is a concern of noncompliance for spinal precautions with use of brace for future mobility attempts. Pt also limited by reported frustration and anger of medical treatment. She required increased time to cue return attention to task and with difficulty to redirect. Performance deficits affecting function are weakness, impaired joint extensibility, impaired endurance, decreased ROM, decreased coordination, impaired self care skills, decreased lower extremity function, impaired functional mobility, impaired balance, pain, impaired skin, orthopedic precautions, other (comment) (poor insight to importance of early mobility).     Rehab Prognosis:  Fair; patient would benefit from acute skilled OT services to address these deficits and reach maximum level of function.       Plan:     Patient to be seen 4 x/week to address the above listed problems via self-care/home management, therapeutic activities, therapeutic exercises, neuromuscular re-education  Plan of Care Expires: 07/15/25  Plan of Care Reviewed with: patient    Subjective     Chief Complaint: Pt stated anger about her current medications. Reported that she was not told when her medications were switched from IV to PO. Stated that PO does not assist with her pain as well.   Patient/Family Comments/goals: declined any OOB ADLs and mobility.  Hesitant for in bed therex, but amenable with encouragement. Pt also refusing to don TLSO for future purposes which is a concern for her need of spinal precautions.   Pain/Comfort:  Pain Rating 1: 10/10  Pain Addressed 1: Pre-medicate for activity. Nurse  reported that Pt was recently given meds.     Objective:     Communicated with: nurseChelsea prior to session.  Patient found HOB elevated with PureWick upon OT entry to room.    General Precautions: Standard, fall    Orthopedic Precautions:spinal precautions  Braces: TLSO  Respiratory Status: Room air     Occupational Performance:     Bed Mobility:    declined    Functional Mobility/Transfers:  declined    Activities of Daily Living:  Declined    Therex:  Max cues to participate in B Ues and Les therex at bed level. Pt completed with multiple breaks and with increased time to initiate therex 2* venting of current situation. Pt performed with reps of 10. (Shoulder flexion, chest presses,shoulder abduction, ankle pumps, leg raises, and hip ab and adduction.)    [facility] 6 Click ADL: 15    Treatment & Education:  Pt educated on the following topics:  OT 's plan of care and purpose of visit, ADLs, importance of increased activity in hospital setting, safety precautions, post-op precautions, to call for assistance with call button  Additional teaching is warranted. Poor reception to therapeutic education at this time.     Patient left HOB elevated with all lines intact, call button in reach, and nurse notified    GOALS:   Multidisciplinary Problems       Occupational Therapy Goals          Problem: Occupational Therapy    Goal Priority Disciplines Outcome Interventions   Occupational Therapy Goal     OT, PT/OT Not Progressing    Description: Goals to be met by: 7/15/2025     Patient will increase functional independence with ADLs by performing:    UE Dressing with Supervision sitting EOB to don TSLO   LE Dressing with Set-up Assistance using AE as needed to maintain  spinal precautions.  Grooming while bedside chair with Set-up Assistance.  Toileting from bedside commode with Minimal Assistance for hygiene and clothing management.   Supine to sit with Supervision.  Toilet transfer to bedside commode with Contact Guard Assistance using LRAD as needed.   Sit to stand with CGA using LRAD as needed.                          DME Justifications:   Kuldeep requires a commode for home use because she is confined to a single room.   Kuldeep's mobility limitation cannot be sufficiently resolved by the use of a cane. Her functional mobility deficit can be sufficiently resolved with the use of a Rolling Walker. Patient's mobility limitation significantly impairs their ability to participate in one of more activities of daily living.  The use of a RW will significantly improve the patient's ability to participate in MRADLS and the patient will use it on regular basis in the home.  Patient demonstrates a mobility limitation that impairs their ability to participate in bathing within tub/shower combination safely. Patient's limitation cannot be sufficiently resolved without the use of a tub transfer bench. The use of the tub transfer bench will considerably improve their ability to safely participate in bathing/shower transfers. Patient will use the tub transfer bench on a regular basis at home.      Time Tracking:     OT Date of Treatment: 06/23/25  OT Start Time: 1500  OT Stop Time: 1527  OT Total Time (min): 27 min    Billable Minutes:Therapeutic Exercise 27    OT/CATRINA: OT     Number of CATRINA visits since last OT visit: 1    6/23/2025

## 2025-06-23 NOTE — PLAN OF CARE
Patient is AAOx4. Vital signs stable. No falls throughout shift. Patient refused to get out of bed with OT. Patient complains of pain. Oxycodone administered PRN. Patient states that the oxycodone does not help. Patient refuses to turn or use wedge.  Problem: Adult Inpatient Plan of Care  Goal: Plan of Care Review  Outcome: Progressing  Goal: Patient-Specific Goal (Individualized)  Outcome: Progressing  Goal: Absence of Hospital-Acquired Illness or Injury  Outcome: Progressing  Goal: Optimal Comfort and Wellbeing  Outcome: Progressing  Goal: Readiness for Transition of Care  Outcome: Progressing     Problem: Bariatric Environmental Safety  Goal: Safety Maintained with Care  Outcome: Progressing     Problem: Acute Kidney Injury/Impairment  Goal: Fluid and Electrolyte Balance  Outcome: Progressing  Goal: Improved Oral Intake  Outcome: Progressing  Goal: Effective Renal Function  Outcome: Progressing     Problem: Skin Injury Risk Increased  Goal: Skin Health and Integrity  Outcome: Progressing     Problem: Fall Injury Risk  Goal: Absence of Fall and Fall-Related Injury  Outcome: Progressing     Problem: Pain Acute  Goal: Optimal Pain Control and Function  Outcome: Progressing     Problem: Electrolyte Imbalance  Goal: Electrolyte Balance  Outcome: Progressing     Problem: Orthopaedic Fracture  Goal: Absence of Bleeding  Outcome: Progressing  Goal: Bowel Elimination  Outcome: Progressing  Goal: Absence of Embolism Signs and Symptoms  Outcome: Progressing  Goal: Fracture Stability  Outcome: Progressing  Goal: Optimal Functional Ability  Outcome: Progressing  Goal: Absence of Infection Signs and Symptoms  Outcome: Progressing  Goal: Effective Tissue Perfusion  Outcome: Progressing  Goal: Optimal Pain Control and Function  Outcome: Progressing  Goal: Effective Oxygenation and Ventilation  Outcome: Progressing     Problem: Infection  Goal: Absence of Infection Signs and Symptoms  Outcome: Progressing     Problem:  Wound  Goal: Optimal Coping  Outcome: Progressing  Goal: Optimal Functional Ability  Outcome: Progressing  Goal: Absence of Infection Signs and Symptoms  Outcome: Progressing  Goal: Improved Oral Intake  Outcome: Progressing  Goal: Optimal Pain Control and Function  Outcome: Progressing  Goal: Skin Health and Integrity  Outcome: Progressing  Goal: Optimal Wound Healing  Outcome: Progressing

## 2025-06-23 NOTE — CONSULTS
[facility]    Wound Care     Patient Name:  Kuldeep Villela  MRN:  [id number]  Date: 6/23/2025  Diagnosis: Compression fracture of T12 vertebra     History:  Past Medical History:   Diagnosis Date    Arthritis     Asthma     Cervical spondylosis 07/13/2012    Chronic LBP 07/13/2012    Chronic neck pain 07/13/2012    Debility     Hyperlipidemia     Hypertension     Lumbar radiculopathy, BLE 07/13/2012    Lumbar spinal stenosis at L4-L5. 07/13/2012    Lumbar spondylosis 07/13/2012    Morbid obesity 07/13/2012    Primary osteoarthritis of both knees 07/13/2012    Spondylolisthesis, grade 1 at L4-L5. 07/13/2012    Tenosynovitis of ankle     Rt peroneus longus    Walker as ambulation aid      Social History[1]  Precautions:  Allergies as of 06/09/2025 - Reviewed 06/09/2025   Allergen Reaction Noted    Penicillins Anxiety and Other (See Comments) 07/08/2012    Anesthetic [benzocaine-aloe vera]  07/13/2012    Guaifenesin Anxiety and Other (See Comments) 07/08/2012       WOC Assessment Details / Treatment:    Patient seen for wound care: New Consult   Chart reviewed for this encounter.   Labs:   WBC (K/uL)   Date Value   06/23/2025 9.66   06/22/2025 9.63     Glucose (mg/dL)   Date Value   06/23/2025 73   06/22/2025 80   02/23/2025 82   09/19/2024 86     Albumin (g/dL)   Date Value   06/13/2025 3.4 (L)   06/12/2025 3.4 (L)   02/23/2025 3.5   02/10/2025 4.0   01/29/2025 3.4   09/19/2024 3.4 (L)     Tariq Score: 19  Nutrition sub-score: 3  PureWick in use.    Narrative:  Pt seen for WC consultation and agreed to assessment  Chart reviewed for this encounter.   See Flow Sheet for additional documentation and media.    Pt sitting up on Umano bed, able to turn with minimal assistance, well approximated surgical incision with staples intact noted. Intact scabs noted to left mid back and bilateral superior back areas, no drainage.   Pt able to turn with minimal assistance, small BM noted, residual white cream removed using  "purple bath wipes revealing bilateral areas of partial thickness skin loss with non-blanchable deep red/maroon. Pt cx of pain when being cleaned. PureWick re-applied to correct position.     Waffle / Triad ordered.   Judith Taylor consulted    RECOMMENDATIONS:  Bedside nurse assess for acute changes (purulence, increased redness/swelling, increased drainage, malodor, increased pain, pallor, necrosis) please contact physician on any acute changes.    Consult Skin Integr[facility] LYUDMILA Judith Taylor  Apply waffle overlay to mattress top and ensure it is properly inflated  Ensure pt is clean and dry  Apply Triad   Ensure PureWick is placed properly    Discussed POC with patient and primary nurse.   See EMR for orders & patient education.     Bedside nursing to continue care, dressing changes, & continue monitoring.  Bedside nursing to maintain pressure injury prevention interventions, (PIP).     Recommendations made to primary team for above plan.    Thank you for the consult. Wound Care will continue to follow.     06/23/25 0700   WOCN Assessment   WOCN Total Time (mins) 30   Visit Date 06/23/25   Visit Time 0700   Consult Type New   WOCN Speciality Wound   Wound pressure;deep tissue injury;At risk for pressure Injury   Intervention chart review;assessed;changed;applied;orders   Teaching on-going        Wound 06/14/25 1122 Incision midline Back   Date First Assessed/Time First Assessed: 06/14/25 1122   Present on Original Admission: No  Primary Wound Type: Incision  Orientation: midline  Location: Back  Closure Method: Sutures;Staples;Other (see comments)  Additional Comments: Aquacel Ag 10"   Wound Image         Wound 06/19/25 2200 Pressure Injury Buttocks   Date First Assessed/Time First Assessed: 06/19/25 2200   Primary Wound Type: Pressure Injury  Location: Buttocks  Is this injury device related?: No   Wound Image    Pressure Injury Stage DTPI   Dressing Appearance Open to air   Drainage Amount None   Drainage " Characteristics/Odor No odor   Appearance Maroon;Purple;Black   Tissue loss description Not applicable   Periwound Area Intact;Ecchymotic   Wound Edges Irregular   Care Cleansed with:;Other (see comments)  (purple bath wipes)   Dressing Other (comment)  (Applied Triad)   Periwound Care Topical treatment applied   Off Loading Other (see comments)  (waffle ordered)   Dressing Change Due 06/23/25            [1]   Social History  Socioeconomic History    Marital status: Single   Tobacco Use    Smoking status: Every Day     Current packs/day: 1.00     Average packs/day: 1 pack/day for 20.0 years (20.0 ttl pk-yrs)     Types: Cigarettes    Smokeless tobacco: Never   Substance and Sexual Activity    Alcohol use: No     Alcohol/week: 0.0 standard drinks of alcohol    Drug use: No    Sexual activity: Not Currently     Partners: Male     Birth control/protection: See Surgical Hx     Social Drivers of Health     Financial Resource Strain: Low Risk  (6/11/2025)    Overall Financial Resource Strain (CARDIA)     Difficulty of Paying Living Expenses: Not hard at all   Food Insecurity: No Food Insecurity (6/11/2025)    Hunger Vital Sign     Worried About Running Out of Food in the Last Year: Never true     Ran Out of Food in the Last Year: Never true   Recent Concern: Food Insecurity - Food Insecurity Present (3/27/2025)    Hunger Vital Sign     Worried About Running Out of Food in the Last Year: Often true     Ran Out of Food in the Last Year: Often true   Transportation Needs: No Transportation Needs (6/11/2025)    PRAPARE - Transportation     Lack of Transportation (Medical): No     Lack of Transportation (Non-Medical): No   Physical Activity: Inactive (6/11/2025)    Exercise Vital Sign     Days of Exercise per Week: 0 days     Minutes of Exercise per Session: 0 min   Stress: No Stress Concern Present (6/11/2025)    [employer] - Occupational Stress Questionnaire     Feeling of Stress : Not at all    Housing Stability: Low Risk  (6/11/2025)    Housing Stability Vital Sign     Unable to Pay for Housing in the Last Year: No     Homeless in the Last Year: No   Recent Concern: Housing Stability - High Risk (3/27/2025)    Housing Stability Vital Sign     Unable to Pay for Housing in the Last Year: Yes     Homeless in the Last Year: No

## 2025-06-23 NOTE — ASSESSMENT & PLAN NOTE
Patient's blood pressure range in the last 24 hours was: BP  Min: 115/57  Max: 125/60.The patient's inpatient anti-hypertensive regimen is listed below:  Current Antihypertensives  , Every 12 hours, Oral  amLODIPine tablet 10 mg, Daily, Oral    Plan  - BP is controlled, no changes needed to their regimen

## 2025-06-23 NOTE — PROGRESS NOTES
[facility] Medicine  Progress Note    Patient Name: Kuldeep Villela  MRN: [id number]  Patient Class: IP- Inpatient   Admission Date: 6/9/2025  Length of Stay: 13 days  Attending Physician: Celine Yanez MD  Primary Care Provider: Ginna Musa MD        Subjective     Principal Problem:Compression fracture of T12 vertebra        HPI:  Kuldeep Villela is a 74 y.o.F with PMHx of arthritis, lumbar stenosis, HTN, HLD, depression who presents to [facility] [place] [place] ED for complaints of worsening low back/L flank pain for the last week. Endorses chronic low back pain for which she sees a specialist for, but has worsened recently. Denies any known injury to area. She reports she felt like it may be the beginning of a UTI so she started taking Azo at home for prevention. States she normally ambulates with a walker; however, the pain has been so severe that she has not been able to ambulate at all. Endorses bilateral lower extremity weakness. Denies bladder or bowel incontinence, denies numbness of lower extremities. Denies fever, chills, chest pain, palpitations, SOB, cough, abdominal pain, n/v/d, dysuria, headaches, or any other symptoms at this time.     In ED: afebrile, VSS on RA. CBC without leukocytosis, Hgb 11.1. CMP notable for Cr 1.5 (~1.0), otherwise unremarkable. UA non-infectious appearing. CT abd/pelvis with new compression deformity of T12, perinephric haziness and stranding, L>R, correlate with renal disease, small periumbilical abd wall hernia involving bowel without inflammatory or obstructive change. S/p rocephin, ketamine, toradol 15mg inj, oxy-acetaminophen 10mg, 1L IVF bolus in ED. TLSO brace ordered. Admitted to  for further evaluation.     Overview/Hospital Course:  Pt admitted for continued management of T12 compression fx. NSGY consulted, no acute intervention warranted at this time. MRI spine with sedation pending and scheduled for 6/12. MRI concerning for compression fracture with instability  and severe spinal canal stenosis concerning for cord edema. Recommended to stop mobility with PT/OT pending surgical stabilization of spine. She was taken to the OR 6/14/25 for  T10-L2 posterior fusion. Monitored post op with pain control. Started on IV abx while drains in place. PT/OT restarted with TLSO brace. XR spine showed stabilization. One drain removed 6/17/25 and second drain removed 6/18. Antibiotics stopped once drain removed. H&H monitored due to possible post op drop.     6/20- pain is better today, worked with PT and sitting in bedside chair. SNF acceptance received however needs repeat PT eval and authorization.    6/21- patient continues to complain of back pain. Discussed with neurosurgery, they will reassess patient, clarify instructions on the back brace and assess cecy. Patient was also seen taking additional pain medications from home. She is currently on similar dose  as home in and additional dilaudid 0.5mg q4hrly PRN    Interval History:   NAEON. Continues to complain of pain. Overall mentation is better today. No bowel movement since yesterday despite bowel regimen however her PO intake remains limited    Review of Systems   Constitutional:  Negative for chills and fever.   Respiratory:  Negative for cough.    Gastrointestinal:  Negative for abdominal pain, constipation, diarrhea and nausea.     Objective:     Vital Signs (Most Recent):  Temp: 97.5 °F (36.4 °C) (06/23/25 0729)  Pulse: 75 (06/23/25 0729)  Resp: 18 (06/23/25 0846)  BP: 118/73 (06/23/25 0729)  SpO2: 99 % (06/23/25 0729) Vital Signs (24h Range):  Temp:  [97.3 °F (36.3 °C)-98.2 °F (36.8 °C)] 97.5 °F (36.4 °C)  Pulse:  [75-90] 75  Resp:  [18-19] 18  SpO2:  [95 %-99 %] 99 %  BP: (115-125)/(57-73) 118/73     Weight: 107 kg (236 lb)  Body mass index is 44.59 kg/m².    Intake/Output Summary (Last 24 hours) at 6/23/2025 1022  Last data filed at 6/22/2025 1634  Gross per 24 hour   Intake 660 ml   Output 300 ml   Net 360 ml          Physical Exam  Constitutional:       Appearance: Normal appearance.   HENT:      Head: Normocephalic and atraumatic.      Nose: Nose normal.      Mouth/Throat:      Mouth: Mucous membranes are moist.   Eyes:      Extraocular Movements: Extraocular movements intact.      Pupils: Pupils are equal, round, and reactive to light.   Cardiovascular:      Rate and Rhythm: Normal rate and regular rhythm.      Heart sounds: No murmur heard.     No gallop.   Pulmonary:      Effort: Pulmonary effort is normal. No respiratory distress.      Breath sounds: Normal breath sounds. No wheezing.   Abdominal:      General: Abdomen is flat. Bowel sounds are normal. There is no distension.      Palpations: Abdomen is soft.      Tenderness: There is no abdominal tenderness.   Musculoskeletal:         General: No swelling. Normal range of motion.      Cervical back: Normal range of motion and neck supple.      Right lower leg: No edema.      Left lower leg: No edema.   Skin:     General: Skin is warm and dry.      Capillary Refill: Capillary refill takes less than 2 seconds.   Neurological:      General: No focal deficit present.      Mental Status: She is alert. Mental status is at baseline.      Motor: Weakness present.      Comments:   Weakness and ROM improving   Psychiatric:         Mood and Affect: Mood normal.           Significant Labs: All pertinent labs within the past 24 hours have been reviewed.    Significant Imaging: I have reviewed all pertinent imaging results/findings within the past 24 hours.      Assessment & Plan  Compression fracture of T12 vertebra  Acute on chronic back pain  Compression fracture of body of thoracic vertebra    74 y.o.F with new compression deformity of T12 and new inability to ambulate 2/2 pain. On admit, AFVSS, labs reviewed. CT a/p with: New compression deformity of T12 when compared to CT abdomen pelvis 03/22/2025. MRI T/L spine with sedation ordered. NSGY consulted. S/P T10-L2 posterior fusion  6/14      Plan:   - TLSO brace ordered   - NSGY consulted, appreciate recommendations  - MRI spine with sedation on 6/12, concerning for compression fracture with instability and severe spinal canal stenosis concerning for cord edema   -s/p T10-L2 posterior fusion 6/14  -stop abx, drains removed 6/18   - MM pain regimen initiated  - PT/OT with TLSO brace on  -bowel regimen increased; ensure has bowel movement  - fall precautions     Hypertension  Patient's blood pressure range in the last 24 hours was: BP  Min: 115/57  Max: 125/60.The patient's inpatient anti-hypertensive regimen is listed below:  Current Antihypertensives  , Every 12 hours, Oral  amLODIPine tablet 10 mg, Daily, Oral    Plan  - BP is controlled, no changes needed to their regimen    Acute on chronic anemia  Anemia is likely due to acute on chronic in light of surgical intervention and drain palcement. Most recent hemoglobin and hematocrit are listed below.  Recent Labs     06/21/25  0412 06/22/25  0643   HGB 8.3* 9.0*   HCT 25.7* 27.8*     Plan  - Monitor serial CBC: Daily  - Transfuse PRBC if patient becomes hemodynamically unstable, symptomatic or H/H drops below 7/21.  - Patient has not received any PRBC transfusions to date  - Patient's anemia is currently stable    Hyperlipemia  - continue home statin    Debility  Patient with Acute on chronic debility due to fracture/prosthesis. The patient's latest AMPAC (Activity Measure for Post Acute Care) Score is listed below.    AM-PAC Score - How much help does the patient need for each activity listed  Basic Mobility Total Score: 17  Turning over in bed (including adjusting bedclothes, sheets and blankets)?: None  Sitting down on and standing up from a chair with arms (e.g., wheelchair, bedside commode, etc.): A little  Moving from lying on back to sitting on the side of the bed?: A little  Moving to and from a bed to a chair (including a wheelchair)?: A little  Need to walk in hospital room?: A  little  Climbing 3-5 steps with a railing?: Unable    Plan  - PT/OT consulted  - Fall precautions in place  - PT/OT recommending moderate intensity therapy at [place]  -SNF acceptance received however needs repeat PT eval and authorization.    Depression with anxiety  Patient has recurrent depression which is mild and is currently controlled. Will Continue anti-depressant medications. We will not consult psychiatry at this time. Patient does not display psychosis at this time. Continue to monitor closely and adjust plan of care as needed.  - continue home medications    Hyperkalemia  -resolved with Lokelma     Tobacco dependency  Dangers of cigarette smoking were reviewed with patient in detail. Patient was Counseled for 3-10 minutes. Nicotine replacement options were discussed. Nicotine replacement was discussed- prescribed    VTE Risk Mitigation (From admission, onward)           Ordered     enoxaparin injection 40 mg  Every 12 hours         06/17/25 0951     IP VTE HIGH RISK PATIENT  Once         06/14/25 1204     Place sequential compression device  Until discontinued         06/10/25 0731                    Discharge Planning   MATT: 6/24/2025     Code Status: Full Code   Medical Readiness for Discharge Date: 6/19/2025  Discharge Plan A: Skilled Nursing Facility   Discharge Delays: None known at this time              Please place Justification for DME      Celine Yanez MD  Department of Hospital Medicine   [facility] Surg     Patient/Caregiver requests family/friend to interpret.

## 2025-06-23 NOTE — ASSESSMENT & PLAN NOTE
Anemia is likely due to acute on chronic in light of surgical intervention and drain palcement. Most recent hemoglobin and hematocrit are listed below.  Recent Labs     06/21/25  0412 06/22/25  0643   HGB 8.3* 9.0*   HCT 25.7* 27.8*     Plan  - Monitor serial CBC: Daily  - Transfuse PRBC if patient becomes hemodynamically unstable, symptomatic or H/H drops below 7/21.  - Patient has not received any PRBC transfusions to date  - Patient's anemia is currently stable

## 2025-06-23 NOTE — PLAN OF CARE
LEBRON telephoned [facility] [phone] and left a voice message for [facility] requesting a return regarding insurance auth.  Waiting for a return call.     2:12 PM  LEBRON spoke to [facility] [phone] regarding d/c placement.  Insurance auth was submitted on Friday.  Family signing paperwork on today.   Waiting for a response.      Request to escalate insurance auth sent to Leadership Team.      2:36 PM  Insurance auth received. Request processed on 6/20/25 @ 3:51pm for admission to SNF on 6/20. Auth: [id number]  [facility]    LEBRON notified [facility] of the above information.  Waiting for a return call with report information.      Kadie Jordan LMSW  Part-Time-  [facility]  Ext. [phone]

## 2025-06-23 NOTE — ASSESSMENT & PLAN NOTE
Patient with Acute on chronic debility due to fracture/prosthesis. The patient's latest AMPAC (Activity Measure for Post Acute Care) Score is listed below.    AM-PAC Score - How much help does the patient need for each activity listed  Basic Mobility Total Score: 17  Turning over in bed (including adjusting bedclothes, sheets and blankets)?: None  Sitting down on and standing up from a chair with arms (e.g., wheelchair, bedside commode, etc.): A little  Moving from lying on back to sitting on the side of the bed?: A little  Moving to and from a bed to a chair (including a wheelchair)?: A little  Need to walk in hospital room?: A little  Climbing 3-5 steps with a railing?: Unable    Plan  - PT/OT consulted  - Fall precautions in place  - PT/OT recommending moderate intensity therapy at [place]  -[facility] acceptance received however needs repeat PT eval and authorization.

## 2025-06-23 NOTE — PLAN OF CARE
NURSING HOME ORDERS    06/24/2025  [facility] - MED SURG  [address] [address]  Dept: [phone]  Loc: [phone]     Admit to Nursing Home:  Skilled Nursing Facility    Diagnoses:  Active Hospital Problems    Diagnosis  POA    *Compression fracture of T12 vertebra [S22.080A]  Yes    Acute on chronic anemia [D64.9]  Yes    Hyperkalemia [E87.5]  No    Compression fracture of body of thoracic vertebra [S22.000A]  Yes    Tobacco dependency [F17.200]  Yes    Depression with anxiety [F41.8]  Yes    Acute on chronic back pain [M54.9, G89.29]  Yes    Debility [R53.81]  Yes    Hyperlipemia [E78.5]  Yes    Hypertension [I10]  Yes      Resolved Hospital Problems    Diagnosis Date Resolved POA    MALACHI (acute kidney injury) [N17.9] 06/19/2025 Yes       Patient is homebound due to:  Compression fracture of T12 vertebra    Allergies:  Review of patient's allergies indicates:   Allergen Reactions    Penicillins Anxiety and Other (See Comments)    Anesthetic [benzocaine-aloe vera]      Jittery/hyper    Guaifenesin Anxiety and Other (See Comments)       Vitals:  Every shift    Diet: diabetic Diet    Activities:   Up in a chair each morning as tolerated, Ambulate with assistance to bathroom, and Activity as tolerated    TLSO in place while upright or out of bed     Goals of Care Treatment Preferences:  Code Status: Full Code      Labs:  twice weekly    Nursing Precautions:  Fall and Pressure ulcer prevention    Consults:   PT to evaluate and treat- 5 times a week, OT to evaluate and treat- 5 times a week, Wound Care, and Nutrition to evaluate and recommend diet     Miscellaneous Care: Routine Skin for Bedridden Patients:  Apply moisture barrier cream to all    Wound Care:      Wound 06/14/25 1122 Incision midline Back         Wound 06/19/25 2200 Non pressure chronic ulcer Left medial Buttocks #1      Ensure waffle mattress overlay is inflated to the proper air amount - perform  hand check to verify proper immersion. Fitted sheet only on waffle -- not over waffle and mattress Ensure Q2H turn protocol continues to be implemented.        Bilateral buttocks: Apply Triad correctly: Always cleanse wound before applying Triad. To remove Triad: Use pH-balanced wound cleanser to soften Triad Gently wipe without scrubbing For complete removal, repeat as needed. Gently spread Triad evenly over the area of application to the thickness of a dime.                Diabetes Care:  SN to perform and educate Diabetic management with blood glucose monitoring:, Fingerstick blood sugar a.m. and p.m., and Report CBG < 60 or > 350 to physician.      Medications: Discontinue all previous medication orders, if any. See new list below.     acetaminophen  1,000 mg Oral TID    amLODIPine  10 mg Oral Daily    busPIRone  15 mg Oral QID    calcium carbonate  500 mg Oral BID    cetirizine  10 mg Oral Daily    citalopram  40 mg Oral Daily    diclofenac sodium  2 g Topical (Top) TID    enoxparin  40 mg Subcutaneous Q12H (prophylaxis, 0900/2100)    lactulose  10 g Oral TID    LIDOcaine  2 patch Transdermal Q24H    methocarbamoL  750 mg Oral QID    polyethylene glycol  17 g Oral Daily    pravastatin  20 mg Oral Daily    rOPINIRole  1 mg Oral BID    senna-docusate  2 tablet Oral BID    sertraline  50 mg Oral Daily         Current Facility-Administered Medications:     albuterol, 2 puff, Inhalation, Q4H PRN    aluminum-magnesium hydroxide-simethicone, 30 mL, Oral, Q4H PRN    bacitracin, , Topical (Top), PRN    bisacodyL, 10 mg, Rectal, Daily PRN    dextrose 50%, 12.5 g, Intravenous, PRN    dextrose 50%, 25 g, Intravenous, PRN    glucagon (human recombinant), 1 mg, Intramuscular, PRN    glucose, 16 g, Oral, PRN    glucose, 24 g, Oral, PRN    magnesium hydroxide 400 mg/5 ml, 30 mL, Oral, Daily PRN    melatonin, 6 mg, Oral, Nightly PRN    naloxone, 0.02 mg, Intravenous, PRN    ondansetron, 8 mg, Oral, Q8H PRN    oxyCODONE, 5 mg,  Oral, Q4H PRN    oxyCODONE, 10 mg, Oral, Q4H PRN    polyethylene glycol, 17 g, Oral, Daily PRN    prochlorperazine, 5 mg, Intravenous, Q6H PRN    sodium chloride 0.9%, 10 mL, Intravenous, Q12H PRN      Immunizations Administered as of 6/24/2025       Name Date Dose VIS Date Route Exp Date    COVID-19, MRNA, LN-S, PF (Moderna) 6/30/2021 0.5 mL -- Intramuscular --    Site: Left arm     : [employer]     Lot: [id number]     Comment: Adminis     COVID-19, MRNA, LN-S, PF (Moderna) 6/2/2021 0.5 mL -- Intramuscular --    Site: Left arm     : [employer]     Lot: [id number]     Comment: Adminis               _________________________________  Elbert Sheets MD  06/24/2025

## 2025-06-23 NOTE — ASSESSMENT & PLAN NOTE
74 y.o.F with new compression deformity of T12 and new inability to ambulate 2/2 pain. On admit, AFVSS, labs reviewed. CT a/p with: New compression deformity of T12 when compared to CT abdomen pelvis 03/22/2025. MRI T/L spine with sedation ordered. NSGY consulted. S/P T10-L2 posterior fusion 6/14      Plan:   - TLSO brace ordered   - NSGY consulted, appreciate recommendations  - MRI spine with sedation on 6/12, concerning for compression fracture with instability and severe spinal canal stenosis concerning for cord edema   -s/p T10-L2 posterior fusion 6/14  -stop abx, drains removed 6/18   - MM pain regimen initiated  - PT/OT with TLSO brace on  -bowel regimen increased; ensure has bowel movement  - fall precautions

## 2025-06-23 NOTE — SUBJECTIVE & OBJECTIVE
Interval History:   NAEON. Continues to complain of pain. Overall mentation is better today. No bowel movement since yesterday despite bowel regimen however her PO intake remains limited    Review of Systems   Constitutional:  Negative for chills and fever.   Respiratory:  Negative for cough.    Gastrointestinal:  Negative for abdominal pain, constipation, diarrhea and nausea.     Objective:     Vital Signs (Most Recent):  Temp: 97.5 °F (36.4 °C) (06/23/25 0729)  Pulse: 75 (06/23/25 0729)  Resp: 18 (06/23/25 0846)  BP: 118/73 (06/23/25 0729)  SpO2: 99 % (06/23/25 0729) Vital Signs (24h Range):  Temp:  [97.3 °F (36.3 °C)-98.2 °F (36.8 °C)] 97.5 °F (36.4 °C)  Pulse:  [75-90] 75  Resp:  [18-19] 18  SpO2:  [95 %-99 %] 99 %  BP: (115-125)/(57-73) 118/73     Weight: 107 kg (236 lb)  Body mass index is 44.59 kg/m².    Intake/Output Summary (Last 24 hours) at 6/23/2025 1022  Last data filed at 6/22/2025 1634  Gross per 24 hour   Intake 660 ml   Output 300 ml   Net 360 ml         Physical Exam  Constitutional:       Appearance: Normal appearance.   HENT:      Head: Normocephalic and atraumatic.      Nose: Nose normal.      Mouth/Throat:      Mouth: Mucous membranes are moist.   Eyes:      Extraocular Movements: Extraocular movements intact.      Pupils: Pupils are equal, round, and reactive to light.   Cardiovascular:      Rate and Rhythm: Normal rate and regular rhythm.      Heart sounds: No murmur heard.     No gallop.   Pulmonary:      Effort: Pulmonary effort is normal. No respiratory distress.      Breath sounds: Normal breath sounds. No wheezing.   Abdominal:      General: Abdomen is flat. Bowel sounds are normal. There is no distension.      Palpations: Abdomen is soft.      Tenderness: There is no abdominal tenderness.   Musculoskeletal:         General: No swelling. Normal range of motion.      Cervical back: Normal range of motion and neck supple.      Right lower leg: No edema.      Left lower leg: No edema.    Skin:     General: Skin is warm and dry.      Capillary Refill: Capillary refill takes less than 2 seconds.   Neurological:      General: No focal deficit present.      Mental Status: She is alert. Mental status is at baseline.      Motor: Weakness present.      Comments:   Weakness and ROM improving   Psychiatric:         Mood and Affect: Mood normal.           Significant Labs: All pertinent labs within the past 24 hours have been reviewed.    Significant Imaging: I have reviewed all pertinent imaging results/findings within the past 24 hours.

## 2025-06-24 VITALS
SYSTOLIC BLOOD PRESSURE: 124 MMHG | OXYGEN SATURATION: 96 % | DIASTOLIC BLOOD PRESSURE: 56 MMHG | RESPIRATION RATE: 18 BRPM | BODY MASS INDEX: 44.56 KG/M2 | TEMPERATURE: 99 F | WEIGHT: 236 LBS | HEART RATE: 98 BPM | HEIGHT: 61 IN

## 2025-06-24 PROBLEM — T14.8XXA DEEP TISSUE INJURY: Status: ACTIVE | Noted: 2025-06-24

## 2025-06-24 LAB
ABSOLUTE EOSINOPHIL (OHS): 0.23 K/UL
ABSOLUTE MONOCYTE (OHS): 0.68 K/UL (ref 0.3–1)
ABSOLUTE NEUTROPHIL COUNT (OHS): 6.93 K/UL (ref 1.8–7.7)
ANION GAP (OHS): 10 MMOL/L (ref 8–16)
BASOPHILS # BLD AUTO: 0.06 K/UL
BASOPHILS NFR BLD AUTO: 0.6 %
BUN SERPL-MCNC: 15 MG/DL (ref 8–23)
CALCIUM SERPL-MCNC: 9.6 MG/DL (ref 8.7–10.5)
CHLORIDE SERPL-SCNC: 101 MMOL/L (ref 95–110)
CO2 SERPL-SCNC: 23 MMOL/L (ref 23–29)
CREAT SERPL-MCNC: 0.9 MG/DL (ref 0.5–1.4)
ERYTHROCYTE [DISTWIDTH] IN BLOOD BY AUTOMATED COUNT: 14 % (ref 11.5–14.5)
GFR SERPLBLD CREATININE-BSD FMLA CKD-EPI: >60 ML/MIN/1.73/M2
GLUCOSE SERPL-MCNC: 73 MG/DL (ref 70–110)
HCT VFR BLD AUTO: 26.7 % (ref 37–48.5)
HGB BLD-MCNC: 8.6 GM/DL (ref 12–16)
IMM GRANULOCYTES # BLD AUTO: 0.05 K/UL (ref 0–0.04)
IMM GRANULOCYTES NFR BLD AUTO: 0.5 % (ref 0–0.5)
LYMPHOCYTES # BLD AUTO: 1.5 K/UL (ref 1–4.8)
MCH RBC QN AUTO: 32 PG (ref 27–31)
MCHC RBC AUTO-ENTMCNC: 32.2 G/DL (ref 32–36)
MCV RBC AUTO: 99 FL (ref 82–98)
NUCLEATED RBC (/100WBC) (OHS): 0 /100 WBC
PLATELET # BLD AUTO: 432 K/UL (ref 150–450)
PMV BLD AUTO: 10.2 FL (ref 9.2–12.9)
POTASSIUM SERPL-SCNC: 4.2 MMOL/L (ref 3.5–5.1)
RBC # BLD AUTO: 2.69 M/UL (ref 4–5.4)
RELATIVE EOSINOPHIL (OHS): 2.4 %
RELATIVE LYMPHOCYTE (OHS): 15.9 % (ref 18–48)
RELATIVE MONOCYTE (OHS): 7.2 % (ref 4–15)
RELATIVE NEUTROPHIL (OHS): 73.4 % (ref 38–73)
SODIUM SERPL-SCNC: 134 MMOL/L (ref 136–145)
WBC # BLD AUTO: 9.45 K/UL (ref 3.9–12.7)

## 2025-06-24 PROCEDURE — 25000003 PHARM REV CODE 250: Performed by: STUDENT IN AN ORGANIZED HEALTH CARE EDUCATION/TRAINING PROGRAM

## 2025-06-24 PROCEDURE — 97535 SELF CARE MNGMENT TRAINING: CPT | Mod: CO

## 2025-06-24 PROCEDURE — 85025 COMPLETE CBC W/AUTO DIFF WBC: CPT | Performed by: STUDENT IN AN ORGANIZED HEALTH CARE EDUCATION/TRAINING PROGRAM

## 2025-06-24 PROCEDURE — 94761 N-INVAS EAR/PLS OXIMETRY MLT: CPT

## 2025-06-24 PROCEDURE — 99222 1ST HOSP IP/OBS MODERATE 55: CPT | Mod: ,,, | Performed by: NURSE PRACTITIONER

## 2025-06-24 PROCEDURE — 36415 COLL VENOUS BLD VENIPUNCTURE: CPT | Performed by: STUDENT IN AN ORGANIZED HEALTH CARE EDUCATION/TRAINING PROGRAM

## 2025-06-24 PROCEDURE — 97116 GAIT TRAINING THERAPY: CPT | Mod: CQ

## 2025-06-24 PROCEDURE — 97112 NEUROMUSCULAR REEDUCATION: CPT | Mod: CQ

## 2025-06-24 PROCEDURE — 25000003 PHARM REV CODE 250

## 2025-06-24 PROCEDURE — 97530 THERAPEUTIC ACTIVITIES: CPT | Mod: CO

## 2025-06-24 PROCEDURE — 80048 BASIC METABOLIC PNL TOTAL CA: CPT | Performed by: STUDENT IN AN ORGANIZED HEALTH CARE EDUCATION/TRAINING PROGRAM

## 2025-06-24 PROCEDURE — 97110 THERAPEUTIC EXERCISES: CPT | Mod: CQ

## 2025-06-24 RX ORDER — PRAVASTATIN SODIUM 20 MG/1
20 TABLET ORAL DAILY
Qty: 90 TABLET | Refills: 0 | Status: SHIPPED | OUTPATIENT
Start: 2025-06-24

## 2025-06-24 RX ORDER — POLYETHYLENE GLYCOL 3350 17 G/17G
17 POWDER, FOR SOLUTION ORAL DAILY
Qty: 510 G | Refills: 2 | Status: SHIPPED | OUTPATIENT
Start: 2025-06-24

## 2025-06-24 RX ORDER — AMOXICILLIN 250 MG
1 CAPSULE ORAL 2 TIMES DAILY
Qty: 60 TABLET | Refills: 2 | Status: SHIPPED | OUTPATIENT
Start: 2025-06-24

## 2025-06-24 RX ORDER — BISACODYL 10 MG/1
10 SUPPOSITORY RECTAL DAILY PRN
Qty: 10 SUPPOSITORY | Refills: 0 | Status: SHIPPED | OUTPATIENT
Start: 2025-06-24 | End: 2025-07-24

## 2025-06-24 RX ORDER — OXYCODONE AND ACETAMINOPHEN 10; 325 MG/1; MG/1
1 TABLET ORAL EVERY 4 HOURS PRN
Qty: 35 TABLET | Refills: 0 | Status: SHIPPED | OUTPATIENT
Start: 2025-06-24 | End: 2025-06-24

## 2025-06-24 RX ORDER — ACETAMINOPHEN 325 MG/1
650 TABLET ORAL EVERY 6 HOURS PRN
Status: DISCONTINUED | OUTPATIENT
Start: 2025-06-24 | End: 2025-06-24

## 2025-06-24 RX ORDER — OXYCODONE AND ACETAMINOPHEN 10; 325 MG/1; MG/1
1 TABLET ORAL EVERY 4 HOURS PRN
Qty: 35 TABLET | Refills: 0
Start: 2025-06-24

## 2025-06-24 RX ORDER — LIDOCAINE 50 MG/G
2 PATCH TOPICAL DAILY
Qty: 60 PATCH | Refills: 0 | Status: SHIPPED | OUTPATIENT
Start: 2025-06-24

## 2025-06-24 RX ORDER — AMLODIPINE BESYLATE 10 MG/1
10 TABLET ORAL DAILY
Qty: 90 TABLET | Refills: 0 | Status: SHIPPED | OUTPATIENT
Start: 2025-06-24

## 2025-06-24 RX ADMIN — OXYCODONE HYDROCHLORIDE 10 MG: 10 TABLET ORAL at 09:06

## 2025-06-24 RX ADMIN — OXYCODONE HYDROCHLORIDE 10 MG: 10 TABLET ORAL at 01:06

## 2025-06-24 RX ADMIN — METHOCARBAMOL 750 MG: 750 TABLET ORAL at 04:06

## 2025-06-24 RX ADMIN — OXYCODONE HYDROCHLORIDE 10 MG: 10 TABLET ORAL at 05:06

## 2025-06-24 RX ADMIN — PRAVASTATIN SODIUM 20 MG: 10 TABLET ORAL at 09:06

## 2025-06-24 RX ADMIN — OXYCODONE HYDROCHLORIDE 10 MG: 10 TABLET ORAL at 02:06

## 2025-06-24 RX ADMIN — METHOCARBAMOL 750 MG: 750 TABLET ORAL at 12:06

## 2025-06-24 RX ADMIN — ACETAMINOPHEN 1000 MG: 500 TABLET ORAL at 02:06

## 2025-06-24 RX ADMIN — BUSPIRONE HYDROCHLORIDE 15 MG: 10 TABLET ORAL at 12:06

## 2025-06-24 RX ADMIN — CALCIUM CARBONATE (ANTACID) CHEW TAB 500 MG 500 MG: 500 CHEW TAB at 09:06

## 2025-06-24 RX ADMIN — CITALOPRAM HYDROBROMIDE 40 MG: 20 TABLET ORAL at 08:06

## 2025-06-24 RX ADMIN — ROPINIROLE HYDROCHLORIDE 1 MG: 1 TABLET, FILM COATED ORAL at 08:06

## 2025-06-24 RX ADMIN — BUSPIRONE HYDROCHLORIDE 15 MG: 10 TABLET ORAL at 04:06

## 2025-06-24 RX ADMIN — METHOCARBAMOL 750 MG: 750 TABLET ORAL at 08:06

## 2025-06-24 RX ADMIN — BUSPIRONE HYDROCHLORIDE 15 MG: 10 TABLET ORAL at 08:06

## 2025-06-24 RX ADMIN — ACETAMINOPHEN 1000 MG: 500 TABLET ORAL at 05:06

## 2025-06-24 RX ADMIN — DICLOFENAC SODIUM 2 G: 10 GEL TOPICAL at 09:06

## 2025-06-24 RX ADMIN — SERTRALINE HYDROCHLORIDE 50 MG: 50 TABLET ORAL at 08:06

## 2025-06-24 RX ADMIN — CETIRIZINE HYDROCHLORIDE 10 MG: 10 TABLET, FILM COATED ORAL at 08:06

## 2025-06-24 RX ADMIN — AMLODIPINE BESYLATE 10 MG: 10 TABLET ORAL at 08:06

## 2025-06-24 NOTE — PLAN OF CARE
[facility] - Med Surg      HOME HEALTH ORDERS  FACE TO FACE ENCOUNTER    Patient Name: Kuldeep Villela  YOB: 1950    PCP: Ginna Musa MD   PCP Address: [address] [place] [address]  PCP Phone Number: [phone]  PCP Fax: [phone]    Encounter Date: 6/9/25    Admit to Home Health    Diagnoses:  Active Hospital Problems    Diagnosis  POA    *Compression fracture of T12 vertebra [S22.080A]  Yes    Acute on chronic anemia [D64.9]  Yes    Hyperkalemia [E87.5]  No    Compression fracture of body of thoracic vertebra [S22.000A]  Yes    Tobacco dependency [F17.200]  Yes    Depression with anxiety [F41.8]  Yes    Acute on chronic back pain [M54.9, G89.29]  Yes    Debility [R53.81]  Yes    Hyperlipemia [E78.5]  Yes    Hypertension [I10]  Yes      Resolved Hospital Problems    Diagnosis Date Resolved POA    MALACHI (acute kidney injury) [N17.9] 06/19/2025 Yes       Follow Up Appointments:  Future Appointments   Date Time Provider Department Center   6/26/2025 11:00 AM Nnamdi Head MD [facility]   7/29/2025 10:00 AM Lore Espinal PA-C [facility]   8/15/2025  3:40 PM Donny Esquivel MD [facility]       Allergies:  Review of patient's allergies indicates:   Allergen Reactions    Penicillins Anxiety and Other (See Comments)    Anesthetic [benzocaine-aloe vera]      Jittery/hyper    Guaifenesin Anxiety and Other (See Comments)       Medications: Review discharge medications with patient and family and provide education.    Current Medications[1]     Medication List        PAUSE taking these medications      carvediloL 6.25 MG tablet  Wait to take this until your doctor or other care provider tells you to start again.  Commonly known as: COREG  Take 1 tablet by mouth every 12 (twelve) hours.     estrogens(esterified)-methyltestosterone 0.625-1.25mg per tablet  Wait to take this until your doctor or other care provider tells you to start  again.  Commonly known as: ESTRATEST HS  Take 1 tablet by mouth.     furosemide 20 MG tablet  Wait to take this until your doctor or other care provider tells you to start again.  Commonly known as: LASIX  Take 1 tablet (20 mg total) by mouth 2 (two) times a day. for 2 days     topiramate 50 MG tablet  Wait to take this until your doctor or other care provider tells you to start again.  Commonly known as: TOPAMAX  TAKE 1 TABLET(50 MG) BY MOUTH EVERY 12 HOURS            START taking these medications      acetaminophen 500 MG tablet  Commonly known as: TYLENOL  Take 2 tablets (1,000 mg total) by mouth 3 (three) times daily.     bacitracin 500 unit/gram ointment  Apply topically as needed (apply to surgical site).     bisacodyL 10 mg Supp  Commonly known as: DULCOLAX  Place 1 suppository (10 mg total) rectally daily as needed.     lactulose 20 gram/30 mL Soln  Commonly known as: CHRONULAC  Take 15 mLs (10 g total) by mouth 3 (three) times daily.     LIDOcaine 5 %  Commonly known as: LIDODERM  Place 2 patches onto the skin once daily. Remove & Discard patch within 12 hours or as directed by MD     methocarbamoL 750 MG Tab  Commonly known as: ROBAXIN  Take 1 tablet (750 mg total) by mouth 4 (four) times daily. for 10 days     polyethylene glycol 17 gram Pwpk  Commonly known as: GLYCOLAX  Take 17 g by mouth once daily.     senna-docusate 8.6-50 mg per tablet  Commonly known as: PERICOLACE  Take 2 tablets by mouth 2 (two) times daily.            CHANGE how you take these medications      linaCLOtide 290 mcg Cap capsule  Commonly known as: LINZESS  Take 1 capsule (290 mcg total) by mouth before breakfast.  What changed: Another medication with the same name was removed. Continue taking this medication, and follow the directions you see here.            CONTINUE taking these medications      albuterol 90 mcg/actuation inhaler  Commonly known as: PROVENTIL/VENTOLIN HFA  Inhale 1-2 puffs into the lungs every 4 (four) hours as  needed for Wheezing. Rescue     amLODIPine 10 MG tablet  Commonly known as: NORVASC  Take 1 tablet (10 mg total) by mouth once daily.     busPIRone 15 MG tablet  Commonly known as: BUSPAR  Take 1 tablet (15 mg total) by mouth 4 (four) times daily.     cetirizine 10 MG tablet  Commonly known as: ZYRTEC  TAKE 1 TABLET BY MOUTH EVERY DAY     citalopram 40 MG tablet  Commonly known as: CeleXA  TAKE 1 TABLET(40 MG) BY MOUTH EVERY DAY     cyclobenzaprine 10 MG tablet  Commonly known as: FLEXERIL  Take 1 tablet (10 mg total) by mouth 3 (three) times daily as needed for Muscle spasms.     diclofenac sodium 1 % Gel  Commonly known as: VOLTAREN  Apply 2 g topically 3 (three) times daily as needed (apply to painful joints).     meloxicam 7.5 MG tablet  Commonly known as: MOBIC  Take 1 tablet (7.5 mg total) by mouth 2 (two) times a day.     naloxone 4 mg/actuation Spry  Commonly known as: NARCAN  4mg by nasal route as needed for opioid overdose; may repeat every 2-3 minutes in alternating nostrils until medical help arrives. Call 911     oxyCODONE-acetaminophen  mg per tablet  Commonly known as: PERCOCET  Take 1 tablet by mouth every 4 (four) hours as needed for Pain (maximum 5 tablets per day).     pravastatin 20 MG tablet  Commonly known as: PRAVACHOL  TAKE 1 TABLET(20 MG) BY MOUTH DAILY     rOPINIRole 1 MG tablet  Commonly known as: REQUIP  Take 1 tablet (1 mg total) by mouth 2 (two) times daily.     sertraline 50 MG tablet  Commonly known as: ZOLOFT  Take 1 tablet (50 mg total) by mouth once daily.     walker Misc  4 wheeled walker for mobility            STOP taking these medications      azelastine 137 mcg (0.1 %) nasal spray  Commonly known as: ASTELIN     fluticasone propionate 50 mcg/actuation nasal spray  Commonly known as: FLONASE     MOVANTIK 25 mg tablet  Generic drug: naloxegoL                I have seen and examined this patient within the last 30 days. My clinical findings that support the need for the  home health skilled services and home bound status are the following:no   Weakness/numbness causing balance and gait disturbance due to Weakness/Debility making it taxing to leave home.     Diet:   regular diet    Labs:  CBC in 1 week.    Referrals/ Consults  Physical Therapy to evaluate and treat. Evaluate for home safety and equipment needs; Establish/upgrade home exercise program. Perform / instruct on therapeutic exercises, gait training, transfer training, and Range of Motion.  Occupational Therapy to evaluate and treat. Evaluate home environment for safety and equipment needs. Perform/Instruct on transfers, ADL training, ROM, and therapeutic exercises.    Activities:   activity as tolerated    Nursing:   Agency to admit patient within 24 hours of hospital discharge unless specified on physician order or at patient request    SN to complete comprehensive assessment including routine vital signs. Instruct on disease process and s/s of complications to report to MD. Review/verify medication list sent home with the patient at time of discharge  and instruct patient/caregiver as needed. Frequency may be adjusted depending on start of care date.     Skilled nurse to perform up to 3 visits PRN for symptoms related to diagnosis    Notify MD if SBP > 160 or < 90; DBP > 90 or < 50; HR > 120 or < 50; Temp > 101; O2 < 88%; Other:       Ok to schedule additional visits based on staff availability and patient request on consecutive days within the home health episode.    When multiple disciplines ordered:    Start of Care occurs on Sunday - Wednesday schedule remaining discipline evaluations as ordered on separate consecutive days following the start of care.    Thursday SOC -schedule subsequent evaluations Friday and Monday the following week.     Friday - Saturday SOC - schedule subsequent discipline evaluations on consecutive days starting Monday of the following week.    For all post-discharge communication and  subsequent orders please contact patient's primary care physician.     Miscellaneous   N/a    Home Health Aide:  Physical Therapy Three times weekly and Occupational Therapy Three times weekly    Wound Care Orders  yes:  Pressure Ulcer(s) Stage II :   Location: Sacrum  Apply Miconzazole:  Barrier ointment                       Frequency:  Daily                             If incontinent of stool or urine, apply thin layer Barrier cream                   twice daily and PRN to wound         Pressure relief measure:  for pressure redistribution             I certify that this patient is confined to her home and needs intermittent skilled nursing care, physical therapy, and occupational therapy.              [1]   Current Facility-Administered Medications   Medication Dose Route Frequency Provider Last Rate Last Admin    acetaminophen tablet 1,000 mg  1,000 mg Oral TID Tsering Perez PA-C   1,000 mg at 06/24/25 0549    albuterol inhaler 2 puff  2 puff Inhalation Q4H PRN Tsering Perez PA-C        aluminum-magnesium hydroxide-simethicone 200-200-20 mg/5 mL suspension 30 mL  30 mL Oral Q4H PRN Ayde Manley MD        amLODIPine tablet 10 mg  10 mg Oral Daily Tsering Perez PA-C   10 mg at 06/24/25 0859    bacitracin ointment   Topical (Top) PRN Modesto Ward MD        bisacodyL suppository 10 mg  10 mg Rectal Daily PRN Cintia Anton MD        busPIRone tablet 15 mg  15 mg Oral QID Tsering Perez PA-C   15 mg at 06/24/25 1206    calcium carbonate 200 mg calcium (500 mg) chewable tablet 500 mg  500 mg Oral BID Ayde Manley MD   500 mg at 06/24/25 0900    cetirizine tablet 10 mg  10 mg Oral Daily Tsering Perez PA-C   10 mg at 06/24/25 0859    citalopram tablet 40 mg  40 mg Oral Daily Tsering Perez PA-C   40 mg at 06/24/25 0859    dextrose 50% injection 12.5 g  12.5 g Intravenous PRN Tsering Perez PA-C        dextrose 50% injection 25 g  25 g Intravenous PRN Ana  MOE Cagle        diclofenac sodium 1 % gel 2 g  2 g Topical (Top) TID Cintia Anton MD   2 g at 06/24/25 0939    enoxaparin injection 40 mg  40 mg Subcutaneous Q12H (prophylaxis, 0900/2100) Cintia Anton MD   40 mg at 06/23/25 2143    glucagon (human recombinant) injection 1 mg  1 mg Intramuscular PRN Tsering Perez PA-C        glucose chewable tablet 16 g  16 g Oral PRN Tsering Perez PA-C        glucose chewable tablet 24 g  24 g Oral PRN Tsering Perez PA-C        lactulose 20 gram/30 mL solution Soln 10 g  10 g Oral TID Cintia Anton MD   10 g at 06/22/25 0938    LIDOcaine 5 % patch 2 patch  2 patch Transdermal Q24H Celine Yanez MD   2 patch at 06/22/25 0939    magnesium hydroxide 400 mg/5 ml suspension 2,400 mg  30 mL Oral Daily PRN Cintia Anton MD        melatonin tablet 6 mg  6 mg Oral Nightly PRN Tsering Perez PA-C   6 mg at 06/22/25 2148    methocarbamoL tablet 750 mg  750 mg Oral QID Tsering Perez PA-C   750 mg at 06/24/25 1206    naloxone 0.4 mg/mL injection 0.02 mg  0.02 mg Intravenous PRN Tsering Perez PA-C        ondansetron disintegrating tablet 8 mg  8 mg Oral Q8H PRN Tsering Perez PA-C        oxyCODONE immediate release tablet 5 mg  5 mg Oral Q4H PRN Ayde Manley MD   5 mg at 06/20/25 2342    oxyCODONE immediate release tablet Tab 10 mg  10 mg Oral Q4H PRN Ayde Manley MD   10 mg at 06/24/25 0928    polyethylene glycol packet 17 g  17 g Oral Daily PRN Tsering Perez PA-C   17 g at 06/11/25 1408    polyethylene glycol packet 17 g  17 g Oral Daily Cintia Anton MD   17 g at 06/23/25 0852    pravastatin tablet 20 mg  20 mg Oral Daily Tsering Perez PA-C   20 mg at 06/24/25 0900    prochlorperazine injection Soln 5 mg  5 mg Intravenous Q6H PRN Tsering Perez PA-C        rOPINIRole tablet 1 mg  1 mg Oral BID Tsering Perez PA-C   1 mg at 06/24/25 0859    senna-docusate 8.6-50 mg per tablet 2 tablet  2 tablet Oral BID  Ayde Manley MD   2 tablet at 06/23/25 2145    sertraline tablet 50 mg  50 mg Oral Daily Tsering Perez PA-C   50 mg at 06/24/25 0859    sodium chloride 0.9% flush 10 mL  10 mL Intravenous Q12H PRN Tsering Perez PA-C

## 2025-06-24 NOTE — SUBJECTIVE & OBJECTIVE
Scheduled Meds:   acetaminophen  1,000 mg Oral TID    amLODIPine  10 mg Oral Daily    busPIRone  15 mg Oral QID    calcium carbonate  500 mg Oral BID    cetirizine  10 mg Oral Daily    citalopram  40 mg Oral Daily    diclofenac sodium  2 g Topical (Top) TID    enoxparin  40 mg Subcutaneous Q12H (prophylaxis, 0900/2100)    lactulose  10 g Oral TID    LIDOcaine  2 patch Transdermal Q24H    methocarbamoL  750 mg Oral QID    polyethylene glycol  17 g Oral Daily    pravastatin  20 mg Oral Daily    rOPINIRole  1 mg Oral BID    senna-docusate  2 tablet Oral BID    sertraline  50 mg Oral Daily     Continuous Infusions:  PRN Meds:  Current Facility-Administered Medications:     albuterol, 2 puff, Inhalation, Q4H PRN    aluminum-magnesium hydroxide-simethicone, 30 mL, Oral, Q4H PRN    bacitracin, , Topical (Top), PRN    bisacodyL, 10 mg, Rectal, Daily PRN    dextrose 50%, 12.5 g, Intravenous, PRN    dextrose 50%, 25 g, Intravenous, PRN    glucagon (human recombinant), 1 mg, Intramuscular, PRN    glucose, 16 g, Oral, PRN    glucose, 24 g, Oral, PRN    magnesium hydroxide 400 mg/5 ml, 30 mL, Oral, Daily PRN    melatonin, 6 mg, Oral, Nightly PRN    naloxone, 0.02 mg, Intravenous, PRN    ondansetron, 8 mg, Oral, Q8H PRN    oxyCODONE, 5 mg, Oral, Q4H PRN    oxyCODONE, 10 mg, Oral, Q4H PRN    polyethylene glycol, 17 g, Oral, Daily PRN    prochlorperazine, 5 mg, Intravenous, Q6H PRN    sodium chloride 0.9%, 10 mL, Intravenous, Q12H PRN    Review of patient's allergies indicates:   Allergen Reactions    Penicillins Anxiety and Other (See Comments)    Anesthetic [benzocaine-aloe vera]      Jittery/hyper    Guaifenesin Anxiety and Other (See Comments)        Past Medical History:   Diagnosis Date    Arthritis     Asthma     Cervical spondylosis 07/13/2012    Chronic LBP 07/13/2012    Chronic neck pain 07/13/2012    Debility     Hyperlipidemia     Hypertension     Lumbar radiculopathy, BLE 07/13/2012    Lumbar spinal stenosis at L4-L5.  07/13/2012    Lumbar spondylosis 07/13/2012    Morbid obesity 07/13/2012    Primary osteoarthritis of both knees 07/13/2012    Spondylolisthesis, grade 1 at L4-L5. 07/13/2012    Tenosynovitis of ankle     Rt peroneus longus    Walker as ambulation aid      Past Surgical History:   Procedure Laterality Date    CHOLECYSTECTOMY      HYSTERECTOMY      MAGNETIC RESONANCE IMAGING Bilateral 6/12/2025    Procedure: MRI (Magnetic Resonance Imagine);  Surgeon: Sameera Villalba;  [facility]: [facility];  Service: Anesthesiology;  Laterality: Bilateral;    AZ REMOVAL OF OVARY/TUBE(S)      THORACIC LAMINECTOMY WITH FUSION N/A 6/14/2025    Procedure: T12 LAMINECTOMY, SPINE, THORACIC, W/ ARTHRODESIS T10-L2;  Surgeon: Nnamdi Head MD;  Location: [facility] OR [address] [place] [address];  Service: Neurosurgery;  Laterality: N/A;       Family History       Problem Relation (Age of Onset)    Heart disease Mother, Father    Hypertension Mother, Father          Tobacco Use    Smoking status: Every Day     Current packs/day: 1.00     Average packs/day: 1 pack/day for 20.0 years (20.0 ttl pk-yrs)     Types: Cigarettes    Smokeless tobacco: Never   Substance and Sexual Activity    Alcohol use: No     Alcohol/week: 0.0 standard drinks of alcohol    Drug use: No    Sexual activity: Not Currently     Partners: Male     Birth control/protection: See Surgical Hx     Review of Systems   Skin:  Positive for wound.       Objective:     Vital Signs (Most Recent):  Temp: 98.4 °F (36.9 °C) (06/24/25 1154)  Pulse: 101 (06/24/25 1154)  Resp: 18 (06/24/25 1346)  BP: (!) 140/64 (06/24/25 1154)  SpO2: 98 % (06/24/25 1154) Vital Signs (24h Range):  Temp:  [97.3 °F (36.3 °C)-98.4 °F (36.9 °C)] 98.4 °F (36.9 °C)  Pulse:  [] 101  Resp:  [18] 18  SpO2:  [96 %-99 %] 98 %  BP: (109-140)/(57-69) 140/64     Weight: 107 kg (236 lb)  Body mass index is 44.59 kg/m².     Physical Exam  Constitutional:       Appearance: Normal appearance.   Skin:     General: Skin is warm and dry.       Findings: Lesion present.   Neurological:      Mental Status: She is alert.          Laboratory:  All pertinent labs reviewed within the last 24 hours.    Diagnostic Results:  None

## 2025-06-24 NOTE — NURSING
Patient received discharge instructions and verbalized understanding. IV discontinued with catheter tip intact. Medications delivered to bedside. Patient's home medications returned to patient. Bath chair delivered to bedside. [facility] to transport patient home.

## 2025-06-24 NOTE — ASSESSMENT & PLAN NOTE
Anemia is likely due to acute on chronic in light of surgical intervention and drain palcement. Most recent hemoglobin and hematocrit are listed below.  Recent Labs     06/22/25  0643 06/23/25  0830 06/24/25  0623   HGB 9.0* 9.0* 8.6*   HCT 27.8* 28.1* 26.7*     Plan  - Monitor serial CBC: Daily  - Transfuse PRBC if patient becomes hemodynamically unstable, symptomatic or H/H drops below 7/21.  - Patient has not received any PRBC transfusions to date  - Patient's anemia is currently stable

## 2025-06-24 NOTE — PLAN OF CARE
06/24/25 1608   Post-Acute Status   Post-Acute Authorization Placement;HME;Home Health   Post-Acute Placement Status Patient declined/refused   HME Status (!) Pending Delivery   Home Health Status Set-up Complete/Auth obtained   Hospital Resources/Appts/Education Provided Appointments scheduled and added to AVS;Post-Acute resouces added to AVS;Provided patient/caregiver with written discharge plan information   Discharge Delays None known at this time   Discharge Plan   Discharge Plan A Home;Home with family;Home Health   Discharge Plan B Skilled Nursing Facility     LEBRON  spoke with Pt's daughter Kamari [phone] to discuss discharge plan with both Pt and daughter. They both agreed to receive [facility] services. Pt daughter was provided with senior living resources and PCA services.Pt will be transported via room air with stretcher. Pt  will receive shower chair in room. Spoke with Noel with [facility], and informed him that Pt will be transported with her shower chair he approved the transportation    Discharge Plan A and Plan B have been determined by review of patient's clinical status, future medical and therapeutic needs, and coverage/benefits for post-acute care in coordination with multidisciplinary team members.     Maxwell Fitch Jr. MSW CSW CIT  /Case Manger  [phone]

## 2025-06-24 NOTE — PLAN OF CARE
LEBRON spoke to pt's daughter, Kamari [phone], regarding d/c plan.  Pt's daughter stated she completed paperwork and waiting for a return call from [facility] to make payment.  LEBRON telephoned [facility] [phone] and left a message requesting a return call to discuss admit today.      11:52 AM  [facility] unable to accept pateint.  Pt is refusing [facility] for SNF placement.  Pt did not want to select any other SNF facility.  Pt has decided she no longer wants SNF and would prefer to go home with HH and is agreeable to plan    Patient/family provided list of facilities in-network with patient's payor plan. Providers that are owned, operated, or affiliated with [facility] are included on the list.     Notified that referral sent to below listed facilities from in-network list based on proximity to home/family support:   [facility]    Patient/family instructed to identify preference.    Preferred Facility: (if more than 1, listed in order of descending preference)  [facility]    If an additional preferred facility not listed above is identified, additional referral to be sent. If above facilities unable to accept, will send additional referrals to in-network providers.     [facility] unable to accept patient.  LEBRON spoke to Kathleen w/ [facility] [phone] and was advised they are able to accept patient.  LEBRON also provided pt's daughter with contact information for waiver program and other senior resources.  Pt will need transportation home.  Will deliver shower chair to pt's room.    Discharge Plan A and Plan B have been determined by review of patient's clinical status, future medical and therapeutic needs, and coverage/benefits for post-acute care in coordination with multidisciplinary team members.    Kadie Jordna LMSW  Part-Time-  [facility]  Ext. [phone]

## 2025-06-24 NOTE — ASSESSMENT & PLAN NOTE
- consult received for evaluation of skin injury.  - pt [place] [facility] [place] for complaints of worsening low back/L flank pain for the last week.  - sacrum with areas of intact non blanchable purple/maroon discoloration and partial thickness tissue loss.  - continue treatment per wound care RN recs.  - Umano surface.  - external urinary catheter in place.  - wedge for offloading.  - turn q2h.  - madison score is 19 with a nutrition sub scale score of 3.  - nursing to maintain pressure injury prevention measures and continue wound care per orders.

## 2025-06-24 NOTE — PLAN OF CARE
Recommendations    1. Continue regular diet as tolerated     - please continue to document PO % intake via flowsheets       2. Encourage good intake    3. Nursing: please re-weight pt. No new wt since admit date  4. RD to monitor weight, labs, intake, tolerance    Goals:     1. % nutritional needs met with diet during admission     2. Maintain weight during admission    3. Display s/s of wound healing during admission    Nutrition Goal Status: goal not met  Communication of RD Recs:  (POC)    Nutrition Discharge Planning    Nutrition Discharge Planning: General healthy diet

## 2025-06-24 NOTE — HPI
Kuldeep Villela is a 74 year old female with PMHx of arthritis, lumbar stenosis, HTN, HLD, depression who presents to [facility] ED for complaints of worsening low back/L flank pain for the last week. Endorses chronic low back pain for which she sees a specialist for, but has worsened recently. Denies any known injury to area. She reports she felt like it may be the beginning of a UTI so she started taking Azo at home for prevention. States she normally ambulates with a walker; however, the pain has been so severe that she has not been able to ambulate at all. Endorses bilateral lower extremity weakness. Denies bladder or bowel incontinence, denies numbness of lower extremities. Denies fever, chills, chest pain, palpitations, SOB, cough, abdominal pain, n/v/d, dysuria, headaches, or any other symptoms at this time.      In ED: afebrile, VSS on RA. CBC without leukocytosis, Hgb 11.1. CMP notable for Cr 1.5 (~1.0), otherwise unremarkable. UA non-infectious appearing. CT abd/pelvis with new compression deformity of T12, perinephric haziness and stranding, L>R, correlate with renal disease, small periumbilical abd wall hernia involving bowel without inflammatory or obstructive change. S/p rocephin, ketamine, toradol 15mg inj, oxy-acetaminophen 10mg, 1L IVF bolus in ED. TLSO brace ordered. Admitted to  for further evaluation. Skin integrity LYUDMILA consulted for evaluation of skin injury.

## 2025-06-24 NOTE — DISCHARGE SUMMARY
[facility] Medicine  Discharge Summary      Patient Name: Kuldeep Villela  MRN: [id number]  CARISA: [id number]  Patient Class: IP- Inpatient  Admission Date: 6/9/2025  Hospital Length of Stay: 14 days  Discharge Date and Time: 06/24/2025 2:33 PM  Attending Physician: Elbert Sheets MD   Discharging Provider: Elbert Sheets MD  Primary Care Provider: Ginna Musa MD  [facility] Medicine Team: [facility] Elbert Sheets MD  Primary Care Team: [facility]    HPI:   Kuldeep Villela is a 74 y.o.F with PMHx of arthritis, lumbar stenosis, HTN, HLD, depression who presents to [facility] ED for complaints of worsening low back/L flank pain for the last week. Endorses chronic low back pain for which she sees a specialist for, but has worsened recently. Denies any known injury to area. She reports she felt like it may be the beginning of a UTI so she started taking Azo at home for prevention. States she normally ambulates with a walker; however, the pain has been so severe that she has not been able to ambulate at all. Endorses bilateral lower extremity weakness. Denies bladder or bowel incontinence, denies numbness of lower extremities. Denies fever, chills, chest pain, palpitations, SOB, cough, abdominal pain, n/v/d, dysuria, headaches, or any other symptoms at this time.     In ED: afebrile, VSS on RA. CBC without leukocytosis, Hgb 11.1. CMP notable for Cr 1.5 (~1.0), otherwise unremarkable. UA non-infectious appearing. CT abd/pelvis with new compression deformity of T12, perinephric haziness and stranding, L>R, correlate with renal disease, small periumbilical abd wall hernia involving bowel without inflammatory or obstructive change. S/p rocephin, ketamine, toradol 15mg inj, oxy-acetaminophen 10mg, 1L IVF bolus in ED. TLSO brace ordered. Admitted to  for further evaluation.     Procedure(s) (LRB):  T12 LAMINECTOMY, SPINE, THORACIC, W/ ARTHRODESIS T10-L2 (N/A)      Hospital Course:   Pt admitted for  continued management of T12 compression fx. NSGY consulted, no acute intervention warranted at this time. MRI spine with sedation pending and scheduled for 6/12. MRI concerning for compression fracture with instability and severe spinal canal stenosis concerning for cord edema. Recommended to stop mobility with PT/OT pending surgical stabilization of spine. She was taken to the OR 6/14/25 for  T10-L2 posterior fusion. Monitored post op with pain control. Started on IV abx while drains in place. PT/OT restarted with TLSO brace. XR spine showed stabilization. One drain removed 6/17/25 and second drain removed 6/18. Antibiotics stopped once drain removed. H&H monitored due to possible post op drop.     6/20- pain is better today, worked with PT and sitting in bedside chair. SNF acceptance received however needs repeat PT eval and authorization.    6/21- patient continues to complain of back pain. Discussed with neurosurgery, they will reassess patient, clarify instructions on the back brace and assess cecy. Patient was also seen taking additional pain medications from home. She is currently on similar dose  as home in and additional dilaudid 0.5mg q4hrly PRN    Patient accepted to SNF, but now refusing and requesting discharge home instead. Plan for discharge home with HH.     Goals of Care Treatment Preferences:  Code Status: Full Code         Consults:   Consults (From admission, onward)          Status Ordering Provider     Inpatient consult to Skin Integrity  Practitioner  Once        Provider:  Judith Taylor NP    Completed ANN SULLIVAN     Inpatient consult to Midline team  Once        Provider:  (Not yet assigned)    Completed ANN SULLIVAN     Inpatient consult to Anesthesiology  Once        Provider:  (Not yet assigned)    Acknowledged MISA GUTIERREZ     Inpatient consult to Neurosurgery  Once        Provider:  (Not yet assigned)    Completed MISA GUTIERREZ          Physical Exam  Constitutional:        Appearance: Normal appearance.   HENT:      Head: Normocephalic and atraumatic.      Nose: Nose normal.      Mouth/Throat:      Mouth: Mucous membranes are moist.   Eyes:      Extraocular Movements: Extraocular movements intact.      Pupils: Pupils are equal, round, and reactive to light.   Cardiovascular:      Rate and Rhythm: Normal rate and regular rhythm.      Heart sounds: No murmur heard.     No gallop.   Pulmonary:      Effort: Pulmonary effort is normal. No respiratory distress.      Breath sounds: Normal breath sounds. No wheezing.   Abdominal:      General: Abdomen is flat. Bowel sounds are normal. There is no distension.      Palpations: Abdomen is soft.      Tenderness: There is no abdominal tenderness.   Musculoskeletal:         General: No swelling. Normal range of motion.      Cervical back: Normal range of motion and neck supple.      Right lower leg: No edema.      Left lower leg: No edema.   Skin:     General: Skin is warm and dry.      Capillary Refill: Capillary refill takes less than 2 seconds.   Neurological:      General: No focal deficit present.      Mental Status: She is alert. Mental status is at baseline.      Motor: Weakness present.      Comments:   Weakness and ROM improving   Psychiatric:         Mood and Affect: Mood normal.   Assessment & Plan  Compression fracture of T12 vertebra  Acute on chronic back pain  Compression fracture of body of thoracic vertebra    74 y.o.F with new compression deformity of T12 and new inability to ambulate 2/2 pain. On admit, AFVSS, labs reviewed. CT a/p with: New compression deformity of T12 when compared to CT abdomen pelvis 03/22/2025. MRI T/L spine with sedation ordered. NSGY consulted. S/P T10-L2 posterior fusion 6/14      Plan:   - TLSO brace ordered   - NSGY consulted, appreciate recommendations  - MRI spine with sedation on 6/12, concerning for compression fracture with instability and severe spinal canal stenosis concerning for cord edema    -s/p T10-L2 posterior fusion 6/14  -stop abx, drains removed 6/18   - MM pain regimen initiated  - PT/OT with TLSO brace on  -bowel regimen increased; ensure has bowel movement  - fall precautions   - plan for discharge to snf, but patient now refusing  - discharge home with HH  - follow up with nsgy outpatient    Hypertension  Patient's blood pressure range in the last 24 hours was: BP  Min: 109/69  Max: 140/64.The patient's inpatient anti-hypertensive regimen is listed below:  Current Antihypertensives  , Every 12 hours, Oral  amLODIPine tablet 10 mg, Daily, Oral  amlodipine (NORVASC) tablet, Daily, Oral    Plan  - BP is controlled, no changes needed to their regimen    Acute on chronic anemia  Anemia is likely due to acute on chronic in light of surgical intervention and drain palcement. Most recent hemoglobin and hematocrit are listed below.  Recent Labs     06/22/25  0643 06/23/25  0830 06/24/25  0623   HGB 9.0* 9.0* 8.6*   HCT 27.8* 28.1* 26.7*     Plan  - Monitor serial CBC: Daily  - Transfuse PRBC if patient becomes hemodynamically unstable, symptomatic or H/H drops below 7/21.  - Patient has not received any PRBC transfusions to date  - Patient's anemia is currently stable    Hyperlipemia  - continue home statin    Debility  Patient with Acute on chronic debility due to fracture/prosthesis. The patient's latest AMPAC (Activity Measure for Post Acute Care) Score is listed below.    AM-PAC Score - How much help does the patient need for each activity listed  Basic Mobility Total Score: 14  Turning over in bed (including adjusting bedclothes, sheets and blankets)?: A little  Sitting down on and standing up from a chair with arms (e.g., wheelchair, bedside commode, etc.): A lot  Moving from lying on back to sitting on the side of the bed?: A little  Moving to and from a bed to a chair (including a wheelchair)?: A little  Need to walk in hospital room?: A lot  Climbing 3-5 steps with a railing?:  Unable    Plan  - PT/OT consulted  - Fall precautions in place  - PT/OT recommending moderate intensity therapy at [place]    Depression with anxiety  Patient has recurrent depression which is mild and is currently controlled. Will Continue anti-depressant medications. We will not consult psychiatry at this time. Patient does not display psychosis at this time. Continue to monitor closely and adjust plan of care as needed.  - continue home medications    Hyperkalemia  -resolved with Lokelma     Tobacco dependency  Dangers of cigarette smoking were reviewed with patient in detail. Patient was Counseled for 3-10 minutes. Nicotine replacement options were discussed. Nicotine replacement was discussed- prescribed    Deep tissue injury        Final Active Diagnoses:    Diagnosis Date Noted POA    PRINCIPAL PROBLEM:  Compression fracture of T12 vertebra [S22.080A] 06/10/2025 Yes    Deep tissue injury [T14.8XXA] 06/24/2025 No    Acute on chronic anemia [D64.9] 06/18/2025 Yes    Hyperkalemia [E87.5] 06/15/2025 No    Compression fracture of body of thoracic vertebra [S22.000A] 06/12/2025 Yes    Tobacco dependency [F17.200] 06/10/2025 Yes    Depression with anxiety [F41.8] 03/23/2025 Yes    Acute on chronic back pain [M54.9, G89.29] 03/22/2025 Yes    Debility [R53.81] 03/22/2025 Yes    Hyperlipemia [E78.5] 09/23/2013 Yes    Hypertension [I10] 04/03/2013 Yes      Problems Resolved During this Admission:    Diagnosis Date Noted Date Resolved POA    MALACHI (acute kidney injury) [N17.9] 06/10/2025 06/19/2025 Yes       Discharged Condition: good    Disposition: Home or Self Care    Follow Up:   Contact information for follow-up providers       Ginna Musa MD In 1 week.    Specialty: Family Medicine  Contact information:  [address] [address]  [phone]               [facility] - Emergency Dept.    Specialty: Emergency Medicine  Why: As needed  Contact information:  [address]  [facility]  "[id number] [phone]             Schedule an appointment as soon as possible for a visit  with [facility] - Neurosurgery 8th Fl.    Specialty: Neurosurgery  Contact information:  [address]  [facility] [address] [phone]  Additional information:  8th Floor [facility] [place]   Please park in [facility].   Check in desk is located in the lobby. Please take the C elevator to 8th floor which opens to the lobby.                     Contact information for after-discharge care       Home Medical Care       [facility] .    Service: Home Health Services  Contact information:  [address]  [facility] [id number] [phone]                                 Patient Instructions:      WALKER FOR HOME USE     Order Specific Question Answer Comments   Type of Walker: Adult (5'4"-6'6")    With wheels? Yes    Height: 5' 1" (1.549 m)    Weight: 107 kg (236 lb)    Length of need (1-99 months): 99    Does patient have medical equipment at home? rollator    Please check all that apply: Patient's condition impairs ambulation.      BATH/SHOWER CHAIR FOR HOME USE     Order Specific Question Answer Comments   Height: 5' 1" (1.549 m)    Weight: 107 kg (236 lb)    Does patient have medical equipment at home? rollator    Length of need (1-99 months): 99    Type: Without back      Ambulatory referral/consult to Neurosurgery   Standing Status: Future   Referral Priority: Routine Referral Type: Consultation   Referral Reason: Specialty Services Required   Requested Specialty: Neurosurgery   Number of Visits Requested: 1     Diet Adult Regular     Notify your health care provider if you experience any of the following:  temperature >100.4     Notify your health care provider if you experience any of the following:  severe uncontrolled pain     Notify your health care provider if you experience any of the following:  persistent dizziness, light-headedness, or visual disturbances     Notify your health care " provider if you experience any of the following:  increased confusion or weakness     Reason for not Ordering Smoking Cessation Referral     Order Specific Question Answer Comments   Reason for not ordering: Patient refused      Reason for not Prescribing Nicotine Replacement     Order Specific Question Answer Comments   Reason for not Prescribing: Patient refused      Activity as tolerated       Significant Diagnostic Studies: Labs: All labs within the past 24 hours have been reviewed    Pending Diagnostic Studies:       None           Medications:  Reconciled Home Medications:      Medication List        PAUSE taking these medications      carvediloL 6.25 MG tablet  Wait to take this until your doctor or other care provider tells you to start again.  Commonly known as: COREG  Take 1 tablet by mouth every 12 (twelve) hours.     estrogens(esterified)-methyltestosterone 0.625-1.25mg per tablet  Wait to take this until your doctor or other care provider tells you to start again.  Commonly known as: ESTRATEST HS  Take 1 tablet by mouth.     furosemide 20 MG tablet  Wait to take this until your doctor or other care provider tells you to start again.  Commonly known as: LASIX  Take 1 tablet (20 mg total) by mouth 2 (two) times a day. for 2 days     topiramate 50 MG tablet  Wait to take this until your doctor or other care provider tells you to start again.  Commonly known as: TOPAMAX  TAKE 1 TABLET(50 MG) BY MOUTH EVERY 12 HOURS            START taking these medications      bisacodyL 10 mg Supp  Commonly known as: DULCOLAX  Place 1 suppository (10 mg total) rectally daily as needed (constipation).     LIDOcaine 5 %  Commonly known as: LIDODERM  Place 2 patches onto the skin once daily. Place to back. Remove & Discard patch within 12 hours or as directed by MD     polyethylene glycol 17 gram Pwpk  Commonly known as: GLYCOLAX  Take 17 g by mouth once daily.     senna-docusate 8.6-50 mg per tablet  Commonly known as:  PERICOLACE  Take 1 tablet by mouth 2 (two) times daily.            CHANGE how you take these medications      linaCLOtide 290 mcg Cap capsule  Commonly known as: LINZESS  Take 1 capsule (290 mcg total) by mouth before breakfast.  What changed: Another medication with the same name was removed. Continue taking this medication, and follow the directions you see here.     pravastatin 20 MG tablet  Commonly known as: PRAVACHOL  Take 1 tablet (20 mg total) by mouth once daily.  What changed: See the new instructions.            CONTINUE taking these medications      albuterol 90 mcg/actuation inhaler  Commonly known as: PROVENTIL/VENTOLIN HFA  Inhale 1-2 puffs into the lungs every 4 (four) hours as needed for Wheezing. Rescue     amLODIPine 10 MG tablet  Commonly known as: NORVASC  Take 1 tablet (10 mg total) by mouth once daily.     busPIRone 15 MG tablet  Commonly known as: BUSPAR  Take 1 tablet (15 mg total) by mouth 4 (four) times daily.     cetirizine 10 MG tablet  Commonly known as: ZYRTEC  TAKE 1 TABLET BY MOUTH EVERY DAY     citalopram 40 MG tablet  Commonly known as: CeleXA  TAKE 1 TABLET(40 MG) BY MOUTH EVERY DAY     cyclobenzaprine 10 MG tablet  Commonly known as: FLEXERIL  Take 1 tablet (10 mg total) by mouth 3 (three) times daily as needed for Muscle spasms.     diclofenac sodium 1 % Gel  Commonly known as: VOLTAREN  Apply 2 g topically 3 (three) times daily as needed (apply to painful joints).     meloxicam 7.5 MG tablet  Commonly known as: MOBIC  Take 1 tablet (7.5 mg total) by mouth 2 (two) times a day.     naloxone 4 mg/actuation Spry  Commonly known as: NARCAN  4mg by nasal route as needed for opioid overdose; may repeat every 2-3 minutes in alternating nostrils until medical help arrives. Call 911     oxyCODONE-acetaminophen  mg per tablet  Commonly known as: PERCOCET  Take 1 tablet by mouth every 4 (four) hours as needed for Pain (maximum 5 tablets per day).     rOPINIRole 1 MG tablet  Commonly  known as: REQUIP  Take 1 tablet (1 mg total) by mouth 2 (two) times daily.     sertraline 50 MG tablet  Commonly known as: ZOLOFT  Take 1 tablet (50 mg total) by mouth once daily.     walker Misc  4 wheeled walker for mobility            STOP taking these medications      azelastine 137 mcg (0.1 %) nasal spray  Commonly known as: ASTELIN     fluticasone propionate 50 mcg/actuation nasal spray  Commonly known as: FLONASE     MOVANTIK 25 mg tablet  Generic drug: naloxegoL              Indwelling Lines/Drains at time of discharge:   Lines/Drains/Airways       Drain  Duration             Female External Urinary Catheter w/ Suction 06/21/25 1900 2 days                        Time spent on the discharge of patient: 35 minutes         Elbert Sheets MD  Department of Hospital Medicine  [facility] Surg

## 2025-06-24 NOTE — ASSESSMENT & PLAN NOTE
74 y.o.F with new compression deformity of T12 and new inability to ambulate 2/2 pain. On admit, AFVSS, labs reviewed. CT a/p with: New compression deformity of T12 when compared to CT abdomen pelvis 03/22/2025. MRI T/L spine with sedation ordered. NSGY consulted. S/P T10-L2 posterior fusion 6/14      Plan:   - TLSO brace ordered   - NSGY consulted, appreciate recommendations  - MRI spine with sedation on 6/12, concerning for compression fracture with instability and severe spinal canal stenosis concerning for cord edema   -s/p T10-L2 posterior fusion 6/14  -stop abx, drains removed 6/18   - MM pain regimen initiated  - PT/OT with TLSO brace on  -bowel regimen increased; ensure has bowel movement  - fall precautions   - plan for discharge to snf, but patient now refusing  - discharge home with   - follow up with nsgy outpatient

## 2025-06-24 NOTE — PT/OT/SLP PROGRESS
Occupational Therapy   Co-Treatment with PT  Co-treatment performed due to patient's multiple deficits requiring two skilled therapists to appropriately and safely assess patient's strength and endurance while facilitating functional tasks in addition to accommodating for patient's activity tolerance.     Name: Kuldeep Villela  MRN: [id number]  Admitting Diagnosis:  Compression fracture of T12 vertebra  10 Days Post-Op    Recommendations:     Discharge Recommendations: Moderate Intensity Therapy  Discharge Equipment Recommendations:  bedside commode, bath bench, walker, rolling  Barriers to discharge:  Decreased caregiver support, Other (Comment) (increased skilled (A) required)    Assessment:     Kuldeep Villela is a 74 y.o. female with a medical diagnosis of Compression fracture of T12 vertebra.  She presents with the following performance deficits affecting function: weakness, gait instability, impaired balance, impaired endurance, impaired self care skills, impaired functional mobility, decreased coordination, orthopedic precautions, decreased safety awareness, decreased lower extremity function, decreased upper extremity function, pain. Pt requiring Max A for motivation and encouragement.    Rehab Prognosis:  Good; patient would benefit from acute skilled OT services to address these deficits and reach maximum level of function.       Plan:     Patient to be seen 4 x/week to address the above listed problems via self-care/home management, therapeutic activities, therapeutic exercises, neuromuscular re-education  Plan of Care Expires: 07/15/25  Plan of Care Reviewed with: patient    Subjective     Patient/Family Comments/goals: to improve function  Pain/Comfort:  Pain Rating 1: 10/10  Location - Orientation 1: generalized  Location 1: back  Pain Addressed 1: Reposition, Distraction, Cessation of Activity, Nurse notified  Pain Rating Post-Intervention 1: 10/10    Objective:     Communicated with: RN prior to session.   Patient found HOB elevated with PureWick upon OT entry to room.    General Precautions: Standard, fall    Orthopedic Precautions:spinal precautions  Braces: TLSO  Respiratory Status: Room air     Occupational Performance:     Bed Mobility:    Patient completed Supine to Sit with contact guard assistance  Patient completed Sit to Supine with moderate assistance and 2 persons     Functional Mobility/Transfers:  Patient completed Sit <> Stand Transfer with moderate assistance  with  rolling walker   Functional Mobility: pt taking 2 lateral steps L with Min A using RW.     Activities of Daily Living:  Upper Body Dressing: moderate assistance to don TLSO brace and back gown      [facility] 6 Click ADL: 16    Treatment & Education:  Pt educated on OT POC and frequency during hospital stay.   Pt educated on proper hand placement and techniques for RW mgmt to improve safety awareness.   Pt educated on importance of OOB activity to improve function and activity tolerance.  Addressed all patient questions/concerns within ALEXANDER scope of practice.     Patient left HOB elevated with all lines intact, call button in reach, and RN notified    GOALS:   Multidisciplinary Problems       Occupational Therapy Goals          Problem: Occupational Therapy    Goal Priority Disciplines Outcome Interventions   Occupational Therapy Goal     OT, PT/OT Not Progressing    Description: Goals to be met by: 7/15/2025     Patient will increase functional independence with ADLs by performing:    UE Dressing with Supervision sitting EOB to don TSLO   LE Dressing with Set-up Assistance using AE as needed to maintain spinal precautions.  Grooming while bedside chair with Set-up Assistance.  Toileting from bedside commode with Minimal Assistance for hygiene and clothing management.   Supine to sit with Supervision.  Toilet transfer to bedside commode with Contact Guard Assistance using LRAD as needed.   Sit to stand with CGA using LRAD as needed.                         Time Tracking:     OT Date of Treatment: 06/24/25  OT Start Time: 1406  OT Stop Time: 1444  OT Total Time (min): 38 min    Billable Minutes:Self Care/Home Management 25  Therapeutic Activity 13    OT/CATRINA: CATRINA     Number of CATRINA visits since last OT visit: 1    6/24/2025

## 2025-06-24 NOTE — ASSESSMENT & PLAN NOTE
Patient's blood pressure range in the last 24 hours was: BP  Min: 109/69  Max: 140/64.The patient's inpatient anti-hypertensive regimen is listed below:  Current Antihypertensives  , Every 12 hours, Oral  amLODIPine tablet 10 mg, Daily, Oral  amlodipine (NORVASC) tablet, Daily, Oral    Plan  - BP is controlled, no changes needed to their regimen

## 2025-06-24 NOTE — PROGRESS NOTES
"[facility] - Med Surg  Adult Nutrition  Progress Note    SUMMARY       Recommendations    1. Continue regular diet as tolerated     - please continue to document PO % intake via flowsheets       2. Encourage good intake    3. Nursing: please re-weight pt. No new wt since admit date  4. RD to monitor weight, labs, intake, tolerance    Goals:     1. % nutritional needs met with diet during admission     2. Maintain weight during admission    3. Display s/s of wound healing during admission    Nutrition Goal Status: goal not met  Communication of RD Recs:  (POC)    Nutrition Discharge Planning    Nutrition Discharge Planning: General healthy diet      Reason for Assessment    Reason For Assessment: RD follow-up  Diagnosis:  (compression fracture of T12 vertebra)  General Information Comments: Pressure injury in buttocks noted on 6/19. No edema noted. Pt on a regular diet. PO decreased to 25% on 6/22. Pt still experiencing weakness, will keep monitoring for signs that may decrease intake d/t limitations. No new wt since admit, nurse to re weight pt.     Nutrition/Diet History    Spiritual, Cultural Beliefs, Adventist Practices, Values that Affect Care: no  Food Allergies: NKFA  Factors Affecting Nutritional Intake: None identified at this time  Nutrition-related SDOH: None Identified    Anthropometrics    Height: 5' 1" (154.9 cm)  Height (inches): 61 in  Height Method: Stated  Weight: 107 kg (236 lb)  Weight (lb): 236 lb  Weight Method: Standard Scale  Ideal Body Weight (IBW), Female: 105 lb  % Ideal Body Weight, Female (lb): 224.76 %  BMI (Calculated): 44.6  BMI Grade: greater than 40 - morbid obesity      Lab/Procedures/Meds    Pertinent Labs Reviewed: reviewed  Pertinent Labs Comments: Na 134,  Pertinent Medications Reviewed: reviewed  Pertinent Medications Comments: Bowel reg, calcium carbonate, amlopidine, enoxaparin, methocarbamol, polyethylene glycol, statin, abx    Estimated/Assessed Needs    Weight Used For " Calorie Calculations: 107 kg (235 lb 14.3 oz)  Energy Calorie Requirements (kcal): 0705-2010 (1.0-1.2)  Energy Need Method: Morton-St Jeor  Protein Requirements: 72-95 g/kg (1.5-2.0 g/pro)  Weight Used For Protein Calculations: 47.6 kg (105 lb) (IBW)    RDA Method (mL): 1517  CHO Requirement: 190-228      Nutrition Prescription Ordered    Current Diet Order: Regular    Evaluation of Received Nutrient/Fluid Intake    I/O: -6014 since 6/9  Energy Calories Required: not meeting needs  Protein Required: not meeting needs  Fluid Required:  (per MD)  Comments: LBM 6/19  Tolerance: not tolerating  % Intake of Estimated Energy Needs: 25 - 50 %  % Meal Intake: 25 - 50 %    PES Statement  No nutrition diagnosis at this time related to   as evidenced by    Status:      Nutrition Risk    Level of Risk/Frequency of Follow-up: moderate - high (1-2/wk)     Monitor and Evaluation    Monitor and Evaluation: Energy intake, Food and beverage intake, Protein intake, Carbohydrate intake, Diet order, Weight, Beliefs and attitudes, Gastrointestinal profile, Electrolyte and renal panel, Glucose/endocrine profile, Inflammatory profile, Lipid profile, Nutrition focused physical findings, Skin     Nutrition Follow-Up    RD Follow-up?: Yes

## 2025-06-24 NOTE — CONSULTS
[facility] - [facility] S[facility]  Skin Integrity LYUDMILA  Consult Note    Patient Name: Kuldeep Villela  MRN: [id number]  Admission Date: 6/9/2025  Hospital Length of Stay: 14 days  Attending Physician: Elbert Sheets MD  Primary Care Provider: Ginna Musa MD     Inpatient consult to Skin Integrity  Practitioner  Consult performed by: Judith Taylor, NP  Consult ordered by: Celine Yanez MD        Subjective:     History of Present Illness:  Kuldeep Villela is a 74 year old female with PMHx of arthritis, lumbar stenosis, HTN, HLD, depression who presents to [facility] ED for complaints of worsening low back/L flank pain for the last week. Endorses chronic low back pain for which she sees a specialist for, but has worsened recently. Denies any known injury to area. She reports she felt like it may be the beginning of a UTI so she started taking Azo at home for prevention. States she normally ambulates with a walker; however, the pain has been so severe that she has not been able to ambulate at all. Endorses bilateral lower extremity weakness. Denies bladder or bowel incontinence, denies numbness of lower extremities. Denies fever, chills, chest pain, palpitations, SOB, cough, abdominal pain, n/v/d, dysuria, headaches, or any other symptoms at this time.      In ED: afebrile, VSS on RA. CBC without leukocytosis, Hgb 11.1. CMP notable for Cr 1.5 (~1.0), otherwise unremarkable. UA non-infectious appearing. CT abd/pelvis with new compression deformity of T12, perinephric haziness and stranding, L>R, correlate with renal disease, small periumbilical abd wall hernia involving bowel without inflammatory or obstructive change. S/p rocephin, ketamine, toradol 15mg inj, oxy-acetaminophen 10mg, 1L IVF bolus in ED. TLSO brace ordered. Admitted to  for further evaluation. Skin integrity LYUDMILA consulted for evaluation of skin injury.    Scheduled Meds:   acetaminophen  1,000 mg Oral TID    amLODIPine  10 mg Oral Daily    busPIRone  15 mg Oral QID     calcium carbonate  500 mg Oral BID    cetirizine  10 mg Oral Daily    citalopram  40 mg Oral Daily    diclofenac sodium  2 g Topical (Top) TID    enoxparin  40 mg Subcutaneous Q12H (prophylaxis, 0900/2100)    lactulose  10 g Oral TID    LIDOcaine  2 patch Transdermal Q24H    methocarbamoL  750 mg Oral QID    polyethylene glycol  17 g Oral Daily    pravastatin  20 mg Oral Daily    rOPINIRole  1 mg Oral BID    senna-docusate  2 tablet Oral BID    sertraline  50 mg Oral Daily     Continuous Infusions:  PRN Meds:  Current Facility-Administered Medications:     albuterol, 2 puff, Inhalation, Q4H PRN    aluminum-magnesium hydroxide-simethicone, 30 mL, Oral, Q4H PRN    bacitracin, , Topical (Top), PRN    bisacodyL, 10 mg, Rectal, Daily PRN    dextrose 50%, 12.5 g, Intravenous, PRN    dextrose 50%, 25 g, Intravenous, PRN    glucagon (human recombinant), 1 mg, Intramuscular, PRN    glucose, 16 g, Oral, PRN    glucose, 24 g, Oral, PRN    magnesium hydroxide 400 mg/5 ml, 30 mL, Oral, Daily PRN    melatonin, 6 mg, Oral, Nightly PRN    naloxone, 0.02 mg, Intravenous, PRN    ondansetron, 8 mg, Oral, Q8H PRN    oxyCODONE, 5 mg, Oral, Q4H PRN    oxyCODONE, 10 mg, Oral, Q4H PRN    polyethylene glycol, 17 g, Oral, Daily PRN    prochlorperazine, 5 mg, Intravenous, Q6H PRN    sodium chloride 0.9%, 10 mL, Intravenous, Q12H PRN    Review of patient's allergies indicates:   Allergen Reactions    Penicillins Anxiety and Other (See Comments)    Anesthetic [benzocaine-aloe vera]      Jittery/hyper    Guaifenesin Anxiety and Other (See Comments)        Past Medical History:   Diagnosis Date    Arthritis     Asthma     Cervical spondylosis 07/13/2012    Chronic LBP 07/13/2012    Chronic neck pain 07/13/2012    Debility     Hyperlipidemia     Hypertension     Lumbar radiculopathy, BLE 07/13/2012    Lumbar spinal stenosis at L4-L5. 07/13/2012    Lumbar spondylosis 07/13/2012    Morbid obesity 07/13/2012    Primary osteoarthritis of both knees  07/13/2012    Spondylolisthesis, grade 1 at L4-L5. 07/13/2012    Tenosynovitis of ankle     Rt peroneus longus    Walker as ambulation aid      Past Surgical History:   Procedure Laterality Date    CHOLECYSTECTOMY      HYSTERECTOMY      MAGNETIC RESONANCE IMAGING Bilateral 6/12/2025    Procedure: MRI (Magnetic Resonance Imagine);  Surgeon: Sameera Villalba;  Location: [facility];  Service: Anesthesiology;  Laterality: Bilateral;    NC REMOVAL OF OVARY/TUBE(S)      THORACIC LAMINECTOMY WITH FUSION N/A 6/14/2025    Procedure: T12 LAMINECTOMY, SPINE, THORACIC, W/ ARTHRODESIS T10-L2;  Surgeon: Nnamdi Head MD;  Location: [facility] OR [address] [place] [address];  Service: Neurosurgery;  Laterality: N/A;       Family History       Problem Relation (Age of Onset)    Heart disease Mother, Father    Hypertension Mother, Father          Tobacco Use    Smoking status: Every Day     Current packs/day: 1.00     Average packs/day: 1 pack/day for 20.0 years (20.0 ttl pk-yrs)     Types: Cigarettes    Smokeless tobacco: Never   Substance and Sexual Activity    Alcohol use: No     Alcohol/week: 0.0 standard drinks of alcohol    Drug use: No    Sexual activity: Not Currently     Partners: Male     Birth control/protection: See Surgical Hx     Review of Systems   Skin:  Positive for wound.       Objective:     Vital Signs (Most Recent):  Temp: 98.4 °F (36.9 °C) (06/24/25 1154)  Pulse: 101 (06/24/25 1154)  Resp: 18 (06/24/25 1346)  BP: (!) 140/64 (06/24/25 1154)  SpO2: 98 % (06/24/25 1154) Vital Signs (24h Range):  Temp:  [97.3 °F (36.3 °C)-98.4 °F (36.9 °C)] 98.4 °F (36.9 °C)  Pulse:  [] 101  Resp:  [18] 18  SpO2:  [96 %-99 %] 98 %  BP: (109-140)/(57-69) 140/64     Weight: 107 kg (236 lb)  Body mass index is 44.59 kg/m².     Physical Exam  Constitutional:       Appearance: Normal appearance.   Skin:     General: Skin is warm and dry.      Findings: Lesion present.   Neurological:      Mental Status: She is alert.          Laboratory:  All pertinent  labs reviewed within the last 24 hours.    Diagnostic Results:  None      Assessment/Plan:              LYUDMILA Skin Integrity Evaluation      Skin Integrity LYUDMILA evaluation of patient as part of the comprehensive skin care team.     She has been admitted for 15 days. Skin injury was noted on 6/19/25. POA no.    Sacrum        Orthopedic  Deep tissue injury  - consult received for evaluation of skin injury.  - pt presents to [facility] ED for complaints of worsening low back/L flank pain for the last week.  - sacrum with areas of intact non blanchable purple/maroon discoloration and partial thickness tissue loss.  - continue treatment per wound care RN recs.  - Umano surface.  - external urinary catheter in place.  - wedge for offloading.  - turn q2h.  - madison score is 19 with a nutrition sub scale score of 3.  - nursing to maintain pressure injury prevention measures and continue wound care per orders.        Thank you for your consult. I will follow-up with patient. Please contact us if you have any additional questions.      Judith Taylor NP  Skin Integrity LYUDMILA  [facility] - Med Surg

## 2025-06-24 NOTE — PT/OT/SLP PROGRESS
Physical Therapy Treatment    Patient Name:  Kuldeep Villela   MRN:  [id number]    Recommendations:     Discharge Recommendations: Moderate Intensity Therapy  Discharge Equipment Recommendations: bedside commode, bath bench, walker, rolling  Barriers to discharge: Decreased caregiver support    Assessment:     Kuldeep Villela is a 74 y.o. female admitted with a medical diagnosis of Compression fracture of T12 vertebra.  She presents with the following impairments/functional limitations: weakness, impaired endurance, impaired self care skills, impaired functional mobility, gait instability, pain, decreased safety awareness, impaired skin, orthopedic precautions.    Rehab Prognosis: Good; patient would benefit from acute skilled PT services to address these deficits and reach maximum level of function.    Recent Surgery: Procedure(s) (LRB):  T12 LAMINECTOMY, SPINE, THORACIC, W/ ARTHRODESIS T10-L2 (N/A) 10 Days Post-Op    Plan:     During this hospitalization, patient to be seen 4 x/week to address the identified rehab impairments via gait training, therapeutic activities, therapeutic exercises, neuromuscular re-education and progress toward the following goals:    Plan of Care Expires:  07/16/25    Subjective     Chief Complaint: pain  Patient/Family Comments/goals: to go home   Pain/Comfort:  Pain Rating 1: 10/10      Objective:     Communicated with RN prior to session.  Patient found HOB elevated with PureWick upon PT entry to room.     General Precautions: Standard, fall  Orthopedic Precautions: spinal precautions  Braces: TLSO  Respiratory Status: Room air     Functional Mobility:  Bed Mobility:     Supine to Sit: contact guard assistance  Sit to Supine: moderate assistance and of 2 persons  Transfers:     Sit to Stand:  moderate assistance with rolling walker  Gait: 2 lateral steps to L with RW and min A       AM-PAC 6 CLICK MOBILITY  Turning over in bed (including adjusting bedclothes, sheets and blankets)?:  3  Sitting down on and standing up from a chair with arms (e.g., wheelchair, bedside commode, etc.): 2  Moving from lying on back to sitting on the side of the bed?: 3  Moving to and from a bed to a chair (including a wheelchair)?: 3  Need to walk in hospital room?: 2  Climbing 3-5 steps with a railing?: 1  Basic Mobility Total Score: 14       Treatment & Education:  Increased time required for active listening to patient grievances/concerns.  Pt did not want to don TLSO due to pain caused in incision.  Extensive education provided TLSO wear and safety.  Bedside table in front of patient and area set up for function, convenience, and safety. RN aware of patient's mobility needs and status. Questions/concerns addressed within PTA scope of practice; patient  with no further questions. Time was provided for active listening, discussion of health disposition, and discussion of safe discharge.    Patient left HOB elevated with all lines intact and call button in reach..    GOALS:   Multidisciplinary Problems       Physical Therapy Goals          Problem: Physical Therapy    Goal Priority Disciplines Outcome Interventions   Physical Therapy Goal     PT, PT/OT Progressing    Description: Goals to be met by: 2025     Patient will increase functional independence with mobility by performin. Supine to sit with Stand-by Assistance  2. Sit to supine with Stand-by Assistance  3. Sit to stand transfer with Stand-by Assistance with RW  4. Bed to chair transfer with Stand-by Assistance using Rolling Walker  5. Gait  x 50 feet with Stand-by Assistance using Rolling Walker.   6. Lower extremity exercise program x10 reps per handout, with supervision                         DME Justifications:   Kuldeep's mobility limitation cannot be sufficiently resolved by the use of a cane. Her functional mobility deficit can be sufficiently resolved with the use of a Rolling Walker. Patient's mobility limitation significantly impairs  their ability to participate in one of more activities of daily living.  The use of a RW will significantly improve the patient's ability to participate in MRADLS and the patient will use it on regular basis in the home.    Time Tracking:     PT Received On: 06/24/25  PT Start Time: 1406     PT Stop Time: 1444  PT Total Time (min): 38 min     Billable Minutes: Gait Training 10, Therapeutic Exercise 18, and Neuromuscular Re-education 10    Treatment Type: Treatment  PT/PTA: PTA     Number of PTA visits since last PT visit: 4 06/24/2025

## 2025-06-24 NOTE — ASSESSMENT & PLAN NOTE
Patient with Acute on chronic debility due to fracture/prosthesis. The patient's latest AMPAC (Activity Measure for Post Acute Care) Score is listed below.    AM-PAC Score - How much help does the patient need for each activity listed  Basic Mobility Total Score: 14  Turning over in bed (including adjusting bedclothes, sheets and blankets)?: A little  Sitting down on and standing up from a chair with arms (e.g., wheelchair, bedside commode, etc.): A lot  Moving from lying on back to sitting on the side of the bed?: A little  Moving to and from a bed to a chair (including a wheelchair)?: A little  Need to walk in hospital room?: A lot  Climbing 3-5 steps with a railing?: Unable    Plan  - PT/OT consulted  - Fall precautions in place  - PT/OT recommending moderate intensity therapy at [place]

## 2025-06-25 ENCOUNTER — TELEPHONE (OUTPATIENT)
Dept: FAMILY MEDICINE | Facility: CLINIC | Age: 75
End: 2025-06-25
Payer: COMMERCIAL

## 2025-06-25 NOTE — TELEPHONE ENCOUNTER
Copied from CRM #[id number]. Topic: General Inquiry - Patient Advice  >> Jun 25, 2025  1:36 PM Wendy wrote:  Type: Patient Call Back    Who called Self     What is the request in detail: pt fell and broke their back and needs a call back from the physicians office regarding their plan of care     Can the clinic reply by [facility]?    Would the patient rather a call back or a response via [facility]? Call back    Best call back number: [phone]    Additional Information:

## 2025-06-25 NOTE — PLAN OF CARE
[facility] - [facility] S[facility]  Discharge Final Note    Primary Care Provider: Ginna Musa MD    Expected Discharge Date: 6/24/2025    Pt refused SNF placement and wanted to discharged home w/ [facility].  Pt received shower chair at bedside.  PCA services and senior living resources provided to pt's daughter Kamari via email @ [email or web address].  Pt discharged home via ambulance transport/ room air.      Discharge Plan A and Plan B have been determined by review of patient's clinical status, future medical and therapeutic needs, and coverage/benefits for post-acute care in coordination with multidisciplinary team members.    Future Appointments   Date Time Provider Department Center   6/26/2025 11:00 AM Nnamdi Head MD [facility]   7/29/2025 10:00 AM Lore Espinal PA-C [facility]   8/15/2025  3:40 PM Don[facility]ivel [facility]         Final Discharge Note (most recent)       Final Note - 06/25/25 1413          Final Note    Assessment Type Final Discharge Note     Anticipated Discharge Disposition Home-Health Care [facility]     Hospital Resources/Appts/Education Provided Provided patient/caregiver with written discharge plan information;Appointments scheduled and added to AVS;Post-Acute resouces added to AVS        Post-Acute Status    Post-Acute Authorization Placement;HME;Home Health     Post-Acute Placement Status Patient declined/refused     HME Status Set-up Complete/Auth obtained     Home Health Status Set-up Complete/Auth obtained     Discharge Delays None known at this time                     Important Message from Medicare              Follow-up providers       Ginna Musa MD   Specialty: Family Medicine   Relationship: PCP - General    [address] [address]   Phone: [phone]       Next Steps: Follow up in 1 week(s)    [facility] - Emergency Dept   Specialty: Emergency Medicine    [address]  [facility] [address]   Phone: [phone]       Next Steps: Follow up    Instructions: As needed    [facility] - Neurosurgery 8th Fl   Specialty: Neurosurgery    [address]  [facility] [address]   Phone: [phone]       Next Steps: Schedule an appointment as soon as possible for a visit              After-discharge care                Home Medical Care       *[facility]   Service: Home Health Services    [address]  [place] [address]   Phone: [phone]                             Kadie Jordan LMSW  Part-Time-  [facility]  Ext. [phone]

## 2025-06-25 NOTE — TELEPHONE ENCOUNTER
Called patient , no answer, unable to leave amesage due to no voicemail setup.    Patient requires a hospital follow up appointment.

## 2025-06-26 ENCOUNTER — TELEPHONE (OUTPATIENT)
Dept: FAMILY MEDICINE | Facility: CLINIC | Age: 75
End: 2025-06-26
Payer: COMMERCIAL

## 2025-06-26 NOTE — TELEPHONE ENCOUNTER
Copied from CRM #[id number]. Topic: General Inquiry - Return Call  >> Jun 26, 2025  2:17 PM Lai wrote:  Type: Patient Call Back    Who called: self    What is the request in detail: Patient called into the call center asking to speak to Dr Musa to see if she can get a home health order so a home health nurse can come out to her house because she was just discharged from the hospital. Patient also states phone # on file is of her oldest daughter and states she got a new # and that she does not know the new #. She is asking for the office to contact her daughter to get her # and then call her that way.     Best call back number: [phone] (Pleasant)        Additional Information:

## 2025-06-26 NOTE — TELEPHONE ENCOUNTER
Called daughter to obtain a current phone for patient.    [phone].    Called patient, no answer , unable to leave a message no mailbox setup.

## 2025-06-27 ENCOUNTER — TELEPHONE (OUTPATIENT)
Dept: PHYSICAL MEDICINE AND REHAB | Facility: CLINIC | Age: 75
End: 2025-06-27
Payer: COMMERCIAL

## 2025-06-27 ENCOUNTER — PATIENT OUTREACH (OUTPATIENT)
Dept: ADMINISTRATIVE | Facility: CLINIC | Age: 75
End: 2025-06-27
Payer: COMMERCIAL

## 2025-06-27 DIAGNOSIS — G89.29 CHRONIC MIDLINE LOW BACK PAIN WITH BILATERAL SCIATICA: Primary | ICD-10-CM

## 2025-06-27 DIAGNOSIS — S22.080D COMPRESSION FRACTURE OF T12 VERTEBRA WITH ROUTINE HEALING, SUBSEQUENT ENCOUNTER: ICD-10-CM

## 2025-06-27 DIAGNOSIS — R26.9 GAIT DISORDER: ICD-10-CM

## 2025-06-27 DIAGNOSIS — E66.01 MORBID OBESITY, UNSPECIFIED OBESITY TYPE: ICD-10-CM

## 2025-06-27 DIAGNOSIS — M54.41 CHRONIC MIDLINE LOW BACK PAIN WITH BILATERAL SCIATICA: Primary | ICD-10-CM

## 2025-06-27 DIAGNOSIS — M54.42 CHRONIC MIDLINE LOW BACK PAIN WITH BILATERAL SCIATICA: Primary | ICD-10-CM

## 2025-06-27 NOTE — TELEPHONE ENCOUNTER
Returned call to Kamari, no answer. Informed her that she will need to contact the patient's insurance company and they should be able to tell her where she can get the home walker.

## 2025-06-27 NOTE — PHYSICIAN QUERY
"Please further specify the type of "anemia" -     Select all that apply -     Other anemia type - specify: _________acute on chronic anemia due to blood loss.  "

## 2025-06-27 NOTE — PROGRESS NOTES
I called the patient's daughter.  She said the patient still complains of severe pain.  She has thoracic compression fractures.  She is status post back surgery.  He is requesting an upright walker.  I told her I will write a prescription and send it to her [employer]

## 2025-06-27 NOTE — PROGRESS NOTES
C3 nurse attempted to contact Kuldeep Villela  for a TCC post hospital discharge follow up call. No answer.   The patient does not have a scheduled HOSFU appointment. Message sent to PCP's staff to assist with HOSFU appointment scheduling.

## 2025-06-27 NOTE — TELEPHONE ENCOUNTER
Copied from CRM #[id number]. Topic: General Inquiry - Patient Advice  >> Jun 26, 2025  1:22 PM Melva wrote:  Pt daughter Kamari is calling because they received a letter for the pt home walker and wants to know where to go to get the walker [facility] [place] [facility]       [phone] - Kamari

## 2025-06-27 NOTE — PHYSICIAN QUERY
Please clarify the integumentary diagnosis related to the documentation of - Sacrum and Buttocks -     Other Integumentary Diagnosis (please specify): Deep Tissue injury

## 2025-06-29 ENCOUNTER — HOSPITAL ENCOUNTER (OUTPATIENT)
Facility: HOSPITAL | Age: 75
Discharge: HOME HEALTH | End: 2025-07-03
Attending: EMERGENCY MEDICINE | Admitting: INTERNAL MEDICINE
Payer: COMMERCIAL

## 2025-06-29 DIAGNOSIS — S22.000A COMPRESSION FRACTURE OF BODY OF THORACIC VERTEBRA: ICD-10-CM

## 2025-06-29 DIAGNOSIS — T14.8XXA DEEP TISSUE INJURY: ICD-10-CM

## 2025-06-29 DIAGNOSIS — S22.080A COMPRESSION FRACTURE OF T12 VERTEBRA, INITIAL ENCOUNTER: ICD-10-CM

## 2025-06-29 DIAGNOSIS — F11.90 CHRONIC, CONTINUOUS USE OF OPIOIDS: ICD-10-CM

## 2025-06-29 DIAGNOSIS — N17.9 AKI (ACUTE KIDNEY INJURY): ICD-10-CM

## 2025-06-29 DIAGNOSIS — R07.9 CHEST PAIN: ICD-10-CM

## 2025-06-29 DIAGNOSIS — S22.080D COMPRESSION FRACTURE OF T12 VERTEBRA WITH ROUTINE HEALING, SUBSEQUENT ENCOUNTER: ICD-10-CM

## 2025-06-29 DIAGNOSIS — G97.82: Primary | ICD-10-CM

## 2025-06-29 DIAGNOSIS — M54.9 BACK PAIN: ICD-10-CM

## 2025-06-29 PROCEDURE — 99285 EMERGENCY DEPT VISIT HI MDM: CPT | Mod: 25

## 2025-06-29 PROCEDURE — 94761 N-INVAS EAR/PLS OXIMETRY MLT: CPT

## 2025-06-29 PROCEDURE — 96361 HYDRATE IV INFUSION ADD-ON: CPT

## 2025-06-29 PROCEDURE — 80047 BASIC METABLC PNL IONIZED CA: CPT

## 2025-06-30 PROBLEM — E04.2 MULTINODULAR GOITER: Status: ACTIVE | Noted: 2025-06-30

## 2025-06-30 LAB
ABSOLUTE EOSINOPHIL (OHS): 0.52 K/UL
ABSOLUTE MONOCYTE (OHS): 0.76 K/UL (ref 0.3–1)
ABSOLUTE NEUTROPHIL COUNT (OHS): 9.61 K/UL (ref 1.8–7.7)
ALBUMIN SERPL BCP-MCNC: 3.9 G/DL (ref 3.5–5.2)
ALP SERPL-CCNC: 90 UNIT/L (ref 40–150)
ALT SERPL W/O P-5'-P-CCNC: 11 UNIT/L (ref 10–44)
ANION GAP (OHS): 13 MMOL/L (ref 8–16)
APTT PPP: 26.3 SECONDS (ref 21–32)
AST SERPL-CCNC: 16 UNIT/L (ref 11–45)
BASOPHILS # BLD AUTO: 0.06 K/UL
BASOPHILS NFR BLD AUTO: 0.5 %
BILIRUB SERPL-MCNC: 0.7 MG/DL (ref 0.1–1)
BIPAP: 0
BUN SERPL-MCNC: 36 MG/DL (ref 8–23)
BUN SERPL-MCNC: 38 MG/DL (ref 6–30)
CALCIUM SERPL-MCNC: 10 MG/DL (ref 8.7–10.5)
CHLORIDE SERPL-SCNC: 102 MMOL/L (ref 95–110)
CHLORIDE SERPL-SCNC: 102 MMOL/L (ref 95–110)
CO2 SERPL-SCNC: 25 MMOL/L (ref 23–29)
CREAT SERPL-MCNC: 1.3 MG/DL (ref 0.5–1.4)
CREAT SERPL-MCNC: 1.5 MG/DL (ref 0.5–1.4)
CRP SERPL-MCNC: 22.8 MG/L
ERYTHROCYTE [DISTWIDTH] IN BLOOD BY AUTOMATED COUNT: 14.1 % (ref 11.5–14.5)
ERYTHROCYTE [SEDIMENTATION RATE] IN BLOOD BY PHOTOMETRIC METHOD: 68 MM/HR
FIO2: 21 %
GFR SERPLBLD CREATININE-BSD FMLA CKD-EPI: 44 ML/MIN/1.73/M2
GLUCOSE SERPL-MCNC: 81 MG/DL (ref 70–110)
GLUCOSE SERPL-MCNC: 81 MG/DL (ref 70–110)
HCT VFR BLD AUTO: 28.7 % (ref 37–48.5)
HCT VFR BLD CALC: 28 %PCV (ref 36–54)
HGB BLD-MCNC: 9.1 GM/DL (ref 12–16)
IMM GRANULOCYTES # BLD AUTO: 0.06 K/UL (ref 0–0.04)
IMM GRANULOCYTES NFR BLD AUTO: 0.5 % (ref 0–0.5)
INDIRECT COOMBS: NORMAL
INFLUENZA A MOLECULAR (OHS): NEGATIVE
INFLUENZA B MOLECULAR (OHS): NEGATIVE
INR PPP: 1 (ref 0.8–1.2)
LDH SERPL L TO P-CCNC: 0.7 MMOL/L (ref 0.5–2.2)
LYMPHOCYTES # BLD AUTO: 1.66 K/UL (ref 1–4.8)
MCH RBC QN AUTO: 32.2 PG (ref 27–31)
MCHC RBC AUTO-ENTMCNC: 31.7 G/DL (ref 32–36)
MCV RBC AUTO: 101 FL (ref 82–98)
NUCLEATED RBC (/100WBC) (OHS): 0 /100 WBC
OHS QRS DURATION: 80 MS
OHS QTC CALCULATION: 461 MS
PLATELET # BLD AUTO: 507 K/UL (ref 150–450)
PMV BLD AUTO: 9.6 FL (ref 9.2–12.9)
POC IONIZED CALCIUM: 1.24 MMOL/L (ref 1.06–1.42)
POC PERFORMED BY: NORMAL
POC TCO2 (MEASURED): 27 MMOL/L (ref 23–29)
POC TEMPERATURE: 37 C
POCT GLUCOSE: 76 MG/DL (ref 70–110)
POTASSIUM BLD-SCNC: 3.9 MMOL/L (ref 3.5–5.1)
POTASSIUM SERPL-SCNC: 3.9 MMOL/L (ref 3.5–5.1)
PROT SERPL-MCNC: 7.5 GM/DL (ref 6–8.4)
PROTHROMBIN TIME: 10.8 SECONDS (ref 9–12.5)
RBC # BLD AUTO: 2.83 M/UL (ref 4–5.4)
RELATIVE EOSINOPHIL (OHS): 4.1 %
RELATIVE LYMPHOCYTE (OHS): 13.1 % (ref 18–48)
RELATIVE MONOCYTE (OHS): 6 % (ref 4–15)
RELATIVE NEUTROPHIL (OHS): 75.8 % (ref 38–73)
RH BLD: NORMAL
SAMPLE: ABNORMAL
SARS-COV-2 RDRP RESP QL NAA+PROBE: NEGATIVE
SODIUM BLD-SCNC: 138 MMOL/L (ref 136–145)
SODIUM SERPL-SCNC: 140 MMOL/L (ref 136–145)
SPECIMEN OUTDATE: NORMAL
SPECIMEN SOURCE: NORMAL
WBC # BLD AUTO: 12.67 K/UL (ref 3.9–12.7)

## 2025-06-30 PROCEDURE — G0378 HOSPITAL OBSERVATION PER HR: HCPCS

## 2025-06-30 PROCEDURE — 96372 THER/PROPH/DIAG INJ SC/IM: CPT

## 2025-06-30 PROCEDURE — 93005 ELECTROCARDIOGRAM TRACING: CPT

## 2025-06-30 PROCEDURE — 25000003 PHARM REV CODE 250

## 2025-06-30 PROCEDURE — 87502 INFLUENZA DNA AMP PROBE: CPT

## 2025-06-30 PROCEDURE — U0002 COVID-19 LAB TEST NON-CDC: HCPCS

## 2025-06-30 PROCEDURE — 87040 BLOOD CULTURE FOR BACTERIA: CPT

## 2025-06-30 PROCEDURE — 83605 ASSAY OF LACTIC ACID: CPT

## 2025-06-30 PROCEDURE — 63600175 PHARM REV CODE 636 W HCPCS

## 2025-06-30 PROCEDURE — 85652 RBC SED RATE AUTOMATED: CPT | Performed by: EMERGENCY MEDICINE

## 2025-06-30 PROCEDURE — 25500020 PHARM REV CODE 255: Performed by: EMERGENCY MEDICINE

## 2025-06-30 PROCEDURE — A9585 GADOBUTROL INJECTION: HCPCS | Performed by: EMERGENCY MEDICINE

## 2025-06-30 PROCEDURE — 99900035 HC TECH TIME PER 15 MIN (STAT)

## 2025-06-30 PROCEDURE — 51798 US URINE CAPACITY MEASURE: CPT

## 2025-06-30 PROCEDURE — 80053 COMPREHEN METABOLIC PANEL: CPT | Performed by: EMERGENCY MEDICINE

## 2025-06-30 PROCEDURE — 93010 ELECTROCARDIOGRAM REPORT: CPT | Mod: ,,, | Performed by: INTERNAL MEDICINE

## 2025-06-30 PROCEDURE — 86850 RBC ANTIBODY SCREEN: CPT

## 2025-06-30 PROCEDURE — 85730 THROMBOPLASTIN TIME PARTIAL: CPT

## 2025-06-30 PROCEDURE — 96374 THER/PROPH/DIAG INJ IV PUSH: CPT

## 2025-06-30 PROCEDURE — 86140 C-REACTIVE PROTEIN: CPT | Performed by: EMERGENCY MEDICINE

## 2025-06-30 PROCEDURE — 85025 COMPLETE CBC W/AUTO DIFF WBC: CPT | Performed by: EMERGENCY MEDICINE

## 2025-06-30 PROCEDURE — 21400001 HC TELEMETRY ROOM

## 2025-06-30 PROCEDURE — 96375 TX/PRO/DX INJ NEW DRUG ADDON: CPT

## 2025-06-30 PROCEDURE — 96376 TX/PRO/DX INJ SAME DRUG ADON: CPT

## 2025-06-30 PROCEDURE — 85610 PROTHROMBIN TIME: CPT

## 2025-06-30 RX ORDER — AMLODIPINE BESYLATE 10 MG/1
10 TABLET ORAL DAILY
Status: DISCONTINUED | OUTPATIENT
Start: 2025-07-01 | End: 2025-07-04 | Stop reason: HOSPADM

## 2025-06-30 RX ORDER — PRAVASTATIN SODIUM 20 MG/1
20 TABLET ORAL DAILY
Status: DISCONTINUED | OUTPATIENT
Start: 2025-06-30 | End: 2025-07-04 | Stop reason: HOSPADM

## 2025-06-30 RX ORDER — DROPERIDOL 2.5 MG/ML
0.62 INJECTION, SOLUTION INTRAMUSCULAR; INTRAVENOUS ONCE
Status: COMPLETED | OUTPATIENT
Start: 2025-06-30 | End: 2025-06-30

## 2025-06-30 RX ORDER — NALOXONE HCL 0.4 MG/ML
0.02 VIAL (ML) INJECTION
Status: DISCONTINUED | OUTPATIENT
Start: 2025-06-30 | End: 2025-07-04 | Stop reason: HOSPADM

## 2025-06-30 RX ORDER — IBUPROFEN 200 MG
1 TABLET ORAL DAILY PRN
Status: DISCONTINUED | OUTPATIENT
Start: 2025-06-30 | End: 2025-07-04 | Stop reason: HOSPADM

## 2025-06-30 RX ORDER — IPRATROPIUM BROMIDE AND ALBUTEROL SULFATE 2.5; .5 MG/3ML; MG/3ML
3 SOLUTION RESPIRATORY (INHALATION) EVERY 4 HOURS PRN
Status: DISCONTINUED | OUTPATIENT
Start: 2025-06-30 | End: 2025-07-04 | Stop reason: HOSPADM

## 2025-06-30 RX ORDER — DROPERIDOL 2.5 MG/ML
0.62 INJECTION, SOLUTION INTRAMUSCULAR; INTRAVENOUS ONCE AS NEEDED
Status: DISCONTINUED | OUTPATIENT
Start: 2025-06-30 | End: 2025-06-30

## 2025-06-30 RX ORDER — GLUCAGON 1 MG
1 KIT INJECTION
Status: DISCONTINUED | OUTPATIENT
Start: 2025-06-30 | End: 2025-07-04 | Stop reason: HOSPADM

## 2025-06-30 RX ORDER — SODIUM CHLORIDE 0.9 % (FLUSH) 0.9 %
10 SYRINGE (ML) INJECTION EVERY 12 HOURS PRN
Status: DISCONTINUED | OUTPATIENT
Start: 2025-06-30 | End: 2025-07-04 | Stop reason: HOSPADM

## 2025-06-30 RX ORDER — AMOXICILLIN 250 MG
1 CAPSULE ORAL 2 TIMES DAILY
Status: DISCONTINUED | OUTPATIENT
Start: 2025-06-30 | End: 2025-07-01

## 2025-06-30 RX ORDER — ONDANSETRON 8 MG/1
8 TABLET, ORALLY DISINTEGRATING ORAL EVERY 8 HOURS PRN
Status: DISCONTINUED | OUTPATIENT
Start: 2025-06-30 | End: 2025-07-04 | Stop reason: HOSPADM

## 2025-06-30 RX ORDER — CETIRIZINE HYDROCHLORIDE 10 MG/1
10 TABLET ORAL DAILY
Status: DISCONTINUED | OUTPATIENT
Start: 2025-07-01 | End: 2025-07-04 | Stop reason: HOSPADM

## 2025-06-30 RX ORDER — SERTRALINE HYDROCHLORIDE 50 MG/1
50 TABLET, FILM COATED ORAL DAILY
Status: DISCONTINUED | OUTPATIENT
Start: 2025-06-30 | End: 2025-07-04 | Stop reason: HOSPADM

## 2025-06-30 RX ORDER — CITALOPRAM 20 MG/1
40 TABLET ORAL DAILY
Status: DISCONTINUED | OUTPATIENT
Start: 2025-07-01 | End: 2025-07-04 | Stop reason: HOSPADM

## 2025-06-30 RX ORDER — SODIUM CHLORIDE 0.9 % (FLUSH) 0.9 %
5 SYRINGE (ML) INJECTION
Status: DISCONTINUED | OUTPATIENT
Start: 2025-06-30 | End: 2025-07-04 | Stop reason: HOSPADM

## 2025-06-30 RX ORDER — IBUPROFEN 200 MG
16 TABLET ORAL
Status: DISCONTINUED | OUTPATIENT
Start: 2025-06-30 | End: 2025-07-04 | Stop reason: HOSPADM

## 2025-06-30 RX ORDER — HYDROMORPHONE HYDROCHLORIDE 1 MG/ML
0.5 INJECTION, SOLUTION INTRAMUSCULAR; INTRAVENOUS; SUBCUTANEOUS EVERY 6 HOURS PRN
Status: DISCONTINUED | OUTPATIENT
Start: 2025-06-30 | End: 2025-07-02

## 2025-06-30 RX ORDER — GADOBUTROL 604.72 MG/ML
10 INJECTION INTRAVENOUS
Status: COMPLETED | OUTPATIENT
Start: 2025-06-30 | End: 2025-06-30

## 2025-06-30 RX ORDER — ACETAMINOPHEN 325 MG/1
650 TABLET ORAL EVERY 4 HOURS PRN
Status: DISCONTINUED | OUTPATIENT
Start: 2025-06-30 | End: 2025-07-03

## 2025-06-30 RX ORDER — IBUPROFEN 200 MG
24 TABLET ORAL
Status: DISCONTINUED | OUTPATIENT
Start: 2025-06-30 | End: 2025-07-04 | Stop reason: HOSPADM

## 2025-06-30 RX ORDER — MORPHINE SULFATE 4 MG/ML
4 INJECTION, SOLUTION INTRAMUSCULAR; INTRAVENOUS
Status: DISCONTINUED | OUTPATIENT
Start: 2025-06-30 | End: 2025-06-30

## 2025-06-30 RX ORDER — TALC
6 POWDER (GRAM) TOPICAL NIGHTLY PRN
Status: DISCONTINUED | OUTPATIENT
Start: 2025-06-30 | End: 2025-07-04 | Stop reason: HOSPADM

## 2025-06-30 RX ORDER — ACETAMINOPHEN 500 MG
1000 TABLET ORAL EVERY 8 HOURS
Status: DISCONTINUED | OUTPATIENT
Start: 2025-06-30 | End: 2025-07-03

## 2025-06-30 RX ORDER — ACETAMINOPHEN 500 MG
1000 TABLET ORAL EVERY 8 HOURS PRN
Status: DISCONTINUED | OUTPATIENT
Start: 2025-06-30 | End: 2025-07-01

## 2025-06-30 RX ORDER — HYDROMORPHONE HYDROCHLORIDE 1 MG/ML
0.5 INJECTION, SOLUTION INTRAMUSCULAR; INTRAVENOUS; SUBCUTANEOUS
Refills: 0 | Status: COMPLETED | OUTPATIENT
Start: 2025-06-30 | End: 2025-06-30

## 2025-06-30 RX ORDER — OXYCODONE HYDROCHLORIDE 10 MG/1
10 TABLET ORAL EVERY 4 HOURS PRN
Refills: 0 | Status: DISCONTINUED | OUTPATIENT
Start: 2025-06-30 | End: 2025-07-01

## 2025-06-30 RX ORDER — ROPINIROLE 1 MG/1
1 TABLET, FILM COATED ORAL 2 TIMES DAILY
Status: DISCONTINUED | OUTPATIENT
Start: 2025-06-30 | End: 2025-07-04 | Stop reason: HOSPADM

## 2025-06-30 RX ORDER — OXYCODONE HYDROCHLORIDE 5 MG/1
5 TABLET ORAL EVERY 4 HOURS PRN
Refills: 0 | Status: DISCONTINUED | OUTPATIENT
Start: 2025-06-30 | End: 2025-07-01

## 2025-06-30 RX ORDER — ENOXAPARIN SODIUM 100 MG/ML
40 INJECTION SUBCUTANEOUS EVERY 12 HOURS
Status: DISCONTINUED | OUTPATIENT
Start: 2025-06-30 | End: 2025-07-04 | Stop reason: HOSPADM

## 2025-06-30 RX ADMIN — SENNOSIDES AND DOCUSATE SODIUM 1 TABLET: 50; 8.6 TABLET ORAL at 09:06

## 2025-06-30 RX ADMIN — ROPINIROLE HYDROCHLORIDE 1 MG: 1 TABLET, FILM COATED ORAL at 09:06

## 2025-06-30 RX ADMIN — OXYCODONE HYDROCHLORIDE 10 MG: 10 TABLET ORAL at 04:06

## 2025-06-30 RX ADMIN — OXYCODONE HYDROCHLORIDE 10 MG: 10 TABLET ORAL at 11:06

## 2025-06-30 RX ADMIN — HYDROMORPHONE HYDROCHLORIDE 0.5 MG: 1 INJECTION, SOLUTION INTRAMUSCULAR; INTRAVENOUS; SUBCUTANEOUS at 02:06

## 2025-06-30 RX ADMIN — BUSPIRONE HYDROCHLORIDE 15 MG: 10 TABLET ORAL at 04:06

## 2025-06-30 RX ADMIN — BUSPIRONE HYDROCHLORIDE 15 MG: 10 TABLET ORAL at 09:06

## 2025-06-30 RX ADMIN — PRAVASTATIN SODIUM 20 MG: 20 TABLET ORAL at 04:06

## 2025-06-30 RX ADMIN — DROPERIDOL 0.62 MG: 2.5 INJECTION, SOLUTION INTRAMUSCULAR; INTRAVENOUS at 03:06

## 2025-06-30 RX ADMIN — SODIUM CHLORIDE, POTASSIUM CHLORIDE, SODIUM LACTATE AND CALCIUM CHLORIDE 500 ML: 600; 310; 30; 20 INJECTION, SOLUTION INTRAVENOUS at 06:06

## 2025-06-30 RX ADMIN — HYDROMORPHONE HYDROCHLORIDE 0.5 MG: 1 INJECTION, SOLUTION INTRAMUSCULAR; INTRAVENOUS; SUBCUTANEOUS at 01:06

## 2025-06-30 RX ADMIN — GADOBUTROL 10 ML: 604.72 INJECTION INTRAVENOUS at 04:06

## 2025-06-30 RX ADMIN — ACETAMINOPHEN 1000 MG: 500 TABLET ORAL at 09:06

## 2025-06-30 RX ADMIN — SERTRALINE HYDROCHLORIDE 50 MG: 50 TABLET ORAL at 04:06

## 2025-06-30 RX ADMIN — ACETAMINOPHEN 1000 MG: 500 TABLET ORAL at 04:06

## 2025-06-30 RX ADMIN — ENOXAPARIN SODIUM 40 MG: 40 INJECTION SUBCUTANEOUS at 09:06

## 2025-06-30 NOTE — ASSESSMENT & PLAN NOTE
Anemia is likely due to chronic disease. Most recent hemoglobin and hematocrit are listed below.  Recent Labs     06/30/25  0119 06/30/25  0131   HGB 9.1*  --    HCT 28.7* 28*     Plan  - Monitor serial CBC: Daily  - Transfuse PRBC if patient becomes hemodynamically unstable, symptomatic or H/H drops below 7/21.  - Patient has not received any PRBC transfusions to date  - Patient's anemia is currently stable

## 2025-06-30 NOTE — ASSESSMENT & PLAN NOTE
MALACHI is likely due to pre-renal azotemia due to dehydration. Baseline creatinine is 0.9-1.0. Most recent creatinine and eGFR are listed below.  Recent Labs     06/30/25  0119   CREATININE 1.3   EGFRNORACEVR 44*      Plan  - MALACHI is stable  - Avoid nephrotoxins and renally dose meds for GFR listed above  - Monitor urine output, serial BMP, and adjust therapy as needed  - given fluid bolus in ED

## 2025-06-30 NOTE — ED NOTES
Telemetry Verification   Patient placed on Telemetry Box  Verified on ED monitor  Box [id number]   Monitor Tech Babitha   Rate NSR   Rhythm 86

## 2025-06-30 NOTE — ASSESSMENT & PLAN NOTE
Patient with Acute on chronic debility due to fracture/prosthesis and age-related physical debility. The patient's latest AMPAC (Activity Measure for Post Acute Care) Score is listed below.    AM-PAC Score - How much help does the patient need for each activity listed       Plan  - Progressive mobility protocol initated  - PT/OT consulted  - Fall precautions in place

## 2025-06-30 NOTE — FIRST PROVIDER EVALUATION
"Medical screening examination initiated.  I have conducted a focused provider triage encounter, findings are as follows:    Brief history of present illness:  Severe back pain, difficulty moving legs after recent laminectomy from Neurosurgery    Vitals:    06/29/25 2148   BP: 120/70   Pulse: 87   Resp: 16   Temp: 98.4 °F (36.9 °C)   SpO2: 99%   Weight: 104.3 kg (230 lb)   Height: 5' 1" (1.549 m)       Pertinent physical exam:  Hunched over and wheelchair, looks extremely uncomfortable    Brief workup plan:  ED bed, labs, will defer imaging to primary team    Preliminary workup initiated; this workup will be continued and followed by the physician or advanced practice provider that is assigned to the patient when roomed.  "

## 2025-06-30 NOTE — ED TRIAGE NOTES
Kuldeep Villela, a 74 y.o. female presents to the ED w/ complaint of back pain and bilateral lower extremity pain s/p back surgery x 1 week ago. Denies incontinence/ fever.    Triage note:  Chief Complaint   Patient presents with    Back Pain     Sx a week ago on back no other complaint.      Review of patient's allergies indicates:   Allergen Reactions    Penicillins Anxiety and Other (See Comments)    Anesthetic [benzocaine-aloe vera]      Jittery/hyper    Guaifenesin Anxiety and Other (See Comments)     Past Medical History:   Diagnosis Date    Arthritis     Asthma     Cervical spondylosis 07/13/2012    Chronic LBP 07/13/2012    Chronic neck pain 07/13/2012    Debility     Hyperlipidemia     Hypertension     Lumbar radiculopathy, BLE 07/13/2012    Lumbar spinal stenosis at L4-L5. 07/13/2012    Lumbar spondylosis 07/13/2012    Morbid obesity 07/13/2012    Primary osteoarthritis of both knees 07/13/2012    Spondylolisthesis, grade 1 at L4-L5. 07/13/2012    Tenosynovitis of ankle     Rt peroneus longus    Walker as ambulation aid

## 2025-06-30 NOTE — ED NOTES
Pt transported to room 656 on tele box via stretcher with escort  Pt condition stable on leaving the ED, pt belongings are with pt and pt will notify family of room number

## 2025-06-30 NOTE — ED NOTES
Received report from BRI Longo. Pt in NAD, VSS. Bed rails up x2, wheels locked, call light in reach. Placed on cardiac monitoring, pulse ox, and BP cuff. Pt has no other complaints at this time. Purwick in place.

## 2025-06-30 NOTE — ED NOTES
I-STAT Chem-8+ Results:   Value Reference Range   Sodium 138 136-145 mmol/L   Potassium  3.9 3.5-5.1 mmol/L   Chloride 102  mmol/L   Ionized Calcium 1.24 1.06-1.42 mmol/L   CO2 (measured) 27 23-29 mmol/L   Glucose 81  mg/dL   BUN 38 6-30 mg/dL   Creatinine 1.5 0.5-1.4 mg/dL   Hematocrit 28 36-54%

## 2025-06-30 NOTE — ED NOTES
I assumed care of this patient at this time. Report received from BRI Andujar. Pt is resting comfortably in ED stretcher + no acute distress observed. Pt is AAOx4. RR is even, unlabored, and spontaneous + pt's oxygen saturation is 98% at this time. Skin is warm, dry and intact. Pt denies pain or needs at this time. Pt remains on continuous cardiac monitor, pulse ox, and BP cuff to cycle. Bed low and locked; side rails up x2; call light within reach.

## 2025-06-30 NOTE — HPI
Patient is a 72-year-old female with a past medical history of T10-L2 fusion, arthritis, HTN, HLD, depression who arrives for evaluation of back pain. Patient states having thoracic back pain that has remained constant since surgery. Complaining of chills. Denies fever. States having new bilateral lower extremity weakness which 1st started on the left lower extremity. Also complains of bilateral lower extremity numbness. Patient denies any urinary retention, urinary incontinence, stool incontinence, saddle anesthesia.

## 2025-06-30 NOTE — SUBJECTIVE & OBJECTIVE
Past Medical History:   Diagnosis Date    Arthritis     Asthma     Cervical spondylosis 07/13/2012    Chronic LBP 07/13/2012    Chronic neck pain 07/13/2012    Debility     Hyperlipidemia     Hypertension     Lumbar radiculopathy, BLE 07/13/2012    Lumbar spinal stenosis at L4-L5. 07/13/2012    Lumbar spondylosis 07/13/2012    Morbid obesity 07/13/2012    Primary osteoarthritis of both knees 07/13/2012    Spondylolisthesis, grade 1 at L4-L5. 07/13/2012    Tenosynovitis of ankle     Rt peroneus longus    Walker as ambulation aid        Past Surgical History:   Procedure Laterality Date    CHOLECYSTECTOMY      HYSTERECTOMY      MAGNETIC RESONANCE IMAGING Bilateral 6/12/2025    Procedure: MRI (Magnetic Resonance Imagine);  Surgeon: Sameera Villalba;  Location: [facility];  Service: Anesthesiology;  Laterality: Bilateral;    LA REMOVAL OF OVARY/TUBE(S)      THORACIC LAMINECTOMY WITH FUSION N/A 6/14/2025    Procedure: T12 LAMINECTOMY, SPINE, THORACIC, W/ ARTHRODESIS T10-L2;  Surgeon: Nnamdi Head MD;  Location: [facility] OR [address] [place] [address];  Service: Neurosurgery;  Laterality: N/A;       Review of patient's allergies indicates:   Allergen Reactions    Penicillins Anxiety and Other (See Comments)    Anesthetic [benzocaine-aloe vera]      Jittery/hyper    Guaifenesin Anxiety and Other (See Comments)       Current Facility-Administered Medications on File Prior to Encounter   Medication    sodium hyaluronate (EUFLEXXA) 10 mg/mL(mw 2.4 -3.6 million) injection 40 mg     Current Outpatient Medications on File Prior to Encounter   Medication Sig    albuterol (PROVENTIL/VENTOLIN HFA) 90 mcg/actuation inhaler Inhale 1-2 puffs into the lungs every 4 (four) hours as needed for Wheezing. Rescue    amLODIPine (NORVASC) 10 MG tablet Take 1 tablet (10 mg total) by mouth once daily.    bisacodyL (DULCOLAX) 10 mg Supp Place 1 suppository (10 mg total) rectally daily as needed (constipation).    busPIRone (BUSPAR) 15 MG tablet Take 1 tablet (15 mg  total) by mouth 4 (four) times daily.    [Paused] carvediloL (COREG) 6.25 MG tablet Take 1 tablet by mouth every 12 (twelve) hours.    cetirizine (ZYRTEC) 10 MG tablet TAKE 1 TABLET BY MOUTH EVERY DAY    citalopram (CELEXA) 40 MG tablet TAKE 1 TABLET(40 MG) BY MOUTH EVERY DAY    cyclobenzaprine (FLEXERIL) 10 MG tablet Take 1 tablet (10 mg total) by mouth 3 (three) times daily as needed for Muscle spasms.    diclofenac sodium (VOLTAREN) 1 % Gel Apply 2 g topically 3 (three) times daily as needed (apply to painful joints).    [Paused] estrogens,conjugated,-methyltestosterone 0.625-1.25mg (ESTRATEST HS) 0.625-1.25 mg per tablet Take 1 tablet by mouth.    [Paused] furosemide (LASIX) 20 MG tablet Take 1 tablet (20 mg total) by mouth 2 (two) times a day. for 2 days    LIDOcaine (LIDODERM) 5 % Place 2 patches onto the skin once daily. Place to back. Remove & Discard patch within 12 hours or as directed by MD    linaCLOtide (LINZESS) 290 mcg Cap capsule Take 1 capsule (290 mcg total) by mouth before breakfast.    meloxicam (MOBIC) 7.5 MG tablet Take 1 tablet (7.5 mg total) by mouth 2 (two) times a day.    naloxone (NARCAN) 4 mg/actuation Spry 4mg by nasal route as needed for opioid overdose; may repeat every 2-3 minutes in alternating nostrils until medical help arrives. Call 911    oxyCODONE-acetaminophen (PERCOCET)  mg per tablet Take 1 tablet by mouth every 4 (four) hours as needed for Pain (maximum 5 tablets per day).    polyethylene glycol (GLYCOLAX) 17 gram/dose powder Use to cap to measure 17g, mix with liquid, and take by mouth once daily.    pravastatin (PRAVACHOL) 20 MG tablet Take 1 tablet (20 mg total) by mouth once daily.    rOPINIRole (REQUIP) 1 MG tablet Take 1 tablet (1 mg total) by mouth 2 (two) times daily.    senna-docusate (PERICOLACE) 8.6-50 mg per tablet Take 1 tablet by mouth 2 (two) times daily.    sertraline (ZOLOFT) 50 MG tablet Take 1 tablet (50 mg total) by mouth once daily.    [Paused]  topiramate (TOPAMAX) 50 MG tablet TAKE 1 TABLET(50 MG) BY MOUTH EVERY 12 HOURS    walker Misc 4 wheeled walker for mobility     Family History       Problem Relation (Age of Onset)    Heart disease Mother, Father    Hypertension Mother, Father          Tobacco Use    Smoking status: Every Day     Current packs/day: 1.00     Average packs/day: 1 pack/day for 20.0 years (20.0 ttl pk-yrs)     Types: Cigarettes    Smokeless tobacco: Never   Substance and Sexual Activity    Alcohol use: No     Alcohol/week: 0.0 standard drinks of alcohol    Drug use: No    Sexual activity: Not Currently     Partners: Male     Birth control/protection: See Surgical Hx     Review of Systems   Constitutional:  Positive for activity change. Negative for chills and fever.   HENT:  Negative for trouble swallowing.    Eyes:  Negative for photophobia and visual disturbance.   Respiratory:  Negative for cough, chest tightness and shortness of breath.    Cardiovascular:  Negative for chest pain, palpitations and leg swelling.   Gastrointestinal:  Negative for abdominal pain, constipation, diarrhea, nausea and vomiting.   Genitourinary:  Negative for dysuria, frequency and hematuria.   Musculoskeletal:  Positive for back pain and gait problem. Negative for neck pain.   Skin:  Negative for rash and wound.   Neurological:  Positive for weakness. Negative for dizziness, syncope, speech difficulty and light-headedness.   Psychiatric/Behavioral:  Negative for agitation and confusion. The patient is not nervous/anxious.      Objective:     Vital Signs (Most Recent):  Temp: 98.2 °F (36.8 °C) (06/30/25 1224)  Pulse: 88 (06/30/25 1300)  Resp: 17 (06/30/25 1224)  BP: (!) 118/58 (06/30/25 1300)  SpO2: 96 % (06/30/25 1300) Vital Signs (24h Range):  Temp:  [98.2 °F (36.8 °C)-98.7 °F (37.1 °C)] 98.2 °F (36.8 °C)  Pulse:  [79-88] 88  Resp:  [16-20] 17  SpO2:  [94 %-99 %] 96 %  BP: ()/(51-70) 118/58     Weight: 104.3 kg (230 lb)  Body mass index is 43.46  kg/m².     Physical Exam  Vitals and nursing note reviewed.   Constitutional:       General: She is not in acute distress.     Appearance: She is well-developed.   HENT:      Head: Normocephalic and atraumatic.      Mouth/Throat:      Pharynx: No oropharyngeal exudate.   Eyes:      Conjunctiva/sclera: Conjunctivae normal.      Pupils: Pupils are equal, round, and reactive to light.   Cardiovascular:      Rate and Rhythm: Normal rate and regular rhythm.      Heart sounds: Normal heart sounds.   Pulmonary:      Effort: Pulmonary effort is normal. No respiratory distress.      Breath sounds: Normal breath sounds. No wheezing.   Abdominal:      General: Bowel sounds are normal. There is no distension.      Palpations: Abdomen is soft.      Tenderness: There is no abdominal tenderness.   Musculoskeletal:         General: No tenderness. Normal range of motion.      Cervical back: Normal range of motion and neck supple.   Lymphadenopathy:      Cervical: No cervical adenopathy.   Skin:     General: Skin is warm and dry.      Capillary Refill: Capillary refill takes less than 2 seconds.      Findings: No rash.      Comments: Lymphedema changes noted to BLE  Small stage 1 pressure sores to buttocks   Thoracic/lumbar surgical scar with staples in place, no surrounding erythema or drainage (see media)   Neurological:      Mental Status: She is alert and oriented to person, place, and time.      Cranial Nerves: No cranial nerve deficit.      Sensory: No sensory deficit.      Motor: Weakness (able to move legs around, but unable to lift either leg off of the bed or hold it up) present.      Coordination: Coordination normal.   Psychiatric:         Mood and Affect: Mood normal.         Behavior: Behavior normal.         Thought Content: Thought content normal.              CRANIAL NERVES     CN III, IV, VI   Pupils are equal, round, and reactive to light.       Significant Labs: All pertinent labs within the past 24 hours have  been reviewed.  CBC:   Recent Labs   Lab 06/30/25  0119 06/30/25  0131   WBC 12.67  --    HGB 9.1*  --    HCT 28.7* 28*   *  --      CMP:   Recent Labs   Lab 06/30/25  0119      K 3.9      CO2 25   GLU 81   BUN 36*   CREATININE 1.3   CALCIUM 10.0   PROT 7.5   ALBUMIN 3.9   BILITOT 0.7   ALKPHOS 90   AST 16   ALT 11   ANIONGAP 13       Significant Imaging: I have reviewed all pertinent imaging results/findings within the past 24 hours.  Imaging Results              MRI Spine Cervical-Thoracic-Lumbar W W/O Contrast (XPD) (Final result)  Result time 06/30/25 09:18:41      Final result by James Beasley MD (06/30/25 09:18:41)                   Impression:      Transitional lumbosacral anatomy again identified.  Confirmation of level numbering prior to any further spine intervention would be recommended.    1. Motion compromised examination.  2. Since prior MRI of 06/12/2025, the patient is now status post T10-L2 posterior instrumented fusion at T10-T11 laminectomy.  Nonspecific fluid collection within the laminectomy bed without significant mass effect upon the subjacent spinal canal.  No robust peripheral enhancement/capsule at the present time to suggest mature abscess formation.  Noting this, the sterility of this collection cannot be assessed by MRI.  Fluid sampling may be considered, as warranted clinically.  3. Elsewhere, there is a new lobulated extra medullary collection within the spinal canal centered at the L1 level, eccentric towards the left and more pronounced dorsally.  Focal component the right dorsal lateral spinal canal measures on the order of 1.2 x 0.4 x 1.2 cm and deforms the right dorsal thecal sac with clustering of intrathecal nerve roots. Collection is favored likely extradural with subdural component difficult to exclude.  4. Additional details of chronic degenerative findings, as discussed.  5. Partially imaged multinodular goiter with substernal extension of the left  thyroid lobe into the mediastinum.  Further characterization with nonemergent ultrasound would be recommended, if thyroid gland not previously assessed elsewhere.  6. Additional details, as provided in the body of the report.      Electronically signed by: James Beasley  Date:    06/30/2025  Time:    09:18               Narrative:    EXAMINATION:  MRI SPINE CERVICAL-THORACIC-LUMBAR W W/O CONTRAST (XPD)    CLINICAL HISTORY:  Paraspinal mass/tumor;    TECHNIQUE:  Multiplanar, multisequence, MR imaging of cervical, thoracic, and lumbar spine performed without with contrast.  Postoperative sequences obtained following intravenous administration of 10 mL Gadavist.    COMPARISON:  MRI of the thoracic and lumbar spine, 06/12/2025. MRI cervical spine 12/17/2014.    FINDINGS:  Comment: Substantially motion compromised examination.    Additional comment: Transitional lumbosacral anatomy is again identified.  Four traditional non-rib-bearing lumbar type of vertebra again noted below the 12th thoracic vertebra, considered L1-L4.  Below this, there is a transitional segment, which for the purposes of this dictation, and in concordance with prior MR performed 06/12/2025, will be considered a sacralized L5 segment.  This numbering convention is additionally concordant with operative note of 06/14/2025.  Continued attention to spine numbering on any future spine intervention is recommended.    CERVICAL SPINE:    Alignment: There is no significant vertebral subluxation.    Developmental spinal canal narrowing: Question mild developmental narrowing of the central spinal canal related to congenitally short pedicles.    Discs: Multilevel degenerative disc disease.    Vertebrae: Question mild degree of edema-like signal involving the endplates spanning the C5-6 disc in keeping with active endplate degeneration.  More chronic appearing degenerative endplate changes noted elsewhere.    Spinal cord: The imaged cord has normal  signal.    Posterior fossa: No posterior fossa abnormalities are identified.    Level-wise findings are as follows:    C2-C3: There is no significant spinal canal or foraminal stenosis.Disc osteophyte complex formation and mild facet hypertrophy, and facet arthropathy with ligamentum flavum thickening.  Moderate to severe central spinal canal stenosis.  Minimal bilateral foraminal narrowing.    C3-C4: Disc osteophyte complex formation with uncinate hypertrophy and facet arthropathy with ligamentum flavum thickening.  Moderate central spinal canal stenosis and moderate right and minimal left foraminal narrowing.    C4-C5: Disc osteophyte complex formation with uncinate hypertrophy and facet arthropathy with ligamentum flavum thickening.  Overall moderate central spinal canal stenosis and moderate right and minimal left foraminal narrowing.    C5-C6: Disc osteophyte complex formation with uncinate hypertrophy and facet arthropathy and ligamentum flavum thickening.  Mild-to-moderate central spinal canal stenosis and mild-to-moderate right and moderate left foraminal narrowing.    C6-C7: Disc osteophyte complex formation with uncinate hypertrophy and facet arthropathy with ligamentum flavum thickening.  Mild central spinal canal stenosis and mild bilateral foraminal narrowing.    C7-T1: Disc osteophyte complex formation with facet arthropathy and ligamentum flavum thickening.  Moderate central spinal canal stenosis and mild bilateral foraminal narrowing.    Additional comment: Postcontrast series essentially diagnostic.  Noting this, no definite pathologic enhancement is appreciated.    THORACIC SPINE:    Prior surgery: Operative sequela of recent T10-L2 posterior instrumented fusion are noted.  Artifact related to suspected paired transpedicular screws bilaterally T10-T11 and L1-L2 are noted, which appear to be secured with vertical stabilization rods.  Sequela of vertebral augmentation noted at the T10 and L2 levels,  as seen on prior postoperative radiograph of 06/15/2025.  Hardware would be better assessed by CT.    Laminectomy has been performed at the T11 and T12 levels.  Nonspecific fluid collection on definite robust peripherally enhancing rim/capsule is noted within the laminectomy bed measuring on the order of 2 x 4 x 3 cm.  Noting limitations imposed by hardware related artifact, this collection does not appear to result in significant spinal canal compromise.    Ventral extradural enhancing soft tissue at the level of T12 (axial T2 series 30, image 16; axial postcontrast series 34, image 16), favored to reflect vascular engorgement.    More caudally, there is a new lobulated extra medullary collection within the spinal canal centered at the L1 level, eccentric towards the left and more pronounced dorsally (for example as seen on axial T2 weighted series 30, image 23).  Focal component the right dorsal lateral spinal canal measures on the order of 1.2 x 0.4 x 1.2 cm and deforms the right dorsal thecal sac with clustering of intrathecal nerve roots.  Collection is favored likely extradural with subdural component difficult to exclude.    More caudally within the spinal canal, circumferential extradural enhancing tissue extending to approximately L3-4 disc levels favored to reflect vascular engorgement as well.    Alignment: There is no significant vertebral subluxation.    Developmental spinal canal narrowing: None.    Discs: Multilevel degenerative disc disease.  Relatively shallow disc bulges are noted at T5-6 through T7-8 resulting in mild ventral thecal sac deformity.    At T1-T2, there is suspected in mild-to-moderate right foraminal narrowing.    At T11-12, diffuse disc bulging is noted mildly deforming ventral thecal sac without cord deformity.  The spinal canal is decompressed posteriorly.  Limited assessment of the foramina at this level with suspected at least mild foraminal narrowing.    At T12-L1, diffuse disc  bulge and facet arthropathy with ligamentum flavum thickening contribute to moderate central spinal canal stenosis and likely at least mild bilateral foraminal narrowing.    Vertebrae: Relatively similar configuration of T12 compression fracture compared to 06/12/2025 preoperative MRI and postoperative radiograph of 06/15/2025. mild apparent increased T2 weighted signal within the T10 and T11 vertebra may be artifactual or possibly related to postoperative edema.  Overall, no new concerning marrow process is identified.    Spinal cord: The imaged cord has normal signal.    Additional comments: Marked asymmetrical enlargement of the left lobe of the thyroid gland with substernal extension into the mediastinum is noted.  There appears to be rightward deviation of the tracheal airway.    LUMBAR SPINE:    Additional comment: For the purposes of this report, the L5-S1 level is considered axial image 51 of series 31, 31, and 34.  Confirmation of level numbering prior to any spine intervention would be recommended.    Postoperative/posttreatment findings: As above.    Alignment: There is no significant vertebral subluxation.    Developmental spinal canal narrowing: None.    Discs: Degenerative disc disease.    Vertebrae: No definite acute abnormality.  Heterogeneous appearance of the marrow, as before.  Chronic appearing degenerative endplate change.    Spinal cord: The distal cord has normal signal.The conus terminates at    Level-wise findings are as follows:    L1-L2: Postoperative changes and extra medullary postoperative collection, as above.Diffuse disc bulge, facet arthropathy and ligamentum flavum thickening again seen to contribute to moderate central spinal canal stenosis and mild right and moderate left foraminal narrowing.    L2-L3: Postoperative changes, as above.  Diffuse disc bulge with bilateral facet arthropathy and ligamentum flavum thickening contribute to moderate central spinal canal stenosis and mild  bilateral foraminal narrowing.    L3-L4: Diffuse disc bulge with right central disc protrusion associated with an annular fissure in conjunction with facet arthropathy and ligamentum flavum thickening contribute to moderate central spinal canal stenosis and narrowing of the right subarticular recess with suspected impingement of the traversing right L3 nerve root. Moderate bilateral foraminal narrowing.    L4-L5: Diffuse disc bulge and facet arthropathy with ligamentum flavum thickening.  No significant central spinal canal or foraminal narrowing.    L5-S1: There is no significant spinal canal or foraminal stenosis.    Imaged sacroiliac joints: Degenerative change.    Imaged abdomen, pelvis, and retroperitoneum: No acute abnormality.  Simple appearing renal cysts.

## 2025-06-30 NOTE — ASSESSMENT & PLAN NOTE
- noted on imaging, but patient without any weakness or changes to upper extremities   - per NSGY, no surgical intervention

## 2025-06-30 NOTE — PROVIDER PROGRESS NOTES - EMERGENCY DEPT.
"ED Resident HAND-OFF NOTE:  6/30/2025  Kuldeep Villela is a 74 y.o. female with a past medical history of T10-L2 fusion, arthritis, HTN, HLD, depression who arrives for evaluation of back pain.  Patient states having thoracic back pain that has remained constant since surgery.  Complaining of chills.  Denies fever.  States having new bilateral lower extremity weakness which 1st started on the left lower extremity.  Also complains of bilateral lower extremity  numbness.  Patient denies any urinary retention, urinary incontinence, stool incontinence, saddle anesthesia.       I assumed care of patient from off-going ED physician team pending:    - NSGY Eval  - MRI spine    On my evaluation Kuldeep Villela, I appreciate:  BP (!) 104/57   Pulse 86   Temp 98.5 °F (36.9 °C) (Oral)   Resp 20   Ht 5' 1" (1.549 m)   Wt 104.3 kg (230 lb)   SpO2 96%   Breastfeeding No   BMI 43.46 kg/m²     MRI showing open canal at surgical site. ESR and CRP mildly elevated. No leukocytosis. No additional focal neuro deficits aside from LE weakness. NSGY evaluated patient who do not deem any urgent surgical intervention necessary, but they do agree that she should come in for her profound weakness limiting ADLs and further monitoring of both surgical site and any developing neuro deficits.    Thus far, Kuldeep Villela has received:  Medications   droPERidol injection 0.625 mg (has no administration in time range)   lactated ringers bolus 500 mL (500 mLs Intravenous New Bag 6/30/25 0612)   HYDROmorphone injection 0.5 mg (0.5 mg Intravenous Given 6/30/25 0121)   droPERidol injection 0.625 mg (0.625 mg Intravenous Given 6/30/25 0331)   gadobutroL (GADAVIST) injection 10 mL (10 mLs Intravenous Given 6/30/25 0428)     Labs & Imaging:  Labs Reviewed   COMPREHENSIVE METABOLIC PANEL - Abnormal       Result Value    Sodium 140      Potassium 3.9      Chloride 102      CO2 25      Glucose 81      BUN 36 (*)     Creatinine 1.3      Calcium 10.0      " Protein Total 7.5      Albumin 3.9      Bilirubin Total 0.7      ALP 90      AST 16      ALT 11      Anion Gap 13      eGFR 44 (*)    C-REACTIVE PROTEIN - Abnormal    CRP 22.8 (*)    SEDIMENTATION RATE - Abnormal    Sed Rate 68 (*)    CBC WITH DIFFERENTIAL - Abnormal    WBC 12.67      RBC 2.83 (*)     HGB 9.1 (*)     HCT 28.7 (*)      (*)     MCH 32.2 (*)     MCHC 31.7 (*)     RDW 14.1      Platelet Count 507 (*)     MPV 9.6      Nucleated RBC 0      Neut % 75.8 (*)     Lymph % 13.1 (*)     Mono % 6.0      Eos % 4.1      Basophil % 0.5      Imm Grans % 0.5      Neut # 9.61 (*)     Lymph # 1.66      Mono # 0.76      Eos # 0.52 (*)     Baso # 0.06      Imm Grans # 0.06 (*)    ISTAT PROCEDURE - Abnormal    POC Glucose 81      POC BUN 38 (*)     POC Creatinine 1.5 (*)     POC Sodium 138      POC Potassium 3.9      POC Chloride 102      POC TCO2 (MEASURED) 27      POC Ionized Calcium 1.24      POC Hematocrit 28 (*)     Sample DIONICIO     INFLUENZA A & B BY MOLECULAR - Normal    INFLUENZA A MOLECULAR Negative      INFLUENZA B MOLECULAR  Negative     APTT - Normal    PTT 26.3     PROTIME-INR - Normal    PT 10.8      INR 1.0     SARS-COV-2 RNA AMPLIFICATION, QUAL - Normal    SARS COV-2 Molecular Negative     CULTURE, BLOOD   CULTURE, BLOOD   CBC W/ AUTO DIFFERENTIAL    Narrative:     The following orders were created for panel order CBC auto differential.  Procedure                               Abnormality         Status                     ---------                               -----------         ------                     CBC with Differential[[id number]]       Abnormal            Final result                 Please view results for these tests on the individual orders.   TYPE & SCREEN    Specimen Outdate 07/03/2025 23:59      Group & Rh O POS      Indirect Sunil NEG     ISTAT CHEM8      Imaging Results              MRI Spine Cervical-Thoracic-Lumbar W W/O Contrast (XPD) (In process)  Result time 06/30/25 04:14:30                        ED Course as of 06/30/25 0635   Mon Jun 30, 2025   0040  Patient is a 72-year-old female with a past medical history of T10-L2 fusion     Who arrives for evaluation of back pain.  Patient states having thoracic back pain that has remained constant since surgery.  Complaining of chills.  Denies fever.  States having new bilateral lower extremity weakness which 1st started on the left lower extremity.  Also complains of bilateral lower extremity  numbness.  Patient denies any urinary retention, urinary incontinence, stool incontinence, saddle anesthesia. [AG]   0041   DX:      Spinal epidural abscess   Cellulitis   [AG]   0133 CBC auto differential(!)    Macrocytic anemia  improved from prior CBC, no leukocytosis, thrombocytosis [AG]   0226  /56 [AG]   0230 C-reactive protein(!) [AG]   0230 Creatinine: 1.3   MALACHI [AG]   0550   Patient sleeping in a room.  Map of 65.  500 mL LR. [AG]      ED Course User Index  [AG] Amy Sal MD        Disposition: Patient will be admitted. Discussed reason for admission and plan with patient and/or caregiver who expressed understanding and agreement.    I have discussed and counseled Kuldeep Villela regarding exam, results, diagnosis, treatment, and plan.  ______________________    6/30/2025

## 2025-06-30 NOTE — ASSESSMENT & PLAN NOTE
Body mass index is 43.46 kg/m². Morbid obesity complicates all aspects of disease management from diagnostic modalities to treatment. Weight loss encouraged and health benefits explained to patient.

## 2025-06-30 NOTE — ASSESSMENT & PLAN NOTE
9o F with PMHx of HTN, HLD, T12 compression fracture s/p posterior fusion T10-L2 (06/14/25) with NSGY who presented to ED for BLE weakness. CRP 22.8/ ESR 68. MRI C/T/L spine showing open canal at surgical site.     - NSGY consulted:  - No acute neurosurgical intervention required  - Low concern for infection at this time  - Will remove staples during hospitalization  - PT/OT consulted   - fall precautions  - MM pain control with scheduled tylenol and robaxin, prn opioids

## 2025-06-30 NOTE — ASSESSMENT & PLAN NOTE
Patient's blood pressure range in the last 24 hours was: BP  Min: 90/52  Max: 141/64.The patient's inpatient anti-hypertensive regimen is listed below:  Current Antihypertensives  amLODIPine tablet 10 mg, Daily, Oral    Plan  - BP is controlled, no changes needed to their regimen  - of note, home coreg and lasix held since last admit

## 2025-06-30 NOTE — CONSULTS
[facility] - Emergency Dept  Neurosurgery  Consult Note    Inpatient consult to Neurosurgery  Consult performed by: Butch Nelson MD  Consult ordered by: Amy Sal MD        Subjective:     Chief Complaint/Reason for Admission: back pain    History of Present Illness: Patient is a 72-year-old female with a past medical history of T10-L2 fusion, arthritis, HTN, HLD, depression who arrives for evaluation of back pain. Patient states having thoracic back pain that has remained constant since surgery. Complaining of chills. Denies fever. States having new bilateral lower extremity weakness which 1st started on the left lower extremity. Also complains of bilateral lower extremity numbness. Patient denies any urinary retention, urinary incontinence, stool incontinence, saddle anesthesia.     Prescriptions Prior to Admission[1]    Review of patient's allergies indicates:   Allergen Reactions    Penicillins Anxiety and Other (See Comments)    Anesthetic [benzocaine-aloe vera]      Jittery/hyper    Guaifenesin Anxiety and Other (See Comments)       Past Medical History:   Diagnosis Date    Arthritis     Asthma     Cervical spondylosis 07/13/2012    Chronic LBP 07/13/2012    Chronic neck pain 07/13/2012    Debility     Hyperlipidemia     Hypertension     Lumbar radiculopathy, BLE 07/13/2012    Lumbar spinal stenosis at L4-L5. 07/13/2012    Lumbar spondylosis 07/13/2012    Morbid obesity 07/13/2012    Primary osteoarthritis of both knees 07/13/2012    Spondylolisthesis, grade 1 at L4-L5. 07/13/2012    Tenosynovitis of ankle     Rt peroneus longus    Walker as ambulation aid      Past Surgical History:   Procedure Laterality Date    CHOLECYSTECTOMY      HYSTERECTOMY      MAGNETIC RESONANCE IMAGING Bilateral 6/12/2025    Procedure: MRI (Magnetic Resonance Imagine);  Surgeon: Sameera Villalba;  Location: [facility];  Service: Anesthesiology;  Laterality: Bilateral;    [place] REMOVAL OF OVARY/TUBE(S)      THORACIC LAMINECTOMY WITH  FUSION N/A 6/14/2025    Procedure: T12 LAMINECTOMY, SPINE, THORACIC, W/ ARTHRODESIS T10-L2;  Surgeon: Nnamdi Head MD;  Location: [facility] OR [address] [place] [address];  Service: Neurosurgery;  Laterality: N/A;     Family History       Problem Relation (Age of Onset)    Heart disease Mother, Father    Hypertension Mother, Father          Tobacco Use    Smoking status: Every Day     Current packs/day: 1.00     Average packs/day: 1 pack/day for 20.0 years (20.0 ttl pk-yrs)     Types: Cigarettes    Smokeless tobacco: Never   Substance and Sexual Activity    Alcohol use: No     Alcohol/week: 0.0 standard drinks of alcohol    Drug use: No    Sexual activity: Not Currently     Partners: Male     Birth control/protection: See Surgical Hx     Review of Systems   All other systems reviewed and are negative.    Objective:     Weight: 104.3 kg (230 lb)  Body mass index is 43.46 kg/m².  Vital Signs (Most Recent):  Temp: 98.7 °F (37.1 °C) (06/30/25 0700)  Pulse: 85 (06/30/25 0700)  Resp: 16 (06/30/25 0700)  BP: (!) 120/57 (06/30/25 0700)  SpO2: 97 % (06/30/25 0700) Vital Signs (24h Range):  Temp:  [98.2 °F (36.8 °C)-98.7 °F (37.1 °C)] 98.7 °F (37.1 °C)  Pulse:  [79-87] 85  Resp:  [16-20] 16  SpO2:  [95 %-99 %] 97 %  BP: ()/(51-70) 120/57     Date 06/30/25 0700 - 07/01/25 0659   Shift 3381-9911 2212-4795 4849-4791 24 Hour Total   INTAKE   IV Piggyback 500   500   Shift Total(mL/kg) 500(4.8)   500(4.8)   OUTPUT   Shift Total(mL/kg)       Weight (kg) 104.3 104.3 104.3 104.3                       Female External Urinary Catheter w/ Suction 06/30/25 0509 (Active)   Skin no redness;no breakdown 06/30/25 0509   Tolerance no signs/symptoms of discomfort;assisted with appliance change 06/30/25 0509   Suction Continuous suction at 70 mmHg 06/30/25 0509          Physical Exam     GENERAL: resting comfortably  HEENT: NCAT, PERRL, mucous membranes moist  NECK: supple, trachea midline  CV: normal capillary refill  PULM: aerating well, symmetric  expansion, no distress  ABD: soft, NT, ND  EXT: no c/c/e    NEURO:    AAO x 3  CN II-XII grossly intact  Fc x 4 antigravity  SILT    No drift or dysmetria  Incision with staples healing well; no erythema induration or fluctuance    Neurosurgery Physical Exam    Significant Labs:  Recent Labs   Lab 06/30/25 0119   GLU 81      K 3.9      CO2 25   BUN 36*   CREATININE 1.3   CALCIUM 10.0     Recent Labs   Lab 06/30/25 0119 06/30/25 0131   WBC 12.67  --    HGB 9.1*  --    HCT 28.7* 28*   *  --      Recent Labs   Lab 06/30/25 0119   INR 1.0   APTT 26.3     Microbiology Results (last 7 days)       Procedure Component Value Units Date/Time    Blood culture [[id number]] Collected: 06/30/25 0119    Order Status: Resulted Specimen: Blood from Peripheral, Forearm, Right Updated: 06/30/25 0605    Blood culture [[id number]] Collected: 06/30/25 0100    Order Status: Resulted Specimen: Blood from Peripheral, Forearm, Left Updated: 06/30/25 0605    Influenza A & B by Molecular [[id number]]  (Normal) Collected: 06/30/25 0119    Order Status: Completed Specimen: Nasal Swab Updated: 06/30/25 0203     INFLUENZA A MOLECULAR Negative     INFLUENZA B MOLECULAR  Negative          All pertinent labs from the last 24 hours have been reviewed.    Significant Diagnostics:  I have reviewed all pertinent imaging results/findings within the past 24 hours.  I have reviewed and interpreted all pertinent imaging results/findings within the past 24 hours.  No results found in the last 24 hours.      Assessment/Plan:     Lumbar radiculopathy, BLE  74f with T12 compression fracture s/p T10-L2 fusion with T12 lami on 6/14 presenting with persistent back pain. Incision healing well. Full strength grossly in bilateral lower extremity. No bowel or bladder issues. MRI showing open canal at surgical site. ESR and CRP mildly elevated  Overall clinical picture not concerning  NSGY will follow along while inpatient    Plan  No acute  neurosurgical intervention required  Low concern for infection at this time  Will remove staples during hospitalization        Thank you for your consult. I will follow-up with patient. Please contact us if you have any additional questions.    Butch Nelson MD  Neurosurgery  [facility] - Emergency Dept       [1] (Not in a hospital admission)     Warm

## 2025-06-30 NOTE — H&P
[facility] - Emergency Dept  [facility] Medicine  History & Physical    Patient Name: Kuldeep Villela  MRN: [id number]  Patient Class: IP- Inpatient  Admission Date: 6/29/2025  Attending Physician: Elbert Sheets MD   Primary Care Provider: Ginna Musa MD         Patient information was obtained from patient and ER records.     Subjective:     Principal Problem:Lumbar radiculopathy    Chief Complaint:   Chief Complaint   Patient presents with    Back Pain     Sx a week ago on back no other complaint.         HPI: Kuldeep Villela is a 73 yo F with PMHx of HTN, HLD, tobacco use, anemia who presented to ED for BLE weakness. Patient was recently hospitalized from 06/09-06/24 for worsening back pain and was found to have a T12 compression fracture. She underwent posterior fusion T10-L2 (06/14/25) with NSGY during that hospitalization. She completed course of antibiotics and drains were removed. Initially plan was for her to discharge to SNF, but she decided she would rather go home with . She reports that since she went home, she has had worsening BLE weakness and her back pain has not been well controlled. She lives with her daughter and grandson. She has mostly been staying in her recliner since discharge due to decreased mobility. She also endorses urinary incontinence, but denies dysuria or increased frequency. She still smokes cigarettes at times, but not daily. Denies fever, chills, chest pain, palpitations, SOB, cough, abdominal pain, n/v/d.    In ED: HDS. Hgb at baseline. PLT elevated to 507. CMP with mild MALACHI with Cr 1.3 (baseline 1.0). CRP 22.8/ ESR 68. Covid/flu negative. MRI C/T/L spine showing open canal at surgical site. NSGY consulted; no acute NSGY intervention as stenosis improved when compared to pre op and low concern for infection. NSGY recommending admission with therapy and possible IPR placement. Given 500 cc bolus of NS. Admitted to  for further management.     Past Medical History:    Diagnosis Date    Arthritis     Asthma     Cervical spondylosis 07/13/2012    Chronic LBP 07/13/2012    Chronic neck pain 07/13/2012    Debility     Hyperlipidemia     Hypertension     Lumbar radiculopathy, BLE 07/13/2012    Lumbar spinal stenosis at L4-L5. 07/13/2012    Lumbar spondylosis 07/13/2012    Morbid obesity 07/13/2012    Primary osteoarthritis of both knees 07/13/2012    Spondylolisthesis, grade 1 at L4-L5. 07/13/2012    Tenosynovitis of ankle     Rt peroneus longus    Walker as ambulation aid        Past Surgical History:   Procedure Laterality Date    CHOLECYSTECTOMY      HYSTERECTOMY      MAGNETIC RESONANCE IMAGING Bilateral 6/12/2025    Procedure: MRI (Magnetic Resonance Imagine);  Surgeon: Sameera Villalba;  Location: [facility];  Service: Anesthesiology;  Laterality: Bilateral;    SC REMOVAL OF OVARY/TUBE(S)      THORACIC LAMINECTOMY WITH FUSION N/A 6/14/2025    Procedure: T12 LAMINECTOMY, SPINE, THORACIC, W/ ARTHRODESIS T10-L2;  Surgeon: Nnamdi Head MD;  Location: [facility] OR [address] [place] [address];  Service: Neurosurgery;  Laterality: N/A;       Review of patient's allergies indicates:   Allergen Reactions    Penicillins Anxiety and Other (See Comments)    Anesthetic [benzocaine-aloe vera]      Jittery/hyper    Guaifenesin Anxiety and Other (See Comments)       Current Facility-Administered Medications on File Prior to Encounter   Medication    sodium hyaluronate (EUFLEXXA) 10 mg/mL(mw 2.4 -3.6 million) injection 40 mg     Current Outpatient Medications on File Prior to Encounter   Medication Sig    albuterol (PROVENTIL/VENTOLIN HFA) 90 mcg/actuation inhaler Inhale 1-2 puffs into the lungs every 4 (four) hours as needed for Wheezing. Rescue    amLODIPine (NORVASC) 10 MG tablet Take 1 tablet (10 mg total) by mouth once daily.    bisacodyL (DULCOLAX) 10 mg Supp Place 1 suppository (10 mg total) rectally daily as needed (constipation).    busPIRone (BUSPAR) 15 MG tablet Take 1 tablet (15 mg total) by mouth 4 (four)  times daily.    [Paused] carvediloL (COREG) 6.25 MG tablet Take 1 tablet by mouth every 12 (twelve) hours.    cetirizine (ZYRTEC) 10 MG tablet TAKE 1 TABLET BY MOUTH EVERY DAY    citalopram (CELEXA) 40 MG tablet TAKE 1 TABLET(40 MG) BY MOUTH EVERY DAY    cyclobenzaprine (FLEXERIL) 10 MG tablet Take 1 tablet (10 mg total) by mouth 3 (three) times daily as needed for Muscle spasms.    diclofenac sodium (VOLTAREN) 1 % Gel Apply 2 g topically 3 (three) times daily as needed (apply to painful joints).    [Paused] estrogens,conjugated,-methyltestosterone 0.625-1.25mg (ESTRATEST HS) 0.625-1.25 mg per tablet Take 1 tablet by mouth.    [Paused] furosemide (LASIX) 20 MG tablet Take 1 tablet (20 mg total) by mouth 2 (two) times a day. for 2 days    LIDOcaine (LIDODERM) 5 % Place 2 patches onto the skin once daily. Place to back. Remove & Discard patch within 12 hours or as directed by MD    linaCLOtide (LINZESS) 290 mcg Cap capsule Take 1 capsule (290 mcg total) by mouth before breakfast.    meloxicam (MOBIC) 7.5 MG tablet Take 1 tablet (7.5 mg total) by mouth 2 (two) times a day.    naloxone (NARCAN) 4 mg/actuation Spry 4mg by nasal route as needed for opioid overdose; may repeat every 2-3 minutes in alternating nostrils until medical help arrives. Call 911    oxyCODONE-acetaminophen (PERCOCET)  mg per tablet Take 1 tablet by mouth every 4 (four) hours as needed for Pain (maximum 5 tablets per day).    polyethylene glycol (GLYCOLAX) 17 gram/dose powder Use to cap to measure 17g, mix with liquid, and take by mouth once daily.    pravastatin (PRAVACHOL) 20 MG tablet Take 1 tablet (20 mg total) by mouth once daily.    rOPINIRole (REQUIP) 1 MG tablet Take 1 tablet (1 mg total) by mouth 2 (two) times daily.    senna-docusate (PERICOLACE) 8.6-50 mg per tablet Take 1 tablet by mouth 2 (two) times daily.    sertraline (ZOLOFT) 50 MG tablet Take 1 tablet (50 mg total) by mouth once daily.    [Paused] topiramate (TOPAMAX) 50 MG  tablet TAKE 1 TABLET(50 MG) BY MOUTH EVERY 12 HOURS    walker Misc 4 wheeled walker for mobility     Family History       Problem Relation (Age of Onset)    Heart disease Mother, Father    Hypertension Mother, Father          Tobacco Use    Smoking status: Every Day     Current packs/day: 1.00     Average packs/day: 1 pack/day for 20.0 years (20.0 ttl pk-yrs)     Types: Cigarettes    Smokeless tobacco: Never   Substance and Sexual Activity    Alcohol use: No     Alcohol/week: 0.0 standard drinks of alcohol    Drug use: No    Sexual activity: Not Currently     Partners: Male     Birth control/protection: See Surgical Hx     Review of Systems   Constitutional:  Positive for activity change. Negative for chills and fever.   HENT:  Negative for trouble swallowing.    Eyes:  Negative for photophobia and visual disturbance.   Respiratory:  Negative for cough, chest tightness and shortness of breath.    Cardiovascular:  Negative for chest pain, palpitations and leg swelling.   Gastrointestinal:  Negative for abdominal pain, constipation, diarrhea, nausea and vomiting.   Genitourinary:  Negative for dysuria, frequency and hematuria.   Musculoskeletal:  Positive for back pain and gait problem. Negative for neck pain.   Skin:  Negative for rash and wound.   Neurological:  Positive for weakness. Negative for dizziness, syncope, speech difficulty and light-headedness.   Psychiatric/Behavioral:  Negative for agitation and confusion. The patient is not nervous/anxious.      Objective:     Vital Signs (Most Recent):  Temp: 98.2 °F (36.8 °C) (06/30/25 1224)  Pulse: 88 (06/30/25 1300)  Resp: 17 (06/30/25 1224)  BP: (!) 118/58 (06/30/25 1300)  SpO2: 96 % (06/30/25 1300) Vital Signs (24h Range):  Temp:  [98.2 °F (36.8 °C)-98.7 °F (37.1 °C)] 98.2 °F (36.8 °C)  Pulse:  [79-88] 88  Resp:  [16-20] 17  SpO2:  [94 %-99 %] 96 %  BP: ()/(51-70) 118/58     Weight: 104.3 kg (230 lb)  Body mass index is 43.46 kg/m².     Physical  Exam  Vitals and nursing note reviewed.   Constitutional:       General: She is not in acute distress.     Appearance: She is well-developed.   HENT:      Head: Normocephalic and atraumatic.      Mouth/Throat:      Pharynx: No oropharyngeal exudate.   Eyes:      Conjunctiva/sclera: Conjunctivae normal.      Pupils: Pupils are equal, round, and reactive to light.   Cardiovascular:      Rate and Rhythm: Normal rate and regular rhythm.      Heart sounds: Normal heart sounds.   Pulmonary:      Effort: Pulmonary effort is normal. No respiratory distress.      Breath sounds: Normal breath sounds. No wheezing.   Abdominal:      General: Bowel sounds are normal. There is no distension.      Palpations: Abdomen is soft.      Tenderness: There is no abdominal tenderness.   Musculoskeletal:         General: No tenderness. Normal range of motion.      Cervical back: Normal range of motion and neck supple.   Lymphadenopathy:      Cervical: No cervical adenopathy.   Skin:     General: Skin is warm and dry.      Capillary Refill: Capillary refill takes less than 2 seconds.      Findings: No rash.      Comments: Lymphedema changes noted to BLE  Small stage 1 pressure sores to buttocks   Thoracic/lumbar surgical scar with staples in place, no surrounding erythema or drainage (see media)   Neurological:      Mental Status: She is alert and oriented to person, place, and time.      Cranial Nerves: No cranial nerve deficit.      Sensory: No sensory deficit.      Motor: Weakness (able to move legs around, but unable to lift either leg off of the bed or hold it up) present.      Coordination: Coordination normal.   Psychiatric:         Mood and Affect: Mood normal.         Behavior: Behavior normal.         Thought Content: Thought content normal.              CRANIAL NERVES     CN III, IV, VI   Pupils are equal, round, and reactive to light.       Significant Labs: All pertinent labs within the past 24 hours have been reviewed.  CBC:    Recent Labs   Lab 06/30/25  0119 06/30/25  0131   WBC 12.67  --    HGB 9.1*  --    HCT 28.7* 28*   *  --      CMP:   Recent Labs   Lab 06/30/25  0119      K 3.9      CO2 25   GLU 81   BUN 36*   CREATININE 1.3   CALCIUM 10.0   PROT 7.5   ALBUMIN 3.9   BILITOT 0.7   ALKPHOS 90   AST 16   ALT 11   ANIONGAP 13       Significant Imaging: I have reviewed all pertinent imaging results/findings within the past 24 hours.  Imaging Results              MRI Spine Cervical-Thoracic-Lumbar W W/O Contrast (XPD) (Final result)  Result time 06/30/25 09:18:41      Final result by James Beasley MD (06/30/25 09:18:41)                   Impression:      Transitional lumbosacral anatomy again identified.  Confirmation of level numbering prior to any further spine intervention would be recommended.    1. Motion compromised examination.  2. Since prior MRI of 06/12/2025, the patient is now status post T10-L2 posterior instrumented fusion at T10-T11 laminectomy.  Nonspecific fluid collection within the laminectomy bed without significant mass effect upon the subjacent spinal canal.  No robust peripheral enhancement/capsule at the present time to suggest mature abscess formation.  Noting this, the sterility of this collection cannot be assessed by MRI.  Fluid sampling may be considered, as warranted clinically.  3. Elsewhere, there is a new lobulated extra medullary collection within the spinal canal centered at the L1 level, eccentric towards the left and more pronounced dorsally.  Focal component the right dorsal lateral spinal canal measures on the order of 1.2 x 0.4 x 1.2 cm and deforms the right dorsal thecal sac with clustering of intrathecal nerve roots. Collection is favored likely extradural with subdural component difficult to exclude.  4. Additional details of chronic degenerative findings, as discussed.  5. Partially imaged multinodular goiter with substernal extension of the left thyroid lobe into the  mediastinum.  Further characterization with nonemergent ultrasound would be recommended, if thyroid gland not previously assessed elsewhere.  6. Additional details, as provided in the body of the report.      Electronically signed by: James Beasley  Date:    06/30/2025  Time:    09:18               Narrative:    EXAMINATION:  MRI SPINE CERVICAL-THORACIC-LUMBAR W W/O CONTRAST (XPD)    CLINICAL HISTORY:  Paraspinal mass/tumor;    TECHNIQUE:  Multiplanar, multisequence, MR imaging of cervical, thoracic, and lumbar spine performed without with contrast.  Postoperative sequences obtained following intravenous administration of 10 mL Gadavist.    COMPARISON:  MRI of the thoracic and lumbar spine, 06/12/2025. MRI cervical spine 12/17/2014.    FINDINGS:  Comment: Substantially motion compromised examination.    Additional comment: Transitional lumbosacral anatomy is again identified.  Four traditional non-rib-bearing lumbar type of vertebra again noted below the 12th thoracic vertebra, considered L1-L4.  Below this, there is a transitional segment, which for the purposes of this dictation, and in concordance with prior MR performed 06/12/2025, will be considered a sacralized L5 segment.  This numbering convention is additionally concordant with operative note of 06/14/2025.  Continued attention to spine numbering on any future spine intervention is recommended.    CERVICAL SPINE:    Alignment: There is no significant vertebral subluxation.    Developmental spinal canal narrowing: Question mild developmental narrowing of the central spinal canal related to congenitally short pedicles.    Discs: Multilevel degenerative disc disease.    Vertebrae: Question mild degree of edema-like signal involving the endplates spanning the C5-6 disc in keeping with active endplate degeneration.  More chronic appearing degenerative endplate changes noted elsewhere.    Spinal cord: The imaged cord has normal signal.    Posterior fossa: No  posterior fossa abnormalities are identified.    Level-wise findings are as follows:    C2-C3: There is no significant spinal canal or foraminal stenosis.Disc osteophyte complex formation and mild facet hypertrophy, and facet arthropathy with ligamentum flavum thickening.  Moderate to severe central spinal canal stenosis.  Minimal bilateral foraminal narrowing.    C3-C4: Disc osteophyte complex formation with uncinate hypertrophy and facet arthropathy with ligamentum flavum thickening.  Moderate central spinal canal stenosis and moderate right and minimal left foraminal narrowing.    C4-C5: Disc osteophyte complex formation with uncinate hypertrophy and facet arthropathy with ligamentum flavum thickening.  Overall moderate central spinal canal stenosis and moderate right and minimal left foraminal narrowing.    C5-C6: Disc osteophyte complex formation with uncinate hypertrophy and facet arthropathy and ligamentum flavum thickening.  Mild-to-moderate central spinal canal stenosis and mild-to-moderate right and moderate left foraminal narrowing.    C6-C7: Disc osteophyte complex formation with uncinate hypertrophy and facet arthropathy with ligamentum flavum thickening.  Mild central spinal canal stenosis and mild bilateral foraminal narrowing.    C7-T1: Disc osteophyte complex formation with facet arthropathy and ligamentum flavum thickening.  Moderate central spinal canal stenosis and mild bilateral foraminal narrowing.    Additional comment: Postcontrast series essentially diagnostic.  Noting this, no definite pathologic enhancement is appreciated.    THORACIC SPINE:    Prior surgery: Operative sequela of recent T10-L2 posterior instrumented fusion are noted.  Artifact related to suspected paired transpedicular screws bilaterally T10-T11 and L1-L2 are noted, which appear to be secured with vertical stabilization rods.  Sequela of vertebral augmentation noted at the T10 and L2 levels, as seen on prior postoperative  radiograph of 06/15/2025.  Hardware would be better assessed by CT.    Laminectomy has been performed at the T11 and T12 levels.  Nonspecific fluid collection on definite robust peripherally enhancing rim/capsule is noted within the laminectomy bed measuring on the order of 2 x 4 x 3 cm.  Noting limitations imposed by hardware related artifact, this collection does not appear to result in significant spinal canal compromise.    Ventral extradural enhancing soft tissue at the level of T12 (axial T2 series 30, image 16; axial postcontrast series 34, image 16), favored to reflect vascular engorgement.    More caudally, there is a new lobulated extra medullary collection within the spinal canal centered at the L1 level, eccentric towards the left and more pronounced dorsally (for example as seen on axial T2 weighted series 30, image 23).  Focal component the right dorsal lateral spinal canal measures on the order of 1.2 x 0.4 x 1.2 cm and deforms the right dorsal thecal sac with clustering of intrathecal nerve roots.  Collection is favored likely extradural with subdural component difficult to exclude.    More caudally within the spinal canal, circumferential extradural enhancing tissue extending to approximately L3-4 disc levels favored to reflect vascular engorgement as well.    Alignment: There is no significant vertebral subluxation.    Developmental spinal canal narrowing: None.    Discs: Multilevel degenerative disc disease.  Relatively shallow disc bulges are noted at T5-6 through T7-8 resulting in mild ventral thecal sac deformity.    At T1-T2, there is suspected in mild-to-moderate right foraminal narrowing.    At T11-12, diffuse disc bulging is noted mildly deforming ventral thecal sac without cord deformity.  The spinal canal is decompressed posteriorly.  Limited assessment of the foramina at this level with suspected at least mild foraminal narrowing.    At T12-L1, diffuse disc bulge and facet arthropathy  with ligamentum flavum thickening contribute to moderate central spinal canal stenosis and likely at least mild bilateral foraminal narrowing.    Vertebrae: Relatively similar configuration of T12 compression fracture compared to 06/12/2025 preoperative MRI and postoperative radiograph of 06/15/2025. mild apparent increased T2 weighted signal within the T10 and T11 vertebra may be artifactual or possibly related to postoperative edema.  Overall, no new concerning marrow process is identified.    Spinal cord: The imaged cord has normal signal.    Additional comments: Marked asymmetrical enlargement of the left lobe of the thyroid gland with substernal extension into the mediastinum is noted.  There appears to be rightward deviation of the tracheal airway.    LUMBAR SPINE:    Additional comment: For the purposes of this report, the L5-S1 level is considered axial image 51 of series 31, 31, and 34.  Confirmation of level numbering prior to any spine intervention would be recommended.    Postoperative/posttreatment findings: As above.    Alignment: There is no significant vertebral subluxation.    Developmental spinal canal narrowing: None.    Discs: Degenerative disc disease.    Vertebrae: No definite acute abnormality.  Heterogeneous appearance of the marrow, as before.  Chronic appearing degenerative endplate change.    Spinal cord: The distal cord has normal signal.The conus terminates at    Level-wise findings are as follows:    L1-L2: Postoperative changes and extra medullary postoperative collection, as above.Diffuse disc bulge, facet arthropathy and ligamentum flavum thickening again seen to contribute to moderate central spinal canal stenosis and mild right and moderate left foraminal narrowing.    L2-L3: Postoperative changes, as above.  Diffuse disc bulge with bilateral facet arthropathy and ligamentum flavum thickening contribute to moderate central spinal canal stenosis and mild bilateral foraminal  narrowing.    L3-L4: Diffuse disc bulge with right central disc protrusion associated with an annular fissure in conjunction with facet arthropathy and ligamentum flavum thickening contribute to moderate central spinal canal stenosis and narrowing of the right subarticular recess with suspected impingement of the traversing right L3 nerve root. Moderate bilateral foraminal narrowing.    L4-L5: Diffuse disc bulge and facet arthropathy with ligamentum flavum thickening.  No significant central spinal canal or foraminal narrowing.    L5-S1: There is no significant spinal canal or foraminal stenosis.    Imaged sacroiliac joints: Degenerative change.    Imaged abdomen, pelvis, and retroperitoneum: No acute abnormality.  Simple appearing renal cysts.                                      Assessment/Plan:     Assessment & Plan  Lumbar radiculopathy, BLE  Lumbar spinal stenosis at L4-L5.  Chronic low back pain  Compression fracture of T12 vertebra  9o F with PMHx of HTN, HLD, T12 compression fracture s/p posterior fusion T10-L2 (06/14/25) with NSGY who presented to ED for BLE weakness. CRP 22.8/ ESR 68. MRI C/T/L spine showing open canal at surgical site.     - NSGY consulted:  - No acute neurosurgical intervention required  - Low concern for infection at this time  - Will remove staples during hospitalization  - PT/OT consulted   - fall precautions  - MM pain control with scheduled tylenol and robaxin, prn opioids     Cervical spondylosis  - noted on imaging, but patient without any weakness or changes to upper extremities   - per NSGY, no surgical intervention    Morbid obesity  Body mass index is 43.46 kg/m². Morbid obesity complicates all aspects of disease management from diagnostic modalities to treatment. Weight loss encouraged and health benefits explained to patient.  Hypertension  Patient's blood pressure range in the last 24 hours was: BP  Min: 90/52  Max: 141/64.The patient's inpatient anti-hypertensive regimen  is listed below:  Current Antihypertensives  amLODIPine tablet 10 mg, Daily, Oral    Plan  - BP is controlled, no changes needed to their regimen  - of note, home coreg and lasix held since last admit  Hyperlipemia  - continue statin   Debility  Patient with Acute on chronic debility due to fracture/prosthesis and age-related physical debility. The patient's latest AMPAC (Activity Measure for Post Acute Care) Score is listed below.    AM-PAC Score - How much help does the patient need for each activity listed       Plan  - Progressive mobility protocol initated  - PT/OT consulted  - Fall precautions in place  MALACHI (acute kidney injury)  MALACHI is likely due to pre-renal azotemia due to dehydration. Baseline creatinine is 0.9-1.0. Most recent creatinine and eGFR are listed below.  Recent Labs     06/30/25  0119   CREATININE 1.3   EGFRNORACEVR 44*      Plan  - MALACHI is stable  - Avoid nephrotoxins and renally dose meds for GFR listed above  - Monitor urine output, serial BMP, and adjust therapy as needed  - given fluid bolus in ED  Tobacco dependency  Dangers of cigarette smoking were reviewed with patient in detail. Patient was Counseled for 3-10 minutes. Nicotine replacement options were discussed. Nicotine replacement was discussed- prescribed  Acute on chronic anemia  Anemia is likely due to chronic disease. Most recent hemoglobin and hematocrit are listed below.  Recent Labs     06/30/25  0119 06/30/25  0131   HGB 9.1*  --    HCT 28.7* 28*     Plan  - Monitor serial CBC: Daily  - Transfuse PRBC if patient becomes hemodynamically unstable, symptomatic or H/H drops below 7/21.  - Patient has not received any PRBC transfusions to date  - Patient's anemia is currently stable  Multinodular goiter  - incidental finding on imaging  - Tsh pending  - will need OP vs. IP thyroid US   VTE Risk Mitigation (From admission, onward)           Ordered     enoxaparin injection 40 mg  Every 12 hours         06/30/25 1252     IP VTE HIGH  RISK PATIENT  Once         06/30/25 1252     Place sequential compression device  Until discontinued         06/30/25 1222                                                Hansa Roberts PA-C  Department of Hospital Medicine  [facility] - [facility] Dept

## 2025-06-30 NOTE — ED PROVIDER NOTES
Encounter Date: 6/29/2025       History     Chief Complaint   Patient presents with    Back Pain     Sx a week ago on back no other complaint.      Patient is a 72-year-old female with a past medical history of T10-L2 fusion, arthritis, HTN, HLD, depression who arrives for evaluation of back pain.  Patient states having thoracic back pain that has remained constant since surgery.  Complaining of chills.  Denies fever.  States having new bilateral lower extremity weakness which 1st started on the left lower extremity.  Also complains of bilateral lower extremity  numbness.  Patient denies any urinary retention, urinary incontinence, stool incontinence, saddle anesthesia.        Review of patient's allergies indicates:   Allergen Reactions    Penicillins Anxiety and Other (See Comments)    Anesthetic [benzocaine-aloe vera]      Jittery/hyper    Guaifenesin Anxiety and Other (See Comments)     Past Medical History:   Diagnosis Date    Arthritis     Asthma     Cervical spondylosis 07/13/2012    Chronic LBP 07/13/2012    Chronic neck pain 07/13/2012    Debility     Hyperlipidemia     Hypertension     Lumbar radiculopathy, BLE 07/13/2012    Lumbar spinal stenosis at L4-L5. 07/13/2012    Lumbar spondylosis 07/13/2012    Morbid obesity 07/13/2012    Primary osteoarthritis of both knees 07/13/2012    Spondylolisthesis, grade 1 at L4-L5. 07/13/2012    Tenosynovitis of ankle     Rt peroneus longus    Walker as ambulation aid      Past Surgical History:   Procedure Laterality Date    CHOLECYSTECTOMY      HYSTERECTOMY      MAGNETIC RESONANCE IMAGING Bilateral 6/12/2025    Procedure: MRI (Magnetic Resonance Imagine);  Surgeon: Sameera Villalba;  Location: [facility];  Service: Anesthesiology;  Laterality: Bilateral;    [place] REMOVAL OF OVARY/TUBE(S)      THORACIC LAMINECTOMY WITH FUSION N/A 6/14/2025    Procedure: T12 LAMINECTOMY, SPINE, THORACIC, W/ ARTHRODESIS T10-L2;  Surgeon: Nnamdi Head MD;  Location: [facility] OR [address] [place] [address];   Service: Neurosurgery;  Laterality: N/A;     Family History   Problem Relation Name Age of Onset    Heart disease Mother      Hypertension Mother      Heart disease Father      Hypertension Father      Breast cancer Neg Hx      Colon cancer Neg Hx      Ovarian cancer Neg Hx       Social History[1]  Review of Systems    Physical Exam     Initial Vitals [06/29/25 2148]   BP Pulse Resp Temp SpO2   120/70 87 16 98.4 °F (36.9 °C) 99 %      MAP       --         Physical Exam    Nursing note and vitals reviewed.  Constitutional: She appears well-developed and well-nourished. She is not diaphoretic. No distress.   HENT:   Head: Normocephalic and atraumatic.   Right Ear: External ear normal.   Left Ear: External ear normal.   Nose: Nose normal.   Eyes: Conjunctivae are normal. Pupils are equal, round, and reactive to light. Right eye exhibits no discharge. Left eye exhibits no discharge. No scleral icterus.   Neck: Neck supple.   Cardiovascular:  Normal rate, regular rhythm and normal heart sounds.           No murmur heard.  Pulmonary/Chest: Breath sounds normal. No stridor. No respiratory distress. She has no wheezes.     Surgical scar with staples in place.  No wound dehiscence.  No erythema. localized swelling at cephalad portion of surgical site. No  area of fluctuance nor induration.  Some tenderness to palpation. No cellulitis.   Abdominal: Abdomen is soft. She exhibits no distension. There is no abdominal tenderness.   Musculoskeletal:         General: No edema.      Cervical back: Neck supple.     Neurological: She is alert. GCS score is 15. GCS eye subscore is 4. GCS verbal subscore is 5. GCS motor subscore is 6.     Able to wiggle toes bilaterally.  Patient is unable to lift lower extremities from sitting position.  Intact sensation in bilateral lower extremities.   Skin: Skin is warm and dry. Capillary refill takes less than 2 seconds. No rash and no abscess noted. No erythema. No pallor.   Psychiatric: She has a  normal mood and affect.               ED Course   Procedures  Labs Reviewed   COMPREHENSIVE METABOLIC PANEL - Abnormal       Result Value    Sodium 140      Potassium 3.9      Chloride 102      CO2 25      Glucose 81      BUN 36 (*)     Creatinine 1.3      Calcium 10.0      Protein Total 7.5      Albumin 3.9      Bilirubin Total 0.7      ALP 90      AST 16      ALT 11      Anion Gap 13      eGFR 44 (*)    C-REACTIVE PROTEIN - Abnormal    CRP 22.8 (*)    SEDIMENTATION RATE - Abnormal    Sed Rate 68 (*)    CBC WITH DIFFERENTIAL - Abnormal    WBC 12.67      RBC 2.83 (*)     HGB 9.1 (*)     HCT 28.7 (*)      (*)     MCH 32.2 (*)     MCHC 31.7 (*)     RDW 14.1      Platelet Count 507 (*)     MPV 9.6      Nucleated RBC 0      Neut % 75.8 (*)     Lymph % 13.1 (*)     Mono % 6.0      Eos % 4.1      Basophil % 0.5      Imm Grans % 0.5      Neut # 9.61 (*)     Lymph # 1.66      Mono # 0.76      Eos # 0.52 (*)     Baso # 0.06      Imm Grans # 0.06 (*)    ISTAT PROCEDURE - Abnormal    POC Glucose 81      POC BUN 38 (*)     POC Creatinine 1.5 (*)     POC Sodium 138      POC Potassium 3.9      POC Chloride 102      POC TCO2 (MEASURED) 27      POC Ionized Calcium 1.24      POC Hematocrit 28 (*)     Sample DIONICIO     INFLUENZA A & B BY MOLECULAR - Normal    INFLUENZA A MOLECULAR Negative      INFLUENZA B MOLECULAR  Negative     APTT - Normal    PTT 26.3     PROTIME-INR - Normal    PT 10.8      INR 1.0     SARS-COV-2 RNA AMPLIFICATION, QUAL - Normal    SARS COV-2 Molecular Negative     CULTURE, BLOOD   CULTURE, BLOOD   CBC W/ AUTO DIFFERENTIAL    Narrative:     The following orders were created for panel order CBC auto differential.  Procedure                               Abnormality         Status                     ---------                               -----------         ------                     CBC with Differential[[id number]]       Abnormal            Final result                 Please view results for these tests  on the individual orders.   TYPE & SCREEN    Specimen Outdate 07/03/2025 23:59      Group & Rh O POS      Indirect Sunil NEG     ISTAT CHEM8          Imaging Results              MRI Spine Cervical-Thoracic-Lumbar W W/O Contrast (XPD) (In process)  Result time 06/30/25 04:14:30                     Medications   droPERidol injection 0.625 mg (has no administration in time range)   HYDROmorphone injection 0.5 mg (0.5 mg Intravenous Given 6/30/25 0121)   droPERidol injection 0.625 mg (0.625 mg Intravenous Given 6/30/25 0331)   gadobutroL (GADAVIST) injection 10 mL (10 mLs Intravenous Given 6/30/25 0428)   lactated ringers bolus 500 mL (0 mLs Intravenous Stopped 6/30/25 5719)     Medical Decision Making  Patient is a 74 year old female  with history as stated above.    Differential diagnosis includes, but is not limited to:    -spinal epidural abcess  - conus medullaris syndrome  - cauda equina syndrome  -  MALACHI  - spinal hematoma    Management:    Ordered dialudid for symptomatic pain management and droperidol for anxiety during MRI. Ordered MRI of entire spine W WO contrast to evaluate for acute infectious process. Consulted neurosurgery. Case discussed with neurosurgery. On labs, patient has malachi (creatinine of 1.5), no leukocytosis, ESR of 68, CRP of 22.8.  Patient signed out to oncoming team pending recommendations by neurosurgery.     Amount and/or Complexity of Data Reviewed  External Data Reviewed:      Details:    Labs: ordered. Decision-making details documented in ED Course.  Radiology: ordered.     Details: MRI spine w wo contrast  ECG/medicine tests: ordered.    Risk  Prescription drug management.  Decision regarding hospitalization.              Attending Attestation:   Physician Attestation Statement for Resident:  As the supervising MD   Physician Attestation Statement: I have personally seen and examined this patient.   I agree with the above history.  -:   As the supervising MD I agree with the above  PE.     As the supervising MD I agree with the above treatment, course, plan, and disposition.    I have reviewed and agree with the residents interpretation of the following: lab data.                 ED Course as of 06/30/25 0551   Mon Jun 30, 2025   0040     0041      0133 CBC auto differential(!)    Macrocytic anemia  improved from prior CBC, no leukocytosis, thrombocytosis [AG]   0226  /56 [AG]   0230 C-reactive protein(!) [AG]   0230 Creatinine: 1.3   MALACHI [AG]   0550   Patient sleeping in a room.  Map of 65.  500 mL LR. [AG]      ED Course User Index  [AG] Amy Sal MD                           Clinical Impression:  Final diagnoses:  [M54.9] Back pain  [N17.9] MALACHI (acute kidney injury)  [G97.82] Postoperative surgical complication involving nervous system associated with non-nervous system procedure, unspecified complication (Primary)                       Amy Sal MD  Resident  06/30/25 0745       Amy Sal MD  Resident  06/30/25 0747         [1]   Social History  Tobacco Use    Smoking status: Every Day     Current packs/day: 1.00     Average packs/day: 1 pack/day for 20.0 years (20.0 ttl pk-yrs)     Types: Cigarettes    Smokeless tobacco: Never   Substance Use Topics    Alcohol use: No     Alcohol/week: 0.0 standard drinks of alcohol    Drug use: No        RacielАнна jennings MD  07/04/25 0036

## 2025-06-30 NOTE — CARE UPDATE
Unit [facility] Care Support Interaction      I have reviewed the chart of Kuldeep Villela who is hospitalized for Lumbar radiculopathy. The patient is currently located in the following unit: ED       I have seen and examined the patient and provided the following support:     Clinical support - wound care consulted       Agueda Munoz PA-C  Unit Based [facility]

## 2025-06-30 NOTE — ASSESSMENT & PLAN NOTE
74f with T12 compression fracture s/p T10-L2 fusion with T12 lami on 6/14 presenting with persistent back pain. Incision healing well. Full strength grossly in bilateral lower extremity. No bowel or bladder issues. MRI showing open canal at surgical site. ESR and CRP mildly elevated  Overall clinical picture not concerning  NSGY will follow along while inpatient    Plan  No acute neurosurgical intervention required  Low concern for infection at this time  Will remove staples during hospitalization

## 2025-06-30 NOTE — SUBJECTIVE & OBJECTIVE
Prescriptions Prior to Admission[1]    Review of patient's allergies indicates:   Allergen Reactions    Penicillins Anxiety and Other (See Comments)    Anesthetic [benzocaine-aloe vera]      Jittery/hyper    Guaifenesin Anxiety and Other (See Comments)       Past Medical History:   Diagnosis Date    Arthritis     Asthma     Cervical spondylosis 07/13/2012    Chronic LBP 07/13/2012    Chronic neck pain 07/13/2012    Debility     Hyperlipidemia     Hypertension     Lumbar radiculopathy, BLE 07/13/2012    Lumbar spinal stenosis at L4-L5. 07/13/2012    Lumbar spondylosis 07/13/2012    Morbid obesity 07/13/2012    Primary osteoarthritis of both knees 07/13/2012    Spondylolisthesis, grade 1 at L4-L5. 07/13/2012    Tenosynovitis of ankle     Rt peroneus longus    Walker as ambulation aid      Past Surgical History:   Procedure Laterality Date    CHOLECYSTECTOMY      HYSTERECTOMY      MAGNETIC RESONANCE IMAGING Bilateral 6/12/2025    Procedure: MRI (Magnetic Resonance Imagine);  Surgeon: Sameera Villalba;  Location: [facility];  Service: Anesthesiology;  Laterality: Bilateral;    [place] REMOVAL OF OVARY/TUBE(S)      THORACIC LAMINECTOMY WITH FUSION N/A 6/14/2025    Procedure: T12 LAMINECTOMY, SPINE, THORACIC, W/ ARTHRODESIS T10-L2;  Surgeon: Nnamdi Head MD;  Location: [facility] OR [address] [place] [address];  Service: Neurosurgery;  Laterality: N/A;     Family History       Problem Relation (Age of Onset)    Heart disease Mother, Father    Hypertension Mother, Father          Tobacco Use    Smoking status: Every Day     Current packs/day: 1.00     Average packs/day: 1 pack/day for 20.0 years (20.0 ttl pk-yrs)     Types: Cigarettes    Smokeless tobacco: Never   Substance and Sexual Activity    Alcohol use: No     Alcohol/week: 0.0 standard drinks of alcohol    Drug use: No    Sexual activity: Not Currently     Partners: Male     Birth control/protection: See Surgical Hx     Review of Systems   All other systems reviewed and are  negative.    Objective:     Weight: 104.3 kg (230 lb)  Body mass index is 43.46 kg/m².  Vital Signs (Most Recent):  Temp: 98.7 °F (37.1 °C) (06/30/25 0700)  Pulse: 85 (06/30/25 0700)  Resp: 16 (06/30/25 0700)  BP: (!) 120/57 (06/30/25 0700)  SpO2: 97 % (06/30/25 0700) Vital Signs (24h Range):  Temp:  [98.2 °F (36.8 °C)-98.7 °F (37.1 °C)] 98.7 °F (37.1 °C)  Pulse:  [79-87] 85  Resp:  [16-20] 16  SpO2:  [95 %-99 %] 97 %  BP: ()/(51-70) 120/57     Date 06/30/25 0700 - 07/01/25 0659   Shift 1208-2492 3864-6880 2291-2220 24 Hour Total   INTAKE   IV Piggyback 500   500   Shift Total(mL/kg) 500(4.8)   500(4.8)   OUTPUT   Shift Total(mL/kg)       Weight (kg) 104.3 104.3 104.3 104.3                       Female External Urinary Catheter w/ Suction 06/30/25 0509 (Active)   Skin no redness;no breakdown 06/30/25 0509   Tolerance no signs/symptoms of discomfort;assisted with appliance change 06/30/25 0509   Suction Continuous suction at 70 mmHg 06/30/25 0509          Physical Exam     GENERAL: resting comfortably  HEENT: NCAT, PERRL, mucous membranes moist  NECK: supple, trachea midline  CV: normal capillary refill  PULM: aerating well, symmetric expansion, no distress  ABD: soft, NT, ND  EXT: no c/c/e    NEURO:    AAO x 3  CN II-XII grossly intact  Fc x 4 antigravity  SILT    No drift or dysmetria  Incision with staples healing well; no erythema induration or fluctuance    Neurosurgery Physical Exam    Significant Labs:  Recent Labs   Lab 06/30/25  0119   GLU 81      K 3.9      CO2 25   BUN 36*   CREATININE 1.3   CALCIUM 10.0     Recent Labs   Lab 06/30/25  0119 06/30/25  0131   WBC 12.67  --    HGB 9.1*  --    HCT 28.7* 28*   *  --      Recent Labs   Lab 06/30/25 0119   INR 1.0   APTT 26.3     Microbiology Results (last 7 days)       Procedure Component Value Units Date/Time    Blood culture [[id number]] Collected: 06/30/25 0119    Order Status: Resulted Specimen: Blood from Peripheral, Forearm, Right  Updated: 06/30/25 0605    Blood culture [[id number]] Collected: 06/30/25 0100    Order Status: Resulted Specimen: Blood from Peripheral, Forearm, Left Updated: 06/30/25 0605    Influenza A & B by Molecular [[id number]]  (Normal) Collected: 06/30/25 0119    Order Status: Completed Specimen: Nasal Swab Updated: 06/30/25 0203     INFLUENZA A MOLECULAR Negative     INFLUENZA B MOLECULAR  Negative          All pertinent labs from the last 24 hours have been reviewed.    Significant Diagnostics:  I have reviewed all pertinent imaging results/findings within the past 24 hours.  I have reviewed and interpreted all pertinent imaging results/findings within the past 24 hours.  No results found in the last 24 hours.           [1] (Not in a hospital admission)

## 2025-06-30 NOTE — HPI
Kuldeep Villela is a 75 yo F with PMHx of HTN, HLD, tobacco use, anemia who presented to ED for BLE weakness. Patient was recently hospitalized from 06/09-06/24 for worsening back pain and was found to have a T12 compression fracture. She underwent posterior fusion T10-L2 (06/14/25) with NSGY during that hospitalization. She completed course of antibiotics and drains were removed. Initially plan was for her to discharge to SNF, but she decided she would rather go home with . She reports that since she went home, she has had worsening BLE weakness and her back pain has not been well controlled. She lives with her daughter and grandson. She has mostly been staying in her recliner since discharge due to decreased mobility. She also endorses urinary incontinence, but denies dysuria or increased frequency. She still smokes cigarettes at times, but not daily. Denies fever, chills, chest pain, palpitations, SOB, cough, abdominal pain, n/v/d.    In ED: HDS. Hgb at baseline. PLT elevated to 507. CMP with mild MALACHI with Cr 1.3 (baseline 1.0). CRP 22.8/ ESR 68. Covid/flu negative. MRI C/T/L spine showing open canal at surgical site. NSGY consulted; no acute NSGY intervention as stenosis improved when compared to pre op and low concern for infection. NSGY recommending admission with therapy and possible IPR placement. Given 500 cc bolus of NS. Admitted to  for further management.

## 2025-07-01 LAB
ABSOLUTE EOSINOPHIL (OHS): 0.25 K/UL
ABSOLUTE MONOCYTE (OHS): 0.63 K/UL (ref 0.3–1)
ABSOLUTE NEUTROPHIL COUNT (OHS): 8.22 K/UL (ref 1.8–7.7)
ALBUMIN SERPL BCP-MCNC: 3.2 G/DL (ref 3.5–5.2)
ALP SERPL-CCNC: 89 UNIT/L (ref 40–150)
ALT SERPL W/O P-5'-P-CCNC: 12 UNIT/L (ref 10–44)
ANION GAP (OHS): 9 MMOL/L (ref 8–16)
AST SERPL-CCNC: 16 UNIT/L (ref 11–45)
BASOPHILS # BLD AUTO: 0.06 K/UL
BASOPHILS NFR BLD AUTO: 0.6 %
BILIRUB SERPL-MCNC: 0.7 MG/DL (ref 0.1–1)
BUN SERPL-MCNC: 24 MG/DL (ref 8–23)
CALCIUM SERPL-MCNC: 9.4 MG/DL (ref 8.7–10.5)
CHLORIDE SERPL-SCNC: 106 MMOL/L (ref 95–110)
CO2 SERPL-SCNC: 25 MMOL/L (ref 23–29)
CREAT SERPL-MCNC: 1 MG/DL (ref 0.5–1.4)
ERYTHROCYTE [DISTWIDTH] IN BLOOD BY AUTOMATED COUNT: 13.7 % (ref 11.5–14.5)
GFR SERPLBLD CREATININE-BSD FMLA CKD-EPI: 60 ML/MIN/1.73/M2
GLUCOSE SERPL-MCNC: 88 MG/DL (ref 70–110)
HCT VFR BLD AUTO: 27.3 % (ref 37–48.5)
HGB BLD-MCNC: 8.9 GM/DL (ref 12–16)
IMM GRANULOCYTES # BLD AUTO: 0.06 K/UL (ref 0–0.04)
IMM GRANULOCYTES NFR BLD AUTO: 0.6 % (ref 0–0.5)
LYMPHOCYTES # BLD AUTO: 1.35 K/UL (ref 1–4.8)
MAGNESIUM SERPL-MCNC: 1.6 MG/DL (ref 1.6–2.6)
MCH RBC QN AUTO: 32.5 PG (ref 27–31)
MCHC RBC AUTO-ENTMCNC: 32.6 G/DL (ref 32–36)
MCV RBC AUTO: 100 FL (ref 82–98)
NUCLEATED RBC (/100WBC) (OHS): 0 /100 WBC
PHOSPHATE SERPL-MCNC: 3.1 MG/DL (ref 2.7–4.5)
PLATELET # BLD AUTO: 446 K/UL (ref 150–450)
PMV BLD AUTO: 9.5 FL (ref 9.2–12.9)
POTASSIUM SERPL-SCNC: 3.6 MMOL/L (ref 3.5–5.1)
PROT SERPL-MCNC: 6.3 GM/DL (ref 6–8.4)
RBC # BLD AUTO: 2.74 M/UL (ref 4–5.4)
RELATIVE EOSINOPHIL (OHS): 2.4 %
RELATIVE LYMPHOCYTE (OHS): 12.8 % (ref 18–48)
RELATIVE MONOCYTE (OHS): 6 % (ref 4–15)
RELATIVE NEUTROPHIL (OHS): 77.6 % (ref 38–73)
SODIUM SERPL-SCNC: 140 MMOL/L (ref 136–145)
T4 FREE SERPL-MCNC: 1.46 NG/DL (ref 0.71–1.51)
TSH SERPL-ACNC: 0.36 UIU/ML (ref 0.4–4)
WBC # BLD AUTO: 10.57 K/UL (ref 3.9–12.7)

## 2025-07-01 PROCEDURE — 84443 ASSAY THYROID STIM HORMONE: CPT

## 2025-07-01 PROCEDURE — 80053 COMPREHEN METABOLIC PANEL: CPT

## 2025-07-01 PROCEDURE — 25000003 PHARM REV CODE 250

## 2025-07-01 PROCEDURE — 83735 ASSAY OF MAGNESIUM: CPT

## 2025-07-01 PROCEDURE — 96372 THER/PROPH/DIAG INJ SC/IM: CPT

## 2025-07-01 PROCEDURE — 85025 COMPLETE CBC W/AUTO DIFF WBC: CPT

## 2025-07-01 PROCEDURE — 25000003 PHARM REV CODE 250: Performed by: INTERNAL MEDICINE

## 2025-07-01 PROCEDURE — 84100 ASSAY OF PHOSPHORUS: CPT

## 2025-07-01 PROCEDURE — 36415 COLL VENOUS BLD VENIPUNCTURE: CPT

## 2025-07-01 PROCEDURE — G0378 HOSPITAL OBSERVATION PER HR: HCPCS

## 2025-07-01 PROCEDURE — 84439 ASSAY OF FREE THYROXINE: CPT

## 2025-07-01 PROCEDURE — 63600175 PHARM REV CODE 636 W HCPCS

## 2025-07-01 RX ORDER — OXYCODONE AND ACETAMINOPHEN 10; 325 MG/1; MG/1
1 TABLET ORAL EVERY 6 HOURS PRN
Status: DISCONTINUED | OUTPATIENT
Start: 2025-07-01 | End: 2025-07-02

## 2025-07-01 RX ORDER — POLYETHYLENE GLYCOL 3350 17 G/17G
17 POWDER, FOR SOLUTION ORAL DAILY
Status: DISCONTINUED | OUTPATIENT
Start: 2025-07-02 | End: 2025-07-01

## 2025-07-01 RX ORDER — ACETAMINOPHEN 500 MG
1000 TABLET ORAL EVERY 8 HOURS PRN
Status: DISCONTINUED | OUTPATIENT
Start: 2025-07-01 | End: 2025-07-03

## 2025-07-01 RX ORDER — SYRING-NEEDL,DISP,INSUL,0.3 ML 29 G X1/2"
296 SYRINGE, EMPTY DISPOSABLE MISCELLANEOUS ONCE
Status: COMPLETED | OUTPATIENT
Start: 2025-07-01 | End: 2025-07-01

## 2025-07-01 RX ORDER — OXYCODONE AND ACETAMINOPHEN 5; 325 MG/1; MG/1
1 TABLET ORAL EVERY 6 HOURS PRN
Status: DISCONTINUED | OUTPATIENT
Start: 2025-07-01 | End: 2025-07-02

## 2025-07-01 RX ADMIN — BUSPIRONE HYDROCHLORIDE 15 MG: 10 TABLET ORAL at 08:07

## 2025-07-01 RX ADMIN — BUSPIRONE HYDROCHLORIDE 15 MG: 10 TABLET ORAL at 10:07

## 2025-07-01 RX ADMIN — ACETAMINOPHEN 1000 MG: 500 TABLET ORAL at 10:07

## 2025-07-01 RX ADMIN — Medication 6 MG: at 02:07

## 2025-07-01 RX ADMIN — ACETAMINOPHEN 1000 MG: 500 TABLET ORAL at 05:07

## 2025-07-01 RX ADMIN — OXYCODONE AND ACETAMINOPHEN 1 TABLET: 325; 10 TABLET ORAL at 10:07

## 2025-07-01 RX ADMIN — LACTULOSE 20 G: 20 SOLUTION ORAL at 09:07

## 2025-07-01 RX ADMIN — ENOXAPARIN SODIUM 40 MG: 40 INJECTION SUBCUTANEOUS at 10:07

## 2025-07-01 RX ADMIN — ROPINIROLE HYDROCHLORIDE 1 MG: 1 TABLET, FILM COATED ORAL at 10:07

## 2025-07-01 RX ADMIN — ENOXAPARIN SODIUM 40 MG: 40 INJECTION SUBCUTANEOUS at 08:07

## 2025-07-01 RX ADMIN — CITALOPRAM HYDROBROMIDE 40 MG: 20 TABLET ORAL at 08:07

## 2025-07-01 RX ADMIN — AMLODIPINE BESYLATE 10 MG: 10 TABLET ORAL at 08:07

## 2025-07-01 RX ADMIN — ACETAMINOPHEN 1000 MG: 500 TABLET ORAL at 01:07

## 2025-07-01 RX ADMIN — PRAVASTATIN SODIUM 20 MG: 20 TABLET ORAL at 08:07

## 2025-07-01 RX ADMIN — MAGNESIUM CITRATE 296 ML: 1.75 LIQUID ORAL at 02:07

## 2025-07-01 RX ADMIN — CETIRIZINE HYDROCHLORIDE 10 MG: 10 TABLET, FILM COATED ORAL at 08:07

## 2025-07-01 RX ADMIN — ROPINIROLE HYDROCHLORIDE 1 MG: 1 TABLET, FILM COATED ORAL at 08:07

## 2025-07-01 RX ADMIN — SERTRALINE HYDROCHLORIDE 50 MG: 50 TABLET ORAL at 08:07

## 2025-07-01 RX ADMIN — BUSPIRONE HYDROCHLORIDE 15 MG: 10 TABLET ORAL at 04:07

## 2025-07-01 RX ADMIN — BUSPIRONE HYDROCHLORIDE 15 MG: 10 TABLET ORAL at 01:07

## 2025-07-01 NOTE — ASSESSMENT & PLAN NOTE
Anemia is likely due to chronic disease. Most recent hemoglobin and hematocrit are listed below.  Recent Labs     06/30/25  0119 06/30/25  0131 07/01/25  0930   HGB 9.1*  --  8.9*   HCT 28.7* 28* 27.3*     Plan  - Monitor serial CBC: Daily  - Transfuse PRBC if patient becomes hemodynamically unstable, symptomatic or H/H drops below 7/21.  - Patient has not received any PRBC transfusions to date  - Patient's anemia is currently stable

## 2025-07-01 NOTE — PROGRESS NOTES
[facility] Medicine  Progress Note    Patient Name: Kuldeep Villela  MRN: [id number]  Patient Class: OP- Observation   Admission Date: 6/29/2025  Length of Stay: 1 days  Attending Physician: Elbert Sheets MD  Primary Care Provider: Ginna Musa MD        Subjective     Principal Problem:Lumbar radiculopathy        HPI:  Kuldeep Villela is a 75 yo F with PMHx of HTN, HLD, tobacco use, anemia who presented to ED for BLE weakness. Patient was recently hospitalized from 06/09-06/24 for worsening back pain and was found to have a T12 compression fracture. She underwent posterior fusion T10-L2 (06/14/25) with NSGY during that hospitalization. She completed course of antibiotics and drains were removed. Initially plan was for her to discharge to SNF, but she decided she would rather go home with . She reports that since she went home, she has had worsening BLE weakness and her back pain has not been well controlled. She lives with her daughter and grandson. She has mostly been staying in her recliner since discharge due to decreased mobility. She also endorses urinary incontinence, but denies dysuria or increased frequency. She still smokes cigarettes at times, but not daily. Denies fever, chills, chest pain, palpitations, SOB, cough, abdominal pain, n/v/d.    In ED: HDS. Hgb at baseline. PLT elevated to 507. CMP with mild MALACHI with Cr 1.3 (baseline 1.0). CRP 22.8/ ESR 68. Covid/flu negative. MRI C/T/L spine showing open canal at surgical site. NSGY consulted; no acute NSGY intervention as stenosis improved when compared to pre op and low concern for infection. NSGY recommending admission with therapy and possible IPR placement. Given 500 cc bolus of NS. Admitted to  for further management.     Overview/Hospital Course:  No notes on file    Interval History: NAEON. Pt c/o lower back pain and b/l leg weakness, but denies acute worsening or numbness/paresthesias. Now able to lift legs slightly off  the bed. No acute neuro deficits. NSGY recommending against surgical intervention at this time. PT/OT eval pending.     Review of Systems   Constitutional:  Positive for activity change. Negative for chills and fever.   HENT:  Negative for trouble swallowing.    Eyes:  Negative for photophobia and visual disturbance.   Respiratory:  Negative for cough, chest tightness and shortness of breath.    Cardiovascular:  Negative for chest pain, palpitations and leg swelling.   Gastrointestinal:  Negative for abdominal pain, constipation, diarrhea, nausea and vomiting.   Genitourinary:  Negative for dysuria, frequency and hematuria.   Musculoskeletal:  Positive for back pain and gait problem. Negative for neck pain.   Skin:  Negative for rash and wound.   Neurological:  Positive for weakness. Negative for dizziness, syncope, speech difficulty and light-headedness.   Psychiatric/Behavioral:  Negative for agitation and confusion. The patient is not nervous/anxious.      Objective:     Vital Signs (Most Recent):  Temp: 97.7 °F (36.5 °C) (07/01/25 1104)  Pulse: 85 (07/01/25 1130)  Resp: 18 (07/01/25 1104)  BP: (!) 109/57 (07/01/25 1104)  SpO2: 97 % (07/01/25 1104) Vital Signs (24h Range):  Temp:  [97.4 °F (36.3 °C)-98.3 °F (36.8 °C)] 97.7 °F (36.5 °C)  Pulse:  [72-91] 85  Resp:  [16-20] 18  SpO2:  [95 %-100 %] 97 %  BP: (109-136)/(56-71) 109/57     Weight: 104.3 kg (230 lb)  Body mass index is 43.46 kg/m².    Intake/Output Summary (Last 24 hours) at 7/1/2025 1242  Last data filed at 7/1/2025 1227  Gross per 24 hour   Intake --   Output 550 ml   Net -550 ml         Physical Exam  Vitals and nursing note reviewed.   Constitutional:       General: She is not in acute distress.     Appearance: She is well-developed.   HENT:      Head: Normocephalic and atraumatic.      Mouth/Throat:      Pharynx: No oropharyngeal exudate.   Eyes:      Conjunctiva/sclera: Conjunctivae normal.      Pupils: Pupils are equal, round, and reactive to  light.   Cardiovascular:      Rate and Rhythm: Normal rate and regular rhythm.      Heart sounds: Normal heart sounds.   Pulmonary:      Effort: Pulmonary effort is normal. No respiratory distress.      Breath sounds: Normal breath sounds. No wheezing.   Abdominal:      General: Bowel sounds are normal. There is no distension.      Palpations: Abdomen is soft.      Tenderness: There is no abdominal tenderness.   Musculoskeletal:         General: No tenderness. Normal range of motion.      Cervical back: Normal range of motion and neck supple.   Lymphadenopathy:      Cervical: No cervical adenopathy.   Skin:     General: Skin is warm and dry.      Capillary Refill: Capillary refill takes less than 2 seconds.      Findings: No rash.      Comments: Lymphedema changes noted to BLE  Small stage 1 pressure sores to buttocks   Thoracic/lumbar surgical scar with staples in place, no surrounding erythema or drainage (see media)   Neurological:      Mental Status: She is alert and oriented to person, place, and time.      Cranial Nerves: No cranial nerve deficit.      Sensory: No sensory deficit.      Motor: Weakness (able to move legs around, now able to lift legs off the bed) present.      Coordination: Coordination normal.   Psychiatric:         Mood and Affect: Mood normal.         Behavior: Behavior normal.         Thought Content: Thought content normal.               Significant Labs: All pertinent labs within the past 24 hours have been reviewed.    Significant Imaging: I have reviewed all pertinent imaging results/findings within the past 24 hours.      Assessment & Plan  Lumbar radiculopathy, BLE  Lumbar spinal stenosis at L4-L5.  Chronic low back pain  Compression fracture of T12 vertebra  9o F with PMHx of HTN, HLD, T12 compression fracture s/p posterior fusion T10-L2 (06/14/25) with NSGY who presented to ED for BLE weakness. CRP 22.8/ ESR 68. MRI C/T/L spine showing open canal at surgical site.     - NSGY  consulted:  - No acute neurosurgical intervention required  - Low concern for infection at this time  - Staples removed today   - fall precautions  - MM pain control with scheduled tylenol and robaxin, prn opioids   - PT/OT eval pending.     Cervical spondylosis  - noted on imaging, but patient without any weakness or changes to upper extremities   - per NSGY, no surgical intervention    Morbid obesity  Body mass index is 43.46 kg/m². Morbid obesity complicates all aspects of disease management from diagnostic modalities to treatment. Weight loss encouraged and health benefits explained to patient.  Hypertension  Patient's blood pressure range in the last 24 hours was: BP  Min: 109/57  Max: 136/68.The patient's inpatient anti-hypertensive regimen is listed below:  Current Antihypertensives  amLODIPine tablet 10 mg, Daily, Oral    Plan  - BP is controlled, no changes needed to their regimen  - of note, home coreg and lasix held since last admit  Hyperlipemia  - continue statin   Debility  Patient with Acute on chronic debility due to fracture/prosthesis and age-related physical debility. The patient's latest AMPAC (Activity Measure for Post Acute Care) Score is listed below.    AM-PAC Score - How much help does the patient need for each activity listed  Basic Mobility Total Score: 12  Turning over in bed (including adjusting bedclothes, sheets and blankets)?: A lot  Sitting down on and standing up from a chair with arms (e.g., wheelchair, bedside commode, etc.): A lot  Moving from lying on back to sitting on the side of the bed?: A lot  Moving to and from a bed to a chair (including a wheelchair)?: A lot  Need to walk in hospital room?: A lot  Climbing 3-5 steps with a railing?: A lot    Plan  - Progressive mobility protocol initated  - PT/OT consulted  - Fall precautions in place  MALACHI (acute kidney injury)  MALACHI is likely due to pre-renal azotemia due to dehydration. Baseline creatinine is 0.9-1.0. Most recent  creatinine and eGFR are listed below.  Recent Labs     06/30/25  0119 07/01/25  0930   CREATININE 1.3 1.0   EGFRNORACEVR 44* 60*      Plan  - MALACHI is stable  - Avoid nephrotoxins and renally dose meds for GFR listed above  - Monitor urine output, serial BMP, and adjust therapy as needed  - given fluid bolus in ED  - improved  Tobacco dependency  Dangers of cigarette smoking were reviewed with patient in detail. Patient was Counseled for 3-10 minutes. Nicotine replacement options were discussed. Nicotine replacement was discussed- prescribed  Acute on chronic anemia  Anemia is likely due to chronic disease. Most recent hemoglobin and hematocrit are listed below.  Recent Labs     06/30/25  0119 06/30/25  0131 07/01/25  0930   HGB 9.1*  --  8.9*   HCT 28.7* 28* 27.3*     Plan  - Monitor serial CBC: Daily  - Transfuse PRBC if patient becomes hemodynamically unstable, symptomatic or H/H drops below 7/21.  - Patient has not received any PRBC transfusions to date  - Patient's anemia is currently stable  Multinodular goiter  - incidental finding on imaging  - Tsh pending  - will need OP thyroid US   VTE Risk Mitigation (From admission, onward)           Ordered     enoxaparin injection 40 mg  Every 12 hours         06/30/25 1252     IP VTE HIGH RISK PATIENT  Once         06/30/25 1252     Place sequential compression device  Until discontinued         06/30/25 1222                    Discharge Planning   MATT:      Code Status: Full Code   Medical Readiness for Discharge Date:                      Please place Justification for DME      Elbert Sheets MD  Department of Hospital Medicine   [facility] Surg

## 2025-07-01 NOTE — NURSING
Patient refuse am lab s,patient stated it was too early to draw labs and she was tired of getting stuck.

## 2025-07-01 NOTE — PLAN OF CARE
Problem: Skin Injury Risk Increased  Goal: Skin Health and Integrity  Outcome: Progressing     Problem: Adult Inpatient Plan of Care  Goal: Plan of Care Review  Outcome: Progressing  Goal: Patient-Specific Goal (Individualized)  Outcome: Progressing  Goal: Absence of Hospital-Acquired Illness or Injury  Outcome: Progressing  Goal: Optimal Comfort and Wellbeing  Outcome: Progressing  Goal: Readiness for Transition of Care  Outcome: Progressing     Problem: Bariatric Environmental Safety  Goal: Safety Maintained with Care  Outcome: Progressing     Problem: Infection  Goal: Absence of Infection Signs and Symptoms  Outcome: Progressing     Problem: Acute Kidney Injury/Impairment  Goal: Fluid and Electrolyte Balance  Outcome: Progressing  Goal: Improved Oral Intake  Outcome: Progressing  Goal: Effective Renal Function  Outcome: Progressing     Problem: Wound  Goal: Optimal Coping  Outcome: Progressing  Goal: Optimal Functional Ability  Outcome: Progressing  Goal: Absence of Infection Signs and Symptoms  Outcome: Progressing  Goal: Improved Oral Intake  Outcome: Progressing  Goal: Optimal Pain Control and Function  Outcome: Progressing  Goal: Skin Health and Integrity  Outcome: Progressing  Goal: Optimal Wound Healing  Outcome: Progressing     Problem: Fall Injury Risk  Goal: Absence of Fall and Fall-Related Injury  Outcome: Progressing

## 2025-07-01 NOTE — SUBJECTIVE & OBJECTIVE
Interval History: NAEON. Pt c/o lower back pain and b/l leg weakness, but denies acute worsening or numbness/paresthesias. Now able to lift legs slightly off the bed. No acute neuro deficits. NSGY recommending against surgical intervention at this time. PT/OT eval pending.     Review of Systems   Constitutional:  Positive for activity change. Negative for chills and fever.   HENT:  Negative for trouble swallowing.    Eyes:  Negative for photophobia and visual disturbance.   Respiratory:  Negative for cough, chest tightness and shortness of breath.    Cardiovascular:  Negative for chest pain, palpitations and leg swelling.   Gastrointestinal:  Negative for abdominal pain, constipation, diarrhea, nausea and vomiting.   Genitourinary:  Negative for dysuria, frequency and hematuria.   Musculoskeletal:  Positive for back pain and gait problem. Negative for neck pain.   Skin:  Negative for rash and wound.   Neurological:  Positive for weakness. Negative for dizziness, syncope, speech difficulty and light-headedness.   Psychiatric/Behavioral:  Negative for agitation and confusion. The patient is not nervous/anxious.      Objective:     Vital Signs (Most Recent):  Temp: 97.7 °F (36.5 °C) (07/01/25 1104)  Pulse: 85 (07/01/25 1130)  Resp: 18 (07/01/25 1104)  BP: (!) 109/57 (07/01/25 1104)  SpO2: 97 % (07/01/25 1104) Vital Signs (24h Range):  Temp:  [97.4 °F (36.3 °C)-98.3 °F (36.8 °C)] 97.7 °F (36.5 °C)  Pulse:  [72-91] 85  Resp:  [16-20] 18  SpO2:  [95 %-100 %] 97 %  BP: (109-136)/(56-71) 109/57     Weight: 104.3 kg (230 lb)  Body mass index is 43.46 kg/m².    Intake/Output Summary (Last 24 hours) at 7/1/2025 1242  Last data filed at 7/1/2025 1227  Gross per 24 hour   Intake --   Output 550 ml   Net -550 ml         Physical Exam  Vitals and nursing note reviewed.   Constitutional:       General: She is not in acute distress.     Appearance: She is well-developed.   HENT:      Head: Normocephalic and atraumatic.       Mouth/Throat:      Pharynx: No oropharyngeal exudate.   Eyes:      Conjunctiva/sclera: Conjunctivae normal.      Pupils: Pupils are equal, round, and reactive to light.   Cardiovascular:      Rate and Rhythm: Normal rate and regular rhythm.      Heart sounds: Normal heart sounds.   Pulmonary:      Effort: Pulmonary effort is normal. No respiratory distress.      Breath sounds: Normal breath sounds. No wheezing.   Abdominal:      General: Bowel sounds are normal. There is no distension.      Palpations: Abdomen is soft.      Tenderness: There is no abdominal tenderness.   Musculoskeletal:         General: No tenderness. Normal range of motion.      Cervical back: Normal range of motion and neck supple.   Lymphadenopathy:      Cervical: No cervical adenopathy.   Skin:     General: Skin is warm and dry.      Capillary Refill: Capillary refill takes less than 2 seconds.      Findings: No rash.      Comments: Lymphedema changes noted to BLE  Small stage 1 pressure sores to buttocks   Thoracic/lumbar surgical scar with staples in place, no surrounding erythema or drainage (see media)   Neurological:      Mental Status: She is alert and oriented to person, place, and time.      Cranial Nerves: No cranial nerve deficit.      Sensory: No sensory deficit.      Motor: Weakness (able to move legs around, now able to lift legs off the bed) present.      Coordination: Coordination normal.   Psychiatric:         Mood and Affect: Mood normal.         Behavior: Behavior normal.         Thought Content: Thought content normal.               Significant Labs: All pertinent labs within the past 24 hours have been reviewed.    Significant Imaging: I have reviewed all pertinent imaging results/findings within the past 24 hours.

## 2025-07-01 NOTE — ASSESSMENT & PLAN NOTE
74f with T12 compression fracture s/p T10-L2 fusion with T12 lami on 6/14 presenting with persistent back pain. Incision healing well. Full strength grossly in bilateral lower extremity. No bowel or bladder issues. MRI showing open canal at surgical site. ESR and CRP mildly elevated  Overall clinical picture not concerning for infection.  NSGY will follow along while inpatient    Plan  No acute neurosurgical intervention required  No further imaging needed from neurosurgical standpoint  OK for diet  OK for subq heparin  7/1 - remove staples  Will continue to monitor peripherally for wound-check

## 2025-07-01 NOTE — NURSING
Patient arrive on the unit per stretcher via transport. No signs and symptoms of distress nor discomfort noted. Patient able to make needs know,no complaint of pain at this time.

## 2025-07-01 NOTE — ASSESSMENT & PLAN NOTE
Patient's blood pressure range in the last 24 hours was: BP  Min: 109/57  Max: 136/68.The patient's inpatient anti-hypertensive regimen is listed below:  Current Antihypertensives  amLODIPine tablet 10 mg, Daily, Oral    Plan  - BP is controlled, no changes needed to their regimen  - of note, home coreg and lasix held since last admit

## 2025-07-01 NOTE — PLAN OF CARE
Problem: Skin Injury Risk Increased  Goal: Skin Health and Integrity  Outcome: Progressing     Problem: Adult Inpatient Plan of Care  Goal: Plan of Care Review  Outcome: Progressing     Problem: Bariatric Environmental Safety  Goal: Safety Maintained with Care  Outcome: Progressing     Problem: Acute Kidney Injury/Impairment  Goal: Fluid and Electrolyte Balance  Outcome: Progressing     Problem: Wound  Goal: Optimal Coping  Outcome: Progressing     Problem: Fall Injury Risk  Goal: Absence of Fall and Fall-Related Injury  Outcome: Progressing

## 2025-07-01 NOTE — PROGRESS NOTES
[facility] Surg  Neurosurgery  Progress Note    Subjective:     History of Present Illness: Patient is a 72-year-old female with a past medical history of T10-L2 fusion, arthritis, HTN, HLD, depression who arrives for evaluation of back pain. Patient states having thoracic back pain that has remained constant since surgery. Complaining of chills. Denies fever. States having new bilateral lower extremity weakness which 1st started on the left lower extremity. Also complains of bilateral lower extremity numbness. Patient denies any urinary retention, urinary incontinence, stool incontinence, saddle anesthesia.    Post-Op Info:  * No surgery found *       Interval History: Patient is a 72-year-old female with a past medical history of T10-L2 fusion, arthritis, HTN, HLD, depression who arrives for evaluation of back pain. Patient states having thoracic back pain that has remained constant since surgery. Complaining of chills. Denies fever. States having new bilateral lower extremity weakness which 1st started on the left lower extremity. Also complains of bilateral lower extremity numbness. Patient denies any urinary retention, urinary incontinence, stool incontinence, saddle anesthesia.     7/1 - SOHAMON, refused AM draws. Complaints of back pain.    Medications:  Continuous Infusions:  Scheduled Meds:   acetaminophen  1,000 mg Oral Q8H    amLODIPine  10 mg Oral Daily    busPIRone  15 mg Oral QID    cetirizine  10 mg Oral Daily    citalopram  40 mg Oral Daily    enoxparin  40 mg Subcutaneous Q12H    pravastatin  20 mg Oral Daily    rOPINIRole  1 mg Oral BID    senna-docusate  1 tablet Oral BID    sertraline  50 mg Oral Daily     PRN Meds:  Current Facility-Administered Medications:     acetaminophen, 1,000 mg, Oral, Q8H PRN    acetaminophen, 650 mg, Oral, Q4H PRN    albuterol-ipratropium, 3 mL, Nebulization, Q4H PRN    dextrose 50%, 12.5 g, Intravenous, PRN    dextrose 50%, 25 g, Intravenous, PRN    glucagon (human  recombinant), 1 mg, Intramuscular, PRN    glucose, 16 g, Oral, PRN    glucose, 24 g, Oral, PRN    HYDROmorphone, 0.5 mg, Intravenous, Q6H PRN    melatonin, 6 mg, Oral, Nightly PRN    naloxone, 0.02 mg, Intravenous, PRN    nicotine, 1 patch, Transdermal, Daily PRN    ondansetron, 8 mg, Oral, Q8H PRN    oxyCODONE, 5 mg, Oral, Q4H PRN    oxyCODONE, 10 mg, Oral, Q4H PRN    sodium chloride 0.9%, 10 mL, Intravenous, Q12H PRN    sodium chloride 0.9%, 5 mL, Intravenous, PRN     Review of Systems   Constitutional:  Negative for fever.   Respiratory:  Negative for shortness of breath.      Objective:     Weight: 104.3 kg (230 lb)  Body mass index is 43.46 kg/m².  Vital Signs (Most Recent):  Temp: 97.7 °F (36.5 °C) (07/01/25 0539)  Pulse: 79 (07/01/25 0428)  Resp: 18 (07/01/25 0428)  BP: 109/68 (07/01/25 0428)  SpO2: 99 % (07/01/25 0428) Vital Signs (24h Range):  Temp:  [97.5 °F (36.4 °C)-98.3 °F (36.8 °C)] 97.7 °F (36.5 °C)  Pulse:  [72-91] 79  Resp:  [16-20] 18  SpO2:  [94 %-100 %] 99 %  BP: (103-141)/(54-71) 109/68                         Female External Urinary Catheter w/ Suction 06/30/25 0509 (Active)   Skin no redness;no breakdown 06/30/25 1638   Tolerance no signs/symptoms of discomfort 06/30/25 1638   Suction Continuous suction at 70 mmHg 06/30/25 1638          Physical Exam  Constitutional:       Appearance: She is well-developed and well-nourished.   Eyes:      Extraocular Movements: EOM normal.              Physical Exam:    Constitutional: She appears well-developed and well-nourished.     Eyes: EOM are normal.     Musculoskeletal:        Right Upper Extremities: Range of motion is full. Tone is normal.        Left Upper Extremities: Range of motion is full. Tone is normal.       Right Lower Extremities: Range of motion is full. Tone is normal.        Left Lower Extremities: Range of motion is full. Tone is normal.       Significant Labs:  Recent Labs   Lab 06/30/25  0119   GLU 81      K 3.9      CO2  25   BUN 36*   CREATININE 1.3   CALCIUM 10.0     Recent Labs   Lab 06/30/25 0119 06/30/25  0131   WBC 12.67  --    HGB 9.1*  --    HCT 28.7* 28*   *  --      Recent Labs   Lab 06/30/25 0119   INR 1.0   APTT 26.3     Microbiology Results (last 7 days)       Procedure Component Value Units Date/Time    Blood culture [[id number]]  (Normal) Collected: 06/30/25 0119    Order Status: Completed Specimen: Blood from Peripheral, Forearm, Right Updated: 07/01/25 0700     Blood Culture No Growth After 24 Hours    Blood culture [[id number]]  (Normal) Collected: 06/30/25 0100    Order Status: Completed Specimen: Blood from Peripheral, Forearm, Left Updated: 07/01/25 0700     Blood Culture No Growth After 24 Hours    Influenza A & B by Molecular [[id number]]  (Normal) Collected: 06/30/25 0119    Order Status: Completed Specimen: Nasal Swab Updated: 06/30/25 0203     INFLUENZA A MOLECULAR Negative     INFLUENZA B MOLECULAR  Negative          All pertinent labs from the last 24 hours have been reviewed.    Significant Diagnostics:  I have reviewed and interpreted all pertinent imaging results/findings within the past 24 hours.  Assessment/Plan:     * Lumbar radiculopathy, BLE  74f with T12 compression fracture s/p T10-L2 fusion with T12 lami on 6/14 presenting with persistent back pain. Incision healing well. Full strength grossly in bilateral lower extremity. No bowel or bladder issues. MRI showing open canal at surgical site. ESR and CRP mildly elevated  Overall clinical picture not concerning for infection.  NSGY will follow along while inpatient    Plan  No acute neurosurgical intervention required  No further imaging needed from neurosurgical standpoint  OK for diet  OK for subq heparin  7/1 - remove staples  Will continue to monitor peripherally for wound-check        Akin Garcia MD  Neurosurgery  [facility] Surg

## 2025-07-01 NOTE — PT/OT/SLP PROGRESS
Occupational Therapy      Patient Name:  Kuldeep Villela   MRN:  [id number]    Patient not seen today secondary to Patient unwilling to participate, Increased agitation. Will follow-up at next date of service .    Obtained information that Pt was not wearing the TLSO after previous hospitalization. Stated that she was allowed to not wear the brace per her doctors. Max encouragement to participate in therapy assessment, but Pt kept declining in lieu of her pain and weakness. Admitted to therapists that she was not in a good mood. Unable to redirect today.     7/1/2025

## 2025-07-01 NOTE — ASSESSMENT & PLAN NOTE
MALACHI is likely due to pre-renal azotemia due to dehydration. Baseline creatinine is 0.9-1.0. Most recent creatinine and eGFR are listed below.  Recent Labs     06/30/25  0119 07/01/25  0930   CREATININE 1.3 1.0   EGFRNORACEVR 44* 60*      Plan  - MALACHI is stable  - Avoid nephrotoxins and renally dose meds for GFR listed above  - Monitor urine output, serial BMP, and adjust therapy as needed  - given fluid bolus in ED  - improved

## 2025-07-01 NOTE — SUBJECTIVE & OBJECTIVE
Interval History: Patient is a 72-year-old female with a past medical history of T10-L2 fusion, arthritis, HTN, HLD, depression who arrives for evaluation of back pain. Patient states having thoracic back pain that has remained constant since surgery. Complaining of chills. Denies fever. States having new bilateral lower extremity weakness which 1st started on the left lower extremity. Also complains of bilateral lower extremity numbness. Patient denies any urinary retention, urinary incontinence, stool incontinence, saddle anesthesia.     7/1 - NAEON, refused AM draws. Complaints of back pain.    Medications:  Continuous Infusions:  Scheduled Meds:   acetaminophen  1,000 mg Oral Q8H    amLODIPine  10 mg Oral Daily    busPIRone  15 mg Oral QID    cetirizine  10 mg Oral Daily    citalopram  40 mg Oral Daily    enoxparin  40 mg Subcutaneous Q12H    pravastatin  20 mg Oral Daily    rOPINIRole  1 mg Oral BID    senna-docusate  1 tablet Oral BID    sertraline  50 mg Oral Daily     PRN Meds:  Current Facility-Administered Medications:     acetaminophen, 1,000 mg, Oral, Q8H PRN    acetaminophen, 650 mg, Oral, Q4H PRN    albuterol-ipratropium, 3 mL, Nebulization, Q4H PRN    dextrose 50%, 12.5 g, Intravenous, PRN    dextrose 50%, 25 g, Intravenous, PRN    glucagon (human recombinant), 1 mg, Intramuscular, PRN    glucose, 16 g, Oral, PRN    glucose, 24 g, Oral, PRN    HYDROmorphone, 0.5 mg, Intravenous, Q6H PRN    melatonin, 6 mg, Oral, Nightly PRN    naloxone, 0.02 mg, Intravenous, PRN    nicotine, 1 patch, Transdermal, Daily PRN    ondansetron, 8 mg, Oral, Q8H PRN    oxyCODONE, 5 mg, Oral, Q4H PRN    oxyCODONE, 10 mg, Oral, Q4H PRN    sodium chloride 0.9%, 10 mL, Intravenous, Q12H PRN    sodium chloride 0.9%, 5 mL, Intravenous, PRN     Review of Systems   Constitutional:  Negative for fever.   Respiratory:  Negative for shortness of breath.      Objective:     Weight: 104.3 kg (230 lb)  Body mass index is 43.46  kg/m².  Vital Signs (Most Recent):  Temp: 97.7 °F (36.5 °C) (07/01/25 0539)  Pulse: 79 (07/01/25 0428)  Resp: 18 (07/01/25 0428)  BP: 109/68 (07/01/25 0428)  SpO2: 99 % (07/01/25 0428) Vital Signs (24h Range):  Temp:  [97.5 °F (36.4 °C)-98.3 °F (36.8 °C)] 97.7 °F (36.5 °C)  Pulse:  [72-91] 79  Resp:  [16-20] 18  SpO2:  [94 %-100 %] 99 %  BP: (103-141)/(54-71) 109/68                         Female External Urinary Catheter w/ Suction 06/30/25 0509 (Active)   Skin no redness;no breakdown 06/30/25 1638   Tolerance no signs/symptoms of discomfort 06/30/25 1638   Suction Continuous suction at 70 mmHg 06/30/25 1638          Physical Exam  Constitutional:       Appearance: She is well-developed and well-nourished.   Eyes:      Extraocular Movements: EOM normal.              Physical Exam:    Constitutional: She appears well-developed and well-nourished.     Eyes: EOM are normal.     Musculoskeletal:        Right Upper Extremities: Range of motion is full. Tone is normal.        Left Upper Extremities: Range of motion is full. Tone is normal.       Right Lower Extremities: Range of motion is full. Tone is normal.        Left Lower Extremities: Range of motion is full. Tone is normal.       Significant Labs:  Recent Labs   Lab 06/30/25 0119   GLU 81      K 3.9      CO2 25   BUN 36*   CREATININE 1.3   CALCIUM 10.0     Recent Labs   Lab 06/30/25 0119 06/30/25  0131   WBC 12.67  --    HGB 9.1*  --    HCT 28.7* 28*   *  --      Recent Labs   Lab 06/30/25 0119   INR 1.0   APTT 26.3     Microbiology Results (last 7 days)       Procedure Component Value Units Date/Time    Blood culture [[id number]]  (Normal) Collected: 06/30/25 0119    Order Status: Completed Specimen: Blood from Peripheral, Forearm, Right Updated: 07/01/25 0700     Blood Culture No Growth After 24 Hours    Blood culture [[id number]]  (Normal) Collected: 06/30/25 0100    Order Status: Completed Specimen: Blood from Peripheral, Forearm, Left  Updated: 07/01/25 0700     Blood Culture No Growth After 24 Hours    Influenza A & B by Molecular [[id number]]  (Normal) Collected: 06/30/25 0119    Order Status: Completed Specimen: Nasal Swab Updated: 06/30/25 0203     INFLUENZA A MOLECULAR Negative     INFLUENZA B MOLECULAR  Negative          All pertinent labs from the last 24 hours have been reviewed.    Significant Diagnostics:  I have reviewed and interpreted all pertinent imaging results/findings within the past 24 hours.

## 2025-07-01 NOTE — PLAN OF CARE
[facility] S[facility]  Initial Discharge Assessment       Primary Care Provider: Ginna Musa MD    Admission Diagnosis: Back pain [M54.9]  MALACHI (acute kidney injury) [N17.9]  Chest pain [R07.9]  Postoperative surgical complication involving nervous system associated with non-nervous system procedure, unspecified complication [G97.82]    Admission Date: 6/29/2025  Expected Discharge Date:     Transition of Care Barriers: (P) None    Payor: BLUE CROSS BLUE SHIELD / [facility] / Product Type: Commercial /     Extended Emergency Contact Information  Primary Emergency Contact: YVON ISBELL  Address: [address] [place] [address] [place]  Mobile Phone: [phone]  Relation: Daughter  Secondary Emergency Contact: Shoulder,Pleasant  Home Phone: [phone]  Relation: Daughter    Discharge Plan A: (P) Home with family, Home, Home Health  Discharge Plan B: (P) Home, Home with family, Home Health      [facility] #[id number] - [place] [id number] [facility] [address] AT [address] [address]  Phone: [phone] Fax: [phone]    [facility] #[id number] [facility] [address] AT [facility] [address] & [place] [address] [facility] [address]  Phone: [phone] Fax: [phone]    [facility] ([facility] #[id number] - [place] - [address] AT [address]  [address]  [address]  [place] [address]  Phone: [phone] Fax: [phone]    [facility] #[id number]  [place] [address] [facility] [address] AT [facility] & [address] [address]  Phone: [phone] Fax: [phone]    [facility] #[id number] - [place]  [address] [facility] [address] & [facility]  [address] [address]  Phone: [phone] Fax: [phone]    [facility] #[id number] -  [place] - [address] AT [facility] & [place]  [address] [address]  Phone: [phone] Fax: [phone]      Initial Assessment (most recent)       Adult Discharge Assessment - 07/01/25 1551          Discharge Assessment    Assessment Type Discharge Planning Assessment     Confirmed/corrected address, phone number and insurance Yes     Confirmed Demographics Correct on Facesheet     Source of Information patient     Communicated MATT with patient/caregiver Yes     People in Home child(deshaun), adult     Name(s) of People in Home Kamari Harrison (daughter) [phone]     Do you expect to return to your current living situation? Yes     Do you have help at home or someone to help you manage your care at home? Yes     Who are your caregiver(s) and their phone number(s)? Kamari Harrison (daughter) [phone]     Prior to hospitilization cognitive status: Alert/Oriented     Current cognitive status: Alert/Oriented     Walking or Climbing Stairs Difficulty yes     Walking or Climbing Stairs ambulation difficulty, requires equipment;ambulation difficulty, assistance 1 person     Mobility Management walker     Dressing/Bathing Difficulty no     Equipment Currently Used at Home rollator     Readmission within 30 days? Yes     Patient currently being followed by outpatient case management? No     Do you currently have service(s) that help you manage your care at home? Yes     Name and Contact number of agency [facility] (P)      Is the pt/caregiver preference to resume services with current agency Yes (P)      Do you take prescription medications? Yes (P)      Do you have prescription coverage? Yes (P)      Do you have any problems affording any of your prescribed medications? No (P)      Is the patient taking medications as prescribed? yes (P)      Who is going to help you get home at discharge? Kamari Harrison (daughter) [phone] (P)      How do you get to doctors  appointments? family or friend will provide (P)      Are you on dialysis? No (P)      Do you take coumadin? No (P)      Discharge Plan A Home with family;Home;Home Health (P)      Discharge Plan B Home;Home with family;Home Health (P)      DME Needed Upon Discharge  hospital bed (P)      Discharge Plan discussed with: Adult children (P)      Transition of Care Barriers None (P)         Physical Activity    On average, how many days per week do you engage in moderate to strenuous exercise (like a brisk walk)? 0 days (P)      On average, how many minutes do you engage in exercise at this level? 0 min (P)         Financial Resource Strain    How hard is it for you to pay for the very basics like food, housing, medical care, and heating? Not very hard (P)         Housing Stability    In the last 12 months, was there a time when you were not able to pay the mortgage or rent on time? No (P)      At any time in the past 12 months, were you homeless or living in a shelter (including now)? No (P)         Transportation Needs    In the past 12 months, has lack of transportation kept you from medical appointments or from getting medications? No (P)      In the past 12 months, has lack of transportation kept you from meetings, work, or from getting things needed for daily living? No (P)         Food Insecurity    Within the past 12 months, you worried that your food would run out before you got the money to buy more. Never true (P)      Within the past 12 months, the food you bought just didn't last and you didn't have money to get more. Never true (P)         Stress    Do you feel stress - tense, restless, nervous, or anxious, or unable to sleep at night because your mind is troubled all the time - these days? Not at all (P)         Social Isolation    How often do you feel lonely or isolated from those around you?  Never (P)         Alcohol Use    Q1: How often do you have a drink containing alcohol? Never (P)      Q2: How  many drinks containing alcohol do you have on a typical day when you are drinking? Patient does not drink (P)      Q3: How often do you have six or more drinks on one occasion? Never (P)         Utilities    In the past 12 months has the electric, gas, oil, or water company threatened to shut off services in your home? No (P)         Health Literacy    How often do you need to have someone help you when you read instructions, pamphlets, or other written material from your doctor or pharmacy? Never (P)                       CM to bedside to complete discharge assessment.  Pt lives in a one story home with adult daughter Kamari ([phone]), with no steps to enter the home.  Pt states she was a  for 30yrs. Pt ambulates with rollator at home but has gotten increasingly weaker since last admission according to pt's daughter .  Pt denies smoking, drinking and hemodialysis.  Pt is current with [facility]  services at this time.  Pt states that she is not interested in going anywhere but home.  Pt's daughter states she will need a hospital bed upon discharge.  Will cont to coordinate care until discharge.     Discharge Plan A and Plan B have been determined by review of patient's clinical status, future medical and therapeutic needs, and coverage/benefits for post-acute care in coordination with multidisciplinary team members.     Pilar Armstrong, MSN   [facility]  [phone]

## 2025-07-01 NOTE — PT/OT/SLP PROGRESS
"Physical Therapy      Patient Name:  Kuldeep Villela   MRN:  [id number]    PT orders acknowledged & reviewed, however patient not seen today secondary to Patient unwilling to participate, citing pain; gassy; & wanting "a few days or rest". Max education & encouragement provided (see below), however patient not receptive at this time and not open to any mobility or bed level assessments. Of note, Patient reporting that she does not remember her spinal precautions and was cleared to not wear TLSO brace by multiple doctors in previous admission; MD ([facility]) contacted via secure chat, for which he would like TLSO donned for OOB mobility. Will follow-up for initial evaluation at next appropriate date.    Patient Education Provided on:  The role of physical therapy and how the patient can benefit from skilled services  The negative effects of prolonged bed rest/sedentary behavior, along with the importance of OOB activity & patient participation with PT  Spinal precautions: No bending, lifting >5lbs, or twisting, w/ acronym BLT to assist with remembering precautions. Patient verbalized understanding of spinal precautions and that these precautions will remain in place until MD clears pt to return to these activities.    Time in: 1305  Time out: 1319  Total Time: 14mins (Non-billable)  "

## 2025-07-01 NOTE — ASSESSMENT & PLAN NOTE
9o F with PMHx of HTN, HLD, T12 compression fracture s/p posterior fusion T10-L2 (06/14/25) with NSGY who presented to ED for BLE weakness. CRP 22.8/ ESR 68. MRI C/T/L spine showing open canal at surgical site.     - NSGY consulted:  - No acute neurosurgical intervention required  - Low concern for infection at this time  - Staples removed today   - fall precautions  - MM pain control with scheduled tylenol and robaxin, prn opioids   - PT/OT eval pending.

## 2025-07-02 LAB
ABSOLUTE EOSINOPHIL (OHS): 0.23 K/UL
ABSOLUTE MONOCYTE (OHS): 0.62 K/UL (ref 0.3–1)
ABSOLUTE NEUTROPHIL COUNT (OHS): 6.13 K/UL (ref 1.8–7.7)
ALBUMIN SERPL BCP-MCNC: 3.1 G/DL (ref 3.5–5.2)
ALP SERPL-CCNC: 88 UNIT/L (ref 40–150)
ALT SERPL W/O P-5'-P-CCNC: 10 UNIT/L (ref 10–44)
ANION GAP (OHS): 8 MMOL/L (ref 8–16)
AST SERPL-CCNC: 13 UNIT/L (ref 11–45)
BACTERIA #/AREA URNS AUTO: ABNORMAL /HPF
BASOPHILS # BLD AUTO: 0.03 K/UL
BASOPHILS NFR BLD AUTO: 0.3 %
BILIRUB SERPL-MCNC: 0.6 MG/DL (ref 0.1–1)
BILIRUB UR QL STRIP.AUTO: NEGATIVE
BUN SERPL-MCNC: 16 MG/DL (ref 8–23)
CALCIUM SERPL-MCNC: 9.1 MG/DL (ref 8.7–10.5)
CHLORIDE SERPL-SCNC: 104 MMOL/L (ref 95–110)
CLARITY UR: ABNORMAL
CO2 SERPL-SCNC: 27 MMOL/L (ref 23–29)
COLOR UR AUTO: YELLOW
CREAT SERPL-MCNC: 1 MG/DL (ref 0.5–1.4)
ERYTHROCYTE [DISTWIDTH] IN BLOOD BY AUTOMATED COUNT: 13.8 % (ref 11.5–14.5)
GFR SERPLBLD CREATININE-BSD FMLA CKD-EPI: 60 ML/MIN/1.73/M2
GLUCOSE SERPL-MCNC: 58 MG/DL (ref 70–110)
GLUCOSE UR QL STRIP: NEGATIVE
HCT VFR BLD AUTO: 26.9 % (ref 37–48.5)
HGB BLD-MCNC: 8.6 GM/DL (ref 12–16)
HGB UR QL STRIP: ABNORMAL
IMM GRANULOCYTES # BLD AUTO: 0.04 K/UL (ref 0–0.04)
IMM GRANULOCYTES NFR BLD AUTO: 0.5 % (ref 0–0.5)
KETONES UR QL STRIP: ABNORMAL
LEUKOCYTE ESTERASE UR QL STRIP: ABNORMAL
LYMPHOCYTES # BLD AUTO: 1.67 K/UL (ref 1–4.8)
MAGNESIUM SERPL-MCNC: 1.7 MG/DL (ref 1.6–2.6)
MCH RBC QN AUTO: 32 PG (ref 27–31)
MCHC RBC AUTO-ENTMCNC: 32 G/DL (ref 32–36)
MCV RBC AUTO: 100 FL (ref 82–98)
MICROSCOPIC COMMENT: ABNORMAL
NITRITE UR QL STRIP: NEGATIVE
NUCLEATED RBC (/100WBC) (OHS): 0 /100 WBC
PH UR STRIP: 7 [PH]
PHOSPHATE SERPL-MCNC: 2.7 MG/DL (ref 2.7–4.5)
PLATELET # BLD AUTO: 426 K/UL (ref 150–450)
PMV BLD AUTO: 10.1 FL (ref 9.2–12.9)
POTASSIUM SERPL-SCNC: 3.9 MMOL/L (ref 3.5–5.1)
PROT SERPL-MCNC: 6.2 GM/DL (ref 6–8.4)
PROT UR QL STRIP: NEGATIVE
RBC # BLD AUTO: 2.69 M/UL (ref 4–5.4)
RBC #/AREA URNS AUTO: 14 /HPF (ref 0–4)
RELATIVE EOSINOPHIL (OHS): 2.6 %
RELATIVE LYMPHOCYTE (OHS): 19.2 % (ref 18–48)
RELATIVE MONOCYTE (OHS): 7.1 % (ref 4–15)
RELATIVE NEUTROPHIL (OHS): 70.3 % (ref 38–73)
SODIUM SERPL-SCNC: 139 MMOL/L (ref 136–145)
SP GR UR STRIP: 1.01
SQUAMOUS #/AREA URNS AUTO: 1 /HPF
UROBILINOGEN UR STRIP-ACNC: ABNORMAL EU/DL
WBC # BLD AUTO: 8.72 K/UL (ref 3.9–12.7)
WBC #/AREA URNS AUTO: 4 /HPF (ref 0–5)

## 2025-07-02 PROCEDURE — 25000003 PHARM REV CODE 250

## 2025-07-02 PROCEDURE — 25000003 PHARM REV CODE 250: Performed by: INTERNAL MEDICINE

## 2025-07-02 PROCEDURE — 96372 THER/PROPH/DIAG INJ SC/IM: CPT

## 2025-07-02 PROCEDURE — 94761 N-INVAS EAR/PLS OXIMETRY MLT: CPT

## 2025-07-02 PROCEDURE — G0378 HOSPITAL OBSERVATION PER HR: HCPCS

## 2025-07-02 PROCEDURE — 63600175 PHARM REV CODE 636 W HCPCS: Performed by: INTERNAL MEDICINE

## 2025-07-02 PROCEDURE — 84100 ASSAY OF PHOSPHORUS: CPT

## 2025-07-02 PROCEDURE — 63600175 PHARM REV CODE 636 W HCPCS

## 2025-07-02 PROCEDURE — 81001 URINALYSIS AUTO W/SCOPE: CPT

## 2025-07-02 PROCEDURE — 96376 TX/PRO/DX INJ SAME DRUG ADON: CPT

## 2025-07-02 PROCEDURE — 36415 COLL VENOUS BLD VENIPUNCTURE: CPT

## 2025-07-02 PROCEDURE — 83735 ASSAY OF MAGNESIUM: CPT

## 2025-07-02 PROCEDURE — 85025 COMPLETE CBC W/AUTO DIFF WBC: CPT

## 2025-07-02 PROCEDURE — 80053 COMPREHEN METABOLIC PANEL: CPT

## 2025-07-02 RX ORDER — AMOXICILLIN 250 MG
1 CAPSULE ORAL 2 TIMES DAILY
Status: DISCONTINUED | OUTPATIENT
Start: 2025-07-02 | End: 2025-07-04 | Stop reason: HOSPADM

## 2025-07-02 RX ORDER — OXYCODONE AND ACETAMINOPHEN 10; 325 MG/1; MG/1
1 TABLET ORAL EVERY 4 HOURS PRN
Refills: 0 | Status: DISCONTINUED | OUTPATIENT
Start: 2025-07-02 | End: 2025-07-03

## 2025-07-02 RX ORDER — HYDROMORPHONE HYDROCHLORIDE 1 MG/ML
0.5 INJECTION, SOLUTION INTRAMUSCULAR; INTRAVENOUS; SUBCUTANEOUS EVERY 6 HOURS PRN
Refills: 0 | Status: DISCONTINUED | OUTPATIENT
Start: 2025-07-02 | End: 2025-07-03

## 2025-07-02 RX ORDER — OXYCODONE HYDROCHLORIDE 10 MG/1
10 TABLET ORAL ONCE
Refills: 0 | Status: DISCONTINUED | OUTPATIENT
Start: 2025-07-02 | End: 2025-07-02

## 2025-07-02 RX ORDER — OXYCODONE AND ACETAMINOPHEN 5; 325 MG/1; MG/1
1 TABLET ORAL EVERY 4 HOURS PRN
Refills: 0 | Status: DISCONTINUED | OUTPATIENT
Start: 2025-07-02 | End: 2025-07-03

## 2025-07-02 RX ORDER — OXYCODONE AND ACETAMINOPHEN 10; 325 MG/1; MG/1
1 TABLET ORAL ONCE
Refills: 0 | Status: COMPLETED | OUTPATIENT
Start: 2025-07-02 | End: 2025-07-02

## 2025-07-02 RX ADMIN — OXYCODONE AND ACETAMINOPHEN 1 TABLET: 325; 10 TABLET ORAL at 06:07

## 2025-07-02 RX ADMIN — AMLODIPINE BESYLATE 10 MG: 10 TABLET ORAL at 09:07

## 2025-07-02 RX ADMIN — ENOXAPARIN SODIUM 40 MG: 40 INJECTION SUBCUTANEOUS at 09:07

## 2025-07-02 RX ADMIN — BUSPIRONE HYDROCHLORIDE 15 MG: 10 TABLET ORAL at 12:07

## 2025-07-02 RX ADMIN — PRAVASTATIN SODIUM 20 MG: 20 TABLET ORAL at 09:07

## 2025-07-02 RX ADMIN — ACETAMINOPHEN 1000 MG: 500 TABLET ORAL at 09:07

## 2025-07-02 RX ADMIN — ROPINIROLE HYDROCHLORIDE 1 MG: 1 TABLET, FILM COATED ORAL at 09:07

## 2025-07-02 RX ADMIN — OXYCODONE AND ACETAMINOPHEN 1 TABLET: 325; 10 TABLET ORAL at 12:07

## 2025-07-02 RX ADMIN — SERTRALINE HYDROCHLORIDE 50 MG: 50 TABLET ORAL at 09:07

## 2025-07-02 RX ADMIN — LACTULOSE 10 G: 20 SOLUTION ORAL at 09:07

## 2025-07-02 RX ADMIN — CITALOPRAM HYDROBROMIDE 40 MG: 20 TABLET ORAL at 09:07

## 2025-07-02 RX ADMIN — OXYCODONE AND ACETAMINOPHEN 1 TABLET: 325; 10 TABLET ORAL at 09:07

## 2025-07-02 RX ADMIN — BUSPIRONE HYDROCHLORIDE 15 MG: 10 TABLET ORAL at 04:07

## 2025-07-02 RX ADMIN — BUSPIRONE HYDROCHLORIDE 15 MG: 10 TABLET ORAL at 09:07

## 2025-07-02 RX ADMIN — HYDROMORPHONE HYDROCHLORIDE 0.5 MG: 1 INJECTION, SOLUTION INTRAMUSCULAR; INTRAVENOUS; SUBCUTANEOUS at 11:07

## 2025-07-02 RX ADMIN — OXYCODONE AND ACETAMINOPHEN 1 TABLET: 325; 10 TABLET ORAL at 10:07

## 2025-07-02 RX ADMIN — ACETAMINOPHEN 1000 MG: 500 TABLET ORAL at 05:07

## 2025-07-02 RX ADMIN — SENNOSIDES AND DOCUSATE SODIUM 1 TABLET: 50; 8.6 TABLET ORAL at 09:07

## 2025-07-02 RX ADMIN — CETIRIZINE HYDROCHLORIDE 10 MG: 10 TABLET, FILM COATED ORAL at 09:07

## 2025-07-02 NOTE — PLAN OF CARE
07/02/25 1202   Rounds   Attendance Provider;Nurse ;;Assigned nurse   Discharge Plan A Home;Home with family;Home Health   Why the patient remains in the hospital Requires continued medical care   Transition of Care Barriers None     Pt will discharge caro when med ready with HH.    Pilar Armstrong, MSN   [facility]  [phone]

## 2025-07-02 NOTE — CONSULTS
"Attempted to assess pt for wound care consult. Pt refused and would not allow me to remove island border to observe surgical incision, adhesive removed spray utilized, which she stated "Stop, it hurts and its cold"   Educated pt on importance of wound assessment to ensure proper care, pt insisted she did not want me to remove dressing.     Pt agitated. Will f/u 7/3  "

## 2025-07-02 NOTE — PLAN OF CARE
"  Problem: Skin Injury Risk Increased  Goal: Skin Health and Integrity  Outcome: Progressing     Problem: Wound  Goal: Optimal Coping  Outcome: Progressing  Goal: Optimal Functional Ability  Outcome: Progressing  Goal: Absence of Infection Signs and Symptoms  Outcome: Progressing  Goal: Improved Oral Intake  Outcome: Progressing     Problem: Adult Inpatient Plan of Care  Goal: Absence of Hospital-Acquired Illness or Injury  Outcome: Progressing         Pt aaox4. Uneventful night. Prn pain medication given. Pt refused to be turned said she'll call when ready, pt however weight shifts in bed. Urine collected.  Pt refused am labs, stated " I'm tired of being stuck shit". Bed in lowest and locked position. Call light within reach. Instructed to call for assistance. Plan of care on going.   "

## 2025-07-02 NOTE — PT/OT/SLP PROGRESS
"Occupational Therapy      Patient Name:  Kuldeep Villela   MRN:  [id number]    Patient not seen today secondary to Pain, Patient unwilling to participate. Attempt made at 13:34 pm - pt declining participation with therapy secondary to increased pain levels and fatigue. Pt presenting with tangential speech, expressing frustration with staff frequently entering her room - difficult to redirect to task. Provided education on the negative effects of prolonged bed rest and the importance of participation in therapeutic activities to promote recovery; however, pt still declining, stating, "Y'all need to let me heal how I want to heal. I can't move around right now because I'm too sore." Pt verbalizing that she understands that she will continue to get weaker and more stiff with prolonged bed rest and inactivity. Pt agreeable to try again tomorrow if pain is more controlled. Will follow-up as appropriate.    No billable units.  OT time spent: 13:34-13:44 pm  7/2/2025  "

## 2025-07-02 NOTE — NURSING
Patient in 10/10 pain still. Messaged MD Kortney to see if he can place a 1 time dose of oxy for patient. MD Kortney placed a order for immediate release 10mg oxy and patient didn't want it because it wasn't the same medication that she takes at home as needed. Reached out to MD Kortney about it and wasted the other oxy 10mg with Ryley the pharmacist.

## 2025-07-02 NOTE — NURSING
Patient would like stool softeners ordered and a enema for her. MD Kortney messaged. No new orders yet received will let the night nurse know to message the night time MD's.

## 2025-07-02 NOTE — PT/OT/SLP PROGRESS
"Physical Therapy      Patient Name:  Kuldeep Villela   MRN:  [id number]    Patient not seen today secondary to Patient unwilling to participate, Pain. Pt expressing much frustration with frequency of people entering the room and tasks that cause her more pain such as team check her back/incision right after her staples were removed. Pt reporting that she wants to heal her way and that she just had surgery. Per chart, pt's surgery was on 6/14/25. Pt attributed her weakness to not having the proper medications and stated that it took 3 tries for the team to get them medication right, Pt also reporting that now her medication is right and she can move her legs, pt observed to have 2/5 hip flexion. Pt also verbalized that she understands her legs will get weaker without moving, but reports she needs to heal and have her pain improve first. Pt also stated, that "they" want her to go somewhere, and there they will want her up at 9:30 and getting up and moving. Pt stated, "I'm not doing that" and "I need to heal my way." Pt also reported that she has a bed at home and that her daughter currently isn't working (suspected that it is to take care of her mother).. Per chart review, pt returned to hospital due to worsening BLE, uncontrolled back pain, and with reports of primarily staying in her recliner. Pt requesting therapy return tomorrow to check on her.    "

## 2025-07-03 VITALS
RESPIRATION RATE: 18 BRPM | SYSTOLIC BLOOD PRESSURE: 133 MMHG | HEART RATE: 82 BPM | WEIGHT: 230 LBS | HEIGHT: 61 IN | DIASTOLIC BLOOD PRESSURE: 64 MMHG | TEMPERATURE: 98 F | BODY MASS INDEX: 43.43 KG/M2 | OXYGEN SATURATION: 97 %

## 2025-07-03 PROBLEM — E55.9 VITAMIN D DEFICIENCY: Status: ACTIVE | Noted: 2025-07-03

## 2025-07-03 LAB — HOLD SPECIMEN: NORMAL

## 2025-07-03 PROCEDURE — 25000003 PHARM REV CODE 250: Performed by: HOSPITALIST

## 2025-07-03 PROCEDURE — G0378 HOSPITAL OBSERVATION PER HR: HCPCS

## 2025-07-03 PROCEDURE — 25000003 PHARM REV CODE 250

## 2025-07-03 PROCEDURE — 25000003 PHARM REV CODE 250: Performed by: INTERNAL MEDICINE

## 2025-07-03 PROCEDURE — 97530 THERAPEUTIC ACTIVITIES: CPT

## 2025-07-03 PROCEDURE — 97535 SELF CARE MNGMENT TRAINING: CPT

## 2025-07-03 PROCEDURE — 97165 OT EVAL LOW COMPLEX 30 MIN: CPT

## 2025-07-03 RX ORDER — PSYLLIUM HUSK 0.4 G
1 CAPSULE ORAL 2 TIMES DAILY
Status: DISCONTINUED | OUTPATIENT
Start: 2025-07-03 | End: 2025-07-04 | Stop reason: HOSPADM

## 2025-07-03 RX ORDER — MELOXICAM 7.5 MG/1
7.5 TABLET ORAL DAILY
Status: DISCONTINUED | OUTPATIENT
Start: 2025-07-03 | End: 2025-07-04 | Stop reason: HOSPADM

## 2025-07-03 RX ORDER — PSYLLIUM HUSK 0.4 G
1 CAPSULE ORAL 2 TIMES DAILY
Qty: 60 TABLET | Refills: 11 | Status: SHIPPED | OUTPATIENT
Start: 2025-07-03 | End: 2026-07-03

## 2025-07-03 RX ORDER — GABAPENTIN 100 MG/1
100 CAPSULE ORAL 3 TIMES DAILY
Status: DISCONTINUED | OUTPATIENT
Start: 2025-07-03 | End: 2025-07-03

## 2025-07-03 RX ORDER — OXYCODONE AND ACETAMINOPHEN 5; 325 MG/1; MG/1
1 TABLET ORAL EVERY 4 HOURS PRN
Refills: 0 | Status: DISCONTINUED | OUTPATIENT
Start: 2025-07-03 | End: 2025-07-04 | Stop reason: HOSPADM

## 2025-07-03 RX ORDER — OXYCODONE AND ACETAMINOPHEN 10; 325 MG/1; MG/1
1 TABLET ORAL EVERY 4 HOURS PRN
Refills: 0 | Status: DISCONTINUED | OUTPATIENT
Start: 2025-07-03 | End: 2025-07-04 | Stop reason: HOSPADM

## 2025-07-03 RX ORDER — OXYCODONE HYDROCHLORIDE 5 MG/1
5 TABLET ORAL EVERY 4 HOURS PRN
Refills: 0 | Status: DISCONTINUED | OUTPATIENT
Start: 2025-07-03 | End: 2025-07-03

## 2025-07-03 RX ORDER — OXYCODONE HYDROCHLORIDE 10 MG/1
10 TABLET ORAL EVERY 4 HOURS PRN
Refills: 0 | Status: DISCONTINUED | OUTPATIENT
Start: 2025-07-03 | End: 2025-07-03

## 2025-07-03 RX ADMIN — SERTRALINE HYDROCHLORIDE 50 MG: 50 TABLET ORAL at 09:07

## 2025-07-03 RX ADMIN — OXYCODONE AND ACETAMINOPHEN 1 TABLET: 325; 10 TABLET ORAL at 02:07

## 2025-07-03 RX ADMIN — ROPINIROLE HYDROCHLORIDE 1 MG: 1 TABLET, FILM COATED ORAL at 09:07

## 2025-07-03 RX ADMIN — OXYCODONE AND ACETAMINOPHEN 1 TABLET: 325; 10 TABLET ORAL at 04:07

## 2025-07-03 RX ADMIN — OXYCODONE AND ACETAMINOPHEN 1 TABLET: 325; 10 TABLET ORAL at 08:07

## 2025-07-03 RX ADMIN — CITALOPRAM HYDROBROMIDE 40 MG: 20 TABLET ORAL at 09:07

## 2025-07-03 RX ADMIN — CETIRIZINE HYDROCHLORIDE 10 MG: 10 TABLET, FILM COATED ORAL at 09:07

## 2025-07-03 RX ADMIN — MELOXICAM 7.5 MG: 7.5 TABLET ORAL at 09:07

## 2025-07-03 RX ADMIN — BUSPIRONE HYDROCHLORIDE 15 MG: 10 TABLET ORAL at 04:07

## 2025-07-03 RX ADMIN — ROPINIROLE HYDROCHLORIDE 1 MG: 1 TABLET, FILM COATED ORAL at 08:07

## 2025-07-03 RX ADMIN — BUSPIRONE HYDROCHLORIDE 15 MG: 10 TABLET ORAL at 09:07

## 2025-07-03 RX ADMIN — BUSPIRONE HYDROCHLORIDE 15 MG: 10 TABLET ORAL at 08:07

## 2025-07-03 RX ADMIN — AMLODIPINE BESYLATE 10 MG: 10 TABLET ORAL at 09:07

## 2025-07-03 RX ADMIN — PRAVASTATIN SODIUM 20 MG: 20 TABLET ORAL at 09:07

## 2025-07-03 RX ADMIN — BUSPIRONE HYDROCHLORIDE 15 MG: 10 TABLET ORAL at 12:07

## 2025-07-03 RX ADMIN — Medication 1 TABLET: at 08:07

## 2025-07-03 RX ADMIN — OXYCODONE AND ACETAMINOPHEN 1 TABLET: 325; 10 TABLET ORAL at 12:07

## 2025-07-03 RX ADMIN — OXYCODONE HYDROCHLORIDE AND ACETAMINOPHEN 1 TABLET: 5; 325 TABLET ORAL at 05:07

## 2025-07-03 RX ADMIN — Medication 1 TABLET: at 09:07

## 2025-07-03 RX ADMIN — SENNOSIDES AND DOCUSATE SODIUM 1 TABLET: 50; 8.6 TABLET ORAL at 08:07

## 2025-07-03 NOTE — PLAN OF CARE
07/02/25 1202 07/03/25 1021   Rounds   Attendance  --  Provider;Nurse ;;Assigned nurse   Discharge Plan A  --  Home;Home with family;Home Health   Why the patient remains in the hospital  --  Requires continued medical care   Transition of Care Barriers  --  None     Discharge home today    Pilar Armstrong, MSN   [facility]  [phone]

## 2025-07-03 NOTE — PLAN OF CARE
Problem: Skin Injury Risk Increased  Goal: Skin Health and Integrity  Outcome: Met     Problem: Adult Inpatient Plan of Care  Goal: Plan of Care Review  Outcome: Met  Goal: Patient-Specific Goal (Individualized)  Outcome: Met  Goal: Absence of Hospital-Acquired Illness or Injury  Outcome: Met  Goal: Optimal Comfort and Wellbeing  Outcome: Met  Goal: Readiness for Transition of Care  Outcome: Met     Problem: Bariatric Environmental Safety  Goal: Safety Maintained with Care  Outcome: Met     Problem: Infection  Goal: Absence of Infection Signs and Symptoms  Outcome: Met     Problem: Acute Kidney Injury/Impairment  Goal: Fluid and Electrolyte Balance  Outcome: Met  Goal: Improved Oral Intake  Outcome: Met  Goal: Effective Renal Function  Outcome: Met     Problem: Wound  Goal: Optimal Coping  Outcome: Met  Goal: Optimal Functional Ability  Outcome: Met  Goal: Absence of Infection Signs and Symptoms  Outcome: Met  Goal: Improved Oral Intake  Outcome: Met  Goal: Optimal Pain Control and Function  Outcome: Met  Goal: Skin Health and Integrity  Outcome: Met  Goal: Optimal Wound Healing  Outcome: Met     Problem: Fall Injury Risk  Goal: Absence of Fall and Fall-Related Injury  Outcome: Met

## 2025-07-03 NOTE — ASSESSMENT & PLAN NOTE
9o F with PMHx of HTN, HLD, T12 compression fracture s/p posterior fusion T10-L2 (06/14/25) with NSGY who presented to ED for BLE weakness. CRP 22.8/ ESR 68. MRI C/T/L spine showing open canal at surgical site.     NSGY consulted, low concern for infection and no acute neurosurgical intervention required  Staples removed 7/1   - fall precautions  - MM pain control   - PT/OT eval pending, patient continues to refuse therapy    9o F with PMHx of HTN, HLD, T12 compression fracture s/p posterior fusion T10-L2 (06/14/25) with NSGY who presented to ED for BLE weakness. CRP 22.8/ ESR 68. MRI C/T/L spine showing open canal at surgical site.     NSGY consulted, low concern for infection and no acute neurosurgical intervention required

## 2025-07-03 NOTE — PROGRESS NOTES
Attempted to follow up with pt for wound care consult. PT in room.     Pt refused 2nd day in a row.

## 2025-07-03 NOTE — ASSESSMENT & PLAN NOTE
Anemia is likely due to chronic disease. Most recent hemoglobin and hematocrit are listed below.  Recent Labs     07/01/25  0930 07/02/25  0858   HGB 8.9* 8.6*   HCT 27.3* 26.9*     Plan  - Monitor serial CBC: Daily  - Transfuse PRBC if patient becomes hemodynamically unstable, symptomatic or H/H drops below 7/21.  - Patient has not received any PRBC transfusions to date  - Patient's anemia is currently stable

## 2025-07-03 NOTE — SUBJECTIVE & OBJECTIVE
Interval History: NAEON. Pt cont to c/o lower back pain and b/l leg weakness. Pt refused PT/OT eval yesterday, stating she is in too much pain and needs to rest. I had extensive discussion with patient regarding importance of working with therapy and concern that staying in bed will not improve pain and likely lead to worsening weakness. She again refused PT/OT eval today despite our conversation. I expressed concerns with daughter (Ple[place] Shoulder) in hopes that family will be able to motivate patient.      Review of Systems   Constitutional:  Positive for activity change. Negative for chills and fever.   HENT:  Negative for trouble swallowing.    Eyes:  Negative for photophobia and visual disturbance.   Respiratory:  Negative for cough, chest tightness and shortness of breath.    Cardiovascular:  Negative for chest pain, palpitations and leg swelling.   Gastrointestinal:  Negative for abdominal pain, constipation, diarrhea, nausea and vomiting.   Genitourinary:  Negative for dysuria, frequency and hematuria.   Musculoskeletal:  Positive for back pain and gait problem. Negative for neck pain.   Skin:  Negative for rash and wound.   Neurological:  Positive for weakness. Negative for dizziness, syncope, speech difficulty and light-headedness.   Psychiatric/Behavioral:  Negative for agitation and confusion. The patient is not nervous/anxious.      Objective:     Vital Signs (Most Recent):  Temp: 97.6 °F (36.4 °C) (07/02/25 1536)  Pulse: 79 (07/02/25 1550)  Resp: 18 (07/02/25 1536)  BP: 118/75 (07/02/25 1536)  SpO2: 99 % (07/02/25 1536) Vital Signs (24h Range):  Temp:  [97.3 °F (36.3 °C)-98.2 °F (36.8 °C)] 97.6 °F (36.4 °C)  Pulse:  [79-97] 79  Resp:  [18] 18  SpO2:  [93 %-99 %] 99 %  BP: (114-140)/(59-75) 118/75     Weight: 104.3 kg (230 lb)  Body mass index is 43.46 kg/m².    Intake/Output Summary (Last 24 hours) at 7/2/2025 1931  Last data filed at 7/2/2025 0903  Gross per 24 hour   Intake 299 ml   Output 600 ml    Net -301 ml         Physical Exam  Vitals and nursing note reviewed.   Constitutional:       General: She is not in acute distress.     Appearance: She is well-developed.   HENT:      Head: Normocephalic and atraumatic.      Mouth/Throat:      Pharynx: No oropharyngeal exudate.   Eyes:      Conjunctiva/sclera: Conjunctivae normal.      Pupils: Pupils are equal, round, and reactive to light.   Cardiovascular:      Rate and Rhythm: Normal rate and regular rhythm.      Heart sounds: Normal heart sounds.   Pulmonary:      Effort: Pulmonary effort is normal. No respiratory distress.      Breath sounds: Normal breath sounds. No wheezing.   Abdominal:      General: Bowel sounds are normal. There is no distension.      Palpations: Abdomen is soft.      Tenderness: There is no abdominal tenderness.   Musculoskeletal:         General: No tenderness. Normal range of motion.      Cervical back: Normal range of motion and neck supple.   Lymphadenopathy:      Cervical: No cervical adenopathy.   Skin:     General: Skin is warm and dry.      Capillary Refill: Capillary refill takes less than 2 seconds.      Findings: No rash.      Comments: Lymphedema changes noted to BLE  Small stage 1 pressure sores to buttocks   Thoracic/lumbar surgical scar with staples in place, no surrounding erythema or drainage (see media)   Neurological:      Mental Status: She is alert and oriented to person, place, and time.      Cranial Nerves: No cranial nerve deficit.      Sensory: No sensory deficit.      Motor: Weakness (able to move legs around, now able to lift legs off the bed) present.      Coordination: Coordination normal.   Psychiatric:         Mood and Affect: Mood normal.         Behavior: Behavior normal.         Thought Content: Thought content normal.               Significant Labs: All pertinent labs within the past 24 hours have been reviewed.    Significant Imaging: I have reviewed all pertinent imaging results/findings within the  past 24 hours.

## 2025-07-03 NOTE — HOSPITAL COURSE
73 yo F with PMHx of HTN, HLD, tobacco use, anemia, recent admission for T12 compression fracture s/p posterior fusion T10-L2 (06/14/25) admitted for intractable lower back pain and BLE weakness. Initially planned for discharge to SNF last admission, but she refused and was instead discharged home with . She reports that since she went home, she has had worsening BLE weakness and her back pain has not been well controlled. CRP 22.8/ ESR 68. MRI C/T/L spine showing open canal at surgical site. NSGY consulted, low concern for infection and no acute neurosurgical intervention required. Staples removed 7/1.   PT/OT eval pending, but patient continues to refuse.

## 2025-07-03 NOTE — PLAN OF CARE
[facility]      HOME HEALTH ORDERS  FACE TO FACE ENCOUNTER    Patient Name: Kuldeep Villela  YOB: 1950    PCP: Ginna Musa MD   PCP Address: [address] / [place] [address]  PCP Phone Number: [phone]  PCP Fax: [phone]    Encounter Date: 6/29/25    Admit to Home Health    Diagnoses:  Active Hospital Problems    Diagnosis  POA    *Lumbar radiculopathy, BLE [M54.16]  Yes    Vitamin D deficiency [E55.9]  Yes    Multinodular goiter [E04.2]  Yes    Acute on chronic anemia [D64.9]  Yes    Compression fracture of T12 vertebra [S22.080A]  Yes    Tobacco dependency [F17.200]  Yes    MALACHI (acute kidney injury) [N17.9]  Yes    Debility [R53.81]  Yes    Chronic low back pain [M54.50, G89.29]  Yes    Hyperlipemia [E78.5]  Yes    Hypertension [I10]  Yes    Lumbar spinal stenosis at L4-L5. [M48.061]  Yes    Cervical spondylosis [M47.812]  Yes    Morbid obesity [E66.01]  Yes      Resolved Hospital Problems   No resolved problems to display.       Follow Up Appointments:  Future Appointments   Date Time Provider Department Center   7/29/2025 10:00 AM Lore Espinal PA-C [facility]   8/15/2025  3:40 PM Donny Esquivel MD [facility]       Allergies:  Review of patient's allergies indicates:   Allergen Reactions    Penicillins Anxiety and Other (See Comments)    Anesthetic [benzocaine-aloe vera]      Jittery/hyper    Guaifenesin Anxiety and Other (See Comments)       Medications: Review discharge medications with patient and family and provide education.    Current Medications[1]     Medication List        PAUSE taking these medications      topiramate 50 MG tablet  Wait to take this until your doctor or other care provider tells you to start again.  Commonly known as: TOPAMAX  TAKE 1 TABLET(50 MG) BY MOUTH EVERY 12 HOURS            START taking these medications      calcium-vitamin D3 500 mg-5 mcg (200 unit) per tablet  Commonly known as:  OS-RAMIRO 500 + D3  Take 1 tablet by mouth 2 (two) times daily.            CONTINUE taking these medications      albuterol 90 mcg/actuation inhaler  Commonly known as: PROVENTIL/VENTOLIN HFA  Inhale 1-2 puffs into the lungs every 4 (four) hours as needed for Wheezing. Rescue     amLODIPine 10 MG tablet  Commonly known as: NORVASC  Take 1 tablet (10 mg total) by mouth once daily.     bisacodyL 10 mg Supp  Commonly known as: DULCOLAX  Place 1 suppository (10 mg total) rectally daily as needed (constipation).     busPIRone 15 MG tablet  Commonly known as: BUSPAR  Take 1 tablet (15 mg total) by mouth 4 (four) times daily.     cetirizine 10 MG tablet  Commonly known as: ZYRTEC  TAKE 1 TABLET BY MOUTH EVERY DAY     citalopram 40 MG tablet  Commonly known as: CeleXA  TAKE 1 TABLET(40 MG) BY MOUTH EVERY DAY     cyclobenzaprine 10 MG tablet  Commonly known as: FLEXERIL  Take 1 tablet (10 mg total) by mouth 3 (three) times daily as needed for Muscle spasms.     diclofenac sodium 1 % Gel  Commonly known as: VOLTAREN  Apply 2 g topically 3 (three) times daily as needed (apply to painful joints).     LIDOcaine 5 %  Commonly known as: LIDODERM  Place 2 patches onto the skin once daily. Place to back. Remove & Discard patch within 12 hours or as directed by MD     linaCLOtide 290 mcg Cap capsule  Commonly known as: LINZESS  Take 1 capsule (290 mcg total) by mouth before breakfast.     meloxicam 7.5 MG tablet  Commonly known as: MOBIC  Take 1 tablet (7.5 mg total) by mouth 2 (two) times a day.     naloxone 4 mg/actuation Spry  Commonly known as: NARCAN  4mg by nasal route as needed for opioid overdose; may repeat every 2-3 minutes in alternating nostrils until medical help arrives. Call 911     oxyCODONE-acetaminophen  mg per tablet  Commonly known as: PERCOCET  Take 1 tablet by mouth every 4 (four) hours as needed for Pain (maximum 5 tablets per day).     polyethylene glycol 17 gram/dose powder  Commonly known as:  GLYCOLAX  Use to cap to measure 17g, mix with liquid, and take by mouth once daily.     pravastatin 20 MG tablet  Commonly known as: PRAVACHOL  Take 1 tablet (20 mg total) by mouth once daily.     rOPINIRole 1 MG tablet  Commonly known as: REQUIP  Take 1 tablet (1 mg total) by mouth 2 (two) times daily.     sertraline 50 MG tablet  Commonly known as: ZOLOFT  Take 1 tablet (50 mg total) by mouth once daily.     STOOL SOFTENER-LAXATIVE 8.6-50 mg per tablet  Generic drug: senna-docusate  Take 1 tablet by mouth 2 (two) times daily.            STOP taking these medications      carvediloL 6.25 MG tablet  Commonly known as: COREG     estrogens(esterified)-methyltestosterone 0.625-1.25mg per tablet  Commonly known as: ESTRATEST HS     furosemide 20 MG tablet  Commonly known as: LASIX     walker Misc                I have seen and examined this patient within the last 30 days. My clinical findings that support the need for the home health skilled services and home bound status are the following:no   Weakness/numbness causing balance and gait disturbance due to Fracture and Weakness/Debility making it taxing to leave home.  Requiring assistive device to leave home due to unsteady gait caused by  Fracture and Weakness/Debility.     Diet:   regular diet        Referrals/ Consults  Physical Therapy to evaluate and treat. Evaluate for home safety and equipment needs; Establish/upgrade home exercise program. Perform / instruct on therapeutic exercises, gait training, transfer training, and Range of Motion.  Occupational Therapy to evaluate and treat. Evaluate home environment for safety and equipment needs. Perform/Instruct on transfers, ADL training, ROM, and therapeutic exercises.    Activities:   activity as tolerated    Nursing:   Agency to admit patient within 24 hours of hospital discharge unless specified on physician order or at patient request    SN to complete comprehensive assessment including routine vital signs.  Instruct on disease process and s/s of complications to report to MD. Review/verify medication list sent home with the patient at time of discharge  and instruct patient/caregiver as needed. Frequency may be adjusted depending on start of care date.     Skilled nurse to perform up to 3 visits PRN for symptoms related to diagnosis    Notify MD if SBP > 160 or < 90; DBP > 90 or < 50; HR > 120 or < 50; Temp > 101; O2 < 88%;     Ok to schedule additional visits based on staff availability and patient request on consecutive days within the home health episode.    When multiple disciplines ordered:    Start of Care occurs on Sunday - Wednesday schedule remaining discipline evaluations as ordered on separate consecutive days following the start of care.    Thursday SOC -schedule subsequent evaluations Friday and Monday the following week.     Friday - Saturday SOC - schedule subsequent discipline evaluations on consecutive days starting Monday of the following week.    For all post-discharge communication and subsequent orders please contact patient's primary care physician.    Miscellaneous   Routine Skin for Bedridden Patients: Instruct patient/caregiver to apply moisture barrier cream to all skin folds and wet areas in perineal area daily and after baths and all bowel movements.    Home Health Aide:  Nursing Three times weekly, Physical Therapy Three times weekly, Occupational Therapy Three times weekly, and Medical Social Work Weekly      I certify that this patient is confined to her home and needs intermittent skilled nursing care, physical therapy, and occupational therapy.               [1]   Current Facility-Administered Medications   Medication Dose Route Frequency Provider Last Rate Last Admin    albuterol-ipratropium 2.5 mg-0.5 mg/3 mL nebulizer solution 3 mL  3 mL Nebulization Q4H PRN Hansa Roberts PA-C        amLODIPine tablet 10 mg  10 mg Oral Daily Hansa Roberts PA-C   10 mg at 07/02/25 0940     busPIRone tablet 15 mg  15 mg Oral QID Hansa Roberts PA-C   15 mg at 07/02/25 2106    calcium-vitamin D3 500 mg-5 mcg (200 unit) per tablet 1 tablet  1 tablet Oral BID Nnamdi Kaba MD        cetirizine tablet 10 mg  10 mg Oral Daily Hansa Roberts PA-C   10 mg at 07/02/25 0943    citalopram tablet 40 mg  40 mg Oral Daily Hansa Roberts PA-C   40 mg at 07/02/25 0943    dextrose 50% injection 12.5 g  12.5 g Intravenous PRN Ilene Presley MD        dextrose 50% injection 25 g  25 g Intravenous PRN Ilene Presley MD        enoxaparin injection 40 mg  40 mg Subcutaneous Q12H Hansa Roberts PA-C   40 mg at 07/02/25 2107    glucagon (human recombinant) injection 1 mg  1 mg Intramuscular PRN Ilene Presley MD        glucose chewable tablet 16 g  16 g Oral PRN Ilene Presley MD        glucose chewable tablet 24 g  24 g Oral PRN Ilene Presley MD        lactulose 20 gram/30 mL solution Soln 10 g  10 g Oral BID Elbert Sheets MD   10 g at 07/02/25 2107    lactulose 20 gram/30 mL solution Soln 20 g  20 g Oral BID PRN Elbert Sheets MD        melatonin tablet 6 mg  6 mg Oral Nightly PRN Hansa Roberts PA-C   6 mg at 07/01/25 0222    meloxicam tablet 7.5 mg  7.5 mg Oral Daily Nnamdi Kaba MD        naloxone 0.4 mg/mL injection 0.02 mg  0.02 mg Intravenous PRN Ilene Presley MD        nicotine 14 mg/24 hr 1 patch  1 patch Transdermal Daily PRN Hansa Roberts PA-C        ondansetron disintegrating tablet 8 mg  8 mg Oral Q8H PRN Hansa Roberts PA-C        oxyCODONE-acetaminophen  mg per tablet 1 tablet  1 tablet Oral Q4H PRN Nnamdi Kaba MD        oxyCODONE-acetaminophen 5-325 mg per tablet 1 tablet  1 tablet Oral Q4H PRN Nnamdi Kaba MD        pravastatin tablet 20 mg  20 mg Oral Daily Hansa Roberts PA-C   20 mg at 07/02/25 0943    rOPINIRole tablet 1 mg  1 mg Oral BID Hansa Roberts PA-C   1 mg at 07/02/25 2106    senna-docusate 8.6-50 mg per tablet 1 tablet  1  tablet Oral BID Hand, Elbert ARELLANO MD   1 tablet at 07/02/25 2106    sertraline tablet 50 mg  50 mg Oral Daily Hansa Roberts PA-C   50 mg at 07/02/25 0943    sodium chloride 0.9% flush 10 mL  10 mL Intravenous Q12H PRN Ilene Presley MD        sodium chloride 0.9% flush 5 mL  5 mL Intravenous PRN Hansa Roberts PA-C

## 2025-07-03 NOTE — ASSESSMENT & PLAN NOTE
Patient with Acute on chronic debility due to fracture/prosthesis and age-related physical debility. The patient's latest AMPAC (Activity Measure for Post Acute Care) Score is listed below.    AM-PAC Score - How much help does the patient need for each activity listed  Basic Mobility Total Score: 12  Turning over in bed (including adjusting bedclothes, sheets and blankets)?: A lot  Sitting down on and standing up from a chair with arms (e.g., wheelchair, bedside commode, etc.): A lot  Moving from lying on back to sitting on the side of the bed?: A lot  Moving to and from a bed to a chair (including a wheelchair)?: A lot  Need to walk in hospital room?: A lot  Climbing 3-5 steps with a railing?: A lot    Plan  - Progressive mobility protocol initated  - PT/OT consulted  - Fall precautions in place

## 2025-07-03 NOTE — ASSESSMENT & PLAN NOTE
Patient with Acute on chronic debility due to fracture/prosthesis and age-related physical debility. The patient's latest AMPAC (Activity Measure for Post Acute Care) Score is listed below.    AM-PAC Score - How much help does the patient need for each activity listed  Basic Mobility Total Score: 12  Turning over in bed (including adjusting bedclothes, sheets and blankets)?: A lot  Sitting down on and standing up from a chair with arms (e.g., wheelchair, bedside commode, etc.): A lot  Moving from lying on back to sitting on the side of the bed?: A lot  Moving to and from a bed to a chair (including a wheelchair)?: A lot  Need to walk in hospital room?: A lot  Climbing 3-5 steps with a railing?: A lot    Plan  - Progressive mobility protocol initated  - PT/OT consulted  - Fall precautions in place    DME JUSTIFICATION   Kuldeep requires a hospital bed due to her requiring positioning of the body in ways not feasible with an ordinary bed to alleviate pain and due to limited ability and cannot independently make changes in body position without the use of the bed.The positioning of the body cannot be sufficiently resolved by the use of pillows and wedges.

## 2025-07-03 NOTE — ASSESSMENT & PLAN NOTE
Anemia is likely due to chronic disease. Most recent hemoglobin and hematocrit are listed below.  Recent Labs     06/30/25  0119 06/30/25  0131 07/01/25  0930 07/02/25  0858   HGB 9.1*  --  8.9* 8.6*   HCT 28.7* 28* 27.3* 26.9*     Plan  - Monitor serial CBC: Daily  - Transfuse PRBC if patient becomes hemodynamically unstable, symptomatic or H/H drops below 7/21.  - Patient has not received any PRBC transfusions to date  - Patient's anemia is currently stable

## 2025-07-03 NOTE — PLAN OF CARE
[facility] - Med Surg  Discharge Final Note    Primary Care Provider: Ginna Musa MD    Expected Discharge Date: 7/3/2025    Patient discharged home via ambulance transportation.     Patient's bedside nurse  notified of the above.    Discharge Plan A and Plan B have been determined by review of patient's clinical status, future medical and therapeutic needs, and coverage/benefits for post-acute care in coordination with multidisciplinary team members.        Final Discharge Note (most recent)       Final Note - 07/03/25 1754          Final Note    Assessment Type Final Discharge Note     Anticipated Discharge Disposition Home-Health Care [facility]     What phone number can be called within the next 1-3 days to see how you are doing after discharge? [phone] (P)         Post-Acute Status    Post-Acute Authorization Home Health (P)      Home Health Status Set-up Complete/Auth obtained (P)      Discharge Delays None known at this time (P)                      Important Message from Medicare              Follow-up providers       Ginna Musa MD   Specialty: Family Medicine   Relationship: PCP - General    [address]  [place] [address]   Phone: [phone]       Next Steps: Schedule an appointment as soon as possible for a visit in 1 week(s)    Donny Esquivel MD   Specialty: Physical Medicine and Rehabilitation   Relationship: Consulting Physician    [address]  [place] [address]   Phone: [phone]       Next Steps: Schedule an appointment as soon as possible for a visit in 1 week(s)    Instructions: rehab MD who can also help with pain control    [facility]   Specialty: Psychiatry    [address] [address]   Phone: [phone]       Next Steps: Schedule an appointment as soon as possible for a visit in 1 week(s)    Instructions: ask them about medications especially about taking zoloft and celexa together    [place] [facility]   Specialty: Internal  Medicine    [address]  [facility] [place] [address]       Next Steps: Follow up              After-discharge care                Home Medical Care       *[facility]   Service: Home Health Services    [address]  [place] [address]   Phone: [phone]                               Future Appointments   Date Time Provider Department Center   7/9/2025  2:00 PM Janie Smith, NP [facility]   7/29/2025 10:00 AM [facility]   8/13/2025 11:00 AM Dilcia Johnson MD [facility]   8/15/2025  3:40 PM Donny Esquivel MD [facility]       On call CM contacted for follow up.  [facility] accepted for service.  Transportation set up via [facility] for ambulance transport.  Spoke with DTR, Kamari, and verified address where Pt will discharge to.

## 2025-07-03 NOTE — SUBJECTIVE & OBJECTIVE
Interval History: NAEON. Pt cont to c/o lower back pain and b/l leg weakness. Pt refused PT/OT eval and wound care. Patient wishes to go home.     Review of Systems   Constitutional:  Negative for fever.   Respiratory:  Negative for shortness of breath.    Cardiovascular:  Negative for chest pain.   Gastrointestinal:  Negative for abdominal pain and constipation.   Genitourinary:  Negative for dysuria.   Musculoskeletal:  Positive for back pain and gait problem. Negative for neck pain.   Skin:  Negative for rash and wound.   Neurological:  Positive for weakness. Negative for dizziness.   Psychiatric/Behavioral:  Negative for agitation and confusion.      Objective:     Vital Signs (Most Recent):  Temp: 98 °F (36.7 °C) (07/03/25 0844)  Pulse: 81 (07/03/25 0939)  Resp: 18 (07/03/25 0844)  BP: 126/71 (07/03/25 0844)  SpO2: 99 % (07/03/25 0844) Vital Signs (24h Range):  Temp:  [97.6 °F (36.4 °C)-100.2 °F (37.9 °C)] 98 °F (36.7 °C)  Pulse:  [79-94] 81  Resp:  [16-18] 18  SpO2:  [97 %-99 %] 99 %  BP: (107-137)/(54-75) 126/71     Weight: 104.3 kg (230 lb)  Body mass index is 43.46 kg/m².    Intake/Output Summary (Last 24 hours) at 7/3/2025 1046  Last data filed at 7/3/2025 0939  Gross per 24 hour   Intake --   Output 100 ml   Net -100 ml         Physical Exam  Vitals and nursing note reviewed.   Constitutional:       General: She is not in acute distress.     Appearance: She is well-developed.   Cardiovascular:      Rate and Rhythm: Normal rate and regular rhythm.      Heart sounds: Normal heart sounds.   Pulmonary:      Effort: Pulmonary effort is normal. No respiratory distress.      Breath sounds: Normal breath sounds. No wheezing.   Abdominal:      General: Bowel sounds are normal. There is no distension.      Palpations: Abdomen is soft.      Tenderness: There is no abdominal tenderness.   Musculoskeletal:         General: No tenderness. Normal range of motion.   Skin:     General: Skin is warm and dry.      Findings:  No rash.   Neurological:      Mental Status: She is alert and oriented to person, place, and time.      Cranial Nerves: No cranial nerve deficit.      Sensory: No sensory deficit.      Motor: Weakness (able to move legs around, now able to lift legs off the bed) present.      Coordination: Coordination normal.   Psychiatric:         Mood and Affect: Mood normal.               Significant Labs: All pertinent labs within the past 24 hours have been reviewed.    Significant Imaging: I have reviewed all pertinent imaging results/findings within the past 24 hours.

## 2025-07-03 NOTE — PROGRESS NOTES
[facility] Medicine  Progress Note    Patient Name: Kuldeep Villela  MRN: [id number]  Patient Class: OP- Observation   Admission Date: 6/29/2025  Length of Stay: 1 days  Attending Physician: Elbert Sheets MD  Primary Care Provider: Ginna Musa MD        Subjective     Principal Problem:Lumbar radiculopathy        HPI:  Kuldeep Villela is a 75 yo F with PMHx of HTN, HLD, tobacco use, anemia who presented to ED for BLE weakness. Patient was recently hospitalized from 06/09-06/24 for worsening back pain and was found to have a T12 compression fracture. She underwent posterior fusion T10-L2 (06/14/25) with NSGY during that hospitalization. She completed course of antibiotics and drains were removed. Initially plan was for her to discharge to SNF, but she decided she would rather go home with . She reports that since she went home, she has had worsening BLE weakness and her back pain has not been well controlled. She lives with her daughter and grandson. She has mostly been staying in her recliner since discharge due to decreased mobility. She also endorses urinary incontinence, but denies dysuria or increased frequency. She still smokes cigarettes at times, but not daily. Denies fever, chills, chest pain, palpitations, SOB, cough, abdominal pain, n/v/d.    In ED: HDS. Hgb at baseline. PLT elevated to 507. CMP with mild MALACHI with Cr 1.3 (baseline 1.0). CRP 22.8/ ESR 68. Covid/flu negative. MRI C/T/L spine showing open canal at surgical site. NSGY consulted; no acute NSGY intervention as stenosis improved when compared to pre op and low concern for infection. NSGY recommending admission with therapy and possible IPR placement. Given 500 cc bolus of NS. Admitted to  for further management.     Overview/Hospital Course:  No notes on file    Interval History: NAEON. Pt cont to c/o lower back pain and b/l leg weakness. Pt refused PT/OT eval yesterday, stating she is in too much pain and needs to  rest. I had extensive discussion with patient regarding importance of working with therapy and concern that staying in bed will not improve pain and likely lead to worsening weakness. She again refused PT/OT eval today despite our conversation. I expressed concerns with daughter ([place]) in hopes that family will be able to motivate patient.      Review of Systems   Constitutional:  Positive for activity change. Negative for chills and fever.   HENT:  Negative for trouble swallowing.    Eyes:  Negative for photophobia and visual disturbance.   Respiratory:  Negative for cough, chest tightness and shortness of breath.    Cardiovascular:  Negative for chest pain, palpitations and leg swelling.   Gastrointestinal:  Negative for abdominal pain, constipation, diarrhea, nausea and vomiting.   Genitourinary:  Negative for dysuria, frequency and hematuria.   Musculoskeletal:  Positive for back pain and gait problem. Negative for neck pain.   Skin:  Negative for rash and wound.   Neurological:  Positive for weakness. Negative for dizziness, syncope, speech difficulty and light-headedness.   Psychiatric/Behavioral:  Negative for agitation and confusion. The patient is not nervous/anxious.      Objective:     Vital Signs (Most Recent):  Temp: 97.6 °F (36.4 °C) (07/02/25 1536)  Pulse: 79 (07/02/25 1550)  Resp: 18 (07/02/25 1536)  BP: 118/75 (07/02/25 1536)  SpO2: 99 % (07/02/25 1536) Vital Signs (24h Range):  Temp:  [97.3 °F (36.3 °C)-98.2 °F (36.8 °C)] 97.6 °F (36.4 °C)  Pulse:  [79-97] 79  Resp:  [18] 18  SpO2:  [93 %-99 %] 99 %  BP: (114-140)/(59-75) 118/75     Weight: 104.3 kg (230 lb)  Body mass index is 43.46 kg/m².    Intake/Output Summary (Last 24 hours) at 7/2/2025 1931  Last data filed at 7/2/2025 0903  Gross per 24 hour   Intake 299 ml   Output 600 ml   Net -301 ml         Physical Exam  Vitals and nursing note reviewed.   Constitutional:       General: She is not in acute distress.     Appearance: She is  well-developed.   HENT:      Head: Normocephalic and atraumatic.      Mouth/Throat:      Pharynx: No oropharyngeal exudate.   Eyes:      Conjunctiva/sclera: Conjunctivae normal.      Pupils: Pupils are equal, round, and reactive to light.   Cardiovascular:      Rate and Rhythm: Normal rate and regular rhythm.      Heart sounds: Normal heart sounds.   Pulmonary:      Effort: Pulmonary effort is normal. No respiratory distress.      Breath sounds: Normal breath sounds. No wheezing.   Abdominal:      General: Bowel sounds are normal. There is no distension.      Palpations: Abdomen is soft.      Tenderness: There is no abdominal tenderness.   Musculoskeletal:         General: No tenderness. Normal range of motion.      Cervical back: Normal range of motion and neck supple.   Lymphadenopathy:      Cervical: No cervical adenopathy.   Skin:     General: Skin is warm and dry.      Capillary Refill: Capillary refill takes less than 2 seconds.      Findings: No rash.      Comments: Lymphedema changes noted to BLE  Small stage 1 pressure sores to buttocks   Thoracic/lumbar surgical scar with staples in place, no surrounding erythema or drainage (see media)   Neurological:      Mental Status: She is alert and oriented to person, place, and time.      Cranial Nerves: No cranial nerve deficit.      Sensory: No sensory deficit.      Motor: Weakness (able to move legs around, now able to lift legs off the bed) present.      Coordination: Coordination normal.   Psychiatric:         Mood and Affect: Mood normal.         Behavior: Behavior normal.         Thought Content: Thought content normal.               Significant Labs: All pertinent labs within the past 24 hours have been reviewed.    Significant Imaging: I have reviewed all pertinent imaging results/findings within the past 24 hours.      Assessment & Plan  Lumbar radiculopathy, BLE  Lumbar spinal stenosis at L4-L5.  Chronic low back pain  Compression fracture of T12  vertebra  9o F with PMHx of HTN, HLD, T12 compression fracture s/p posterior fusion T10-L2 (06/14/25) with NSGY who presented to ED for BLE weakness. CRP 22.8/ ESR 68. MRI C/T/L spine showing open canal at surgical site.     NSGY consulted, low concern for infection and no acute neurosurgical intervention required  - Staples removed 7/1   - fall precautions  - MM pain control   - PT/OT eval pending, patient continues to refuse therapy  - I educated patient regarding importance of compliance. Consider discharging home tomorrow if she continues to refuse  Cervical spondylosis  - noted on imaging, but patient without any weakness or changes to upper extremities   - per NSGY, no surgical intervention    Morbid obesity  Body mass index is 43.46 kg/m². Morbid obesity complicates all aspects of disease management from diagnostic modalities to treatment. Weight loss encouraged and health benefits explained to patient.  Hypertension  Patient's blood pressure range in the last 24 hours was: BP  Min: 114/59  Max: 140/69.The patient's inpatient anti-hypertensive regimen is listed below:  Current Antihypertensives  amLODIPine tablet 10 mg, Daily, Oral    Plan  - BP is controlled, no changes needed to their regimen  - of note, home coreg and lasix held since last admit  Hyperlipemia  - continue statin   Debility  Patient with Acute on chronic debility due to fracture/prosthesis and age-related physical debility. The patient's latest AMPAC (Activity Measure for Post Acute Care) Score is listed below.    AM-PAC Score - How much help does the patient need for each activity listed  Basic Mobility Total Score: 12  Turning over in bed (including adjusting bedclothes, sheets and blankets)?: A lot  Sitting down on and standing up from a chair with arms (e.g., wheelchair, bedside commode, etc.): A lot  Moving from lying on back to sitting on the side of the bed?: A lot  Moving to and from a bed to a chair (including a wheelchair)?: A  lot  Need to walk in hospital room?: A lot  Climbing 3-5 steps with a railing?: A lot    Plan  - Progressive mobility protocol initated  - PT/OT consulted  - Fall precautions in place  MALACHI (acute kidney injury)  MALACHI is likely due to pre-renal azotemia due to dehydration. Baseline creatinine is 0.9-1.0. Most recent creatinine and eGFR are listed below.  Recent Labs     06/30/25  0119 07/01/25  0930 07/02/25  0858   CREATININE 1.3 1.0 1.0   EGFRNORACEVR 44* 60* 60*      Plan  - MALACHI is stable  - Avoid nephrotoxins and renally dose meds for GFR listed above  - Monitor urine output, serial BMP, and adjust therapy as needed  - given fluid bolus in ED  - improved  Tobacco dependency  Dangers of cigarette smoking were reviewed with patient in detail. Patient was Counseled for 3-10 minutes. Nicotine replacement options were discussed. Nicotine replacement was discussed- prescribed  Acute on chronic anemia  Anemia is likely due to chronic disease. Most recent hemoglobin and hematocrit are listed below.  Recent Labs     06/30/25  0119 06/30/25  0131 07/01/25  0930 07/02/25  0858   HGB 9.1*  --  8.9* 8.6*   HCT 28.7* 28* 27.3* 26.9*     Plan  - Monitor serial CBC: Daily  - Transfuse PRBC if patient becomes hemodynamically unstable, symptomatic or H/H drops below 7/21.  - Patient has not received any PRBC transfusions to date  - Patient's anemia is currently stable  Multinodular goiter  - incidental finding on imaging  - Tsh pending  - will need OP thyroid US   VTE Risk Mitigation (From admission, onward)           Ordered     enoxaparin injection 40 mg  Every 12 hours         06/30/25 1252     IP VTE HIGH RISK PATIENT  Once         06/30/25 1252     Place sequential compression device  Until discontinued         06/30/25 1222                    Discharge Planning   MATT: 7/3/2025     Code Status: Full Code   Medical Readiness for Discharge Date:   Discharge Plan A: Home, Home with family, Home Health                  Please  place Justification for DME      Elbert Sheets MD  Department of Hospital Medicine   [facility]

## 2025-07-03 NOTE — ASSESSMENT & PLAN NOTE
noted on imaging, but patient without any weakness or changes to upper extremities   - per NSGY, no surgical intervention

## 2025-07-03 NOTE — PLAN OF CARE
Problem: Skin Injury Risk Increased  Goal: Skin Health and Integrity  Outcome: Progressing     Problem: Adult Inpatient Plan of Care  Goal: Absence of Hospital-Acquired Illness or Injury  Outcome: Progressing  Goal: Optimal Comfort and Wellbeing  Outcome: Progressing     Problem: Skin Injury Risk Increased  Goal: Skin Health and Integrity  Outcome: Progressing     Problem: Wound  Goal: Improved Oral Intake  Outcome: Progressing     Problem: Fall Injury Risk  Goal: Absence of Fall and Fall-Related Injury  Outcome: Progressing       Pt continues to refuse being turned. Weight shifts. Call light w/n reach. Bed in lowest and locked position. Instructed to call for assistance. Plan of care on going.

## 2025-07-03 NOTE — ASSESSMENT & PLAN NOTE
MALACHI is likely due to pre-renal azotemia due to dehydration. Baseline creatinine is 0.9-1.0. Most recent creatinine and eGFR are listed below.  Recent Labs     07/01/25  0930 07/02/25  0858   CREATININE 1.0 1.0   EGFRNORACEVR 60* 60*      Plan  - MALACHI is stable  - Avoid nephrotoxins and renally dose meds for GFR listed above  - Monitor urine output, serial BMP, and adjust therapy as needed  - given fluid bolus in ED  - improved

## 2025-07-03 NOTE — PT/OT/SLP EVAL
Occupational Therapy  Evaluation and Treatment    Name: Kuldeep Villela  MRN: [id number]  Admitting Diagnosis: Lumbar radiculopathy, BLE  Recent Surgery: * No surgery found *      Recommendations:     Discharge Recommendations: Moderate Intensity Therapy  Discharge Equipment Recommendations: bedside commode, bath bench, hospital bed, lift device, wheelchair  Barriers to discharge: None    Assessment:     Kuldeep Villela is a 74 y.o. female with a medical diagnosis of Lumbar radiculopathy, BLE. She presents with performance deficits affecting function including: weakness, impaired endurance, impaired self care skills, impaired functional mobility, impaired balance, decreased safety awareness. Patient agreeable to participate in therapy evaluation this AM with max encouragement from therapist. At this time, patient limited in ADLs, transfers, and functional mobility and is currently not performing tasks at their reported PLOF. Patient would benefit from continued skilled acute OT services in order to maximize IND with ADLs and functional mobility to ensure safe return to PLOF in the least restrictive environment. OT recommending Moderate Intensity Therapy once patient is medically appropriate for d/c.    Rehab Prognosis: Good; patient would benefit from acute OT services to address these deficits and reach maximum level of function.    Plan:     Patient to be seen 4 x/week to address the above listed problems via self-care/home management, therapeutic activities, therapeutic exercises, neuromuscular re-education  Plan of Care Expires: 07/17/25  Plan of Care Reviewed with: patient    Subjective     Chief Complaint: None stated by patient at this time.  Patient Comments/Goals: Patient with goal to return home.  Pain/Comfort:  Pain Rating 1: other (see comments) (unrated)  Pain Rating Post-Intervention 1: other (see comments) (unrated)    Patients cultural, spiritual, Restorationist conflicts given the current situation:  no    Occupational Profile:  Living Environment: Patient lives with her daughter in a H with no AARON. Bathroom setup includes a tub/shower combo with no seat.  Prior Level of Function: IND with ADLs, except receives some A with bathing and dressing; Mod IND with functional mobility of household distances using rollator  Roles and Routines: Role of mother identified  Equipment Used at Home: rollator (no additional DME owned)  Assistance Upon Discharge: Daughter to A as needed    Objective:     Communicated with RN prior to session. Patient cleared to participate in therapy at this time. Patient found HOB elevated with telemetry, PureWick upon OT entry to room. No visitors present in room upon OT entry.    General Precautions: Standard, fall   Orthopedic Precautions: spinal precautions   Braces: TLSO (when OOB)  Respiratory Status: Room air    Occupational Performance    Bed Mobility:  Patient completed Rolling/Turning to Left with minimum assistance and use of bed rail  Patient completed Rolling/Turning to Right with minimum assistance and use of bed rail  Patient completed Supine to Sit with contact guard assistance, noted with HOB significantly elevated  Patient completed anterior Scooting towards the EOB with contact guard assistance  Patient completed Sit to Supine with minimum assistance, noted with HOB significantly elevated    Functional Mobility/Transfers:  Not performed at this time due to patient with spinal precautions, to wear TLSO when OOB (per MD Kortney), however TLSO brace not present at bedside.    Activities of Daily Living:  Grooming: total assistance to apply lotion to BLEs seated EOB   Toileting: total assistance to perform hygiene and Purewick management at bed-level     Cognitive/Visual Perceptual:  Cognitive/Psychosocial Skills:    Patient awake and alert throughout session  Follows Commands/attention: Follows commands with max encouragement  Communication: Clear/fluent  Memory: No Deficits  noted  Safety awareness/insight to disability: Impaired   Visual/Perceptual:    Intact visual field as noted during functional tasks    Physical Exam:  Balance:   Static sitting balance: SBA  Dynamic sitting balance: CGA-SBA  Upper Extremity Range of Motion:     Right Upper Extremity: WFL  Left Upper Extremity: WFL  Upper Extremity Strength:    Right Upper Extremity: WFL  Left Upper Extremity: WFL   Strength:    Right Upper Extremity: WFL  Left Upper Extremity: WFL    AMPAC 6 Click ADL:  AMPAC Total Score: 15    Treatment & Education:  Patient educated on:   Role of OT, OT POC, and discharge planning.  Safe transfer techniques and proper body mechanics for fall prevention and improved independence with functional transfers.  Importance of OOB activities to increase endurance and tolerance for increased participation in daily ADLs.  Time provided for therapeutic counseling and discussion of health disposition.  Utilizing the call bell to request for assistance with all functional mobility to ensure safety during hospital stay.  Whiteboard updated.    Patient verbalized understanding, however would benefit from reinforcement of education provided. All questions were addressed within the scope of OT.    Patient left HOB elevated with all lines intact, call button in reach, RN notified, and no visitors present.    GOALS:   Multidisciplinary Problems       Occupational Therapy Goals          Problem: Occupational Therapy    Goal Priority Disciplines Outcome Interventions   Occupational Therapy Goal     OT, PT/OT Progressing    Description: Goals to be met by: 07/17/2025     Patient will increase functional independence with ADLs by performing:    UE Dressing with Supervision.  LE Dressing with Minimal Assistance.  Grooming while seated with Set-up Assistance.  Grooming while standing with Minimal Assistance.  Toileting from toilet/BSC with Minimal Assistance for hygiene and clothing management.   Toilet transfer to  toilet/BSC with Moderate Assistance.  Functional mobility to simulate household/community distances with Moderate Assistance and utilizing LRAD in order to maximize functional activity tolerance and standing balance required for engagement in occupations of choice.                       DME Justifications:  Bedside Commode - Patient requires a commode for home use because she is confined to a single room.  Hospital Bed - Kuldeep requires a hospital bed due to her requiring positioning of the body in ways not feasible with an ordinary bed due to limited ability and cannot independently make changes in body position without the use of the bed. The positioning of the body cannot be sufficiently resolved by the use of pillows and wedges.  Wheelchair - Patient has a mobility limitation that significantly impairs his/her ability to participate in one or more mobility related activities of daily living (MRADL's) such as toileting, feeding, dressing, grooming, and bathing in customary locations in the home. The mobility limitation cannot be sufficiently resolved by the use of a cane or walker. The use of a manual wheelchair will significantly improve the patient's ability to participate in MRADLS and the patient will use it on regular basis in the home. Patient has expressed his/her willingness to use a manual wheelchair in the home. Patient's upper body strength is sufficient for propulsion. He/She also has a caregiver who is available, willing, and able to provide assistance with the wheelchair when needed.    History:     Past Medical History:   Diagnosis Date    Arthritis     Asthma     Cervical spondylosis 07/13/2012    Debility     Hyperlipidemia     Hypertension     Lumbar radiculopathy, BLE 07/13/2012    Lumbar spinal stenosis at L4-L5. 07/13/2012    Lumbar spondylosis 07/13/2012    Morbid obesity 07/13/2012    Primary osteoarthritis of both knees 07/13/2012    Spondylolisthesis, grade 1 at L4-L5. 07/13/2012     Tenosynovitis of ankle     Rt peroneus longus    Vitamin D deficiency     Walker as ambulation aid          Past Surgical History:   Procedure Laterality Date    CHOLECYSTECTOMY      HYSTERECTOMY      MAGNETIC RESONANCE IMAGING Bilateral 6/12/2025    Procedure: MRI (Magnetic Resonance Imagine);  Surgeon: Sameera Villalba;  Location: [facility];  Service: Anesthesiology;  Laterality: Bilateral;    IN REMOVAL OF OVARY/TUBE(S)      THORACIC LAMINECTOMY WITH FUSION N/A 6/14/2025    Procedure: T12 LAMINECTOMY, SPINE, THORACIC, W/ ARTHRODESIS T10-L2;  Surgeon: Nnamdi Head MD;  Location: [address];  Service: Neurosurgery;  Laterality: N/A;       Time Tracking:     OT Date of Treatment: 07/03/25  OT Start Time: 1029  OT Stop Time: 1103  OT Total Time (min): 34 min    Billable Minutes: Evaluation 10  Self Care/Home Management 10  Therapeutic Activity 14    A client care conference was performed between the keli ALVARADO and Desean FRITZ, prior to treatment by CATRINA, to discuss the patient's status, treatment plan and established goals.     7/3/2025

## 2025-07-03 NOTE — ASSESSMENT & PLAN NOTE
Patient's blood pressure range in the last 24 hours was: BP  Min: 107/54  Max: 137/60.The patient's inpatient anti-hypertensive regimen is listed below:  Current Antihypertensives  amLODIPine tablet 10 mg, Daily, Oral    Plan  - BP is controlled, no changes needed to their regimen  - of note, home coreg and lasix held since last admit

## 2025-07-03 NOTE — PROGRESS NOTES
AVS virtually reviewed with Kuldeep Villela in its entirety with emphasis on diet, medications, follow-up appointments and reasons to return to the ED. Patient also encouraged to utilize their patient portal. Ease and convenience of use reiterated. Education complete and patient voiced understanding. All questions answered. Discharge teaching complete.

## 2025-07-03 NOTE — PHYSICIAN QUERY
Please further specify the diagnosis of Pyelonephritis:    Past history only, not a current diagnosis

## 2025-07-03 NOTE — ASSESSMENT & PLAN NOTE
9o F with PMHx of HTN, HLD, T12 compression fracture s/p posterior fusion T10-L2 (06/14/25) with NSGY who presented to ED for BLE weakness. CRP 22.8/ ESR 68. MRI C/T/L spine showing open canal at surgical site.     NSGY consulted, low concern for infection and no acute neurosurgical intervention required  - Staples removed 7/1   - fall precautions  - MM pain control   - PT/OT eval pending, patient continues to refuse therapy  - I educated patient regarding importance of compliance. Consider discharging home tomorrow if she continues to refuse

## 2025-07-03 NOTE — ASSESSMENT & PLAN NOTE
Patient's blood pressure range in the last 24 hours was: BP  Min: 114/59  Max: 140/69.The patient's inpatient anti-hypertensive regimen is listed below:  Current Antihypertensives  amLODIPine tablet 10 mg, Daily, Oral    Plan  - BP is controlled, no changes needed to their regimen  - of note, home coreg and lasix held since last admit

## 2025-07-03 NOTE — DISCHARGE INSTRUCTIONS
.Our goal at [facility] is to always give you outstanding care and exceptional service. You may receive a survey from [employer] by mail, text or e-mail in the next 24-48 hours asking about the care you received with us. The survey should only take 5-10 minutes to complete and is very important to us.     Your feedback provides us with a way to recognize our staff who work tirelessly to provide the best care! Also, your responses help us learn how to improve when your experience was below our aspiration of excellence. We are always looking for ways to improve your stay. We WILL use your feedback to continue making improvements to help us provide the highest quality care. We keep your personal information and feedback confidential. We appreciate your time completing this survey and can't wait to hear from you!!!    We look forward to your continued care with us! Thanks so much for choosing [facility] for your healthcare needs!

## 2025-07-03 NOTE — PROGRESS NOTES
[facility] Medicine  Progress Note    Patient Name: Kuldeep Villela  MRN: [id number]  Patient Class: OP- Observation   Admission Date: 6/29/2025  Length of Stay: 1 days  Attending Physician: Nnamdi Kaba MD  Primary Care Provider: Ginna Musa MD        Subjective     Principal Problem:<principal problem not specified>        HPI:  Kuldeep Villela is a 75 yo F with PMHx of HTN, HLD, tobacco use, anemia who presented to ED for BLE weakness. Patient was recently hospitalized from 06/09-06/24 for worsening back pain and was found to have a T12 compression fracture. She underwent posterior fusion T10-L2 (06/14/25) with NSGY during that hospitalization. She completed course of antibiotics and drains were removed. Initially plan was for her to discharge to SNF, but she decided she would rather go home with . She reports that since she went home, she has had worsening BLE weakness and her back pain has not been well controlled. She lives with her daughter and grandson. She has mostly been staying in her recliner since discharge due to decreased mobility. She also endorses urinary incontinence, but denies dysuria or increased frequency. She still smokes cigarettes at times, but not daily. Denies fever, chills, chest pain, palpitations, SOB, cough, abdominal pain, n/v/d.    In ED: HDS. Hgb at baseline. PLT elevated to 507. CMP with mild MALACHI with Cr 1.3 (baseline 1.0). CRP 22.8/ ESR 68. Covid/flu negative. MRI C/T/L spine showing open canal at surgical site. NSGY consulted; no acute NSGY intervention as stenosis improved when compared to pre op and low concern for infection. NSGY recommending admission with therapy and possible IPR placement. Given 500 cc bolus of NS. Admitted to  for further management.     Overview/Hospital Course:  75 yo F with PMHx of HTN, HLD, tobacco use, anemia, recent admission for T12 compression fracture s/p posterior fusion T10-L2 (06/14/25) admitted for intractable  lower back pain and BLE weakness. Initially planned for discharge to SNF last admission, but she refused and was instead discharged home with . She reports that since she went home, she has had worsening BLE weakness and her back pain has not been well controlled. CRP 22.8/ ESR 68. MRI C/T/L spine showing open canal at surgical site. NSGY consulted, low concern for infection and no acute neurosurgical intervention required. Staples removed 7/1.   PT/OT eval pending, but patient continues to refuse.      Interval History: NAEON. Pt cont to c/o lower back pain and b/l leg weakness. Pt refused PT/OT eval and wound care. Patient wishes to go home.     Review of Systems   Constitutional:  Negative for fever.   Respiratory:  Negative for shortness of breath.    Cardiovascular:  Negative for chest pain.   Gastrointestinal:  Negative for abdominal pain and constipation.   Genitourinary:  Negative for dysuria.   Musculoskeletal:  Positive for back pain and gait problem. Negative for neck pain.   Skin:  Negative for rash and wound.   Neurological:  Positive for weakness. Negative for dizziness.   Psychiatric/Behavioral:  Negative for agitation and confusion.      Objective:     Vital Signs (Most Recent):  Temp: 98 °F (36.7 °C) (07/03/25 0844)  Pulse: 81 (07/03/25 0939)  Resp: 18 (07/03/25 0844)  BP: 126/71 (07/03/25 0844)  SpO2: 99 % (07/03/25 0844) Vital Signs (24h Range):  Temp:  [97.6 °F (36.4 °C)-100.2 °F (37.9 °C)] 98 °F (36.7 °C)  Pulse:  [79-94] 81  Resp:  [16-18] 18  SpO2:  [97 %-99 %] 99 %  BP: (107-137)/(54-75) 126/71     Weight: 104.3 kg (230 lb)  Body mass index is 43.46 kg/m².    Intake/Output Summary (Last 24 hours) at 7/3/2025 1046  Last data filed at 7/3/2025 0939  Gross per 24 hour   Intake --   Output 100 ml   Net -100 ml         Physical Exam  Vitals and nursing note reviewed.   Constitutional:       General: She is not in acute distress.     Appearance: She is well-developed.   Cardiovascular:      Rate  and Rhythm: Normal rate and regular rhythm.      Heart sounds: Normal heart sounds.   Pulmonary:      Effort: Pulmonary effort is normal. No respiratory distress.      Breath sounds: Normal breath sounds. No wheezing.   Abdominal:      General: Bowel sounds are normal. There is no distension.      Palpations: Abdomen is soft.      Tenderness: There is no abdominal tenderness.   Musculoskeletal:         General: No tenderness. Normal range of motion.   Skin:     General: Skin is warm and dry.      Findings: No rash.   Neurological:      Mental Status: She is alert and oriented to person, place, and time.      Cranial Nerves: No cranial nerve deficit.      Sensory: No sensory deficit.      Motor: Weakness (able to move legs around, now able to lift legs off the bed) present.      Coordination: Coordination normal.   Psychiatric:         Mood and Affect: Mood normal.               Significant Labs: All pertinent labs within the past 24 hours have been reviewed.    Significant Imaging: I have reviewed all pertinent imaging results/findings within the past 24 hours.      Assessment & Plan  Lumbar spinal stenosis at L4-L5.  Compression fracture of T12 vertebra  9o F with PMHx of HTN, HLD, T12 compression fracture s/p posterior fusion T10-L2 (06/14/25) with NSGY who presented to ED for BLE weakness. CRP 22.8/ ESR 68. MRI C/T/L spine showing open canal at surgical site.     NSGY consulted, low concern for infection and no acute neurosurgical intervention required  Staples removed 7/1   - fall precautions  - MM pain control   - PT/OT eval pending, patient continues to refuse therapy    9o F with PMHx of HTN, HLD, T12 compression fracture s/p posterior fusion T10-L2 (06/14/25) with NSGY who presented to ED for BLE weakness. CRP 22.8/ ESR 68. MRI C/T/L spine showing open canal at surgical site.     NSGY consulted, low concern for infection and no acute neurosurgical intervention required  Cervical spondylosis   noted on  imaging, but patient without any weakness or changes to upper extremities   - per NSGY, no surgical intervention    Morbid obesity  Body mass index is 43.46 kg/m². Morbid obesity complicates all aspects of disease management from diagnostic modalities to treatment. Weight loss encouraged and health benefits explained to patient.  Hypertension  Patient's blood pressure range in the last 24 hours was: BP  Min: 107/54  Max: 137/60.The patient's inpatient anti-hypertensive regimen is listed below:  Current Antihypertensives  amLODIPine tablet 10 mg, Daily, Oral    Plan  - BP is controlled, no changes needed to their regimen  - of note, home coreg and lasix held since last admit  Hyperlipemia  - continue statin   Debility  Patient with Acute on chronic debility due to fracture/prosthesis and age-related physical debility. The patient's latest AMPAC (Activity Measure for Post Acute Care) Score is listed below.    AM-PAC Score - How much help does the patient need for each activity listed  Basic Mobility Total Score: 12  Turning over in bed (including adjusting bedclothes, sheets and blankets)?: A lot  Sitting down on and standing up from a chair with arms (e.g., wheelchair, bedside commode, etc.): A lot  Moving from lying on back to sitting on the side of the bed?: A lot  Moving to and from a bed to a chair (including a wheelchair)?: A lot  Need to walk in hospital room?: A lot  Climbing 3-5 steps with a railing?: A lot    Plan  - Progressive mobility protocol initated  - PT/OT consulted  - Fall precautions in place    DME JUSTIFICATION   Kuldeep requires a hospital bed due to her requiring positioning of the body in ways not feasible with an ordinary bed to alleviate pain and due to limited ability and cannot independently make changes in body position without the use of the bed.The positioning of the body cannot be sufficiently resolved by the use of pillows and wedges.    MALACHI (acute kidney injury)  MALACHI is likely due  to pre-renal azotemia due to dehydration. Baseline creatinine is 0.9-1.0. Most recent creatinine and eGFR are listed below.  Recent Labs     07/01/25  0930 07/02/25  0858   CREATININE 1.0 1.0   EGFRNORACEVR 60* 60*      Plan  - MALACHI is stable  - Avoid nephrotoxins and renally dose meds for GFR listed above  - Monitor urine output, serial BMP, and adjust therapy as needed  - given fluid bolus in ED  - improved  Tobacco dependency  Dangers of cigarette smoking were reviewed with patient in detail. Patient was Counseled for 3-10 minutes. Nicotine replacement options were discussed. Nicotine replacement was discussed- prescribed  Acute on chronic anemia  Anemia is likely due to chronic disease. Most recent hemoglobin and hematocrit are listed below.  Recent Labs     07/01/25  0930 07/02/25  0858   HGB 8.9* 8.6*   HCT 27.3* 26.9*     Plan  - Monitor serial CBC: Daily  - Transfuse PRBC if patient becomes hemodynamically unstable, symptomatic or H/H drops below 7/21.  - Patient has not received any PRBC transfusions to date  - Patient's anemia is currently stable  Multinodular goiter  incidental finding on imaging  Free T4 WNL  - will need OP thyroid US   Vitamin D deficiency  Noted from chart review and recent fracture  -- PO ordered    VTE Risk Mitigation (From admission, onward)           Ordered     enoxaparin injection 40 mg  Every 12 hours         06/30/25 1252     IP VTE HIGH RISK PATIENT  Once         06/30/25 1252     Place sequential compression device  Until discontinued         06/30/25 1222                    Discharge Planning   MATT: 7/3/2025     Code Status: Full Code   Medical Readiness for Discharge Date: 7/3/2025  Discharge Plan A: Home, Home with family, Home Health                  Please place Justification for DME      Nnamdi Kaba MD  Department of Hospital Medicine   [facility]

## 2025-07-03 NOTE — PLAN OF CARE
Problem: Skin Injury Risk Increased  Goal: Skin Health and Integrity  Outcome: Met     Problem: Adult Inpatient Plan of Care  Goal: Plan of Care Review  Outcome: Met  Goal: Patient-Specific Goal (Individualized)  Outcome: Met  Goal: Absence of Hospital-Acquired Illness or Injury  Outcome: Met  Goal: Optimal Comfort and Wellbeing  Outcome: Met  Goal: Readiness for Transition of Care  Outcome: Met     Problem: Bariatric Environmental Safety  Goal: Safety Maintained with Care  Outcome: Met     Problem: Infection  Goal: Absence of Infection Signs and Symptoms  Outcome: Met     Problem: Acute Kidney Injury/Impairment  Goal: Fluid and Electrolyte Balance  Outcome: Met  Goal: Improved Oral Intake  Outcome: Met  Goal: Effective Renal Function  Outcome: Met     Problem: Wound  Goal: Optimal Coping  Outcome: Met  Goal: Optimal Functional Ability  Outcome: Met  Goal: Absence of Infection Signs and Symptoms  Outcome: Met  Goal: Improved Oral Intake  Outcome: Met  Goal: Optimal Pain Control and Function  Outcome: Met  Goal: Skin Health and Integrity  Outcome: Met  Goal: Optimal Wound Healing  Outcome: Met     Problem: Fall Injury Risk  Goal: Absence of Fall and Fall-Related Injury  Outcome: Met     Problem: Occupational Therapy  Goal: Occupational Therapy Goal  Description: Goals to be met by: 07/17/2025     Patient will increase functional independence with ADLs by performing:    UE Dressing with Supervision.  LE Dressing with Minimal Assistance.  Grooming while seated with Set-up Assistance.  Grooming while standing with Minimal Assistance.  Toileting from toilet/BSC with Minimal Assistance for hygiene and clothing management.   Toilet transfer to toilet/BSC with Moderate Assistance.  Functional mobility to simulate household/community distances with Moderate Assistance and utilizing LRAD in order to maximize functional activity tolerance and standing balance required for engagement in occupations of choice.    Outcome:  Met

## 2025-07-03 NOTE — PLAN OF CARE
[facility]  Discharge Final Note    Primary Care Provider: Ginna Musa MD    Expected Discharge Date: 7/3/2025    Final Discharge Note (most recent)       Final Note - 07/03/25 1339          Final Note    Assessment Type Final Discharge Note (P)      Anticipated Discharge Disposition Home-Health Care Svc (P)      What phone number can be called within the next 1-3 days to see how you are doing after discharge? [phone] (P)      Hospital Resources/Appts/Education Provided Appointments scheduled and added to AVS (P)         Post-Acute Status    Post-Acute Authorization Home Health (P)      Home Health Status Set-up Complete/Auth obtained (P)      Discharge Delays None known at this time (P)                      Important Message from Medicare             Contact Info       Ginna Musa MD   Specialty: Family Medicine   Relationship: PCP - General    [address] [place], [place] [address]  [place] [address]   Phone: [phone]       Next Steps: Schedule an appointment as soon as possible for a visit in 1 week(s)    Donny Esquivel MD   Specialty: Physical Medicine and Rehabilitation   Relationship: Consulting Physician    [facility]  [place] [address]   Phone: [phone]       Next Steps: Schedule an appointment as soon as possible for a visit in 1 week(s)    Instructions: rehab MD who can also help with pain control    [facility]   Specialty: Psychiatry    [address] [facility] [address]   Phone: [phone]       Next Steps: Schedule an appointment as soon as possible for a visit in 1 week(s)    Instructions: ask them about medications especially about taking zoloft and celexa together    [place] Provider   Specialty: Internal Medicine    [address] [facility] [address]  [facility] [address]       Next Steps: Follow up          Pt discharged home with family and [facility] as well as Acute Care at Home.  ODME to delivery hospital bed at discharge.  HH orders faxed to [facility]. [facility]    Discharge Plan A and Plan B have been determined by review of patient's clinical status, future medical and therapeutic needs, and coverage/benefits for post-acute care in coordination with multidisciplinary team members.     Pilar Armstrong, MSN   [facility]  [phone]

## 2025-07-03 NOTE — PLAN OF CARE
OT evaluation completed on this date - see note for details. OT POC and goals established.    Problem: Occupational Therapy  Goal: Occupational Therapy Goal  Description: Goals to be met by: 07/17/2025     Patient will increase functional independence with ADLs by performing:    UE Dressing with Supervision.  LE Dressing with Minimal Assistance.  Grooming while seated with Set-up Assistance.  Grooming while standing with Minimal Assistance.  Toileting from toilet/BSC with Minimal Assistance for hygiene and clothing management.   Toilet transfer to toilet/BSC with Moderate Assistance.  Functional mobility to simulate household/community distances with Moderate Assistance and utilizing LRAD in order to maximize functional activity tolerance and standing balance required for engagement in occupations of choice.    Outcome: Progressing

## 2025-07-03 NOTE — ASSESSMENT & PLAN NOTE
MALACHI is likely due to pre-renal azotemia due to dehydration. Baseline creatinine is 0.9-1.0. Most recent creatinine and eGFR are listed below.  Recent Labs     06/30/25  0119 07/01/25  0930 07/02/25  0858   CREATININE 1.3 1.0 1.0   EGFRNORACEVR 44* 60* 60*      Plan  - MALACHI is stable  - Avoid nephrotoxins and renally dose meds for GFR listed above  - Monitor urine output, serial BMP, and adjust therapy as needed  - given fluid bolus in ED  - improved

## 2025-07-03 NOTE — NURSING
Pain medication given to patient and I cleaned patient up. Patient states she hasn't been changed all night and states she has been sitting in BM all night. Patient would like to be discharged today because she is unhappy with her care. No other needs at this time.

## 2025-07-04 NOTE — ASSESSMENT & PLAN NOTE
Patient's blood pressure range in the last 24 hours was: BP  Min: 120/73  Max: 137/60.The patient's inpatient anti-hypertensive regimen is listed below:  Current Antihypertensives  amLODIPine tablet 10 mg, Daily, Oral    Plan  - BP is controlled, no changes needed to their regimen  - of note, home coreg and lasix held since last admit

## 2025-07-04 NOTE — DISCHARGE SUMMARY
[facility]  Discharge Summary      Patient Name: Kuldeep Villela  MRN: [id number]  CARISA: [id number]  Patient Class: OP- Observation  Admission Date: 6/29/2025  Hospital Length of Stay: 1 days  Discharge Date and Time: 07/03/2025 8:06 PM  Attending Physician: Nnamdi Kaba MD   Discharging Provider: Nnamdi Kaba MD  Primary Care Provider: Ginna Musa MD  [facility] Medicine Team: [facility] Nnamdi Kaba MD  Primary Care Team: [facility]    HPI:   Kuldeep Villela is a 75 yo F with PMHx of HTN, HLD, tobacco use, anemia who presented to ED for BLE weakness. Patient was recently hospitalized from 06/09-06/24 for worsening back pain and was found to have a T12 compression fracture. She underwent posterior fusion T10-L2 (06/14/25) with NSGY during that hospitalization. She completed course of antibiotics and drains were removed. Initially plan was for her to discharge to SNF, but she decided she would rather go home with . She reports that since she went home, she has had worsening BLE weakness and her back pain has not been well controlled. She lives with her daughter and grandson. She has mostly been staying in her recliner since discharge due to decreased mobility. She also endorses urinary incontinence, but denies dysuria or increased frequency. She still smokes cigarettes at times, but not daily. Denies fever, chills, chest pain, palpitations, SOB, cough, abdominal pain, n/v/d.    In ED: HDS. Hgb at baseline. PLT elevated to 507. CMP with mild MALACHI with Cr 1.3 (baseline 1.0). CRP 22.8/ ESR 68. Covid/flu negative. MRI C/T/L spine showing open canal at surgical site. NSGY consulted; no acute NSGY intervention as stenosis improved when compared to pre op and low concern for infection. NSGY recommending admission with therapy and possible IPR placement. Given 500 cc bolus of NS. Admitted to  for further management.     * No surgery found *      Hospital Course:   74  yo F with PMHx of HTN, HLD, tobacco use, anemia, recent admission for T12 compression fracture s/p posterior fusion T10-L2 (06/14/25) admitted for intractable lower back pain and BLE weakness. Initially planned for discharge to SNF last admission, but she refused and was instead discharged home with . She reports that since she went home, she has had worsening BLE weakness and her back pain has not been well controlled. CRP 22.8/ ESR 68. MRI C/T/L spine showing open canal at surgical site. NSGY consulted, low concern for infection and no acute neurosurgical intervention required. Staples removed 7/1.   PT/OT eval pending, but patient continues to refuse.       Goals of Care Treatment Preferences:  Code Status: Full Code         Consults:   Consults (From admission, onward)          Status Ordering Provider     Inpatient consult to Neurosurgery  Once        Provider:  (Not yet assigned)    Completed PADDY DELGADO            Assessment & Plan  Lumbar spinal stenosis at L4-L5.  Compression fracture of T12 vertebra  9o F with PMHx of HTN, HLD, T12 compression fracture s/p posterior fusion T10-L2 (06/14/25) with NSGY who presented to ED for BLE weakness. CRP 22.8/ ESR 68. MRI C/T/L spine showing open canal at surgical site.     NSGY consulted, low concern for infection and no acute neurosurgical intervention required  Staples removed 7/1   - fall precautions  - MM pain control   - PT/OT eval pending, patient continues to refuse therapy    9o F with PMHx of HTN, HLD, T12 compression fracture s/p posterior fusion T10-L2 (06/14/25) with NSGY who presented to ED for BLE weakness. CRP 22.8/ ESR 68. MRI C/T/L spine showing open canal at surgical site.     NSGY consulted, low concern for infection and no acute neurosurgical intervention required  Cervical spondylosis   noted on imaging, but patient without any weakness or changes to upper extremities   - per NSGY, no surgical intervention    Morbid obesity  Body mass index  is 43.46 kg/m². Morbid obesity complicates all aspects of disease management from diagnostic modalities to treatment. Weight loss encouraged and health benefits explained to patient.  Hypertension  Patient's blood pressure range in the last 24 hours was: BP  Min: 120/73  Max: 137/60.The patient's inpatient anti-hypertensive regimen is listed below:  Current Antihypertensives  amLODIPine tablet 10 mg, Daily, Oral    Plan  - BP is controlled, no changes needed to their regimen  - of note, home coreg and lasix held since last admit  Hyperlipemia  - continue statin   Debility  Patient with Acute on chronic debility due to fracture/prosthesis and age-related physical debility. The patient's latest AMPAC (Activity Measure for Post Acute Care) Score is listed below.    AM-PAC Score - How much help does the patient need for each activity listed  Basic Mobility Total Score: 12  Turning over in bed (including adjusting bedclothes, sheets and blankets)?: A lot  Sitting down on and standing up from a chair with arms (e.g., wheelchair, bedside commode, etc.): A lot  Moving from lying on back to sitting on the side of the bed?: A lot  Moving to and from a bed to a chair (including a wheelchair)?: A lot  Need to walk in hospital room?: A lot  Climbing 3-5 steps with a railing?: A lot    Plan  - Progressive mobility protocol initated  - PT/OT consulted  - Fall precautions in place    DME JUSTIFICATION   Kuldeep requires a hospital bed due to her requiring positioning of the body in ways not feasible with an ordinary bed to alleviate pain and due to limited ability and cannot independently make changes in body position without the use of the bed.The positioning of the body cannot be sufficiently resolved by the use of pillows and wedges.    MALACHI (acute kidney injury)  MALACHI is likely due to pre-renal azotemia due to dehydration. Baseline creatinine is 0.9-1.0. Most recent creatinine and eGFR are listed below.  Recent Labs      07/01/25  0930 07/02/25  0858   CREATININE 1.0 1.0   EGFRNORACEVR 60* 60*      Plan  - MALACHI is stable  - Avoid nephrotoxins and renally dose meds for GFR listed above  - Monitor urine output, serial BMP, and adjust therapy as needed  - given fluid bolus in ED  - improved  Tobacco dependency  Dangers of cigarette smoking were reviewed with patient in detail. Patient was Counseled for 3-10 minutes. Nicotine replacement options were discussed. Nicotine replacement was discussed- prescribed  Acute on chronic anemia  Anemia is likely due to chronic disease. Most recent hemoglobin and hematocrit are listed below.  Recent Labs     07/01/25  0930 07/02/25  0858   HGB 8.9* 8.6*   HCT 27.3* 26.9*     Plan  - Monitor serial CBC: Daily  - Transfuse PRBC if patient becomes hemodynamically unstable, symptomatic or H/H drops below 7/21.  - Patient has not received any PRBC transfusions to date  - Patient's anemia is currently stable  Multinodular goiter  incidental finding on imaging  Free T4 WNL  - will need OP thyroid US   Vitamin D deficiency  Noted from chart review and recent fracture  -- PO ordered    Final Active Diagnoses:    Diagnosis Date Noted POA    PRINCIPAL PROBLEM:  Lumbar spinal stenosis at L4-L5. [M48.061] 07/13/2012 Yes    Vitamin D deficiency [E55.9] 07/03/2025 Yes    Multinodular goiter [E04.2] 06/30/2025 Yes    Acute on chronic anemia [D64.9] 06/18/2025 Yes    Compression fracture of T12 vertebra [S22.080A] 06/10/2025 Yes    Tobacco dependency [F17.200] 06/10/2025 Yes    MALACHI (acute kidney injury) [N17.9] 06/10/2025 Yes    Debility [R53.81] 03/22/2025 Yes    Hyperlipemia [E78.5] 09/23/2013 Yes    Hypertension [I10] 04/03/2013 Yes    Cervical spondylosis [M47.812] 07/13/2012 Yes    Morbid obesity [E66.01] 07/13/2012 Yes      Problems Resolved During this Admission:       Discharged Condition: good    Disposition: Home-Health Care [facility]    Follow Up:   Contact information for follow-up providers       Ginna Musa  "MD ROLANDA. Schedule an appointment as soon as possible for a visit in 1 week(s).    Specialty: Family Medicine  Contact information:  [address] [place] [address]  [phone]               Donny Esquivel MD. Schedule an appointment as soon as possible for a visit in 1 week(s).    Specialty: Physical Medicine and Rehabilitation  Why: rehab MD who can also help with pain control  Contact information:  [address]  [facility] [address]  [phone]               [facility]. Schedule an appointment as soon as possible for a visit in 1 week(s).    Specialty: Psychiatry  Why: ask them about medications especially about taking zoloft and celexa together  Contact information:  [address]  [facility] [id number] [phone]             ProviderAung .    Specialty: Internal Medicine  Contact information:  [address]  [facility] [address]                       Contact information for after-discharge care       Home Medical Care       [facility] .    Service: Home Health Services  Contact information:  [address]  [facility] [id number] [phone]                                 Patient Instructions:      HOSPITAL BED FOR HOME USE     Order Specific Question Answer Comments   Type: Semi-electric    Length of need (1-99 months): 99    Does patient have medical equipment at home? rollator    Height: 5' 1" (1.549 m)    Weight: 104.3 kg (230 lb)    Please check all that apply: Patient requires positioning of the body in ways not feasible in an ordinary bed due to a medical condition which is expected to last at least one month.    Please check all that apply: Patient requires, for the alleviation of pain, positioning of the body in ways not feasible in an ordinary bed.      Ambulatory referral/consult to Psychiatry   Standing Status: Future   Referral Priority: Routine Referral Type: Psychiatric   Referral Reason: Specialty Services " Required   Requested Specialty: Psychiatry   Number of Visits Requested: 1     Ambulatory referral/consult to [facility]   Standing Status: Future   Referral Priority: Routine Referral Type: Consultation   Referred to Provider: ADRIENNE PROVIDER Requested Specialty: Internal Medicine   Number of Visits Requested: 1     Diet Adult Regular     Notify your health care provider if you experience any of the following:  temperature >100.4     Notify your health care provider if you experience any of the following:  severe uncontrolled pain     Notify your health care provider if you experience any of the following:  persistent nausea and vomiting or diarrhea     Notify your health care provider if you experience any of the following:  redness, tenderness, or signs of infection (pain, swelling, redness, odor or green/yellow discharge around incision site)     Notify your health care provider if you experience any of the following:  persistent dizziness, light-headedness, or visual disturbances     Notify your health care provider if you experience any of the following:  increased confusion or weakness     Activity as tolerated       Significant Diagnostic Studies: N/A    Pending Diagnostic Studies:       None           Medications:  Reconciled Home Medications:      Medication List        PAUSE taking these medications      topiramate 50 MG tablet  Wait to take this until your doctor or other care provider tells you to start again.  Commonly known as: TOPAMAX  TAKE 1 TABLET(50 MG) BY MOUTH EVERY 12 HOURS            START taking these medications      OYSTER SHELL CALCIUM-VIT D3 500 mg-5 mcg (200 unit) per tablet  Generic drug: calcium-vitamin D3  Take 1 tablet by mouth 2 (two) times daily.            CONTINUE taking these medications      albuterol 90 mcg/actuation inhaler  Commonly known as: PROVENTIL/VENTOLIN HFA  Inhale 1-2 puffs into the lungs every 4 (four) hours as needed for Wheezing. Rescue      amLODIPine 10 MG tablet  Commonly known as: NORVASC  Take 1 tablet (10 mg total) by mouth once daily.     bisacodyL 10 mg Supp  Commonly known as: DULCOLAX  Place 1 suppository (10 mg total) rectally daily as needed (constipation).     busPIRone 15 MG tablet  Commonly known as: BUSPAR  Take 1 tablet (15 mg total) by mouth 4 (four) times daily.     cetirizine 10 MG tablet  Commonly known as: ZYRTEC  TAKE 1 TABLET BY MOUTH EVERY DAY     citalopram 40 MG tablet  Commonly known as: CeleXA  TAKE 1 TABLET(40 MG) BY MOUTH EVERY DAY     cyclobenzaprine 10 MG tablet  Commonly known as: FLEXERIL  Take 1 tablet (10 mg total) by mouth 3 (three) times daily as needed for Muscle spasms.     diclofenac sodium 1 % Gel  Commonly known as: VOLTAREN  Apply 2 g topically 3 (three) times daily as needed (apply to painful joints).     LIDOcaine 5 %  Commonly known as: LIDODERM  Place 2 patches onto the skin once daily. Place to back. Remove & Discard patch within 12 hours or as directed by MD     linaCLOtide 290 mcg Cap capsule  Commonly known as: LINZESS  Take 1 capsule (290 mcg total) by mouth before breakfast.     meloxicam 7.5 MG tablet  Commonly known as: MOBIC  Take 1 tablet (7.5 mg total) by mouth 2 (two) times a day.     naloxone 4 mg/actuation Spry  Commonly known as: NARCAN  4mg by nasal route as needed for opioid overdose; may repeat every 2-3 minutes in alternating nostrils until medical help arrives. Call 911     oxyCODONE-acetaminophen  mg per tablet  Commonly known as: PERCOCET  Take 1 tablet by mouth every 4 (four) hours as needed for Pain (maximum 5 tablets per day).     polyethylene glycol 17 gram/dose powder  Commonly known as: GLYCOLAX  Use to cap to measure 17g, mix with liquid, and take by mouth once daily.     pravastatin 20 MG tablet  Commonly known as: PRAVACHOL  Take 1 tablet (20 mg total) by mouth once daily.     rOPINIRole 1 MG tablet  Commonly known as: REQUIP  Take 1 tablet (1 mg total) by mouth 2  (two) times daily.     sertraline 50 MG tablet  Commonly known as: ZOLOFT  Take 1 tablet (50 mg total) by mouth once daily.     STOOL SOFTENER-LAXATIVE 8.6-50 mg per tablet  Generic drug: senna-docusate  Take 1 tablet by mouth 2 (two) times daily.            STOP taking these medications      carvediloL 6.25 MG tablet  Commonly known as: COREG     estrogens(esterified)-methyltestosterone 0.625-1.25mg per tablet  Commonly known as: ESTRATEST HS     furosemide 20 MG tablet  Commonly known as: LASIX     walker Misc              Indwelling Lines/Drains at time of discharge:   Lines/Drains/Airways       Drain  Duration             Female External Urinary Catheter w/ Suction 06/30/25 0509 3 days                        Time spent on the discharge of patient: 44 minutes         Nnamdi Kaba MD  Department of Hospital Medicine  [facility]

## 2025-07-05 LAB
BACTERIA BLD CULT: NORMAL
BACTERIA BLD CULT: NORMAL

## 2025-07-07 ENCOUNTER — TELEPHONE (OUTPATIENT)
Dept: PHYSICAL MEDICINE AND REHAB | Facility: CLINIC | Age: 75
End: 2025-07-07
Payer: COMMERCIAL

## 2025-07-07 DIAGNOSIS — M54.9 BACK PAIN, UNSPECIFIED BACK LOCATION, UNSPECIFIED BACK PAIN LATERALITY, UNSPECIFIED CHRONICITY: ICD-10-CM

## 2025-07-07 RX ORDER — OXYCODONE AND ACETAMINOPHEN 10; 325 MG/1; MG/1
1 TABLET ORAL
Qty: 150 TABLET | Refills: 0
Start: 2025-07-07 | End: 2025-07-07 | Stop reason: SDUPTHER

## 2025-07-07 RX ORDER — OXYCODONE AND ACETAMINOPHEN 10; 325 MG/1; MG/1
1 TABLET ORAL
Qty: 150 TABLET | Refills: 0 | Status: SHIPPED | OUTPATIENT
Start: 2025-07-07

## 2025-07-07 NOTE — TELEPHONE ENCOUNTER
Copied from CRM #[id number]. Topic: Medications - Medication Refill  >> Jul 7, 2025  9:58 AM Jasmin wrote:      Rx Refill/Request         Is this a Refill or New Rx:   Refill     Rx Name and Strength:  oxyCODONE-acetaminophen (PERCOCET)  mg per tablet       Preferred Pharmacy with phone number:    [id number] - [place] [address] [address]  Phone: [phone] Fax: [phone]    Communication Preference:  Phone        Additional Information:  Please call

## 2025-07-07 NOTE — TELEPHONE ENCOUNTER
"Copied from CRM #[id number]. Topic: Medications - Medication Refill  >> Jul 7, 2025  3:20 PM Shamika wrote:  Name Of Caller: Self     Contact Preference?:[phone]      What is the nature of the call?:pt calling in regards ot needing a new scripit for pt oxyCODONE-acetaminophen (PERCOCET)  mg per tablet to be refill. Pls call     .  [facility][id number]  [address] AT [address]  [place] [address]  Phone: [phone] Fax: [phone]                       Additional Notes:  "Thank you for all that you do for our patients"  "

## 2025-07-08 ENCOUNTER — TELEPHONE (OUTPATIENT)
Dept: FAMILY MEDICINE | Facility: CLINIC | Age: 75
End: 2025-07-08

## 2025-07-08 ENCOUNTER — TELEPHONE (OUTPATIENT)
Dept: PHARMACY | Facility: CLINIC | Age: 75
End: 2025-07-08
Payer: COMMERCIAL

## 2025-07-08 NOTE — TELEPHONE ENCOUNTER
[facility]Population Health Chart Review & Patient Outreach Details For Medication Adherence Project    Reason for Outreach Encounter: 3rd Party payor non-compliance report ([employer], [employer], C, etc)  2.  Patient Outreach Method: Reviewed patient chart   3.   Medication in question:    Hyperlipidemia Medications              pravastatin (PRAVACHOL) 20 MG tablet Take 1 tablet (20 mg total) by mouth once daily.                  pravastatin   last filled  6/24/25 for 90 day supply    4.  Reviewed and or Updates Made To: Patient Chart  5. Outreach Outcomes and/or actions taken: Patient filled medication and is on track to be adherent  Additional Notes:

## 2025-07-14 ENCOUNTER — TELEPHONE (OUTPATIENT)
Dept: FAMILY MEDICINE | Facility: CLINIC | Age: 75
End: 2025-07-14
Payer: COMMERCIAL

## 2025-07-14 NOTE — TELEPHONE ENCOUNTER
Spoke with Eileen the daughter who stated that her mother requires 24hr nurse services. Explained per  that there is no 24 hour nursing care. She would need a personal aide, which is not covered via insurance. This is an out of pocket expense.     The daughter asked if the home health services do not have this, instructed that she can call the home health agency to ask and if the insurance will cover this. She stated understanding.

## 2025-07-14 NOTE — TELEPHONE ENCOUNTER
Unfortunately, there is no such thing as 24 hour nursing care. She would need a personal aide, which is not covered via insurance. This is an out of pocket expense.

## 2025-07-14 NOTE — TELEPHONE ENCOUNTER
"Copied from CRM #[id number]. Topic: General Inquiry - Patient Advice  >> Jul 14, 2025 12:31 PM Mani wrote:  Type:  Needs Medical Advice    Who Called: Eileen "daughter"     Symptoms (please be specific): Daughter stated that she would like for the doctor to put in an order for round the clock Home Health Care. They need this so that it will be less of a stress on the sister who takes care of her and she is almost ready to go back to work. The patient just recently had back sugery. Please reach out to her for more information.      Would the patient rather a call back or a response via [facility]? Callback     Best Call Back Number: .[phone] ask for Eileen     Additional Information:  "

## 2025-07-16 ENCOUNTER — TELEPHONE (OUTPATIENT)
Dept: FAMILY MEDICINE | Facility: CLINIC | Age: 75
End: 2025-07-16
Payer: COMMERCIAL

## 2025-07-16 NOTE — TELEPHONE ENCOUNTER
Nurse returned call no answer at this time.       Copied from CRM #[id number]. Topic: General Inquiry - Patient Advice  >> Jul 16, 2025  2:48 PM Mani wrote:  Type:  Needs Medical Advice    Who Called: Kuldeep     Symptoms (please be specific): Patient stated that she would like for the doctor or the nurse to give her a call. The patient did not want to say specifically what it was she needs to talk about. Please reach out to her as soon as possible.     Would the patient rather a call back or a response via [facility]? Callback preferrably with the doctor    Best Call Back Number: .[phone]    Additional Information:

## 2025-07-16 NOTE — TELEPHONE ENCOUNTER
Romulo/patient's daughter stated family is requesting 24 hour care for patient and would like to know if Dr. Musa could help with that.  Also family would like to know if patient was referred to physical therapy by hospital after surgery or Dr. Musa.  Stated patient's other daughter would like to speak with Dr. Musa regarding a personal matter that daughter witnessed with patient during physical therapy session.  Requesting a call from Dr. Musa to discuss.  Please advise.

## 2025-07-16 NOTE — TELEPHONE ENCOUNTER
Copied from CRM #[id number]. Topic: General Inquiry - Return Call  >> Jul 16, 2025  2:32 PM Lai wrote:  Type: Patient Call Back    Who called:Romulo-Daughter    What is the request in detail: Patient is not doing to good with the home health. Needs a call today to discuss.     Can the clinic reply by [facility]?no-no portal    Would the patient rather a call back or a response via [facility]? call    Best call back number: [phone]      Additional Information:

## 2025-07-17 ENCOUNTER — TELEPHONE (OUTPATIENT)
Dept: FAMILY MEDICINE | Facility: CLINIC | Age: 75
End: 2025-07-17
Payer: COMMERCIAL

## 2025-07-17 DIAGNOSIS — Z71.89 ENCOUNTER FOR COUNSELING FOR CARE MANAGEMENT OF PATIENT WITH CHRONIC CONDITIONS AND COMPLEX HEALTH NEEDS USING NURSE-BASED MODEL: Primary | ICD-10-CM

## 2025-07-17 DIAGNOSIS — I11.9 BENIGN HYPERTENSIVE HEART DISEASE WITHOUT HEART FAILURE: ICD-10-CM

## 2025-07-17 DIAGNOSIS — M54.2 CHRONIC NECK PAIN: ICD-10-CM

## 2025-07-17 DIAGNOSIS — G89.29 CHRONIC NECK PAIN: ICD-10-CM

## 2025-07-17 NOTE — TELEPHONE ENCOUNTER
Copied from CRM #[id number]. Topic: General Inquiry - Patient Advice  >> Jul 17, 2025 11:20 AM Evette wrote:  .Type:  Patient Requesting Referral    Who Called: self     Referral to What Specialty: home health    Reason for Referral: pt didn't care for the other company     Does the patient want the referral with a specific physician?: home health     [facility]     Would the patient rather a call back or a response via [facility]?  Call back     Best Call Back Number: .[phone]      Additional Information: Pt would like to speak to nurse regarding her bowls also , please have nurse reach out , pt said the current company is money hungry , she spoke to someone in the office

## 2025-07-17 NOTE — TELEPHONE ENCOUNTER
Patient requesting a new referral to home health. Does not want to continue with current home health provider.  Please advise.

## 2025-07-18 NOTE — TELEPHONE ENCOUNTER
Unfortumately, insurance doesn't pay for 24 hour care. She would either be admitted to a nursing home of her choice or pay for aides to come in, but this would be an out of pocket expense for her.

## 2025-07-18 NOTE — TELEPHONE ENCOUNTER
I am getting asocial worker involved to see what her needs are. Case management will be contacting her.

## 2025-07-18 NOTE — TELEPHONE ENCOUNTER
Called [place]- daughter , no answer, phone line went to a busy signal twice. Unable to leave message.         This was addressed on 14 July 2025:    7/14/25  2:40 PM  NOTE  Spoke with Eileen the daughter who stated that her mother requires 24hr nurse services. Explained per  that there is no 24 hour nursing care. She would need a personal aide, which is not covered via insurance. This is an out of pocket expense.      The daughter asked if the home health services do not have this, instructed that she can call the home health agency to ask and if the insurance will cover this. She stated understanding.

## 2025-07-21 ENCOUNTER — TELEPHONE (OUTPATIENT)
Dept: FAMILY MEDICINE | Facility: CLINIC | Age: 75
End: 2025-07-21
Payer: COMMERCIAL

## 2025-07-21 DIAGNOSIS — L89.90 PRESSURE INJURY OF SKIN, UNSPECIFIED INJURY STAGE, UNSPECIFIED LOCATION: Primary | ICD-10-CM

## 2025-07-21 NOTE — TELEPHONE ENCOUNTER
Copied from CRM #[id number]. Topic: General Inquiry - Return Call  >> Jul 21, 2025  9:23 AM Lai wrote:  Type: Patient Call Back    Who called: self    What is the request in detail: Patient is stating she has used everything for her bed sore and she can not take it anymore. Wants to know what can be done for her? Can we get something called in to the pharmacy?    Can the clinic reply by [facility]?no    Would the patient rather a call back or a response via [facility]? call    Best call back number: [phone] or [phone] (M)        Additional Information:

## 2025-07-21 NOTE — TELEPHONE ENCOUNTER
LOV  03/19/2025  Ginna Musa MD  PCP - General        Copied from CRM #[id number]. Topic: Medications - Medication Refill  >> Jul 21, 2025  9:27 AM Lai wrote:  Type: RX Refill Request    Who Called: self    Have you contacted your pharmacy: yes    Refill or New Rx:refill    RX Name and Strength:busPIRone (BUSPAR) 15 MG tablet    Preferred Pharmacy with phone number:Best Call Back Number:[id number] - [place], [place] [address] [facility] [address] AT [address] [address]  Phone: [phone] Fax: [phone]  Hours: Open 24 hours    Local or Mail Order:local    Ordering Provider:Lencho    Would the patient rather a call back or a response via [facility]? call    Best Call Back Number:[email or web address][id number] - [place] [address] [facility] [address] AT [address] [address]  Phone: [phone] Fax: [phone]  Hours: Open 24 hours        Additional Information: [phone]-patients #

## 2025-07-22 ENCOUNTER — TELEPHONE (OUTPATIENT)
Dept: FAMILY MEDICINE | Facility: CLINIC | Age: 75
End: 2025-07-22
Payer: COMMERCIAL

## 2025-07-22 ENCOUNTER — TELEPHONE (OUTPATIENT)
Dept: HOME HEALTH SERVICES | Facility: CLINIC | Age: 75
End: 2025-07-22
Payer: COMMERCIAL

## 2025-07-22 DIAGNOSIS — L89.90 PRESSURE INJURY OF SKIN, UNSPECIFIED INJURY STAGE, UNSPECIFIED LOCATION: Primary | ICD-10-CM

## 2025-07-22 DIAGNOSIS — Z74.09 MOBILITY IMPAIRED: ICD-10-CM

## 2025-07-22 DIAGNOSIS — F41.8 DEPRESSION WITH ANXIETY: ICD-10-CM

## 2025-07-22 NOTE — TELEPHONE ENCOUNTER
Spoke with [facility] and they stated they do not have the patient under any of the home health agencies. Last  agency [facility] was May 5,2025 uncertain if patient is currently using this.     The office is going to clarify the agency, start dates an dif any services can be provided for the patient sooner.

## 2025-07-22 NOTE — TELEPHONE ENCOUNTER
Copied from CRM #[id number]. Topic: Medications - Medication Refill  >> Jul 22, 2025 11:49 AM Evette wrote:  .Type: RX Refill Request    Who Called: Self     Have you contacted your pharmacy: yes     Refill or New Rx: refill     RX Name and Strength: busPIRone (BUSPAR) 15 MG tablet ,     How is the patient currently taking it? (ex. 1XDay): per dr duarte     Is this a 30 day or 90 day RX: per dr duarte     Preferred Pharmacy with phone number: .  [facility] #[id number] - [address] AT [address] [address]  Phone: [phone] Fax: [phone]                    Local or Mail Order: local     Ordering Provider: deysi camara     Would the patient rather a call back or a response via [facility]? Call back     Best Call Back Number: [phone] (M)        Additional Information: Pt said she called in yesterday for a med and doesn't remember the name

## 2025-07-22 NOTE — TELEPHONE ENCOUNTER
Copied from CRM #[id number]. Topic: General Inquiry - Return Call  >> Jul 22, 2025 12:40 PM Ailin wrote:  Type:  Patient Returning Call    Who Called:Ms Villela   Who Left Message for Patient:Ms Chavis   Does the patient know what this is regarding?:yes   Would the patient rather a call back or a response via [facility]? Call   Best Call Back Number:[phone]   Additional Information: please call

## 2025-07-22 NOTE — TELEPHONE ENCOUNTER
Good morning,   Thank you for your referral. Unfortunately, due to our current start of care date being the week of Aug 11th, we are unable to accommodate your patient in a timely manner. We highly recommend you engage an outside home health provider for possible services at an earlier start of care date. If you are unable to establish care with an outside provider, please reach out to our agency for additional assistance.      Thank you,   SARAH Rubalcava   [facility]

## 2025-07-22 NOTE — TELEPHONE ENCOUNTER
Called patient at [phone] and  [phone]   No answer and both phones do not have a voicemail setup.

## 2025-07-23 DIAGNOSIS — F41.1 GAD (GENERALIZED ANXIETY DISORDER): ICD-10-CM

## 2025-07-23 NOTE — TELEPHONE ENCOUNTER
Copied from CRM #[id number]. Topic: Medications - Medication Status Check   >> Jul 23, 2025  3:20 PM Lai wrote:  Type: Patient Call Back    Who called: self    What is the request in detail: patient is asking for the status of her busPIRone (BUSPAR) 15 MG tablet script. Stated she called it in 2 days ago with no response from the office. Would like a call today please    Can the clinic reply by [facility]?no    Would the patient rather a call back or a response via [facility]? call    Best call back number: [phone] (M) or [phone]          Additional Information:

## 2025-07-23 NOTE — TELEPHONE ENCOUNTER
No care due was identified.  [facility] Embedded Care Due Messages. Reference number: [id number].   7/23/2025 3:39:12 PM CDT

## 2025-07-24 RX ORDER — BUSPIRONE HYDROCHLORIDE 15 MG/1
15 TABLET ORAL 4 TIMES DAILY
Qty: 120 TABLET | Refills: 11 | Status: SHIPPED | OUTPATIENT
Start: 2025-07-24 | End: 2026-07-24

## 2025-07-25 DIAGNOSIS — M54.9 BACK PAIN, UNSPECIFIED BACK LOCATION, UNSPECIFIED BACK PAIN LATERALITY, UNSPECIFIED CHRONICITY: ICD-10-CM

## 2025-07-25 RX ORDER — OXYCODONE AND ACETAMINOPHEN 10; 325 MG/1; MG/1
1 TABLET ORAL
Qty: 150 TABLET | Refills: 0 | Status: SHIPPED | OUTPATIENT
Start: 2025-08-06

## 2025-07-25 NOTE — TELEPHONE ENCOUNTER
Copied from CRM #[id number]. Topic: Medications - Medication Refill  >> Jul 25, 2025 12:09 PM Preston wrote:  Type:  RX Refill Request    Who Called:  Patient  Refill or New Rx: oxyCODONE-acetaminophen (PERCOCET)  mg per tablet    RX Name and Strength:oxyCODONE-acetaminophen (PERCOCET)  mg per tablet  Preferred Pharmacy with phone number:  [id number] [address] [address]  Phone: [phone] Fax: [phone]      Local or Mail Order: local  Ordering Provider: Dr. Esquivel  Would the patient rather a call back or a response via [facility]?  callback  Best Call Back Number:[phone]    Additional Information: She stated she is in pain and the 12 pills she have left will not last. She also asked for a callback once its sent before today's out please.

## 2025-07-28 ENCOUNTER — TELEPHONE (OUTPATIENT)
Dept: PHYSICAL MEDICINE AND REHAB | Facility: CLINIC | Age: 75
End: 2025-07-28
Payer: COMMERCIAL

## 2025-07-28 NOTE — TELEPHONE ENCOUNTER
Returned call to patient in regards to message left with phone staff. Informed her that her refill has been called in, but has a start date of 8/6/25 and he called it in for 150 pills. Patient verbalized understanding.

## 2025-07-28 NOTE — TELEPHONE ENCOUNTER
Copied from CRM #[id number]. Topic: Medications - Medication Refill  >> Jul 28, 2025  2:43 PM Preston wrote:  Type:  RX Refill Request    Who Called:  Patient  Refill or New Rx: oxyCODONE-acetaminophen (PERCOCET)  mg per tablet    RX Name and Strength: oxyCODONE-acetaminophen (PERCOCET)  mg per tablet    Preferred Pharmacy with phone number:   [id number] [address] [address]  Phone: [phone] Fax: [phone]    Local or Mail Order: local  Ordering Provider: Dr. Esquivel  Would the patient rather a call back or a response via [facility]?  callback  Best Call Back Number: [phone] (M)      Additional Information:  She stated the last time the pharmacy gave her 150 pills but now she only have 2 pills left. She also stated the provider normally give her 180 to take 2 and would like to know if he will refill her medication.

## 2025-07-30 ENCOUNTER — TELEPHONE (OUTPATIENT)
Dept: FAMILY MEDICINE | Facility: CLINIC | Age: 75
End: 2025-07-30
Payer: COMMERCIAL

## 2025-07-30 NOTE — TELEPHONE ENCOUNTER
Copied from CRM #[id number]. Topic: General Inquiry - Patient Advice  >> Jul 30, 2025 10:06 AM Mohini Jj wrote:  Type: Patient Call Back    Who called:self     What is the request in detail:calling to speak with nurse in regards to home care service.     Can the clinic reply by [facility]?no     Would the patient rather a call back or a response via [facility]? Call    Best call back number:.[phone]    Additional Information:

## 2025-07-30 NOTE — TELEPHONE ENCOUNTER
Spoke with the patient she states she had  nurse service with unknown  agency who came out 29 Jul 2025. Patient is sating she wants to go back to [facility] for services. Explained to patient that [facility]. She states this nurse was not with [facility]. Patient is going to call back with the name of the agency.

## 2025-08-04 NOTE — TELEPHONE ENCOUNTER
Last Office Visit Info:   The patient's last visit with Ginna Musa MD was on 3/19/2025.    The patient's last visit in current department was on 3/19/2025.        Last CBC Results:   Lab Results   Component Value Date    WBC 8.72 07/02/2025    HGB 8.6 (L) 07/02/2025    HCT 26.9 (L) 07/02/2025     07/02/2025       Last CMP Results  Lab Results   Component Value Date     07/02/2025    K 3.9 07/02/2025     07/02/2025    CO2 27 07/02/2025    BUN 16 07/02/2025    CREATININE 1.0 07/02/2025    CALCIUM 9.1 07/02/2025    ALBUMIN 3.1 (L) 07/02/2025    AST 13 07/02/2025    ALT 10 07/02/2025       Last Lipids  Lab Results   Component Value Date    CHOL 153 05/09/2023    TRIG 78 05/09/2023    HDL 33 (L) 05/09/2023    LDLCALC 104.4 05/09/2023       Last A1C  Lab Results   Component Value Date    HGBA1C 4.9 05/09/2023       Last TSH  Lab Results   Component Value Date    TSH 0.356 (L) 07/01/2025             Current Med Refills  Medication List with Changes/Refills   Current Medications    ALBUTEROL (PROVENTIL/VENTOLIN HFA) 90 MCG/ACTUATION INHALER    Inhale 1-2 puffs into the lungs every 4 (four) hours as needed for Wheezing. Rescue       Start Date: 2/27/2025 End Date: --    AMLODIPINE (NORVASC) 10 MG TABLET    Take 1 tablet (10 mg total) by mouth once daily.       Start Date: 6/24/2025 End Date: --    BUSPIRONE (BUSPAR) 15 MG TABLET    Take 1 tablet (15 mg total) by mouth 4 (four) times daily.       Start Date: 7/24/2025 End Date: 7/24/2026    CALCIUM-VITAMIN D3 (OS-RAMIRO 500 + D3) 500 MG-5 MCG (200 UNIT) PER TABLET    Take 1 tablet by mouth 2 (two) times daily.       Start Date: 7/3/2025  End Date: 7/3/2026    CETIRIZINE (ZYRTEC) 10 MG TABLET    TAKE 1 TABLET BY MOUTH EVERY DAY       Start Date: 1/8/2025  End Date: --    CITALOPRAM (CELEXA) 40 MG TABLET    TAKE 1 TABLET(40 MG) BY MOUTH EVERY DAY       Start Date: 8/12/2024 End Date: --    CYCLOBENZAPRINE (FLEXERIL) 10 MG TABLET    Take 1 tablet (10 mg  total) by mouth 3 (three) times daily as needed for Muscle spasms.       Start Date: 4/15/2025 End Date: --    DICLOFENAC SODIUM (VOLTAREN) 1 % GEL    Apply 2 g topically 3 (three) times daily as needed (apply to painful joints).       Start Date: 3/10/2023 End Date: --    LIDOCAINE (LIDODERM) 5 %    Place 2 patches onto the skin once daily. Place to back. Remove & Discard patch within 12 hours or as directed by MD       Start Date: 6/24/2025 End Date: --    LINACLOTIDE (LINZESS) 290 MCG CAP CAPSULE    Take 1 capsule (290 mcg total) by mouth before breakfast.       Start Date: 8/12/2021 End Date: --    MELOXICAM (MOBIC) 7.5 MG TABLET    Take 1 tablet (7.5 mg total) by mouth 2 (two) times a day.       Start Date: 4/4/2025  End Date: 5/4/2025    NALOXONE (NARCAN) 4 MG/ACTUATION SPRY    4mg by nasal route as needed for opioid overdose; may repeat every 2-3 minutes in alternating nostrils until medical help arrives. Call 911       Start Date: 5/23/2025 End Date: --    OXYCODONE-ACETAMINOPHEN (PERCOCET)  MG PER TABLET    Take 1 tablet by mouth every 4 to 6 hours as needed for Pain (maximum 5 tablets per day).       Start Date: 8/6/2025  End Date: --    POLYETHYLENE GLYCOL (GLYCOLAX) 17 GRAM/DOSE POWDER    Use to cap to measure 17g, mix with liquid, and take by mouth once daily.       Start Date: 6/24/2025 End Date: --    PRAVASTATIN (PRAVACHOL) 20 MG TABLET    Take 1 tablet (20 mg total) by mouth once daily.       Start Date: 6/24/2025 End Date: --    ROPINIROLE (REQUIP) 1 MG TABLET    Take 1 tablet (1 mg total) by mouth 2 (two) times daily.       Start Date: 3/19/2025 End Date: --    SENNA-DOCUSATE (PERICOLACE) 8.6-50 MG PER TABLET    Take 1 tablet by mouth 2 (two) times daily.       Start Date: 6/24/2025 End Date: --    SERTRALINE (ZOLOFT) 50 MG TABLET    Take 1 tablet (50 mg total) by mouth once daily.       Start Date: 8/14/2024 End Date: 8/14/2025    TOPIRAMATE (TOPAMAX) 50 MG TABLET    TAKE 1 TABLET(50 MG) BY  MOUTH EVERY 12 HOURS       Start Date: 7/17/2023 End Date: --       Order(s) placed per written order guidelines:     Please advise.

## 2025-08-04 NOTE — TELEPHONE ENCOUNTER
No care due was identified.  [facility] Embedded Care Due Messages. Reference number: [id number].   8/03/2025 7:22:28 PM CDT

## 2025-08-05 DIAGNOSIS — G62.9 NEUROPATHY: Primary | ICD-10-CM

## 2025-08-07 RX ORDER — SERTRALINE HYDROCHLORIDE 50 MG/1
TABLET, FILM COATED ORAL
Qty: 30 TABLET | Refills: 11 | Status: SHIPPED | OUTPATIENT
Start: 2025-08-07

## 2025-08-11 ENCOUNTER — TELEPHONE (OUTPATIENT)
Dept: FAMILY MEDICINE | Facility: CLINIC | Age: 75
End: 2025-08-11
Payer: COMMERCIAL

## 2025-08-11 DIAGNOSIS — L89.90 PRESSURE INJURY OF SKIN, UNSPECIFIED INJURY STAGE, UNSPECIFIED LOCATION: Primary | ICD-10-CM

## 2025-08-11 DIAGNOSIS — I11.9 BENIGN HYPERTENSIVE HEART DISEASE WITHOUT HEART FAILURE: ICD-10-CM

## 2025-08-11 DIAGNOSIS — R53.81 DEBILITY: ICD-10-CM

## 2025-08-13 ENCOUNTER — TELEPHONE (OUTPATIENT)
Dept: FAMILY MEDICINE | Facility: CLINIC | Age: 75
End: 2025-08-13
Payer: COMMERCIAL

## 2025-08-15 ENCOUNTER — OFFICE VISIT (OUTPATIENT)
Dept: PHYSICAL MEDICINE AND REHAB | Facility: CLINIC | Age: 75
End: 2025-08-15
Payer: COMMERCIAL

## 2025-08-15 ENCOUNTER — TELEPHONE (OUTPATIENT)
Dept: PHYSICAL MEDICINE AND REHAB | Facility: CLINIC | Age: 75
End: 2025-08-15

## 2025-08-15 VITALS — OXYGEN SATURATION: 98 % | BODY MASS INDEX: 32.57 KG/M2 | WEIGHT: 172.5 LBS | HEIGHT: 61 IN

## 2025-08-15 DIAGNOSIS — G89.29 CHRONIC MIDLINE LOW BACK PAIN WITH BILATERAL SCIATICA: Primary | ICD-10-CM

## 2025-08-15 DIAGNOSIS — M54.2 CHRONIC NECK PAIN: ICD-10-CM

## 2025-08-15 DIAGNOSIS — R26.9 GAIT DISORDER: ICD-10-CM

## 2025-08-15 DIAGNOSIS — G89.29 CHRONIC NECK PAIN: ICD-10-CM

## 2025-08-15 DIAGNOSIS — M47.26 OSTEOARTHRITIS OF SPINE WITH RADICULOPATHY, LUMBAR REGION: ICD-10-CM

## 2025-08-15 DIAGNOSIS — M54.41 CHRONIC MIDLINE LOW BACK PAIN WITH BILATERAL SCIATICA: Primary | ICD-10-CM

## 2025-08-15 DIAGNOSIS — M54.12 CERVICAL RADICULOPATHY: ICD-10-CM

## 2025-08-15 DIAGNOSIS — M54.42 CHRONIC MIDLINE LOW BACK PAIN WITH BILATERAL SCIATICA: Primary | ICD-10-CM

## 2025-08-15 DIAGNOSIS — Z79.891 CHRONICALLY ON OPIATE THERAPY: ICD-10-CM

## 2025-08-15 DIAGNOSIS — M47.22 OSTEOARTHRITIS OF SPINE WITH RADICULOPATHY, CERVICAL REGION: ICD-10-CM

## 2025-08-15 DIAGNOSIS — M17.0 PRIMARY OSTEOARTHRITIS OF BOTH KNEES: ICD-10-CM

## 2025-08-15 DIAGNOSIS — S22.080D COMPRESSION FRACTURE OF T12 VERTEBRA WITH ROUTINE HEALING, SUBSEQUENT ENCOUNTER: ICD-10-CM

## 2025-08-15 DIAGNOSIS — M48.061 SPINAL STENOSIS OF LUMBAR REGION, UNSPECIFIED WHETHER NEUROGENIC CLAUDICATION PRESENT: ICD-10-CM

## 2025-08-15 DIAGNOSIS — Z98.1 STATUS POST LUMBAR SPINAL FUSION: ICD-10-CM

## 2025-08-15 PROCEDURE — 99999 PR PBB SHADOW E&M-EST. PATIENT-LVL IV: CPT | Mod: PBBFAC,,, | Performed by: PHYSICAL MEDICINE & REHABILITATION

## 2025-08-15 RX ORDER — CYCLOBENZAPRINE HCL 10 MG
10 TABLET ORAL 3 TIMES DAILY PRN
Qty: 90 TABLET | Refills: 3 | Status: SHIPPED | OUTPATIENT
Start: 2025-08-15

## 2025-08-20 ENCOUNTER — TELEPHONE (OUTPATIENT)
Dept: FAMILY MEDICINE | Facility: CLINIC | Age: 75
End: 2025-08-20
Payer: COMMERCIAL

## 2025-08-20 DIAGNOSIS — F41.1 GAD (GENERALIZED ANXIETY DISORDER): ICD-10-CM

## 2025-08-20 RX ORDER — BUSPIRONE HYDROCHLORIDE 15 MG/1
15 TABLET ORAL 4 TIMES DAILY
Qty: 120 TABLET | Refills: 11 | Status: CANCELLED | OUTPATIENT
Start: 2025-08-20 | End: 2026-08-20

## 2025-08-23 ENCOUNTER — DOCUMENT SCAN (OUTPATIENT)
Dept: HOME HEALTH SERVICES | Facility: HOSPITAL | Age: 75
End: 2025-08-23
Payer: COMMERCIAL

## 2025-08-28 DIAGNOSIS — F41.0 GENERALIZED ANXIETY DISORDER WITH PANIC ATTACKS: ICD-10-CM

## 2025-08-28 DIAGNOSIS — F41.1 GENERALIZED ANXIETY DISORDER WITH PANIC ATTACKS: ICD-10-CM

## 2025-09-02 DIAGNOSIS — M54.9 BACK PAIN, UNSPECIFIED BACK LOCATION, UNSPECIFIED BACK PAIN LATERALITY, UNSPECIFIED CHRONICITY: ICD-10-CM

## 2025-09-02 RX ORDER — CITALOPRAM 40 MG/1
40 TABLET ORAL
Qty: 90 TABLET | Refills: 3 | Status: SHIPPED | OUTPATIENT
Start: 2025-09-02

## 2025-09-02 RX ORDER — OXYCODONE AND ACETAMINOPHEN 10; 325 MG/1; MG/1
1 TABLET ORAL
Qty: 150 TABLET | Refills: 0 | Status: SHIPPED | OUTPATIENT
Start: 2025-09-05

## (undated) DEVICE — ELECTRODE EXTENDED BLADE

## (undated) DEVICE — SUT PDS II 1 TP-1 VIL

## (undated) DEVICE — DRAPE C-ARMOR EQUIPMENT COVER

## (undated) DEVICE — SEALER AQUAMANTYS 2.3 BIPOLAR

## (undated) DEVICE — EVACUATOR WOUND BULB 100CC

## (undated) DEVICE — KIT SURGIFLO HEMOSTATIC MATRIX

## (undated) DEVICE — SUT SILK 3-0 STRANDS 30IN

## (undated) DEVICE — NDL SPINAL 18GX3.5 SPINOCAN

## (undated) DEVICE — PENCIL ROCKER SWITCH 10FT CORD

## (undated) DEVICE — DURAPREP SURG SCRUB 26ML

## (undated) DEVICE — GOWN POLY REINF BRTH SLV XL

## (undated) DEVICE — GAUZE SPONGE PEANUT STRL

## (undated) DEVICE — DRAPE INCISE IOBAN 2 23X17IN

## (undated) DEVICE — PACK ECLIPSE SET-UP W/O DRAPE

## (undated) DEVICE — DIFFUSER

## (undated) DEVICE — TRAY NEURO OMC

## (undated) DEVICE — DISPOSABLE BIPOLAR

## (undated) DEVICE — CARTRIDGE OIL

## (undated) DEVICE — KIT SPINAL PATIENT CARE JACK

## (undated) DEVICE — ELECTRODE MEGADYNE RETURN DUAL

## (undated) DEVICE — SUT SILK 3-0 SH DETACH 30IN

## (undated) DEVICE — SUT 2/0 30IN SILK BLK BRAI

## (undated) DEVICE — CLOSURE SKIN STERI STRIP 1/2X4

## (undated) DEVICE — DRESSING AQUACEL AG 3.5X10IN

## (undated) DEVICE — SUT MCRYL PLUS 4-0 PS2 27IN

## (undated) DEVICE — Device

## (undated) DEVICE — TRAY CATH 1-LYR URIMTR 16FR

## (undated) DEVICE — CORD BIPOLAR 12 FOOT

## (undated) DEVICE — BLADE MILL+ BONE MEDIUM DISP

## (undated) DEVICE — SPHERE MARKER REFLECTIVE DISP

## (undated) DEVICE — SPONGE COTTON TRAY 4X4IN

## (undated) DEVICE — DRAPE STERI INSTRUMENT 1018

## (undated) DEVICE — SUT SILK 2-0 STRANDS 30IN

## (undated) DEVICE — DRAIN CHANNEL ROUND 15FR

## (undated) DEVICE — CANNULA EXPEDIUM OPN 16GX160MM

## (undated) DEVICE — SUT VICRYL PLUS 2-0 CT1 18

## (undated) DEVICE — SYR 10CC LUER LOCK

## (undated) DEVICE — ELECTRODE BLADE INSULATED 1 IN

## (undated) DEVICE — SPONGE LAP 18X18 PREWASHED

## (undated) DEVICE — TAP POWER 6MM DOUBLE LEAD

## (undated) DEVICE — SUT PDS II 0 CT VIL MONO 36

## (undated) DEVICE — TUBE FRAZIER 5MM 2FT SOFT TIP

## (undated) DEVICE — ELECTRODE REM PLYHSV RETURN 9